# Patient Record
Sex: FEMALE | Race: WHITE | NOT HISPANIC OR LATINO | Employment: OTHER | URBAN - METROPOLITAN AREA
[De-identification: names, ages, dates, MRNs, and addresses within clinical notes are randomized per-mention and may not be internally consistent; named-entity substitution may affect disease eponyms.]

---

## 2017-02-21 ENCOUNTER — CONVERSION ENCOUNTER (OUTPATIENT)
Dept: MAMMOGRAPHY | Facility: HOSPITAL | Age: 67
End: 2017-02-21

## 2017-11-30 ENCOUNTER — CONVERSION ENCOUNTER (OUTPATIENT)
Dept: MAMMOGRAPHY | Facility: HOSPITAL | Age: 67
End: 2017-11-30

## 2018-03-27 ENCOUNTER — CONVERSION ENCOUNTER (OUTPATIENT)
Dept: MAMMOGRAPHY | Facility: HOSPITAL | Age: 68
End: 2018-03-27

## 2018-04-16 ENCOUNTER — CONVERSION ENCOUNTER (OUTPATIENT)
Dept: MAMMOGRAPHY | Facility: HOSPITAL | Age: 68
End: 2018-04-16

## 2018-04-30 ENCOUNTER — CONVERSION ENCOUNTER (OUTPATIENT)
Dept: MAMMOGRAPHY | Facility: HOSPITAL | Age: 68
End: 2018-04-30

## 2018-05-10 ENCOUNTER — OFFICE VISIT CONVERTED (OUTPATIENT)
Dept: OTHER | Facility: HOSPITAL | Age: 68
End: 2018-05-10
Attending: SURGERY

## 2018-05-24 ENCOUNTER — OFFICE VISIT CONVERTED (OUTPATIENT)
Dept: OTHER | Facility: HOSPITAL | Age: 68
End: 2018-05-24
Attending: SURGERY

## 2018-06-06 ENCOUNTER — OFFICE VISIT CONVERTED (OUTPATIENT)
Dept: SURGERY | Facility: CLINIC | Age: 68
End: 2018-06-06
Attending: SURGERY

## 2018-06-20 ENCOUNTER — OFFICE VISIT CONVERTED (OUTPATIENT)
Dept: SURGERY | Facility: CLINIC | Age: 68
End: 2018-06-20
Attending: SURGERY

## 2018-06-27 ENCOUNTER — OFFICE VISIT CONVERTED (OUTPATIENT)
Dept: SURGERY | Facility: CLINIC | Age: 68
End: 2018-06-27
Attending: SURGERY

## 2018-06-29 ENCOUNTER — OFFICE VISIT CONVERTED (OUTPATIENT)
Dept: CARDIOLOGY | Facility: CLINIC | Age: 68
End: 2018-06-29
Attending: INTERNAL MEDICINE

## 2018-07-11 ENCOUNTER — OFFICE VISIT CONVERTED (OUTPATIENT)
Dept: SURGERY | Facility: CLINIC | Age: 68
End: 2018-07-11
Attending: SURGERY

## 2018-10-01 ENCOUNTER — OFFICE VISIT CONVERTED (OUTPATIENT)
Dept: CARDIOLOGY | Facility: CLINIC | Age: 68
End: 2018-10-01
Attending: INTERNAL MEDICINE

## 2019-02-07 ENCOUNTER — HOSPITAL ENCOUNTER (OUTPATIENT)
Dept: GENERAL RADIOLOGY | Facility: HOSPITAL | Age: 69
Discharge: HOME | End: 2019-02-07
Attending: NURSE PRACTITIONER

## 2019-02-13 ENCOUNTER — OFFICE VISIT CONVERTED (OUTPATIENT)
Dept: ONCOLOGY | Facility: HOSPITAL | Age: 69
End: 2019-02-13
Attending: NURSE PRACTITIONER

## 2019-02-13 ENCOUNTER — HOSPITAL ENCOUNTER (OUTPATIENT)
Dept: ONCOLOGY | Facility: HOSPITAL | Age: 69
Discharge: HOME | End: 2019-02-13
Attending: NURSE PRACTITIONER

## 2019-05-11 ENCOUNTER — HOSPITAL ENCOUNTER (OUTPATIENT)
Dept: URGENT CARE | Facility: CLINIC | Age: 69
Discharge: HOME | End: 2019-05-11
Attending: EMERGENCY MEDICINE

## 2019-07-03 ENCOUNTER — OFFICE VISIT CONVERTED (OUTPATIENT)
Dept: ORTHOPEDIC SURGERY | Facility: CLINIC | Age: 69
End: 2019-07-03
Attending: ORTHOPAEDIC SURGERY

## 2019-08-26 ENCOUNTER — HOSPITAL ENCOUNTER (OUTPATIENT)
Dept: MAMMOGRAPHY | Facility: HOSPITAL | Age: 69
Discharge: HOME | End: 2019-08-26
Attending: NURSE PRACTITIONER

## 2019-10-22 ENCOUNTER — OFFICE VISIT CONVERTED (OUTPATIENT)
Dept: CARDIOLOGY | Facility: CLINIC | Age: 69
End: 2019-10-22
Attending: INTERNAL MEDICINE

## 2019-11-04 ENCOUNTER — OFFICE VISIT CONVERTED (OUTPATIENT)
Dept: ONCOLOGY | Facility: HOSPITAL | Age: 69
End: 2019-11-04
Attending: NURSE PRACTITIONER

## 2019-11-04 ENCOUNTER — HOSPITAL ENCOUNTER (OUTPATIENT)
Dept: ONCOLOGY | Facility: HOSPITAL | Age: 69
Discharge: HOME | End: 2019-11-04
Attending: NURSE PRACTITIONER

## 2019-11-04 LAB
ALBUMIN SERPL-MCNC: 4.6 G/DL (ref 3.5–5)
ALBUMIN/GLOB SERPL: 1.8 {RATIO} (ref 1.4–2.6)
ALP SERPL-CCNC: 62 U/L (ref 43–160)
ALT SERPL-CCNC: 9 U/L (ref 10–40)
ANION GAP SERPL CALC-SCNC: 20 MMOL/L (ref 8–19)
AST SERPL-CCNC: 18 U/L (ref 15–50)
BASOPHILS # BLD AUTO: 0.04 10*3/UL (ref 0–0.2)
BASOPHILS NFR BLD AUTO: 0.7 % (ref 0–3)
BILIRUB SERPL-MCNC: 0.31 MG/DL (ref 0.2–1.3)
BUN SERPL-MCNC: 13 MG/DL (ref 5–25)
BUN/CREAT SERPL: 17 {RATIO} (ref 6–20)
CALCIUM SERPL-MCNC: 9.2 MG/DL (ref 8.7–10.4)
CHLORIDE SERPL-SCNC: 100 MMOL/L (ref 99–111)
CONV ABS IMM GRAN: 0.01 10*3/UL (ref 0–0.2)
CONV CO2: 25 MMOL/L (ref 22–32)
CONV IMMATURE GRAN: 0.2 % (ref 0–1.8)
CONV TOTAL PROTEIN: 7.2 G/DL (ref 6.3–8.2)
CREAT UR-MCNC: 0.77 MG/DL (ref 0.5–0.9)
DEPRECATED RDW RBC AUTO: 43 FL (ref 36.4–46.3)
EOSINOPHIL # BLD AUTO: 0.13 10*3/UL (ref 0–0.7)
EOSINOPHIL # BLD AUTO: 2.3 % (ref 0–7)
ERYTHROCYTE [DISTWIDTH] IN BLOOD BY AUTOMATED COUNT: 12.6 % (ref 11.7–14.4)
GFR SERPLBLD BASED ON 1.73 SQ M-ARVRAT: >60 ML/MIN/{1.73_M2}
GLOBULIN UR ELPH-MCNC: 2.6 G/DL (ref 2–3.5)
GLUCOSE SERPL-MCNC: 124 MG/DL (ref 65–99)
HCT VFR BLD AUTO: 36.1 % (ref 37–47)
HGB BLD-MCNC: 12.1 G/DL (ref 12–16)
LDH SERPL-CCNC: 182 U/L (ref 120–240)
LYMPHOCYTES # BLD AUTO: 1.65 10*3/UL (ref 1–5)
LYMPHOCYTES NFR BLD AUTO: 28.8 % (ref 20–45)
MCH RBC QN AUTO: 31.3 PG (ref 27–31)
MCHC RBC AUTO-ENTMCNC: 33.5 G/DL (ref 33–37)
MCV RBC AUTO: 93.3 FL (ref 81–99)
MONOCYTES # BLD AUTO: 0.6 10*3/UL (ref 0.2–1.2)
MONOCYTES NFR BLD AUTO: 10.5 % (ref 3–10)
NEUTROPHILS # BLD AUTO: 3.29 10*3/UL (ref 2–8)
NEUTROPHILS NFR BLD AUTO: 57.5 % (ref 30–85)
NRBC CBCN: 0 % (ref 0–0.7)
OSMOLALITY SERPL CALC.SUM OF ELEC: 294 MOSM/KG (ref 273–304)
PLATELET # BLD AUTO: 296 10*3/UL (ref 130–400)
PMV BLD AUTO: 9.9 FL (ref 9.4–12.3)
POTASSIUM SERPL-SCNC: 3.9 MMOL/L (ref 3.5–5.3)
RBC # BLD AUTO: 3.87 10*6/UL (ref 4.2–5.4)
SODIUM SERPL-SCNC: 141 MMOL/L (ref 135–147)
WBC # BLD AUTO: 5.72 10*3/UL (ref 4.8–10.8)

## 2019-11-05 LAB
CANCER AG15-3 SERPL-ACNC: 14.4 U/ML (ref 0–25)
CANCER AG27-29 SERPL-ACNC: 13.5 U/ML (ref 0–38.6)

## 2019-11-06 LAB — 1,25(OH)2D3 SERPL-MCNC: 34.1 PG/ML (ref 19.9–79.3)

## 2019-11-11 ENCOUNTER — HOSPITAL ENCOUNTER (OUTPATIENT)
Dept: MRI IMAGING | Facility: HOSPITAL | Age: 69
Discharge: HOME | End: 2019-11-11
Attending: NURSE PRACTITIONER

## 2019-11-26 ENCOUNTER — HOSPITAL ENCOUNTER (OUTPATIENT)
Dept: ONCOLOGY | Facility: HOSPITAL | Age: 69
Discharge: HOME | End: 2019-11-26
Attending: NURSE PRACTITIONER

## 2019-11-26 ENCOUNTER — OFFICE VISIT CONVERTED (OUTPATIENT)
Dept: ONCOLOGY | Facility: HOSPITAL | Age: 69
End: 2019-11-26
Attending: NURSE PRACTITIONER

## 2019-12-02 ENCOUNTER — CONVERSION ENCOUNTER (OUTPATIENT)
Dept: OTHER | Facility: HOSPITAL | Age: 69
End: 2019-12-02

## 2019-12-02 ENCOUNTER — OFFICE VISIT CONVERTED (OUTPATIENT)
Dept: OTOLARYNGOLOGY | Facility: CLINIC | Age: 69
End: 2019-12-02
Attending: OTOLARYNGOLOGY

## 2019-12-20 ENCOUNTER — HOSPITAL ENCOUNTER (OUTPATIENT)
Dept: CT IMAGING | Facility: HOSPITAL | Age: 69
Discharge: HOME | End: 2019-12-20
Attending: OTOLARYNGOLOGY

## 2019-12-20 LAB
CREAT BLD-MCNC: 0.7 MG/DL (ref 0.6–1.4)
GFR SERPLBLD BASED ON 1.73 SQ M-ARVRAT: >60 ML/MIN/{1.73_M2}

## 2020-06-17 ENCOUNTER — HOSPITAL ENCOUNTER (OUTPATIENT)
Dept: OTHER | Facility: HOSPITAL | Age: 70
Discharge: HOME | End: 2020-06-17
Attending: NURSE PRACTITIONER

## 2020-06-26 ENCOUNTER — OFFICE VISIT CONVERTED (OUTPATIENT)
Dept: ORTHOPEDIC SURGERY | Facility: CLINIC | Age: 70
End: 2020-06-26
Attending: ORTHOPAEDIC SURGERY

## 2020-07-23 ENCOUNTER — CONVERSION ENCOUNTER (OUTPATIENT)
Dept: GASTROENTEROLOGY | Facility: HOSPITAL | Age: 70
End: 2020-07-23

## 2020-07-24 LAB — SARS-COV-2 RNA SPEC QL NAA+PROBE: NOT DETECTED

## 2020-07-27 ENCOUNTER — HOSPITAL ENCOUNTER (OUTPATIENT)
Dept: GASTROENTEROLOGY | Facility: HOSPITAL | Age: 70
Setting detail: HOSPITAL OUTPATIENT SURGERY
Discharge: HOME | End: 2020-07-27
Attending: INTERNAL MEDICINE

## 2020-07-27 ENCOUNTER — PROCEDURE VISIT CONVERTED (OUTPATIENT)
Dept: OTHER | Facility: HOSPITAL | Age: 70
End: 2020-07-27
Attending: INTERNAL MEDICINE

## 2020-07-27 LAB — GLUCOSE BLD-MCNC: 102 MG/DL (ref 65–99)

## 2020-08-11 ENCOUNTER — HOSPITAL ENCOUNTER (OUTPATIENT)
Dept: LAB | Facility: HOSPITAL | Age: 70
Discharge: HOME | End: 2020-08-11
Attending: NURSE PRACTITIONER

## 2020-08-11 ENCOUNTER — HOSPITAL ENCOUNTER (OUTPATIENT)
Dept: GENERAL RADIOLOGY | Facility: HOSPITAL | Age: 70
Discharge: HOME | End: 2020-08-11
Attending: NURSE PRACTITIONER

## 2020-08-11 LAB
ALBUMIN SERPL-MCNC: 4.3 G/DL (ref 3.5–5)
ALBUMIN/GLOB SERPL: 1.7 {RATIO} (ref 1.4–2.6)
ALP SERPL-CCNC: 74 U/L (ref 43–160)
ALT SERPL-CCNC: 11 U/L (ref 10–40)
ANION GAP SERPL CALC-SCNC: 19 MMOL/L (ref 8–19)
AST SERPL-CCNC: 18 U/L (ref 15–50)
BASOPHILS # BLD AUTO: 0.05 10*3/UL (ref 0–0.2)
BASOPHILS NFR BLD AUTO: 0.7 % (ref 0–3)
BILIRUB SERPL-MCNC: 0.19 MG/DL (ref 0.2–1.3)
BUN SERPL-MCNC: 11 MG/DL (ref 5–25)
BUN/CREAT SERPL: 13 {RATIO} (ref 6–20)
CALCIUM SERPL-MCNC: 9.8 MG/DL (ref 8.7–10.4)
CHLORIDE SERPL-SCNC: 103 MMOL/L (ref 99–111)
CONV ABS IMM GRAN: 0.03 10*3/UL (ref 0–0.2)
CONV CO2: 24 MMOL/L (ref 22–32)
CONV IMMATURE GRAN: 0.4 % (ref 0–1.8)
CONV TOTAL PROTEIN: 6.8 G/DL (ref 6.3–8.2)
CREAT UR-MCNC: 0.85 MG/DL (ref 0.5–0.9)
DEPRECATED RDW RBC AUTO: 47.4 FL (ref 36.4–46.3)
EOSINOPHIL # BLD AUTO: 0.15 10*3/UL (ref 0–0.7)
EOSINOPHIL # BLD AUTO: 2.2 % (ref 0–7)
ERYTHROCYTE [DISTWIDTH] IN BLOOD BY AUTOMATED COUNT: 13.3 % (ref 11.7–14.4)
GFR SERPLBLD BASED ON 1.73 SQ M-ARVRAT: >60 ML/MIN/{1.73_M2}
GLOBULIN UR ELPH-MCNC: 2.5 G/DL (ref 2–3.5)
GLUCOSE SERPL-MCNC: 126 MG/DL (ref 65–99)
HCT VFR BLD AUTO: 35.9 % (ref 37–47)
HGB BLD-MCNC: 11.4 G/DL (ref 12–16)
LYMPHOCYTES # BLD AUTO: 1.42 10*3/UL (ref 1–5)
LYMPHOCYTES NFR BLD AUTO: 20.9 % (ref 20–45)
MCH RBC QN AUTO: 30.6 PG (ref 27–31)
MCHC RBC AUTO-ENTMCNC: 31.8 G/DL (ref 33–37)
MCV RBC AUTO: 96.2 FL (ref 81–99)
MONOCYTES # BLD AUTO: 0.66 10*3/UL (ref 0.2–1.2)
MONOCYTES NFR BLD AUTO: 9.7 % (ref 3–10)
NEUTROPHILS # BLD AUTO: 4.5 10*3/UL (ref 2–8)
NEUTROPHILS NFR BLD AUTO: 66.1 % (ref 30–85)
NRBC CBCN: 0 % (ref 0–0.7)
OSMOLALITY SERPL CALC.SUM OF ELEC: 295 MOSM/KG (ref 273–304)
PLATELET # BLD AUTO: 271 10*3/UL (ref 130–400)
PMV BLD AUTO: 10.6 FL (ref 9.4–12.3)
POTASSIUM SERPL-SCNC: 4.2 MMOL/L (ref 3.5–5.3)
RBC # BLD AUTO: 3.73 10*6/UL (ref 4.2–5.4)
SODIUM SERPL-SCNC: 142 MMOL/L (ref 135–147)
WBC # BLD AUTO: 6.81 10*3/UL (ref 4.8–10.8)

## 2020-08-24 ENCOUNTER — TELEPHONE CONVERTED (OUTPATIENT)
Dept: GASTROENTEROLOGY | Facility: CLINIC | Age: 70
End: 2020-08-24
Attending: NURSE PRACTITIONER

## 2020-10-26 ENCOUNTER — CONVERSION ENCOUNTER (OUTPATIENT)
Dept: CARDIOLOGY | Facility: CLINIC | Age: 70
End: 2020-10-26

## 2020-10-26 ENCOUNTER — OFFICE VISIT CONVERTED (OUTPATIENT)
Dept: CARDIOLOGY | Facility: CLINIC | Age: 70
End: 2020-10-26
Attending: INTERNAL MEDICINE

## 2020-10-27 ENCOUNTER — HOSPITAL ENCOUNTER (OUTPATIENT)
Dept: OTHER | Facility: HOSPITAL | Age: 70
Discharge: HOME | End: 2020-10-27
Attending: INTERNAL MEDICINE

## 2020-10-27 LAB
CHOLEST SERPL-MCNC: 139 MG/DL (ref 107–200)
CHOLEST/HDLC SERPL: 2.8 {RATIO} (ref 3–6)
HDLC SERPL-MCNC: 50 MG/DL (ref 40–60)
LDLC SERPL CALC-MCNC: 62 MG/DL (ref 70–100)
TRIGL SERPL-MCNC: 134 MG/DL (ref 40–150)
VLDLC SERPL-MCNC: 27 MG/DL (ref 5–37)

## 2020-11-02 ENCOUNTER — CONVERSION ENCOUNTER (OUTPATIENT)
Dept: CARDIOLOGY | Facility: CLINIC | Age: 70
End: 2020-11-02
Attending: INTERNAL MEDICINE

## 2020-12-18 ENCOUNTER — HOSPITAL ENCOUNTER (OUTPATIENT)
Dept: URGENT CARE | Facility: CLINIC | Age: 70
Discharge: HOME | End: 2020-12-18

## 2021-03-31 ENCOUNTER — OFFICE VISIT CONVERTED (OUTPATIENT)
Dept: ORTHOPEDIC SURGERY | Facility: CLINIC | Age: 71
End: 2021-03-31
Attending: ORTHOPAEDIC SURGERY

## 2021-05-10 NOTE — H&P
History and Physical      Patient Name: Camilla Navas   Patient ID: [id number]   Sex: Female   YOB: 1950    Primary Care Provider: Tamanna Chavez   Referring Provider: Almita CLIFFORD    Visit Date: June 26, 2020    Provider: Ad Mcclure MD   Location: [facility]   Location Address: [address] [place] [address]  [id number]   Location Phone: [phone]          Chief Complaint  · Right Hip Pain      History Of Present Illness  Camilla Navas is a 69 year old /White female who presents for right hip pain. She points to the outside part of her hip as the source of all of her pain. She denies any trauma to it. She was just walking along and felt a sharp pain there, and now, it is staying. This happened about a month ago. She has had X-rays, which shows calcifications at the greater trochanter consistent with chronic bursitis and mild-to-moderate osteoarthritis.       Past Medical History  Anemia, Unspecified; Arthritis; Asthma; Atrophic vaginitis; Bladder Disorder; Breast Cancer, Female; CAD (coronary artery disease); Cancer; Colon Polyps; Cough; DDD (degenerative disc disease), cervical; Depression; Diabetes; Diabetes Mellitus, Type II; Diverticula of Intestine; Dry mouth; Dysuria; Ear pain; GERD; Hearing loss; Heart Disease; Hematuria; High cholesterol; Hypertension; Limb Swelling; Mandibular mass; Reflux; Renal Cyst; Renal cysts, acquired, bilateral; Renal mass; Vaginal itching         Past Surgical History  Arthroscopic knee surgery, right; Bladder Surgery; Breast biopsy, right breast; Cardiac Catherization; Cholecystectomy; Colonoscopy; EGD; Eye Implant; Gallbladder; Hernia; Hiatal hernia repair; Hysterectomy; Knee surgery; Lumpectomy, right breast; Tonsillectomy; Ventral Hernia         Medication List  amlodipine 10 mg oral tablet; aspirin 81 mg oral tablet,delayed release (DR/EC); atenolol 50 mg oral tablet; gabapentin 300 mg oral capsule; hydrochlorothiazide 25 mg  "oral tablet; metformin 1,000 mg oral tablet; naproxen 500 mg oral tablet; Norco 5-325 mg oral tablet; Norvasc 10 mg oral tablet; NuLYTELY with Flavor Packs 420 gram oral recon soln; Pravachol 80 mg oral tablet; Tylenol Arthritis Pain 650 mg oral tablet extended release; vitamin E 400 unit oral capsule; Zofran 4 mg oral tablet; Zoloft 100 mg oral tablet         Allergy List  benazepril; ezetimibe; fenofibrate; niacin; Niaspan Extended-Release; Relafen; simvastatin; Tricor; Vytorin 10-10         Family Medical History  Cancer, Unspecified; Diabetes, unspecified type; Family history of uterine cancer         Social History  Alcohol Use (Never); Claustophobic (Unknown); Homemaker.; lives with spouse; .; Recreational Drug Use (Never); Tobacco (Never)         Review of Systems  · Constitutional  o Denies  o : fever, chills, weight loss  · Cardiovascular  o Denies  o : chest pain, shortness of breath  · Gastrointestinal  o Denies  o : liver disease, heartburn, nausea, blood in stools  · Genitourinary  o Denies  o : painful urination, blood in urine  · Integument  o Denies  o : rash, itching  · Neurologic  o Denies  o : headache, weakness, loss of consciousness  · Musculoskeletal  o Admits  o : painful, swollen joints  · Psychiatric  o Admits  o : depression  o Denies  o : drug/alcohol addiction, anxiety      Vitals  Date Time BP Position Site L\R Cuff Size HR RR TEMP (F) WT  HT  BMI kg/m2 BSA m2 O2 Sat        06/26/2020 01:57 PM         184lbs 0oz 5'  6\" 29.7 1.97           Physical Examination  · Constitutional  o Appearance  o : well developed, well-nourished, no obvious deformities present  · Head and Face  o Head  o :   § Inspection  § : normocephalic  o Face  o :   § Inspection  § : no facial lesions  · Eyes  o Conjunctivae  o : conjunctivae normal  o Sclerae  o : sclerae white  · Ears, Nose, Mouth and Throat  o Ears  o :   § External Ears  § : appearance within normal limits  § Hearing  § : " intact  o Nose  o :   § External Nose  § : appearance normal  · Neck  o Inspection/Palpation  o : normal appearance  o Range of Motion  o : full range of motion  · Respiratory  o Respiratory Effort  o : breathing unlabored  o Inspection of Chest  o : normal appearance  o Auscultation of Lungs  o : no audible wheezing or rales  · Cardiovascular  o Heart  o : regular rate  · Gastrointestinal  o Abdominal Examination  o : soft and non-tender  · Skin and Subcutaneous Tissue  o General Inspection  o : intact, no rashes  · Psychiatric  o General  o : Alert and oriented x3  o Judgement and Insight  o : judgment and insight intact  o Mood and Affect  o : mood normal, affect appropriate  · Right Hip  o Inspection  o : Appearance of the hip is normal compared bilaterally. No atrophy or skin discoloration. Exquisite tenderness over the trochanteric bursa. Full range of motion. No groin pain with internal/external rotation. Her knee and ankle is nontender with full range of motion. Good strength of quadriceps, hamstrings, dorsiflexors, and plantar flexors. Sensation is grossly intact. Neurovascularly intact. Calf supple; no signs of DVT.   · Injection Note/Aspiration Note  o Site  o : right hip  o Procedure  o : After educating the patient, patient gave consent for procedure. After using Chloraprep, the injection was given. The patient tolerated the procedure well.  o Medication  o : 80 mg of DepoMedrol with 9cc of 1% Lidocaine  · Imaging  o Imaging  o : X-rays performed at [facility] on 06/17/2020 concluded no acute fracture or dislocation; mild-to-moderate osteoarthritic degenerative changes of the bilateral hips noted; degenerative changes of the SI joints, pubic symphysis, and lower lumbosacral spine noted; sacral alae intact; SI joints intact; phleboliths noted in the pelvis; vascular calcifications observed.           Assessment  · Primary osteoarthritis of right hip     715.15/M16.11  · Trochanteric bursitis of right  hip     726.5/M70.61  · Right Pain: Hip     719.45/M25.559      Plan  · Orders  o Depo-Medrol injection 80mg () - 719.45/M25.559 - 06/26/2020   Lot [id number] Exp 06 2021 [employer] Administered by PATRICK Mcclure MD  o Hip Intra-articular Injection without US Guidance [facility] ([id number]) - 719.45/M25.559 - 06/26/2020   Lot [id number] Exp 07 01 2021 [employer] Administered by PATRICK Mcclure MD  · Medications  o Medications have been Reconciled  o Transition of Care or Provider Policy  · Instructions  o Reviewed the patient's Past Medical, Social, and Family history as well as the ROS at today's visit, no changes.  o Call or return if worsening symptoms.  o Exercise handout given.  o Reviewed Xrays.  o This note was transcribed by Eileen hurd.  o Discussed the risks and benefits of an injection. Given a home exercise program and a prescription for physical therapy at [facility] if she is failing to improve. Follow up as needed.             Electronically Signed by: Eileen Small-, Other -Author on June 27, 2020 04:14:31 PM  Electronically Co-signed by: Ad Mcclure MD -Reviewer on June 29, 2020 09:30:43 AM

## 2021-05-10 NOTE — H&P
History and Physical      Patient Name: Camilla Navas   Patient ID: [id number]   Sex: Female   YOB: 1950        Create Date: 2020              Chief Complaint  · Surgical History and Physical  · Screening Colonoscopy  · Gerd  · EGD      History Of Present Illness  NON-INPATIENT HISTORY AND PHYSICAL  Allergies: benazepril, ezetimibe, fenofibrate, niacin, Niaspan Extended-Release, Relafen, simvastatin, Tricor, and Vytorin 10-10   Chief Complaint/History of Present Illness: Colonoscopy Screening-HX of polyps; EGD-Gerd   Colon Recall: Yes How Lon years   Failed Outpatient Treatment/Contraindications: N/A   Current Medications: aspirin 81 mg oral tablet,delayed release (DR/EC), atenolol 50 mg oral tablet, gabapentin 300 mg oral capsule, hydrochlorothiazide 25 mg oral tablet, metformin 1,000 mg oral tablet, naproxen 500 mg oral tablet, Norvasc 10 mg oral tablet, NuLYTELY with Flavor Packs 420 gram oral recon soln, Pravachol 80 mg oral tablet, Tylenol Arthritis Pain 650 mg oral tablet extended release, vitamin E 400 unit oral capsule, Zofran 4 mg oral tablet, and Zoloft 100 mg oral tablet   Significant Past Medical History: Arthritis, Asthma, Atrophic vaginitis, Breast Cancer, Female, CAD (coronary artery disease), Colon Polyps, Cough, DDD (degenerative disc disease), cervical, Depression, Diabetes mellitus, type II, Diverticula of Intestine, Dry mouth, Dysuria, Ear pain, GERD, Hearing loss, Heart Disease, Hematuria, High cholesterol, Hypertension, Mandibular mass, Renal Cyst, Renal cysts, acquired, bilateral, Renal mass, and Vaginal itching   Significant Family Medical History: Mother diagnosed with Uterine Cancer   Significant Past Surgical History: Arthroscopic knee surgery, right, Bladder Surgery, Breast biopsy, right breast, Cardiac Catherization, Cholecystectomy, Colonoscopy, EGD, Eye Implant, Gallbladder, Hernia, Hiatal hernia repair, Hysterectomy, Knee surgery, Lumpectomy, right breast,  Tonsillectomy, and Ventral Hernia   Previous Colonoscopy: Yes YEAR: 2014 By Whom: Ren Gillette MD   Previous EGD: Yes YEAR: 2014 By Whom: Ren Gillette MD   PHYSICAL EXAM:  Heart: Regular Rate and Rhythm   Lungs: Breathing Unlabored       Past Medical History  Disease Name Date Onset Notes   Anemia, Unspecified --  --    Arthritis --  --    Asthma --  --    Atrophic Vaginitis 01/11/2017 --    Bladder Disorder --  --    Breast Cancer, Female 2001 Right Breast Cancer   CAD (coronary artery disease) --  --    Cancer --  --    Colon Polyps --  --    Cough --  --    DDD (degenerative disc disease), cervical --  --    Depression --  --    Diabetes --  --    Diabetes Mellitus, Type II --  --    Diverticula of Intestine --  --    Dry mouth --  --    Dysuria 01/11/2017 --    Ear pain --  --    GERD --  --    Hearing loss --  --    Heart Disease --  Blockage in Heart   Hematuria --  --    High cholesterol --  --    Hypertension --  --    Limb Swelling --  --    Mandibular mass --  --    Reflux --  --    Renal Cyst --  --    Renal cysts, acquired, bilateral 01/11/2017 --    Renal mass --  --    Vaginal itching 01/11/2017 --          Past Surgical History  Procedure Name Date Notes   Arthroscopic knee surgery, right --  --    Bladder Surgery --  --    Breast biopsy, right breast --  --    Cardiac Catherization --  --    Cholecystectomy --  --    Colonoscopy --  --    EGD --  --    Eye Implant --  yes   Gallbladder --  --    Hernia --  --    Hiatal hernia repair --  --    Hysterectomy --  --    Knee surgery --  Right   Lumpectomy, right breast --  --    Tonsillectomy --  --    Ventral Hernia --  --          Medication List  Name Date Started Instructions   amlodipine 10 mg oral tablet 04/27/2020 TAKE 1 TABLET BY MOUTH EVERY DAY   aspirin 81 mg oral tablet,delayed release (DR/EC)  take 1 tablet (81 mg) by oral route once daily   atenolol 50 mg oral tablet 04/27/2020 TAKE 1 TABLET BY MOUTH EVERY DAY   gabapentin 300 mg oral  capsule  take 1 capsule by oral route daily   hydrochlorothiazide 25 mg oral tablet 08/15/2019 TAKE 1 TABLET BY MOUTH EVERY DAY   metformin 1,000 mg oral tablet  take 1 tablet (1,000 mg) by oral route 2 times per day with morning and evening meals   naproxen 500 mg oral tablet  take 1 tablet (500 mg) by oral route every 12 hours with food   Norco 5-325 mg oral tablet 06/26/2020 take 1 tablet by oral route every 8 hours as needed   Norvasc 10 mg oral tablet  take 1 tablet (10 mg) by oral route once daily   NuLYTELY with Flavor Packs 420 gram oral recon soln 03/03/2020 take as directed per your doctors instructions   Pravachol 80 mg oral tablet  --    Tylenol Arthritis Pain 650 mg oral tablet extended release  take 1 tablet by oral route daily   vitamin E 400 unit oral capsule  --    Zofran 4 mg oral tablet 06/06/2018 take 2 tablets (8 mg) by oral route every 12 hours   Zoloft 100 mg oral tablet  --          Allergy List  Allergen Name Date Reaction Notes   benazepril --  --  --    ezetimibe --  --  --    fenofibrate --  --  --    niacin --  --  --    Niaspan Extended-Release --  --  --    Relafen --  --  --    simvastatin --  --  --    Tricor --  --  --    Vytorin 10-10 --  --  --        Allergies Reconciled  Family Medical History  Disease Name Relative/Age Notes   Cancer, Unspecified Mother/   Mother   Diabetes, unspecified type Son/   Son   Family history of uterine cancer Mother/   --          Social History  Finding Status Start/Stop Quantity Notes   Alcohol Use Never --/-- --  does not drink   Claustophobic Unknown --/-- --  yes   Homemaker. --  --/-- --  --    lives with spouse --  --/-- --  --    . --  --/-- --  --    Recreational Drug Use Never --/-- --  no   Tobacco Never --/-- --  never smoker             Assessment  · Preoperative examination     V72.84/Z01.818  · Screening for colon cancer     V76.51/Z12.11  · GERD (gastroesophageal reflux disease)     530.81/K21.9      Plan  · Orders  o Consent  for Esophagogastroduodenoscopy (EGD) and Colonoscopy- Possible risks/complications, benefits, and alternatives to surgical or invasive procedure have been explained to patient and/or legal guardian. - Patient has been evaluated and can tolerate anesthesia and/or sedation. Risks, benefits, and alternatives to anesthesia and sedation have been explained to patient and/or legal guardian. ([id number]) - - 07/27/2020  · Instructions  o ****Surgical Orders****  o ***************  o Outpatient  o ***************  o RISK AND BENEFITS:  o Possible risks/complications, benefits and alternatives to surgical or invasive procedure have been explained to the patient and/or legal guardian.  o Patient has been evaluated and can tolerate anesthesia and/or sedation. Risks, benefits, and alternatives to anesthesia and sedation have been explained to the patient and/or legal guardian.  o ***************  o PREP: Per protocol  o IV: Per Anesthesia  o The above History and Physical Examination has been completed within 30 days of admission.            Electronically Signed by: Agustina Chambers Other -Author on July 27, 2020 10:17:20 AM

## 2021-05-10 NOTE — H&P
History and Physical      Patient Name: Camilla Navas   Patient ID: [id number]   Sex: Female   YOB: 1950    Primary Care Provider: Tamanna Chavez   Referring Provider: Anne CLIFFORD    Visit Date: March 31, 2021    Provider: Ad Mcclure MD   Location: [facility]   Location Address: [address] [place] [address]  [id number]   Location Phone: [phone]          Chief Complaint  · Right Hip Pain      History Of Present Illness  Camilla Navas is a 70 year old /White female who presents today to [facility].      Patient presents today for a follow-up of right hip pain. We have been treating patient for trochanteric bursitis of her right hip. Patient received an injection on 6/26/20 that has provided her some relief of pain. She states she has been having increasing lateral sided hip pain in the past month. She states she does have some groin pain as well.       Past Medical History  Anemia, Unspecified; Arthritis; Asthma; Atrophic vaginitis; Bladder disorder; Breast Cancer, Female; CAD (coronary artery disease); Cancer; Chest pain; Colon Polyps; Cough; DDD (degenerative disc disease), cervical; Depression; Diabetes; Diabetes Mellitus, Type II; Diverticula of Intestine; Dry mouth; Dysuria; Ear pain; GERD; Hearing loss; Heart Disease; Hematuria; High cholesterol; Hyperlipemia; Hypertension; Limb Swelling; Mandibular mass; Reflux; Renal Cyst; Renal cysts, acquired, bilateral; Renal mass; Seasonal allergies; Vaginal itching         Past Surgical History  Arthroscopic knee surgery, right; Bladder Surgery; Breast biopsy, right breast; Cardiac Catherization; Cholecystectomy; Colonoscopy; EGD; Eye Implant; Gallbladder; Hernia; Hiatal hernia repair; Hysterectomy; Knee surgery; Lumpectomy, right breast; Tonsillectomy; Ventral Hernia         Medication List  amlodipine 10 mg oral tablet; aspirin 81 mg oral tablet,delayed release (DR/EC); atenolol 50 mg oral  "tablet; gabapentin 300 mg oral capsule; hydrochlorothiazide 25 mg oral tablet; metformin 1,000 mg oral tablet; Norvasc 10 mg oral tablet; Pravachol 80 mg oral tablet; pravastatin 80 mg oral tablet; Protonix 20 mg oral tablet,delayed release (DR/EC); Tylenol Arthritis Pain 650 mg oral tablet extended release; vitamin E 400 unit oral capsule; Zofran 4 mg oral tablet; Zoloft 100 mg oral tablet         Allergy List  benazepril; ezetimibe; fenofibrate; niacin; Niaspan Extended-Release; Relafen; simvastatin; Tricor; Vytorin 10-10       Allergies Reconciled  Family Medical History  Cancer, Unspecified; Diabetes, unspecified type; No family history of colorectal cancer; Family history of Arthritis; Family history of uterine cancer         Social History  Alcohol Use (Never); Claustophobic (Unknown); Homemaker.; lives with spouse; .; Recreational Drug Use (Never); Tobacco (Never)         Review of Systems  · Constitutional  o Denies  o : fever, chills, weight loss  · Cardiovascular  o Denies  o : chest pain, shortness of breath  · Gastrointestinal  o Denies  o : liver disease, heartburn, nausea, blood in stools  · Genitourinary  o Denies  o : painful urination, blood in urine  · Integument  o Denies  o : rash, itching  · Neurologic  o Denies  o : headache, weakness, loss of consciousness  · Musculoskeletal  o Denies  o : painful, swollen joints  · Psychiatric  o Denies  o : drug/alcohol addiction, anxiety, depression      Vitals  Date Time BP Position Site L\R Cuff Size HR RR TEMP (F) WT  HT  BMI kg/m2 BSA m2 O2 Sat FR L/min FiO2 HC       02/06/2017 02:36 /70 Sitting       202lbs 0oz 5'  6\" 32.6 2.07       03/31/2021 02:34 PM      67 - R   185lbs 16oz 5'  6\" 30.02 1.98 99 %            Physical Examination  · Constitutional  o Appearance  o : well developed, well-nourished, no obvious deformities present  · Head and Face  o Head  o :   § Inspection  § : normocephalic  o Face  o :   § Inspection  § : no facial " lesions  · Eyes  o Conjunctivae  o : conjunctivae normal  o Sclerae  o : sclerae white  · Ears, Nose, Mouth and Throat  o Ears  o :   § External Ears  § : appearance within normal limits  § Hearing  § : intact  o Nose  o :   § External Nose  § : appearance normal  · Neck  o Inspection/Palpation  o : normal appearance  o Range of Motion  o : full range of motion  · Respiratory  o Respiratory Effort  o : breathing unlabored  o Inspection of Chest  o : normal appearance  o Auscultation of Lungs  o : no audible wheezing or rales  · Cardiovascular  o Heart  o : regular rate  · Gastrointestinal  o Abdominal Examination  o : soft and non-tender  · Skin and Subcutaneous Tissue  o General Inspection  o : intact, no rashes  · Psychiatric  o General  o : Alert and oriented x3  o Judgement and Insight  o : judgment and insight intact  o Mood and Affect  o : mood normal, affect appropriate  · Right Hip  o Inspection  o : Appearance of the hip is normal compared bilaterally. No swelling, skin discoloration or atrophy. Tender trochanteric bursa. Full hip range of motion. Good tone of hip flexors, hip extensors, hip adductor, hip abductors. Good strength of quadriceps, hamstrings, dorsiflexors, and plantar flexors. Sensation grossly intact. Neurovascular intact. Full leg raise. No groin pain with hip range of motion.  · Injection Note/Aspiration Note  o Site  o : right hip  o Procedure  o : Procedure: After educating the patient, patient gave consent for procedure. After using Chloraprep, the joint space was injected. The patient tolerated the procedure well.   o Medication  o : 80 mg of DepoMedrol with 9cc of 1% Xylocaine  · In Office Procedures  o View  o : AP/LATERAL  o Site  o : right, hip   o Indication  o : Right hip pain   o Study  o : X-rays ordered, taken in the office, and reviewed today.  o Xray  o : Mild to moderate degenerative changes of the right hip.           Assessment  · Trochanteric bursitis of right  hip     726.5/M70.61  · Right Pain: Hip     719.45/M25.559      Plan  · Orders  o Depo-Medrol injection 80mg () - 719.45/M25.559 - 03/31/2021   Lot [id number] Exp 02 2022 [employer] Administered by PATRICK Mcclure MD  o Hip Intra-articular Injection without US Guidance [facility] ([id number]) - 719.45/M25.559 - 03/31/2021   Lot [id number] Exp 04 2024 [employer] Administered by PATRICK Mcclure MD  o Hip (Right) 2 or more views (includes AP Pelvis) [facility]. ([id number]) - 719.45/M25.559 - 03/31/2021  · Medications  o Medications have been Reconciled  o Transition of Care or Provider Policy  · Instructions  o Dr. Mcclure saw and examined the patient and agrees with plan.   o X-rays reviewed by Dr. Mcclure.  o Reviewed the patient's Past Medical, Social, and Family history as well as the ROS at today's visit, no changes.  o Call or return if worsening symptoms.  o Discussed treatment plans with the patient. Patient opted for a right hip injection and tolerated this procedure well. She was given a prescription for physical therapy if she wishes to attend.  o The above service was scribed by Belkis Madrid on my behalf and I attest to the accuracy of the note. [email or web address]            Electronically Signed by: Belkis Madrid-, Other -Author on April 1, 2021 02:55:49 PM  Electronically Co-signed by: Ad Mcclure MD -Reviewer on April 9, 2021 09:36:19 PM

## 2021-05-10 NOTE — PROCEDURES
"   Procedure Note      Patient Name: Camilla Navas   Patient ID: [id number]   Sex: Female   YOB: 1950    Primary Care Provider: Tamanna Chavez   Referring Provider: Anne CLIFFORD    Visit Date: November 2, 2020    Provider: Umesh Guadarrama MD   [place] [facility]   Location Address: [address] [place] [address], [address]   LENARD Spears  [id number]   Location Phone: [phone]          FINAL REPORT   TRANSTHORACIC ECHOCARDIOGRAM REPORT    Diagnosis: Coronary artery disease   Height: 5'6\" Weight: 185 B/P: 142/76 BSA: 1.9   Tech: JTW   MEASUREMENTS:  RVID (Diastole) : RVID. (NORMAL: 0.7 to 2.4 cm max)   LVID (Systole): 3.7 cm (Diastole): 5.3 cm . (NORMAL: 3.7 - 5.4 cm)   Posterior Wall Thickness (Diastole): 1.2 cm. (NORMAL: 0.8 - 1.1 cm)   Septal Thickness (Diastole): 1.2 cm. (NORMAL: 0.7 - 1.2 cm)   LAID (Systole): 3.7 cm. (NORMAL: 1.9 - 3.8 cm)   Aortic Root Diameter (Diastole): 3.0 cm. (NORMAL: 2.0 - 3.7 cm)   COMMENTS:  The patient underwent 2-D, M-Mode, and Doppler examination, including pulse-wave, continuous-wave, and color-flow analysis; the study is technically adequate.   FINDINGS:  MITRAL VALVE: Normal in appearance, opens well. No evidence of mitral valve prolapse. No mitral stenosis. Mild to moderate mitral regurgitation noted. The MR jet is eccentrically directed.   AORTIC VALVE: Normal trileaflet aortic valve, opens well. No evidence of aortic stenosis or regurgitation on Doppler examination.   TRICUSPID VALVE: Normal in appearance, opens well. Mild tricuspid regurgitation. Pulmonary artery systolic pressure within normal limits.   PULMONIC VALVE: Grossly normal.   AORTIC ROOT: Normal in size with adequate motion.   LEFT ATRIUM: Mild left atrial enlargement. No intracavitary masses or clots seen.   LEFT VENTRICLE: The left ventricular chamber size is normal. There is mild concentric left ventricular hypertrophy. Left ventricular systolic function is normal. Estimated LV " ejection fraction is 55-60%. There are no regional wall motion abnormalities. There is grade 2 diastolic dysfunction, pseudonormal pattern of the left ventricle.   RIGHT VENTRICLE: Normal size and function.   RIGHT ATRIUM: Normal in size.   PERICARDIUM: No evidence of pericardial effusion.   INFERIOR VENA CAVA: Measures 1.2 cm in diameter and there is more than 50% collapse during inspiration.   DOPPLER: E/A ratio is 1.5. DT= 142 msec. IVRT is 116 msec. E/E' is 15.   Faxed: 11/05/2020      CONCLUSION:  1.  Normal left ventricular systolic function with estimated LV ejection fraction of 55-60%.   2.  Mild concentric left ventricular hypertrophy.   3.  Grade 2 diastolic dysfunction of the left ventricle.   4.  Mild left atrial enlargement.   5.  Mild to moderate mitral regurgitation.   6.  Mild tricuspid regurgitation with normal calculated pulmonary artery systolic pressure.       MD JERILYN Castellanos/woody    This note was transcribed by Adela Henriquez.  [email or web address]/jerilyn  The above service was transcribed by Adela Henriquez, and I attest to the accuracy of the note.  JERILYN                 Electronically Signed by: Elsa Henriquez-, Other -Author on November 5, 2020 03:56:34 PM  Electronically Co-signed by: Umesh Guadarrama MD -Reviewer on November 6, 2020 07:45:03 AM

## 2021-05-13 NOTE — PROGRESS NOTES
Progress Note      Patient Name: Camilla Navas   Patient ID: [id number]   Sex: Female   YOB: 1950    Primary Care Provider: Tamanna Chavez   Referring Provider: Anne CLIFFORD    Visit Date: August 24, 2020    Provider: VENESSA Rose   Location: [facility]   Location Address: [address] [place] [address]  [id number]   Location Phone: [phone]          Chief Complaint  · diarrhea   · burping/gas   · nausea   · ABD pain  · Heartburn      History Of Present Illness  TELEHEALTH TELEPHONE VISIT  Chief Complaint: PT state its a follow up. She is still having nausea, heartburn, when she eats she burps, her stomach feels sore all the time. She has had diarrhea. No other issue at this time.   Camilla Navas is a 69 year old /White female who is presenting for evaluation via telehealth telephone visit. Verbal consent obtained before beginning visit.   Provider spent 8 minutes with the patient during the telehealth visit.   The following staff were present during this visit: Shahnaz MERCADO; Lucian Dick MA   Past Medical History/ Overview of Patient Symptoms     Patient has not been seen in the office since April 2018 and this office visit is not available today.  She was set up for screening EGD and colon--hx Lozada's endo+/bx neg in 2017.     EGD colonoscopy 7/27/2020: Normal esophagus, duodenal biopsy negative, stomach biopsy with gastropathy, previous Nissen seen in the stomach, normal duodenum; good prep, multiple nonbleeding diverticula in the sigmoid, 5 mm polyp in the rectum completely removedadenoma.  Carafate was prescribed.  Patient called August 5 reporting she was having stomach pain and diarrhea she felt it was related to the Carafate.  She had no fever or vomiting but reported symptoms for 1 week.  Advised to stop Carafate we checked a CBC which showed a normal white count but hemoglobin 11.4 with RDW elevated, CMP unremarkable  August 11, 2020  Chest x-ray and abdominal x-ray was negative 8/11/2020.    Currently pt c/o fatigue, nausea. Taking Zofran but not helping. No vomiting. Pt states after a meal she has terrible gas and burping. c/o stomach pain and HB. Taking TUMS. Pt has been on Meloxicam and Naproxen, she stopped Naproxen but didn't know to stop Meloxicam too.    HX Gastroparesis + 1/2017 w 16% remaining at 4 hrs.       Past Medical History  Anemia, Unspecified; Arthritis; Asthma; Atrophic vaginitis; Bladder Disorder; Breast Cancer, Female; CAD (coronary artery disease); Cancer; Colon Polyps; Cough; DDD (degenerative disc disease), cervical; Depression; Diabetes; Diabetes Mellitus, Type II; Diverticula of Intestine; Dry mouth; Dysuria; Ear pain; GERD; Hearing loss; Heart Disease; Hematuria; High cholesterol; Hypertension; Limb Swelling; Mandibular mass; Reflux; Renal Cyst; Renal cysts, acquired, bilateral; Renal mass; Vaginal itching         Past Surgical History  Arthroscopic knee surgery, right; Bladder Surgery; Breast biopsy, right breast; Cardiac Catherization; Cholecystectomy; Colonoscopy; EGD; Eye Implant; Gallbladder; Hernia; Hiatal hernia repair; Hysterectomy; Knee surgery; Lumpectomy, right breast; Tonsillectomy; Ventral Hernia         Medication List  Name Date Started Instructions   amlodipine 10 mg oral tablet 07/31/2020 TAKE 1 TABLET BY MOUTH EVERY DAY   aspirin 81 mg oral tablet,delayed release (DR/EC)  take 1 tablet (81 mg) by oral route once daily   atenolol 50 mg oral tablet 07/31/2020 TAKE 1 TABLET BY MOUTH EVERY DAY   gabapentin 300 mg oral capsule  take 1 capsule by oral route daily   hydrochlorothiazide 25 mg oral tablet 08/15/2019 TAKE 1 TABLET BY MOUTH EVERY DAY   metformin 1,000 mg oral tablet  take 1 tablet (1,000 mg) by oral route 2 times per day with morning and evening meals   Norvasc 10 mg oral tablet  take 1 tablet (10 mg) by oral route once daily   Pravachol 80 mg oral tablet  --    Tylenol Arthritis  Pain 650 mg oral tablet extended release  take 1 tablet by oral route daily   vitamin E 400 unit oral capsule  --    Zofran 4 mg oral tablet 06/06/2018 take 2 tablets (8 mg) by oral route every 12 hours   Zoloft 100 mg oral tablet  --          Allergy List  benazepril; ezetimibe; fenofibrate; niacin; Niaspan Extended-Release; Relafen; simvastatin; Tricor; Vytorin 10-10         Family Medical History  Cancer, Unspecified; Diabetes, unspecified type; No family history of colorectal cancer; Family history of uterine cancer         Social History  Alcohol Use (Never); Claustophobic (Unknown); Homemaker.; lives with spouse; .; Recreational Drug Use (Never); Tobacco (Never)             Assessment  · Gastroparesis     536.3/K31.84  w nausea  · Gastropathy     537.9/K31.9  · Epigastric pain     789.06/R10.13  · Polyp of rectum     569.0/K62.1  · Anemia     285.9/D64.9      Plan  · Orders  o Iron Profile (Iron 79512 TIBC 81513 and Transferrin 17373) (IRONP) - - 08/24/2020  o Ferritin (89491) - - 08/24/2020  o B12 Folate levels (B12FO) - - 08/24/2020  · Medications  o Protonix 20 mg oral tablet,delayed release (DR/EC)   SIG: take 1 tablet (20 mg) by oral route once daily for 90 days   DISP: (90) tablets with 1 refills  Prescribed on 08/24/2020     o meloxicam 15 mg oral tablet   SIG: take 1 tablet by oral route   DISP: (0) tablet with 0 refills  Discontinued on 08/24/2020     o naproxen 500 mg oral tablet   SIG: take 1 tablet (500 mg) by oral route every 12 hours with food   DISP: (0) tablet with 0 refills  Discontinued on 08/24/2020     o Norco 5-325 mg oral tablet   SIG: take 1 tablet by oral route every 8 hours as needed   DISP: (25) tablets with 0 refills  Discontinued on 08/24/2020     o Medications have been Reconciled  o Transition of Care or Provider Policy  · Instructions  o Patient instructed to seek medical attention urgently for new or worsening symptoms.  o Call the office with any concerns or  questions.  o HOLD NSAIDS  o R/W pt small frequent meals, but to go on liquid diet only for a few days to see if nausea improves. Will add PPI as well.   o F/U 2 months            Electronically Signed by: VENESSA Rose -Author on August 24, 2020 11:44:35 AM

## 2021-05-13 NOTE — PROGRESS NOTES
Progress Note      Patient Name: Camilla Navas   Patient ID: [id number]   Sex: Female   YOB: 1950    Primary Care Provider: Tamanna Chavez   Referring Provider: Anne CLIFFORD    Visit Date: October 26, 2020    Provider: Umesh Guadarrama MD   Location: [facility]   Location Address: [address] [place] [address], [address]   LENARD Spears  [id number]   Location Phone: [phone]          Chief Complaint  · Follow-up visit for non-obstructive coronary artery disease and hyperlipidemia       History Of Present Illness  REFERRING CARE PROVIDER: Tamanna Chavez   Camilla Navas is a 70-year-old female with non-obstructive coronary artery disease, hypertension, hyperlipidemia, who is here for her follow-up visit. She reports feeling more weak and tired at this time and has shortness of breath on activities. She denies having any chest pain, tightness or pressure. She will occasionally get some sharp pain in the chest. These symptoms are new and, per patient, getting worse.   PAST MEDICAL HISTORY: (1) Non-obstructive coronary artery disease. Cardiac catheterization done in October 2018 showed a 40% mid LAD lesion and a 40% proximal RCA lesion. (2) Hypertension, difficult to control. (3) Diabetes mellitus, on oral medications. (4) Hyperlipidemia. (5) Recent diagnosis of breast cancer, s/p surgery.   PSYCHOSOCIAL HISTORY: She never used alcohol. She currently uses tobacco.   CURRENT MEDICATIONS: include Norvasc 10 mg daily; ASA 81 mg daily; Atenolol 50 mg daily; HCTZ 25 mg daily; Pravachol 80 mg daily; Gabapentin 300 mg daily; Metformin 1000 mg b.i.d.; Zoloft 100 male denies any; Tylenol Arthritis 650 mg daily; Vitamin E; Zofran p.r.n.; Naproxen 500 mg b.i.d. The dosage and frequency of the medications were reviewed with the patient.       Review of Systems  · Cardiovascular  o Admits  o : shortness of breath while walking or lying flat  o Denies  o : palpitations (fast, fluttering, or skipping  "beats), swelling (feet, ankles, hands), chest pain or angina pectoris   · Respiratory  o Denies  o : chronic or frequent cough, asthma or wheezing      Vitals  Date Time BP Position Site L\R Cuff Size HR RR TEMP (F) WT  HT  BMI kg/m2 BSA m2 O2 Sat FR L/min FiO2 HC       10/26/2020 12:09 /76 Sitting    64 - R   184lbs 16oz 5'  6\" 29.86 1.98             Physical Examination  · Respiratory  o Auscultation of Lungs  o : Clear to auscultation bilaterally. No crackles or rhonchi.  · Cardiovascular  o Heart  o : S1, S2 is normally heard. No S3. No murmur, rubs, or gallops.  · Gastrointestinal  o Abdominal Examination  o : Soft, nontender, nondistended. No free fluid. Bowel sounds heard in all four quadrants.  · Extremities  o Extremities  o : Warm and well perfused. No pitting pedal edema. Distal pulses present.     Labs done on 08/11/2020 show hemoglobin of 11.4, hematocrit 35.9, platelet count 271, WBC 6.81.  Sodium 142, potassium 4.2, BUN 11, creatinine 0.85.  AST 18, ALT 11.           Assessment     ASSESSMENT AND PLAN:    1.  Non-obstructive coronary artery disease:  Currently no angina-like chest pain.  Continue aspirin, statin and       beta blocker.  2.  Hyperlipidemia:  Continue Pravastatin.  Will check lipid panel now and adjust the dose if needed.  3.  Hypertension:  Well controlled.  4.  Will proceed with an echocardiogram to reassess the left ventricular function since patient reports some       shortness of breath on activities.  5.  Will follow up with echocardiogram and lab reports.    MD JERILYN Castellanos/nicolás           This note was transcribed by Galina Castellon.  nicolás/JERILYN  The above service was transcribed by Galina Castellon, and I attest to the accuracy of the note.  JERILYN               Electronically Signed by: Galina Castellon-, -Author on October 28, 2020 04:30:51 AM  Electronically Co-signed by: Umesh Guadarrama MD -Reviewer on October 28, 2020 02:32:21 PM  "

## 2021-05-14 VITALS — WEIGHT: 186 LBS | BODY MASS INDEX: 29.89 KG/M2 | HEIGHT: 66 IN | OXYGEN SATURATION: 99 % | HEART RATE: 67 BPM

## 2021-05-14 VITALS
HEIGHT: 66 IN | WEIGHT: 185 LBS | HEART RATE: 64 BPM | SYSTOLIC BLOOD PRESSURE: 142 MMHG | BODY MASS INDEX: 29.73 KG/M2 | DIASTOLIC BLOOD PRESSURE: 76 MMHG

## 2021-05-15 VITALS
HEIGHT: 66 IN | SYSTOLIC BLOOD PRESSURE: 149 MMHG | OXYGEN SATURATION: 99 % | HEART RATE: 70 BPM | WEIGHT: 184.12 LBS | DIASTOLIC BLOOD PRESSURE: 71 MMHG | BODY MASS INDEX: 29.59 KG/M2

## 2021-05-15 VITALS
SYSTOLIC BLOOD PRESSURE: 130 MMHG | DIASTOLIC BLOOD PRESSURE: 68 MMHG | WEIGHT: 184 LBS | HEART RATE: 64 BPM | BODY MASS INDEX: 29.57 KG/M2 | HEIGHT: 66 IN

## 2021-05-15 VITALS — HEART RATE: 71 BPM | WEIGHT: 181.5 LBS | BODY MASS INDEX: 29.17 KG/M2 | HEIGHT: 66 IN | OXYGEN SATURATION: 98 %

## 2021-05-15 VITALS — BODY MASS INDEX: 29.57 KG/M2 | HEIGHT: 66 IN | WEIGHT: 184 LBS

## 2021-05-16 ENCOUNTER — HOSPITAL ENCOUNTER (OUTPATIENT)
Dept: URGENT CARE | Facility: CLINIC | Age: 71
Discharge: HOME | End: 2021-05-16
Attending: EMERGENCY MEDICINE

## 2021-05-16 VITALS — RESPIRATION RATE: 16 BRPM | BODY MASS INDEX: 30.53 KG/M2 | WEIGHT: 190 LBS | HEIGHT: 66 IN

## 2021-05-16 VITALS — WEIGHT: 190 LBS | BODY MASS INDEX: 30.53 KG/M2 | HEIGHT: 66 IN | RESPIRATION RATE: 16 BRPM

## 2021-05-16 VITALS
HEART RATE: 60 BPM | WEIGHT: 181 LBS | DIASTOLIC BLOOD PRESSURE: 60 MMHG | BODY MASS INDEX: 29.09 KG/M2 | HEIGHT: 66 IN | SYSTOLIC BLOOD PRESSURE: 128 MMHG

## 2021-05-16 VITALS
HEIGHT: 66 IN | BODY MASS INDEX: 29.25 KG/M2 | WEIGHT: 182 LBS | SYSTOLIC BLOOD PRESSURE: 146 MMHG | DIASTOLIC BLOOD PRESSURE: 72 MMHG | HEART RATE: 70 BPM

## 2021-05-16 VITALS — WEIGHT: 190 LBS | BODY MASS INDEX: 30.53 KG/M2 | RESPIRATION RATE: 16 BRPM | HEIGHT: 66 IN

## 2021-05-16 VITALS — BODY MASS INDEX: 30.22 KG/M2 | RESPIRATION RATE: 14 BRPM | WEIGHT: 188 LBS | HEIGHT: 66 IN

## 2021-05-16 VITALS — HEIGHT: 66 IN | WEIGHT: 182 LBS | BODY MASS INDEX: 29.25 KG/M2 | RESPIRATION RATE: 16 BRPM

## 2021-05-28 VITALS
OXYGEN SATURATION: 100 % | BODY MASS INDEX: 29.34 KG/M2 | WEIGHT: 182.54 LBS | TEMPERATURE: 97.8 F | OXYGEN SATURATION: 99 % | WEIGHT: 181.44 LBS | HEART RATE: 67 BPM | WEIGHT: 183.2 LBS | OXYGEN SATURATION: 99 % | BODY MASS INDEX: 29.16 KG/M2 | SYSTOLIC BLOOD PRESSURE: 146 MMHG | TEMPERATURE: 97.5 F | TEMPERATURE: 97.8 F | HEART RATE: 66 BPM | HEART RATE: 74 BPM | BODY MASS INDEX: 29.57 KG/M2 | RESPIRATION RATE: 16 BRPM | RESPIRATION RATE: 20 BRPM | HEIGHT: 66 IN | DIASTOLIC BLOOD PRESSURE: 67 MMHG | HEIGHT: 66 IN | DIASTOLIC BLOOD PRESSURE: 56 MMHG | SYSTOLIC BLOOD PRESSURE: 151 MMHG | SYSTOLIC BLOOD PRESSURE: 136 MMHG | RESPIRATION RATE: 16 BRPM | DIASTOLIC BLOOD PRESSURE: 107 MMHG

## 2021-05-28 NOTE — PROGRESS NOTES
Patient: CAMILLA NAVAS     Acct: [id number]     Report: #[id number]  UNIT #: [id number]     : 1950    Encounter Date:2019  PRIMARY CARE: MIGUEL REYES  ***Signed***  --------------------------------------------------------------------------------------------------------------------  NURSE INTAKE      Visit Type      Established Patient Visit            Chief Complaint      BREAST CA            Referring Provider/Copies To      Referring Provider:  SANTOSH TURK      Primary Care Provider:  SANTOSH TURK            History and Present Illness      Past Oncology Illness History      Ms. Camilla Navas is a very pleasant 67 year old  female     presenting after surgery.            -: right breast cancer. Lumpectomy and radiation x 33 treatment.       -2018. Diagnostic mammogram: Indeterminate clustered calcification in     the superior medial right breast.      -2018. Final pathology: Right breast Calcification with stereotactic     core needle biopsy: Invasive ductal carcinoma with 2 mm size. Estrogen receptor     positive 100% strong, Progesterone receptor positive 80% strong, Ki 67: 15%,     HER2/diego is negative: 1+ by IHC. DCIS present. Pattern is solid and cribriform,     low grade, necrosis is not present, Calcifications present       -.  Status post bilateral mastectomy.  Invasive carcinoma of type.      Grade 1.  %.  CT 80%. [id number]. Her 2 not amplified.       -2018. Oncotype DX score 10. No benefit for chemotherapy.       -2018.  Started tamoxifen.            HPI - Oncology Interim       Present for follow up for : Recurrent Right breast cancer: invasive ductal     carcinoma. Bilateral mastectomy. ER, CT hormone positive. Her 12 diego not     amplified.             Recurrent right breast cancer: Presents for follow up for recurrent right breast    cancer. Original diagnosis in right breast. Completed a right  "lumpectomy and t    ook 33 radiation treatments in 2002.             She started Tamoxifen in July of 2018 and continues to take this daily. She     denies any adverse side effects other than a few hot flashes.             She denies any cough, shortness of breath, abdominal pain, or worsening back     pain. She does report some axillary discomfort when she moves her arm. Feels     like a \"click\". Does not necessarily cause pain. I told her we could consider a     breast US to evaluate the site, follow up with surgeon and or consider physical     therapy. She agreed at this time, it is not that big of a discomfort to her and     forgo any scans, PT or follow up with surgeon.            ECOG Performance Status      0            PAST, FAMILY   Past Medical History      Past Medical History:  Arthritis, COPD, Depression, Diabetes Type 2,     Hypertension      Hematology/Oncology (F):  Breast Cancer      Other Genetic/Metabolic      NONE            Past Surgical History      Cholecystectomy, Hysterectomy      HERNIA REPAIR X 2, BLADDER REPAIR            Family History      MOTHER-UTERINE CA            Social History      Marital Status:        Lives independently:  Yes      Number of Children:  2      Occupation:  HOUSEWIFE            Tobacco Use      Tobacco status:  Never smoker            Alcohol Use      Alcohol intake:  None            Substance Use      Substance use:  Denies use            REVIEW OF SYSTEMS      General:  Denies: Appetite Change, Fatigue, Fever, Night Sweats, Weight Gain,     Weight Loss      Eye:  Denies: Blurred Vision, Corrective Lenses, Diplopia, Eye Irritation, Eye     Pain, Eye Redness, Spots in Vision, Vision Loss      ENT:  Denies: Headache, Hearing Loss, Hoarseness, Seizures, Sinus Congestion,     Sore Throat      Cardiovascular:  Denies: Chest Pain, Edema Ankles, Edema Legs, Irregular     Heartbeat, Palpitations      Respiratory:  Denies: Coughing Blood, Productive Cough, " Shortness of Air, Wh    eezing      Gastrointestinal:  Admits: Nausea;          Denies: Bloody Stools, Constipation, Diarrhea, Frequent Heartburn, Problem     Swallowing, Tarry Stools, Unable to Control Bowels, Vomiting      Genitourinary (female):  Denies: Blood in Urine, Decrease Urine Stream, Frequent    Urination, Incontinence, Painful Urination      Musculoskeletal:  Denies: Back Pain, Leg Cramps, Muscle Pain, Muscle Weakness,     Painful Joints, Swollen Joints      Integumentary:  Denies: Bleeds Easily, Bruises Easily, Hair Changes, Jaundice,     Lesions, Mole Changes, Nail Changes, Pigment Changes, Rash, Skin Discoloration      Neurologic:  Denies: Dizziness, Fainting, Numbness\Tingling, Paralysis, Seizures      Psychiatric:  Denies: Anxiety, Confused, Depression, Disoriented, Memory Loss      Hematologic/Lymphatic:  Denies: Bruising, Bleeding, Enlarged Lymph Nodes,     Recurrent Infections, Transfusions      Reproductive:  Admits: Menopause;          Denies: Heavy Periods, Pregnant, Still Menstruating            VITAL SIGNS AND SCORES      Vitals      Height 5 ft 5.75 in / 167 cm      Weight 181 lbs 7.017 oz / 82.3 kg      BSA 1.91 m2      BMI 29.5 kg/m2      Temperature 97.8 F / 36.56 C - Temporal      Pulse 74      Respirations 20      Blood Pressure 136/67 Sitting, Left Arm      Pulse Oximetry 99%, ROOM AIR            Pain Score      Experiencing any pain?:  Yes      Pain Scale Used:  Numerical (UNDER RIGHT ARM)      Pain Intensity:  5            Fatigue Score      Experiencing any fatigue?:  No            EXAM      General Appearance:  Positive for: Alert, Oriented x3, Cooperative      Eye:  Positive for: Moist Conjunctiva, PERRLA, Reactive to Light      HEENT:  Positive for: Oropharynx clear, Dentition      Neck:  Positive for: Full ROM, Supple      Respiratory:  Positive for: CTAB, Normal Respiratory Effort      Abdomen/Gastro:  Positive for: Normal Active Bowel Sounds, Soft;          Negative for:  Distention, Tenderness      Cardiovascular:  Positive for: Peripheral Edema, RRR;          Negative for: Murmur      Skin:  Positive for: Normal Temperature, Normal Texture and Turgor, Normal Tone      Psychiatric:  Positive for: AAO X 3, Appropriate Affect, Intact Judgement      Neurologic:  Positive for: Cranial Ner II-XII Intact;          Negative for: Deep Tendon Reflexes, Headache      Genitourinary:  Negative for: Bladder Distention      Musculoskeletal:  Positive for: Full ROM Lower Extremety, Full ROM Upper     Extremety, Full Muscle Strength      Lower Extremities:  Positive for: Normal Gait and Station, Pedal Pulses Intact,     Pedal Pulses Symetrical      Lymphatic:  Negative for: Axillary, Cervical, Supraclavicular            PREVENTION      Date Influenza Vaccine Given:  Oct 1, 2017      Influenza Vaccine Declined:  No      2 or More Falls Past Year?:  No      Fall Past Year with Injury?:  No      Encouraged to follow-up with:  PCP regarding preventative exams.      Chart initiated by      NADEEM HINOJOSA [facility]            ALLERGY/MEDS      Allergies      Coded Allergies:             FENOFIBRATE,MICRONIZED (Verified  Allergy, Severe, SHORT OF AIR, 10/18/18)           NABUMETONE (Verified  Adverse Reaction, Intermediate, weak, 10/18/18)           BENAZEPRIL (Verified  Adverse Reaction, Unknown, WEAK, 10/18/18)           EZETIMIBE (Verified  Adverse Reaction, Unknown, NAUSEA , 10/18/18)           NIACIN (Verified  Adverse Reaction, Unknown, RASH, 10/18/18)           SIMVASTATIN (Verified  Adverse Reaction, Unknown, NAUSEA, 10/18/18)            Medications      Last Reconciled on 2/13/19 15:43 by VICKIE MCLEAN      Tamoxifen Citrate (Tamoxifen*) 20 Mg Tab      20 MG PO QDAY, #30 TAB 5 Refills         Prov: VICKIE MCLEAN onc         2/13/19       Pravastatin Sodium (Pravachol*) 80 Mg Tablet      80 MG PO QDAY, TAB         Reported         10/18/18       Cyanocobalamin (Vitamin B-12*) 2,500 Mcg Tab.subl       2500 MCG PO QDAY, #30 TAB.SL 2 Refills         Prov: Hany Santos         8/12/18       Atenolol (ATENOLOL) 50 Mg Tablet      50 MG PO QDAY, #30 TAB 0 Refills         Reported         5/30/18       Sertraline HCl (Sertraline*) 100 Mg Tablet      100 MG PO QDAY, #30 TAB 0 Refills         Reported         5/30/18       Famotidine (Famotidine) 20 Mg Tablet      20 MG PO HS for 30 Days, #30 TAB 0 Refills         Reported         5/30/18       MDI-Albuterol (Ventolin HFA) 8 Gm Hfa.aer.ad      1 PUFFS INH Q4-6H PRN for SHORTNESS OF BREATH, #1 MDI 0 Refills         Reported         5/30/18       (Tylenol Arthritis)   No Conflict Check               Reported         5/10/18       Ondansetron HCl (Zofran*) 4 Mg Tablet      4 MG PO Q6H PRN for NAUSEA AND/OR VOMITING, TAB 0 Refills         Reported         5/10/18       Vitamin E (Vitamin E*) 400 Units Capsule      400 UNITS PO QDAY, CAP         Reported         2/23/18       Nitroglycerin (Nitrostat*) 0.4 Mg Tablet      0.4 MG SL ASDIR, #25 TAB 1 Refill         Reported         11/17/17       Hydrochlorothiazide (Hydrochlorothiazide*) 25 Mg Tablet      25 MG PO QDAY, #30 TAB 0 Refills         Reported         1/27/15       Metformin HCl (Metformin HCl) 1,000 Mg Tablet      1000 MG PO BID, #60 TAB 0 Refills         Reported         1/26/15       Gabapentin (Gabapentin) 300 Mg Capsule      300 MG PO HS, #30 CAP 0 Refills         Reported         1/26/15       amLODIPine (amLODIPine) 10 Mg Tablet      10 MG PO QDAY, #30 TAB 0 Refills         Reported         1/26/15       Meloxicam (Mobic*) 15 Mg Tablet      15 MG PO HS, TAB         Reported         12/30/14       Aspirin Chew (Aspirin Chew) 81 Mg Tab.chew      81 MG PO QDAY, TAB.CHEW         Reported         3/2/14      Medications Reviewed:  No Changes made to meds            IMPRESSION/PLAN      Impression      1). Invasive Ductal Carcinoma, 7mm. pT1b      Right breast. Bilateral mastectomy in June 2018.       Hormone  receptor positive. Oncotype DX score 10 which shows no benefit for     chemotherapy.            Diagnosis      Recurrent breast cancer - C50.919            Estrogen receptor positive status (ER+) - Z17.0            Carcinoma of breast, progesterone receptor positive - C50.919            Notes      Renewed Medications      * Tamoxifen Citrate (Tamoxifen*) 20 MG TAB: 20 MG PO QDAY #30            Plan      Tamoxifen 20 mg refilled and sent to pharmacy      Return in 3 months for NP visit.       Call with any questions or concerns.            Patient Education      Patient Education Provided:  Yes            Topics Patient Counseled on      counseled on breast cancer       counseled on side effects of Tamoxifen.            Alcohol Counseling      Counseling given:  None            Substance Counseling      Counseling given:  None                 Disclaimer: Converted document may not contain table formatting or lab diagrams. Please see [employer] [facility] for the authenticated document.

## 2021-05-28 NOTE — PROGRESS NOTES
Patient: CAMILLA NAVAS     Acct: [id number]     Report: #[id number]  UNIT #: [id number]     : 1950    Encounter Date:2019  PRIMARY CARE: MIGUEL REYES  ***Signed***  --------------------------------------------------------------------------------------------------------------------  NURSE INTAKE      Visit Type      Established Patient Visit            Chief Complaint      BREAST CA            Referring Provider/Copies To      Referring Provider:  SANTOSH TURK      Primary Care Provider:  SANTOSH TURK            History and Present Illness      Past Oncology Illness History      Ms. Camilla Navas is a very pleasant 67 year old  female     presenting after surgery.            1) Right breast cancer: Inferior outer right breast:  lumpectomy, radiation x 33    treatment. ()            --Invasive breast carcinoma:  Right superior medial breast. (18) final     path: Right breast: Invasive ductal carcinoma with 2 mm size, ER (+), 100%, [place]     (+) 80%, Ki-67 15%, Her 2 diego (-): 1+ by IHC. DCIS present. Staging: [id number]            Treatment:             Bilateral mastectomy: 2018      Oncotype DX score 10: no benefit for chemotherapy. (2018)      Started Tamoxifen: (2018)      dexa scan: normal results in the femur and spine: (19)            HPI - Oncology Interim      1) Right breast cancer: Inferior outer right breast:  lumpectomy, radiation x 33    treatment. ()            --Invasive breast carcinoma:  Right superior medial breast. (18) final     path: Right breast: Invasive ductal carcinoma with 2 mm size, ER (+), 100%, [place]     (+) 80%, Ki-67 15%, Her 2 diego (-): 1+ by IHC. DCIS present. Staging: [id number]            Treatment:             Bilateral mastectomy: 2018      Oncotype DX score 10: no benefit for chemotherapy. (2018)      Started Tamoxifen: (2018)      dexa scan: normal results in the femur and spine: (19)             Presents for 8 month follow up for invasive breast cancer.  History of a prior     right breast cancer occurring in 2002 in the right breast with lumpectomy and     radiation. Routine mammogram in 2018 picked up additional calcifications in the     right breast and patient diagnosed with invasive breast cancer in 2018 in the     right breast and subsequently completed bilateral mastectomies. She was     scheduled to come back at 3 month interval but patient not seen. Ms. Ying     states today, she took herself off of Tamoxifen due to having increased leg and     knee pain that started about 2-3 months ago. She does reports she had a bone     density done which was normal.             2) Bone pain: Reports ongoing leg and knee pain for the last 2-3 months. She     rates this a 10/10 pain scale. She relates this to being on the Tamoxifen and     states she stopped taking the Tamoxifen on her own about 3 months ago.             3) Headaches: She reports an increase in headaches 2-3 times a week. She reports    she will take Tylenol intermittently and the headaches will go away with     Tylenol.            ECOG Performance Status      0            PAST, FAMILY   Past Medical History      Past Medical History:  Arthritis, COPD, Depression, Diabetes Type 2,     Hypertension      Hematology/Oncology (F):  Breast Cancer            Past Surgical History      Cholecystectomy, Hysterectomy            Social History      Marital Status:        Lives independently:  Yes      Number of Children:  2      Occupation:  HOUSEWIFE            Tobacco Use      Tobacco status:  Never smoker            Alcohol Use      Alcohol intake:  None            Substance Use      Substance use:  Denies use            REVIEW OF SYSTEMS      General:  Admits: Fatigue;          Denies: Appetite Change, Fever, Night Sweats, Weight Gain, Weight Loss      Eye:  Admits Corrective Lenses; Denies Blurred Vision, Denies Diplopia, Denies     Vision  Changes      ENT:  Denies Headache, Denies Hearing Loss, Denies Hoarseness, Denies Sore     Throat      Cardiovascular:  Denies Chest Pain, Denies Palpitations      Respiratory:  Denies: Coughing Blood, Productive Cough, Shortness of Air,     Wheezing      Gastrointestinal:  Denies Bloody Stools, Denies Constipation, Denies Diarrhea,     Denies Nausea/Vomiting, Denies Problem Swallowing, Denies Unable to Control Irmo    els      Genitourinary:  Denies Blood in Urine, Denies Incontinence, Denies Painful     Urination      Musculoskeletal:  Denies Back Pain; Admits Muscle Pain (LEGS), Admits Painful     Joints (LEGS)      Integumentary:  Denies Itching, Denies Lesions, Denies Rash      Neurologic:  Denies Dizziness, Denies Numbness\Tingling, Denies Seizures      Psychiatric:  Denies Anxiety, Denies Depression      Endocrine:  Denies Cold Intolerance, Denies Heat Intolerance      Hematologic/Lymphatic:  Denies Bruising, Denies Bleeding, Denies Enlarged Lymph     Nodes      Reproductive:  Denies: Menopause, Heavy Periods, Pregnant, Still Menstruating            VITAL SIGNS AND SCORES      Vitals      Height 5 ft 5.75 in / 167 cm      Weight 182 lbs 8.654 oz / 82.8 kg      BSA 1.92 m2      BMI 29.7 kg/m2      Temperature 97.5 F / 36.39 C - Temporal      Pulse 66      Respirations 16      Blood Pressure 146/107 Sitting, Left Arm      Pulse Oximetry 100%, ROOM AIR            Pain Score      Experiencing any pain?:  Yes      Pain Scale Used:  Numerical      Pain Intensity:  10            Fatigue Score      Experiencing any fatigue?:  Yes      Fatigue (0-10 scale):  8            EXAM      General Appearance:  Positive for: Alert, Oriented x3, Cooperative      Eye:  Positive for: Moist Conjunctiva, PERRLA, Reactive to Light      HEENT:  Positive for: Oropharynx clear;          Negative for: Erythema      Neck:  Positive for: Full ROM, Supple      Respiratory:  Positive for: CTAB, Normal Respiratory Effort      Breast - Left:   Negative for: Adenopathy, Appearance, Discharge, Mass, Skin     Changes      Breast - Right:  Negative for: Adenopathy, Appearance, Discharge, Mass, Skin     Changes      Abdomen/Gastro:  Positive for: Normal Active Bowel Sounds, Soft;          Negative for: Distention, Tenderness      Cardiovascular:  Positive for: RRR;          Negative for: Distant Heart Sounds, Murmur, Peripheral Edema      Skin:  Positive for: Normal Temperature, Normal Texture and Turgor, Normal Tone;             Negative for: Rash      Psychiatric:  Positive for: AAO X 3, Appropriate Affect, Intact Judgement      Neurologic:  Positive for: Cranial Ner II-XII Intact, Deep Tendon Reflexes,     Headache      Musculoskeletal:  Positive for: Full ROM Lower Extremety, Full ROM Upper     Extremety, Full Muscle Strength      Lower Extremities:  Positive for: Normal Gait and Station, Pedal Pulses Intact,     Pedal Pulses Symetrical      Lymphatic:  Negative for: Axillary, Cervical, Supraclavicular            PREVENTION      Date Influenza Vaccine Given:  Oct 1, 2017      Influenza Vaccine Declined:  No      2 or More Falls Past Year?:  No      Fall Past Year with Injury?:  No      Encouraged to follow-up with:  PCP regarding preventative exams.      Chart initiated by      INA JOLLY MA            ALLERGY/MEDS      Allergies      Coded Allergies:             FENOFIBRATE,MICRONIZED (Verified  Allergy, Severe, SHORT OF AIR, 11/4/19)           NABUMETONE (Verified  Adverse Reaction, Intermediate, weak, 11/4/19)           BENAZEPRIL (Verified  Adverse Reaction, Unknown, WEAK, 11/4/19)           EZETIMIBE (Verified  Adverse Reaction, Unknown, NAUSEA , 11/4/19)           NIACIN (Verified  Adverse Reaction, Unknown, RASH, 11/4/19)           SIMVASTATIN (Verified  Adverse Reaction, Unknown, NAUSEA, 11/4/19)            Medications      Last Reconciled on 11/4/19 10:43 by VICKIE MCLEAN      Naproxen (Naproxen) 500 Mg Tablet      500 MG PO BID PRN  for pain for 5 Days, #10 TAB 0 Refills         Prov: MONI BALDERRAMA cfe         5/11/19       Pravastatin Sodium (Pravachol*) 80 Mg Tablet      80 MG PO QDAY, TAB         Reported         10/18/18       Atenolol (ATENOLOL) 50 Mg Tablet      50 MG PO QDAY, #30 TAB 0 Refills         Reported         5/30/18       Sertraline HCl (Sertraline*) 100 Mg Tablet      100 MG PO QDAY, #30 TAB 0 Refills         Reported         5/30/18       MDI-Albuterol (Ventolin HFA) 8 Gm Hfa.aer.ad      1 PUFFS INH Q4-6H PRN for SHORTNESS OF BREATH, #1 MDI 0 Refills         Reported         5/30/18       (Tylenol Arthritis)   No Conflict Check               Reported         5/10/18       Ondansetron Hcl (ONDANSETRON HCL) 4 Mg Tablet      4 MG PO Q6H PRN for NAUSEA AND/OR VOMITING, TAB 0 Refills         Reported         5/10/18       Vitamin E (Vitamin E*) 400 Units Capsule      400 UNITS PO QDAY, CAP         Reported         2/23/18       Nitroglycerin (Nitrostat*) 0.4 Mg Tablet      0.4 MG SL ASDIR, #25 TAB 1 Refill         Reported         11/17/17       Hydrochlorothiazide (Hydrochlorothiazide*) 25 Mg Tablet      25 MG PO QDAY, #30 TAB 0 Refills         Reported         1/27/15       Metformin HCl (Metformin HCl) 1,000 Mg Tablet      1000 MG PO BID, #60 TAB 0 Refills         Reported         1/26/15       Gabapentin (Gabapentin) 300 Mg Capsule      300 MG PO HS, #30 CAP 0 Refills         Reported         1/26/15       amLODIPine (amLODIPine) 10 Mg Tablet      10 MG PO QDAY, #30 TAB 0 Refills         Reported         1/26/15       Aspirin Chew (Aspirin Chew) 81 Mg Tab.chew      81 MG PO QDAY, TAB.CHEW         Reported         3/2/14      Medications Reviewed:  Changes made to meds            IMPRESSION/PLAN      Impression      1) Right breast cancer: Inferior outer right breast:  lumpectomy, radiation x 33    treatment. (2002)            --Invasive breast carcinoma:  Right superior medial breast. (4/30/18) final     path: Right breast:  Invasive ductal carcinoma with 2 mm size, ER (+), 100%, [place]     (+) 80%, Ki-67 15%, Her 2 diego (-): 1+ by IHC. DCIS present. Staging: [id number]            Treatment:             Bilateral mastectomy: 6/1/2018      Oncotype DX score 10: no benefit for chemotherapy. (6/2018)      Started Tamoxifen: (6/2018)            Presents for 8 month follow up for invasive breast cancer.  History of a prior     right breast cancer occurring in 2002 in the right breast with lumpectomy and     radiation. Routine mammogram in 2018 picked up additional calcifications in the     right breast and patient diagnosed with invasive breast cancer in 2018 in the     right breast and subsequently completed bilateral mastectomies. She was     scheduled to come back at 3 month interval but patient not seen. Ms. Ying     states today, she took herself off of Tamoxifen due to having increased leg and     knee pain that started about 2-3 months ago.             2) Bone pain: Reports ongoing leg and knee pain for the last 2-3 months. She     rates this a 10/10 pain scale. She relates this to being on the Tamoxifen and     states she stopped taking the Tamoxifen on her own about 3 months ago.             3) Headaches: She reports an increase in headaches 2-3 times a week. She reports    she will take Tylenol intermittently and the headaches will go away with     Tylenol.            Diagnosis      Carcinoma of breast, progesterone receptor positive         Carcinoma of right breast, progesterone receptor positive         Laterality: right            Invasive carcinoma of breast - C50.919            Persistent headaches - R51            Bone pain of knee - M89.8X6            Leg pain, bilateral - M79.604, M79.605            Notes      Discontinued Medications      * Tamoxifen Citrate (Tamoxifen*) 20 MG TAB: 20 MG PO QDAY #30      New Diagnostics      * CBC With Auto Diff, Routine         Dx: Carcinoma of breast, progesterone receptor positive - C50.919       * CMP Comp Metabolic Panel, Routine         Dx: Carcinoma of breast, progesterone receptor positive - C50.919      * LDH, Routine         Dx: Carcinoma of breast, progesterone receptor positive - C50.919      * Ca15-3, Routine         Dx: Carcinoma of breast, progesterone receptor positive - C50.919      * CA27-29, Routine         Dx: Carcinoma of breast, progesterone receptor positive - C50.919      * Bone Scan Wh Body NM, As Soon As Possible         Dx: Carcinoma of breast, progesterone receptor positive - C50.919      * Brain W/WO Cont MRI, As Soon As Possible         Dx: Carcinoma of breast, progesterone receptor positive - C50.919      * Vitamin D Level, Routine         Dx: Carcinoma of breast, progesterone receptor positive - C50.919            Plan      1) Doubt the new onset of leg and knee pain in related to Tamoxifen since she     had previously been taking Tamoxifen for a year and sudden development of bone     pain. She had been tolerating Tamoxifen well she reports until recently.  She     will stay off the  Tamoxifen for now. Will check labs today with CA 15-3, 27.29,    CBC, CMP, LDH, and Vitamin D.             2) Obtain a bone scan ASAP to evaluate for metastatic bone disease.             3) MRI of brain to rule out metastatic jluis lesions.             Follow up in 3 weeks for results of testing. If all is normal, would likely     switch Tamoxifen to Letrozole or could continue Tamoxifen.            Patient Education            Letrozole      Patient Education Provided:  Yes            Topics Patient Counseled on      Tamoxifen       Letrozole      bone scan      MRI of brain.            Alcohol Counseling      Counseling given:  None            Substance Counseling      Counseling given:  None            Electronically signed by VICKIE MCLEAN onc  11/04/2019 10:43       Disclaimer: Converted document may not contain table formatting or lab diagrams. Please see [facility] for the authenticated document.

## 2021-05-28 NOTE — PROGRESS NOTES
Patient: CAMILLA NAVAS     Acct: [id number]     Report: #[id number]  UNIT #: [id number]     : 1950    Encounter Date:2019  PRIMARY CARE: MIGUEL REYES  ***Signed***  --------------------------------------------------------------------------------------------------------------------  NURSE INTAKE      Visit Type      Established Patient Visit            Chief Complaint      F/U Breast Cancer            Referring Provider/Copies To      Referring Provider:  SANTOSH TURK      Primary Care Provider:  SANTOSH TURK            History and Present Illness      Past Oncology Illness History      Ms. Camilla Navas is a very pleasant 67 year old  female     presenting after surgery.            1) Right breast cancer: Inferior outer right breast:  lumpectomy, radiation x 33    treatment. ()            --Invasive breast carcinoma:  Right superior medial breast. (18) final     path: Right breast: Invasive ductal carcinoma with 2 mm size, ER (+), 100%, DE     (+) 80%, Ki-67 15%, Her 2 diego (-): 1+ by IHC. DCIS present. Staging: [id number]            Treatment:             Bilateral mastectomy: 2018      Oncotype DX score 10: no benefit for chemotherapy. (2018)      Started Tamoxifen: (2018)      dexa scan: normal results in the femur and spine: (19)            HPI - Oncology Interim      1) Right breast cancer: Inferior outer right breast:  lumpectomy, radiation x 33    treatment. ()            --Invasive breast carcinoma:  Right superior medial breast. (18) final     path: Right breast: Invasive ductal carcinoma with 2 mm size, ER (+), 100%, DE     (+) 80%, Ki-67 15%, Her 2 diego (-): 1+ by IHC. DCIS present. Staging: [id number].            Ms. Camilla Navas presents for follow-up to review her bone scan and MRI of    the brain.  She she has a history of invasive ductal right breast carcinoma     hormone receptor positive.  She was started on tamoxifen  in June 2018.  She does    report that she stopped taking her tamoxifen several months ago due to having     increased leg and knee pain that started about April of 2019.  Currently she     remains off the tamoxifen.  She does still have reports of increased leg and     knee pain bilaterally.  A bone scan was performed which did not show any osseous    metastatic disease.  It did show radiotracer uptake in the right side of the man    dible which could be related to odontogenic disease and does recommend direct     visualization.  I have discussed this with the patient and we will refer her for    an ENT consultation.  The bone scan did show a nonspecific focal area of     radiotracer uptake in the anterior left L4 superior endplate possibly     degenerative in nature and does recommend radiographs to correlate this.             Dexa scan in August 2019 was normal.             MRI of the brain was completed and did not show any mets to the brain.  Recent     labs on 11/4/19 are normal as well as her breast cancer tumor markers 15-3 and     27.29.             I have discussed with the patient the importance of taking AI therapy or     Tamoxifen. She is willing to try additional therapy as indicated. We will     proceed with Letrozole for a month to see how she tolerates this. I have     discussed side effect profile including osteoporosis, bone loss, arthralgia's,     hot flashes. She is agreeable to this plan of care.            ECOG Performance Status      0            PAST, FAMILY   Past Medical History      Past Medical History:  Arthritis, COPD, Depression, Diabetes Type 2, Hyper    tension      Hematology/Oncology (F):  Breast Cancer            Past Surgical History      Cholecystectomy, Hysterectomy            Social History      Marital Status:        Lives independently:  Yes      Number of Children:  2      Occupation:  HOUSEWIFE            Tobacco Use      Tobacco status:  Never smoker             Alcohol Use      Alcohol intake:  None            Substance Use      Substance use:  Denies use            REVIEW OF SYSTEMS      General:  Admits: Fatigue;          Denies: Appetite Change, Fever, Night Sweats, Weight Gain, Weight Loss      Eye:  Admits Corrective Lenses; Denies Blurred Vision, Denies Diplopia, Denies     Vision Changes      ENT:  Denies Headache, Denies Hearing Loss, Denies Hoarseness, Denies Sore     Throat      Other      Sinus complaints - running nose, sneezing      Cardiovascular:  Denies Chest Pain, Denies Palpitations      Respiratory:  Denies: Coughing Blood, Productive Cough, Shortness of Air,     Wheezing      Gastrointestinal:  Denies Bloody Stools, Denies Constipation, Denies Diarrhea,     Denies Nausea/Vomiting, Denies Problem Swallowing, Denies Unable to Control     Bowels      Musculoskeletal:  Admits Back Pain, Admits Muscle Pain      Other      Pt c/o of lower back pain      Integumentary:  Denies Itching, Denies Lesions, Denies Rash      Neurologic:  Denies Dizziness, Denies Numbness\Tingling, Denies Seizures      Psychiatric:  Denies Anxiety, Denies Depression      Endocrine:  Denies Cold Intolerance, Denies Heat Intolerance      Hematologic/Lymphatic:  Denies Bruising, Denies Bleeding, Denies Enlarged Lymph     Nodes      Reproductive:  Denies: Menopause, Heavy Periods, Pregnant, Still Menstruating            VITAL SIGNS AND SCORES      Vitals      Weight 183 lbs 3.236 oz / 83.1 kg      Temperature 97.8 F / 36.56 C      Pulse 67      Respirations 16      Blood Pressure 151/56      Pulse Oximetry 99%, ra            Pain Score      Experiencing any pain?:  Yes      Pain Scale Used:  Numerical      Pain Intensity:  10      Pain Comment:        Pain 10/10 to BLE constant dull pain and weakness            Fatigue Score      Experiencing any fatigue?:  Yes      Fatigue (0-10 scale):  8            EXAM      General appearance:  in no apparent distress, cooperative, appears stated  age.      HEENT: No pallor, no icterus, oral mucosa moist      Neck: Supple, trachea central-not deviated      Lymph nodes: none palpable peripherally      Cardiovascular: S1-S2 heard, no murmurs, no rubs, no gallops.      Breast exam: bilateral mastectomies.       Respiratory: Clear to auscultation bilaterally, no adventitious sounds      Abdomen/gastro: Soft, nontender, no palpable hepatosplenomegaly, bowel sounds     heard      Skin: No lesions, no rashes, no petechiae.      Extremities: No pedal edema, peripheral pulses felt, no clubbing      Musculoskeletal: Normal tone, no rigidity of muscles; range of motion intact      Spine: No deformities      Psychiatric: Alert and oriented x3, appropriate affect, intact judgment      Neurologic: Cranial nerves II through XII grossly intact, no gross neurological     deficits noted.            PREVENTION      Hx Influenza Vaccination:  Yes      Date Influenza Vaccine Given:  Oct 1, 2019      Influenza Vaccine Declined:  No      2 or More Falls Past Year?:  Yes      Fall Past Year with Injury?:  No      Encouraged to follow-up with:  PCP regarding preventative exams.      Chart initiated by      Josefina Howe            ALLERGY/MEDS      Allergies      Coded Allergies:             FENOFIBRATE,MICRONIZED (Verified  Allergy, Severe, SHORT OF AIR, 11/4/19)           NABUMETONE (Verified  Adverse Reaction, Intermediate, weak, 11/4/19)           BENAZEPRIL (Verified  Adverse Reaction, Unknown, WEAK, 11/4/19)           EZETIMIBE (Verified  Adverse Reaction, Unknown, NAUSEA , 11/4/19)           NIACIN (Verified  Adverse Reaction, Unknown, RASH, 11/4/19)           SIMVASTATIN (Verified  Adverse Reaction, Unknown, NAUSEA, 11/4/19)            Medications      Last Reconciled on 11/26/19 14:14 by VICKIE MCLEAN      Letrozole (Letrozole) 2.5 Mg Tablet      2.5 MG PO QDAY for 30 Days, #30 TAB 0 Refills         Prov: VICKIE MCLEAN onc         11/26/19       Naproxen  (Naproxen) 500 Mg Tablet      500 MG PO BID PRN for pain for 5 Days, #10 TAB 0 Refills         Prov: MONI BALDERRAMA cfe         5/11/19       Pravastatin Sodium (Pravachol*) 80 Mg Tablet      80 MG PO QDAY, TAB         Reported         10/18/18       Atenolol (ATENOLOL) 50 Mg Tablet      50 MG PO QDAY, #30 TAB 0 Refills         Reported         5/30/18       Sertraline HCl (Sertraline*) 100 Mg Tablet      100 MG PO QDAY, #30 TAB 0 Refills         Reported         5/30/18       MDI-Albuterol (Ventolin HFA) 8 Gm Hfa.aer.ad      1 PUFFS INH Q4-6H PRN for SHORTNESS OF BREATH, #1 MDI 0 Refills         Reported         5/30/18       (Tylenol Arthritis)   No Conflict Check               Reported         5/10/18       Ondansetron Hcl (ONDANSETRON HCL) 4 Mg Tablet      4 MG PO Q6H PRN for NAUSEA AND/OR VOMITING, TAB 0 Refills         Reported         5/10/18       Vitamin E (Vitamin E*) 400 Units Capsule      400 UNITS PO QDAY, CAP         Reported         2/23/18       Nitroglycerin (Nitrostat*) 0.4 Mg Tablet      0.4 MG SL ASDIR, #25 TAB 1 Refill         Reported         11/17/17       Hydrochlorothiazide (Hydrochlorothiazide*) 25 Mg Tablet      25 MG PO QDAY, #30 TAB 0 Refills         Reported         1/27/15       Metformin HCl (Metformin HCl) 1,000 Mg Tablet      1000 MG PO BID, #60 TAB 0 Refills         Reported         1/26/15       Gabapentin (Gabapentin) 300 Mg Capsule      300 MG PO HS, #30 CAP 0 Refills         Reported         1/26/15       amLODIPine (amLODIPine) 10 Mg Tablet      10 MG PO QDAY, #30 TAB 0 Refills         Reported         1/26/15       Aspirin Chew (Aspirin Chew) 81 Mg Tab.chew      81 MG PO QDAY, TAB.CHEW         Reported         3/2/14      Medications Reviewed:  No Changes made to meds            IMPRESSION/PLAN      Impression      1) Right breast cancer: Inferior outer right breast:  lumpectomy, radiation x 33    treatment. (2002)            --Invasive breast carcinoma:  Right superior  medial breast. (4/30/18) final     path: Right breast: Invasive ductal carcinoma with 2 mm size, ER (+), 100%, PA     (+) 80%, Ki-67 15%, Her 2 diego (-): 1+ by IHC. DCIS present. Staging: [id number].            Ms. Camilla Navas presents for follow-up to review her bone scan and MRI of    the brain.  She she has a history of invasive ductal right breast carcinoma     hormone receptor positive.  She was started on tamoxifen in June 2018.  She does    report that she stopped taking her tamoxifen several months ago due to having     increased leg and knee pain that started about April of 2019.  Currently she     remains off the tamoxifen.  She does still have reports of increased leg and     knee pain bilaterally.  A bone scan was performed which did not show any osseous    metastatic disease.  It did show radiotracer uptake in the right side of the     mandible which could be related to odontogenic disease and does recommend direct    visualization.  I have discussed this with the patient and we will refer her for    an ENT consultation.  The bone scan did show a nonspecific focal area of     radiotracer uptake in the anterior left L4 superior endplate possibly     degenerative in nature and does recommend radiographs to correlate this.             Dexa scan in August 2019 was normal.             MRI of the brain was completed and did not show any mets to the brain.  Recent     labs on 11/4/19 are normal as well as her breast cancer tumor markers 15-3 and     27.29.             I have discussed with the patient the importance of taking AI therapy or     Tamoxifen. She is willing to try additional therapy as indicated. We will     proceed with Letrozole for a month to see how she tolerates this. I have     discussed side effect profile including osteoporosis, bone loss, arthralgia's,     hot flashes. She is agreeable to this plan of care.            Diagnosis      Abnormal radionuclide bone scan - R94.8             Notes      New Medications      * Letrozole 2.5 MG TABLET: 2.5 MG PO QDAY 30 Days #30      New Referrals      * Ear, Nose, Throat, As Soon As Possible         Dx: Abnormal radionuclide bone scan - R94.8            Plan      1) Start Letrozole 2.5 mg PO QD x 1 month. Patient will call to let us know if     she is tolerating med. If so, then will continue with refills.             ENT consult for direct visualization of the right side of the mandible wich     could be related to odontogenic disease.             Xray of the left L 4 area to correlate with bone scan.             Return in 3 months with xray prior.             Call with any questions or concerns.            Patient Education      Patient Education Provided:  Yes            Alcohol Counseling      Counseling given:  None            Substance Counseling      Counseling given:  None            Electronically signed by VICKIE MCLEAN onc  11/26/2019 14:14       Disclaimer: Converted document may not contain table formatting or lab diagrams. Please see [employer] for the authenticated document.

## 2021-06-24 ENCOUNTER — HOSPITAL ENCOUNTER (EMERGENCY)
Facility: HOSPITAL | Age: 71
Discharge: LEFT BEFORE DISCHARGE | End: 2021-06-24

## 2021-06-24 VITALS
HEART RATE: 59 BPM | TEMPERATURE: 97.8 F | DIASTOLIC BLOOD PRESSURE: 90 MMHG | OXYGEN SATURATION: 97 % | RESPIRATION RATE: 18 BRPM | BODY MASS INDEX: 29.43 KG/M2 | WEIGHT: 182.32 LBS | SYSTOLIC BLOOD PRESSURE: 151 MMHG

## 2021-06-24 PROCEDURE — 99211 OFF/OP EST MAY X REQ PHY/QHP: CPT

## 2021-06-24 RX ORDER — PRAVASTATIN SODIUM 80 MG/1
80 TABLET ORAL DAILY
COMMUNITY
End: 2022-01-05

## 2021-06-24 RX ORDER — ATENOLOL 50 MG/1
50 TABLET ORAL DAILY
COMMUNITY
End: 2021-12-28

## 2021-06-24 RX ORDER — ASPIRIN 81 MG/1
81 TABLET ORAL DAILY
COMMUNITY

## 2021-06-24 RX ORDER — AMLODIPINE BESYLATE 10 MG/1
10 TABLET ORAL DAILY
COMMUNITY
End: 2021-12-28

## 2021-06-24 RX ORDER — SERTRALINE HYDROCHLORIDE 100 MG/1
100 TABLET, FILM COATED ORAL DAILY
COMMUNITY

## 2021-06-24 RX ORDER — GABAPENTIN 300 MG/1
300 CAPSULE ORAL 3 TIMES DAILY
COMMUNITY

## 2021-06-24 RX ORDER — HYDROCHLOROTHIAZIDE 25 MG/1
25 TABLET ORAL DAILY
COMMUNITY
End: 2021-11-29

## 2021-09-27 ENCOUNTER — OFFICE VISIT (OUTPATIENT)
Dept: ORTHOPEDIC SURGERY | Facility: CLINIC | Age: 71
End: 2021-09-27

## 2021-09-27 VITALS — WEIGHT: 186 LBS | BODY MASS INDEX: 29.89 KG/M2 | HEIGHT: 66 IN

## 2021-09-27 DIAGNOSIS — M75.51 BURSITIS OF RIGHT SHOULDER: Primary | ICD-10-CM

## 2021-09-27 PROCEDURE — 20610 DRAIN/INJ JOINT/BURSA W/O US: CPT | Performed by: ORTHOPAEDIC SURGERY

## 2021-09-27 PROCEDURE — 99213 OFFICE O/P EST LOW 20 MIN: CPT | Performed by: ORTHOPAEDIC SURGERY

## 2021-09-27 RX ADMIN — METHYLPREDNISOLONE ACETATE 80 MG: 80 INJECTION, SUSPENSION INTRA-ARTICULAR; INTRALESIONAL; INTRAMUSCULAR; SOFT TISSUE at 14:55

## 2021-09-27 RX ADMIN — LIDOCAINE HYDROCHLORIDE 9 ML: 10 INJECTION, SOLUTION INFILTRATION; PERINEURAL at 14:55

## 2021-09-27 NOTE — PROGRESS NOTES
"Chief Complaint  Initial Evaluation of the Right Shoulder     Subjective      Camilla Navas presents to [facility] for an evaluation of right shoulder. She states she has signficant pain and tenderness of the shoulder. She denies any injury or trauma to her shoulder. Pain has been ongoing for a month. She states she assumed she had slept wrong one day and that is why she was having shoulder pain. She states pain has failed to improve. She went to [facility] and was told she had a pinched nerve and sent her home. However the pain in her shoulder started to increase.     Allergies   Allergen Reactions   • Ezetimibe Unknown - Low Severity   • Ezetimibe-Simvastatin GI Intolerance   • Fenofibrate Other (See Comments)     SOB    • Nabumetone Unknown - Low Severity   • Simvastatin Nausea And Vomiting   • Naproxen Rash   • Niacin Anxiety and Rash        Social History     Socioeconomic History   • Marital status:      Spouse name: Not on file   • Number of children: Not on file   • Years of education: Not on file   • Highest education level: Not on file   Tobacco Use   • Smoking status: Never Smoker   • Smokeless tobacco: Never Used   Vaping Use   • Vaping Use: Never used   Substance and Sexual Activity   • Alcohol use: Never   • Drug use: Never        Review of Systems     Objective   Vital Signs:   Ht 167.6 cm (66\")   Wt 84.4 kg (186 lb)   BMI 30.02 kg/m²       Physical Exam  Constitutional:       Appearance: Normal appearance. Patient is well-developed and normal weight.   HENT:      Head: Normocephalic.      Right Ear: Hearing and external ear normal.      Left Ear: Hearing and external ear normal.      Nose: Nose normal.   Eyes:      Conjunctiva/sclera: Conjunctivae normal.   Cardiovascular:      Rate and Rhythm: Normal rate.   Pulmonary:      Effort: Pulmonary effort is normal.      Breath sounds: No wheezing or rales.   Abdominal:      Palpations: Abdomen is soft.      " Tenderness: There is no abdominal tenderness.   Musculoskeletal:      Cervical back: Normal range of motion.   Skin:     Findings: No rash.   Neurological:      Mental Status: Patient  is alert and oriented to person, place, and time.   Psychiatric:         Mood and Affect: Mood and affect normal.         Judgment: Judgment normal.       Ortho Exam      RIGHT SHOULDER: Passive motion to 160 with pain, actively to 140. Weakness and pain with empty can testing. Good tone of deltoid, biceps, triceps, wrist extensors, and wrist flexors.  Sensation grossly intact. Neurovascular intact. IR to L5. No swelling, skin discoloration or atrophy. Full flexion and extension. Tenderness about the shoulder. Radial pulse 2+, ulnar pulse 2+.       Large Joint Arthrocentesis: R subacromial bursa  Date/Time: 9/27/2021 2:55 PM  Consent given by: patient  Site marked: site marked  Timeout: Immediately prior to procedure a time out was called to verify the correct patient, procedure, equipment, support staff and site/side marked as required   Supporting Documentation  Indications: pain   Procedure Details  Location: shoulder - R subacromial bursa  Preparation: Patient was prepped and draped in the usual sterile fashion  Needle size: 22 G  Medications administered: 9 mL lidocaine 1 %; 80 mg methylPREDNISolone acetate 80 MG/ML  Patient tolerance: patient tolerated the procedure well with no immediate complications              Imaging Results (Most Recent)     None           Result Review :       No results found.           Assessment and Plan     DX: Right shoulder bursitis     Patient given a right shoulder injection and tolerated this well. If she continues to have persistent pain we will discuss obtaining an MRI of the shoulder.     Call or return if worsening symptoms.    Follow Up     4-6 weeks.       Patient was given instructions and counseling regarding her condition or for health maintenance advice. Please see specific information  pulled into the AVS if appropriate.     Scribed for Ad Mcclure MD by Belkis Madrid.  09/27/21   14:41 EDT    I have personally performed the services described in this document as scribed by the above individual and it is both accurate and complete. Ad Mcclure MD 09/29/21

## 2021-09-29 RX ORDER — LIDOCAINE HYDROCHLORIDE 10 MG/ML
9 INJECTION, SOLUTION INFILTRATION; PERINEURAL
Status: COMPLETED | OUTPATIENT
Start: 2021-09-27 | End: 2021-09-27

## 2021-09-29 RX ORDER — METHYLPREDNISOLONE ACETATE 80 MG/ML
80 INJECTION, SUSPENSION INTRA-ARTICULAR; INTRALESIONAL; INTRAMUSCULAR; SOFT TISSUE
Status: COMPLETED | OUTPATIENT
Start: 2021-09-27 | End: 2021-09-27

## 2021-11-22 ENCOUNTER — APPOINTMENT (OUTPATIENT)
Dept: CT IMAGING | Facility: HOSPITAL | Age: 71
End: 2021-11-22

## 2021-11-22 ENCOUNTER — HOSPITAL ENCOUNTER (EMERGENCY)
Facility: HOSPITAL | Age: 71
Discharge: HOME | End: 2021-11-22
Attending: EMERGENCY MEDICINE | Admitting: EMERGENCY MEDICINE

## 2021-11-22 VITALS
HEIGHT: 66 IN | WEIGHT: 179.45 LBS | DIASTOLIC BLOOD PRESSURE: 70 MMHG | HEART RATE: 70 BPM | SYSTOLIC BLOOD PRESSURE: 146 MMHG | OXYGEN SATURATION: 93 % | TEMPERATURE: 98.1 F | BODY MASS INDEX: 28.84 KG/M2 | RESPIRATION RATE: 19 BRPM

## 2021-11-22 DIAGNOSIS — M54.31 SCIATICA OF RIGHT SIDE: Primary | ICD-10-CM

## 2021-11-22 LAB
ANION GAP SERPL CALCULATED.3IONS-SCNC: 11.7 MMOL/L (ref 5–15)
BACTERIA UR QL AUTO: ABNORMAL /HPF
BASOPHILS # BLD AUTO: 0.03 10*3/MM3 (ref 0–0.2)
BASOPHILS NFR BLD AUTO: 0.3 % (ref 0–1.5)
BILIRUB UR QL STRIP: NEGATIVE
BUN SERPL-MCNC: 10 MG/DL (ref 8–23)
BUN/CREAT SERPL: 13.2 (ref 7–25)
CALCIUM SPEC-SCNC: 9.3 MG/DL (ref 8.6–10.5)
CHLORIDE SERPL-SCNC: 105 MMOL/L (ref 98–107)
CLARITY UR: CLEAR
CO2 SERPL-SCNC: 23.3 MMOL/L (ref 22–29)
COLOR UR: YELLOW
CREAT SERPL-MCNC: 0.76 MG/DL (ref 0.57–1)
DEPRECATED RDW RBC AUTO: 45.9 FL (ref 37–54)
EOSINOPHIL # BLD AUTO: 0.08 10*3/MM3 (ref 0–0.4)
EOSINOPHIL NFR BLD AUTO: 0.9 % (ref 0.3–6.2)
ERYTHROCYTE [DISTWIDTH] IN BLOOD BY AUTOMATED COUNT: 13.1 % (ref 12.3–15.4)
GFR SERPL CREATININE-BSD FRML MDRD: 75 ML/MIN/1.73
GLUCOSE SERPL-MCNC: 80 MG/DL (ref 65–99)
GLUCOSE UR STRIP-MCNC: NEGATIVE MG/DL
HCT VFR BLD AUTO: 37.3 % (ref 34–46.6)
HGB BLD-MCNC: 12.1 G/DL (ref 12–15.9)
HGB UR QL STRIP.AUTO: ABNORMAL
HYALINE CASTS UR QL AUTO: ABNORMAL /LPF
IMM GRANULOCYTES # BLD AUTO: 0.03 10*3/MM3 (ref 0–0.05)
IMM GRANULOCYTES NFR BLD AUTO: 0.3 % (ref 0–0.5)
KETONES UR QL STRIP: NEGATIVE
LEUKOCYTE ESTERASE UR QL STRIP.AUTO: ABNORMAL
LYMPHOCYTES # BLD AUTO: 2.63 10*3/MM3 (ref 0.7–3.1)
LYMPHOCYTES NFR BLD AUTO: 29.7 % (ref 19.6–45.3)
MCH RBC QN AUTO: 31.1 PG (ref 26.6–33)
MCHC RBC AUTO-ENTMCNC: 32.4 G/DL (ref 31.5–35.7)
MCV RBC AUTO: 95.9 FL (ref 79–97)
MONOCYTES # BLD AUTO: 1.01 10*3/MM3 (ref 0.1–0.9)
MONOCYTES NFR BLD AUTO: 11.4 % (ref 5–12)
NEUTROPHILS NFR BLD AUTO: 5.09 10*3/MM3 (ref 1.7–7)
NEUTROPHILS NFR BLD AUTO: 57.4 % (ref 42.7–76)
NITRITE UR QL STRIP: NEGATIVE
NRBC BLD AUTO-RTO: 0 /100 WBC (ref 0–0.2)
PH UR STRIP.AUTO: 6.5 [PH] (ref 5–8)
PLATELET # BLD AUTO: 256 10*3/MM3 (ref 140–450)
PMV BLD AUTO: 9.9 FL (ref 6–12)
POTASSIUM SERPL-SCNC: 3.6 MMOL/L (ref 3.5–5.2)
PROT UR QL STRIP: NEGATIVE
RBC # BLD AUTO: 3.89 10*6/MM3 (ref 3.77–5.28)
RBC # UR STRIP: ABNORMAL /HPF
REF LAB TEST METHOD: ABNORMAL
SODIUM SERPL-SCNC: 140 MMOL/L (ref 136–145)
SP GR UR STRIP: <=1.005 (ref 1–1.03)
SQUAMOUS #/AREA URNS HPF: ABNORMAL /HPF
UROBILINOGEN UR QL STRIP: ABNORMAL
WBC # UR STRIP: ABNORMAL /HPF
WBC NRBC COR # BLD: 8.87 10*3/MM3 (ref 3.4–10.8)

## 2021-11-22 PROCEDURE — 81001 URINALYSIS AUTO W/SCOPE: CPT | Performed by: EMERGENCY MEDICINE

## 2021-11-22 PROCEDURE — 96376 TX/PRO/DX INJ SAME DRUG ADON: CPT

## 2021-11-22 PROCEDURE — 25010000002 ONDANSETRON PER 1 MG: Performed by: EMERGENCY MEDICINE

## 2021-11-22 PROCEDURE — 25010000002 MORPHINE PER 10 MG: Performed by: EMERGENCY MEDICINE

## 2021-11-22 PROCEDURE — 74176 CT ABD & PELVIS W/O CONTRAST: CPT

## 2021-11-22 PROCEDURE — 99283 EMERGENCY DEPT VISIT LOW MDM: CPT

## 2021-11-22 PROCEDURE — 85025 COMPLETE CBC W/AUTO DIFF WBC: CPT | Performed by: EMERGENCY MEDICINE

## 2021-11-22 PROCEDURE — 96375 TX/PRO/DX INJ NEW DRUG ADDON: CPT

## 2021-11-22 PROCEDURE — 36415 COLL VENOUS BLD VENIPUNCTURE: CPT

## 2021-11-22 PROCEDURE — 80048 BASIC METABOLIC PNL TOTAL CA: CPT | Performed by: EMERGENCY MEDICINE

## 2021-11-22 PROCEDURE — 96374 THER/PROPH/DIAG INJ IV PUSH: CPT

## 2021-11-22 RX ORDER — MORPHINE SULFATE 2 MG/ML
2 INJECTION, SOLUTION INTRAMUSCULAR; INTRAVENOUS
Status: DISCONTINUED | OUTPATIENT
Start: 2021-11-22 | End: 2021-11-22 | Stop reason: HOSPADM

## 2021-11-22 RX ORDER — PREDNISONE 20 MG/1
20 TABLET ORAL
Qty: 9 TABLET | Refills: 0 | Status: SHIPPED | OUTPATIENT
Start: 2021-11-22 | End: 2021-11-25

## 2021-11-22 RX ORDER — SODIUM CHLORIDE 0.9 % (FLUSH) 0.9 %
10 SYRINGE (ML) INJECTION AS NEEDED
Status: DISCONTINUED | OUTPATIENT
Start: 2021-11-22 | End: 2021-11-22 | Stop reason: HOSPADM

## 2021-11-22 RX ORDER — ONDANSETRON 2 MG/ML
4 INJECTION INTRAMUSCULAR; INTRAVENOUS ONCE
Status: COMPLETED | OUTPATIENT
Start: 2021-11-22 | End: 2021-11-22

## 2021-11-22 RX ORDER — CYCLOBENZAPRINE HCL 10 MG
10 TABLET ORAL NIGHTLY PRN
Qty: 7 TABLET | Refills: 0 | Status: SHIPPED | OUTPATIENT
Start: 2021-11-22 | End: 2021-11-29

## 2021-11-22 RX ADMIN — MORPHINE SULFATE 2 MG: 2 INJECTION, SOLUTION INTRAMUSCULAR; INTRAVENOUS at 18:19

## 2021-11-22 RX ADMIN — MORPHINE SULFATE 2 MG: 2 INJECTION, SOLUTION INTRAMUSCULAR; INTRAVENOUS at 19:24

## 2021-11-22 RX ADMIN — ONDANSETRON 4 MG: 2 INJECTION INTRAMUSCULAR; INTRAVENOUS at 18:22

## 2021-11-29 RX ORDER — HYDROCHLOROTHIAZIDE 25 MG/1
TABLET ORAL
Qty: 90 TABLET | Refills: 0 | Status: SHIPPED | OUTPATIENT
Start: 2021-11-29 | End: 2022-03-11

## 2021-12-28 RX ORDER — AMLODIPINE BESYLATE 10 MG/1
TABLET ORAL
Qty: 30 TABLET | Refills: 1 | Status: SHIPPED | OUTPATIENT
Start: 2021-12-28 | End: 2021-12-28

## 2021-12-28 RX ORDER — AMLODIPINE BESYLATE 10 MG/1
TABLET ORAL
Qty: 30 TABLET | Refills: 1 | Status: SHIPPED | OUTPATIENT
Start: 2021-12-28 | End: 2022-05-02

## 2021-12-28 RX ORDER — ATENOLOL 50 MG/1
TABLET ORAL
Qty: 30 TABLET | Refills: 1 | Status: SHIPPED | OUTPATIENT
Start: 2021-12-28 | End: 2022-03-09

## 2021-12-28 RX ORDER — ATENOLOL 50 MG/1
TABLET ORAL
Qty: 30 TABLET | Refills: 1 | Status: SHIPPED | OUTPATIENT
Start: 2021-12-28 | End: 2021-12-28

## 2021-12-28 RX ORDER — AMLODIPINE BESYLATE 10 MG/1
TABLET ORAL
Qty: 90 TABLET | OUTPATIENT
Start: 2021-12-28

## 2021-12-28 RX ORDER — ATENOLOL 50 MG/1
TABLET ORAL
Qty: 90 TABLET | OUTPATIENT
Start: 2021-12-28

## 2022-01-06 RX ORDER — PRAVASTATIN SODIUM 80 MG/1
TABLET ORAL
Qty: 30 TABLET | Refills: 1 | Status: SHIPPED | OUTPATIENT
Start: 2022-01-06 | End: 2022-02-10

## 2022-01-10 RX ORDER — PRAVASTATIN SODIUM 80 MG/1
TABLET ORAL
Qty: 90 TABLET | OUTPATIENT
Start: 2022-01-10

## 2022-01-21 PROBLEM — R07.9 CHEST PAIN: Status: ACTIVE | Noted: 2022-01-21

## 2022-01-21 PROBLEM — J45.909 ASTHMA WITHOUT STATUS ASTHMATICUS: Status: ACTIVE | Noted: 2022-01-21

## 2022-01-21 PROBLEM — R30.0 DIFFICULT OR PAINFUL URINATION: Status: ACTIVE | Noted: 2017-01-11

## 2022-01-21 PROBLEM — K21.9 ESOPHAGEAL REFLUX: Status: ACTIVE | Noted: 2022-01-21

## 2022-01-21 PROBLEM — M50.30 DDD (DEGENERATIVE DISC DISEASE), CERVICAL: Status: ACTIVE | Noted: 2022-01-21

## 2022-01-21 PROBLEM — R31.9 HEMATURIA: Status: ACTIVE | Noted: 2022-01-21

## 2022-01-21 PROBLEM — C80.1 CANCER: Status: ACTIVE | Noted: 2022-01-21

## 2022-01-21 PROBLEM — F32.A DEPRESSION: Status: ACTIVE | Noted: 2022-01-21

## 2022-01-21 PROBLEM — D64.9 ANEMIA: Status: ACTIVE | Noted: 2022-01-21

## 2022-01-21 PROBLEM — M79.89 LIMB SWELLING: Status: ACTIVE | Noted: 2022-01-21

## 2022-01-21 PROBLEM — N95.9 MENOPAUSAL AND POSTMENOPAUSAL DISORDER: Status: ACTIVE | Noted: 2022-01-21

## 2022-01-21 PROBLEM — N89.8 VAGINAL ITCHING: Status: ACTIVE | Noted: 2017-01-11

## 2022-01-21 PROBLEM — N28.89 RENAL MASS: Status: ACTIVE | Noted: 2022-01-21

## 2022-01-21 PROBLEM — N95.2 ATROPHIC VAGINITIS: Status: ACTIVE | Noted: 2017-01-11

## 2022-01-21 PROBLEM — E66.9 OBESITY: Status: ACTIVE | Noted: 2022-01-21

## 2022-01-21 PROBLEM — K63.5 COLON POLYPS: Status: ACTIVE | Noted: 2022-01-21

## 2022-01-21 PROBLEM — H92.09 EAR PAIN: Status: ACTIVE | Noted: 2022-01-21

## 2022-01-21 PROBLEM — R22.0 MANDIBULAR MASS: Status: ACTIVE | Noted: 2022-01-21

## 2022-01-21 PROBLEM — E66.3 OVERWEIGHT WITH BODY MASS INDEX (BMI) 25.0-29.9: Status: ACTIVE | Noted: 2022-01-21

## 2022-01-21 PROBLEM — I25.9 CHRONIC ISCHEMIC HEART DISEASE: Status: ACTIVE | Noted: 2022-01-21

## 2022-01-21 PROBLEM — M19.90 OSTEOARTHRITIS: Status: ACTIVE | Noted: 2022-01-21

## 2022-01-21 PROBLEM — C50.919 MALIGNANT NEOPLASM OF FEMALE BREAST: Status: ACTIVE | Noted: 2022-01-21

## 2022-01-21 PROBLEM — I51.9 HEART DISEASE: Status: ACTIVE | Noted: 2022-01-21

## 2022-01-21 PROBLEM — G47.00 INSOMNIA: Status: ACTIVE | Noted: 2022-01-21

## 2022-01-21 PROBLEM — K57.30 DIVERTICULA OF INTESTINE: Status: ACTIVE | Noted: 2022-01-21

## 2022-01-21 PROBLEM — E11.9 DIABETES (HCC): Status: ACTIVE | Noted: 2022-01-21

## 2022-01-21 PROBLEM — K30 FUNCTIONAL DYSPEPSIA: Status: ACTIVE | Noted: 2022-01-21

## 2022-01-21 PROBLEM — J30.2 SEASONAL ALLERGIC RHINITIS: Status: ACTIVE | Noted: 2022-01-21

## 2022-01-21 PROBLEM — F41.1 GENERALIZED ANXIETY DISORDER: Status: ACTIVE | Noted: 2022-01-21

## 2022-01-21 PROBLEM — F32.9 MAJOR DEPRESSION, SINGLE EPISODE: Status: ACTIVE | Noted: 2022-01-21

## 2022-01-21 PROBLEM — F43.22 ADJUSTMENT DISORDER WITH ANXIETY: Status: ACTIVE | Noted: 2022-01-21

## 2022-01-21 PROBLEM — E11.42 DIABETIC PERIPHERAL NEUROPATHY ASSOCIATED WITH TYPE 2 DIABETES MELLITUS: Status: ACTIVE | Noted: 2022-01-21

## 2022-01-21 PROBLEM — R05.9 COUGH: Status: ACTIVE | Noted: 2022-01-21

## 2022-01-21 PROBLEM — N28.1 SIMPLE RENAL CYST: Status: ACTIVE | Noted: 2017-01-11

## 2022-01-21 PROBLEM — R68.2 DRY MOUTH: Status: ACTIVE | Noted: 2022-01-21

## 2022-01-21 PROBLEM — E78.5 HYPERLIPEMIA: Status: ACTIVE | Noted: 2022-01-21

## 2022-01-21 PROBLEM — N32.9 BLADDER DISORDER: Status: ACTIVE | Noted: 2022-01-21

## 2022-01-21 PROBLEM — H90.5 SENSORINEURAL HEARING LOSS: Status: ACTIVE | Noted: 2022-01-21

## 2022-01-21 PROBLEM — I25.10 ATHEROSCLEROSIS OF CORONARY ARTERY: Status: ACTIVE | Noted: 2022-01-21

## 2022-01-21 PROBLEM — K21.00 REFLUX ESOPHAGITIS: Status: ACTIVE | Noted: 2022-01-21

## 2022-01-21 PROCEDURE — 87086 URINE CULTURE/COLONY COUNT: CPT | Performed by: PHYSICIAN ASSISTANT

## 2022-01-31 ENCOUNTER — HOSPITAL ENCOUNTER (EMERGENCY)
Facility: HOSPITAL | Age: 72
Discharge: HOME | End: 2022-01-31
Attending: EMERGENCY MEDICINE | Admitting: EMERGENCY MEDICINE

## 2022-01-31 ENCOUNTER — APPOINTMENT (OUTPATIENT)
Dept: CT IMAGING | Facility: HOSPITAL | Age: 72
End: 2022-01-31

## 2022-01-31 VITALS
HEIGHT: 66 IN | SYSTOLIC BLOOD PRESSURE: 155 MMHG | RESPIRATION RATE: 18 BRPM | WEIGHT: 180.34 LBS | HEART RATE: 64 BPM | DIASTOLIC BLOOD PRESSURE: 73 MMHG | BODY MASS INDEX: 28.98 KG/M2 | TEMPERATURE: 98 F | OXYGEN SATURATION: 99 %

## 2022-01-31 DIAGNOSIS — K59.00 CONSTIPATION, UNSPECIFIED CONSTIPATION TYPE: ICD-10-CM

## 2022-01-31 DIAGNOSIS — M62.830 LUMBAR PARASPINAL MUSCLE SPASM: ICD-10-CM

## 2022-01-31 DIAGNOSIS — R10.9 ACUTE LEFT FLANK PAIN: Primary | ICD-10-CM

## 2022-01-31 LAB
ALBUMIN SERPL-MCNC: 4.4 G/DL (ref 3.5–5.2)
ALBUMIN/GLOB SERPL: 1.6 G/DL
ALP SERPL-CCNC: 76 U/L (ref 39–117)
ALT SERPL W P-5'-P-CCNC: 8 U/L (ref 1–33)
ANION GAP SERPL CALCULATED.3IONS-SCNC: 10.2 MMOL/L (ref 5–15)
AST SERPL-CCNC: 17 U/L (ref 1–32)
BACTERIA UR QL AUTO: ABNORMAL /HPF
BASOPHILS # BLD AUTO: 0.03 10*3/MM3 (ref 0–0.2)
BASOPHILS NFR BLD AUTO: 0.6 % (ref 0–1.5)
BILIRUB SERPL-MCNC: 0.3 MG/DL (ref 0–1.2)
BILIRUB UR QL STRIP: NEGATIVE
BUN SERPL-MCNC: 16 MG/DL (ref 8–23)
BUN/CREAT SERPL: 22.5 (ref 7–25)
CALCIUM SPEC-SCNC: 9.5 MG/DL (ref 8.6–10.5)
CHLORIDE SERPL-SCNC: 102 MMOL/L (ref 98–107)
CLARITY UR: CLEAR
CO2 SERPL-SCNC: 26.8 MMOL/L (ref 22–29)
COLOR UR: YELLOW
CREAT SERPL-MCNC: 0.71 MG/DL (ref 0.57–1)
DEPRECATED RDW RBC AUTO: 41.4 FL (ref 37–54)
EOSINOPHIL # BLD AUTO: 0.07 10*3/MM3 (ref 0–0.4)
EOSINOPHIL NFR BLD AUTO: 1.3 % (ref 0.3–6.2)
ERYTHROCYTE [DISTWIDTH] IN BLOOD BY AUTOMATED COUNT: 12.5 % (ref 12.3–15.4)
GFR SERPL CREATININE-BSD FRML MDRD: 81 ML/MIN/1.73
GLOBULIN UR ELPH-MCNC: 2.7 GM/DL
GLUCOSE SERPL-MCNC: 140 MG/DL (ref 65–99)
GLUCOSE UR STRIP-MCNC: NEGATIVE MG/DL
HCT VFR BLD AUTO: 34.7 % (ref 34–46.6)
HGB BLD-MCNC: 11.7 G/DL (ref 12–15.9)
HGB UR QL STRIP.AUTO: NEGATIVE
HOLD SPECIMEN: NORMAL
HOLD SPECIMEN: NORMAL
HYALINE CASTS UR QL AUTO: ABNORMAL /LPF
IMM GRANULOCYTES # BLD AUTO: 0.02 10*3/MM3 (ref 0–0.05)
IMM GRANULOCYTES NFR BLD AUTO: 0.4 % (ref 0–0.5)
KETONES UR QL STRIP: NEGATIVE
LEUKOCYTE ESTERASE UR QL STRIP.AUTO: ABNORMAL
LIPASE SERPL-CCNC: 46 U/L (ref 13–60)
LYMPHOCYTES # BLD AUTO: 1.67 10*3/MM3 (ref 0.7–3.1)
LYMPHOCYTES NFR BLD AUTO: 32.1 % (ref 19.6–45.3)
MCH RBC QN AUTO: 30.8 PG (ref 26.6–33)
MCHC RBC AUTO-ENTMCNC: 33.7 G/DL (ref 31.5–35.7)
MCV RBC AUTO: 91.3 FL (ref 79–97)
MONOCYTES # BLD AUTO: 0.59 10*3/MM3 (ref 0.1–0.9)
MONOCYTES NFR BLD AUTO: 11.3 % (ref 5–12)
NEUTROPHILS NFR BLD AUTO: 2.82 10*3/MM3 (ref 1.7–7)
NEUTROPHILS NFR BLD AUTO: 54.3 % (ref 42.7–76)
NITRITE UR QL STRIP: NEGATIVE
NRBC BLD AUTO-RTO: 0 /100 WBC (ref 0–0.2)
PH UR STRIP.AUTO: 7.5 [PH] (ref 5–8)
PLATELET # BLD AUTO: 257 10*3/MM3 (ref 140–450)
PMV BLD AUTO: 9.4 FL (ref 6–12)
POTASSIUM SERPL-SCNC: 3.7 MMOL/L (ref 3.5–5.2)
PROT SERPL-MCNC: 7.1 G/DL (ref 6–8.5)
PROT UR QL STRIP: NEGATIVE
RBC # BLD AUTO: 3.8 10*6/MM3 (ref 3.77–5.28)
RBC # UR STRIP: ABNORMAL /HPF
REF LAB TEST METHOD: ABNORMAL
SODIUM SERPL-SCNC: 139 MMOL/L (ref 136–145)
SP GR UR STRIP: 1.01 (ref 1–1.03)
SQUAMOUS #/AREA URNS HPF: ABNORMAL /HPF
TRANS CELLS #/AREA URNS HPF: ABNORMAL /HPF
UROBILINOGEN UR QL STRIP: ABNORMAL
WBC # UR STRIP: ABNORMAL /HPF
WBC NRBC COR # BLD: 5.2 10*3/MM3 (ref 3.4–10.8)
WHOLE BLOOD HOLD SPECIMEN: NORMAL
WHOLE BLOOD HOLD SPECIMEN: NORMAL

## 2022-01-31 PROCEDURE — 96374 THER/PROPH/DIAG INJ IV PUSH: CPT

## 2022-01-31 PROCEDURE — 85025 COMPLETE CBC W/AUTO DIFF WBC: CPT | Performed by: EMERGENCY MEDICINE

## 2022-01-31 PROCEDURE — 83690 ASSAY OF LIPASE: CPT | Performed by: EMERGENCY MEDICINE

## 2022-01-31 PROCEDURE — 25010000002 MORPHINE PER 10 MG: Performed by: EMERGENCY MEDICINE

## 2022-01-31 PROCEDURE — 81001 URINALYSIS AUTO W/SCOPE: CPT | Performed by: EMERGENCY MEDICINE

## 2022-01-31 PROCEDURE — 74176 CT ABD & PELVIS W/O CONTRAST: CPT

## 2022-01-31 PROCEDURE — 96375 TX/PRO/DX INJ NEW DRUG ADDON: CPT

## 2022-01-31 PROCEDURE — 25010000002 KETOROLAC TROMETHAMINE PER 15 MG: Performed by: EMERGENCY MEDICINE

## 2022-01-31 PROCEDURE — 25010000002 ONDANSETRON PER 1 MG: Performed by: EMERGENCY MEDICINE

## 2022-01-31 PROCEDURE — 87086 URINE CULTURE/COLONY COUNT: CPT | Performed by: EMERGENCY MEDICINE

## 2022-01-31 PROCEDURE — 80053 COMPREHEN METABOLIC PANEL: CPT | Performed by: EMERGENCY MEDICINE

## 2022-01-31 PROCEDURE — 99283 EMERGENCY DEPT VISIT LOW MDM: CPT

## 2022-01-31 PROCEDURE — 25010000002 ORPHENADRINE CITRATE PER 60 MG: Performed by: EMERGENCY MEDICINE

## 2022-01-31 RX ORDER — METAXALONE 800 MG/1
800 TABLET ORAL 3 TIMES DAILY PRN
Qty: 20 TABLET | Refills: 0 | OUTPATIENT
Start: 2022-01-31 | End: 2022-05-01

## 2022-01-31 RX ORDER — ONDANSETRON 2 MG/ML
4 INJECTION INTRAMUSCULAR; INTRAVENOUS ONCE
Status: COMPLETED | OUTPATIENT
Start: 2022-01-31 | End: 2022-01-31

## 2022-01-31 RX ORDER — HYDROCODONE BITARTRATE AND ACETAMINOPHEN 5; 325 MG/1; MG/1
1 TABLET ORAL EVERY 6 HOURS PRN
Qty: 12 TABLET | Refills: 0 | Status: SHIPPED | OUTPATIENT
Start: 2022-01-31 | End: 2022-11-01

## 2022-01-31 RX ORDER — ORPHENADRINE CITRATE 30 MG/ML
60 INJECTION INTRAMUSCULAR; INTRAVENOUS ONCE
Status: COMPLETED | OUTPATIENT
Start: 2022-01-31 | End: 2022-01-31

## 2022-01-31 RX ORDER — SODIUM CHLORIDE 0.9 % (FLUSH) 0.9 %
10 SYRINGE (ML) INJECTION AS NEEDED
Status: DISCONTINUED | OUTPATIENT
Start: 2022-01-31 | End: 2022-01-31 | Stop reason: HOSPADM

## 2022-01-31 RX ORDER — KETOROLAC TROMETHAMINE 30 MG/ML
30 INJECTION, SOLUTION INTRAMUSCULAR; INTRAVENOUS ONCE
Status: COMPLETED | OUTPATIENT
Start: 2022-01-31 | End: 2022-01-31

## 2022-01-31 RX ADMIN — MORPHINE SULFATE 4 MG: 4 INJECTION INTRAVENOUS at 09:09

## 2022-01-31 RX ADMIN — ORPHENADRINE CITRATE 60 MG: 60 INJECTION INTRAMUSCULAR; INTRAVENOUS at 11:30

## 2022-01-31 RX ADMIN — KETOROLAC TROMETHAMINE 30 MG: 30 INJECTION, SOLUTION INTRAMUSCULAR; INTRAVENOUS at 10:08

## 2022-01-31 RX ADMIN — ONDANSETRON 4 MG: 2 INJECTION INTRAMUSCULAR; INTRAVENOUS at 09:08

## 2022-02-01 LAB — BACTERIA SPEC AEROBE CULT: NORMAL

## 2022-02-01 NOTE — ED NOTES
Patient called stating that the insurance would not cover the metaxalone.  This RN spoke with Magaly PA who gave order for Flexeril 10 mg BID PRN dispense 15 no refills.  This RN called prescription in to [facility] and notified patient.      Zara Hancock, RN  02/01/22 1012

## 2022-02-14 RX ORDER — PRAVASTATIN SODIUM 80 MG/1
TABLET ORAL
Qty: 13 TABLET | Refills: 0 | Status: SHIPPED | OUTPATIENT
Start: 2022-02-14 | End: 2022-03-10

## 2022-02-25 RX ORDER — HYDROCHLOROTHIAZIDE 25 MG/1
TABLET ORAL
Qty: 90 TABLET | Refills: 0 | OUTPATIENT
Start: 2022-02-25

## 2022-02-28 RX ORDER — AMLODIPINE BESYLATE 10 MG/1
TABLET ORAL
Qty: 30 TABLET | Refills: 1 | OUTPATIENT
Start: 2022-02-28

## 2022-03-04 NOTE — ED PROVIDER NOTES
Time: 6:04 PM EST  Arrived by: private car  Chief Complaint: Right low back pain  History provided by: Patient  History is limited by: N/A     History of Present Illness:  Patient is a 71 y.o. year old female that presents to the emergency department with right low back pain, constant since 04 100 today.  Patient denies any report of trauma.  Patient states this constant right low back pain radiates to her right lower abdomen and right thigh.  Symptoms are worse with movement of her right lower extremity.  Describes the pain is stabbing.  The radiating pain is intermittent.  She does complain of urinary frequency starting today but denies any urinary retention or loss of bowel/bladder control.  She denies any right leg numbness or loss of strength/control.    HPI    Similar Symptoms Previously: No  Recently seen: No      Patient Care Team  Primary Care Provider: Tamanna Damon APRN    Past Medical History:     Allergies   Allergen Reactions   • Ezetimibe Unknown - Low Severity   • Ezetimibe-Simvastatin GI Intolerance   • Fenofibrate Other (See Comments)     SOB    • Nabumetone Unknown - Low Severity   • Simvastatin Nausea And Vomiting   • Naproxen Rash   • Niacin Anxiety and Rash     Past Medical History:   Diagnosis Date   • Arthritis    • Asthma    • Cancer (HCC)     Breast.    • Diabetes mellitus (HCC)    • Diverticulitis    • Heart disease    • Hiatal hernia    • Hypertension      Past Surgical History:   Procedure Laterality Date   • HERNIA REPAIR     • HYSTERECTOMY     • MASTECTOMY Bilateral      History reviewed. No pertinent family history.    Home Medications:  Prior to Admission medications    Medication Sig Start Date End Date Taking? Authorizing Provider   amLODIPine (NORVASC) 10 MG tablet Take 10 mg by mouth Daily.    ProviderJayesh MD   aspirin 81 MG EC tablet Take 81 mg by mouth Daily.    ProviderJayesh MD   atenolol (TENORMIN) 50 MG tablet Take 50 mg by mouth Daily.    Provider  Vasquez Hyltonkayleigh    Chief Complaint   Patient presents with   [facility] New Patient       History and Physical    Yajaira Hickman is a pleasant 26-year-old female who presents today for evaluation for lower extremity swelling, fatigue tired legs, and inability to walk long distances due to heavy legs. Patient presents today with her daughter at her side. Patient states that she ambulates daily 2-3 blocks easily without any leg pain or discomfort but her legs get extremely fatigued tired and heavy by the end of the day. She does have some bilateral knee issues, arthritis, but states that her little more heavy fatigued and tired. She recently was in the hospital and got diagnosed with a locally itchy. Patient states that she had a laceration on her left shin and was well for a couple weeks which she feels caused the DVT. Patient was started on Eliquis and has continued to take the medicine without any bleeding or bruising issues. Patient reports that her left leg was 3 times the size of her right leg at one time but it has started to decrease in size over the past 48 to 72 hours. She denies any open wounds at this time. Denies any ulcerations or skin rashes. She does note that the surrounding erythema around her left leg when she would get diagnosed with DVT has started to dissipate. Patient does have chronic issues with sedation. Numerous varicosities on both lower extremities left greater than right. She also with spider veins. Patient states that she has worn compression stockings in the but not religiously. She does state that she has trouble applying them but she will be compliant if asked to. She also states that she elevates her legs while sitting in a recliner but denies elevating her legs above the level of her heart. Overall patient states that 79years old she continues to remain active and is mobile and completes her own ADLs.   Continues to take her daily meds as recommended including aspirin, Eliquis, MD Jayesh   erythromycin (ROMYCIN) 5 MG/GM ophthalmic ointment Administer  into the left eye Every 4 (Four) Hours While Awake for 10 days. 11/13/21 11/23/21  Brenna Britton APRN   gabapentin (NEURONTIN) 300 MG capsule Take 300 mg by mouth 3 (Three) Times a Day.    ProviderJayesh MD   hydroCHLOROthiazide (HYDRODIURIL) 25 MG tablet Take 25 mg by mouth Daily.    Jayesh Mckinley MD   metFORMIN (GLUCOPHAGE) 1000 MG tablet Take 1,000 mg by mouth 2 (Two) Times a Day With Meals.    Jayesh Mckinley MD   ondansetron (ZOFRAN) 4 MG tablet Take 4 mg by mouth Every 8 (Eight) Hours As Needed for Nausea or Vomiting.    Emergency, Nurse Nasir, RN   pravastatin (Pravachol) 80 MG tablet Take 80 mg by mouth Daily.    ProviderJayesh MD   sertraline (ZOLOFT) 100 MG tablet Take 100 mg by mouth Daily.    Jayesh Mckinley MD   tiZANidine (ZANAFLEX) 4 MG tablet Take 1 tablet by mouth 3 (Three) Times a Day. 7/26/21   Matthew Mitchell MD        Social History:   Social History     Tobacco Use   • Smoking status: Never Smoker   • Smokeless tobacco: Never Used   Vaping Use   • Vaping Use: Never used   Substance Use Topics   • Alcohol use: Never   • Drug use: Never     Recent travel: no     Review of Systems:  Review of Systems   Constitutional: Negative for chills, fatigue and fever.   HENT: Negative for congestion, rhinorrhea and sore throat.    Eyes: Negative for pain and visual disturbance.   Respiratory: Negative for apnea, cough, chest tightness and shortness of breath.    Cardiovascular: Negative for chest pain and palpitations.   Gastrointestinal: Negative for abdominal pain, diarrhea, nausea and vomiting.   Genitourinary: Positive for frequency. Negative for difficulty urinating, dysuria and hematuria.   Musculoskeletal: Positive for back pain. Negative for joint swelling and myalgias.   Skin: Negative for color change.   Neurological: Negative for seizures, weakness, numbness and  and as stated she is on an antibiotic for the redness in her leg. She denies any chest pain or shortness of breath. Denies any dyspnea on exertion. Very good appetite with no nausea vomiting. Is any bowel or bladder issues. As stated above no open wounds lesions or rashes visit. Patients daughter stated that her mother ambulated from the parking lot to our office without any issues. Past Medical History:   Diagnosis Date    Essential hypertension     Hyperlipidemia     Other premature beats 5/13/2013     3.  11,437 single ve's, 16 paired. Bigeminy and trigeminy noted. Approximately 15% o pvc's on ekg. asymptomatic potassium normal check echo for lv function      Patient Active Problem List   Diagnosis Code    Other premature beats I49.49    Essential hypertension, benign I10    Other and unspecified hyperlipidemia E78.5    Rhabdomyolysis M62.82     History reviewed. No pertinent surgical history. Current Outpatient Medications   Medication Sig Dispense Refill    apixaban (ELIQUIS) 5 mg (74 tabs) starter pack Take 10 mg (two 5 mg tablets) by mouth twice a day for 7 days Followed by 5 mg (one 5 mg tablet) by mouth twice a day 1 Dose Pack 0    mv-mn/folic ac/calcium/vit K1 (WOMEN'S 50 PLUS MULTIVITAMIN PO) Take 1 Tablet by mouth daily.  artificial tears,hypromellose, (Systane GeL) 0.3 % gel ophthalmic ointment Apply 1 Drop to eye four (4) times daily as needed for Other (irritation). apply 1 drop  over left eye q 4 hours prn irritation      calcium-cholecalciferol, d3, (Calcium 600 + D,3,) 600 mg calcium- 200 unit cap Take 600 mg by mouth daily.  ibuprofen (MOTRIN) 400 mg tablet Take 400 mg by mouth three (3) times daily.  acetaminophen (TYLENOL) 325 mg tablet Take 2 Tabs by mouth every four (4) hours as needed for Pain.  (Patient taking differently: Take 500 mg by mouth three (3) times daily.) 60 Tab 0    diclofenac (VOLTAREN) 1 % gel Apply 4 g to affected area four (4) times daily as "headaches.   Psychiatric/Behavioral: Negative.  Negative for confusion.   All other systems reviewed and are negative.       Physical Exam:  /61   Pulse 65   Temp 98.1 °F (36.7 °C) (Oral)   Resp 19   Ht 167.6 cm (66\")   Wt 81.4 kg (179 lb 7.3 oz)   SpO2 93%   BMI 28.96 kg/m²     Physical Exam  Vitals and nursing note reviewed.   Constitutional:       Appearance: Normal appearance. She is well-developed.      Comments: In pain   HENT:      Head: Normocephalic and atraumatic.      Nose: Nose normal.      Mouth/Throat:      Mouth: Mucous membranes are dry.   Eyes:      Extraocular Movements: Extraocular movements intact.      Pupils: Pupils are equal, round, and reactive to light.   Cardiovascular:      Rate and Rhythm: Normal rate and regular rhythm.      Heart sounds: Normal heart sounds.   Pulmonary:      Effort: Pulmonary effort is normal.      Breath sounds: Normal breath sounds.   Abdominal:      General: Bowel sounds are normal.      Palpations: Abdomen is soft.      Tenderness: There is abdominal tenderness in the right lower quadrant. There is no guarding or rebound.   Musculoskeletal:         General: No swelling. Normal range of motion.      Cervical back: Normal range of motion and neck supple.   Skin:     General: Skin is warm and dry.      Coloration: Skin is not jaundiced.   Neurological:      General: No focal deficit present.      Mental Status: She is alert and oriented to person, place, and time. Mental status is at baseline.      Cranial Nerves: No cranial nerve deficit.      Motor: No weakness.      Comments: Sensation intact to right lower extremity   Psychiatric:         Mood and Affect: Mood normal.         Behavior: Behavior normal.         Judgment: Judgment normal.                Medications in the Emergency Department:  Medications   sodium chloride 0.9 % flush 10 mL (has no administration in time range)   morphine injection 2 mg (2 mg Intravenous Given 11/22/21 1924) " needed for Pain. Indications: Osteoarthritis of the Knee 100 g 0    atorvastatin (LIPITOR) 20 mg tablet Take 20 mg by mouth Q TU, TH & SAT.  raloxifene (EVISTA) 60 mg tablet Take 60 mg by mouth daily.  losartan-hydrochlorothiazide (HYZAAR) 50-12.5 mg per tablet Take 1 Tab by mouth daily.  cephALEXin (KEFLEX) 500 mg capsule Take 500 mg by mouth two (2) times a day. (Patient not taking: Reported on 3/4/2022)      losartan (COZAAR) 50 mg tablet Take 25 mg by mouth daily. (Patient not taking: Reported on 3/4/2022)      methocarbamol (ROBAXIN) 500 mg tablet Take 500 mg by mouth daily. take at bedtime  Pt reports only taking it one time due to med causing dizziness; pt to discuss it w/ MD.  Indications: muscle spasm (Patient not taking: Reported on 3/4/2022)      calcitonin, salmon, (MIACALCIN) nasal 1 spray intranasal daily, Alternate nostrils. (Patient not taking: Reported on 3/4/2022) 1 Bottle 0    pantoprazole (PROTONIX) 20 mg tablet Take 20 mg by mouth daily. Indications: gastroesophageal reflux disease (Patient not taking: Reported on 3/4/2022)      allopurinol (ZYLOPRIM) 100 mg tablet Take 100 mg by mouth daily. (Patient not taking: Reported on 3/4/2022)      colchicine 0.6 mg tablet Take 0.6 mg by mouth daily. (Patient not taking: Reported on 3/4/2022)      nitrofurantoin (MACRODANTIN) 50 mg capsule Take 1 Cap by mouth every six (6) hours. (Patient not taking: Reported on 2/11/2020) 6 Cap 0    aspirin delayed-release 81 mg tablet Take 81 mg by mouth daily.  (Patient not taking: Reported on 3/4/2022)       No Known Allergies  Social History     Socioeconomic History    Marital status:      Spouse name: Not on file    Number of children: Not on file    Years of education: Not on file    Highest education level: Not on file   Occupational History    Not on file   Tobacco Use    Smoking status: Never Smoker    Smokeless tobacco: Never Used   Substance and Sexual Activity      ondansetron (ZOFRAN) injection 4 mg (4 mg Intravenous Given 11/22/21 1822)        Labs  Lab Results (last 24 hours)     Procedure Component Value Units Date/Time    Urinalysis With Culture If Indicated - Urine, Clean Catch [[id number]]  (Abnormal) Collected: 11/22/21 1825    Specimen: Urine, Clean Catch Updated: 11/22/21 1853     Color, UA Yellow     Appearance, UA Clear     pH, UA 6.5     Specific Gravity, UA <=1.005     Glucose, UA Negative     Ketones, UA Negative     Bilirubin, UA Negative     Blood, UA Trace     Protein, UA Negative     Leuk Esterase, UA Small (1+)     Nitrite, UA Negative     Urobilinogen, UA 0.2 E.U./dL    Urinalysis, Microscopic Only - Urine, Clean Catch [[id number]]  (Abnormal) Collected: 11/22/21 1825    Specimen: Urine, Clean Catch Updated: 11/22/21 1853     RBC, UA 0-2 /HPF      WBC, UA 3-5 /HPF      Bacteria, UA None Seen /HPF      Squamous Epithelial Cells, UA 0-2 /HPF      Hyaline Casts, UA None Seen /LPF      Methodology Automated Microscopy    CBC & Differential [[id number]]  (Abnormal) Collected: 11/22/21 1854    Specimen: Blood Updated: 11/22/21 1901    Narrative:      The following orders were created for panel order CBC & Differential.  Procedure                               Abnormality         Status                     ---------                               -----------         ------                     CBC Auto Differential[[id number]]        Abnormal            Final result                 Please view results for these tests on the individual orders.    Basic Metabolic Panel [[id number]]  (Normal) Collected: 11/22/21 1854    Specimen: Blood Updated: 11/22/21 1918     Glucose 80 mg/dL      BUN 10 mg/dL      Creatinine 0.76 mg/dL      Sodium 140 mmol/L      Potassium 3.6 mmol/L      Chloride 105 mmol/L      CO2 23.3 mmol/L      Calcium 9.3 mg/dL      eGFR Non African Amer 75 mL/min/1.73      BUN/Creatinine Ratio 13.2     Anion Gap 11.7 mmol/L     Narrative:      GFR Normal  Alcohol use: No    Drug use: No    Sexual activity: Never   Other Topics Concern    Not on file   Social History Narrative    Not on file     Social Determinants of Health     Financial Resource Strain:     Difficulty of Paying Living Expenses: Not on file   Food Insecurity:     Worried About Running Out of Food in the Last Year: Not on file    Ric of Food in the Last Year: Not on file   Transportation Needs:     Lack of Transportation (Medical): Not on file    Lack of Transportation (Non-Medical): Not on file   Physical Activity:     Days of Exercise per Week: Not on file    Minutes of Exercise per Session: Not on file   Stress:     Feeling of Stress : Not on file   Social Connections:     Frequency of Communication with Friends and Family: Not on file    Frequency of Social Gatherings with Friends and Family: Not on file    Attends [employer] Services: Not on file    Active Member of [address] [place] [address] [employer] or Organizations: Not on file    Attends Club or Organization Meetings: Not on file    Marital Status: Not on file   Intimate Partner Violence:     Fear of Current or Ex-Partner: Not on file    Emotionally Abused: Not on file    Physically Abused: Not on file    Sexually Abused: Not on file   Housing Stability:     Unable to Pay for Housing in the Last Year: Not on file    Number of [place] in the Last Year: Not on file    Unstable Housing in the Last Year: Not on file      Family History   Problem Relation Age of Onset    Arrhythmia Sister     Heart Attack Other     Heart Surgery Other        Review of Systems    A full review of systems was completed times ten organ systems and was deemed negative unless otherwise mentioned in the HPI. Physical Exam:    Visit Vitals  /80 (BP 1 Location: Left upper arm, BP Patient Position: Sitting, BP Cuff Size: Adult)   Pulse 77   Resp 20   SpO2 95%   General: Alert and oriented x3. Answers all questions appropriately.   Moves all extremities to >60  Chronic Kidney Disease <60  Kidney Failure <15      CBC Auto Differential [[id number]]  (Abnormal) Collected: 11/22/21 1854    Specimen: Blood Updated: 11/22/21 1901     WBC 8.87 10*3/mm3      RBC 3.89 10*6/mm3      Hemoglobin 12.1 g/dL      Hematocrit 37.3 %      MCV 95.9 fL      MCH 31.1 pg      MCHC 32.4 g/dL      RDW 13.1 %      RDW-SD 45.9 fl      MPV 9.9 fL      Platelets 256 10*3/mm3      Neutrophil % 57.4 %      Lymphocyte % 29.7 %      Monocyte % 11.4 %      Eosinophil % 0.9 %      Basophil % 0.3 %      Immature Grans % 0.3 %      Neutrophils, Absolute 5.09 10*3/mm3      Lymphocytes, Absolute 2.63 10*3/mm3      Monocytes, Absolute 1.01 10*3/mm3      Eosinophils, Absolute 0.08 10*3/mm3      Basophils, Absolute 0.03 10*3/mm3      Immature Grans, Absolute 0.03 10*3/mm3      nRBC 0.0 /100 WBC            Imaging:  CT Abdomen Pelvis Without Contrast    Result Date: 11/22/2021  PROCEDURE: CT ABDOMEN PELVIS WO CONTRAST  COMPARISON: [facility], CT, CHEST W/ CONTRAST, 6/09/2018, 3:15.  INDICATIONS: Right renal colic  TECHNIQUE: CT images were created without intravenous contrast.   PROTOCOL:   Standard imaging protocol performed    RADIATION:   DLP: 475.1mGy*cm   Automated exposure control was utilized to minimize radiation dose.  FINDINGS:  The visualized lung bases are clear.  A cholecystectomy has been performed.  The liver, spleen, pancreas and adrenal glands appear unremarkable.  In the mid to upper left kidney is a 0.7 cm hyperdense focus favored to represent a small proteinaceous or hemorrhagic cyst.  Cystic foci elsewhere in the left kidney measure up to 3.8 cm, favored to represent simple cyst.  A 0.8 cm hyperdense focus in the inferior pole of the right kidney also likely represents a proteinaceous or hemorrhagic cyst.  Moderate atherosclerotic changes are noted involving the bilateral renal arteries.  No ureteral stone is seen.  The urinary bladder appears normal.  A hysterectomy has been  command. Brought into the room via wheelchair. Constitutional:  Patient is well developed, well nourished, and not distressed. Ambulated into the room with a normal steady gait. HEENT: atraumatic, normocephalic, wearing a mask. Eyes:   Cunjunctivae clear, no scleral icterus  Neck:   No JVD present. No adenopathy. No carotid bruits or thrills noted bilaterally. Cardiovascular:  Normal rate, regular rhythm, normal heart sounds. No murmur heard. Pulmonary/Chest: Effort normal . No wheezes, rales or rhonchi noted. Extremities: Normal range of motion. Strong palpable pedal pulses. Positive bilateral edema noted up to her sock lines, left 2 times the size of right. No calf tenderness to palpation bilaterally. Neurovascularly intact bilaterally both soft and deep sensation. Multiple varicosities noted left greater than right. Multiple spider veins noted left greater than right. Nontender to palpation  Neurological:  he  is alert and oriented x3 . Gait normal. Motor & sensory grossly intact in all 4 limbs. Psych: Appropriate mood and affect. Skin:  Skin is warm and dry. No ulcerations. Capillary refill <3 sec bilaterally. No ulcerations or rashes noted. Impression and Plan:    ICD-10-CM ICD-9-CM    1. Venous (peripheral) insufficiency  I87.2 459.81 DUPLEX LOWER EXT VENOUS LEFT     Patient instructed to continue present Rx -taking daily meds as ordered including anticoagulation medication. Patient educated on the importance of compression elevation in the face of venous insufficiency. Patient educated on proper elevation. Patient instructed that she must lay flat with 2-3 pillows under her legs to get her legs 20 to 30 inches above the level of her heart for proper elevation. Patient and daughter state they will try. Patient given a prescription for compression wraps. 20 to 30 mmHg.   Instructed the difference between the wraps and stockings, and patient feels they will be much easier for application. Patient willing to be compliant with above recommendations. Follow-up in 3 months with repeat left lower extremity duplex imaging to evaluate clot. In the meantime patient encouraged to continue with compression elevation and next visit we will discuss the length of time the patient will need to be on anticoagulation. Preliminary report shows 6 months for acute DVT. Patient agreeable  In the meantime patient encouraged to continue to increase her activity as tolerated, to continue to make better lifestyle choices, nutritional choices, and of course smoking cessation. Patient and daughter state and, are appreciative of our service, and are willing to proceed as planned. We will discuss details with my attending. Follow-up and Dispositions    · Return in about 3 months (around 6/4/2022). Sana Hanley PA-C    PLEASE NOTE:  This document has been produced using voice recognition software. Unrecognized errors in transcription may be present. performed.  The bilateral ovaries appear normal.  No adenopathy or free fluid is seen in the abdomen or pelvis.  No acute bowel abnormality is identified.  The lack of oral contrast limits evaluation of the bowel.  No focal osseous lesion is seen.  CONCLUSION:  1. Previous cholecystectomy and hysterectomy 2. Multiple simple appearing cysts in the kidneys bilaterally 3. Suspected proteinaceous or hemorrhagic renal cysts bilaterally, as above. 4. No ureteral stone demonstrated     Jose D Willingham M.D.       Electronically Signed and Approved By: Jose D Willingham M.D. on 11/22/2021 at 19:27               Procedures:  Procedures    Progress  ED Course as of 11/22/21 2055   Mon Nov 22, 2021 2047 Feeling much better on reevaluation.  Happy with our plan to go home.  I have alerted her to the presence of bilateral renal cysts and need for further work-up/evaluation. [RP]   2054 Patient states she is able to take prednisone in the past without having hyperglycemia. [RP]      ED Course User Index  [RP] Konrad Son MD                            Medical Decision Making:  MDM  Number of Diagnoses or Management Options     Amount and/or Complexity of Data Reviewed  Clinical lab tests: reviewed  Tests in the radiology section of CPT®: reviewed  Independent visualization of images, tracings, or specimens: yes    Risk of Complications, Morbidity, and/or Mortality  Presenting problems: moderate  Management options: low         Final diagnoses:   Sciatica of right side        Disposition:  ED Disposition     ED Disposition Condition Comment    Discharge Stable           This medical record created using voice recognition software and may contain unintended errors.         Konrad Son MD  11/22/21 2055

## 2022-03-05 PROBLEM — I10 HYPERTENSION, ESSENTIAL: Chronic | Status: ACTIVE | Noted: 2022-03-05

## 2022-03-05 PROBLEM — E78.5 HYPERLIPEMIA: Chronic | Status: ACTIVE | Noted: 2022-01-21

## 2022-03-05 PROBLEM — I25.10 ATHEROSCLEROSIS OF CORONARY ARTERY: Chronic | Status: ACTIVE | Noted: 2022-01-21

## 2022-03-05 PROBLEM — I10 HYPERTENSION, ESSENTIAL: Status: ACTIVE | Noted: 2022-03-05

## 2022-03-05 NOTE — PROGRESS NOTES
Chief Complaint  Other (Chronic ischemic heart disease/), Coronary Artery Disease, Hypertension, and Hyperlipidemia    Subjective        Camilla Navas presents to [facility] CARDIOLOGY  She is a 71-year-old white female comes in to evaluate her coronary disease.  Has not been here in the office for about a year and a half now.  States she is has chest discomfort at least twice over the last 2 months.  Described as a pressure and tightness lasting 5 to 10 minutes.  She did not take any nitro for it.  Has occasional palpitation 5 to fluttery sensation only last a few seconds.  Continue to be short of breath and small with mod exertion that is unchanged. Denies  swelling, dizziness, syncope, PND, and orthopnea.  States blood pressures are good but did not bring us any readings.           Past History:    Past Medical History:   Diagnosis Date   • Arthritis    • Asthma    • Atherosclerosis of coronary artery 1/21/2022    Non-obstructive coronary artery disease. Cardiac catheterization done in October 2018 showed a 40% mid LAD lesion and a 40% proximal RCA lesion.   • Cancer (HCC)     Breast.    • Diabetes mellitus (HCC)    • Diverticulitis    • Heart disease    • Hiatal hernia    • Hyperlipemia 1/21/2022   • Hypertension    • Hypertension, essential 3/5/2022        Family History: family history is not on file.     Social History: reports that she has never smoked. She has never used smokeless tobacco. She reports that she does not drink alcohol and does not use drugs.    Allergies: Fenofibrate, Vytorin [ezetimibe-simvastatin], and Niacin    Past Surgical History:   Procedure Laterality Date   • HERNIA REPAIR     • HYSTERECTOMY     • MASTECTOMY Bilateral           Current Outpatient Medications:   •  amLODIPine (NORVASC) 10 MG tablet, TAKE 1 TABLET BY MOUTH EVERY DAY, Disp: 30 tablet, Rfl: 1  •  aspirin 81 MG EC tablet, Take 81 mg by mouth Daily., Disp: , Rfl:   •  gabapentin (NEURONTIN) 300 MG  capsule, Take 300 mg by mouth 3 (Three) Times a Day., Disp: , Rfl:   •  HYDROcodone-acetaminophen (NORCO) 5-325 MG per tablet, Take 1 tablet by mouth Every 6 (Six) Hours As Needed for Severe Pain ., Disp: 12 tablet, Rfl: 0  •  melatonin 5 MG tablet tablet, Take 5 mg by mouth At Night As Needed., Disp: , Rfl:   •  metFORMIN (GLUCOPHAGE) 1000 MG tablet, Take 1,000 mg by mouth 2 (Two) Times a Day With Meals., Disp: , Rfl:   •  ondansetron (ZOFRAN) 4 MG tablet, Take 4 mg by mouth Every 8 (Eight) Hours As Needed for Nausea or Vomiting., Disp: , Rfl:   •  sertraline (ZOLOFT) 100 MG tablet, Take 100 mg by mouth Daily. 1.5 tabs qd, Disp: , Rfl:   •  vitamin E 1000 UNIT capsule, Take 1,000 Units by mouth Daily., Disp: , Rfl:   •  atenolol (TENORMIN) 50 MG tablet, TAKE 1 TABLET BY MOUTH EVERY DAY, Disp: 90 tablet, Rfl: 3  •  hydroCHLOROthiazide (HYDRODIURIL) 25 MG tablet, TAKE 1 TABLET BY MOUTH EVERY DAY, Disp: 90 tablet, Rfl: 3  •  metaxalone (SKELAXIN) 800 MG tablet, Take 1 tablet by mouth 3 (Three) Times a Day As Needed for Muscle Spasms., Disp: 20 tablet, Rfl: 0  •  nitroglycerin (NITROSTAT) 0.4 MG SL tablet, 1 under the tongue as needed for angina, may repeat q5mins for up three doses, Disp: 100 tablet, Rfl: 11  •  pravastatin (PRAVACHOL) 80 MG tablet, TAKE 1 TABLET BY MOUTH EVERY DAY, Disp: 90 tablet, Rfl: 3     Medications Discontinued During This Encounter   Medication Reason   • tiZANidine (ZANAFLEX) 4 MG tablet *Therapy completed   • nitrofurantoin, macrocrystal-monohydrate, (Macrobid) 100 MG capsule *Therapy completed   • neomycin-polymyxin-hydrocortisone (CORTISPORIN) 3.5-24501-5 ophthalmic suspension *Therapy completed        Review of Systems   Constitutional: Positive for fatigue.   Respiratory: Positive for shortness of breath (When walking). Negative for cough.    Cardiovascular: Positive for chest pain (Tightness) and palpitations. Negative for leg swelling.   Neurological: Negative for dizziness.     "    Objective     Physical Exam  Constitutional:       General: She is not in acute distress.     Appearance: Normal appearance.   Neck:      Vascular: No carotid bruit.   Cardiovascular:      Rate and Rhythm: Normal rate and regular rhythm.      Heart sounds: Murmur (at the apex) heard.    Systolic murmur is present with a grade of 1/6.  Pulmonary:      Effort: Pulmonary effort is normal.      Breath sounds: Normal breath sounds.   Musculoskeletal:      Right lower leg: No edema.      Left lower leg: No edema.   Neurological:      Mental Status: She is alert.       /69   Pulse 68   Ht 167.6 cm (66\")   Wt 82.1 kg (181 lb)   BMI 29.21 kg/m²       Vitals:    03/07/22 1409   BP: 155/69   Pulse: 68       Result Review :         The following data was reviewed by: VENESSA Galarza on 03/07/2022:      No results found for: PROBNP, BNP  CMP    CMP 11/22/21 1/31/22 3/16/22   Glucose 80 140 (A) 127 (A)   BUN 10 16 13   Creatinine 0.76 0.71 0.88   eGFR Non African Am 75 81    Sodium 140 139 138   Potassium 3.6 3.7 4.2   Chloride 105 102 102   Calcium 9.3 9.5 9.3   Albumin  4.40 4.10   Total Bilirubin  0.3 0.3   Alkaline Phosphatase  76 73   AST (SGOT)  17 16   ALT (SGPT)  8 9   (A) Abnormal value            CBC w/diff    CBC w/Diff 11/22/21 1/31/22   WBC 8.87 5.20   RBC 3.89 3.80   Hemoglobin 12.1 11.7 (A)   Hematocrit 37.3 34.7   MCV 95.9 91.3   MCH 31.1 30.8   MCHC 32.4 33.7   RDW 13.1 12.5   Platelets 256 257   Neutrophil Rel % 57.4 54.3   Immature Granulocyte Rel % 0.3 0.4   Lymphocyte Rel % 29.7 32.1   Monocyte Rel % 11.4 11.3   Eosinophil Rel % 0.9 1.3   Basophil Rel % 0.3 0.6   (A) Abnormal value             Lipid Panel    Lipid Panel 3/16/22   Total Cholesterol 141   Triglycerides 83   HDL Cholesterol 56   VLDL Cholesterol 16   LDL Cholesterol  69   LDL/HDL Ratio 1.22           Magnesium   Date Value Ref Range Status   03/13/2020 1.69 1.60 - 2.30 mg/dL Final               ECG 12 Lead    Date/Time: " 3/7/2022 4:28 PM  Performed by: Christine Chi APRN  Authorized by: Christine Chi APRN   Comparison: compared with previous ECG from 5/16/2021  Rhythm: sinus rhythm  Rate: normal  BPM: 61  Q waves: II, III and aVF    Clinical impression comment: Borderline EKG              Assessment and Plan    Diagnoses and all orders for this visit:    1. Atherosclerosis of coronary artery of native heart with angina pectoris, unspecified vessel or lesion type (HCC) (Primary)  Assessment & Plan:  With recent angina.  We will do a stress test to evaluate her chest pain.  Nitroglycerin was given with instructions.      2. Hyperlipidemia, unspecified hyperlipidemia type  Assessment & Plan:  Unknown control.  We will do labs in the next week and adjust meds accordingly.  Continue pravastatin 80 mg for now    Orders:  -     Lipid Panel; Future  -     Comprehensive Metabolic Panel; Future    3. Hypertension, essential  Assessment & Plan:  Uncertain control.  Instructed to blood pressure log will adjust meds accordingly.  Continue amlodipine 10 mg, atenolol 50 mg and hydrochlorothiazide 25 mg.  If elevated will consider adding an ACE or an ARB in view of her diabetes.      4. Chest pain, unspecified type  -     nitroglycerin (NITROSTAT) 0.4 MG SL tablet; 1 under the tongue as needed for angina, may repeat q5mins for up three doses  Dispense: 100 tablet; Refill: 11  -     Stress Test With Myocardial Perfusion One Day; Future  -     ECG 12 Lead          Follow Up     Return in about 4 months (around 7/7/2022) for with Dr. Guadarrama.    Patient was given instructions and counseling regarding her condition or for health maintenance advice. Please see specific information pulled into the AVS if appropriate.       VENESSA Zapata  04/01/22 11:48 EST

## 2022-03-07 ENCOUNTER — OFFICE VISIT (OUTPATIENT)
Dept: CARDIOLOGY | Facility: CLINIC | Age: 72
End: 2022-03-07

## 2022-03-07 VITALS
DIASTOLIC BLOOD PRESSURE: 69 MMHG | WEIGHT: 181 LBS | BODY MASS INDEX: 29.09 KG/M2 | SYSTOLIC BLOOD PRESSURE: 155 MMHG | HEART RATE: 68 BPM | HEIGHT: 66 IN

## 2022-03-07 DIAGNOSIS — I25.119 ATHEROSCLEROSIS OF CORONARY ARTERY OF NATIVE HEART WITH ANGINA PECTORIS, UNSPECIFIED VESSEL OR LESION TYPE: Primary | ICD-10-CM

## 2022-03-07 DIAGNOSIS — R07.9 CHEST PAIN, UNSPECIFIED TYPE: ICD-10-CM

## 2022-03-07 DIAGNOSIS — I10 HYPERTENSION, ESSENTIAL: ICD-10-CM

## 2022-03-07 DIAGNOSIS — E78.5 HYPERLIPIDEMIA, UNSPECIFIED HYPERLIPIDEMIA TYPE: ICD-10-CM

## 2022-03-07 PROCEDURE — 99214 OFFICE O/P EST MOD 30 MIN: CPT | Performed by: NURSE PRACTITIONER

## 2022-03-07 PROCEDURE — 93000 ELECTROCARDIOGRAM COMPLETE: CPT | Performed by: NURSE PRACTITIONER

## 2022-03-07 RX ORDER — NITROGLYCERIN 0.4 MG/1
TABLET SUBLINGUAL
Qty: 100 TABLET | Refills: 11 | Status: SHIPPED | OUTPATIENT
Start: 2022-03-07

## 2022-03-07 RX ORDER — MULTIVIT WITH MINERALS/LUTEIN
1000 TABLET ORAL DAILY
COMMUNITY

## 2022-03-07 NOTE — ASSESSMENT & PLAN NOTE
Uncertain control.  Instructed to blood pressure log will adjust meds accordingly.  Continue amlodipine 10 mg, atenolol 50 mg and hydrochlorothiazide 25 mg.  If elevated will consider adding an ACE or an ARB in view of her diabetes.

## 2022-03-07 NOTE — ASSESSMENT & PLAN NOTE
With recent angina.  We will do a stress test to evaluate her chest pain.  Nitroglycerin was given with instructions.

## 2022-03-07 NOTE — ASSESSMENT & PLAN NOTE
Unknown control.  We will do labs in the next week and adjust meds accordingly.  Continue pravastatin 80 mg for now

## 2022-03-09 RX ORDER — ATENOLOL 50 MG/1
TABLET ORAL
Qty: 30 TABLET | Refills: 1 | Status: CANCELLED | OUTPATIENT
Start: 2022-03-09

## 2022-03-10 RX ORDER — ATENOLOL 50 MG/1
TABLET ORAL
Qty: 90 TABLET | Refills: 3 | Status: SHIPPED | OUTPATIENT
Start: 2022-03-10

## 2022-03-11 RX ORDER — HYDROCHLOROTHIAZIDE 25 MG/1
TABLET ORAL
Qty: 90 TABLET | Refills: 3 | Status: SHIPPED | OUTPATIENT
Start: 2022-03-11 | End: 2023-03-10

## 2022-03-11 RX ORDER — PRAVASTATIN SODIUM 80 MG/1
TABLET ORAL
Qty: 90 TABLET | Refills: 3 | Status: SHIPPED | OUTPATIENT
Start: 2022-03-11

## 2022-03-16 ENCOUNTER — LAB (OUTPATIENT)
Dept: LAB | Facility: HOSPITAL | Age: 72
End: 2022-03-16

## 2022-03-16 DIAGNOSIS — E78.5 HYPERLIPIDEMIA, UNSPECIFIED HYPERLIPIDEMIA TYPE: ICD-10-CM

## 2022-03-16 LAB
ALBUMIN SERPL-MCNC: 4.1 G/DL (ref 3.5–5.2)
ALBUMIN/GLOB SERPL: 1.6 G/DL
ALP SERPL-CCNC: 73 U/L (ref 39–117)
ALT SERPL W P-5'-P-CCNC: 9 U/L (ref 1–33)
ANION GAP SERPL CALCULATED.3IONS-SCNC: 7.9 MMOL/L (ref 5–15)
AST SERPL-CCNC: 16 U/L (ref 1–32)
BILIRUB SERPL-MCNC: 0.3 MG/DL (ref 0–1.2)
BUN SERPL-MCNC: 13 MG/DL (ref 8–23)
BUN/CREAT SERPL: 14.8 (ref 7–25)
CALCIUM SPEC-SCNC: 9.3 MG/DL (ref 8.6–10.5)
CHLORIDE SERPL-SCNC: 102 MMOL/L (ref 98–107)
CHOLEST SERPL-MCNC: 141 MG/DL (ref 0–200)
CO2 SERPL-SCNC: 28.1 MMOL/L (ref 22–29)
CREAT SERPL-MCNC: 0.88 MG/DL (ref 0.57–1)
EGFRCR SERPLBLD CKD-EPI 2021: 69.9 ML/MIN/1.73
GLOBULIN UR ELPH-MCNC: 2.6 GM/DL
GLUCOSE SERPL-MCNC: 127 MG/DL (ref 65–99)
HDLC SERPL-MCNC: 56 MG/DL (ref 40–60)
LDLC SERPL CALC-MCNC: 69 MG/DL (ref 0–100)
LDLC/HDLC SERPL: 1.22 {RATIO}
POTASSIUM SERPL-SCNC: 4.2 MMOL/L (ref 3.5–5.2)
PROT SERPL-MCNC: 6.7 G/DL (ref 6–8.5)
SODIUM SERPL-SCNC: 138 MMOL/L (ref 136–145)
TRIGL SERPL-MCNC: 83 MG/DL (ref 0–150)
VLDLC SERPL-MCNC: 16 MG/DL (ref 5–40)

## 2022-03-16 PROCEDURE — 80061 LIPID PANEL: CPT

## 2022-03-16 PROCEDURE — 36415 COLL VENOUS BLD VENIPUNCTURE: CPT

## 2022-03-16 PROCEDURE — 80053 COMPREHEN METABOLIC PANEL: CPT

## 2022-03-18 ENCOUNTER — TELEPHONE (OUTPATIENT)
Dept: CARDIOLOGY | Facility: CLINIC | Age: 72
End: 2022-03-18

## 2022-03-18 NOTE — TELEPHONE ENCOUNTER
----- Message from Autumn Farfan sent at 3/18/2022 11:50 AM EDT -----    ----- Message -----  From: Christine Chi June, APRN  Sent: 3/18/2022  10:30 AM EDT  To: Autumn Farfan    Cholesterols and labs are good.  Continue current medications

## 2022-03-30 ENCOUNTER — HOSPITAL ENCOUNTER (OUTPATIENT)
Dept: NUCLEAR MEDICINE | Facility: HOSPITAL | Age: 72
Discharge: HOME | End: 2022-03-30

## 2022-03-30 DIAGNOSIS — R07.9 CHEST PAIN, UNSPECIFIED TYPE: ICD-10-CM

## 2022-03-30 PROCEDURE — 93018 CV STRESS TEST I&R ONLY: CPT | Performed by: INTERNAL MEDICINE

## 2022-03-30 PROCEDURE — 93017 CV STRESS TEST TRACING ONLY: CPT

## 2022-03-30 PROCEDURE — A9502 TC99M TETROFOSMIN: HCPCS | Performed by: NURSE PRACTITIONER

## 2022-03-30 PROCEDURE — 78452 HT MUSCLE IMAGE SPECT MULT: CPT

## 2022-03-30 PROCEDURE — 0 TECHNETIUM TETROFOSMIN KIT: Performed by: NURSE PRACTITIONER

## 2022-03-30 PROCEDURE — 93016 CV STRESS TEST SUPVJ ONLY: CPT | Performed by: NURSE PRACTITIONER

## 2022-03-30 PROCEDURE — 78452 HT MUSCLE IMAGE SPECT MULT: CPT | Performed by: INTERNAL MEDICINE

## 2022-03-30 RX ADMIN — TETROFOSMIN 1 DOSE: 1.38 INJECTION, POWDER, LYOPHILIZED, FOR SOLUTION INTRAVENOUS at 09:37

## 2022-03-30 RX ADMIN — TETROFOSMIN 1 DOSE: 1.38 INJECTION, POWDER, LYOPHILIZED, FOR SOLUTION INTRAVENOUS at 08:30

## 2022-03-31 LAB
BH CV IMMEDIATE POST RECOVERY TECH DATA SYMPTOMS: NORMAL
BH CV IMMEDIATE POST TECH DATA BLOOD PRESSURE: NORMAL MMHG
BH CV IMMEDIATE POST TECH DATA HEART RATE: 120 BPM
BH CV IMMEDIATE POST TECH DATA OXYGEN SATS: 97 %
BH CV REST NUCLEAR ISOTOPE DOSE: 9.7 MCI
BH CV SIX MINUTE RECOVERY TECH DATA BLOOD PRESSURE: NORMAL
BH CV SIX MINUTE RECOVERY TECH DATA HEART RATE: 70 BPM
BH CV SIX MINUTE RECOVERY TECH DATA OXYGEN SATURATION: 97 %
BH CV STRESS BP STAGE 1: NORMAL
BH CV STRESS BP STAGE 2: NORMAL
BH CV STRESS DURATION MIN STAGE 1: 3
BH CV STRESS DURATION MIN STAGE 2: 3
BH CV STRESS DURATION MIN STAGE 3: 3
BH CV STRESS DURATION SEC STAGE 1: 0
BH CV STRESS DURATION SEC STAGE 2: 0
BH CV STRESS DURATION SEC STAGE 3: 0
BH CV STRESS GRADE STAGE 1: 0
BH CV STRESS GRADE STAGE 2: 5
BH CV STRESS GRADE STAGE 3: 12
BH CV STRESS HR STAGE 1: 88
BH CV STRESS HR STAGE 2: 97
BH CV STRESS HR STAGE 3: 128
BH CV STRESS METS STAGE 1: 2.3
BH CV STRESS METS STAGE 2: 3.5
BH CV STRESS METS STAGE 3: 4.6
BH CV STRESS NUCLEAR ISOTOPE DOSE: 36.7 MCI
BH CV STRESS O2 STAGE 1: 96
BH CV STRESS O2 STAGE 2: 98
BH CV STRESS O2 STAGE 3: 99
BH CV STRESS PROTOCOL 1: NORMAL
BH CV STRESS RECOVERY BP: NORMAL MMHG
BH CV STRESS RECOVERY HR: 70 BPM
BH CV STRESS RECOVERY O2: 97 %
BH CV STRESS SPEED STAGE 1: 1.7
BH CV STRESS SPEED STAGE 2: 1.7
BH CV STRESS SPEED STAGE 3: 2.4
BH CV STRESS STAGE 1: 1
BH CV STRESS STAGE 2: 2
BH CV STRESS STAGE 3: 3
BH CV THREE MINUTE POST TECH DATA BLOOD PRESSURE: NORMAL MMHG
BH CV THREE MINUTE POST TECH DATA HEART RATE: 70 BPM
BH CV THREE MINUTE POST TECH DATA OXYGEN SATURATION: 97 %
BH CV THREE MINUTE RECOVERY TECH DATA SYMPTOM: NORMAL
LV EF NUC BP: 59 %
MAXIMAL PREDICTED HEART RATE: 148 BPM
PERCENT MAX PREDICTED HR: 86.49 %
STRESS BASELINE BP: NORMAL MMHG
STRESS BASELINE HR: 62 BPM
STRESS O2 SAT REST: 97 %
STRESS PERCENT HR: 102 %
STRESS POST ESTIMATED WORKLOAD: 6.3 METS
STRESS POST EXERCISE DUR MIN: 9 MIN
STRESS POST EXERCISE DUR SEC: 0 SEC
STRESS POST O2 SAT PEAK: 99 %
STRESS POST PEAK BP: NORMAL MMHG
STRESS POST PEAK HR: 128 BPM
STRESS TARGET HR: 126 BPM

## 2022-04-04 ENCOUNTER — TELEPHONE (OUTPATIENT)
Dept: CARDIOLOGY | Facility: CLINIC | Age: 72
End: 2022-04-04

## 2022-04-04 NOTE — TELEPHONE ENCOUNTER
----- Message from VENESSA Galarza sent at 4/1/2022  9:06 AM EDT -----  Inform her stress test was normal.  How is her chest pain doing?

## 2022-04-04 NOTE — TELEPHONE ENCOUNTER
Pt aware of results. Verbalized understanding. No questions.     Pt states she is still dealing with a lot of stress, however her chest pain is improving.

## 2022-05-01 PROCEDURE — 87086 URINE CULTURE/COLONY COUNT: CPT | Performed by: NURSE PRACTITIONER

## 2022-05-02 RX ORDER — AMLODIPINE BESYLATE 10 MG/1
TABLET ORAL
Qty: 90 TABLET | Refills: 0 | Status: SHIPPED | OUTPATIENT
Start: 2022-05-02 | End: 2022-08-02

## 2022-06-24 ENCOUNTER — HOSPITAL ENCOUNTER (EMERGENCY)
Facility: HOSPITAL | Age: 72
Discharge: HOME | End: 2022-06-24
Attending: EMERGENCY MEDICINE | Admitting: EMERGENCY MEDICINE

## 2022-06-24 ENCOUNTER — APPOINTMENT (OUTPATIENT)
Dept: GENERAL RADIOLOGY | Facility: HOSPITAL | Age: 72
End: 2022-06-24

## 2022-06-24 VITALS
DIASTOLIC BLOOD PRESSURE: 70 MMHG | BODY MASS INDEX: 30.33 KG/M2 | HEART RATE: 83 BPM | WEIGHT: 188.71 LBS | RESPIRATION RATE: 23 BRPM | SYSTOLIC BLOOD PRESSURE: 156 MMHG | OXYGEN SATURATION: 92 % | HEIGHT: 66 IN | TEMPERATURE: 98.2 F

## 2022-06-24 DIAGNOSIS — J98.01 BRONCHOSPASM: ICD-10-CM

## 2022-06-24 DIAGNOSIS — J40 BRONCHITIS: Primary | ICD-10-CM

## 2022-06-24 LAB
ALBUMIN SERPL-MCNC: 4.6 G/DL (ref 3.5–5.2)
ALBUMIN/GLOB SERPL: 1.8 G/DL
ALP SERPL-CCNC: 104 U/L (ref 39–117)
ALT SERPL W P-5'-P-CCNC: 12 U/L (ref 1–33)
ANION GAP SERPL CALCULATED.3IONS-SCNC: 11.6 MMOL/L (ref 5–15)
AST SERPL-CCNC: 21 U/L (ref 1–32)
BASOPHILS # BLD AUTO: 0.02 10*3/MM3 (ref 0–0.2)
BASOPHILS NFR BLD AUTO: 0.3 % (ref 0–1.5)
BILIRUB SERPL-MCNC: 0.2 MG/DL (ref 0–1.2)
BUN SERPL-MCNC: 15 MG/DL (ref 8–23)
BUN/CREAT SERPL: 17.9 (ref 7–25)
CALCIUM SPEC-SCNC: 9.5 MG/DL (ref 8.6–10.5)
CHLORIDE SERPL-SCNC: 102 MMOL/L (ref 98–107)
CO2 SERPL-SCNC: 26.4 MMOL/L (ref 22–29)
CREAT SERPL-MCNC: 0.84 MG/DL (ref 0.57–1)
DEPRECATED RDW RBC AUTO: 44.4 FL (ref 37–54)
EGFRCR SERPLBLD CKD-EPI 2021: 73.9 ML/MIN/1.73
EOSINOPHIL # BLD AUTO: 0.13 10*3/MM3 (ref 0–0.4)
EOSINOPHIL NFR BLD AUTO: 2.1 % (ref 0.3–6.2)
ERYTHROCYTE [DISTWIDTH] IN BLOOD BY AUTOMATED COUNT: 13.2 % (ref 12.3–15.4)
FLUAV AG NPH QL: NEGATIVE
FLUBV AG NPH QL IA: NEGATIVE
GLOBULIN UR ELPH-MCNC: 2.5 GM/DL
GLUCOSE SERPL-MCNC: 133 MG/DL (ref 65–99)
HCT VFR BLD AUTO: 35.5 % (ref 34–46.6)
HGB BLD-MCNC: 11.5 G/DL (ref 12–15.9)
HOLD SPECIMEN: NORMAL
HOLD SPECIMEN: NORMAL
IMM GRANULOCYTES # BLD AUTO: 0.02 10*3/MM3 (ref 0–0.05)
IMM GRANULOCYTES NFR BLD AUTO: 0.3 % (ref 0–0.5)
LYMPHOCYTES # BLD AUTO: 1.13 10*3/MM3 (ref 0.7–3.1)
LYMPHOCYTES NFR BLD AUTO: 18 % (ref 19.6–45.3)
MCH RBC QN AUTO: 30 PG (ref 26.6–33)
MCHC RBC AUTO-ENTMCNC: 32.4 G/DL (ref 31.5–35.7)
MCV RBC AUTO: 92.7 FL (ref 79–97)
MONOCYTES # BLD AUTO: 0.55 10*3/MM3 (ref 0.1–0.9)
MONOCYTES NFR BLD AUTO: 8.7 % (ref 5–12)
NEUTROPHILS NFR BLD AUTO: 4.44 10*3/MM3 (ref 1.7–7)
NEUTROPHILS NFR BLD AUTO: 70.6 % (ref 42.7–76)
NRBC BLD AUTO-RTO: 0 /100 WBC (ref 0–0.2)
NT-PROBNP SERPL-MCNC: 788.1 PG/ML (ref 0–900)
PLATELET # BLD AUTO: 248 10*3/MM3 (ref 140–450)
PMV BLD AUTO: 9.3 FL (ref 6–12)
POTASSIUM SERPL-SCNC: 3.4 MMOL/L (ref 3.5–5.2)
PROT SERPL-MCNC: 7.1 G/DL (ref 6–8.5)
QT INTERVAL: 380 MS
RBC # BLD AUTO: 3.83 10*6/MM3 (ref 3.77–5.28)
SARS-COV-2 RNA PNL SPEC NAA+PROBE: DETECTED
SODIUM SERPL-SCNC: 140 MMOL/L (ref 136–145)
TROPONIN T SERPL-MCNC: <0.01 NG/ML (ref 0–0.03)
WBC NRBC COR # BLD: 6.29 10*3/MM3 (ref 3.4–10.8)
WHOLE BLOOD HOLD COAG: NORMAL
WHOLE BLOOD HOLD SPECIMEN: NORMAL

## 2022-06-24 PROCEDURE — 85025 COMPLETE CBC W/AUTO DIFF WBC: CPT

## 2022-06-24 PROCEDURE — C9803 HOPD COVID-19 SPEC COLLECT: HCPCS | Performed by: EMERGENCY MEDICINE

## 2022-06-24 PROCEDURE — U0004 COV-19 TEST NON-CDC HGH THRU: HCPCS

## 2022-06-24 PROCEDURE — 93010 ELECTROCARDIOGRAM REPORT: CPT | Performed by: SPECIALIST

## 2022-06-24 PROCEDURE — 93005 ELECTROCARDIOGRAM TRACING: CPT | Performed by: EMERGENCY MEDICINE

## 2022-06-24 PROCEDURE — 96374 THER/PROPH/DIAG INJ IV PUSH: CPT

## 2022-06-24 PROCEDURE — 84484 ASSAY OF TROPONIN QUANT: CPT | Performed by: EMERGENCY MEDICINE

## 2022-06-24 PROCEDURE — 36415 COLL VENOUS BLD VENIPUNCTURE: CPT

## 2022-06-24 PROCEDURE — 83880 ASSAY OF NATRIURETIC PEPTIDE: CPT | Performed by: EMERGENCY MEDICINE

## 2022-06-24 PROCEDURE — 80053 COMPREHEN METABOLIC PANEL: CPT | Performed by: EMERGENCY MEDICINE

## 2022-06-24 PROCEDURE — 94799 UNLISTED PULMONARY SVC/PX: CPT

## 2022-06-24 PROCEDURE — 94640 AIRWAY INHALATION TREATMENT: CPT

## 2022-06-24 PROCEDURE — 99283 EMERGENCY DEPT VISIT LOW MDM: CPT

## 2022-06-24 PROCEDURE — U0005 INFEC AGEN DETEC AMPLI PROBE: HCPCS

## 2022-06-24 PROCEDURE — 87804 INFLUENZA ASSAY W/OPTIC: CPT

## 2022-06-24 PROCEDURE — 71045 X-RAY EXAM CHEST 1 VIEW: CPT

## 2022-06-24 PROCEDURE — 25010000002 METHYLPREDNISOLONE PER 125 MG: Performed by: EMERGENCY MEDICINE

## 2022-06-24 PROCEDURE — 93005 ELECTROCARDIOGRAM TRACING: CPT

## 2022-06-24 RX ORDER — AZITHROMYCIN 250 MG/1
TABLET, FILM COATED ORAL
Qty: 6 TABLET | Refills: 0 | Status: SHIPPED | OUTPATIENT
Start: 2022-06-24 | End: 2022-11-01

## 2022-06-24 RX ORDER — ALBUTEROL SULFATE 90 UG/1
2 AEROSOL, METERED RESPIRATORY (INHALATION) EVERY 4 HOURS PRN
Qty: 8 G | Refills: 0 | Status: SHIPPED | OUTPATIENT
Start: 2022-06-24

## 2022-06-24 RX ORDER — SODIUM CHLORIDE 0.9 % (FLUSH) 0.9 %
10 SYRINGE (ML) INJECTION AS NEEDED
Status: DISCONTINUED | OUTPATIENT
Start: 2022-06-24 | End: 2022-06-24 | Stop reason: HOSPADM

## 2022-06-24 RX ORDER — METHYLPREDNISOLONE SODIUM SUCCINATE 125 MG/2ML
125 INJECTION, POWDER, LYOPHILIZED, FOR SOLUTION INTRAMUSCULAR; INTRAVENOUS ONCE
Status: COMPLETED | OUTPATIENT
Start: 2022-06-24 | End: 2022-06-24

## 2022-06-24 RX ORDER — IPRATROPIUM BROMIDE AND ALBUTEROL SULFATE 2.5; .5 MG/3ML; MG/3ML
3 SOLUTION RESPIRATORY (INHALATION) ONCE
Status: COMPLETED | OUTPATIENT
Start: 2022-06-24 | End: 2022-06-24

## 2022-06-24 RX ORDER — PREDNISONE 50 MG/1
50 TABLET ORAL DAILY
Qty: 5 TABLET | Refills: 0 | OUTPATIENT
Start: 2022-06-24 | End: 2022-07-02

## 2022-06-24 RX ADMIN — IPRATROPIUM BROMIDE AND ALBUTEROL SULFATE 3 ML: 2.5; .5 SOLUTION RESPIRATORY (INHALATION) at 06:33

## 2022-06-24 RX ADMIN — METHYLPREDNISOLONE SODIUM SUCCINATE 125 MG: 125 INJECTION, POWDER, FOR SOLUTION INTRAMUSCULAR; INTRAVENOUS at 06:24

## 2022-06-24 NOTE — ED PROVIDER NOTES
Time: 6:09 AM EDT  Arrived by: private car  Chief Complaint: SOB  History provided by: pt  History is limited by: N/A     History of Present Illness:  Patient is a 71 y.o. year old female that presents to the emergency department with moderate SOB and cough. Cough has been intermittent for a couple days, but wheezing started at 2200 last night. Pt reports she used to have active asthma. Nothing improves or worsens symptoms.    Pt denies smoking.       History provided by:  Patient  Shortness of Breath  Severity:  Moderate  Onset quality:  Sudden  Duration:  2 days  Timing:  Constant  Progression:  Unchanged  Chronicity:  New  Relieved by:  Nothing  Worsened by:  Nothing  Associated symptoms: cough    Associated symptoms: no chest pain, no diaphoresis, no fever, no headaches, no neck pain, no rash and no vomiting    Cough:     Severity:  Moderate    Onset quality:  Sudden    Duration:  2 days    Timing:  Intermittent    Progression:  Unchanged    Chronicity:  New      Similar Symptoms Previously: no  Recently seen: N/A      Patient Care Team  Primary Care Provider: Lester Lizarraga MD    Past Medical History:     Allergies   Allergen Reactions   • Fenofibrate Other (See Comments)     SOB    • Vytorin [Ezetimibe-Simvastatin] GI Intolerance   • Lisinopril Cough   • Niacin Anxiety and Rash     Past Medical History:   Diagnosis Date   • Arthritis    • Asthma    • Atherosclerosis of coronary artery 1/21/2022    Non-obstructive coronary artery disease. Cardiac catheterization done in October 2018 showed a 40% mid LAD lesion and a 40% proximal RCA lesion.   • Cancer (HCC)     Breast.    • Diabetes mellitus (HCC)    • Diverticulitis    • Heart disease    • Hiatal hernia    • Hyperlipemia 1/21/2022   • Hypertension    • Hypertension, essential 3/5/2022     Past Surgical History:   Procedure Laterality Date   • HERNIA REPAIR     • HYSTERECTOMY     • MASTECTOMY Bilateral      No family history on file.    Home  Medications:  Prior to Admission medications    Medication Sig Start Date End Date Taking? Authorizing Provider   amLODIPine (NORVASC) 10 MG tablet TAKE 1 TABLET BY MOUTH EVERY DAY 5/2/22   Umesh Guadarrama MD   Arthritis Pain Reliever 650 MG 8 hr tablet Take 650 mg by mouth Every 8 (Eight) Hours As Needed. 3/2/22   Emergency, Nurse Epic, RN   aspirin 81 MG EC tablet Take 81 mg by mouth Daily.    ProviderJayesh MD   atenolol (TENORMIN) 50 MG tablet TAKE 1 TABLET BY MOUTH EVERY DAY 3/10/22   Christine Chi APRN   cyclobenzaprine (FLEXERIL) 5 MG tablet Take 1 tablet by mouth 2 (Two) Times a Day As Needed for Muscle Spasms. 5/1/22   Brenna Britton APRN   gabapentin (NEURONTIN) 300 MG capsule Take 300 mg by mouth 3 (Three) Times a Day.    ProviderJayesh MD   hydroCHLOROthiazide (HYDRODIURIL) 25 MG tablet TAKE 1 TABLET BY MOUTH EVERY DAY 3/11/22   Umesh Guadarrama MD   HYDROcodone-acetaminophen (NORCO) 5-325 MG per tablet Take 1 tablet by mouth Every 6 (Six) Hours As Needed for Severe Pain . 1/31/22   Kwasi Chavez MD   melatonin 5 MG tablet tablet Take 5 mg by mouth At Night As Needed. 12/15/21   Emergency, Nurse MARIZA Best   metFORMIN (GLUCOPHAGE) 1000 MG tablet Take 1,000 mg by mouth 2 (Two) Times a Day With Meals.    ProviderJayesh MD   naproxen (NAPROSYN) 250 MG tablet Take 1 tablet by mouth 2 (Two) Times a Day As Needed for Mild Pain . 5/1/22   Brenna Britton APRN   nitroglycerin (NITROSTAT) 0.4 MG SL tablet 1 under the tongue as needed for angina, may repeat q5mins for up three doses 3/7/22   Christine Chi APRN   ondansetron (ZOFRAN) 4 MG tablet Take 4 mg by mouth Every 8 (Eight) Hours As Needed for Nausea or Vomiting.    Emergency, Nurse MARIZA Best   pravastatin (PRAVACHOL) 80 MG tablet TAKE 1 TABLET BY MOUTH EVERY DAY 3/11/22   Christine Chi APRN   sertraline (ZOLOFT) 100 MG tablet Take 100 mg by mouth Daily. 1.5 tabs qd    Provider, MD Jayesh   vitamin  "E 1000 UNIT capsule Take 1,000 Units by mouth Daily.    Provider, Jayesh, MD        Social History:   Social History     Tobacco Use   • Smoking status: Never Smoker   • Smokeless tobacco: Never Used   Vaping Use   • Vaping Use: Never used   Substance Use Topics   • Alcohol use: Never   • Drug use: Never       Review of Systems:  Review of Systems   Constitutional: Negative for chills, diaphoresis and fever.   HENT: Negative for ear discharge and nosebleeds.    Eyes: Negative for photophobia.   Respiratory: Positive for cough and shortness of breath.    Cardiovascular: Negative for chest pain.   Gastrointestinal: Negative for diarrhea, nausea and vomiting.   Genitourinary: Negative for dysuria.   Musculoskeletal: Negative for back pain and neck pain.   Skin: Negative for rash.   Neurological: Negative for headaches.        Physical Exam:  /63   Pulse 82   Temp 98.1 °F (36.7 °C) (Oral)   Resp 23   Ht 167.6 cm (66\")   Wt 85.6 kg (188 lb 11.4 oz)   SpO2 94%   BMI 30.46 kg/m²     Physical Exam  Vitals and nursing note reviewed.   Constitutional:       General: She is not in acute distress.     Appearance: Normal appearance.   HENT:      Head: Normocephalic and atraumatic.      Nose: Nose normal.   Eyes:      General: No scleral icterus.  Cardiovascular:      Rate and Rhythm: Normal rate and regular rhythm.      Heart sounds: Normal heart sounds.   Pulmonary:      Effort: Pulmonary effort is normal. No respiratory distress.      Breath sounds: Wheezing (moderate) present.   Abdominal:      Palpations: Abdomen is soft.      Tenderness: There is no abdominal tenderness.   Musculoskeletal:         General: Normal range of motion.      Cervical back: Neck supple.      Right lower leg: No edema.      Left lower leg: No edema.   Skin:     General: Skin is warm and dry.   Neurological:      General: No focal deficit present.      Mental Status: She is alert and oriented to person, place, and time.      Sensory: " No sensory deficit.      Motor: No weakness.                Medications in the Emergency Department:  Medications   sodium chloride 0.9 % flush 10 mL (has no administration in time range)   ipratropium-albuterol (DUO-NEB) nebulizer solution 3 mL (3 mL Nebulization Given 6/24/22 0633)   methylPREDNISolone sodium succinate (SOLU-Medrol) injection 125 mg (125 mg Intravenous Given 6/24/22 0624)        Labs  Lab Results (last 24 hours)     Procedure Component Value Units Date/Time    COVID-19,APTIMA PANTHER(DILMA),[facility], NP/OP SWAB IN UTM/VTM/SALINE TRANSPORT MEDIA,24 HR TAT - Swab, Nasopharynx [[id number]] Collected: 06/24/22 0524    Specimen: Swab from Nasopharynx Updated: 06/24/22 0546    Influenza Antigen, Rapid - Swab, Nasopharynx [[id number]]  (Normal) Collected: 06/24/22 0524    Specimen: Swab from Nasopharynx Updated: 06/24/22 0615     Influenza A Ag, EIA Negative     Influenza B Ag, EIA Negative    CBC & Differential [[id number]]  (Abnormal) Collected: 06/24/22 0540    Specimen: Blood from Arm, Left Updated: 06/24/22 0552    Narrative:      The following orders were created for panel order CBC & Differential.  Procedure                               Abnormality         Status                     ---------                               -----------         ------                     CBC Auto Differential[[id number]]        Abnormal            Final result                 Please view results for these tests on the individual orders.    Comprehensive Metabolic Panel [[id number]]  (Abnormal) Collected: 06/24/22 0540    Specimen: Blood from Arm, Left Updated: 06/24/22 0612     Glucose 133 mg/dL      BUN 15 mg/dL      Creatinine 0.84 mg/dL      Sodium 140 mmol/L      Potassium 3.4 mmol/L      Chloride 102 mmol/L      CO2 26.4 mmol/L      Calcium 9.5 mg/dL      Total Protein 7.1 g/dL      Albumin 4.60 g/dL      ALT (SGPT) 12 U/L      AST (SGOT) 21 U/L      Alkaline Phosphatase 104 U/L      Total Bilirubin 0.2 mg/dL       Globulin 2.5 gm/dL      A/G Ratio 1.8 g/dL      BUN/Creatinine Ratio 17.9     Anion Gap 11.6 mmol/L      eGFR 73.9 mL/min/1.73      Comment: National Kidney Foundation and American Society of Nephrology (ASN) Task Force recommended calculation based on the Chronic Kidney Disease Epidemiology Collaboration (CKD-EPI) equation refit without adjustment for race.       Narrative:      GFR Normal >60  Chronic Kidney Disease <60  Kidney Failure <15      BNP [[id number]]  (Normal) Collected: 06/24/22 0540    Specimen: Blood from Arm, Left Updated: 06/24/22 0608     proBNP 788.1 pg/mL     Narrative:      Among patients with dyspnea, NT-proBNP is highly sensitive for the detection of acute congestive heart failure. In addition NT-proBNP of <300 pg/ml effectively rules out acute congestive heart failure with 99% negative predictive value.    Results may be falsely decreased if patient taking Biotin.      Troponin [[id number]]  (Normal) Collected: 06/24/22 0540    Specimen: Blood from Arm, Left Updated: 06/24/22 0610     Troponin T <0.010 ng/mL     Narrative:      Troponin T Reference Range:  <= 0.03 ng/mL-   Negative for AMI  >0.03 ng/mL-     Abnormal for myocardial necrosis.  Clinicians would have to utilize clinical acumen, EKG, Troponin and serial changes to determine if it is an Acute Myocardial Infarction or myocardial injury due to an underlying chronic condition.       Results may be falsely decreased if patient taking Biotin.      CBC Auto Differential [[id number]]  (Abnormal) Collected: 06/24/22 0540    Specimen: Blood from Arm, Left Updated: 06/24/22 0552     WBC 6.29 10*3/mm3      RBC 3.83 10*6/mm3      Hemoglobin 11.5 g/dL      Hematocrit 35.5 %      MCV 92.7 fL      MCH 30.0 pg      MCHC 32.4 g/dL      RDW 13.2 %      RDW-SD 44.4 fl      MPV 9.3 fL      Platelets 248 10*3/mm3      Neutrophil % 70.6 %      Lymphocyte % 18.0 %      Monocyte % 8.7 %      Eosinophil % 2.1 %      Basophil % 0.3 %      Immature Grans %  0.3 %      Neutrophils, Absolute 4.44 10*3/mm3      Lymphocytes, Absolute 1.13 10*3/mm3      Monocytes, Absolute 0.55 10*3/mm3      Eosinophils, Absolute 0.13 10*3/mm3      Basophils, Absolute 0.02 10*3/mm3      Immature Grans, Absolute 0.02 10*3/mm3      nRBC 0.0 /100 WBC            Imaging:  XR Chest 1 View    Result Date: 6/24/2022  PROCEDURE: XR CHEST 1 VW  COMPARISON: [facility] RODRIGUEZ Mary, CHEST PA/AP & LAT 2V, 5/16/2021, 13:59.  INDICATIONS: SHORTNESS OF BREATH.  FINDINGS:  A single AP upright portable chest radiograph was performed.  Borderline cardiac enlargement is seen.  No acute infiltrate is appreciated.  No pleural effusion or pneumothorax is identified.  Surgical clips are projected over the left chest, especially inferiorly.  The thoracic aorta is atherosclerotic. Chronic calcified granulomatous disease involves the chest.  No significant interval change is seen since the prior study.        No acute infiltrate is appreciated.      COMMENT:  Part of this note is an electronic transcription of spoken language to printed text. The electronic translation/transcription may permit erroneous, or at times, nonsensical (or even sensical) words or phrases to be inadvertently transcribed or omitted; this  has reviewed the note for such errors (as well as additional errors); however, some may still exist.  DELVIN FAROOQ JR, MD       Electronically Signed and Approved By: DELVIN FAROOQ JR, MD on 6/24/2022 at 6:11                Procedures:  Procedures    Progress  ED Course as of 06/24/22 0729   Fri Jun 24, 2022   0532 EKG:    Rhythm: Normal sinus rhythm  Rate: 85  Intervals: Normal  T-wave: V2 V3 low amplitude  ST Segment: Normal    Artifact present in V6    EKG Comparison: Not available    Interpreted by me   [NL]      ED Course User Index  [NL] Rohan Ring DO                            Medical Decision Making:  MDM   71-year-old female patient presents with complaint of cough  and shortness of breath.  Patient has expiratory wheezing.  Patient states she has had an asthma episode previously but has been several years.  Patient's chest x-ray is negative and her pulse ox is 95% on room air.  Patient improved with IV steroids and DuoNeb breathing treatment.  Patient is stable for discharge on antibiotics, steroids, cough medication and an inhaler.        Final diagnoses:   Bronchitis   Bronchospasm        Disposition:  ED Disposition     ED Disposition   Discharge    Condition   Stable    Comment   --             Documentation assistance provided by Lore Tinajero acting as scribe for Rohan Ring DO. Information recorded by the scribe was done at my direction and has been verified and validated by me.        Lore Tinajero  06/24/22 6247       Rohan Ring DO  06/24/22 4560

## 2022-07-02 ENCOUNTER — APPOINTMENT (OUTPATIENT)
Dept: GENERAL RADIOLOGY | Facility: HOSPITAL | Age: 72
End: 2022-07-02

## 2022-07-02 ENCOUNTER — HOSPITAL ENCOUNTER (EMERGENCY)
Facility: HOSPITAL | Age: 72
Discharge: HOME | End: 2022-07-02
Attending: EMERGENCY MEDICINE | Admitting: EMERGENCY MEDICINE

## 2022-07-02 VITALS
BODY MASS INDEX: 28.77 KG/M2 | OXYGEN SATURATION: 94 % | TEMPERATURE: 97.8 F | HEIGHT: 66 IN | SYSTOLIC BLOOD PRESSURE: 171 MMHG | WEIGHT: 179.01 LBS | DIASTOLIC BLOOD PRESSURE: 73 MMHG | HEART RATE: 84 BPM | RESPIRATION RATE: 26 BRPM

## 2022-07-02 DIAGNOSIS — R06.02 SHORTNESS OF BREATH: ICD-10-CM

## 2022-07-02 DIAGNOSIS — J20.9 ACUTE BRONCHITIS WITH BRONCHOSPASM: Primary | ICD-10-CM

## 2022-07-02 DIAGNOSIS — U07.1 COVID-19: ICD-10-CM

## 2022-07-02 LAB
ALBUMIN SERPL-MCNC: 4.7 G/DL (ref 3.5–5.2)
ALBUMIN/GLOB SERPL: 1.7 G/DL
ALP SERPL-CCNC: 85 U/L (ref 39–117)
ALT SERPL W P-5'-P-CCNC: 15 U/L (ref 1–33)
ANION GAP SERPL CALCULATED.3IONS-SCNC: 12.9 MMOL/L (ref 5–15)
AST SERPL-CCNC: 21 U/L (ref 1–32)
BASOPHILS # BLD AUTO: 0.03 10*3/MM3 (ref 0–0.2)
BASOPHILS NFR BLD AUTO: 0.4 % (ref 0–1.5)
BILIRUB SERPL-MCNC: 0.5 MG/DL (ref 0–1.2)
BUN SERPL-MCNC: 19 MG/DL (ref 8–23)
BUN/CREAT SERPL: 20.2 (ref 7–25)
CALCIUM SPEC-SCNC: 10.1 MG/DL (ref 8.6–10.5)
CHLORIDE SERPL-SCNC: 97 MMOL/L (ref 98–107)
CO2 SERPL-SCNC: 26.1 MMOL/L (ref 22–29)
CREAT SERPL-MCNC: 0.94 MG/DL (ref 0.57–1)
DEPRECATED RDW RBC AUTO: 43.2 FL (ref 37–54)
EGFRCR SERPLBLD CKD-EPI 2021: 64.6 ML/MIN/1.73
EOSINOPHIL # BLD AUTO: 0.1 10*3/MM3 (ref 0–0.4)
EOSINOPHIL NFR BLD AUTO: 1.4 % (ref 0.3–6.2)
ERYTHROCYTE [DISTWIDTH] IN BLOOD BY AUTOMATED COUNT: 13.2 % (ref 12.3–15.4)
GLOBULIN UR ELPH-MCNC: 2.7 GM/DL
GLUCOSE SERPL-MCNC: 160 MG/DL (ref 65–99)
HCT VFR BLD AUTO: 41.5 % (ref 34–46.6)
HGB BLD-MCNC: 13.7 G/DL (ref 12–15.9)
HOLD SPECIMEN: NORMAL
HOLD SPECIMEN: NORMAL
IMM GRANULOCYTES # BLD AUTO: 0.1 10*3/MM3 (ref 0–0.05)
IMM GRANULOCYTES NFR BLD AUTO: 1.4 % (ref 0–0.5)
LYMPHOCYTES # BLD AUTO: 1.94 10*3/MM3 (ref 0.7–3.1)
LYMPHOCYTES NFR BLD AUTO: 26.3 % (ref 19.6–45.3)
MCH RBC QN AUTO: 29.8 PG (ref 26.6–33)
MCHC RBC AUTO-ENTMCNC: 33 G/DL (ref 31.5–35.7)
MCV RBC AUTO: 90.4 FL (ref 79–97)
MONOCYTES # BLD AUTO: 0.68 10*3/MM3 (ref 0.1–0.9)
MONOCYTES NFR BLD AUTO: 9.2 % (ref 5–12)
NEUTROPHILS NFR BLD AUTO: 4.52 10*3/MM3 (ref 1.7–7)
NEUTROPHILS NFR BLD AUTO: 61.3 % (ref 42.7–76)
NRBC BLD AUTO-RTO: 0 /100 WBC (ref 0–0.2)
NT-PROBNP SERPL-MCNC: 332.3 PG/ML (ref 0–900)
PLATELET # BLD AUTO: 311 10*3/MM3 (ref 140–450)
PMV BLD AUTO: 9.3 FL (ref 6–12)
POTASSIUM SERPL-SCNC: 4.4 MMOL/L (ref 3.5–5.2)
PROT SERPL-MCNC: 7.4 G/DL (ref 6–8.5)
RBC # BLD AUTO: 4.59 10*6/MM3 (ref 3.77–5.28)
SODIUM SERPL-SCNC: 136 MMOL/L (ref 136–145)
TROPONIN T SERPL-MCNC: <0.01 NG/ML (ref 0–0.03)
WBC NRBC COR # BLD: 7.37 10*3/MM3 (ref 3.4–10.8)
WHOLE BLOOD HOLD COAG: NORMAL
WHOLE BLOOD HOLD SPECIMEN: NORMAL

## 2022-07-02 PROCEDURE — 25010000002 ONDANSETRON PER 1 MG: Performed by: EMERGENCY MEDICINE

## 2022-07-02 PROCEDURE — 96374 THER/PROPH/DIAG INJ IV PUSH: CPT

## 2022-07-02 PROCEDURE — 94664 DEMO&/EVAL PT USE INHALER: CPT

## 2022-07-02 PROCEDURE — 85025 COMPLETE CBC W/AUTO DIFF WBC: CPT

## 2022-07-02 PROCEDURE — 94799 UNLISTED PULMONARY SVC/PX: CPT

## 2022-07-02 PROCEDURE — 93005 ELECTROCARDIOGRAM TRACING: CPT | Performed by: EMERGENCY MEDICINE

## 2022-07-02 PROCEDURE — 94640 AIRWAY INHALATION TREATMENT: CPT

## 2022-07-02 PROCEDURE — 96375 TX/PRO/DX INJ NEW DRUG ADDON: CPT

## 2022-07-02 PROCEDURE — 36415 COLL VENOUS BLD VENIPUNCTURE: CPT

## 2022-07-02 PROCEDURE — 84484 ASSAY OF TROPONIN QUANT: CPT

## 2022-07-02 PROCEDURE — 93005 ELECTROCARDIOGRAM TRACING: CPT

## 2022-07-02 PROCEDURE — 83880 ASSAY OF NATRIURETIC PEPTIDE: CPT

## 2022-07-02 PROCEDURE — 71045 X-RAY EXAM CHEST 1 VIEW: CPT

## 2022-07-02 PROCEDURE — 80053 COMPREHEN METABOLIC PANEL: CPT

## 2022-07-02 PROCEDURE — 99283 EMERGENCY DEPT VISIT LOW MDM: CPT

## 2022-07-02 PROCEDURE — 25010000002 METHYLPREDNISOLONE PER 125 MG: Performed by: EMERGENCY MEDICINE

## 2022-07-02 RX ORDER — PREDNISONE 20 MG/1
40 TABLET ORAL DAILY
Qty: 10 TABLET | Refills: 0 | Status: SHIPPED | OUTPATIENT
Start: 2022-07-02 | End: 2022-11-01

## 2022-07-02 RX ORDER — IPRATROPIUM BROMIDE AND ALBUTEROL SULFATE 2.5; .5 MG/3ML; MG/3ML
3 SOLUTION RESPIRATORY (INHALATION) ONCE
Status: COMPLETED | OUTPATIENT
Start: 2022-07-02 | End: 2022-07-02

## 2022-07-02 RX ORDER — SODIUM CHLORIDE 0.9 % (FLUSH) 0.9 %
10 SYRINGE (ML) INJECTION AS NEEDED
Status: DISCONTINUED | OUTPATIENT
Start: 2022-07-02 | End: 2022-07-03 | Stop reason: HOSPADM

## 2022-07-02 RX ORDER — BENZONATATE 200 MG/1
200 CAPSULE ORAL 3 TIMES DAILY PRN
Qty: 20 CAPSULE | Refills: 0 | Status: SHIPPED | OUTPATIENT
Start: 2022-07-02 | End: 2022-11-01

## 2022-07-02 RX ORDER — METHYLPREDNISOLONE SODIUM SUCCINATE 125 MG/2ML
125 INJECTION, POWDER, LYOPHILIZED, FOR SOLUTION INTRAMUSCULAR; INTRAVENOUS ONCE
Status: COMPLETED | OUTPATIENT
Start: 2022-07-02 | End: 2022-07-02

## 2022-07-02 RX ORDER — ONDANSETRON 2 MG/ML
4 INJECTION INTRAMUSCULAR; INTRAVENOUS ONCE
Status: COMPLETED | OUTPATIENT
Start: 2022-07-02 | End: 2022-07-02

## 2022-07-02 RX ADMIN — METHYLPREDNISOLONE SODIUM SUCCINATE 125 MG: 125 INJECTION, POWDER, FOR SOLUTION INTRAMUSCULAR; INTRAVENOUS at 19:33

## 2022-07-02 RX ADMIN — ONDANSETRON 4 MG: 2 INJECTION INTRAMUSCULAR; INTRAVENOUS at 19:32

## 2022-07-02 RX ADMIN — IPRATROPIUM BROMIDE AND ALBUTEROL SULFATE 3 ML: .5; 3 SOLUTION RESPIRATORY (INHALATION) at 19:31

## 2022-07-02 NOTE — ED PROVIDER NOTES
Time: 6:50 PM EDT  Arrived by: private car  Chief Complaint: Shortness of Breath  History provided by: Patient  History is limited by: N/A     History of Present Illness:  Patient is a 71 y.o. year old female with a hx of asthma DM, and CAD who presents to the emergency department with shortness of breath that started a few days ago. Pt recently tested positive for COVID on 06/24/22 and has been having some shortness of breath with a productive cough with yellow sputum. Pt reports some wheezing and mild chest pain along with shortness of breath. Pt also has recently taken Prednisone and completed her course of steroids. Pt denies any leg swelling.      Similar Symptoms Previously: Yes  Recently seen: No      Patient Care Team  Primary Care Provider: Lester Lizarraga MD    Past Medical History:     Allergies   Allergen Reactions   • Fenofibrate Other (See Comments)     SOB    • Vytorin [Ezetimibe-Simvastatin] GI Intolerance   • Lisinopril Cough   • Niacin Anxiety and Rash     Past Medical History:   Diagnosis Date   • Arthritis    • Asthma    • Atherosclerosis of coronary artery 1/21/2022    Non-obstructive coronary artery disease. Cardiac catheterization done in October 2018 showed a 40% mid LAD lesion and a 40% proximal RCA lesion.   • Cancer (HCC)     Breast.    • Diabetes mellitus (HCC)    • Diverticulitis    • Heart disease    • Hiatal hernia    • Hyperlipemia 1/21/2022   • Hypertension    • Hypertension, essential 3/5/2022     Past Surgical History:   Procedure Laterality Date   • HERNIA REPAIR     • HYSTERECTOMY     • MASTECTOMY Bilateral      History reviewed. No pertinent family history.    Home Medications:  Prior to Admission medications    Medication Sig Start Date End Date Taking? Authorizing Provider   albuterol sulfate  (90 Base) MCG/ACT inhaler Inhale 2 puffs Every 4 (Four) Hours As Needed for Wheezing. 6/24/22   Rohan Ring,    amLODIPine (NORVASC) 10 MG tablet TAKE 1 TABLET BY MOUTH  EVERY DAY 5/2/22   Umesh Guadarrama MD   Arthritis Pain Reliever 650 MG 8 hr tablet Take 650 mg by mouth Every 8 (Eight) Hours As Needed. 3/2/22   Emergency, Nurse Epic, RN   aspirin 81 MG EC tablet Take 81 mg by mouth Daily.    ProviderJayesh MD   atenolol (TENORMIN) 50 MG tablet TAKE 1 TABLET BY MOUTH EVERY DAY 3/10/22   Christine Chi APRN   azithromycin (Zithromax Z-Micah) 250 MG tablet Take 2 tablets by mouth on day 1, then 1 tablet daily on days 2-5 6/24/22   Rohan Ring DO   cyclobenzaprine (FLEXERIL) 5 MG tablet Take 1 tablet by mouth 2 (Two) Times a Day As Needed for Muscle Spasms. 5/1/22   Brenna Britton APRN   gabapentin (NEURONTIN) 300 MG capsule Take 300 mg by mouth 3 (Three) Times a Day.    Jayesh Mckinley MD   hydroCHLOROthiazide (HYDRODIURIL) 25 MG tablet TAKE 1 TABLET BY MOUTH EVERY DAY 3/11/22   Umesh Guadarrama MD   HYDROcod Polst-CPM Polst ER (Tussionex Pennkinetic ER) 10-8 MG/5ML ER suspension Take 5 mL by mouth Every 12 (Twelve) Hours As Needed for Cough. 6/24/22   Rohan Ring DO   HYDROcodone-acetaminophen (NORCO) 5-325 MG per tablet Take 1 tablet by mouth Every 6 (Six) Hours As Needed for Severe Pain . 1/31/22   Kwasi Chavez MD   melatonin 5 MG tablet tablet Take 5 mg by mouth At Night As Needed. 12/15/21   Emergency, Nurse MARIZA Best   metFORMIN (GLUCOPHAGE) 1000 MG tablet Take 1,000 mg by mouth 2 (Two) Times a Day With Meals.    ProviderJayesh MD   naproxen (NAPROSYN) 250 MG tablet Take 1 tablet by mouth 2 (Two) Times a Day As Needed for Mild Pain . 5/1/22   Brenna Britton APRN   nitroglycerin (NITROSTAT) 0.4 MG SL tablet 1 under the tongue as needed for angina, may repeat q5mins for up three doses 3/7/22   Christine Chi APRN   ondansetron (ZOFRAN) 4 MG tablet Take 4 mg by mouth Every 8 (Eight) Hours As Needed for Nausea or Vomiting.    Emergency, Nurse Nasir, RN   pravastatin (PRAVACHOL) 80 MG tablet TAKE 1 TABLET BY MOUTH EVERY DAY  "3/11/22   Christine Chi Monik, APRN   predniSONE (DELTASONE) 50 MG tablet Take 1 tablet by mouth Daily. 6/24/22   Rohan Ring DO   sertraline (ZOLOFT) 100 MG tablet Take 100 mg by mouth Daily. 1.5 tabs qd    ProviderJayesh MD   vitamin E 1000 UNIT capsule Take 1,000 Units by mouth Daily.    Provider, MD Jayesh        Social History:   Social History     Tobacco Use   • Smoking status: Never Smoker   • Smokeless tobacco: Never Used   Vaping Use   • Vaping Use: Never used   Substance Use Topics   • Alcohol use: Never   • Drug use: Never     Recent travel: no     Review of Systems:  Review of Systems   Constitutional: Negative for chills and fever.   HENT: Negative for congestion, ear pain and sore throat.    Eyes: Negative for pain.   Respiratory: Positive for cough (yellow sputum), shortness of breath and wheezing. Negative for chest tightness.    Cardiovascular: Positive for chest pain (Mild).   Gastrointestinal: Negative for abdominal pain, diarrhea, nausea and vomiting.   Genitourinary: Negative for flank pain and hematuria.   Musculoskeletal: Negative for joint swelling.   Skin: Negative for pallor.   Neurological: Negative for seizures and headaches.   All other systems reviewed and are negative.       Physical Exam:  /73   Pulse 84   Temp 97.8 °F (36.6 °C) (Oral)   Resp 26   Ht 167.6 cm (66\")   Wt 81.2 kg (179 lb 0.2 oz)   SpO2 94%   BMI 28.89 kg/m²     Physical Exam  Vitals and nursing note reviewed.   Constitutional:       General: She is not in acute distress.     Appearance: Normal appearance. She is not toxic-appearing.   HENT:      Head: Normocephalic and atraumatic.      Mouth/Throat:      Mouth: Mucous membranes are moist.   Eyes:      General: No scleral icterus.  Cardiovascular:      Rate and Rhythm: Normal rate and regular rhythm.      Pulses: Normal pulses.      Heart sounds: Normal heart sounds.      Comments: No calf tenderness or edema  Pulmonary:      Effort: Pulmonary " effort is normal. Tachypnea present. No respiratory distress.      Comments: Mild respiratory distress with expiratory wheezes in all lung fields.  Abdominal:      General: Abdomen is flat.      Palpations: Abdomen is soft.      Tenderness: There is no abdominal tenderness.   Musculoskeletal:         General: Normal range of motion.      Cervical back: Normal range of motion and neck supple.   Skin:     General: Skin is warm and dry.   Neurological:      Mental Status: She is alert and oriented to person, place, and time. Mental status is at baseline.                Medications in the Emergency Department:  Medications   ondansetron (ZOFRAN) injection 4 mg (4 mg Intravenous Given 7/2/22 1932)   methylPREDNISolone sodium succinate (SOLU-Medrol) injection 125 mg (125 mg Intravenous Given 7/2/22 1933)   ipratropium-albuterol (DUO-NEB) nebulizer solution 3 mL (3 mL Nebulization Given 7/2/22 1931)   ipratropium-albuterol (DUO-NEB) nebulizer solution 3 mL (3 mL Nebulization Given 7/2/22 1931)        Labs  Lab Results (last 24 hours)     Procedure Component Value Units Date/Time    CBC & Differential [[id number]]  (Abnormal) Collected: 07/02/22 1627    Specimen: Blood Updated: 07/02/22 1704    Narrative:      The following orders were created for panel order CBC & Differential.  Procedure                               Abnormality         Status                     ---------                               -----------         ------                     CBC Auto Differential[[id number]]        Abnormal            Final result                 Please view results for these tests on the individual orders.    Comprehensive Metabolic Panel [[id number]]  (Abnormal) Collected: 07/02/22 1627    Specimen: Blood Updated: 07/02/22 1736     Glucose 160 mg/dL      BUN 19 mg/dL      Creatinine 0.94 mg/dL      Sodium 136 mmol/L      Potassium 4.4 mmol/L      Chloride 97 mmol/L      CO2 26.1 mmol/L      Calcium 10.1 mg/dL      Total Protein 7.4  g/dL      Albumin 4.70 g/dL      ALT (SGPT) 15 U/L      AST (SGOT) 21 U/L      Alkaline Phosphatase 85 U/L      Total Bilirubin 0.5 mg/dL      Globulin 2.7 gm/dL      A/G Ratio 1.7 g/dL      BUN/Creatinine Ratio 20.2     Anion Gap 12.9 mmol/L      eGFR 64.6 mL/min/1.73      Comment: National Kidney Foundation and American Society of Nephrology (ASN) Task Force recommended calculation based on the Chronic Kidney Disease Epidemiology Collaboration (CKD-EPI) equation refit without adjustment for race.       Narrative:      GFR Normal >60  Chronic Kidney Disease <60  Kidney Failure <15      BNP [[id number]]  (Normal) Collected: 07/02/22 1627    Specimen: Blood Updated: 07/02/22 1732     proBNP 332.3 pg/mL     Narrative:      Among patients with dyspnea, NT-proBNP is highly sensitive for the detection of acute congestive heart failure. In addition NT-proBNP of <300 pg/ml effectively rules out acute congestive heart failure with 99% negative predictive value.    Results may be falsely decreased if patient taking Biotin.      Troponin [[id number]]  (Normal) Collected: 07/02/22 1627    Specimen: Blood Updated: 07/02/22 1734     Troponin T <0.010 ng/mL     Narrative:      Troponin T Reference Range:  <= 0.03 ng/mL-   Negative for AMI  >0.03 ng/mL-     Abnormal for myocardial necrosis.  Clinicians would have to utilize clinical acumen, EKG, Troponin and serial changes to determine if it is an Acute Myocardial Infarction or myocardial injury due to an underlying chronic condition.       Results may be falsely decreased if patient taking Biotin.      CBC Auto Differential [[id number]]  (Abnormal) Collected: 07/02/22 1627    Specimen: Blood Updated: 07/02/22 1704     WBC 7.37 10*3/mm3      RBC 4.59 10*6/mm3      Hemoglobin 13.7 g/dL      Hematocrit 41.5 %      MCV 90.4 fL      MCH 29.8 pg      MCHC 33.0 g/dL      RDW 13.2 %      RDW-SD 43.2 fl      MPV 9.3 fL      Platelets 311 10*3/mm3      Neutrophil % 61.3 %      Lymphocyte %  26.3 %      Monocyte % 9.2 %      Eosinophil % 1.4 %      Basophil % 0.4 %      Immature Grans % 1.4 %      Neutrophils, Absolute 4.52 10*3/mm3      Lymphocytes, Absolute 1.94 10*3/mm3      Monocytes, Absolute 0.68 10*3/mm3      Eosinophils, Absolute 0.10 10*3/mm3      Basophils, Absolute 0.03 10*3/mm3      Immature Grans, Absolute 0.10 10*3/mm3      nRBC 0.0 /100 WBC            Imaging:  XR Chest 1 View    Result Date: 7/2/2022  PROCEDURE: XR CHEST 1 VW  COMPARISON: [facility], CR, XR CHEST 1 VW, 6/24/2022, 5:49.  INDICATIONS: SOA Triage Protocol  FINDINGS:  No new consolidations or pleural effusions are observed. The cardiac silhouette and mediastinum are unchanged. No definitive acute osseous abnormalities are seen on this single view.        1. No change from the previous study with no evidence for acute cardiopulmonary process.         GONSALO PAN MD       Electronically Signed and Approved By: GONSALO PAN MD on 7/02/2022 at 17:35               Procedures:  ECG 12 Lead      Date/Time: 7/2/2022 6:50 PM  Performed by: Kwasi Chavez MD  Authorized by: Kwasi Chavez MD   Rhythm: sinus rhythm  Rate: normal  BPM: 68  QRS axis: normal  Conduction: conduction normal  ST Segments: ST segments normal  T Waves: T waves normal  Clinical impression: normal ECG  Comments: No acute ischemia.          Progress                            Medical Decision Making:  MDM  Number of Diagnoses or Management Options  Shortness of breath: established and improving     Amount and/or Complexity of Data Reviewed  Clinical lab tests: reviewed  Tests in the radiology section of CPT®: reviewed  Tests in the medicine section of CPT®: reviewed         Differential diagnosis for this patient with dyspnea includes asthma or COPD exacerbation, CHF exacerbation, bronchitis or pneumonia, pneumothorax, pleural effusion or pulmonary edema, or less likely MI or PE.        This patient is a 71-year-old female presenting with  COVID-19 for over a week now now having significant wheezing and some dyspnea.    She is oxygenating well on room air but lung fields are quite wheezy on exam.    She does not have any reported asthma or COPD or underlying lung disease.    I am treating her with IV steroids and DuoNeb breathing treatments in the ED.    On reassessment she looks improved but still somewhat wheezy but oxygenating well here and chest x-ray normal and lab work reassuring.    I think she probably has some bronchospasm in the setting of frequent coughing from COVID-19 viral illness this week.    I am calling in some prednisone burst and will have her take her albuterol nebulized breathing treatments at home and follow-up with her doctor as an outpatient.        Final diagnoses:   Acute bronchitis with bronchospasm   COVID-19   Shortness of breath        Disposition:  ED Disposition     ED Disposition   Discharge    Condition   Stable    Comment   --             This medical record created using voice recognition software.           Tyrone Monson Jr.  07/02/22 1930       Tyrone Monson Jr.  07/02/22 2203       Tyrone Monson Jr.  07/02/22 2223       Kwasi Chavez MD  07/03/22 6685

## 2022-07-03 LAB — QT INTERVAL: 412 MS

## 2022-07-03 NOTE — DISCHARGE INSTRUCTIONS
Your blood work and EKG and chest x-ray look good today.    Make sure you use your albuterol nebulizer for breathing treatments every 4-6 hours at home for your wheezing, and start taking the prednisone tomorrow.    Please call your doctor for follow-up appointment.

## 2022-08-02 RX ORDER — AMLODIPINE BESYLATE 10 MG/1
TABLET ORAL
Qty: 90 TABLET | Refills: 2 | Status: SHIPPED | OUTPATIENT
Start: 2022-08-02

## 2022-11-28 ENCOUNTER — OFFICE VISIT (OUTPATIENT)
Dept: ORTHOPEDIC SURGERY | Facility: CLINIC | Age: 72
End: 2022-11-28

## 2022-11-28 VITALS — OXYGEN SATURATION: 97 % | HEIGHT: 66 IN | WEIGHT: 190 LBS | HEART RATE: 76 BPM | BODY MASS INDEX: 30.53 KG/M2

## 2022-11-28 DIAGNOSIS — M17.11 OSTEOARTHRITIS OF RIGHT KNEE, UNSPECIFIED OSTEOARTHRITIS TYPE: ICD-10-CM

## 2022-11-28 DIAGNOSIS — M25.561 RIGHT KNEE PAIN, UNSPECIFIED CHRONICITY: Primary | ICD-10-CM

## 2022-11-28 PROCEDURE — 20610 DRAIN/INJ JOINT/BURSA W/O US: CPT | Performed by: ORTHOPAEDIC SURGERY

## 2022-11-28 RX ORDER — TIZANIDINE 2 MG/1
TABLET ORAL
COMMUNITY
Start: 2022-08-22 | End: 2022-12-05

## 2022-11-28 RX ADMIN — TRIAMCINOLONE ACETONIDE 40 MG: 40 INJECTION, SUSPENSION INTRA-ARTICULAR; INTRAMUSCULAR at 11:14

## 2022-11-28 RX ADMIN — LIDOCAINE HYDROCHLORIDE 5 ML: 10 INJECTION, SOLUTION INFILTRATION; PERINEURAL at 11:14

## 2022-11-28 NOTE — PROGRESS NOTES
"Chief Complaint  Initial Evaluation of the Right Knee     Subjective      Camilla Navas presents to [facility] for initial evaluation of the right knee. The patient has had problems for years.  Her right knee is getting worse.  She notes increase pain with certain activities and hoping to get a right knee steroid injection.  The patient has had no recent falls , but is having increase time getting around.     Allergies   Allergen Reactions   • Fenofibrate Other (See Comments)     SOB    • Vytorin [Ezetimibe-Simvastatin] GI Intolerance   • Lisinopril Cough   • Niacin Anxiety and Rash        Social History     Socioeconomic History   • Marital status:    Tobacco Use   • Smoking status: Never   • Smokeless tobacco: Never   Vaping Use   • Vaping Use: Never used   Substance and Sexual Activity   • Alcohol use: Never   • Drug use: Never        Review of Systems     Objective   Vital Signs:   Pulse 76   Ht 167.6 cm (66\")   Wt 86.2 kg (190 lb)   SpO2 97%   BMI 30.67 kg/m²       Physical Exam  Constitutional:       Appearance: Normal appearance. Patient is well-developed and normal weight.   HENT:      Head: Normocephalic.      Right Ear: Hearing and external ear normal.      Left Ear: Hearing and external ear normal.      Nose: Nose normal.   Eyes:      Conjunctiva/sclera: Conjunctivae normal.   Cardiovascular:      Rate and Rhythm: Normal rate.   Pulmonary:      Effort: Pulmonary effort is normal.      Breath sounds: No wheezing or rales.   Abdominal:      Palpations: Abdomen is soft.      Tenderness: There is no abdominal tenderness.   Musculoskeletal:      Cervical back: Normal range of motion.   Skin:     Findings: No rash.   Neurological:      Mental Status: Patient  is alert and oriented to person, place, and time.   Psychiatric:         Mood and Affect: Mood and affect normal.         Judgment: Judgment normal.       Ortho Exam      RIGHT KNEE Dorsal pedal pulse 2+. " Posterior tibialis pulse is 2+. Good tone of hip flexors, hip extensors, and hip abductors. ROM -3-110.  Pain with lateral and medial joint lines. Calf supple.  Moderate swelling to the knee.  Sensation intact. Neurovascular Intact.    Large Joint Arthrocentesis: R knee  Date/Time: 11/28/2022 11:14 AM  Consent given by: patient  Site marked: site marked  Timeout: Immediately prior to procedure a time out was called to verify the correct patient, procedure, equipment, support staff and site/side marked as required   Supporting Documentation  Indications: pain   Procedure Details  Location: knee - R knee  Needle size: 22 G  Medications administered: 5 mL lidocaine 1 %; 40 mg triamcinolone acetonide 40 MG/ML  Patient tolerance: patient tolerated the procedure well with no immediate complications              Imaging Results (Most Recent)     Procedure Component Value Units Date/Time    XR Knee 3 View Right [[id number]] Resulted: 11/28/22 1057     Updated: 11/28/22 1102           Result Review :     X-Ray Report:  Right knee(s) X-Ray  Indication: Evaluation of the right knee  AP/Lateral and [place] view(s)  Findings: Advanced arthritis changes.  Moderate osseous abnormality, no dislocation or fracture.   Prior studies available for comparison:No                  Assessment and Plan     Diagnoses and all orders for this visit:    1. Right knee pain, unspecified chronicity (Primary)  -     XR Knee 3 View Right    2. Osteoarthritis of right knee, unspecified osteoarthritis type        Discussed the treatment plan with the patient. Discussed conservative measures as exercises, anti-inflammatory and injection. The patient wanted to proceed with right knee steroid injection and tolerated well.      Call or return if worsening symptoms.    Follow Up     PRN      Patient was given instructions and counseling regarding her condition or for health maintenance advice. Please see specific information pulled into the AVS if  appropriate.     Scribed for Ad Mcclure MD by Jennifer Pinto MA.  11/28/22   11:07 EST    I have personally performed the services described in this document as scribed by the above individual and it is both accurate and complete. Ad Mcclure MD 11/30/22

## 2022-11-30 RX ORDER — TRIAMCINOLONE ACETONIDE 40 MG/ML
40 INJECTION, SUSPENSION INTRA-ARTICULAR; INTRAMUSCULAR
Status: COMPLETED | OUTPATIENT
Start: 2022-11-28 | End: 2022-11-28

## 2022-11-30 RX ORDER — LIDOCAINE HYDROCHLORIDE 10 MG/ML
5 INJECTION, SOLUTION INFILTRATION; PERINEURAL
Status: COMPLETED | OUTPATIENT
Start: 2022-11-28 | End: 2022-11-28

## 2022-12-05 ENCOUNTER — HOSPITAL ENCOUNTER (EMERGENCY)
Facility: HOSPITAL | Age: 72
Discharge: HOME | End: 2022-12-05
Attending: EMERGENCY MEDICINE | Admitting: EMERGENCY MEDICINE

## 2022-12-05 VITALS
TEMPERATURE: 97.7 F | BODY MASS INDEX: 26.91 KG/M2 | SYSTOLIC BLOOD PRESSURE: 154 MMHG | DIASTOLIC BLOOD PRESSURE: 69 MMHG | HEIGHT: 71 IN | WEIGHT: 192.24 LBS | OXYGEN SATURATION: 97 % | RESPIRATION RATE: 16 BRPM | HEART RATE: 57 BPM

## 2022-12-05 DIAGNOSIS — M54.42 ACUTE LEFT-SIDED LOW BACK PAIN WITH LEFT-SIDED SCIATICA: Primary | ICD-10-CM

## 2022-12-05 LAB
BACTERIA UR QL AUTO: ABNORMAL /HPF
BILIRUB UR QL STRIP: NEGATIVE
CLARITY UR: CLEAR
COLOR UR: YELLOW
GLUCOSE UR STRIP-MCNC: NEGATIVE MG/DL
HGB UR QL STRIP.AUTO: ABNORMAL
HYALINE CASTS UR QL AUTO: ABNORMAL /LPF
KETONES UR QL STRIP: NEGATIVE
LEUKOCYTE ESTERASE UR QL STRIP.AUTO: ABNORMAL
NITRITE UR QL STRIP: NEGATIVE
PH UR STRIP.AUTO: 6 [PH] (ref 5–8)
PROT UR QL STRIP: NEGATIVE
RBC # UR STRIP: ABNORMAL /HPF
REF LAB TEST METHOD: ABNORMAL
SP GR UR STRIP: 1.02 (ref 1–1.03)
SQUAMOUS #/AREA URNS HPF: ABNORMAL /HPF
UROBILINOGEN UR QL STRIP: ABNORMAL
WBC # UR STRIP: ABNORMAL /HPF

## 2022-12-05 PROCEDURE — 99283 EMERGENCY DEPT VISIT LOW MDM: CPT

## 2022-12-05 PROCEDURE — 25010000002 DEXAMETHASONE PER 1 MG: Performed by: NURSE PRACTITIONER

## 2022-12-05 PROCEDURE — 25010000002 KETOROLAC TROMETHAMINE PER 15 MG: Performed by: NURSE PRACTITIONER

## 2022-12-05 PROCEDURE — 81001 URINALYSIS AUTO W/SCOPE: CPT | Performed by: NURSE PRACTITIONER

## 2022-12-05 PROCEDURE — 87086 URINE CULTURE/COLONY COUNT: CPT | Performed by: NURSE PRACTITIONER

## 2022-12-05 PROCEDURE — 96372 THER/PROPH/DIAG INJ SC/IM: CPT

## 2022-12-05 PROCEDURE — 25010000002 ORPHENADRINE CITRATE PER 60 MG: Performed by: NURSE PRACTITIONER

## 2022-12-05 RX ORDER — DEXAMETHASONE 4 MG/1
8 TABLET ORAL
Qty: 4 TABLET | Refills: 0 | Status: SHIPPED | OUTPATIENT
Start: 2022-12-05 | End: 2022-12-30

## 2022-12-05 RX ORDER — KETOROLAC TROMETHAMINE 30 MG/ML
30 INJECTION, SOLUTION INTRAMUSCULAR; INTRAVENOUS ONCE
Status: COMPLETED | OUTPATIENT
Start: 2022-12-05 | End: 2022-12-05

## 2022-12-05 RX ORDER — ORPHENADRINE CITRATE 30 MG/ML
60 INJECTION INTRAMUSCULAR; INTRAVENOUS ONCE
Status: COMPLETED | OUTPATIENT
Start: 2022-12-05 | End: 2022-12-05

## 2022-12-05 RX ORDER — ORPHENADRINE CITRATE 100 MG/1
100 TABLET, EXTENDED RELEASE ORAL 2 TIMES DAILY
Qty: 14 TABLET | Refills: 0 | Status: SHIPPED | OUTPATIENT
Start: 2022-12-05 | End: 2022-12-30

## 2022-12-05 RX ORDER — KETOROLAC TROMETHAMINE 10 MG/1
10 TABLET, FILM COATED ORAL EVERY 6 HOURS PRN
Qty: 15 TABLET | Refills: 0 | Status: SHIPPED | OUTPATIENT
Start: 2022-12-05 | End: 2022-12-30

## 2022-12-05 RX ADMIN — KETOROLAC TROMETHAMINE 30 MG: 30 INJECTION, SOLUTION INTRAMUSCULAR; INTRAVENOUS at 04:17

## 2022-12-05 RX ADMIN — DEXAMETHASONE SODIUM PHOSPHATE 10 MG: 10 INJECTION INTRAMUSCULAR; INTRAVENOUS at 06:10

## 2022-12-05 RX ADMIN — ORPHENADRINE CITRATE 60 MG: 30 INJECTION INTRAMUSCULAR; INTRAVENOUS at 04:17

## 2022-12-05 NOTE — DISCHARGE INSTRUCTIONS
Rest, gentle range of motion stretches followed by ice for 20 minutes several times a day.  Take your meds as prescribed.  You may also take over-the-counter acetaminophen with these medications as needed for pain.  Follow-up with Dr. GETACHEW Lizarraga's office as scheduled on December 15 for reevaluation and to discuss possible physical therapy if her symptoms are not starting to improve with conservative treatment.  Return to the emergency department for any acutely developing neurological symptoms, any bladder or bowel incontinence, or any new or worse concerns.

## 2022-12-05 NOTE — ED NOTES
Pt starting having back pain x 1 month.  Pt has f/u with pcp and given steriod.  Pt had no relief and followed up and dx with UTI.  Pt is still having pain when she moves a certain way.

## 2022-12-05 NOTE — ED PROVIDER NOTES
Subjective   History of Present Illness  The patient presents to the emergency department and states that she has had lower left back pain for about 1 month.  She states that it has been radiating down into her posterior left leg to about her mid thigh.  She states that stabbing in nature.  She states she initially was seen at the urgent care and was given muscle relaxers and a round of steroids.  She states that she was diagnosed there with a pulled muscle.  She states that she still continue to have discomfort and was told at her primary care's office that she had a UTI was treated for that.  She states that the pain has continued even after completing her antibiotics.  She states that the pain got worse yesterday and is getting very uncomfortable when she moves or bends.  She denies any bladder or bowel incontinence.  She states that she has had no known injury and had no moments where she felt she had injured her back.  She states she does follow-up with Dr. Lizarraga on December 15 of the office.    History provided by:  Patient   used: No        Review of Systems   Constitutional: Negative for chills and fever.   HENT: Negative for congestion, ear pain and sore throat.    Eyes: Negative for pain.   Respiratory: Negative for cough, chest tightness and shortness of breath.    Cardiovascular: Negative for chest pain.   Gastrointestinal: Negative for abdominal pain, diarrhea, nausea and vomiting.   Genitourinary: Negative for dysuria, flank pain, frequency, hematuria and urgency.   Musculoskeletal: Positive for back pain (LOWER LEFT). Negative for gait problem, joint swelling, neck pain and neck stiffness.   Skin: Negative for pallor and rash.   Neurological: Negative for seizures and headaches.   All other systems reviewed and are negative.      Past Medical History:   Diagnosis Date   • Arthritis    • Asthma    • Atherosclerosis of coronary artery 1/21/2022    Non-obstructive coronary artery  disease. Cardiac catheterization done in October 2018 showed a 40% mid LAD lesion and a 40% proximal RCA lesion.   • Cancer (HCC)     Breast.    • Diabetes mellitus (HCC)    • Diverticulitis    • Heart disease    • Hiatal hernia    • Hyperlipemia 1/21/2022   • Hypertension    • Hypertension, essential 3/5/2022       Allergies   Allergen Reactions   • Fenofibrate Other (See Comments)     SOB    • Vytorin [Ezetimibe-Simvastatin] GI Intolerance   • Lisinopril Cough   • Niacin Anxiety and Rash       Past Surgical History:   Procedure Laterality Date   • HERNIA REPAIR     • HYSTERECTOMY     • MASTECTOMY Bilateral        History reviewed. No pertinent family history.    Social History     Socioeconomic History   • Marital status:    Tobacco Use   • Smoking status: Never   • Smokeless tobacco: Never   Vaping Use   • Vaping Use: Never used   Substance and Sexual Activity   • Alcohol use: Never   • Drug use: Never           Objective   Physical Exam  Vitals and nursing note reviewed.   Constitutional:       General: She is not in acute distress.     Appearance: Normal appearance. She is not ill-appearing or toxic-appearing.   HENT:      Head: Normocephalic and atraumatic.   Eyes:      General: No scleral icterus.  Cardiovascular:      Rate and Rhythm: Normal rate and regular rhythm.      Pulses: Normal pulses.   Pulmonary:      Effort: Pulmonary effort is normal. No respiratory distress.      Breath sounds: Normal breath sounds. No wheezing.   Abdominal:      General: Abdomen is flat.      Palpations: Abdomen is soft.      Tenderness: There is no guarding or rebound.   Musculoskeletal:         General: Normal range of motion.      Cervical back: Normal range of motion and neck supple. No rigidity or tenderness.   Lymphadenopathy:      Cervical: No cervical adenopathy.   Skin:     General: Skin is warm and dry.      Capillary Refill: Capillary refill takes less than 2 seconds.      Findings: No rash.   Neurological:       General: No focal deficit present.      Mental Status: She is alert and oriented to person, place, and time. Mental status is at baseline.   Psychiatric:         Mood and Affect: Mood normal.         Behavior: Behavior normal.         Procedures           ED Course  ED Course as of 12/05/22 0718   Mon Dec 05, 2022   9849 The patient reports that she has had some relief with the medications here in the emergency department we discussed the need for me to send those to the pharmacy for her to continue to take and discussed the exercises and icing that she needed to do at home.  She reports that she follows up with Dr. Liazrraga in the office on December 15.  We discussed the need for her to talk to them about physical therapy if her symptoms are not improving.  She verbalized understanding of follow-up and return instructions to the ED. [TC]      ED Course User Index  [TC] Carol Bhagat APRN                                           MDM  Number of Diagnoses or Management Options  Acute left-sided low back pain with left-sided sciatica: minor  Risk of Complications, Morbidity, and/or Mortality  Presenting problems: low  Diagnostic procedures: low  Management options: low    Patient Progress  Patient progress: stable      Final diagnoses:   Acute left-sided low back pain with left-sided sciatica       ED Disposition  ED Disposition     ED Disposition   Discharge    Condition   Stable    Comment   --             Lester Lizarraga MD  [address] [place] [address]  [phone]      AS SCHEDULED         Medication List      New Prescriptions    dexamethasone 4 MG tablet  Commonly known as: DECADRON  Take 2 tablets by mouth Daily With Breakfast.     ketorolac 10 MG tablet  Commonly known as: TORADOL  Take 1 tablet by mouth Every 6 (Six) Hours As Needed for Moderate Pain.     orphenadrine 100 MG 12 hr tablet  Commonly known as: NORFLEX  Take 1 tablet by mouth 2 (Two) Times a Day.           Where to Get Your  Medications      These medications were [id number] [place] [facility] [address]  AT [facility] [address] - [phone]  - [phone] FX  [address], [place] [address]    Phone: [phone]   · dexamethasone 4 MG tablet  · ketorolac 10 MG tablet  · orphenadrine 100 MG 12 hr tablet          Carol Bhagat APRN  12/05/22 0743

## 2022-12-06 LAB — BACTERIA SPEC AEROBE CULT: NORMAL

## 2023-02-24 PROCEDURE — 87086 URINE CULTURE/COLONY COUNT: CPT | Performed by: PHYSICIAN ASSISTANT

## 2023-03-10 RX ORDER — HYDROCHLOROTHIAZIDE 25 MG/1
TABLET ORAL
Qty: 90 TABLET | Refills: 2 | Status: SHIPPED | OUTPATIENT
Start: 2023-03-10

## 2023-03-25 ENCOUNTER — HOSPITAL ENCOUNTER (EMERGENCY)
Facility: HOSPITAL | Age: 73
Discharge: HOME | End: 2023-03-25
Admitting: EMERGENCY MEDICINE
Payer: COMMERCIAL

## 2023-03-25 ENCOUNTER — APPOINTMENT (OUTPATIENT)
Dept: GENERAL RADIOLOGY | Facility: HOSPITAL | Age: 73
End: 2023-03-25
Payer: COMMERCIAL

## 2023-03-25 VITALS
DIASTOLIC BLOOD PRESSURE: 92 MMHG | RESPIRATION RATE: 18 BRPM | TEMPERATURE: 98.5 F | WEIGHT: 197.53 LBS | HEART RATE: 67 BPM | OXYGEN SATURATION: 99 % | BODY MASS INDEX: 31.75 KG/M2 | HEIGHT: 66 IN | SYSTOLIC BLOOD PRESSURE: 161 MMHG

## 2023-03-25 DIAGNOSIS — R07.2 SUBSTERNAL CHEST PAIN: ICD-10-CM
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
## 2023-03-25 PROCEDURE — 93005 ELECTROCARDIOGRAM TRACING: CPT

## 2023-03-25 PROCEDURE — 71045 X-RAY EXAM CHEST 1 VIEW: CPT

## 2023-03-25 PROCEDURE — 99284 EMERGENCY DEPT VISIT MOD MDM: CPT

## 2023-03-25 PROCEDURE — 93010 ELECTROCARDIOGRAM REPORT: CPT | Performed by: INTERNAL MEDICINE

## 2023-03-25 PROCEDURE — 63710000001 ONDANSETRON ODT 4 MG TABLET DISPERSIBLE: Performed by: EMERGENCY MEDICINE

## 2023-03-25 RX ORDER — ONDANSETRON 4 MG/1
4 TABLET, ORALLY DISINTEGRATING ORAL ONCE
Status: COMPLETED | OUTPATIENT
Start: 2023-03-25 | End: 2023-03-25

## 2023-03-25 RX ORDER — IBUPROFEN 400 MG/1
800 TABLET ORAL ONCE
Status: COMPLETED | OUTPATIENT
Start: 2023-03-25 | End: 2023-03-25

## 2023-03-25 RX ADMIN — IBUPROFEN 800 MG: 400 TABLET ORAL at 21:12

## 2023-03-25 RX ADMIN — ONDANSETRON 4 MG: 4 TABLET, ORALLY DISINTEGRATING ORAL at 21:12

## 2023-03-26 LAB — QT INTERVAL: 398 MS
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
## 2023-03-26 NOTE — DISCHARGE INSTRUCTIONS
Your EKG was normal  Your chest x-ray was negative for any fractures or dislocations  Please apply ice for swelling as needed  You can take Tylenol Motrin as needed for pain  Please follow-up with your primary care as needed.

## 2023-04-13 NOTE — TELEPHONE ENCOUNTER
Rx Refill Note  Requested Prescriptions      No prescriptions requested or ordered in this encounter      Last office visit with prescribing clinician:  03/07/22*    Next office visit with prescribing clinician: 9/5/2023     }Lance Stein MA  04/13/23, 15:24 EDT

## 2023-04-14 RX ORDER — ATENOLOL 50 MG/1
50 TABLET ORAL DAILY
Qty: 90 TABLET | Refills: 1 | Status: SHIPPED | OUTPATIENT
Start: 2023-04-14

## 2023-05-01 RX ORDER — AMLODIPINE BESYLATE 10 MG/1
TABLET ORAL
Qty: 90 TABLET | Refills: 2 | Status: SHIPPED | OUTPATIENT
Start: 2023-05-01

## 2023-05-04 ENCOUNTER — TELEPHONE (OUTPATIENT)
Dept: CARDIOLOGY | Facility: CLINIC | Age: 73
End: 2023-05-04
Payer: MEDICARE

## 2023-05-04 RX ORDER — PRAVASTATIN SODIUM 80 MG/1
80 TABLET ORAL DAILY
Qty: 90 TABLET | Refills: 1 | Status: SHIPPED | OUTPATIENT
Start: 2023-05-04

## 2023-05-04 NOTE — TELEPHONE ENCOUNTER
The [facility] received a fax that requires your attention. The document has been indexed to the patient’s chart for your review.      Reason for sending: REECE TATE- [facility] MED REC NOTIF     Documents Description: PRESCRIPTION REFILL REQUEST     Name of Sender: SHEA     Date Indexed: 5.2.23    Notes (if needed):

## 2023-06-15 ENCOUNTER — TELEPHONE (OUTPATIENT)
Dept: CARDIOLOGY | Facility: CLINIC | Age: 73
End: 2023-06-15
Payer: MEDICARE

## 2023-06-15 NOTE — TELEPHONE ENCOUNTER
The [facility] received a fax that requires your attention. The document has been indexed to the patient’s chart for your review.      Reason for sending: NEW PRESCRIPTION REFILL REQUEST    Documents Description: EXT MED [id number]    Name of Sender: BRIDGER    Date Indexed: 6-15-23

## 2023-06-16 ENCOUNTER — TELEPHONE (OUTPATIENT)
Dept: CARDIOLOGY | Facility: CLINIC | Age: 73
End: 2023-06-16
Payer: MEDICARE

## 2023-06-16 NOTE — TELEPHONE ENCOUNTER
The [facility] received a fax that requires your attention. The document has been indexed to the patient’s chart for your review.      Reason for sending: EXTERNAL MEDICAL RECORD NOTIFICATION     Documents Description: REFILL REQUESTS     Name of Sender: [facility]     Date Indexed: 6.16.23

## 2023-07-25 RX ORDER — ATENOLOL 50 MG/1
50 TABLET ORAL DAILY
Qty: 90 TABLET | Refills: 0 | Status: SHIPPED | OUTPATIENT
Start: 2023-07-25

## 2023-07-26 ENCOUNTER — HOSPITAL ENCOUNTER (OUTPATIENT)
Dept: CT IMAGING | Facility: HOSPITAL | Age: 73
Discharge: HOME | End: 2023-07-26
Admitting: INTERNAL MEDICINE
Payer: MEDICARE

## 2023-07-26 DIAGNOSIS — R10.31 ABDOMINAL PAIN, RIGHT LOWER QUADRANT: ICD-10-CM

## 2023-07-26 PROCEDURE — 74176 CT ABD & PELVIS W/O CONTRAST: CPT

## 2023-08-12 ENCOUNTER — HOSPITAL ENCOUNTER (EMERGENCY)
Facility: HOSPITAL | Age: 73
Discharge: HOME | End: 2023-08-12
Attending: EMERGENCY MEDICINE
Payer: MEDICARE

## 2023-08-12 ENCOUNTER — APPOINTMENT (OUTPATIENT)
Dept: CT IMAGING | Facility: HOSPITAL | Age: 73
End: 2023-08-12
Payer: MEDICARE

## 2023-08-12 VITALS
SYSTOLIC BLOOD PRESSURE: 168 MMHG | TEMPERATURE: 98 F | HEIGHT: 66 IN | OXYGEN SATURATION: 96 % | BODY MASS INDEX: 31.68 KG/M2 | HEART RATE: 105 BPM | WEIGHT: 197.09 LBS | RESPIRATION RATE: 16 BRPM | DIASTOLIC BLOOD PRESSURE: 54 MMHG

## 2023-08-12 DIAGNOSIS — R19.7 DIARRHEA, UNSPECIFIED TYPE: Primary | ICD-10-CM

## 2023-08-12 LAB
ALBUMIN SERPL-MCNC: 4.7 G/DL (ref 3.5–5.2)
ALBUMIN/GLOB SERPL: 1.7 G/DL
ALP SERPL-CCNC: 73 U/L (ref 39–117)
ALT SERPL W P-5'-P-CCNC: 11 U/L (ref 1–33)
ANION GAP SERPL CALCULATED.3IONS-SCNC: 14.3 MMOL/L (ref 5–15)
AST SERPL-CCNC: 21 U/L (ref 1–32)
BACTERIA UR QL AUTO: ABNORMAL /HPF
BASOPHILS # BLD AUTO: 0.02 10*3/MM3 (ref 0–0.2)
BASOPHILS NFR BLD AUTO: 0.2 % (ref 0–1.5)
BILIRUB SERPL-MCNC: <0.2 MG/DL (ref 0–1.2)
BILIRUB UR QL STRIP: NEGATIVE
BUN SERPL-MCNC: 15 MG/DL (ref 8–23)
BUN/CREAT SERPL: 15.2 (ref 7–25)
CALCIUM SPEC-SCNC: 9.4 MG/DL (ref 8.6–10.5)
CHLORIDE SERPL-SCNC: 103 MMOL/L (ref 98–107)
CLARITY UR: CLEAR
CO2 SERPL-SCNC: 24.7 MMOL/L (ref 22–29)
COLOR UR: YELLOW
CREAT SERPL-MCNC: 0.99 MG/DL (ref 0.57–1)
DEPRECATED RDW RBC AUTO: 43.2 FL (ref 37–54)
EGFRCR SERPLBLD CKD-EPI 2021: 60.3 ML/MIN/1.73
EOSINOPHIL # BLD AUTO: 0.14 10*3/MM3 (ref 0–0.4)
EOSINOPHIL NFR BLD AUTO: 1.7 % (ref 0.3–6.2)
ERYTHROCYTE [DISTWIDTH] IN BLOOD BY AUTOMATED COUNT: 12.9 % (ref 12.3–15.4)
GLOBULIN UR ELPH-MCNC: 2.7 GM/DL
GLUCOSE SERPL-MCNC: 97 MG/DL (ref 65–99)
GLUCOSE UR STRIP-MCNC: NEGATIVE MG/DL
HCT VFR BLD AUTO: 36.4 % (ref 34–46.6)
HEMOCCULT STL QL IA: NEGATIVE
HGB BLD-MCNC: 12.3 G/DL (ref 12–15.9)
HGB UR QL STRIP.AUTO: ABNORMAL
HYALINE CASTS UR QL AUTO: ABNORMAL /LPF
IMM GRANULOCYTES # BLD AUTO: 0.03 10*3/MM3 (ref 0–0.05)
IMM GRANULOCYTES NFR BLD AUTO: 0.4 % (ref 0–0.5)
KETONES UR QL STRIP: NEGATIVE
LEUKOCYTE ESTERASE UR QL STRIP.AUTO: ABNORMAL
LYMPHOCYTES # BLD AUTO: 1.99 10*3/MM3 (ref 0.7–3.1)
LYMPHOCYTES NFR BLD AUTO: 24.1 % (ref 19.6–45.3)
MCH RBC QN AUTO: 31.1 PG (ref 26.6–33)
MCHC RBC AUTO-ENTMCNC: 33.8 G/DL (ref 31.5–35.7)
MCV RBC AUTO: 91.9 FL (ref 79–97)
MONOCYTES # BLD AUTO: 0.82 10*3/MM3 (ref 0.1–0.9)
MONOCYTES NFR BLD AUTO: 9.9 % (ref 5–12)
NEUTROPHILS NFR BLD AUTO: 5.27 10*3/MM3 (ref 1.7–7)
NEUTROPHILS NFR BLD AUTO: 63.7 % (ref 42.7–76)
NITRITE UR QL STRIP: NEGATIVE
NRBC BLD AUTO-RTO: 0 /100 WBC (ref 0–0.2)
PH UR STRIP.AUTO: 6 [PH] (ref 5–8)
PLATELET # BLD AUTO: 274 10*3/MM3 (ref 140–450)
PMV BLD AUTO: 9.5 FL (ref 6–12)
POTASSIUM SERPL-SCNC: 3.8 MMOL/L (ref 3.5–5.2)
PROT SERPL-MCNC: 7.4 G/DL (ref 6–8.5)
PROT UR QL STRIP: NEGATIVE
RBC # BLD AUTO: 3.96 10*6/MM3 (ref 3.77–5.28)
RBC # UR STRIP: ABNORMAL /HPF
REF LAB TEST METHOD: ABNORMAL
SODIUM SERPL-SCNC: 142 MMOL/L (ref 136–145)
SP GR UR STRIP: 1.02 (ref 1–1.03)
SQUAMOUS #/AREA URNS HPF: ABNORMAL /HPF
UROBILINOGEN UR QL STRIP: ABNORMAL
WBC # UR STRIP: ABNORMAL /HPF
WBC NRBC COR # BLD: 8.27 10*3/MM3 (ref 3.4–10.8)

## 2023-08-12 PROCEDURE — 82274 ASSAY TEST FOR BLOOD FECAL: CPT

## 2023-08-12 PROCEDURE — 96361 HYDRATE IV INFUSION ADD-ON: CPT

## 2023-08-12 PROCEDURE — 25010000002 ONDANSETRON PER 1 MG

## 2023-08-12 PROCEDURE — 93005 ELECTROCARDIOGRAM TRACING: CPT | Performed by: EMERGENCY MEDICINE

## 2023-08-12 PROCEDURE — 74177 CT ABD & PELVIS W/CONTRAST: CPT

## 2023-08-12 PROCEDURE — 80053 COMPREHEN METABOLIC PANEL: CPT

## 2023-08-12 PROCEDURE — 99285 EMERGENCY DEPT VISIT HI MDM: CPT

## 2023-08-12 PROCEDURE — 85025 COMPLETE CBC W/AUTO DIFF WBC: CPT

## 2023-08-12 PROCEDURE — 96376 TX/PRO/DX INJ SAME DRUG ADON: CPT

## 2023-08-12 PROCEDURE — 25510000001 IOPAMIDOL PER 1 ML: Performed by: EMERGENCY MEDICINE

## 2023-08-12 PROCEDURE — 25010000002 ONDANSETRON PER 1 MG: Performed by: EMERGENCY MEDICINE

## 2023-08-12 PROCEDURE — 96375 TX/PRO/DX INJ NEW DRUG ADDON: CPT

## 2023-08-12 PROCEDURE — 81001 URINALYSIS AUTO W/SCOPE: CPT | Performed by: EMERGENCY MEDICINE

## 2023-08-12 PROCEDURE — 96374 THER/PROPH/DIAG INJ IV PUSH: CPT

## 2023-08-12 RX ORDER — OMEPRAZOLE 40 MG/1
40 CAPSULE, DELAYED RELEASE ORAL DAILY
Qty: 14 CAPSULE | Refills: 0 | Status: SHIPPED | OUTPATIENT
Start: 2023-08-12

## 2023-08-12 RX ORDER — ONDANSETRON 2 MG/ML
4 INJECTION INTRAMUSCULAR; INTRAVENOUS ONCE
Status: COMPLETED | OUTPATIENT
Start: 2023-08-12 | End: 2023-08-12

## 2023-08-12 RX ORDER — DICYCLOMINE HCL 20 MG
20 TABLET ORAL EVERY 6 HOURS PRN
Qty: 12 TABLET | Refills: 0 | Status: SHIPPED | OUTPATIENT
Start: 2023-08-12

## 2023-08-12 RX ORDER — ONDANSETRON 4 MG/1
4 TABLET, ORALLY DISINTEGRATING ORAL EVERY 6 HOURS PRN
Qty: 15 TABLET | Refills: 0 | Status: SHIPPED | OUTPATIENT
Start: 2023-08-12

## 2023-08-12 RX ORDER — PANTOPRAZOLE SODIUM 40 MG/10ML
40 INJECTION, POWDER, LYOPHILIZED, FOR SOLUTION INTRAVENOUS ONCE
Status: COMPLETED | OUTPATIENT
Start: 2023-08-12 | End: 2023-08-12

## 2023-08-12 RX ADMIN — PANTOPRAZOLE SODIUM 40 MG: 40 INJECTION, POWDER, FOR SOLUTION INTRAVENOUS at 20:33

## 2023-08-12 RX ADMIN — ONDANSETRON 4 MG: 2 INJECTION INTRAMUSCULAR; INTRAVENOUS at 18:01

## 2023-08-12 RX ADMIN — ONDANSETRON 4 MG: 2 INJECTION INTRAMUSCULAR; INTRAVENOUS at 20:31

## 2023-08-12 RX ADMIN — SODIUM CHLORIDE 1000 ML: 9 INJECTION, SOLUTION INTRAVENOUS at 18:01

## 2023-08-12 RX ADMIN — IOPAMIDOL 100 ML: 755 INJECTION, SOLUTION INTRAVENOUS at 18:53

## 2023-08-12 NOTE — ED PROVIDER NOTES
"Time: 5:41 PM EDT  Date of encounter:  8/12/2023  Independent Historian/Clinical History and Information was obtained by:   Patient    History is limited by: N/A    Chief Complaint   Patient presents with    Diarrhea     Diarrhea x 2.5 weeks, Black stool x 2 days    Weakness - Generalized    Abdominal Pain    Nausea         History of Present Illness:  Patient is a 72 y.o. year old female who presents to the emergency department for evaluation of diarrhea and ab pain times 2 weeks. She has developed dark bloody stools within the last 2 days. Takes ASA daily, no hx of GI bleeds. (KENYATTA Bennett, APRN).  No recent antibiotic exposure.  Afebrile.  Pain is primarily epigastric location but also right lower quadrant.  She describes the epigastric region as \"on fire\".  Nauseated but no vomiting.  Describes her stool as \"grayish-black, but not totally black\".      HPI    Patient Care Team  Primary Care Provider: Lester Lizarraga MD    Past Medical History:     Allergies   Allergen Reactions    Fenofibrate Other (See Comments)     SOB     Vytorin [Ezetimibe-Simvastatin] GI Intolerance    Lisinopril Cough    Niacin Anxiety and Rash     Past Medical History:   Diagnosis Date    Arthritis     Asthma     Atherosclerosis of coronary artery 1/21/2022    Non-obstructive coronary artery disease. Cardiac catheterization done in October 2018 showed a 40% mid LAD lesion and a 40% proximal RCA lesion.    Cancer     Breast.     Diabetes mellitus     Diverticulitis     Heart disease     Hiatal hernia     Hyperlipemia 1/21/2022    Hypertension     Hypertension, essential 3/5/2022     Past Surgical History:   Procedure Laterality Date    HERNIA REPAIR      HYSTERECTOMY      MASTECTOMY Bilateral      History reviewed. No pertinent family history.    Home Medications:  Prior to Admission medications    Medication Sig Start Date End Date Taking? Authorizing Provider   albuterol sulfate  (90 Base) MCG/ACT inhaler Inhale 2 puffs " Every 4 (Four) Hours As Needed for Wheezing. 6/24/22   Rohan Ring DO   amLODIPine (NORVASC) 10 MG tablet Take 1 tablet by mouth Daily. 6/26/23   Umesh Guadarrama MD   Arthritis Pain Reliever 650 MG 8 hr tablet Take 650 mg by mouth Every 8 (Eight) Hours As Needed. 3/2/22   Emergency, Nurse Epic, RN   aspirin 81 MG EC tablet Take 81 mg by mouth Daily.    Jayesh Mckinley MD   atenolol (TENORMIN) 50 MG tablet TAKE 1 TABLET BY MOUTH DAILY 7/25/23   Candida Campbell APRN   cyclobenzaprine (FLEXERIL) 10 MG tablet Take 1 tablet by mouth 3 (Three) Times a Day As Needed for Muscle Spasms. 2/24/23   Rick Jack PA   diclofenac (VOLTAREN) 75 MG EC tablet Take 1 tablet by mouth 2 (Two) Times a Day. 11/1/22   Rick Jack PA   gabapentin (NEURONTIN) 300 MG capsule Take 300 mg by mouth 3 (Three) Times a Day.    Jayesh Mckinley MD   hydroCHLOROthiazide (HYDRODIURIL) 25 MG tablet TAKE 1 TABLET BY MOUTH EVERY DAY 3/10/23   Umesh Guadarrama MD   lidocaine (LIDODERM) 5 % Place 1 patch on the skin as directed by provider Daily. Remove & Discard patch within 12 hours or as directed by MD 12/30/22   Brenna Britton APRN   melatonin 5 MG tablet tablet Take 5 mg by mouth At Night As Needed. 12/15/21   Emergency, Nurse Nasir, RN   metFORMIN (GLUCOPHAGE) 1000 MG tablet Take 1,000 mg by mouth 2 (Two) Times a Day With Meals.    Jayesh Mckinley MD   mirtazapine (REMERON) 15 MG tablet Take 15 mg by mouth At Night As Needed. 9/22/22   Jayesh Mckinley MD   nitroglycerin (NITROSTAT) 0.4 MG SL tablet 1 under the tongue as needed for angina, may repeat q5mins for up three doses 3/7/22   Christine Chi APRN   pravastatin (PRAVACHOL) 80 MG tablet Take 1 tablet by mouth Daily. 6/26/23   Umesh Guadarrama MD   sertraline (ZOLOFT) 100 MG tablet Take 100 mg by mouth Daily. 1.5 tabs qd    Jayesh Mckinley MD   vitamin E 1000 UNIT capsule Take 1,000 Units by mouth Daily.    Provider, MD Jayesh     "    Social History:   Social History     Tobacco Use    Smoking status: Never     Passive exposure: Never    Smokeless tobacco: Never   Vaping Use    Vaping Use: Never used   Substance Use Topics    Alcohol use: Never    Drug use: Never         Review of Systems:  Review of Systems   Constitutional:  Negative for chills and fever.   HENT:  Negative for congestion, rhinorrhea and sore throat.    Eyes:  Negative for photophobia.   Respiratory:  Negative for apnea, cough, chest tightness and shortness of breath.    Cardiovascular:  Negative for chest pain and palpitations.   Gastrointestinal:  Positive for abdominal pain, diarrhea and nausea. Negative for vomiting.   Endocrine: Negative.    Genitourinary:  Negative for difficulty urinating and dysuria.   Musculoskeletal:  Negative for back pain, joint swelling and myalgias.   Skin:  Negative for color change and wound.   Allergic/Immunologic: Negative.    Neurological:  Negative for seizures and headaches.   Psychiatric/Behavioral: Negative.     All other systems reviewed and are negative.     Physical Exam:  /54   Pulse 105   Temp 98 øF (36.7 øC) (Oral)   Resp 16   Ht 167.6 cm (66\")   Wt 89.4 kg (197 lb 1.5 oz)   SpO2 96%   BMI 31.81 kg/mý         Physical Exam  Vitals and nursing note reviewed.   Constitutional:       General: She is awake.      Appearance: Normal appearance.   HENT:      Head: Normocephalic and atraumatic.      Nose: Nose normal.      Mouth/Throat:      Mouth: Mucous membranes are moist.   Eyes:      Extraocular Movements: Extraocular movements intact.      Pupils: Pupils are equal, round, and reactive to light.   Cardiovascular:      Rate and Rhythm: Normal rate and regular rhythm.      Heart sounds: Normal heart sounds.   Pulmonary:      Effort: Pulmonary effort is normal. No respiratory distress.      Breath sounds: Normal breath sounds. No wheezing, rhonchi or rales.   Abdominal:      General: Bowel sounds are normal.      " Palpations: Abdomen is soft.      Tenderness: There is abdominal tenderness in the right lower quadrant. There is guarding. There is no rebound.      Comments: No rigidity   Musculoskeletal:         General: No tenderness. Normal range of motion.      Cervical back: Normal range of motion and neck supple.   Skin:     General: Skin is warm and dry.      Coloration: Skin is not jaundiced.   Neurological:      General: No focal deficit present.      Mental Status: She is alert and oriented to person, place, and time. Mental status is at baseline.      Sensory: Sensation is intact.      Motor: Motor function is intact.      Coordination: Coordination is intact.   Psychiatric:         Attention and Perception: Attention and perception normal.         Mood and Affect: Mood and affect normal.         Speech: Speech normal.         Behavior: Behavior normal.         Judgment: Judgment normal.                    Procedures:  Procedures      Medical Decision Making:      Comorbidities that affect care:    Diabetes, asthma, hypertension, diverticulitis    External Notes reviewed:    Imaging review: Outpatient CT abdomen pelvis ordered by Dr. SANTOSH Lizarraga on 7/26/2023 shows the following:  Narrative & Impression   PROCEDURE:  CT ABDOMEN PELVIS WO CONTRAST     COMPARISON: [facility], CT, CT ABDOMEN PELVIS WO CONTRAST, 1/31/2022, 8:59.     INDICATIONS:  RLQ Abdominal Pain, Nausea, Diarrhea     TECHNIQUE:    CT images were created without intravenous contrast.       PROTOCOL:     Standard imaging protocol performed                 RADIATION:      DLP: 471 mGy*cm               Automated exposure control was utilized to minimize radiation dose.      FINDINGS:          CT demonstrates mild atelectasis.  There is a stable pleural collection of fat in the right lower   thorax laterally similar to previous exam.  Previous cholecystectomy.     Small fat containing ventral hernia above the umbilicus.  There is coronary artery  calcifications.    No liver lesion.  Pancreas demonstrates some fatty infiltration but no focal mass.  There is a   bilobed duodenal diverticulum or 2 adjacent diverticula similar to previous exam.  Adrenal glands   appear normal.  There is renal vascular calcifications.  No obstructive uropathy.  Two left renal   cysts measuring up to 4.2 centimeters.  1 centimeter indeterminate exophytic lesion in the mid   right kidney similar in size to previous exam.  It measures about 26 Hounsfield units similar to   previous.  Bladder is normal.  Postoperative changes adjacent the bladder.  Previous hysterectomy.    There is no adnexal mass evident.  Diverticulosis.  Mild to moderate stool in the colon.  No   definite colitis.  Appendix not well visualized if present.  No definite small bowel finding.  No   enlarged adenopathy.  No significant ascites.  Mild to moderate atherosclerotic changes greater in   the mesenteric and renal arteries.  Lack of contrast limits evaluation.     Multilevel spine degenerative changes and facet arthropathy there is minimal anterolisthesis of L4   on L5.  The findings similar to previous exam.  Mild dextrocurvature of lumbar spine.     IMPRESSION:                 1. No clearly acute finding.  2. At least 2 indeterminate right renal lesions measuring up to 1 centimeter similar to previous   exam.  Contrast not utilized.    3. Mild to moderate stool in the colon.  There is diverticulosis.  No inflammatory process.  4. Multiple other incidental findings as above.            TONYA SINGLETARY MD         Electronically Signed and Approved By: TONYA SINGLETARY MD on 7/27/2023 at 10:32          The following orders were placed and all results were independently analyzed by me:  Orders Placed This Encounter   Procedures    Enteric Bacterial Panel - Stool, Per Rectum    Clostridioides difficile Toxin - Stool, Per Rectum    Clostridioides difficile Toxin, PCR - Stool, Per Rectum    CT Abdomen Pelvis  With Contrast    Comprehensive Metabolic Panel    CBC Auto Differential    Occult Blood, Fecal By Immunoassay - Stool, Per Rectum    Urinalysis With Culture If Indicated - Urine, Clean Catch    Urinalysis, Microscopic Only - Urine, Clean Catch    Could we please prompt for urine again  Nursing Communication    ECG 12 Lead Chest Pain    CBC & Differential       Medications Given in the Emergency Department:  Medications   ondansetron (ZOFRAN) injection 4 mg (4 mg Intravenous Given 8/12/23 1801)   sodium chloride 0.9 % bolus 1,000 mL (0 mL Intravenous Stopped 8/12/23 1901)   iopamidol (ISOVUE-370) 76 % injection 100 mL (100 mL Intravenous Given 8/12/23 1853)   pantoprazole (PROTONIX) injection 40 mg (40 mg Intravenous Given 8/12/23 2033)   ondansetron (ZOFRAN) injection 4 mg (4 mg Intravenous Given 8/12/23 2031)        ED Course:    The patient was initially evaluated in the triage area where orders were placed. The patient was later dispositioned by Konrad Son MD.      The patient was advised to stay for completion of workup which includes but is not limited to communication of labs and radiological results, reassessment and plan. The patient was advised that leaving prior to disposition by a provider could result in critical findings that are not communicated to the patient.     ED Course as of 08/12/23 2149   Sat Aug 12, 2023   1740   --- PROVIDER IN TRIAGE NOTE ---    Patient was seen and evaluated in triage by VENESSA Bravo.  Orders were written and the patient is currently awaiting disposition.   [MS]      ED Course User Index  [MS] Claribel Bennett APRN       Labs:    Lab Results (last 24 hours)       Procedure Component Value Units Date/Time    CBC & Differential [[id number]]  (Normal) Collected: 08/12/23 1801    Specimen: Blood Updated: 08/12/23 1813    Narrative:      The following orders were created for panel order CBC & Differential.  Procedure                                Abnormality         Status                     ---------                               -----------         ------                     CBC Auto Differential[[id number]]        Normal              Final result                 Please view results for these tests on the individual orders.    Comprehensive Metabolic Panel [[id number]] Collected: 08/12/23 1801    Specimen: Blood Updated: 08/12/23 1830     Glucose 97 mg/dL      BUN 15 mg/dL      Creatinine 0.99 mg/dL      Sodium 142 mmol/L      Potassium 3.8 mmol/L      Comment: Slight hemolysis detected by analyzer. Results may be affected.        Chloride 103 mmol/L      CO2 24.7 mmol/L      Calcium 9.4 mg/dL      Total Protein 7.4 g/dL      Albumin 4.7 g/dL      ALT (SGPT) 11 U/L      AST (SGOT) 21 U/L      Alkaline Phosphatase 73 U/L      Total Bilirubin <0.2 mg/dL      Globulin 2.7 gm/dL      A/G Ratio 1.7 g/dL      BUN/Creatinine Ratio 15.2     Anion Gap 14.3 mmol/L      eGFR 60.3 mL/min/1.73     Narrative:      GFR Normal >60  Chronic Kidney Disease <60  Kidney Failure <15    The GFR formula is only valid for adults with stable renal function between ages 18 and 70.    CBC Auto Differential [[id number]]  (Normal) Collected: 08/12/23 1801    Specimen: Blood Updated: 08/12/23 1813     WBC 8.27 10*3/mm3      RBC 3.96 10*6/mm3      Hemoglobin 12.3 g/dL      Hematocrit 36.4 %      MCV 91.9 fL      MCH 31.1 pg      MCHC 33.8 g/dL      RDW 12.9 %      RDW-SD 43.2 fl      MPV 9.5 fL      Platelets 274 10*3/mm3      Neutrophil % 63.7 %      Lymphocyte % 24.1 %      Monocyte % 9.9 %      Eosinophil % 1.7 %      Basophil % 0.2 %      Immature Grans % 0.4 %      Neutrophils, Absolute 5.27 10*3/mm3      Lymphocytes, Absolute 1.99 10*3/mm3      Monocytes, Absolute 0.82 10*3/mm3      Eosinophils, Absolute 0.14 10*3/mm3      Basophils, Absolute 0.02 10*3/mm3      Immature Grans, Absolute 0.03 10*3/mm3      nRBC 0.0 /100 WBC     Occult Blood, Fecal By Immunoassay - Stool, Per Rectum  [[id number]]  (Normal) Collected: 08/12/23 1828    Specimen: Stool from Per Rectum Updated: 08/12/23 1917     Occult Blood, Fecal by Immunoassay Negative    Urinalysis With Culture If Indicated - Urine, Clean Catch [[id number]]  (Abnormal) Collected: 08/12/23 2027    Specimen: Urine, Clean Catch Updated: 08/12/23 2038     Color, UA Yellow     Appearance, UA Clear     pH, UA 6.0     Specific Gravity, UA 1.018     Glucose, UA Negative     Ketones, UA Negative     Bilirubin, UA Negative     Blood, UA Trace     Protein, UA Negative     Leuk Esterase, UA Small (1+)     Nitrite, UA Negative     Urobilinogen, UA 0.2 E.U./dL    Narrative:      In absence of clinical symptoms, the presence of pyuria, bacteria, and/or nitrites on the urinalysis result does not correlate with infection.    Urinalysis, Microscopic Only - Urine, Clean Catch [[id number]]  (Abnormal) Collected: 08/12/23 2027    Specimen: Urine, Clean Catch Updated: 08/12/23 2039     RBC, UA 0-2 /HPF      WBC, UA 0-2 /HPF      Comment: Urine culture not indicated.        Bacteria, UA None Seen /HPF      Squamous Epithelial Cells, UA 0-2 /HPF      Hyaline Casts, UA None Seen /LPF      Methodology Automated Microscopy             Imaging:    CT Abdomen Pelvis With Contrast    Result Date: 8/12/2023  PROCEDURE: CT ABDOMEN PELVIS W CONTRAST  COMPARISON: [facility], CT, CT ABDOMEN PELVIS WO CONTRAST, 7/26/2023, 16:35.  INDICATIONS: pain with dark stools  TECHNIQUE: After obtaining the patient's consent, CT images were created with non-ionic intravenous contrast material.   PROTOCOL:   Standard imaging protocol performed    RADIATION:   DLP: 970.7 mGy*cm   Automated exposure control was utilized to minimize radiation dose. CONTRAST: 100 cc Isovue 370 I.V.  FINDINGS:  Within the lung bases is minimal bibasilar atelectasis.  The liver, adrenal glands, spleen, and pancreas are unremarkable.  The gallbladder is surgically absent.  There are bilateral renal cysts.   The stomach appears normal.  The small bowel appears normal in caliber and configuration.  There is sigmoid diverticulosis.  The appendix is not well visualized.  There is no ascites or loculated collection.  No abnormally enlarged lymph nodes are identified.  The rectum and urinary bladder are unremarkable.  The uterus is surgically absent.  No aggressive osseous lesions are identified.        1. No acute process identified within abdomen/pelvis. 2. Sigmoid diverticulosis.     GONSALO CHURCH MD       Electronically Signed and Approved By: GONSALO CHURCH MD on 8/12/2023 at 19:22                Differential Diagnosis and Discussion:      Abdominal Pain: Based on the patient's signs and symptoms, I considered abdominal aortic aneurysm, small bowel obstruction, pancreatitis, acute cholecystitis, acute appendecitis, peptic ulcer disease, gastritis, colitis, endocrine disorders, irritable bowel syndrome and other differential diagnosis an etiology of the patient's abdominal pain.    All labs were reviewed and interpreted by me.  CT scan radiology impression was interpreted by me.    MDM     Amount and/or Complexity of Data Reviewed  Clinical lab tests: reviewed  Tests in the radiology section of [facility]: reviewed  Tests in the medicine section of [facility]: reviewed             Patient Care Considerations:    CONSULT: I considered consulting gastroenterology, however work-up is unremarkable.      Consultants/Shared Management Plan:    None    Social Determinants of Health:    Patient is independent, reliable, and has access to care.       Disposition and Care Coordination:    Discharged: I considered escalation of care by admitting this patient to the hospital, however the patient has improved and is suitable and stable for discharge.  I did offer this patient overnight observation however she prefers to go home and follow-up with her primary care provider Monday morning.  Strong return warnings were given.    I have  explained the patient's condition, diagnoses and treatment plan based on the information available to me at this time. I have answered questions and addressed any concerns. The patient has a good  understanding of the patient's diagnosis, condition, and treatment plan as can be expected at this point. The vital signs have been stable. The patient's condition is stable and appropriate for discharge from the emergency department.      The patient will pursue further outpatient evaluation with the primary care physician or other designated or consulting physician as outlined in the discharge instructions. They are agreeable to this plan of care and follow-up instructions have been explained in detail. The patient has received these instructions in written format and have expressed an understanding of the discharge instructions. The patient is aware that any significant change in condition or worsening of symptoms should prompt an immediate return to this or the closest emergency department or call to 911.    Final diagnoses:   Diarrhea, unspecified type        ED Disposition       ED Disposition   Discharge    Condition   Stable    Comment   --               This medical record created using voice recognition software.             Konrad Son MD  08/12/23 4920

## 2023-08-13 LAB — QT INTERVAL: 393 MS

## 2023-08-13 NOTE — ED NOTES
Patient discharged by Ester MEEKS RN, but was unable to complete charting.  Per ester, she discharged patient at [phone]

## 2023-08-13 NOTE — DISCHARGE INSTRUCTIONS
Return to the emergency department immediately for fever, worsening abdominal pain, uncontrolled vomiting, vomiting blood.  Stay well-hydrated and eat a bland diet.

## 2023-08-31 ENCOUNTER — OFFICE VISIT (OUTPATIENT)
Dept: GASTROENTEROLOGY | Facility: CLINIC | Age: 73
End: 2023-08-31
Payer: MEDICARE

## 2023-08-31 ENCOUNTER — TELEPHONE (OUTPATIENT)
Dept: GASTROENTEROLOGY | Facility: CLINIC | Age: 73
End: 2023-08-31
Payer: MEDICARE

## 2023-08-31 VITALS
SYSTOLIC BLOOD PRESSURE: 158 MMHG | HEIGHT: 66 IN | HEART RATE: 65 BPM | OXYGEN SATURATION: 100 % | BODY MASS INDEX: 31.5 KG/M2 | WEIGHT: 196 LBS | DIASTOLIC BLOOD PRESSURE: 68 MMHG

## 2023-08-31 DIAGNOSIS — R19.7 DIARRHEA, UNSPECIFIED TYPE: Primary | ICD-10-CM

## 2023-08-31 DIAGNOSIS — R11.0 NAUSEA: ICD-10-CM

## 2023-08-31 DIAGNOSIS — K21.9 GASTROESOPHAGEAL REFLUX DISEASE, UNSPECIFIED WHETHER ESOPHAGITIS PRESENT: ICD-10-CM

## 2023-08-31 DIAGNOSIS — R10.84 GENERALIZED ABDOMINAL PAIN: ICD-10-CM

## 2023-08-31 DIAGNOSIS — R12 HEARTBURN: ICD-10-CM

## 2023-08-31 RX ORDER — POLYETHYLENE GLYCOL 3350, SODIUM SULFATE ANHYDROUS, SODIUM BICARBONATE, SODIUM CHLORIDE, POTASSIUM CHLORIDE 227.1; 21.5; 6.36; 5.53; .754 G/L; G/L; G/L; G/L; G/L
4000 POWDER, FOR SOLUTION ORAL ONCE
Qty: 1 EACH | Refills: 0 | Status: SHIPPED | OUTPATIENT
Start: 2023-08-31 | End: 2023-08-31

## 2023-08-31 RX ORDER — DICYCLOMINE HCL 20 MG
20 TABLET ORAL EVERY 6 HOURS PRN
Qty: 90 TABLET | Refills: 1 | Status: SHIPPED | OUTPATIENT
Start: 2023-08-31

## 2023-08-31 RX ORDER — PANTOPRAZOLE SODIUM 40 MG/1
40 TABLET, DELAYED RELEASE ORAL DAILY
Qty: 90 TABLET | Refills: 1 | Status: SHIPPED | OUTPATIENT
Start: 2023-08-31 | End: 2023-11-29

## 2023-08-31 NOTE — TELEPHONE ENCOUNTER
Pt is scheduled for egd/colon on 11/142023, needs cardiac clearance from Dr Guadarrama, to be sent on or around 9/14/2023

## 2023-08-31 NOTE — PROGRESS NOTES
Chief Complaint  Functional dyspepsia     Camilla Navas is a 72 y.o. female who presents to [facility] [place] for functional dyspepsia     History of present Illness  Established patient since 2018 for dyspepsia  EGD/Colonoscopy 07/27/2020 by Dr. Gillette - Normal esophagus, evidence of Nissen fundoplication, and normal duodenum.  Stomach biopsies-mild reactive gastropathy.  Normal duodenum biopsies.  Small tubular adenoma polyp in the rectum and diverticula in the sigmoid colon.  Repeat colonoscopy 5 years Carafate prescribed.  But ultimately stopped due to stomach pain and diarrhea associated.    Last seen in office 8/2020 with complaints of fatigue and nausea.  Nausea unrelieved with Zofran.  Reports excessive gas, belching, stomach pain, and heartburn.  History of gastroparesis diagnosed in 2017 (16% remaining at 4 hours).  Advised to stop all NSAIDs.  Protonix 20 mg prescribed    Patient evaluated at [facility] ED 8/12/23 for diarrhea and abdominal pain x2 weeks.   CT Abdomen Pelvis With Contrast 08/12/2023 -no acute findings, sigmoid diverticulosis, cholecystectomy  Occult blood 08/12/2023 - negative    Patient presents to the office for abdominal pain and diarrhea. For the last 6 weeks she has been struggling with nausea daily, post prandial diarrhea, and generalized abdominal pain. She tried Pepto without relief. Has about 4-5 episodes of diarrhea a day. Denies starting any new medications since symptoms began. Struggling with heartburn daily, not taking any medications currently.     Past Medical History:   Diagnosis Date    Arthritis     Asthma     Atherosclerosis of coronary artery 1/21/2022    Non-obstructive coronary artery disease. Cardiac catheterization done in October 2018 showed a 40% mid LAD lesion and a 40% proximal RCA lesion.    Cancer     Breast.     Diabetes mellitus     Diverticulitis     Heart disease     Hiatal hernia     Hyperlipemia 1/21/2022     Hypertension     Hypertension, essential 3/5/2022       Past Surgical History:   Procedure Laterality Date    COLONOSCOPY      HERNIA REPAIR      HYSTERECTOMY      MASTECTOMY Bilateral     UPPER GASTROINTESTINAL ENDOSCOPY           Current Outpatient Medications:     albuterol sulfate  (90 Base) MCG/ACT inhaler, Inhale 2 puffs Every 4 (Four) Hours As Needed for Wheezing., Disp: 8 g, Rfl: 0    amLODIPine (NORVASC) 10 MG tablet, Take 1 tablet by mouth Daily., Disp: 90 tablet, Rfl: 0    Arthritis Pain Reliever 650 MG 8 hr tablet, Take 1 tablet by mouth Every 8 (Eight) Hours As Needed., Disp: , Rfl:     aspirin 81 MG EC tablet, Take 1 tablet by mouth Daily., Disp: , Rfl:     atenolol (TENORMIN) 50 MG tablet, TAKE 1 TABLET BY MOUTH DAILY, Disp: 90 tablet, Rfl: 0    dicyclomine (BENTYL) 20 MG tablet, Take 1 tablet by mouth Every 6 (Six) Hours As Needed for Abdominal Cramping., Disp: 90 tablet, Rfl: 1    gabapentin (NEURONTIN) 300 MG capsule, Take 1 capsule by mouth 3 (Three) Times a Day., Disp: , Rfl:     hydroCHLOROthiazide (HYDRODIURIL) 25 MG tablet, TAKE 1 TABLET BY MOUTH EVERY DAY, Disp: 90 tablet, Rfl: 2    metFORMIN (GLUCOPHAGE) 1000 MG tablet, Take 1 tablet by mouth 2 (Two) Times a Day With Meals., Disp: , Rfl:     mirtazapine (REMERON) 15 MG tablet, Take 1 tablet by mouth At Night As Needed., Disp: , Rfl:     nitroglycerin (NITROSTAT) 0.4 MG SL tablet, 1 under the tongue as needed for angina, may repeat q5mins for up three doses, Disp: 100 tablet, Rfl: 11    ondansetron ODT (ZOFRAN-ODT) 4 MG disintegrating tablet, Place 1 tablet on the tongue Every 6 (Six) Hours As Needed for Nausea or Vomiting., Disp: 15 tablet, Rfl: 0    pravastatin (PRAVACHOL) 80 MG tablet, Take 1 tablet by mouth Daily., Disp: 90 tablet, Rfl: 0    sertraline (ZOLOFT) 100 MG tablet, Take 1 tablet by mouth Daily. 1.5 tabs qd, Disp: , Rfl:     vitamin E 1000 UNIT capsule, Take 1 capsule by mouth Daily., Disp: , Rfl:     cyclobenzaprine  "(FLEXERIL) 10 MG tablet, Take 1 tablet by mouth 3 (Three) Times a Day As Needed for Muscle Spasms. (Patient not taking: Reported on 8/31/2023), Disp: 30 tablet, Rfl: 0    diclofenac (VOLTAREN) 75 MG EC tablet, Take 1 tablet by mouth 2 (Two) Times a Day. (Patient not taking: Reported on 8/31/2023), Disp: 20 tablet, Rfl: 0    lidocaine (LIDODERM) 5 %, Place 1 patch on the skin as directed by provider Daily. Remove & Discard patch within 12 hours or as directed by MD (Patient not taking: Reported on 8/31/2023), Disp: 30 patch, Rfl: 0    melatonin 5 MG tablet tablet, Take 1 tablet by mouth At Night As Needed. (Patient not taking: Reported on 8/31/2023), Disp: , Rfl:     pantoprazole (Protonix) 40 MG EC tablet, Take 1 tablet by mouth Daily for 90 days., Disp: 90 tablet, Rfl: 1    PEG 3350-KCl-NaBcb-NaCl-NaSulf (Golytely) 227.1 g pack, Take 4,000 mL by mouth 1 (One) Time for 1 dose. Take as directed by the office for colon prep, Disp: 1 each, Rfl: 0     Allergies   Allergen Reactions    Fenofibrate Other (See Comments)     SOB     Vytorin [Ezetimibe-Simvastatin] GI Intolerance    Lisinopril Cough    Niacin Anxiety and Rash       Family History   Problem Relation Age of Onset    Colon cancer Neg Hx         Social History     Social History Narrative    Not on file       Objective       Vital Signs:   /68 (BP Location: Left arm, Patient Position: Sitting, Cuff Size: Adult)   Pulse 65   Ht 167.6 cm (65.98\")   Wt 88.9 kg (196 lb)   SpO2 100%   BMI 31.65 kg/mý       Physical Exam  Constitutional:       Appearance: Normal appearance.   HENT:      Head: Normocephalic.   Cardiovascular:      Rate and Rhythm: Normal rate and regular rhythm.      Heart sounds: Normal heart sounds.   Pulmonary:      Effort: Pulmonary effort is normal.      Breath sounds: Normal breath sounds.   Abdominal:      General: Bowel sounds are normal.      Palpations: Abdomen is soft.   Skin:     General: Skin is warm and dry.   Neurological:     "  Mental Status: She is alert and oriented to person, place, and time. Mental status is at baseline.   Psychiatric:         Mood and Affect: Mood normal.         Behavior: Behavior normal.         Thought Content: Thought content normal.         Judgment: Judgment normal.       Result Review :       CBC w/diff          8/12/2023    18:01   CBC w/Diff   WBC 8.27    RBC 3.96    Hemoglobin 12.3    Hematocrit 36.4    MCV 91.9    MCH 31.1    MCHC 33.8    RDW 12.9    Platelets 274    Neutrophil Rel % 63.7    Immature Granulocyte Rel % 0.4    Lymphocyte Rel % 24.1    Monocyte Rel % 9.9    Eosinophil Rel % 1.7    Basophil Rel % 0.2      CMP          8/12/2023    18:01   CMP   Glucose 97    BUN 15    Creatinine 0.99    EGFR 60.3    Sodium 142    Potassium 3.8    Chloride 103    Calcium 9.4    Total Protein 7.4    Albumin 4.7    Globulin 2.7    Total Bilirubin <0.2    Alkaline Phosphatase 73    AST (SGOT) 21    ALT (SGPT) 11    Albumin/Globulin Ratio 1.7    BUN/Creatinine Ratio 15.2    Anion Gap 14.3              Assessment and Plan    Diagnoses and all orders for this visit:    1. Diarrhea, unspecified type (Primary)  -     Clostridioides difficile Toxin - Stool, Per Rectum; Future  -     Enteric Bacterial Panel - Stool, Per Rectum; Future  -     Enteric Parasite Panel - Stool, Per Rectum; Future  -     Case Request; Standing  -     Implement Anesthesia orders day of procedure.; Standing  -     Obtain informed consent; Standing  -     Verify NPO; Standing  -     Verify bowel prep was successful; Standing  -     Give tap water enema if bowel prep was insufficient; Standing    2. Gastroesophageal reflux disease, unspecified whether esophagitis present  -     Case Request; Standing  -     Implement Anesthesia orders day of procedure.; Standing  -     Obtain informed consent; Standing  -     Verify NPO; Standing  -     Verify bowel prep was successful; Standing  -     Give tap water enema if bowel prep was insufficient;  Standing    3. Generalized abdominal pain  -     Case Request; Standing  -     Implement Anesthesia orders day of procedure.; Standing  -     Obtain informed consent; Standing  -     Verify NPO; Standing  -     Verify bowel prep was successful; Standing  -     Give tap water enema if bowel prep was insufficient; Standing    4. Nausea  -     Case Request; Standing  -     Implement Anesthesia orders day of procedure.; Standing  -     Obtain informed consent; Standing  -     Verify NPO; Standing  -     Verify bowel prep was successful; Standing  -     Give tap water enema if bowel prep was insufficient; Standing    5. Heartburn  -     Case Request; Standing  -     Implement Anesthesia orders day of procedure.; Standing  -     Obtain informed consent; Standing  -     Verify NPO; Standing  -     Verify bowel prep was successful; Standing  -     Give tap water enema if bowel prep was insufficient; Standing    Other orders  -     PEG 3350-KCl-NaBcb-NaCl-NaSulf (Golytely) 227.1 g pack; Take 4,000 mL by mouth 1 (One) Time for 1 dose. Take as directed by the office for colon prep  Dispense: 1 each; Refill: 0  -     pantoprazole (Protonix) 40 MG EC tablet; Take 1 tablet by mouth Daily for 90 days.  Dispense: 90 tablet; Refill: 1  -     dicyclomine (BENTYL) 20 MG tablet; Take 1 tablet by mouth Every 6 (Six) Hours As Needed for Abdominal Cramping.  Dispense: 90 tablet; Refill: 1    Start Protonix 40mg daily  Stool studies  Continue Bentyl as needed.   Cardiac clearance from Dr. Guadarrama.   EGD/COLONOSCOPY Surgical Risk and Benefits: Possible risk/complications, benefits, and alternatives to surgical or invasive procedure have been explained to patient and/or legal guardian. Risks include bleeding, infection, and perforation. Patient has been evaluated and can tolerate anesthesia and/or sedation. Risk, benefits, and alternatives to anesthesia and sedation have been explained to patient and/or legal guardian.     Follow Up   No  follow-ups on file.  Patient was given instructions and counseling regarding her condition or for health maintenance advice. Please see specific information pulled into the AVS if appropriate.

## 2023-09-05 ENCOUNTER — OFFICE VISIT (OUTPATIENT)
Dept: CARDIOLOGY | Facility: CLINIC | Age: 73
End: 2023-09-05
Payer: MEDICARE

## 2023-09-05 ENCOUNTER — LAB (OUTPATIENT)
Dept: LAB | Facility: HOSPITAL | Age: 73
End: 2023-09-05
Payer: MEDICARE

## 2023-09-05 VITALS
HEIGHT: 66 IN | BODY MASS INDEX: 31.18 KG/M2 | WEIGHT: 194 LBS | SYSTOLIC BLOOD PRESSURE: 153 MMHG | HEART RATE: 64 BPM | DIASTOLIC BLOOD PRESSURE: 65 MMHG

## 2023-09-05 DIAGNOSIS — I10 ESSENTIAL HYPERTENSION: ICD-10-CM

## 2023-09-05 DIAGNOSIS — I25.10 NONOBSTRUCTIVE ATHEROSCLEROSIS OF CORONARY ARTERY: Primary | ICD-10-CM

## 2023-09-05 DIAGNOSIS — R19.7 DIARRHEA, UNSPECIFIED TYPE: ICD-10-CM

## 2023-09-05 DIAGNOSIS — R60.0 PEDAL EDEMA: ICD-10-CM

## 2023-09-05 DIAGNOSIS — E78.2 MIXED HYPERLIPIDEMIA: ICD-10-CM

## 2023-09-05 LAB
027 TOXIN: NORMAL
C DIFF TOX GENS STL QL NAA+PROBE: NEGATIVE

## 2023-09-05 PROCEDURE — 99214 OFFICE O/P EST MOD 30 MIN: CPT | Performed by: INTERNAL MEDICINE

## 2023-09-05 PROCEDURE — 3077F SYST BP >= 140 MM HG: CPT | Performed by: INTERNAL MEDICINE

## 2023-09-05 PROCEDURE — 1160F RVW MEDS BY RX/DR IN RCRD: CPT | Performed by: INTERNAL MEDICINE

## 2023-09-05 PROCEDURE — 3078F DIAST BP <80 MM HG: CPT | Performed by: INTERNAL MEDICINE

## 2023-09-05 PROCEDURE — 1159F MED LIST DOCD IN RCRD: CPT | Performed by: INTERNAL MEDICINE

## 2023-09-05 PROCEDURE — 87493 C DIFF AMPLIFIED PROBE: CPT

## 2023-09-05 NOTE — ASSESSMENT & PLAN NOTE
She reports intermittent chest discomfort, which are atypical for angina.  Previous Cardiac catheterization showed nonobstructive coronary artery disease.  A SPECT stress test done last year did not show any evidence of reversible ischemia.  At this time, we will continue with medical management.  Continue aspirin, statin and beta-blocker.  A Cardiac catheterization to be considered in the future for recurrent or worsening symptoms.

## 2023-09-05 NOTE — ASSESSMENT & PLAN NOTE
Blood pressure mildly elevated in the office today.  Well-controlled in the past with current regimen.  We will continue amlodipine, hydrochlorothiazide and atenolol at the current dose.  Encouraged to keep home blood pressure log.  Counseled regarding low-sodium diet.

## 2023-09-05 NOTE — ASSESSMENT & PLAN NOTE
LDL was 69 per labs done last year.  No recent lipid panel available.  We will continue pravastatin 80 mg nightly.  We will repeat lipid panel before next office visit

## 2023-09-05 NOTE — ASSESSMENT & PLAN NOTE
Longstanding problem.  She also has significantly enlarged veins with chronic venous stasis changes.  Previous echocardiogram showed preserved LV function.  Etiology is likely related to chronic venous insufficiency.  Counseled regarding foot elevation and use of compression stockings as needed.  She is on hydrochlorothiazide which will be continued.

## 2023-09-05 NOTE — PROGRESS NOTES
CARDIOLOGY FOLLOW-UP PROGRESS NOTE        Chief Complaint  Follow-up (4 month f/u), Leg Swelling (Bilateral Ankles/feet for past two months), and Hypertension    Subjective            Camilla Kirill Navas presents to [facility] CARDIOLOGY  History of Present Illness      Ms Navas is here for a follow-up visit.  She was previously seen and evaluated for chest discomfort and pedal edema.  She was last seen in the office 1 and half years back.  She reports intermittent episodes of chest discomfort, mostly at rest.  Pedal edema is persisting and recently worse.  Denies any dizziness.  For the past 6 weeks, she is having constant watery diarrhea, multiple times during the day.  She is currently being scheduled for upper and lower GI endoscopy.      Past History:    (1) Non-obstructive coronary artery disease. Cardiac catheterization done in October 2018 showed a 40% mid LAD lesion and a 40% proximal RCA lesion. (2) Hypertension, difficult to control. (3) Diabetes mellitus, on oral medications. (4) Hyperlipidemia. (5) Breast cancer, s/p surgery.     Medical History:  Past Medical History:   Diagnosis Date    Arthritis     Asthma     Atherosclerosis of coronary artery 2018    Non-obstructive coronary artery disease. Cardiac catheterization done in October 2018 showed a 40% mid LAD lesion and a 40% proximal RCA lesion.    Cancer     Breast.     Diabetes mellitus     Diverticulitis     Hiatal hernia     Hyperlipemia 01/21/2022    Hypertension, essential 03/05/2022       Surgical History: has a past surgical history that includes Hernia repair; Hysterectomy; Mastectomy (Bilateral); Upper gastrointestinal endoscopy; and Colonoscopy.     Family History: Reviewed, no family history of premature coronary disease    Social History: reports that she has never smoked. She has never been exposed to tobacco smoke. She has never used smokeless tobacco. She reports that she does not drink alcohol and does not use  "drugs.    Allergies: Fenofibrate, Vytorin [ezetimibe-simvastatin], Lisinopril, and Niacin    Current Outpatient Medications on File Prior to Visit   Medication Sig    albuterol sulfate  (90 Base) MCG/ACT inhaler Inhale 2 puffs Every 4 (Four) Hours As Needed for Wheezing.    amLODIPine (NORVASC) 10 MG tablet Take 1 tablet by mouth Daily.    Arthritis Pain Reliever 650 MG 8 hr tablet Take 1 tablet by mouth Every 8 (Eight) Hours As Needed.    aspirin 81 MG EC tablet Take 1 tablet by mouth Daily.    atenolol (TENORMIN) 50 MG tablet TAKE 1 TABLET BY MOUTH DAILY    dicyclomine (BENTYL) 20 MG tablet Take 1 tablet by mouth Every 6 (Six) Hours As Needed for Abdominal Cramping.    gabapentin (NEURONTIN) 300 MG capsule Take 1 capsule by mouth 3 (Three) Times a Day.    hydroCHLOROthiazide (HYDRODIURIL) 25 MG tablet TAKE 1 TABLET BY MOUTH EVERY DAY    metFORMIN (GLUCOPHAGE) 1000 MG tablet Take 1 tablet by mouth 2 (Two) Times a Day With Meals.    mirtazapine (REMERON) 15 MG tablet Take 1 tablet by mouth At Night As Needed.    nitroglycerin (NITROSTAT) 0.4 MG SL tablet 1 under the tongue as needed for angina, may repeat q5mins for up three doses    ondansetron ODT (ZOFRAN-ODT) 4 MG disintegrating tablet Place 1 tablet on the tongue Every 6 (Six) Hours As Needed for Nausea or Vomiting.    pravastatin (PRAVACHOL) 80 MG tablet Take 1 tablet by mouth Daily.    sertraline (ZOLOFT) 100 MG tablet Take 1 tablet by mouth Daily. 1.5 tabs qd    vitamin E 1000 UNIT capsule Take 1 capsule by mouth Daily.         Review of Systems   Respiratory:  Negative for cough, shortness of breath and wheezing.    Cardiovascular:  Positive for chest pain and leg swelling. Negative for palpitations.   Gastrointestinal:  Negative for nausea and vomiting.   Neurological:  Negative for dizziness and syncope.      Objective     /65 (BP Location: Left arm, Patient Position: Sitting)   Pulse 64   Ht 167.6 cm (65.98\")   Wt 88 kg (194 lb)   BMI " 31.33 kg/m²       Physical Exam    General : Alert, awake, no acute distress  Neck : Supple, no carotid bruit, no jugular venous distention  CVS : Regular rate and rhythm, no murmur, rubs or gallops  Lungs: Clear to auscultation bilaterally, no crackles or rhonchi  Abdomen: Soft, nontender, bowel sounds heard in all 4 quadrants  Extremities: Warm, well-perfused, trace edema bilaterally.  Bilateral engorged veins and chronic venous stasis changes present.    Result Review :     The following data was reviewed by: Umesh Guadarrama MD on 09/05/2023:    CMP          8/12/2023    18:01   CMP   Glucose 97    BUN 15    Creatinine 0.99    EGFR 60.3    Sodium 142    Potassium 3.8    Chloride 103    Calcium 9.4    Total Protein 7.4    Albumin 4.7    Globulin 2.7    Total Bilirubin <0.2    Alkaline Phosphatase 73    AST (SGOT) 21    ALT (SGPT) 11    Albumin/Globulin Ratio 1.7    BUN/Creatinine Ratio 15.2    Anion Gap 14.3      CBC          8/12/2023    18:01   CBC   WBC 8.27    RBC 3.96    Hemoglobin 12.3    Hematocrit 36.4    MCV 91.9    MCH 31.1    MCHC 33.8    RDW 12.9    Platelets 274                Component  Ref Range & Units 1 yr ago 2 yr ago   Total Cholesterol  0 - 200 mg/dL 141 139 R, CM   Triglycerides  0 - 150 mg/dL 83 134 R, CM   HDL Cholesterol  40 - 60 mg/dL 56 50 CM   LDL Cholesterol  0 - 100 mg/dL 69 62 Low  R, CM   VLDL Cholesterol  5 - 40 mg/dL 16 27 R   LDL/HDL Ratio 1.22            Data reviewed: Cardiology studies            Results for orders placed during the hospital encounter of 03/30/22    Stress Test With Myocardial Perfusion One Day    Interpretation Summary  · Myocardial perfusion imaging indicates a normal myocardial perfusion study with no evidence of ischemia.  · Left ventricular ejection fraction is normal. (Calculated EF = 59%).  · Fair exercise tolerance noted.  · There was no chest pain with exercise.  · Exercise EKG is negative for ischemic changes.  · Impressions are consistent with a  low risk study.      Echocardiogram done on 11/2/2020 showed    1. Normal left ventricular systolic function with estimated LV ejection fraction of 55-60%.   2. Mild concentric left ventricular hypertrophy.   3. Grade 2 diastolic dysfunction of the left ventricle.   4. Mild left atrial enlargement.   5. Mild to moderate mitral regurgitation.   6. Mild tricuspid regurgitation with normal calculated pulmonary artery systolic pressure.              Assessment and Plan        Diagnoses and all orders for this visit:    1. Nonobstructive atherosclerosis of coronary artery (Primary)  Assessment & Plan:  She reports intermittent chest discomfort, which are atypical for angina.  Previous Cardiac catheterization showed nonobstructive coronary artery disease.  A SPECT stress test done last year did not show any evidence of reversible ischemia.  At this time, we will continue with medical management.  Continue aspirin, statin and beta-blocker.  A Cardiac catheterization to be considered in the future for recurrent or worsening symptoms.      2. Essential hypertension  Assessment & Plan:  Blood pressure mildly elevated in the office today.  Well-controlled in the past with current regimen.  We will continue amlodipine, hydrochlorothiazide and atenolol at the current dose.  Encouraged to keep home blood pressure log.  Counseled regarding low-sodium diet.      3. Mixed hyperlipidemia  Assessment & Plan:  LDL was 69 per labs done last year.  No recent lipid panel available.  We will continue pravastatin 80 mg nightly.  We will repeat lipid panel before next office visit      4. Pedal edema  Assessment & Plan:  Longstanding problem.  She also has significantly enlarged veins with chronic venous stasis changes.  Previous echocardiogram showed preserved LV function.  Etiology is likely related to chronic venous insufficiency.  Counseled regarding foot elevation and use of compression stockings as needed.  She is on  hydrochlorothiazide which will be continued.                Follow Up     Return in about 6 months (around 3/5/2024) for Next scheduled follow up.    Patient was given instructions and counseling regarding her condition or for health maintenance advice. Please see specific information pulled into the AVS if appropriate.

## 2023-09-06 LAB
C COLI+JEJ+UPSA DNA STL QL NAA+NON-PROBE: NOT DETECTED
EC STX1+STX2 GENES STL QL NAA+NON-PROBE: NOT DETECTED
S ENT+BONG DNA STL QL NAA+NON-PROBE: NOT DETECTED
SHIGELLA SP+EIEC IPAH ST NAA+NON-PROBE: NOT DETECTED

## 2023-09-07 ENCOUNTER — TELEPHONE (OUTPATIENT)
Dept: GASTROENTEROLOGY | Facility: CLINIC | Age: 73
End: 2023-09-07
Payer: MEDICARE

## 2023-09-07 NOTE — TELEPHONE ENCOUNTER
Called patient to review stool study results. Patient did not answer. Left vm and call back number

## 2023-09-08 RX ORDER — MONTELUKAST SODIUM 4 MG/1
1-2 TABLET, CHEWABLE ORAL 2 TIMES DAILY
Qty: 120 TABLET | Refills: 1 | Status: SHIPPED | OUTPATIENT
Start: 2023-09-08

## 2023-09-08 RX ORDER — ATENOLOL 50 MG/1
TABLET ORAL
Qty: 90 TABLET | Refills: 2 | Status: SHIPPED | OUTPATIENT
Start: 2023-09-08

## 2023-09-15 NOTE — TELEPHONE ENCOUNTER
Cardiac Clearance Request  9/15/2023      Patient Name: Camilla Navas  : 1950      Dear Dr Guadarrama,    This patient is waiting to have a Colonoscopy and/or Esophagogastroduodenoscopy which I will perform  at [facility] on _____________2023_____________. Please respond to this request noting your recommendations regarding clearance from a cardiology standpoint.  You may contact our office at [phone] Option 2 with any questions. I appreciate your prompt response in this matter. Please return this form to our office as soon as possible to [phone].    ____ I approve my patient from a cardiology standpoint    ____ I do NOT approve my patient from a cardiology standpoint at this time      Approving physician name (please print): _____________________________________________      Approving physician signature: ________________________________ Date:________________      Sincerely,  [facility]  Dr. Gillette's Office                            Please fax approval or denial to our office as soon as possible.

## 2023-09-19 NOTE — TELEPHONE ENCOUNTER
Procedure: Colonoscopy and/or EGD    Medication Directive:  NA    PMH: HLD, HTN, Nonobsturctive CAD    Last Seen: 09/05/23    STRESS: 03/30/22    Myocardial perfusion imaging indicates a normal myocardial perfusion study with no evidence of ischemia.  Left ventricular ejection fraction is normal. (Calculated EF = 59%).  Fair exercise tolerance noted.  There was no chest pain with exercise.  Exercise EKG is negative for ischemic changes.  Impressions are consistent with a low risk study.

## 2023-09-20 RX ORDER — PRAVASTATIN SODIUM 80 MG/1
TABLET ORAL
Qty: 90 TABLET | Refills: 3 | Status: SHIPPED | OUTPATIENT
Start: 2023-09-20

## 2023-09-20 RX ORDER — AMLODIPINE BESYLATE 10 MG/1
TABLET ORAL
Qty: 90 TABLET | Refills: 3 | Status: SHIPPED | OUTPATIENT
Start: 2023-09-20

## 2023-09-22 NOTE — TELEPHONE ENCOUNTER
MsFord Navas may proceed with upcoming colonoscopy and/or EGD at moderate risk for perioperative cardiac events.

## 2023-09-25 ENCOUNTER — APPOINTMENT (OUTPATIENT)
Dept: CT IMAGING | Facility: HOSPITAL | Age: 73
End: 2023-09-25
Payer: MEDICARE

## 2023-09-25 ENCOUNTER — APPOINTMENT (OUTPATIENT)
Dept: GENERAL RADIOLOGY | Facility: HOSPITAL | Age: 73
End: 2023-09-25
Payer: MEDICARE

## 2023-09-25 ENCOUNTER — HOSPITAL ENCOUNTER (EMERGENCY)
Facility: HOSPITAL | Age: 73
Discharge: HOME | End: 2023-09-25
Attending: EMERGENCY MEDICINE | Admitting: EMERGENCY MEDICINE
Payer: MEDICARE

## 2023-09-25 VITALS
RESPIRATION RATE: 15 BRPM | HEART RATE: 68 BPM | TEMPERATURE: 98.8 F | BODY MASS INDEX: 30.82 KG/M2 | WEIGHT: 191.8 LBS | DIASTOLIC BLOOD PRESSURE: 62 MMHG | HEIGHT: 66 IN | OXYGEN SATURATION: 98 % | SYSTOLIC BLOOD PRESSURE: 147 MMHG

## 2023-09-25 DIAGNOSIS — R10.30 LOWER ABDOMINAL PAIN: ICD-10-CM

## 2023-09-25 DIAGNOSIS — S20.212A CONTUSION OF LEFT CHEST WALL, INITIAL ENCOUNTER: ICD-10-CM
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
## 2023-09-25 DIAGNOSIS — S29.011A MUSCLE STRAIN OF CHEST WALL, INITIAL ENCOUNTER: ICD-10-CM

## 2023-09-25 LAB
ALBUMIN SERPL-MCNC: 4.7 G/DL (ref 3.5–5.2)
ALBUMIN/GLOB SERPL: 1.8 G/DL
ALP SERPL-CCNC: 74 U/L (ref 39–117)
ALT SERPL W P-5'-P-CCNC: 9 U/L (ref 1–33)
ANION GAP SERPL CALCULATED.3IONS-SCNC: 15.2 MMOL/L (ref 5–15)
AST SERPL-CCNC: 18 U/L (ref 1–32)
BACTERIA UR QL AUTO: ABNORMAL /HPF
BASOPHILS # BLD AUTO: 0.03 10*3/MM3 (ref 0–0.2)
BASOPHILS NFR BLD AUTO: 0.4 % (ref 0–1.5)
BILIRUB SERPL-MCNC: 0.2 MG/DL (ref 0–1.2)
BILIRUB UR QL STRIP: NEGATIVE
BUN SERPL-MCNC: 12 MG/DL (ref 8–23)
BUN/CREAT SERPL: 16.2 (ref 7–25)
CALCIUM SPEC-SCNC: 9.4 MG/DL (ref 8.6–10.5)
CHLORIDE SERPL-SCNC: 99 MMOL/L (ref 98–107)
CLARITY UR: CLEAR
CO2 SERPL-SCNC: 24.8 MMOL/L (ref 22–29)
COLOR UR: YELLOW
CREAT SERPL-MCNC: 0.74 MG/DL (ref 0.57–1)
DEPRECATED RDW RBC AUTO: 43.9 FL (ref 37–54)
EGFRCR SERPLBLD CKD-EPI 2021: 85.6 ML/MIN/1.73
EOSINOPHIL # BLD AUTO: 0.1 10*3/MM3 (ref 0–0.4)
EOSINOPHIL NFR BLD AUTO: 1.5 % (ref 0.3–6.2)
ERYTHROCYTE [DISTWIDTH] IN BLOOD BY AUTOMATED COUNT: 13 % (ref 12.3–15.4)
GLOBULIN UR ELPH-MCNC: 2.6 GM/DL
GLUCOSE SERPL-MCNC: 116 MG/DL (ref 65–99)
GLUCOSE UR STRIP-MCNC: NEGATIVE MG/DL
HCT VFR BLD AUTO: 37.8 % (ref 34–46.6)
HGB BLD-MCNC: 12.4 G/DL (ref 12–15.9)
HGB UR QL STRIP.AUTO: ABNORMAL
HYALINE CASTS UR QL AUTO: ABNORMAL /LPF
IMM GRANULOCYTES # BLD AUTO: 0.02 10*3/MM3 (ref 0–0.05)
IMM GRANULOCYTES NFR BLD AUTO: 0.3 % (ref 0–0.5)
KETONES UR QL STRIP: NEGATIVE
LEUKOCYTE ESTERASE UR QL STRIP.AUTO: ABNORMAL
LIPASE SERPL-CCNC: 25 U/L (ref 13–60)
LYMPHOCYTES # BLD AUTO: 1.5 10*3/MM3 (ref 0.7–3.1)
LYMPHOCYTES NFR BLD AUTO: 22.5 % (ref 19.6–45.3)
MCH RBC QN AUTO: 30.5 PG (ref 26.6–33)
MCHC RBC AUTO-ENTMCNC: 32.8 G/DL (ref 31.5–35.7)
MCV RBC AUTO: 93.1 FL (ref 79–97)
MONOCYTES # BLD AUTO: 0.62 10*3/MM3 (ref 0.1–0.9)
MONOCYTES NFR BLD AUTO: 9.3 % (ref 5–12)
NEUTROPHILS NFR BLD AUTO: 4.4 10*3/MM3 (ref 1.7–7)
NEUTROPHILS NFR BLD AUTO: 66 % (ref 42.7–76)
NITRITE UR QL STRIP: NEGATIVE
NRBC BLD AUTO-RTO: 0 /100 WBC (ref 0–0.2)
PH UR STRIP.AUTO: 6 [PH] (ref 5–8)
PLATELET # BLD AUTO: 282 10*3/MM3 (ref 140–450)
PMV BLD AUTO: 9.7 FL (ref 6–12)
POTASSIUM SERPL-SCNC: 3.3 MMOL/L (ref 3.5–5.2)
PROT SERPL-MCNC: 7.3 G/DL (ref 6–8.5)
PROT UR QL STRIP: NEGATIVE
RBC # BLD AUTO: 4.06 10*6/MM3 (ref 3.77–5.28)
RBC # UR STRIP: ABNORMAL /HPF
REF LAB TEST METHOD: ABNORMAL
SODIUM SERPL-SCNC: 139 MMOL/L (ref 136–145)
SP GR UR STRIP: 1.01 (ref 1–1.03)
SQUAMOUS #/AREA URNS HPF: ABNORMAL /HPF
UROBILINOGEN UR QL STRIP: ABNORMAL
WBC # UR STRIP: ABNORMAL /HPF
WBC NRBC COR # BLD: 6.67 10*3/MM3 (ref 3.4–10.8)

## 2023-09-25 PROCEDURE — 83690 ASSAY OF LIPASE: CPT | Performed by: EMERGENCY MEDICINE

## 2023-09-25 PROCEDURE — 71045 X-RAY EXAM CHEST 1 VIEW: CPT

## 2023-09-25 PROCEDURE — 80053 COMPREHEN METABOLIC PANEL: CPT | Performed by: EMERGENCY MEDICINE

## 2023-09-25 PROCEDURE — 74177 CT ABD & PELVIS W/CONTRAST: CPT

## 2023-09-25 PROCEDURE — 99285 EMERGENCY DEPT VISIT HI MDM: CPT

## 2023-09-25 PROCEDURE — 36415 COLL VENOUS BLD VENIPUNCTURE: CPT | Performed by: EMERGENCY MEDICINE

## 2023-09-25 PROCEDURE — 85025 COMPLETE CBC W/AUTO DIFF WBC: CPT | Performed by: EMERGENCY MEDICINE

## 2023-09-25 PROCEDURE — 25510000001 IOPAMIDOL PER 1 ML: Performed by: EMERGENCY MEDICINE

## 2023-09-25 PROCEDURE — 81001 URINALYSIS AUTO W/SCOPE: CPT | Performed by: NURSE PRACTITIONER

## 2023-09-25 RX ORDER — ACETAMINOPHEN 325 MG/1
650 TABLET ORAL ONCE
Status: COMPLETED | OUTPATIENT
Start: 2023-09-25 | End: 2023-09-25

## 2023-09-25 RX ADMIN — IOPAMIDOL 100 ML: 755 INJECTION, SOLUTION INTRAVENOUS at 18:50

## 2023-09-25 RX ADMIN — ACETAMINOPHEN 650 MG: 325 TABLET ORAL at 19:48
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
## 2023-09-26 NOTE — DISCHARGE INSTRUCTIONS
All imaging was normal and lab work was unremarkable.    Alternate ice and moist heat to affected areas.    Tylenol or ibuprofen for pain.    Follow-up with your PCP as needed    Return for new or worsening symptoms

## 2023-10-18 RX ORDER — MONTELUKAST SODIUM 4 MG/1
1-2 TABLET, CHEWABLE ORAL 2 TIMES DAILY
Qty: 360 TABLET | Refills: 1 | Status: SHIPPED | OUTPATIENT
Start: 2023-10-18

## 2023-11-06 NOTE — PRE-PROCEDURE INSTRUCTIONS
"Instructed on date and arrival time of 0800. Come to entrance \"C\". Must have  over age 18 to drive home.  May have two visitors; however, children under 12 must stay in waiting room.  Discussed clear liquid diet (no red or purple) and bowel prep.  May take medications as usual except for blood thinners, diabetic medications, and weight loss medications.  Bring list of medications.  Verbalized understanding of instructions given.  Instructed to call for questions or concerns.  Cardiac clearance noted in chart.  "

## 2023-11-13 ENCOUNTER — ANESTHESIA EVENT (OUTPATIENT)
Dept: GASTROENTEROLOGY | Facility: HOSPITAL | Age: 73
End: 2023-11-13
Payer: MEDICARE

## 2023-11-13 NOTE — ANESTHESIA PREPROCEDURE EVALUATION
Anesthesia Evaluation     Patient summary reviewed and Nursing notes reviewed   NPO Solid Status: > 8 hours  NPO Liquid Status: > 2 hours           Airway   Mallampati: I  TM distance: >3 FB  Neck ROM: full  No difficulty expected  Dental    (+) edentulous    Pulmonary - normal exam    breath sounds clear to auscultation  (+) asthma,  Cardiovascular - normal exam  Exercise tolerance: poor (<4 METS)    Rhythm: regular  Rate: normal    (+) hypertension well controlled, CAD, hyperlipidemia      Neuro/Psych  (+) tremors (lower lip - notable tremor at rest), numbness, psychiatric history Anxiety and Depression  GI/Hepatic/Renal/Endo    (+) obesity, hiatal hernia, GERD well controlled, renal disease-, diabetes mellitus type 2 well controlled    Musculoskeletal     Abdominal    Substance History      OB/GYN          Other   arthritis,   history of cancer                Anesthesia Plan    ASA 3     general   total IV anesthesia  intravenous induction     Anesthetic plan, risks, benefits, and alternatives have been provided, discussed and informed consent has been obtained with: patient and child.  Pre-procedure education provided  Plan discussed with CRNA.    CODE STATUS:

## 2023-11-14 ENCOUNTER — HOSPITAL ENCOUNTER (OUTPATIENT)
Facility: HOSPITAL | Age: 73
Setting detail: HOSPITAL OUTPATIENT SURGERY
Discharge: HOME | End: 2023-11-14
Attending: INTERNAL MEDICINE | Admitting: INTERNAL MEDICINE
Payer: MEDICARE

## 2023-11-14 ENCOUNTER — ANESTHESIA (OUTPATIENT)
Dept: GASTROENTEROLOGY | Facility: HOSPITAL | Age: 73
End: 2023-11-14
Payer: MEDICARE

## 2023-11-14 VITALS
HEART RATE: 72 BPM | DIASTOLIC BLOOD PRESSURE: 68 MMHG | RESPIRATION RATE: 22 BRPM | OXYGEN SATURATION: 97 % | TEMPERATURE: 97.8 F | SYSTOLIC BLOOD PRESSURE: 172 MMHG | WEIGHT: 189.6 LBS | BODY MASS INDEX: 30.6 KG/M2

## 2023-11-14 DIAGNOSIS — K21.9 GASTROESOPHAGEAL REFLUX DISEASE, UNSPECIFIED WHETHER ESOPHAGITIS PRESENT: ICD-10-CM

## 2023-11-14 DIAGNOSIS — R19.7 DIARRHEA, UNSPECIFIED TYPE: ICD-10-CM

## 2023-11-14 DIAGNOSIS — R12 HEARTBURN: ICD-10-CM

## 2023-11-14 DIAGNOSIS — R11.0 NAUSEA: ICD-10-CM

## 2023-11-14 DIAGNOSIS — R10.84 GENERALIZED ABDOMINAL PAIN: ICD-10-CM

## 2023-11-14 LAB — GLUCOSE BLDC GLUCOMTR-MCNC: 98 MG/DL (ref 70–99)

## 2023-11-14 PROCEDURE — 88305 TISSUE EXAM BY PATHOLOGIST: CPT | Performed by: INTERNAL MEDICINE

## 2023-11-14 PROCEDURE — 45380 COLONOSCOPY AND BIOPSY: CPT | Performed by: INTERNAL MEDICINE

## 2023-11-14 PROCEDURE — 25010000002 PROPOFOL 10 MG/ML EMULSION: Performed by: NURSE ANESTHETIST, CERTIFIED REGISTERED

## 2023-11-14 PROCEDURE — 82948 REAGENT STRIP/BLOOD GLUCOSE: CPT

## 2023-11-14 PROCEDURE — 43239 EGD BIOPSY SINGLE/MULTIPLE: CPT | Performed by: INTERNAL MEDICINE

## 2023-11-14 PROCEDURE — 25810000003 LACTATED RINGERS PER 1000 ML: Performed by: ANESTHESIOLOGY

## 2023-11-14 PROCEDURE — 45390 COLONOSCOPY W/RESECTION: CPT | Performed by: INTERNAL MEDICINE

## 2023-11-14 RX ORDER — PROPOFOL 10 MG/ML
VIAL (ML) INTRAVENOUS AS NEEDED
Status: DISCONTINUED | OUTPATIENT
Start: 2023-11-14 | End: 2023-11-14 | Stop reason: SURG

## 2023-11-14 RX ORDER — LIDOCAINE HYDROCHLORIDE 20 MG/ML
INJECTION, SOLUTION EPIDURAL; INFILTRATION; INTRACAUDAL; PERINEURAL AS NEEDED
Status: DISCONTINUED | OUTPATIENT
Start: 2023-11-14 | End: 2023-11-14 | Stop reason: SURG

## 2023-11-14 RX ORDER — SODIUM CHLORIDE, SODIUM LACTATE, POTASSIUM CHLORIDE, CALCIUM CHLORIDE 600; 310; 30; 20 MG/100ML; MG/100ML; MG/100ML; MG/100ML
30 INJECTION, SOLUTION INTRAVENOUS CONTINUOUS
Status: DISCONTINUED | OUTPATIENT
Start: 2023-11-14 | End: 2023-11-14 | Stop reason: HOSPADM

## 2023-11-14 RX ADMIN — LIDOCAINE HYDROCHLORIDE 100 MG: 20 INJECTION, SOLUTION EPIDURAL; INFILTRATION; INTRACAUDAL; PERINEURAL at 09:33

## 2023-11-14 RX ADMIN — PROPOFOL 125 MCG/KG/MIN: 10 INJECTION, EMULSION INTRAVENOUS at 09:33

## 2023-11-14 RX ADMIN — PROPOFOL 30 MG: 10 INJECTION, EMULSION INTRAVENOUS at 09:33

## 2023-11-14 RX ADMIN — SODIUM CHLORIDE, POTASSIUM CHLORIDE, SODIUM LACTATE AND CALCIUM CHLORIDE 30 ML/HR: 600; 310; 30; 20 INJECTION, SOLUTION INTRAVENOUS at 08:32

## 2023-11-14 NOTE — H&P
Pre Procedure History & Physical    Chief Complaint:   Diarrhea and heartburn    Subjective     HPI:   Chronic diarrhea and heartburn    Past Medical History:   Past Medical History:   Diagnosis Date    Arthritis     Asthma     Atherosclerosis of coronary artery 2018    Non-obstructive coronary artery disease. Cardiac catheterization done in October 2018 showed a 40% mid LAD lesion and a 40% proximal RCA lesion.    Cancer     Breast.     Diabetes mellitus     Diverticulitis     Hiatal hernia     Hyperlipemia 01/21/2022    Hypertension, essential 03/05/2022       Past Surgical History:  Past Surgical History:   Procedure Laterality Date    COLONOSCOPY      HERNIA REPAIR      HYSTERECTOMY      MASTECTOMY Bilateral     UPPER GASTROINTESTINAL ENDOSCOPY         Family History:  Family History   Problem Relation Age of Onset    Colon cancer Neg Hx     Malig Hyperthermia Neg Hx        Social History:   reports that she has never smoked. She has never been exposed to tobacco smoke. She has never used smokeless tobacco. She reports that she does not drink alcohol and does not use drugs.    Medications:   Medications Prior to Admission   Medication Sig Dispense Refill Last Dose    albuterol sulfate  (90 Base) MCG/ACT inhaler Inhale 2 puffs Every 4 (Four) Hours As Needed for Wheezing. 8 g 0 Past Month    amLODIPine (NORVASC) 10 MG tablet TAKE 1 TABLET EVERY DAY 90 tablet 3 11/13/2023    Arthritis Pain Reliever 650 MG 8 hr tablet Take 1 tablet by mouth Every 8 (Eight) Hours As Needed.   11/13/2023    aspirin 81 MG EC tablet Take 1 tablet by mouth Daily.   11/13/2023    atenolol (TENORMIN) 50 MG tablet TAKE 1 TABLET EVERY DAY 90 tablet 2 11/13/2023    dicyclomine (BENTYL) 20 MG tablet Take 1 tablet by mouth Every 6 (Six) Hours As Needed for Abdominal Cramping. 90 tablet 1 Past Week    gabapentin (NEURONTIN) 300 MG capsule Take 1 capsule by mouth 3 (Three) Times a Day.   11/13/2023    hydroCHLOROthiazide (HYDRODIURIL) 25  MG tablet TAKE 1 TABLET BY MOUTH EVERY DAY 90 tablet 2 11/13/2023    metFORMIN (GLUCOPHAGE) 1000 MG tablet Take 1 tablet by mouth 2 (Two) Times a Day With Meals.   11/13/2023    mirtazapine (REMERON) 15 MG tablet Take 1 tablet by mouth At Night As Needed.   Past Week    ondansetron ODT (ZOFRAN-ODT) 4 MG disintegrating tablet Place 1 tablet on the tongue Every 6 (Six) Hours As Needed for Nausea or Vomiting. 15 tablet 0 11/13/2023    pravastatin (PRAVACHOL) 80 MG tablet TAKE 1 TABLET EVERY DAY 90 tablet 3 11/13/2023    sertraline (ZOLOFT) 100 MG tablet Take 1 tablet by mouth Daily. 1.5 tabs qd   11/13/2023    vitamin E 1000 UNIT capsule Take 1 capsule by mouth Daily.   Past Week    nitroglycerin (NITROSTAT) 0.4 MG SL tablet 1 under the tongue as needed for angina, may repeat q5mins for up three doses 100 tablet 11 More than a month       Allergies:  Patient has no known allergies.        Objective     Blood pressure 179/70, pulse 70, temperature 97.1 °F (36.2 °C), temperature source Temporal, resp. rate 18, weight 86 kg (189 lb 9.5 oz), SpO2 95%.    Physical Exam   Constitutional: Pt is oriented to person, place, and time and well-developed, well-nourished, and in no distress.   Mouth/Throat: Oropharynx is clear and moist.   Neck: Normal range of motion.   Cardiovascular: Normal rate, regular rhythm and normal heart sounds.    Pulmonary/Chest: Effort normal and breath sounds normal.   Abdominal: Soft. Nontender  Skin: Skin is warm and dry.   Psychiatric: Mood, memory, affect and judgment normal.     Assessment & Plan     Diagnosis:  Persistent heartburn and chronic diarrhea    Anticipated Surgical Procedure:  EGD and colonoscopy    The risks, benefits, and alternatives of this procedure have been discussed with the patient or the responsible party- the patient understands and agrees to proceed.

## 2023-11-14 NOTE — ANESTHESIA POSTPROCEDURE EVALUATION
Patient: Camilla Navas    Procedure Summary       Date: 11/14/23 Room / Location: [facility] ENDOSCOPY 4 / [facility]COPY    Anesthesia Start: 0928 Anesthesia Stop: 1023    Procedures:       ESOPHAGOGASTRODUODENOSCOPY WITH BIOPSIES      COLONOSCOPY WITH POLYPECTOMIES HOT/COLD SNARE, ELEVIEW INJECTION, BIOPSIES Diagnosis:       Diarrhea, unspecified type      Gastroesophageal reflux disease, unspecified whether esophagitis present      Generalized abdominal pain      Nausea      Heartburn      (Diarrhea, unspecified type [R19.7])      (Gastroesophageal reflux disease, unspecified whether esophagitis present [K21.9])      (Generalized abdominal pain [R10.84])      (Nausea [R11.0])      (Heartburn [R12])    Surgeons: Lester Gillette MD Provider: Jacky Hunt CRNA    Anesthesia Type: general ASA Status: 3            Anesthesia Type: general    Vitals  Vitals Value Taken Time   /68 11/14/23 1038   Temp 36.6 °C (97.8 °F) 11/14/23 1038   Pulse 72 11/14/23 1038   Resp 22 11/14/23 1038   SpO2 97 % 11/14/23 1038           Post Anesthesia Care and Evaluation    Post-procedure mental status: acceptable.  Pain management: satisfactory to patient    Airway patency: patent  Anesthetic complications: No anesthetic complications    Cardiovascular status: acceptable  Respiratory status: acceptable    Comments: Per chart review

## 2023-11-15 LAB
CYTO UR: NORMAL
LAB AP CASE REPORT: NORMAL
LAB AP CLINICAL INFORMATION: NORMAL
PATH REPORT.FINAL DX SPEC: NORMAL
PATH REPORT.GROSS SPEC: NORMAL

## 2023-11-22 ENCOUNTER — TELEPHONE (OUTPATIENT)
Dept: GASTROENTEROLOGY | Facility: CLINIC | Age: 73
End: 2023-11-22
Payer: MEDICARE

## 2023-11-22 NOTE — TELEPHONE ENCOUNTER
----- Message from VENESSA Bacon sent at 11/15/2023  2:26 PM EST -----  I have reviewed the patients upper endoscopy and pathology.  Esophageal biopsies show chronic inflammation.  Biopsies are negative for dysplasia, metaplasia, and malignancy.      I have reviewed the patient colonoscopy and pathology report. Patient has tubular adenoma polyps on pathology report that were completely removed. Random colon biopsies normal, negative for microscopic colitis. It is recommended the patient be on a 3 year recall. Please place in the recall system. Send letter.    Please arrange follow up if having persistent GI symptoms.

## 2023-11-22 NOTE — TELEPHONE ENCOUNTER
Patient notified of results and recommendations and verbalized understanding, placed in recall system and letter mailed.  Patient is not having any persistent GI issues and does not wish to follow up at this time.

## 2023-12-26 RX ORDER — HYDROCHLOROTHIAZIDE 25 MG/1
TABLET ORAL
Qty: 90 TABLET | Refills: 2 | Status: SHIPPED | OUTPATIENT
Start: 2023-12-26

## 2024-01-23 RX ORDER — PRAVASTATIN SODIUM 80 MG/1
80 TABLET ORAL DAILY
Qty: 90 TABLET | Refills: 3 | Status: SHIPPED | OUTPATIENT
Start: 2024-01-23

## 2024-02-21 ENCOUNTER — APPOINTMENT (OUTPATIENT)
Dept: GENERAL RADIOLOGY | Facility: HOSPITAL | Age: 74
End: 2024-02-21
Payer: MEDICARE

## 2024-02-21 ENCOUNTER — HOSPITAL ENCOUNTER (EMERGENCY)
Facility: HOSPITAL | Age: 74
Discharge: HOME | End: 2024-02-21
Attending: EMERGENCY MEDICINE | Admitting: EMERGENCY MEDICINE
Payer: MEDICARE

## 2024-02-21 ENCOUNTER — APPOINTMENT (OUTPATIENT)
Dept: CT IMAGING | Facility: HOSPITAL | Age: 74
End: 2024-02-21
Payer: MEDICARE

## 2024-02-21 VITALS
BODY MASS INDEX: 30.12 KG/M2 | SYSTOLIC BLOOD PRESSURE: 181 MMHG | OXYGEN SATURATION: 91 % | HEART RATE: 63 BPM | RESPIRATION RATE: 18 BRPM | HEIGHT: 66 IN | WEIGHT: 187.39 LBS | DIASTOLIC BLOOD PRESSURE: 69 MMHG | TEMPERATURE: 97.4 F

## 2024-02-21 DIAGNOSIS — K21.9 GASTROESOPHAGEAL REFLUX DISEASE, UNSPECIFIED WHETHER ESOPHAGITIS PRESENT: ICD-10-CM

## 2024-02-21 DIAGNOSIS — R10.9 ABDOMINAL PAIN, UNSPECIFIED ABDOMINAL LOCATION: Primary | ICD-10-CM

## 2024-02-21 DIAGNOSIS — K52.9 CHRONIC DIARRHEA: ICD-10-CM

## 2024-02-21 DIAGNOSIS — R07.89 CHEST DISCOMFORT: ICD-10-CM

## 2024-02-21 LAB
ALBUMIN SERPL-MCNC: 4.3 G/DL (ref 3.5–5.2)
ALBUMIN/GLOB SERPL: 1.6 G/DL
ALP SERPL-CCNC: 78 U/L (ref 39–117)
ALT SERPL W P-5'-P-CCNC: 11 U/L (ref 1–33)
ANION GAP SERPL CALCULATED.3IONS-SCNC: 8.9 MMOL/L (ref 5–15)
AST SERPL-CCNC: 18 U/L (ref 1–32)
BASOPHILS # BLD AUTO: 0.02 10*3/MM3 (ref 0–0.2)
BASOPHILS NFR BLD AUTO: 0.3 % (ref 0–1.5)
BILIRUB SERPL-MCNC: 0.3 MG/DL (ref 0–1.2)
BUN SERPL-MCNC: 14 MG/DL (ref 8–23)
BUN/CREAT SERPL: 17.1 (ref 7–25)
CALCIUM SPEC-SCNC: 9.6 MG/DL (ref 8.6–10.5)
CHLORIDE SERPL-SCNC: 101 MMOL/L (ref 98–107)
CO2 SERPL-SCNC: 29.1 MMOL/L (ref 22–29)
CREAT SERPL-MCNC: 0.82 MG/DL (ref 0.57–1)
DEPRECATED RDW RBC AUTO: 42.9 FL (ref 37–54)
EGFRCR SERPLBLD CKD-EPI 2021: 75.2 ML/MIN/1.73
EOSINOPHIL # BLD AUTO: 0.12 10*3/MM3 (ref 0–0.4)
EOSINOPHIL NFR BLD AUTO: 1.7 % (ref 0.3–6.2)
ERYTHROCYTE [DISTWIDTH] IN BLOOD BY AUTOMATED COUNT: 12.9 % (ref 12.3–15.4)
FLUAV SUBTYP SPEC NAA+PROBE: NOT DETECTED
FLUBV RNA ISLT QL NAA+PROBE: NOT DETECTED
GEN 5 2HR TROPONIN T REFLEX: 12 NG/L
GLOBULIN UR ELPH-MCNC: 2.7 GM/DL
GLUCOSE SERPL-MCNC: 114 MG/DL (ref 65–99)
HCT VFR BLD AUTO: 36 % (ref 34–46.6)
HGB BLD-MCNC: 11.6 G/DL (ref 12–15.9)
HOLD SPECIMEN: NORMAL
HOLD SPECIMEN: NORMAL
IMM GRANULOCYTES # BLD AUTO: 0.02 10*3/MM3 (ref 0–0.05)
IMM GRANULOCYTES NFR BLD AUTO: 0.3 % (ref 0–0.5)
LIPASE SERPL-CCNC: 30 U/L (ref 13–60)
LYMPHOCYTES # BLD AUTO: 1.47 10*3/MM3 (ref 0.7–3.1)
LYMPHOCYTES NFR BLD AUTO: 21.4 % (ref 19.6–45.3)
MAGNESIUM SERPL-MCNC: 2 MG/DL (ref 1.6–2.4)
MCH RBC QN AUTO: 29.7 PG (ref 26.6–33)
MCHC RBC AUTO-ENTMCNC: 32.2 G/DL (ref 31.5–35.7)
MCV RBC AUTO: 92.1 FL (ref 79–97)
MONOCYTES # BLD AUTO: 0.65 10*3/MM3 (ref 0.1–0.9)
MONOCYTES NFR BLD AUTO: 9.5 % (ref 5–12)
NEUTROPHILS NFR BLD AUTO: 4.58 10*3/MM3 (ref 1.7–7)
NEUTROPHILS NFR BLD AUTO: 66.8 % (ref 42.7–76)
NRBC BLD AUTO-RTO: 0 /100 WBC (ref 0–0.2)
NT-PROBNP SERPL-MCNC: 727.5 PG/ML (ref 0–900)
PLATELET # BLD AUTO: 272 10*3/MM3 (ref 140–450)
PMV BLD AUTO: 9.5 FL (ref 6–12)
POTASSIUM SERPL-SCNC: 4.2 MMOL/L (ref 3.5–5.2)
PROT SERPL-MCNC: 7 G/DL (ref 6–8.5)
QT INTERVAL: 392 MS
QTC INTERVAL: 411 MS
RBC # BLD AUTO: 3.91 10*6/MM3 (ref 3.77–5.28)
RSV RNA NPH QL NAA+NON-PROBE: NOT DETECTED
SARS-COV-2 RNA RESP QL NAA+PROBE: NOT DETECTED
SODIUM SERPL-SCNC: 139 MMOL/L (ref 136–145)
TROPONIN T DELTA: 0 NG/L
TROPONIN T SERPL HS-MCNC: 12 NG/L
WBC NRBC COR # BLD AUTO: 6.86 10*3/MM3 (ref 3.4–10.8)
WHOLE BLOOD HOLD COAG: NORMAL
WHOLE BLOOD HOLD SPECIMEN: NORMAL

## 2024-02-21 PROCEDURE — 25010000002 ONDANSETRON PER 1 MG: Performed by: EMERGENCY MEDICINE

## 2024-02-21 PROCEDURE — 71045 X-RAY EXAM CHEST 1 VIEW: CPT

## 2024-02-21 PROCEDURE — 99285 EMERGENCY DEPT VISIT HI MDM: CPT

## 2024-02-21 PROCEDURE — 96374 THER/PROPH/DIAG INJ IV PUSH: CPT

## 2024-02-21 PROCEDURE — 87637 SARSCOV2&INF A&B&RSV AMP PRB: CPT | Performed by: EMERGENCY MEDICINE

## 2024-02-21 PROCEDURE — 93010 ELECTROCARDIOGRAM REPORT: CPT | Performed by: INTERNAL MEDICINE

## 2024-02-21 PROCEDURE — 84484 ASSAY OF TROPONIN QUANT: CPT | Performed by: EMERGENCY MEDICINE

## 2024-02-21 PROCEDURE — 83735 ASSAY OF MAGNESIUM: CPT

## 2024-02-21 PROCEDURE — 36415 COLL VENOUS BLD VENIPUNCTURE: CPT

## 2024-02-21 PROCEDURE — 84484 ASSAY OF TROPONIN QUANT: CPT

## 2024-02-21 PROCEDURE — 25510000001 IOPAMIDOL PER 1 ML: Performed by: EMERGENCY MEDICINE

## 2024-02-21 PROCEDURE — 83690 ASSAY OF LIPASE: CPT

## 2024-02-21 PROCEDURE — 96375 TX/PRO/DX INJ NEW DRUG ADDON: CPT

## 2024-02-21 PROCEDURE — 80053 COMPREHEN METABOLIC PANEL: CPT

## 2024-02-21 PROCEDURE — 93005 ELECTROCARDIOGRAM TRACING: CPT | Performed by: EMERGENCY MEDICINE

## 2024-02-21 PROCEDURE — 93005 ELECTROCARDIOGRAM TRACING: CPT

## 2024-02-21 PROCEDURE — 85025 COMPLETE CBC W/AUTO DIFF WBC: CPT

## 2024-02-21 PROCEDURE — 74177 CT ABD & PELVIS W/CONTRAST: CPT

## 2024-02-21 PROCEDURE — 83880 ASSAY OF NATRIURETIC PEPTIDE: CPT

## 2024-02-21 RX ORDER — ONDANSETRON 2 MG/ML
4 INJECTION INTRAMUSCULAR; INTRAVENOUS ONCE
Status: COMPLETED | OUTPATIENT
Start: 2024-02-21 | End: 2024-02-21

## 2024-02-21 RX ORDER — PANTOPRAZOLE SODIUM 40 MG/10ML
40 INJECTION, POWDER, LYOPHILIZED, FOR SOLUTION INTRAVENOUS ONCE
Status: COMPLETED | OUTPATIENT
Start: 2024-02-21 | End: 2024-02-21

## 2024-02-21 RX ORDER — ALUMINA, MAGNESIA, AND SIMETHICONE 2400; 2400; 240 MG/30ML; MG/30ML; MG/30ML
15 SUSPENSION ORAL ONCE
Status: COMPLETED | OUTPATIENT
Start: 2024-02-21 | End: 2024-02-21

## 2024-02-21 RX ORDER — ASPIRIN 81 MG/1
324 TABLET, CHEWABLE ORAL ONCE
Status: DISCONTINUED | OUTPATIENT
Start: 2024-02-21 | End: 2024-02-21 | Stop reason: HOSPADM

## 2024-02-21 RX ORDER — SODIUM CHLORIDE 0.9 % (FLUSH) 0.9 %
10 SYRINGE (ML) INJECTION AS NEEDED
Status: DISCONTINUED | OUTPATIENT
Start: 2024-02-21 | End: 2024-02-21 | Stop reason: HOSPADM

## 2024-02-21 RX ADMIN — ONDANSETRON 4 MG: 2 INJECTION INTRAMUSCULAR; INTRAVENOUS at 18:03

## 2024-02-21 RX ADMIN — PANTOPRAZOLE SODIUM 40 MG: 40 INJECTION, POWDER, FOR SOLUTION INTRAVENOUS at 18:03

## 2024-02-21 RX ADMIN — ALUMINUM HYDROXIDE, MAGNESIUM HYDROXIDE, AND DIMETHICONE 15 ML: 400; 400; 40 SUSPENSION ORAL at 18:02

## 2024-02-21 RX ADMIN — IOPAMIDOL 100 ML: 755 INJECTION, SOLUTION INTRAVENOUS at 18:18

## 2024-02-21 NOTE — ED PROVIDER NOTES
Time: 4:06 PM EST  Date of encounter:  2/21/2024  Independent Historian/Clinical History and Information was obtained by:   Patient    History is limited by: N/A    Chief Complaint   Patient presents with    Shortness of Breath    Nausea    Vomiting    Diarrhea         History of Present Illness:  Patient is a 73 y.o. year old female who presents to the emergency department for evaluation of multiple symptoms that began at 3 AM.  The patient notes that she woke up at 3 AM with chest pressure in the middle of the chest and slightly to the left of the sternum.  Again, described as pressure and there is no radiation of the symptoms to the neck, jaw back or arms.  She states she is slightly short of breath with it as well.  The symptoms been constant for over 14 hours at this time.  She describes the pressure as mild in nature.  She does note that she has a history of heartburn and reflux.  At that time she also became nauseated but there is no emesis.  She fell like she had to go to the bathroom.  In the bathroom she had diarrhea.  The patient states that she had diarrhea for almost 3 hours.  She states that is slightly improved.  He then developed abdominal pain at that time.  She states the stools were not black or bloody.  Also complicating the matter is that she has had chronic diarrhea now for nearly 2 months.  She has had multiple visits to her primary care physician as well as a gastroenterologist.  At this time there is no etiology.  Patient denies any bad food exposure.  The patient has not been on any antibiotics and denies any medication changes.      Patient Care Team  Primary Care Provider: Lester Lizarraga MD    Past Medical History:     No Known Allergies  Past Medical History:   Diagnosis Date    Arthritis     Asthma     Atherosclerosis of coronary artery 2018    Non-obstructive coronary artery disease. Cardiac catheterization done in October 2018 showed a 40% mid LAD lesion and a 40% proximal RCA  lesion.    Cancer     Breast.     Diabetes mellitus     Diverticulitis     Hiatal hernia     Hyperlipemia 01/21/2022    Hypertension, essential 03/05/2022     Past Surgical History:   Procedure Laterality Date    COLONOSCOPY      COLONOSCOPY N/A 11/14/2023    Procedure: COLONOSCOPY WITH POLYPECTOMIES HOT/COLD SNARE, ELEVIEW INJECTION, BIOPSIES;  Surgeon: Lester Gillette MD;  Location: [facility] ENDOSCOPY;  Service: Gastroenterology;  Laterality: N/A;  COLON POLYPS, DIVERTICULOSIS    ENDOSCOPY N/A 11/14/2023    Procedure: ESOPHAGOGASTRODUODENOSCOPY WITH BIOPSIES;  Surgeon: Lester Gillette MD;  Location: [facility] ENDOSCOPY;  Service: Gastroenterology;  Laterality: N/A;  PREVIOUS SURGERY    HERNIA REPAIR      HYSTERECTOMY      MASTECTOMY Bilateral     UPPER GASTROINTESTINAL ENDOSCOPY       Family History   Problem Relation Age of Onset    Colon cancer Neg Hx     Malig Hyperthermia Neg Hx        Home Medications:  Prior to Admission medications    Medication Sig Start Date End Date Taking? Authorizing Provider   albuterol sulfate  (90 Base) MCG/ACT inhaler Inhale 2 puffs Every 4 (Four) Hours As Needed for Wheezing. 6/24/22   Rohan Ring,    amLODIPine (NORVASC) 10 MG tablet TAKE 1 TABLET EVERY DAY 9/20/23   Umesh Guadarrama MD   Arthritis Pain Reliever 650 MG 8 hr tablet Take 1 tablet by mouth Every 8 (Eight) Hours As Needed. 3/2/22   Emergency, Nurse Epic, RN   aspirin 81 MG EC tablet Take 1 tablet by mouth Daily.    Provider, MD Jayesh   atenolol (TENORMIN) 50 MG tablet TAKE 1 TABLET EVERY DAY 9/8/23   Umesh Guadarrama MD   dicyclomine (BENTYL) 20 MG tablet Take 1 tablet by mouth Every 6 (Six) Hours As Needed for Abdominal Cramping. 8/31/23   Gail Moe APRN   gabapentin (NEURONTIN) 300 MG capsule Take 1 capsule by mouth 3 (Three) Times a Day.    ProviderJayesh MD   hydroCHLOROthiazide (HYDRODIURIL) 25 MG tablet TAKE 1 TABLET BY MOUTH EVERY DAY 12/26/23   Umesh Guadarrama MD    metFORMIN (GLUCOPHAGE) 1000 MG tablet Take 1 tablet by mouth 2 (Two) Times a Day With Meals.    Jayesh Mckinley MD   mirtazapine (REMERON) 15 MG tablet Take 1 tablet by mouth At Night As Needed. 9/22/22   Jayesh Mckinley MD   nitroglycerin (NITROSTAT) 0.4 MG SL tablet 1 under the tongue as needed for angina, may repeat q5mins for up three doses 3/7/22   Christine Chi APRN   ondansetron ODT (ZOFRAN-ODT) 4 MG disintegrating tablet Place 1 tablet on the tongue Every 6 (Six) Hours As Needed for Nausea or Vomiting. 8/12/23   Konrad Son MD   pravastatin (PRAVACHOL) 80 MG tablet TAKE 1 TABLET BY MOUTH DAILY 1/23/24   Edith Will APRN   sertraline (ZOLOFT) 100 MG tablet Take 1 tablet by mouth Daily. 1.5 tabs qd    Jayesh Mckinley MD   vitamin E 1000 UNIT capsule Take 1 capsule by mouth Daily.    Jayesh Mckinley MD        Social History:   Social History     Tobacco Use    Smoking status: Never     Passive exposure: Never    Smokeless tobacco: Never   Vaping Use    Vaping Use: Never used   Substance Use Topics    Alcohol use: Never    Drug use: Never         Review of Systems:  Review of Systems   Constitutional:  Negative for chills, diaphoresis and fever.   HENT:  Negative for congestion, postnasal drip, rhinorrhea and sore throat.    Eyes:  Negative for photophobia.   Respiratory:  Positive for shortness of breath. Negative for cough and chest tightness.    Cardiovascular:  Positive for chest pain. Negative for palpitations and leg swelling.   Gastrointestinal:  Positive for abdominal pain, diarrhea and nausea. Negative for vomiting.   Genitourinary:  Negative for difficulty urinating, dysuria, flank pain, frequency, hematuria and urgency.   Musculoskeletal:  Negative for neck pain and neck stiffness.   Skin:  Negative for pallor and rash.   Neurological:  Negative for dizziness, syncope, weakness, numbness and headaches.   Hematological:  Negative for adenopathy. Does not  "bruise/bleed easily.   Psychiatric/Behavioral: Negative.          Physical Exam:  BP (!) 181/69   Pulse 63   Temp 97.4 °F (36.3 °C) (Oral)   Resp 18   Ht 167.6 cm (66\")   Wt 85 kg (187 lb 6.3 oz)   SpO2 91%   BMI 30.25 kg/m²         Physical Exam  Vitals and nursing note reviewed.   Constitutional:       General: She is not in acute distress.     Appearance: Normal appearance. She is not ill-appearing, toxic-appearing or diaphoretic.   HENT:      Head: Normocephalic and atraumatic.      Mouth/Throat:      Mouth: Mucous membranes are moist.   Eyes:      Pupils: Pupils are equal, round, and reactive to light.   Cardiovascular:      Rate and Rhythm: Normal rate and regular rhythm.      Pulses: Normal pulses.           Carotid pulses are 2+ on the right side and 2+ on the left side.       Radial pulses are 2+ on the right side and 2+ on the left side.        Femoral pulses are 2+ on the right side and 2+ on the left side.       Popliteal pulses are 2+ on the right side and 2+ on the left side.        Dorsalis pedis pulses are 2+ on the right side and 2+ on the left side.        Posterior tibial pulses are 2+ on the right side and 2+ on the left side.      Heart sounds: Normal heart sounds. No murmur heard.  Pulmonary:      Effort: Pulmonary effort is normal. No accessory muscle usage, respiratory distress or retractions.      Breath sounds: Normal breath sounds. No wheezing, rhonchi or rales.   Chest:      Chest wall: No mass or tenderness.   Abdominal:      General: Abdomen is flat. There is no distension or abdominal bruit.      Palpations: Abdomen is soft. There is no mass or pulsatile mass.      Tenderness: There is abdominal tenderness in the right lower quadrant and suprapubic area. There is no right CVA tenderness, left CVA tenderness, guarding or rebound.      Comments: No rigidity   Musculoskeletal:         General: No swelling, tenderness or deformity.      Cervical back: Neck supple. No tenderness.     "  Right lower leg: No tenderness. No edema.      Left lower leg: No tenderness. No edema.   Skin:     General: Skin is warm and dry.      Capillary Refill: Capillary refill takes less than 2 seconds.      Coloration: Skin is not jaundiced or pale.      Findings: No erythema.   Neurological:      General: No focal deficit present.      Mental Status: She is alert and oriented to person, place, and time. Mental status is at baseline.      Cranial Nerves: Cranial nerves 2-12 are intact. No cranial nerve deficit.      Sensory: Sensation is intact. No sensory deficit.      Motor: Motor function is intact. No weakness or pronator drift.      Coordination: Coordination is intact. Coordination normal.   Psychiatric:         Mood and Affect: Mood normal.         Behavior: Behavior normal.                  Procedures:  Procedures      Medical Decision Making:      Comorbidities that affect care:    Asthma, diabetes, hyperlipidemia, hypertension, chronic diarrhea, diverticulitis    External Notes reviewed:    None      The following orders were placed and all results were independently analyzed by me:  Orders Placed This Encounter   Procedures    COVID PRE-OP / PRE-PROCEDURE SCREENING ORDER (NO ISOLATION) - Swab, Nasopharynx    COVID-19, FLU A/B, RSV PCR 1 HR TAT - Swab, Nasopharynx    Enteric Bacterial Panel - Stool, Per Rectum    Clostridioides difficile Toxin - Stool, Per Rectum    Clostridioides difficile Toxin, PCR - Stool, Per Rectum    XR Chest 1 View    CT Abdomen Pelvis With Contrast    [place] Draw    High Sensitivity Troponin T    Comprehensive Metabolic Panel    Lipase    BNP    Magnesium    CBC Auto Differential    High Sensitivity Troponin T 2Hr    Undress & Gown    Continuous Pulse Oximetry    ECG 12 Lead ED Triage Standing Order; Chest Pain    CBC & Differential    Green Top (Gel)    Lavender Top    Gold Top - SST    Light Blue Top       Medications Given in the Emergency Department:  Medications   pantoprazole  (PROTONIX) injection 40 mg (40 mg Intravenous Given 2/21/24 1803)   ondansetron (ZOFRAN) injection 4 mg (4 mg Intravenous Given 2/21/24 1803)   aluminum-magnesium hydroxide-simethicone (MAALOX MAX) 400-400-40 MG/5ML suspension 15 mL (15 mL Oral Given 2/21/24 1802)   iopamidol (ISOVUE-370) 76 % injection 100 mL (100 mL Intravenous Given 2/21/24 1818)        ED Course:    The patient was initially evaluated in the triage area where orders were placed. The patient was later dispositioned by August Peña DO.      The patient was advised to stay for completion of workup which includes but is not limited to communication of labs and radiological results, reassessment and plan. The patient was advised that leaving prior to disposition by a provider could result in critical findings that are not communicated to the patient.     ED Course as of 02/23/24 0257   Wed Feb 21, 2024   1608 PROVIDER IN TRIAGE  Patient was evaluated by Cruzito maloney PA-C. Orders were placed and awaiting final results and disposition.   [MV]   1610 EKG:    Rhythm: Sinus rhythm  Rate: 66  Intervals: Normal [place] and QT interval  T-wave: Nonspecific T wave flattening  ST Segment: No pathological ST elevation and reciprocal ST depression to suggest STEMI    EKG Comparison: No change in the QRS and ST morphology from the EKG performed August 12, 2023  Interpreted by me   [SD]   1837 EKG:    Rhythm: Sinus rhythm  Rate: 64  Intervals: Normal [place] and QT interval  T-wave: No pathological T waves  ST Segment: No pathological ST elevation and reciprocal ST depression to suggest STEMI    EKG Comparison: No change in QRS and ST morphology from EKG performed earlier in the department    Interpreted by me   [SD]      ED Course User Index  [MV] Cruzito Funez PA  [SD] August Peña DO       Labs:    Lab Results (last 24 hours)       ** No results found for the last 24 hours. **             Imaging:    No Radiology Exams Resulted Within Past 24 Hours      Differential  Diagnosis and Discussion:      Abdominal Pain: Based on the patient's signs and symptoms, I considered abdominal aortic aneurysm, small bowel obstruction, pancreatitis, acute cholecystitis, acute appendecitis, peptic ulcer disease, gastritis, colitis, endocrine disorders, irritable bowel syndrome and other differential diagnosis an etiology of the patient's abdominal pain.    All labs were reviewed and interpreted by me.  All X-rays impressions were independently interpreted by me.  EKG was interpreted by me.  CT scan radiology impression was interpreted by me.    MDM  Number of Diagnoses or Management Options  Abdominal pain, unspecified abdominal location  Chest discomfort  Chronic diarrhea  Gastroesophageal reflux disease, unspecified whether esophagitis present  Diagnosis management comments:   The patient was given Protonix and Maalox and reassess.  The patient states that her chest pressure went completely away after being medicated.    The patient has a history of chronic diarrhea.  The patient was unable to produce a stool sample in the emergency room.  The patient had no diarrhea while in the emergency room.    HEART Score for Major Cardiac Events - MDCalc  Calculated on Feb 21 2024 8:10 PM  4 points -> Moderate Score (4-6 points) Risk of MACE of 12-16.6%.    The patient had 2 high sensitivity troponins that were within normal limits.  The patient had 2 EKGs that demonstrated no evidence of ST segment elevation MI or other injury pattern    I discussed the patient's heart score with her.  The patient understands that she has moderate risk for cardiovascular event over the next 6 weeks.  The patient understands that she had 2 normal EKGs and 2 normal high sensitive troponins.  The patient also notes currently unsure implies that patients that had 2 normal high send troponins over 2 hours apart have very low risk to have a cardiovascular interval the next 4 weeks.  The fact that the patient's pain is better  with the Maalox and Protonix and she has a history of reflux, The patient feels comfortable for discharge and will follow-up with her cardiologist on outpatient basis.  Patient has no abdominal pain at this time.    The patient is resting comfortably and feels better, is alert and in no distress.  Repeat examination is unremarkable and benign; in particular, there is no discomfort at McBurney's point and there is no pulsatile mass.  The history, exam, diagnostic testing and current condition does not suggest acute appendicitis, bowel obstruction, acute cholecystitis bowel perforation, major gastrointestinal bleeding, severe diverticulitis, abdominal aortic aneurysm, mesenteric ischemia, volvulus, sepsis or other significant pathology that warrants further testing, continued ED treatment, admission for surgical evaluation at this point.  The vital signs have been stable.  The patient does not have uncontrolled pain intractable vomiting or other significant symptoms.  The patient's condition is stable and appropriate for discharge from the emergency department.  The patient will follow up with her primary care physician for serial reexamination of the abdomen tomorrow.  The patient was instructed to return should they have worsening pain, intractable vomiting, fever or new symptoms           Amount and/or Complexity of Data Reviewed  Clinical lab tests: reviewed  Tests in the radiology section of CPT®: reviewed  Tests in the medicine section of CPT®: reviewed         Social Determinants of Health:    Patient is independent, reliable, and has access to care.       Disposition and Care Coordination:    Discharged: I considered escalation of care by admitting this patient to the hospital, however the patient has improved and is suitable and  stable for discharge.    I have explained discharge medications and the need for follow up with the patient/caretakers. This was also printed in the discharge instructions. Patient  was discharged with the following medications and follow up:      Medication List      No changes were made to your prescriptions during this visit.      Lester Lizarraga MD  [address] [place] [address]  [phone]    On 2/22/2024  Call for appointment    Umesh Guadarrama MD  [address] [place] [address]  [phone]    On 2/22/2024  Chest discomfort, call for appointment       Final diagnoses:   Abdominal pain, unspecified abdominal location   Chronic diarrhea   Chest discomfort   Gastroesophageal reflux disease, unspecified whether esophagitis present        ED Disposition       ED Disposition   Discharge    Condition   Stable    Comment   --               This medical record created using voice recognition software.             August Peña DO  02/23/24 0258

## 2024-02-22 RX ORDER — PANTOPRAZOLE SODIUM 40 MG/1
40 TABLET, DELAYED RELEASE ORAL DAILY
Qty: 90 TABLET | Refills: 1 | Status: SHIPPED | OUTPATIENT
Start: 2024-02-22

## 2024-02-22 NOTE — DISCHARGE INSTRUCTIONS
Please follow-up with your doctor tomorrow for serial reexamination the abdomen.  Return to the emergency room immediately for intractable pain, intractable vomiting, fever, shaking chills, muscle aches, near passing out, passing out or any new symptoms you are concerned with    No strenuous activity until released by the cardiologist.      Please return to the emergency room for worsening chest pain, radiating chest pain, shortness of breath, near passing out, passing out, unusual fatigue, unusual sweating, nausea or vomiting or new or worrisome symptoms

## 2024-02-23 ENCOUNTER — HOSPITAL ENCOUNTER (OUTPATIENT)
Dept: GENERAL RADIOLOGY | Facility: HOSPITAL | Age: 74
Discharge: HOME | End: 2024-02-23
Payer: MEDICARE

## 2024-02-23 ENCOUNTER — TRANSCRIBE ORDERS (OUTPATIENT)
Dept: ADMINISTRATIVE | Facility: HOSPITAL | Age: 74
End: 2024-02-23
Payer: MEDICARE

## 2024-02-23 DIAGNOSIS — M54.50 LOW BACK PAIN, UNSPECIFIED BACK PAIN LATERALITY, UNSPECIFIED CHRONICITY, UNSPECIFIED WHETHER SCIATICA PRESENT: Primary | ICD-10-CM

## 2024-02-23 DIAGNOSIS — M54.50 LOW BACK PAIN, UNSPECIFIED BACK PAIN LATERALITY, UNSPECIFIED CHRONICITY, UNSPECIFIED WHETHER SCIATICA PRESENT: ICD-10-CM

## 2024-02-23 PROCEDURE — 72100 X-RAY EXAM L-S SPINE 2/3 VWS: CPT

## 2024-02-25 LAB
QT INTERVAL: 399 MS
QTC INTERVAL: 412 MS

## 2024-03-06 LAB
QT INTERVAL: 392 MS
QTC INTERVAL: 411 MS

## 2024-04-29 ENCOUNTER — OFFICE VISIT (OUTPATIENT)
Dept: CARDIOLOGY | Facility: CLINIC | Age: 74
End: 2024-04-29
Payer: MEDICARE

## 2024-04-29 VITALS
HEART RATE: 65 BPM | HEIGHT: 66 IN | SYSTOLIC BLOOD PRESSURE: 142 MMHG | BODY MASS INDEX: 31.72 KG/M2 | WEIGHT: 197.4 LBS | DIASTOLIC BLOOD PRESSURE: 68 MMHG

## 2024-04-29 DIAGNOSIS — R60.0 PEDAL EDEMA: ICD-10-CM

## 2024-04-29 DIAGNOSIS — I25.10 NONOBSTRUCTIVE ATHEROSCLEROSIS OF CORONARY ARTERY: Primary | ICD-10-CM

## 2024-04-29 DIAGNOSIS — I10 ESSENTIAL HYPERTENSION: ICD-10-CM

## 2024-04-29 DIAGNOSIS — E78.2 MIXED HYPERLIPIDEMIA: Chronic | ICD-10-CM

## 2024-04-29 PROCEDURE — 3077F SYST BP >= 140 MM HG: CPT | Performed by: FAMILY MEDICINE

## 2024-04-29 PROCEDURE — 3078F DIAST BP <80 MM HG: CPT | Performed by: FAMILY MEDICINE

## 2024-04-29 PROCEDURE — 99214 OFFICE O/P EST MOD 30 MIN: CPT | Performed by: FAMILY MEDICINE

## 2024-04-29 RX ORDER — TIZANIDINE 2 MG/1
2 TABLET ORAL NIGHTLY
COMMUNITY
Start: 2024-01-02 | End: 2024-04-29

## 2024-04-29 RX ORDER — EMPAGLIFLOZIN 10 MG/1
1 TABLET, FILM COATED ORAL DAILY
COMMUNITY
Start: 2024-04-03

## 2024-04-29 RX ORDER — CELECOXIB 200 MG/1
1 CAPSULE ORAL DAILY
COMMUNITY
Start: 2024-02-20 | End: 2024-04-29

## 2024-04-29 RX ORDER — ONDANSETRON 4 MG/1
1 TABLET, FILM COATED ORAL DAILY
COMMUNITY
Start: 2024-04-03 | End: 2024-04-29

## 2024-04-29 RX ORDER — BACLOFEN 10 MG/1
TABLET ORAL EVERY 12 HOURS SCHEDULED
COMMUNITY

## 2024-04-29 RX ORDER — NITROGLYCERIN 0.4 MG/1
TABLET SUBLINGUAL
Qty: 25 TABLET | Refills: 1 | Status: SHIPPED | OUTPATIENT
Start: 2024-04-29

## 2024-04-29 RX ORDER — LISINOPRIL 10 MG/1
1 TABLET ORAL DAILY
COMMUNITY
Start: 2024-04-03 | End: 2024-04-29

## 2024-04-29 RX ORDER — CYCLOBENZAPRINE HCL 10 MG
1 TABLET ORAL EVERY 12 HOURS SCHEDULED
COMMUNITY
Start: 2024-02-22 | End: 2024-04-29

## 2024-04-29 NOTE — PROGRESS NOTES
Chief Complaint  Nonobstructive atherosclerosis of coronary artery, Atherosclerosis of coronary artery of native heart with ang, and Follow-up    Subjective        History of Present Illness  Camilla Navas presents to [facility]   Ms. Navas is a 73-year-old female patient coming in for routine cardiac follow-up.  States she is in her general state of health currently and denies any new concerns..  SShe did have an ER evaluation in February with primary complaints of abdominal pain but also described having episode of chest pain at that time.  EKGs and cardiac enzymes were unremarkable.  Symptoms were improved with treatment with Maalox and Protonix.  Further significant episodes of chest pain or pressure.  She does request routine refill of sublingual nitro to have available, but has not required any recent use.    Past History:     (1) Non-obstructive coronary artery disease. Cardiac catheterization done in October 2018 showed a 40% mid LAD lesion and a 40% proximal RCA lesion. (2) Hypertension, difficult to control. (3) Diabetes mellitus, on oral medications. (4) Hyperlipidemia. (5) Breast cancer, s/p surgery.        Past Medical History:   Diagnosis Date    Arthritis     Asthma     Atherosclerosis of coronary artery 2018    Cancer     Diabetes mellitus     Diverticulitis     Hiatal hernia     Hyperlipemia 01/21/2022    Hypertension, essential 03/05/2022       No Known Allergies     Past Surgical History:   Procedure Laterality Date    COLONOSCOPY      COLONOSCOPY N/A 11/14/2023    Procedure: COLONOSCOPY WITH POLYPECTOMIES HOT/COLD SNARE, ELEVIEW INJECTION, BIOPSIES;  Surgeon: Lester Gillette MD;  Location: [facility] ENDOSCOPY;  Service: Gastroenterology;  Laterality: N/A;  COLON POLYPS, DIVERTICULOSIS    ENDOSCOPY N/A 11/14/2023    Procedure: ESOPHAGOGASTRODUODENOSCOPY WITH BIOPSIES;  Surgeon: Lester Gillette MD;  Location: [facility] ENDOSCOPY;  Service: Gastroenterology;   "Laterality: N/A;  PREVIOUS SURGERY    HERNIA REPAIR      HYSTERECTOMY      MASTECTOMY Bilateral     UPPER GASTROINTESTINAL ENDOSCOPY          Social History  She  reports that she has never smoked. She has never been exposed to tobacco smoke. She has never used smokeless tobacco. She reports that she does not drink alcohol and does not use drugs.    Family History  Her family history is not on file.       Current Outpatient Medications on File Prior to Visit   Medication Sig    albuterol sulfate  (90 Base) MCG/ACT inhaler Inhale 2 puffs Every 4 (Four) Hours As Needed for Wheezing.    amLODIPine (NORVASC) 10 MG tablet TAKE 1 TABLET EVERY DAY    Arthritis Pain Reliever 650 MG 8 hr tablet Take 1 tablet by mouth Every 8 (Eight) Hours As Needed.    aspirin 81 MG EC tablet Take 1 tablet by mouth Daily.    atenolol (TENORMIN) 50 MG tablet TAKE 1 TABLET EVERY DAY    baclofen (LIORESAL) 10 MG tablet Every 12 (Twelve) Hours.    gabapentin (NEURONTIN) 300 MG capsule Take 1 capsule by mouth 3 (Three) Times a Day.    hydroCHLOROthiazide (HYDRODIURIL) 25 MG tablet TAKE 1 TABLET BY MOUTH EVERY DAY    Jardiance 10 MG tablet tablet Take 1 tablet by mouth Daily.    pravastatin (PRAVACHOL) 80 MG tablet TAKE 1 TABLET BY MOUTH DAILY    sertraline (ZOLOFT) 100 MG tablet Take 1 tablet by mouth Daily. 1.5 tabs qd    vitamin E 1000 UNIT capsule Take 1 capsule by mouth Daily.     No current facility-administered medications on file prior to visit.         Review of Systems   Constitutional:  Negative for fatigue.   Respiratory:  Negative for cough, chest tightness and shortness of breath.    Cardiovascular:  Negative for chest pain, palpitations and leg swelling.   Gastrointestinal:  Negative for nausea and vomiting.   Neurological:  Negative for dizziness and syncope.        Objective   Vitals:    04/29/24 0837   BP: 151/66   Pulse: 65   Weight: 89.5 kg (197 lb 6.4 oz)   Height: 167.6 cm (66\")         Physical Exam  General : Alert, " "awake, no acute distress  Neck : Supple, no carotid bruit, no jugular venous distention  CVS : Regular rate and rhythm, no murmur, rubs or gallops  Lungs: Clear to auscultation bilaterally, no crackles or rhonchi  Abdomen: Soft, nontender, bowel sounds active  Extremities: Warm, well-perfused, no pedal edema      Result Review     The following data was reviewed by VENESSA Howell  proBNP   Date Value Ref Range Status   02/21/2024 727.5 0.0 - 900.0 pg/mL Final     CMP          8/12/2023    18:01 9/25/2023    17:51 2/21/2024    16:19   CMP   Glucose 97  116  114    BUN 15  12  14    Creatinine 0.99  0.74  0.82    EGFR 60.3  85.6  75.2    Sodium 142  139  139    Potassium 3.8  3.3  4.2    Chloride 103  99  101    Calcium 9.4  9.4  9.6    Total Protein 7.4  7.3  7.0    Albumin 4.7  4.7  4.3    Globulin 2.7  2.6  2.7    Total Bilirubin <0.2  0.2  0.3    Alkaline Phosphatase 73  74  78    AST (SGOT) 21  18  18    ALT (SGPT) 11  9  11    Albumin/Globulin Ratio 1.7  1.8  1.6    BUN/Creatinine Ratio 15.2  16.2  17.1    Anion Gap 14.3  15.2  8.9      CBC w/diff          8/12/2023    18:01 9/25/2023    17:51 2/21/2024    16:19   CBC w/Diff   WBC 8.27  6.67  6.86    RBC 3.96  4.06  3.91    Hemoglobin 12.3  12.4  11.6    Hematocrit 36.4  37.8  36.0    MCV 91.9  93.1  92.1    MCH 31.1  30.5  29.7    MCHC 33.8  32.8  32.2    RDW 12.9  13.0  12.9    Platelets 274  282  272    Neutrophil Rel % 63.7  66.0  66.8    Immature Granulocyte Rel % 0.4  0.3  0.3    Lymphocyte Rel % 24.1  22.5  21.4    Monocyte Rel % 9.9  9.3  9.5    Eosinophil Rel % 1.7  1.5  1.7    Basophil Rel % 0.2  0.4  0.3      Magnesium   Date Value Ref Range Status   02/21/2024 2.0 1.6 - 2.4 mg/dL Final      No results found for: \"DIGOXIN\"   Lab Results   Component Value Date    TROPONINT 12 02/21/2024           Echocardiogram done on 11/2/2020 showed     1. Normal left ventricular systolic function with estimated LV ejection fraction of 55-60%.   2. Mild " concentric left ventricular hypertrophy.   3. Grade 2 diastolic dysfunction of the left ventricle.   4. Mild left atrial enlargement.   5. Mild to moderate mitral regurgitation.   6. Mild tricuspid regurgitation with normal calculated pulmonary artery systolic pressure.              Results for orders placed during the hospital encounter of 03/30/22    Stress Test With Myocardial Perfusion One Day    Interpretation Summary  · Myocardial perfusion imaging indicates a normal myocardial perfusion study with no evidence of ischemia.  · Left ventricular ejection fraction is normal. (Calculated EF = 59%).  · Fair exercise tolerance noted.  · There was no chest pain with exercise.  · Exercise EKG is negative for ischemic changes.  · Impressions are consistent with a low risk study.           Assessment and Plan   Diagnoses and all orders for this visit:    1. Nonobstructive atherosclerosis of coronary artery (Primary)  Assessment & Plan:  She is stable, without symptoms of angina recently.  Continue current medication regiment with daily aspirin, beta-blocker and statin therapy.  No recent use of sublingual nitro, request refill for availability.    Orders:  -     nitroglycerin (NITROSTAT) 0.4 MG SL tablet; 1 under the tongue as needed for angina, may repeat q5mins for up three doses  Dispense: 25 tablet; Refill: 1    2. Essential hypertension  Assessment & Plan:  Blood pressure reasonably well-controlled.  Continue current doses of amlodipine, hydrochlorothiazide and atenolol.  Counseled regarding low-sodium diet.    Orders:  -     Comprehensive Metabolic Panel; Future    3. Mixed hyperlipidemia  Assessment & Plan:   Previously well-controlled, will send for updated lipid profile from PCP.  Continue current dose pravastatin recheck fasting lipid profile before next routine follow-up visit.    Orders:  -     Lipid Panel; Future  -     Comprehensive Metabolic Panel; Future    4. Pedal edema  Assessment & Plan:  She has  chronic longstanding pedal edema with multiple varicosities.  Previous echocardiogram showed preserved LV function.  Encouraged compression stockings, elevation when possible and continue current dose of HCTZ.                  Follow Up   Return in about 9 months (around 1/29/2025) for with Dr. Guadarrama.    Patient was given instructions and counseling regarding her condition or for health maintenance advice. Please see specific information pulled into the AVS if appropriate.     Signed,  Edith Will, APRN  04/29/2024     Dictated Utilizing Dragon Dictation: Please note that portions of this note were completed with a voice recognition program.  Part of this note may be an electronic transcription/translation of spoken language to printed text using the Dragon Dictation System.

## 2024-04-29 NOTE — ASSESSMENT & PLAN NOTE
Previously well-controlled, will send for updated lipid profile from PCP.  Continue current dose pravastatin recheck fasting lipid profile before next routine follow-up visit.

## 2024-04-29 NOTE — ASSESSMENT & PLAN NOTE
Blood pressure reasonably well-controlled.  Continue current doses of amlodipine, hydrochlorothiazide and atenolol.  Counseled regarding low-sodium diet.

## 2024-04-29 NOTE — ASSESSMENT & PLAN NOTE
She is stable, without symptoms of angina recently.  Continue current medication regiment with daily aspirin, beta-blocker and statin therapy.  No recent use of sublingual nitro, request refill for availability.

## 2024-04-29 NOTE — ASSESSMENT & PLAN NOTE
She has chronic longstanding pedal edema with multiple varicosities.  Previous echocardiogram showed preserved LV function.  Encouraged compression stockings, elevation when possible and continue current dose of HCTZ.

## 2024-04-29 NOTE — LETTER
April 29, 2024     Lester Lizarraga MD  [address] [place] [address]    Patient: Camilla Navas   YOB: 1950   Date of Visit: 4/29/2024       Dear Lester Lizarraga MD    Camilla Navas was in my office today. Below is a copy of my note.    If you have questions, please do not hesitate to call me. I look forward to following Camilla along with you.         Sincerely,        VENESSA Howell        CC: No Recipients    Chief Complaint  Nonobstructive atherosclerosis of coronary artery, Atherosclerosis of coronary artery of native heart with ang, and Follow-up    Subjective       History of Present Illness  Camilla Navas presents to [facility]   Ms. Navas is a 73-year-old female patient coming in for routine cardiac follow-up.  States she is in her general state of health currently and denies any new concerns..  SShe did have an ER evaluation in February with primary complaints of abdominal pain but also described having episode of chest pain at that time.  EKGs and cardiac enzymes were unremarkable.  Symptoms were improved with treatment with Maalox and Protonix.  Further significant episodes of chest pain or pressure.  She does request routine refill of sublingual nitro to have available, but has not required any recent use.    Past History:     (1) Non-obstructive coronary artery disease. Cardiac catheterization done in October 2018 showed a 40% mid LAD lesion and a 40% proximal RCA lesion. (2) Hypertension, difficult to control. (3) Diabetes mellitus, on oral medications. (4) Hyperlipidemia. (5) Breast cancer, s/p surgery.        Past Medical History:   Diagnosis Date   • Arthritis    • Asthma    • Atherosclerosis of coronary artery 2018   • Cancer    • Diabetes mellitus    • Diverticulitis    • Hiatal hernia    • Hyperlipemia 01/21/2022   • Hypertension, essential 03/05/2022       No Known Allergies     Past Surgical History:   Procedure  Laterality Date   • COLONOSCOPY     • COLONOSCOPY N/A 11/14/2023    Procedure: COLONOSCOPY WITH POLYPECTOMIES HOT/COLD SNARE, ELEVIEW INJECTION, BIOPSIES;  Surgeon: Lester Gillette MD;  Location: [facility] ENDOSCOPY;  Service: Gastroenterology;  Laterality: N/A;  COLON POLYPS, DIVERTICULOSIS   • ENDOSCOPY N/A 11/14/2023    Procedure: ESOPHAGOGASTRODUODENOSCOPY WITH BIOPSIES;  Surgeon: Lester Gillette MD;  Location: [facility] ENDOSCOPY;  Service: Gastroenterology;  Laterality: N/A;  PREVIOUS SURGERY   • HERNIA REPAIR     • HYSTERECTOMY     • MASTECTOMY Bilateral    • UPPER GASTROINTESTINAL ENDOSCOPY          Social History  She  reports that she has never smoked. She has never been exposed to tobacco smoke. She has never used smokeless tobacco. She reports that she does not drink alcohol and does not use drugs.    Family History  Her family history is not on file.       Current Outpatient Medications on File Prior to Visit   Medication Sig   • albuterol sulfate  (90 Base) MCG/ACT inhaler Inhale 2 puffs Every 4 (Four) Hours As Needed for Wheezing.   • amLODIPine (NORVASC) 10 MG tablet TAKE 1 TABLET EVERY DAY   • Arthritis Pain Reliever 650 MG 8 hr tablet Take 1 tablet by mouth Every 8 (Eight) Hours As Needed.   • aspirin 81 MG EC tablet Take 1 tablet by mouth Daily.   • atenolol (TENORMIN) 50 MG tablet TAKE 1 TABLET EVERY DAY   • baclofen (LIORESAL) 10 MG tablet Every 12 (Twelve) Hours.   • gabapentin (NEURONTIN) 300 MG capsule Take 1 capsule by mouth 3 (Three) Times a Day.   • hydroCHLOROthiazide (HYDRODIURIL) 25 MG tablet TAKE 1 TABLET BY MOUTH EVERY DAY   • Jardiance 10 MG tablet tablet Take 1 tablet by mouth Daily.   • pravastatin (PRAVACHOL) 80 MG tablet TAKE 1 TABLET BY MOUTH DAILY   • sertraline (ZOLOFT) 100 MG tablet Take 1 tablet by mouth Daily. 1.5 tabs qd   • vitamin E 1000 UNIT capsule Take 1 capsule by mouth Daily.     No current facility-administered medications on file prior to visit.  "        Review of Systems   Constitutional:  Negative for fatigue.   Respiratory:  Negative for cough, chest tightness and shortness of breath.    Cardiovascular:  Negative for chest pain, palpitations and leg swelling.   Gastrointestinal:  Negative for nausea and vomiting.   Neurological:  Negative for dizziness and syncope.        Objective  Vitals:    04/29/24 0837   BP: 151/66   Pulse: 65   Weight: 89.5 kg (197 lb 6.4 oz)   Height: 167.6 cm (66\")         Physical Exam  General : Alert, awake, no acute distress  Neck : Supple, no carotid bruit, no jugular venous distention  CVS : Regular rate and rhythm, no murmur, rubs or gallops  Lungs: Clear to auscultation bilaterally, no crackles or rhonchi  Abdomen: Soft, nontender, bowel sounds active  Extremities: Warm, well-perfused, no pedal edema      Result Review    The following data was reviewed by VENESSA Howell  proBNP   Date Value Ref Range Status   02/21/2024 727.5 0.0 - 900.0 pg/mL Final     CMP          8/12/2023    18:01 9/25/2023    17:51 2/21/2024    16:19   CMP   Glucose 97  116  114    BUN 15  12  14    Creatinine 0.99  0.74  0.82    EGFR 60.3  85.6  75.2    Sodium 142  139  139    Potassium 3.8  3.3  4.2    Chloride 103  99  101    Calcium 9.4  9.4  9.6    Total Protein 7.4  7.3  7.0    Albumin 4.7  4.7  4.3    Globulin 2.7  2.6  2.7    Total Bilirubin <0.2  0.2  0.3    Alkaline Phosphatase 73  74  78    AST (SGOT) 21  18  18    ALT (SGPT) 11  9  11    Albumin/Globulin Ratio 1.7  1.8  1.6    BUN/Creatinine Ratio 15.2  16.2  17.1    Anion Gap 14.3  15.2  8.9      CBC w/diff          8/12/2023    18:01 9/25/2023    17:51 2/21/2024    16:19   CBC w/Diff   WBC 8.27  6.67  6.86    RBC 3.96  4.06  3.91    Hemoglobin 12.3  12.4  11.6    Hematocrit 36.4  37.8  36.0    MCV 91.9  93.1  92.1    MCH 31.1  30.5  29.7    MCHC 33.8  32.8  32.2    RDW 12.9  13.0  12.9    Platelets 274  282  272    Neutrophil Rel % 63.7  66.0  66.8    Immature Granulocyte Rel % " "0.4  0.3  0.3    Lymphocyte Rel % 24.1  22.5  21.4    Monocyte Rel % 9.9  9.3  9.5    Eosinophil Rel % 1.7  1.5  1.7    Basophil Rel % 0.2  0.4  0.3      Magnesium   Date Value Ref Range Status   02/21/2024 2.0 1.6 - 2.4 mg/dL Final      No results found for: \"DIGOXIN\"   Lab Results   Component Value Date    TROPONINT 12 02/21/2024           Echocardiogram done on 11/2/2020 showed     1. Normal left ventricular systolic function with estimated LV ejection fraction of 55-60%.   2. Mild concentric left ventricular hypertrophy.   3. Grade 2 diastolic dysfunction of the left ventricle.   4. Mild left atrial enlargement.   5. Mild to moderate mitral regurgitation.   6. Mild tricuspid regurgitation with normal calculated pulmonary artery systolic pressure.              Results for orders placed during the hospital encounter of 03/30/22    Stress Test With Myocardial Perfusion One Day    Interpretation Summary  · Myocardial perfusion imaging indicates a normal myocardial perfusion study with no evidence of ischemia.  · Left ventricular ejection fraction is normal. (Calculated EF = 59%).  · Fair exercise tolerance noted.  · There was no chest pain with exercise.  · Exercise EKG is negative for ischemic changes.  · Impressions are consistent with a low risk study.           Assessment and Plan   Diagnoses and all orders for this visit:    1. Nonobstructive atherosclerosis of coronary artery (Primary)  Assessment & Plan:  She is stable, without symptoms of angina recently.  Continue current medication regiment with daily aspirin, beta-blocker and statin therapy.  No recent use of sublingual nitro, request refill for availability.    Orders:  -     nitroglycerin (NITROSTAT) 0.4 MG SL tablet; 1 under the tongue as needed for angina, may repeat q5mins for up three doses  Dispense: 25 tablet; Refill: 1    2. Essential hypertension  Assessment & Plan:  Blood pressure reasonably well-controlled.  Continue current doses of " amlodipine, hydrochlorothiazide and atenolol.  Counseled regarding low-sodium diet.    Orders:  -     Comprehensive Metabolic Panel; Future    3. Mixed hyperlipidemia  Assessment & Plan:   Previously well-controlled, will send for updated lipid profile from PCP.  Continue current dose pravastatin recheck fasting lipid profile before next routine follow-up visit.    Orders:  -     Lipid Panel; Future  -     Comprehensive Metabolic Panel; Future    4. Pedal edema  Assessment & Plan:  She has chronic longstanding pedal edema with multiple varicosities.  Previous echocardiogram showed preserved LV function.  Encouraged compression stockings, elevation when possible and continue current dose of HCTZ.                  Follow Up   Return in about 9 months (around 1/29/2025) for with Dr. Guadarrama.    Patient was given instructions and counseling regarding her condition or for health maintenance advice. Please see specific information pulled into the AVS if appropriate.     Signed,  Edith Will, APRN  04/29/2024     Dictated Utilizing Dragon Dictation: Please note that portions of this note were completed with a voice recognition program.  Part of this note may be an electronic transcription/translation of spoken language to printed text using the Dragon Dictation System.

## 2024-05-21 RX ORDER — AMLODIPINE BESYLATE 10 MG/1
10 TABLET ORAL DAILY
Qty: 90 TABLET | Refills: 3 | Status: SHIPPED | OUTPATIENT
Start: 2024-05-21

## 2024-05-24 ENCOUNTER — HOSPITAL ENCOUNTER (EMERGENCY)
Facility: HOSPITAL | Age: 74
Discharge: HOME | End: 2024-05-24
Attending: EMERGENCY MEDICINE
Payer: MEDICARE

## 2024-05-24 ENCOUNTER — APPOINTMENT (OUTPATIENT)
Dept: GENERAL RADIOLOGY | Facility: HOSPITAL | Age: 74
End: 2024-05-24
Payer: MEDICARE

## 2024-05-24 VITALS
TEMPERATURE: 98.5 F | BODY MASS INDEX: 32.28 KG/M2 | RESPIRATION RATE: 12 BRPM | HEIGHT: 66 IN | WEIGHT: 200.84 LBS | OXYGEN SATURATION: 93 % | HEART RATE: 84 BPM | SYSTOLIC BLOOD PRESSURE: 170 MMHG | DIASTOLIC BLOOD PRESSURE: 62 MMHG

## 2024-05-24 DIAGNOSIS — J98.01 ACUTE BRONCHOSPASM: Primary | ICD-10-CM

## 2024-05-24 LAB
ALBUMIN SERPL-MCNC: 4.3 G/DL (ref 3.5–5.2)
ALBUMIN/GLOB SERPL: 1.6 G/DL
ALP SERPL-CCNC: 83 U/L (ref 39–117)
ALT SERPL W P-5'-P-CCNC: 8 U/L (ref 1–33)
ANION GAP SERPL CALCULATED.3IONS-SCNC: 10.4 MMOL/L (ref 5–15)
AST SERPL-CCNC: 18 U/L (ref 1–32)
BASOPHILS # BLD AUTO: 0.02 10*3/MM3 (ref 0–0.2)
BASOPHILS NFR BLD AUTO: 0.3 % (ref 0–1.5)
BILIRUB SERPL-MCNC: 0.2 MG/DL (ref 0–1.2)
BUN SERPL-MCNC: 15 MG/DL (ref 8–23)
BUN/CREAT SERPL: 20 (ref 7–25)
CALCIUM SPEC-SCNC: 8.9 MG/DL (ref 8.6–10.5)
CHLORIDE SERPL-SCNC: 100 MMOL/L (ref 98–107)
CO2 SERPL-SCNC: 28.6 MMOL/L (ref 22–29)
CREAT SERPL-MCNC: 0.75 MG/DL (ref 0.57–1)
D-LACTATE SERPL-SCNC: 1.9 MMOL/L (ref 0.5–2)
DEPRECATED RDW RBC AUTO: 42.9 FL (ref 37–54)
EGFRCR SERPLBLD CKD-EPI 2021: 83.7 ML/MIN/1.73
EOSINOPHIL # BLD AUTO: 0.08 10*3/MM3 (ref 0–0.4)
EOSINOPHIL NFR BLD AUTO: 1.3 % (ref 0.3–6.2)
ERYTHROCYTE [DISTWIDTH] IN BLOOD BY AUTOMATED COUNT: 13.1 % (ref 12.3–15.4)
GLOBULIN UR ELPH-MCNC: 2.7 GM/DL
GLUCOSE SERPL-MCNC: 147 MG/DL (ref 65–99)
HCT VFR BLD AUTO: 34.9 % (ref 34–46.6)
HGB BLD-MCNC: 11.6 G/DL (ref 12–15.9)
HOLD SPECIMEN: NORMAL
HOLD SPECIMEN: NORMAL
IMM GRANULOCYTES # BLD AUTO: 0.03 10*3/MM3 (ref 0–0.05)
IMM GRANULOCYTES NFR BLD AUTO: 0.5 % (ref 0–0.5)
LYMPHOCYTES # BLD AUTO: 1.52 10*3/MM3 (ref 0.7–3.1)
LYMPHOCYTES NFR BLD AUTO: 24.7 % (ref 19.6–45.3)
MCH RBC QN AUTO: 30.1 PG (ref 26.6–33)
MCHC RBC AUTO-ENTMCNC: 33.2 G/DL (ref 31.5–35.7)
MCV RBC AUTO: 90.4 FL (ref 79–97)
MONOCYTES # BLD AUTO: 0.63 10*3/MM3 (ref 0.1–0.9)
MONOCYTES NFR BLD AUTO: 10.2 % (ref 5–12)
NEUTROPHILS NFR BLD AUTO: 3.88 10*3/MM3 (ref 1.7–7)
NEUTROPHILS NFR BLD AUTO: 63 % (ref 42.7–76)
NRBC BLD AUTO-RTO: 0 /100 WBC (ref 0–0.2)
NT-PROBNP SERPL-MCNC: 720.4 PG/ML (ref 0–900)
PLATELET # BLD AUTO: 248 10*3/MM3 (ref 140–450)
PMV BLD AUTO: 9.3 FL (ref 6–12)
POTASSIUM SERPL-SCNC: 3.6 MMOL/L (ref 3.5–5.2)
PROT SERPL-MCNC: 7 G/DL (ref 6–8.5)
RBC # BLD AUTO: 3.86 10*6/MM3 (ref 3.77–5.28)
SODIUM SERPL-SCNC: 139 MMOL/L (ref 136–145)
TROPONIN T SERPL HS-MCNC: 12 NG/L
WBC NRBC COR # BLD AUTO: 6.16 10*3/MM3 (ref 3.4–10.8)
WHOLE BLOOD HOLD COAG: NORMAL
WHOLE BLOOD HOLD SPECIMEN: NORMAL

## 2024-05-24 PROCEDURE — 99284 EMERGENCY DEPT VISIT MOD MDM: CPT

## 2024-05-24 PROCEDURE — 36415 COLL VENOUS BLD VENIPUNCTURE: CPT

## 2024-05-24 PROCEDURE — 83880 ASSAY OF NATRIURETIC PEPTIDE: CPT | Performed by: EMERGENCY MEDICINE

## 2024-05-24 PROCEDURE — 93010 ELECTROCARDIOGRAM REPORT: CPT | Performed by: INTERNAL MEDICINE

## 2024-05-24 PROCEDURE — 87040 BLOOD CULTURE FOR BACTERIA: CPT | Performed by: EMERGENCY MEDICINE

## 2024-05-24 PROCEDURE — 94799 UNLISTED PULMONARY SVC/PX: CPT

## 2024-05-24 PROCEDURE — 93005 ELECTROCARDIOGRAM TRACING: CPT | Performed by: EMERGENCY MEDICINE

## 2024-05-24 PROCEDURE — 94761 N-INVAS EAR/PLS OXIMETRY MLT: CPT

## 2024-05-24 PROCEDURE — 84484 ASSAY OF TROPONIN QUANT: CPT | Performed by: EMERGENCY MEDICINE

## 2024-05-24 PROCEDURE — 80053 COMPREHEN METABOLIC PANEL: CPT | Performed by: EMERGENCY MEDICINE

## 2024-05-24 PROCEDURE — 25010000002 DEXAMETHASONE SODIUM PHOSPHATE 10 MG/ML SOLUTION: Performed by: EMERGENCY MEDICINE

## 2024-05-24 PROCEDURE — 85025 COMPLETE CBC W/AUTO DIFF WBC: CPT | Performed by: EMERGENCY MEDICINE

## 2024-05-24 PROCEDURE — 83605 ASSAY OF LACTIC ACID: CPT | Performed by: EMERGENCY MEDICINE

## 2024-05-24 PROCEDURE — 96374 THER/PROPH/DIAG INJ IV PUSH: CPT

## 2024-05-24 PROCEDURE — 71045 X-RAY EXAM CHEST 1 VIEW: CPT

## 2024-05-24 PROCEDURE — 93005 ELECTROCARDIOGRAM TRACING: CPT

## 2024-05-24 PROCEDURE — 94640 AIRWAY INHALATION TREATMENT: CPT

## 2024-05-24 RX ORDER — DEXAMETHASONE SODIUM PHOSPHATE 10 MG/ML
10 INJECTION, SOLUTION INTRAMUSCULAR; INTRAVENOUS ONCE
Status: COMPLETED | OUTPATIENT
Start: 2024-05-24 | End: 2024-05-24

## 2024-05-24 RX ORDER — HYDROCODONE POLISTIREX AND CHLORPHENIRAMINE POLISTIREX 10; 8 MG/5ML; MG/5ML
5 SUSPENSION, EXTENDED RELEASE ORAL ONCE
Status: COMPLETED | OUTPATIENT
Start: 2024-05-24 | End: 2024-05-24

## 2024-05-24 RX ORDER — IPRATROPIUM BROMIDE AND ALBUTEROL SULFATE 2.5; .5 MG/3ML; MG/3ML
3 SOLUTION RESPIRATORY (INHALATION) ONCE
Status: COMPLETED | OUTPATIENT
Start: 2024-05-24 | End: 2024-05-24

## 2024-05-24 RX ORDER — HYDROCODONE POLISTIREX AND CHLORPHENIRAMINE POLISTIREX 10; 8 MG/5ML; MG/5ML
5 SUSPENSION, EXTENDED RELEASE ORAL EVERY 12 HOURS PRN
Qty: 100 ML | Refills: 0 | Status: SHIPPED | OUTPATIENT
Start: 2024-05-24

## 2024-05-24 RX ORDER — PREDNISONE 20 MG/1
40 TABLET ORAL DAILY
Qty: 10 TABLET | Refills: 0 | Status: SHIPPED | OUTPATIENT
Start: 2024-05-24 | End: 2024-05-29

## 2024-05-24 RX ORDER — LIDOCAINE HYDROCHLORIDE 10 MG/ML
5 INJECTION, SOLUTION EPIDURAL; INFILTRATION; INTRACAUDAL; PERINEURAL ONCE
Status: COMPLETED | OUTPATIENT
Start: 2024-05-24 | End: 2024-05-24

## 2024-05-24 RX ORDER — SODIUM CHLORIDE 0.9 % (FLUSH) 0.9 %
10 SYRINGE (ML) INJECTION AS NEEDED
Status: DISCONTINUED | OUTPATIENT
Start: 2024-05-24 | End: 2024-05-24 | Stop reason: HOSPADM

## 2024-05-24 RX ADMIN — HYDROCODONE POLISTIREX AND CHLORPHENIRAMINE POLISTIREX 5 ML: 10; 8 SUSPENSION, EXTENDED RELEASE ORAL at 17:44

## 2024-05-24 RX ADMIN — LIDOCAINE HYDROCHLORIDE 5 ML: 10 INJECTION, SOLUTION EPIDURAL; INFILTRATION; INTRACAUDAL; PERINEURAL at 17:14

## 2024-05-24 RX ADMIN — IPRATROPIUM BROMIDE AND ALBUTEROL SULFATE 3 ML: .5; 3 SOLUTION RESPIRATORY (INHALATION) at 16:17

## 2024-05-24 RX ADMIN — DEXAMETHASONE SODIUM PHOSPHATE 10 MG: 10 INJECTION INTRAMUSCULAR; INTRAVENOUS at 16:05

## 2024-05-24 NOTE — DISCHARGE INSTRUCTIONS
Your chest x-ray look normal and all of your blood work and EKG look normal.    Nothing serious or life-threatening was found today.    You may be having some bronchospasm or trouble breathing and coughing and wheezing secondary to some acid reflux that occurred last night in bed.    No obvious objects were found to be obstructing in your throat but you may feel better with some cough medicine and doing some steroids this week like prednisone.    Please make sure you check your blood sugars this week as they may be more elevated than usual.    Return if anything gets worse.

## 2024-05-24 NOTE — ED PROVIDER NOTES
Time: 4:11 PM EDT  Date of encounter:  5/24/2024  Independent Historian/Clinical History and Information was obtained by:   Patient    History is limited by: N/A    Chief Complaint: Cough, shortness of breath, wheezing, something stuck in throat      History of Present Illness:  Patient is a 73 y.o. year old diabetic female who presents to the emergency department for evaluation of something called in her throat or tickling her throat when she awoke this morning but went to bed feeling fine.    She has been having a dry cough and wheezing and short of breath ever since.    She has been outside lately and also has a history of seasonal allergies and asthma.    Has required corticosteroids in the past.    HPI    Patient Care Team  Primary Care Provider: Lester Lizarraga MD    Past Medical History:     No Known Allergies  Past Medical History:   Diagnosis Date    Arthritis     Asthma     Atherosclerosis of coronary artery 2018    Non-obstructive coronary artery disease. Cardiac catheterization done in October 2018 showed a 40% mid LAD lesion and a 40% proximal RCA lesion.    Cancer     Breast.     Diabetes mellitus     Diverticulitis     Hiatal hernia     Hyperlipemia 01/21/2022    Hypertension, essential 03/05/2022     Past Surgical History:   Procedure Laterality Date    COLONOSCOPY      COLONOSCOPY N/A 11/14/2023    Procedure: COLONOSCOPY WITH POLYPECTOMIES HOT/COLD SNARE, ELEVIEW INJECTION, BIOPSIES;  Surgeon: Lester Gillette MD;  Location: [facility] ENDOSCOPY;  Service: Gastroenterology;  Laterality: N/A;  COLON POLYPS, DIVERTICULOSIS    ENDOSCOPY N/A 11/14/2023    Procedure: ESOPHAGOGASTRODUODENOSCOPY WITH BIOPSIES;  Surgeon: Lester Gillette MD;  Location: [facility] ENDOSCOPY;  Service: Gastroenterology;  Laterality: N/A;  PREVIOUS SURGERY    HERNIA REPAIR      HYSTERECTOMY      MASTECTOMY Bilateral     UPPER GASTROINTESTINAL ENDOSCOPY       Family History   Problem Relation Age of Onset    Colon cancer  Neg Hx     Malig Hyperthermia Neg Hx        Home Medications:  Prior to Admission medications    Medication Sig Start Date End Date Taking? Authorizing Provider   albuterol sulfate  (90 Base) MCG/ACT inhaler Inhale 2 puffs Every 4 (Four) Hours As Needed for Wheezing. 6/24/22   Rohan Ring DO   amLODIPine (NORVASC) 10 MG tablet TAKE 1 TABLET BY MOUTH EVERY DAY 5/21/24   Umesh Guadarrama MD   Arthritis Pain Reliever 650 MG 8 hr tablet Take 1 tablet by mouth Every 8 (Eight) Hours As Needed. 3/2/22   Emergency, Nurse Epic, RN   aspirin 81 MG EC tablet Take 1 tablet by mouth Daily.    Jayesh Mckinley MD   atenolol (TENORMIN) 50 MG tablet TAKE 1 TABLET EVERY DAY 9/8/23   Umesh Guadarrama MD   baclofen (LIORESAL) 10 MG tablet Every 12 (Twelve) Hours.    Jayesh Mckinley MD   gabapentin (NEURONTIN) 300 MG capsule Take 1 capsule by mouth 3 (Three) Times a Day.    Jayesh Mckinley MD   hydroCHLOROthiazide (HYDRODIURIL) 25 MG tablet TAKE 1 TABLET BY MOUTH EVERY DAY 12/26/23   Umesh Guadarrama MD   Jardiance 10 MG tablet tablet Take 1 tablet by mouth Daily. 4/3/24   Jayesh Mckinley MD   nitroglycerin (NITROSTAT) 0.4 MG SL tablet 1 under the tongue as needed for angina, may repeat q5mins for up three doses 4/29/24   Edith Will APRN   pravastatin (PRAVACHOL) 80 MG tablet TAKE 1 TABLET BY MOUTH DAILY 1/23/24   Edith Will APRN   sertraline (ZOLOFT) 100 MG tablet Take 1 tablet by mouth Daily. 1.5 tabs qd    Jayesh Mckinley MD   vitamin E 1000 UNIT capsule Take 1 capsule by mouth Daily.    Jayesh Mckinley MD        Social History:   Social History     Tobacco Use    Smoking status: Never     Passive exposure: Never    Smokeless tobacco: Never   Vaping Use    Vaping status: Never Used   Substance Use Topics    Alcohol use: Never    Drug use: Never         Review of Systems:  Review of Systems   I performed a 10 point review of systems which was all negative, except for the  "positives found in the HPI above.  Physical Exam:  /62   Pulse 84   Temp 98.5 °F (36.9 °C)   Resp 12   Ht 167.6 cm (66\")   Wt 91.1 kg (200 lb 13.4 oz)   SpO2 93%   BMI 32.42 kg/m²     Physical Exam   General: Awake alert and in mild distress, coughing    HEENT: Head normocephalic atraumatic, eyes PERRLA EOMI, nose normal, oropharynx normal.    Neck: Supple full range of motion, no meningismus, no lymphadenopathy    Heart: Regular rate and rhythm, no murmurs or rubs, 2+ radial pulses bilaterally    Lungs: Mild wheezing and coughing, no respiratory distress    Abdomen: Soft, nontender, nondistended, no rebound or guarding    Skin: Warm, dry, no rash    Musculoskeletal: Normal range of motion, mild bilateral lower extremity edema    Neurologic: Oriented x3, no motor deficits no sensory deficits    Psychiatric: Mood appears stable, no psychosis          Procedures:  Procedures      Medical Decision Making:      Comorbidities that affect care:    Asthma    External Notes reviewed:    None      The following orders were placed and all results were independently analyzed by me:  Orders Placed This Encounter   Procedures    Blood Culture - Blood,    Blood Culture - Blood,    XR Chest 1 View    [place] Draw    Comprehensive Metabolic Panel    BNP    Single High Sensitivity Troponin T    CBC Auto Differential    Lactic Acid, Plasma    Undress & Gown    Continuous Pulse Oximetry    Vital Signs    ECG 12 Lead ED Triage Standing Order; SOA    CBC & Differential    Green Top (Gel)    Lavender Top    Gold Top - SST    Light Blue Top       Medications Given in the Emergency Department:  Medications   dexAMETHasone sodium phosphate injection 10 mg (10 mg Intravenous Given 5/24/24 1605)   ipratropium-albuterol (DUO-NEB) nebulizer solution 3 mL (3 mL Nebulization Given 5/24/24 1617)   lidocaine PF 1% (XYLOCAINE) injection 5 mL (5 mL Nebulization Given 5/24/24 1714)   Hydrocod Jac-Chlorphe Jac ER (TUSSIONEX PENNKINETIC) " 10-8 MG/5ML ER suspension 5 mL (5 mL Oral Given 5/24/24 6904)        ED Course:    ED Course as of 05/24/24 2235   Fri May 24, 2024   1346 EKG: Interpreted her twelve-lead EKG is normal sinus rhythm at 70 bpm, normal P waves, normal QRS, normal ST segments and T waves; no acute ischemia or ectopy. [VS]      ED Course User Index  [VS] Kwasi Chavez MD       Labs:    Lab Results (last 24 hours)       Procedure Component Value Units Date/Time    CBC & Differential [[id number]]  (Abnormal) Collected: 05/24/24 1356    Specimen: Blood Updated: 05/24/24 1404    Narrative:      The following orders were created for panel order CBC & Differential.  Procedure                               Abnormality         Status                     ---------                               -----------         ------                     CBC Auto Differential[[id number]]        Abnormal            Final result                 Please view results for these tests on the individual orders.    Comprehensive Metabolic Panel [[id number]]  (Abnormal) Collected: 05/24/24 1356    Specimen: Blood Updated: 05/24/24 1427     Glucose 147 mg/dL      BUN 15 mg/dL      Creatinine 0.75 mg/dL      Sodium 139 mmol/L      Potassium 3.6 mmol/L      Chloride 100 mmol/L      CO2 28.6 mmol/L      Calcium 8.9 mg/dL      Total Protein 7.0 g/dL      Albumin 4.3 g/dL      ALT (SGPT) 8 U/L      AST (SGOT) 18 U/L      Alkaline Phosphatase 83 U/L      Total Bilirubin 0.2 mg/dL      Globulin 2.7 gm/dL      A/G Ratio 1.6 g/dL      BUN/Creatinine Ratio 20.0     Anion Gap 10.4 mmol/L      eGFR 83.7 mL/min/1.73     Narrative:      GFR Normal >60  Chronic Kidney Disease <60  Kidney Failure <15    The GFR formula is only valid for adults with stable renal function between ages 18 and 70.    BNP [[id number]]  (Normal) Collected: 05/24/24 1356    Specimen: Blood Updated: 05/24/24 1423     proBNP 720.4 pg/mL     Narrative:      This assay is used as an aid in the diagnosis of  individuals suspected of having heart failure. It can be used as an aid in the diagnosis of acute decompensated heart failure (ADHF) in patients presenting with signs and symptoms of ADHF to the emergency department (ED). In addition, NT-proBNP of <300 pg/mL indicates ADHF is not likely.    Age Range Result Interpretation  NT-proBNP Concentration (pg/mL:      <50             Positive            >450                   Gray                 300-450                    Negative             <300    50-75           Positive            >900                  Gray                300-900                  Negative            <300      >75             Positive            >1800                  Gray                300-1800                  Negative            <300    Single High Sensitivity Troponin T [[id number]]  (Normal) Collected: 05/24/24 1356    Specimen: Blood Updated: 05/24/24 1427     HS Troponin T 12 ng/L     Narrative:      High Sensitive Troponin T Reference Range:  <14.0 ng/L- Negative Female for AMI  <22.0 ng/L- Negative Male for AMI  >=14 - Abnormal Female indicating possible myocardial injury.  >=22 - Abnormal Male indicating possible myocardial injury.   Clinicians would have to utilize clinical acumen, EKG, Troponin, and serial changes to determine if it is an Acute Myocardial Infarction or myocardial injury due to an underlying chronic condition.         CBC Auto Differential [[id number]]  (Abnormal) Collected: 05/24/24 1356    Specimen: Blood Updated: 05/24/24 1404     WBC 6.16 10*3/mm3      RBC 3.86 10*6/mm3      Hemoglobin 11.6 g/dL      Hematocrit 34.9 %      MCV 90.4 fL      MCH 30.1 pg      MCHC 33.2 g/dL      RDW 13.1 %      RDW-SD 42.9 fl      MPV 9.3 fL      Platelets 248 10*3/mm3      Neutrophil % 63.0 %      Lymphocyte % 24.7 %      Monocyte % 10.2 %      Eosinophil % 1.3 %      Basophil % 0.3 %      Immature Grans % 0.5 %      Neutrophils, Absolute 3.88 10*3/mm3      Lymphocytes, Absolute 1.52  10*3/mm3      Monocytes, Absolute 0.63 10*3/mm3      Eosinophils, Absolute 0.08 10*3/mm3      Basophils, Absolute 0.02 10*3/mm3      Immature Grans, Absolute 0.03 10*3/mm3      nRBC 0.0 /100 WBC     Blood Culture - Blood, Arm, Left [[id number]] Collected: 05/24/24 1356    Specimen: Blood from Arm, Left Updated: 05/24/24 1401    Lactic Acid, Plasma [[id number]]  (Normal) Collected: 05/24/24 1356    Specimen: Blood Updated: 05/24/24 1425     Lactate 1.9 mmol/L     Blood Culture - Blood, Arm, Left [[id number]] Collected: 05/24/24 1413    Specimen: Blood from Arm, Left Updated: 05/24/24 1416             Imaging:    XR Chest 1 View    Result Date: 5/24/2024  XR CHEST 1 VW-  Date of Exam: 5/24/2024 2:01 PM  Indication: SOA Triage Protocol  Comparison: Chest radiograph 2/21/2024  Findings: Heart size borderline/mildly enlarged similar to prior. Negative for lobar consolidation, edema, large effusion or pneumothorax. Minimal apical capping. Surgical changes on the left similar to prior comparison. Degenerative related osseous changes.      Impression: No active pulmonary process.   Electronically Signed By-Umesh Worrell MD On:5/24/2024 2:04 PM         Differential Diagnosis and Discussion:    Cough: Differential diagnosis includes but is not limited to pneumonia, acute bronchitis, upper respiratory infection, ACE inhibitor use, allergic reaction, epiglottitis, seasonal allergies, chemical irritants, exercise-induced asthma, viral syndrome.    All labs were reviewed and interpreted by me.  All X-rays impressions were independently interpreted by me.  EKG was interpreted by me.    MDM     Amount and/or Complexity of Data Reviewed  Clinical lab tests: reviewed  Tests in the radiology section of CPT®: reviewed  Tests in the medicine section of CPT®: reviewed           This patient is a pleasant 73-year-old female with history of asthma and seasonal allergies presenting with dry cough, some wheezing and shortness of breath and  feeling that something is in her throat or making her cough since she woke up this morning.    She denies choking on any food or pills last night and felt normal before bed and awoke with new cough and wheezing.    I am giving her IV steroids here and breathing treatment.    All of her workup today including lab work, EKG, chest x-ray look normal.    We are going to try a nebulized lidocaine breathing treatment here to see if it helps.    I did make her aware that she needs to monitor her blood sugars closely this week delay will be temporarily elevated above baseline given the corticosteroids for asthma attack and bronchospasm, as she is diabetic.            Patient Care Considerations:          Consultants/Shared Management Plan:        Social Determinants of Health:    Patient is independent, reliable, and has access to care.       Disposition and Care Coordination:    Discharged: The patient is suitable and stable for discharge with no need for consideration of admission.    I have explained the patient´s condition, diagnoses and treatment plan based on the information available to me at this time. I have answered questions and addressed any concerns. The patient has a good  understanding of the patient´s diagnosis, condition, and treatment plan as can be expected at this point. The vital signs have been stable. The patient´s condition is stable and appropriate for discharge from the emergency department.      The patient will pursue further outpatient evaluation with the primary care physician or other designated or consulting physician as outlined in the discharge instructions. They are agreeable to this plan of care and follow-up instructions have been explained in detail. The patient has received these instructions in written format and has expressed an understanding of the discharge instructions. The patient is aware that any significant change in condition or worsening of symptoms should prompt an  immediate return to this or the closest emergency department or call to 911.  I have explained discharge medications and the need for follow up with the patient/caretakers. This was also printed in the discharge instructions. Patient was discharged with the following medications and follow up:      Medication List        New Prescriptions      Hydrocod Jac-Chlorphe Jac ER 10-8 MG/5ML ER suspension  Commonly known as: TUSSIONEX PENNKINETIC  Take 5 mL by mouth Every 12 (Twelve) Hours As Needed for Cough or Allergies.     predniSONE 20 MG tablet  Commonly known as: DELTASONE  Take 2 tablets by mouth Daily for 5 days.               Where to Get Your Medications        [id number] [place] [facility] [address] AT [facility] - [phone]  - [phone] FX  [address] [place] [address]      Phone: [phone]   Hydrocod Jac-Chlorphe Jac ER 10-8 MG/5ML ER suspension  predniSONE 20 MG tablet      Lester Lizarraga MD  [address] [place] [address]  [phone]    Call in 2 days  As needed, for a follow-up appointment    Wayne Chambers MD  [address] [facility] [address]  [place] [address]  [phone]    Call in 2 days  As needed, If symptoms worsen, for a follow-up appointment       Final diagnoses:   Acute bronchospasm        ED Disposition       ED Disposition   Discharge    Condition   Stable    Comment   --               This medical record created using voice recognition software.             Kwasi Chavez MD  05/24/24 0493

## 2024-05-25 LAB
QT INTERVAL: 400 MS
QTC INTERVAL: 455 MS

## 2024-05-29 LAB
BACTERIA SPEC AEROBE CULT: NORMAL
BACTERIA SPEC AEROBE CULT: NORMAL

## 2024-06-06 ENCOUNTER — TRANSCRIBE ORDERS (OUTPATIENT)
Dept: ADMINISTRATIVE | Facility: HOSPITAL | Age: 74
End: 2024-06-06
Payer: MEDICARE

## 2024-06-06 DIAGNOSIS — R13.12 OROPHARYNGEAL DYSPHAGIA: Primary | ICD-10-CM

## 2024-06-07 ENCOUNTER — APPOINTMENT (OUTPATIENT)
Dept: GENERAL RADIOLOGY | Facility: HOSPITAL | Age: 74
DRG: 309 | End: 2024-06-07
Payer: MEDICARE

## 2024-06-07 ENCOUNTER — HOSPITAL ENCOUNTER (INPATIENT)
Facility: HOSPITAL | Age: 74
LOS: 1 days | Discharge: HOME | DRG: 309 | End: 2024-06-08
Attending: EMERGENCY MEDICINE | Admitting: INTERNAL MEDICINE
Payer: MEDICARE

## 2024-06-07 DIAGNOSIS — I50.9 ACUTE ON CHRONIC CONGESTIVE HEART FAILURE, UNSPECIFIED HEART FAILURE TYPE: ICD-10-CM

## 2024-06-07 DIAGNOSIS — I48.91 ATRIAL FIBRILLATION WITH RVR: Primary | ICD-10-CM

## 2024-06-07 PROBLEM — E87.6 HYPOKALEMIA: Status: ACTIVE | Noted: 2024-06-07

## 2024-06-07 LAB
ALBUMIN SERPL-MCNC: 4.2 G/DL (ref 3.5–5.2)
ALBUMIN/GLOB SERPL: 1.7 G/DL
ALP SERPL-CCNC: 105 U/L (ref 39–117)
ALT SERPL W P-5'-P-CCNC: 15 U/L (ref 1–33)
ANION GAP SERPL CALCULATED.3IONS-SCNC: 16.9 MMOL/L (ref 5–15)
APTT PPP: 29.8 SECONDS (ref 24.2–34.2)
AST SERPL-CCNC: 18 U/L (ref 1–32)
BASOPHILS # BLD AUTO: 0.04 10*3/MM3 (ref 0–0.2)
BASOPHILS NFR BLD AUTO: 0.4 % (ref 0–1.5)
BILIRUB SERPL-MCNC: 0.2 MG/DL (ref 0–1.2)
BUN SERPL-MCNC: 19 MG/DL (ref 8–23)
BUN/CREAT SERPL: 19.8 (ref 7–25)
CALCIUM SPEC-SCNC: 9.2 MG/DL (ref 8.6–10.5)
CHLORIDE SERPL-SCNC: 101 MMOL/L (ref 98–107)
CO2 SERPL-SCNC: 22.1 MMOL/L (ref 22–29)
CREAT SERPL-MCNC: 0.96 MG/DL (ref 0.57–1)
DEPRECATED RDW RBC AUTO: 45.7 FL (ref 37–54)
EGFRCR SERPLBLD CKD-EPI 2021: 62.2 ML/MIN/1.73
EOSINOPHIL # BLD AUTO: 0.06 10*3/MM3 (ref 0–0.4)
EOSINOPHIL NFR BLD AUTO: 0.6 % (ref 0.3–6.2)
ERYTHROCYTE [DISTWIDTH] IN BLOOD BY AUTOMATED COUNT: 13.5 % (ref 12.3–15.4)
GEN 5 2HR TROPONIN T REFLEX: 36 NG/L
GLOBULIN UR ELPH-MCNC: 2.5 GM/DL
GLUCOSE BLDC GLUCOMTR-MCNC: 153 MG/DL (ref 70–99)
GLUCOSE BLDC GLUCOMTR-MCNC: 189 MG/DL (ref 70–99)
GLUCOSE BLDC GLUCOMTR-MCNC: 209 MG/DL (ref 70–99)
GLUCOSE SERPL-MCNC: 214 MG/DL (ref 65–99)
HCT VFR BLD AUTO: 36.6 % (ref 34–46.6)
HGB BLD-MCNC: 12 G/DL (ref 12–15.9)
HOLD SPECIMEN: NORMAL
HOLD SPECIMEN: NORMAL
IMM GRANULOCYTES # BLD AUTO: 0.05 10*3/MM3 (ref 0–0.05)
IMM GRANULOCYTES NFR BLD AUTO: 0.5 % (ref 0–0.5)
INR PPP: 0.99 (ref 0.86–1.15)
LYMPHOCYTES # BLD AUTO: 2.7 10*3/MM3 (ref 0.7–3.1)
LYMPHOCYTES NFR BLD AUTO: 28.1 % (ref 19.6–45.3)
MAGNESIUM SERPL-MCNC: 1.6 MG/DL (ref 1.6–2.4)
MCH RBC QN AUTO: 30.1 PG (ref 26.6–33)
MCHC RBC AUTO-ENTMCNC: 32.8 G/DL (ref 31.5–35.7)
MCV RBC AUTO: 91.7 FL (ref 79–97)
MONOCYTES # BLD AUTO: 0.98 10*3/MM3 (ref 0.1–0.9)
MONOCYTES NFR BLD AUTO: 10.2 % (ref 5–12)
NEUTROPHILS NFR BLD AUTO: 5.77 10*3/MM3 (ref 1.7–7)
NEUTROPHILS NFR BLD AUTO: 60.2 % (ref 42.7–76)
NRBC BLD AUTO-RTO: 0 /100 WBC (ref 0–0.2)
NT-PROBNP SERPL-MCNC: 2509 PG/ML (ref 0–900)
PLATELET # BLD AUTO: 265 10*3/MM3 (ref 140–450)
PMV BLD AUTO: 10.3 FL (ref 6–12)
POTASSIUM SERPL-SCNC: 3 MMOL/L (ref 3.5–5.2)
PROT SERPL-MCNC: 6.7 G/DL (ref 6–8.5)
PROTHROMBIN TIME: 13.3 SECONDS (ref 11.8–14.9)
RBC # BLD AUTO: 3.99 10*6/MM3 (ref 3.77–5.28)
SODIUM SERPL-SCNC: 140 MMOL/L (ref 136–145)
TROPONIN T DELTA: 17 NG/L
TROPONIN T SERPL HS-MCNC: 19 NG/L
TROPONIN T SERPL HS-MCNC: 25 NG/L
TSH SERPL DL<=0.05 MIU/L-ACNC: 2.28 UIU/ML (ref 0.27–4.2)
WBC NRBC COR # BLD AUTO: 9.6 10*3/MM3 (ref 3.4–10.8)
WHOLE BLOOD HOLD COAG: NORMAL
WHOLE BLOOD HOLD SPECIMEN: NORMAL

## 2024-06-07 PROCEDURE — 83880 ASSAY OF NATRIURETIC PEPTIDE: CPT | Performed by: EMERGENCY MEDICINE

## 2024-06-07 PROCEDURE — 25010000002 POTASSIUM CHLORIDE 10 MEQ/100ML SOLUTION: Performed by: EMERGENCY MEDICINE

## 2024-06-07 PROCEDURE — 85025 COMPLETE CBC W/AUTO DIFF WBC: CPT | Performed by: EMERGENCY MEDICINE

## 2024-06-07 PROCEDURE — 25010000002 ENOXAPARIN PER 10 MG: Performed by: INTERNAL MEDICINE

## 2024-06-07 PROCEDURE — 83735 ASSAY OF MAGNESIUM: CPT | Performed by: EMERGENCY MEDICINE

## 2024-06-07 PROCEDURE — 82948 REAGENT STRIP/BLOOD GLUCOSE: CPT

## 2024-06-07 PROCEDURE — 93005 ELECTROCARDIOGRAM TRACING: CPT | Performed by: EMERGENCY MEDICINE

## 2024-06-07 PROCEDURE — 99285 EMERGENCY DEPT VISIT HI MDM: CPT

## 2024-06-07 PROCEDURE — 84484 ASSAY OF TROPONIN QUANT: CPT | Performed by: INTERNAL MEDICINE

## 2024-06-07 PROCEDURE — 84484 ASSAY OF TROPONIN QUANT: CPT | Performed by: EMERGENCY MEDICINE

## 2024-06-07 PROCEDURE — 85730 THROMBOPLASTIN TIME PARTIAL: CPT | Performed by: EMERGENCY MEDICINE

## 2024-06-07 PROCEDURE — 80053 COMPREHEN METABOLIC PANEL: CPT | Performed by: EMERGENCY MEDICINE

## 2024-06-07 PROCEDURE — 25010000002 CEFTRIAXONE PER 250 MG: Performed by: INTERNAL MEDICINE

## 2024-06-07 PROCEDURE — 71045 X-RAY EXAM CHEST 1 VIEW: CPT

## 2024-06-07 PROCEDURE — 93010 ELECTROCARDIOGRAM REPORT: CPT | Performed by: INTERNAL MEDICINE

## 2024-06-07 PROCEDURE — 36415 COLL VENOUS BLD VENIPUNCTURE: CPT

## 2024-06-07 PROCEDURE — 85610 PROTHROMBIN TIME: CPT | Performed by: EMERGENCY MEDICINE

## 2024-06-07 PROCEDURE — 84443 ASSAY THYROID STIM HORMONE: CPT | Performed by: EMERGENCY MEDICINE

## 2024-06-07 PROCEDURE — 99291 CRITICAL CARE FIRST HOUR: CPT

## 2024-06-07 RX ORDER — ONDANSETRON 2 MG/ML
4 INJECTION INTRAMUSCULAR; INTRAVENOUS EVERY 6 HOURS PRN
Status: DISCONTINUED | OUTPATIENT
Start: 2024-06-07 | End: 2024-06-08 | Stop reason: HOSPADM

## 2024-06-07 RX ORDER — ATENOLOL 50 MG/1
100 TABLET ORAL
Status: DISCONTINUED | OUTPATIENT
Start: 2024-06-07 | End: 2024-06-08 | Stop reason: HOSPADM

## 2024-06-07 RX ORDER — BENZONATATE 100 MG/1
100 CAPSULE ORAL 3 TIMES DAILY PRN
COMMUNITY
Start: 2024-06-04

## 2024-06-07 RX ORDER — ALUMINA, MAGNESIA, AND SIMETHICONE 2400; 2400; 240 MG/30ML; MG/30ML; MG/30ML
15 SUSPENSION ORAL EVERY 6 HOURS PRN
Status: DISCONTINUED | OUTPATIENT
Start: 2024-06-07 | End: 2024-06-08 | Stop reason: HOSPADM

## 2024-06-07 RX ORDER — GLIPIZIDE 5 MG/1
5 TABLET ORAL DAILY
COMMUNITY
Start: 2024-05-30

## 2024-06-07 RX ORDER — NITROGLYCERIN 0.4 MG/1
0.4 TABLET SUBLINGUAL
Status: ON HOLD | COMMUNITY
End: 2024-06-07 | Stop reason: SDUPTHER

## 2024-06-07 RX ORDER — BROMPHENIRAMINE MALEATE, PSEUDOEPHEDRINE HYDROCHLORIDE, AND DEXTROMETHORPHAN HYDROBROMIDE 2; 30; 10 MG/5ML; MG/5ML; MG/5ML
10 SYRUP ORAL 4 TIMES DAILY PRN
COMMUNITY
Start: 2024-05-30

## 2024-06-07 RX ORDER — MIRTAZAPINE 15 MG/1
15 TABLET, FILM COATED ORAL NIGHTLY
COMMUNITY

## 2024-06-07 RX ORDER — ACETAMINOPHEN 650 MG/1
650 SUPPOSITORY RECTAL EVERY 4 HOURS PRN
Status: DISCONTINUED | OUTPATIENT
Start: 2024-06-07 | End: 2024-06-08 | Stop reason: HOSPADM

## 2024-06-07 RX ORDER — ACETAMINOPHEN 160 MG/5ML
650 SOLUTION ORAL EVERY 4 HOURS PRN
Status: DISCONTINUED | OUTPATIENT
Start: 2024-06-07 | End: 2024-06-08 | Stop reason: HOSPADM

## 2024-06-07 RX ORDER — DICYCLOMINE HCL 20 MG
20 TABLET ORAL EVERY 6 HOURS PRN
COMMUNITY
Start: 2024-05-15

## 2024-06-07 RX ORDER — DILTIAZEM HYDROCHLORIDE 180 MG/1
180 CAPSULE, COATED, EXTENDED RELEASE ORAL
Status: DISCONTINUED | OUTPATIENT
Start: 2024-06-07 | End: 2024-06-08 | Stop reason: HOSPADM

## 2024-06-07 RX ORDER — CODEINE PHOSPHATE/GUAIFENESIN 10-100MG/5
5 LIQUID (ML) ORAL EVERY 8 HOURS PRN
Status: DISCONTINUED | OUTPATIENT
Start: 2024-06-07 | End: 2024-06-08 | Stop reason: HOSPADM

## 2024-06-07 RX ORDER — DILTIAZEM HYDROCHLORIDE 5 MG/ML
10 INJECTION INTRAVENOUS ONCE
Status: COMPLETED | OUTPATIENT
Start: 2024-06-07 | End: 2024-06-07

## 2024-06-07 RX ORDER — TRIAMTERENE AND HYDROCHLOROTHIAZIDE 37.5; 25 MG/1; MG/1
1 TABLET ORAL DAILY
Status: DISCONTINUED | OUTPATIENT
Start: 2024-06-07 | End: 2024-06-08 | Stop reason: HOSPADM

## 2024-06-07 RX ORDER — NALOXONE HCL 0.4 MG/ML
0.4 VIAL (ML) INJECTION
Status: DISCONTINUED | OUTPATIENT
Start: 2024-06-07 | End: 2024-06-08 | Stop reason: HOSPADM

## 2024-06-07 RX ORDER — BISACODYL 10 MG
10 SUPPOSITORY, RECTAL RECTAL DAILY PRN
Status: DISCONTINUED | OUTPATIENT
Start: 2024-06-07 | End: 2024-06-08 | Stop reason: HOSPADM

## 2024-06-07 RX ORDER — HYDROCODONE BITARTRATE AND ACETAMINOPHEN 10; 325 MG/1; MG/1
1 TABLET ORAL EVERY 4 HOURS PRN
Status: DISCONTINUED | OUTPATIENT
Start: 2024-06-07 | End: 2024-06-08 | Stop reason: HOSPADM

## 2024-06-07 RX ORDER — POTASSIUM CHLORIDE 750 MG/1
40 CAPSULE, EXTENDED RELEASE ORAL
Status: COMPLETED | OUTPATIENT
Start: 2024-06-07 | End: 2024-06-08

## 2024-06-07 RX ORDER — ONDANSETRON 4 MG/1
4 TABLET, FILM COATED ORAL EVERY 6 HOURS PRN
COMMUNITY

## 2024-06-07 RX ORDER — DILTIAZEM HCL IN NACL,ISO-OSM 125 MG/125
5-15 PLASTIC BAG, INJECTION (ML) INTRAVENOUS
Status: DISCONTINUED | OUTPATIENT
Start: 2024-06-07 | End: 2024-06-08 | Stop reason: HOSPADM

## 2024-06-07 RX ORDER — ALPRAZOLAM 0.25 MG/1
0.5 TABLET ORAL EVERY 8 HOURS PRN
Status: DISCONTINUED | OUTPATIENT
Start: 2024-06-07 | End: 2024-06-08 | Stop reason: HOSPADM

## 2024-06-07 RX ORDER — AMOXICILLIN 250 MG
2 CAPSULE ORAL 2 TIMES DAILY PRN
Status: DISCONTINUED | OUTPATIENT
Start: 2024-06-07 | End: 2024-06-08 | Stop reason: HOSPADM

## 2024-06-07 RX ORDER — PANTOPRAZOLE SODIUM 40 MG/1
1 TABLET, DELAYED RELEASE ORAL DAILY
COMMUNITY

## 2024-06-07 RX ORDER — HYDROCODONE BITARTRATE AND ACETAMINOPHEN 5; 325 MG/1; MG/1
1 TABLET ORAL EVERY 4 HOURS PRN
Status: DISCONTINUED | OUTPATIENT
Start: 2024-06-07 | End: 2024-06-08 | Stop reason: HOSPADM

## 2024-06-07 RX ORDER — NITROGLYCERIN 0.4 MG/1
0.4 TABLET SUBLINGUAL
Status: DISCONTINUED | OUTPATIENT
Start: 2024-06-07 | End: 2024-06-08 | Stop reason: HOSPADM

## 2024-06-07 RX ORDER — FAMOTIDINE 20 MG/1
40 TABLET, FILM COATED ORAL DAILY
Status: DISCONTINUED | OUTPATIENT
Start: 2024-06-07 | End: 2024-06-08 | Stop reason: HOSPADM

## 2024-06-07 RX ORDER — ENOXAPARIN SODIUM 100 MG/ML
40 INJECTION SUBCUTANEOUS DAILY
Status: DISCONTINUED | OUTPATIENT
Start: 2024-06-07 | End: 2024-06-08 | Stop reason: HOSPADM

## 2024-06-07 RX ORDER — POTASSIUM CHLORIDE 7.45 MG/ML
10 INJECTION INTRAVENOUS ONCE
Status: COMPLETED | OUTPATIENT
Start: 2024-06-07 | End: 2024-06-07

## 2024-06-07 RX ORDER — BISACODYL 5 MG/1
5 TABLET, DELAYED RELEASE ORAL DAILY PRN
Status: DISCONTINUED | OUTPATIENT
Start: 2024-06-07 | End: 2024-06-08 | Stop reason: HOSPADM

## 2024-06-07 RX ORDER — POLYETHYLENE GLYCOL 3350 17 G/17G
17 POWDER, FOR SOLUTION ORAL DAILY PRN
Status: DISCONTINUED | OUTPATIENT
Start: 2024-06-07 | End: 2024-06-08 | Stop reason: HOSPADM

## 2024-06-07 RX ORDER — ACETAMINOPHEN 325 MG/1
650 TABLET ORAL EVERY 4 HOURS PRN
Status: DISCONTINUED | OUTPATIENT
Start: 2024-06-07 | End: 2024-06-08 | Stop reason: HOSPADM

## 2024-06-07 RX ADMIN — POTASSIUM CHLORIDE 40 MEQ: 750 CAPSULE, EXTENDED RELEASE ORAL at 17:18

## 2024-06-07 RX ADMIN — FAMOTIDINE 40 MG: 20 TABLET ORAL at 08:47

## 2024-06-07 RX ADMIN — ATENOLOL 100 MG: 50 TABLET ORAL at 10:39

## 2024-06-07 RX ADMIN — POTASSIUM CHLORIDE 40 MEQ: 750 CAPSULE, EXTENDED RELEASE ORAL at 12:24

## 2024-06-07 RX ADMIN — ALPRAZOLAM 0.5 MG: 0.25 TABLET ORAL at 10:36

## 2024-06-07 RX ADMIN — TRIAMTERENE AND HYDROCHLOROTHIAZIDE 1 TABLET: 37.5; 25 TABLET ORAL at 10:50

## 2024-06-07 RX ADMIN — DILTIAZEM HYDROCHLORIDE 10 MG: 5 INJECTION, SOLUTION INTRAVENOUS at 03:50

## 2024-06-07 RX ADMIN — CEFTRIAXONE SODIUM 1000 MG: 1 INJECTION, POWDER, FOR SOLUTION INTRAMUSCULAR; INTRAVENOUS at 10:39

## 2024-06-07 RX ADMIN — ENOXAPARIN SODIUM 40 MG: 100 INJECTION SUBCUTANEOUS at 08:48

## 2024-06-07 RX ADMIN — POTASSIUM CHLORIDE 10 MEQ: 7.46 INJECTION, SOLUTION INTRAVENOUS at 05:38

## 2024-06-07 RX ADMIN — DILTIAZEM HYDROCHLORIDE 180 MG: 180 CAPSULE, COATED, EXTENDED RELEASE ORAL at 10:40

## 2024-06-07 RX ADMIN — DILTIAZEM HYDROCHLORIDE 5 MG/HR: 5 INJECTION INTRAVENOUS at 03:56

## 2024-06-07 RX ADMIN — GUAIFENESIN AND CODEINE PHOSPHATE 5 ML: 100; 10 SOLUTION ORAL at 07:17

## 2024-06-07 RX ADMIN — PHENOL 1 SPRAY: 1.5 LIQUID ORAL at 18:04

## 2024-06-07 NOTE — ED NOTES
EMS spoke with Dr. Coy en route pertaining to EKG showing A-fib with RVR, was told that pt was good to be placed in triage as long as BP was stable.

## 2024-06-07 NOTE — ED PROVIDER NOTES
Time: 4:29 AM EDT  Date of encounter:  6/7/2024  Independent Historian/Clinical History and Information was obtained by:   Patient    History is limited by: N/A    Chief Complaint: cough      History of Present Illness:  Patient is a 73 y.o. year old female who presents to the emergency department for evaluation of Coughing and palpitations.  Patient states she woke up started coughing.  She reports shortness of breath.  She reports chest discomfort.  She tried a breathing treatment at home without any significant improvement.  No nausea vomiting.  No fever or chills.  Cough is nonproductive    HPI    Patient Care Team  Primary Care Provider: Lester Lizarraga MD    Past Medical History:     No Known Allergies  Past Medical History:   Diagnosis Date    Arthritis     Asthma     Atherosclerosis of coronary artery 2018    Non-obstructive coronary artery disease. Cardiac catheterization done in October 2018 showed a 40% mid LAD lesion and a 40% proximal RCA lesion.    Cancer     Breast.     Diabetes mellitus     Diverticulitis     Hiatal hernia     Hyperlipemia 01/21/2022    Hypertension, essential 03/05/2022     Past Surgical History:   Procedure Laterality Date    COLONOSCOPY      COLONOSCOPY N/A 11/14/2023    Procedure: COLONOSCOPY WITH POLYPECTOMIES HOT/COLD SNARE, ELEVIEW INJECTION, BIOPSIES;  Surgeon: Lester Gillette MD;  Location: [facility] ENDOSCOPY;  Service: Gastroenterology;  Laterality: N/A;  COLON POLYPS, DIVERTICULOSIS    ENDOSCOPY N/A 11/14/2023    Procedure: ESOPHAGOGASTRODUODENOSCOPY WITH BIOPSIES;  Surgeon: Lester Gillette MD;  Location: [facility] ENDOSCOPY;  Service: Gastroenterology;  Laterality: N/A;  PREVIOUS SURGERY    HERNIA REPAIR      HYSTERECTOMY      MASTECTOMY Bilateral     UPPER GASTROINTESTINAL ENDOSCOPY       Family History   Problem Relation Age of Onset    Colon cancer Neg Hx     Malig Hyperthermia Neg Hx        Home Medications:  Prior to Admission medications    Medication Sig  Start Date End Date Taking? Authorizing Provider   albuterol sulfate  (90 Base) MCG/ACT inhaler Inhale 2 puffs Every 4 (Four) Hours As Needed for Wheezing. 6/24/22   Rohan Ring DO   amLODIPine (NORVASC) 10 MG tablet TAKE 1 TABLET BY MOUTH EVERY DAY 5/21/24   Umesh Guadarrama MD   Arthritis Pain Reliever 650 MG 8 hr tablet Take 1 tablet by mouth Every 8 (Eight) Hours As Needed. 3/2/22   Emergency, Nurse Epic, RN   aspirin 81 MG EC tablet Take 1 tablet by mouth Daily.    Jayesh Mckinley MD   atenolol (TENORMIN) 50 MG tablet TAKE 1 TABLET EVERY DAY 9/8/23   Umesh Guadarrama MD   baclofen (LIORESAL) 10 MG tablet Every 12 (Twelve) Hours.    Jayesh Mckinley MD   gabapentin (NEURONTIN) 300 MG capsule Take 1 capsule by mouth 3 (Three) Times a Day.    Jayesh Mckinley MD   hydroCHLOROthiazide (HYDRODIURIL) 25 MG tablet TAKE 1 TABLET BY MOUTH EVERY DAY 12/26/23   Umesh Guadarrama MD   Hydrocod Jac-Chlorphe Jac ER (TUSSIONEX PENNKINETIC) 10-8 MG/5ML ER suspension Take 5 mL by mouth Every 12 (Twelve) Hours As Needed for Cough or Allergies. 5/24/24   Kwasi Chavez MD   Jardiance 10 MG tablet tablet Take 1 tablet by mouth Daily. 4/3/24   Jayesh Mckinley MD   nitroglycerin (NITROSTAT) 0.4 MG SL tablet 1 under the tongue as needed for angina, may repeat q5mins for up three doses 4/29/24   Edith Will APRN   pravastatin (PRAVACHOL) 80 MG tablet TAKE 1 TABLET BY MOUTH DAILY 1/23/24   Edith Will APRN   sertraline (ZOLOFT) 100 MG tablet Take 1 tablet by mouth Daily. 1.5 tabs qd    Jayesh Mckinley MD   vitamin E 1000 UNIT capsule Take 1 capsule by mouth Daily.    Jayesh Mckinley MD        Social History:   Social History     Tobacco Use    Smoking status: Never     Passive exposure: Never    Smokeless tobacco: Never   Vaping Use    Vaping status: Never Used   Substance Use Topics    Alcohol use: Never    Drug use: Never         Review of Systems:  Review of Systems  "  Constitutional:  Negative for chills and fever.   HENT:  Negative for congestion, ear pain and sore throat.    Eyes:  Negative for pain.   Respiratory:  Positive for cough and shortness of breath. Negative for chest tightness.    Cardiovascular:  Positive for chest pain and palpitations.   Gastrointestinal:  Negative for abdominal pain, diarrhea, nausea and vomiting.   Genitourinary:  Negative for flank pain and hematuria.   Musculoskeletal:  Negative for joint swelling.   Skin:  Negative for pallor.   Neurological:  Negative for seizures and headaches.   All other systems reviewed and are negative.       Physical Exam:  /90   Pulse 88   Temp 98.6 °F (37 °C) (Oral)   Resp 20   Ht 167.6 cm (65.98\")   Wt 90.3 kg (199 lb 1.2 oz)   SpO2 96%   BMI 32.15 kg/m²     Physical Exam  Constitutional:       Appearance: Normal appearance.   HENT:      Head: Normocephalic and atraumatic.      Nose: Nose normal.      Mouth/Throat:      Mouth: Mucous membranes are moist.   Eyes:      Extraocular Movements: Extraocular movements intact.      Conjunctiva/sclera: Conjunctivae normal.      Pupils: Pupils are equal, round, and reactive to light.   Cardiovascular:      Rate and Rhythm: Tachycardia present. Rhythm irregular.      Pulses: Normal pulses.      Heart sounds: Normal heart sounds.   Pulmonary:      Effort: Pulmonary effort is normal.      Breath sounds: Rhonchi present.   Abdominal:      General: There is no distension.      Palpations: Abdomen is soft.      Tenderness: There is no abdominal tenderness.   Musculoskeletal:         General: Normal range of motion.      Cervical back: Normal range of motion.   Skin:     General: Skin is warm and dry.      Capillary Refill: Capillary refill takes less than 2 seconds.   Neurological:      General: No focal deficit present.      Mental Status: She is alert and oriented to person, place, and time. Mental status is at baseline.   Psychiatric:         Mood and Affect: Mood " normal.         Behavior: Behavior normal.                  Procedures:  Procedures      Medical Decision Making:      Comorbidities that affect care:    Asthma, Cancer, Diabetes, Hypertension    External Notes reviewed:    Previous Clinic Note: Patient seen by cardiology on 4/29/2024 for nonobstructive atherosclerosis of coronary arteries, hypertension, hyperlipidemia, pedal edema      The following orders were placed and all results were independently analyzed by me:  Orders Placed This Encounter   Procedures    XR Chest 1 View    [place] Draw    Comprehensive Metabolic Panel    High Sensitivity Troponin T    BNP    Protime-INR    aPTT    CBC Auto Differential    High Sensitivity Troponin T 2Hr    Magnesium    TSH Rfx On Abnormal To Free T4    Inpatient Hospitalist Consult    ECG 12 Lead Tachycardia    Inpatient Admission    Green Top (Gel)    Lavender Top    Gold Top - SST    Light Blue Top    CBC & Differential       Medications Given in the Emergency Department:  Medications   dilTIAZem (CARDIZEM) 125 mg in 125 mL sodium chloride  infusion (5 mg/hr Intravenous Currently Infusing 6/7/24 0358)   potassium chloride 10 mEq in 100 mL IVPB (10 mEq Intravenous New Bag 6/7/24 0539)   dilTIAZem (CARDIZEM) injection 10 mg (10 mg Intravenous Given 6/7/24 0350)        ED Course:         Labs:    Lab Results (last 24 hours)       Procedure Component Value Units Date/Time    CBC & Differential [[id number]]  (Abnormal) Collected: 06/07/24 0206    Specimen: Blood from Arm, Left Updated: 06/07/24 0325    Narrative:      The following orders were created for panel order CBC & Differential.  Procedure                               Abnormality         Status                     ---------                               -----------         ------                     CBC Auto Differential[[id number]]        Abnormal            Final result                 Please view results for these tests on the individual orders.    Comprehensive  Metabolic Panel [[id number]]  (Abnormal) Collected: 06/07/24 0206    Specimen: Blood from Arm, Left Updated: 06/07/24 0338     Glucose 214 mg/dL      BUN 19 mg/dL      Creatinine 0.96 mg/dL      Sodium 140 mmol/L      Potassium 3.0 mmol/L      Chloride 101 mmol/L      CO2 22.1 mmol/L      Calcium 9.2 mg/dL      Total Protein 6.7 g/dL      Albumin 4.2 g/dL      ALT (SGPT) 15 U/L      AST (SGOT) 18 U/L      Alkaline Phosphatase 105 U/L      Total Bilirubin 0.2 mg/dL      Globulin 2.5 gm/dL      A/G Ratio 1.7 g/dL      BUN/Creatinine Ratio 19.8     Anion Gap 16.9 mmol/L      eGFR 62.2 mL/min/1.73     Narrative:      GFR Normal >60  Chronic Kidney Disease <60  Kidney Failure <15    The GFR formula is only valid for adults with stable renal function between ages 18 and 70.    High Sensitivity Troponin T [[id number]]  (Abnormal) Collected: 06/07/24 0206    Specimen: Blood from Arm, Left Updated: 06/07/24 0338     HS Troponin T 19 ng/L     Narrative:      High Sensitive Troponin T Reference Range:  <14.0 ng/L- Negative Female for AMI  <22.0 ng/L- Negative Male for AMI  >=14 - Abnormal Female indicating possible myocardial injury.  >=22 - Abnormal Male indicating possible myocardial injury.   Clinicians would have to utilize clinical acumen, EKG, Troponin, and serial changes to determine if it is an Acute Myocardial Infarction or myocardial injury due to an underlying chronic condition.         BNP [[id number]]  (Abnormal) Collected: 06/07/24 0206    Specimen: Blood from Arm, Left Updated: 06/07/24 0336     proBNP 2,509.0 pg/mL     Narrative:      This assay is used as an aid in the diagnosis of individuals suspected of having heart failure. It can be used as an aid in the diagnosis of acute decompensated heart failure (ADHF) in patients presenting with signs and symptoms of ADHF to the emergency department (ED). In addition, NT-proBNP of <300 pg/mL indicates ADHF is not likely.    Age Range Result Interpretation  NT-proBNP  Concentration (pg/mL:      <50             Positive            >450                   Gray                 300-450                    Negative             <300    50-75           Positive            >900                  Gray                300-900                  Negative            <300      >75             Positive            >1800                  Gray                300-1800                  Negative            <300    Protime-INR [[id number]]  (Normal) Collected: 06/07/24 0206    Specimen: Blood from Arm, Left Updated: 06/07/24 0331     Protime 13.3 Seconds      INR 0.99    Narrative:      Suggested Therapeutic Ranges For Oral Anticoagulant Therapy:  Level of Therapy                      INR Target Range  Standard Dose                            2.0-3.0  High Dose                                2.5-3.5  Patients not receiving anticoagulant  Therapy Normal Range                     0.86-1.15    aPTT [[id number]]  (Normal) Collected: 06/07/24 0206    Specimen: Blood from Arm, Left Updated: 06/07/24 0331     PTT 29.8 seconds     CBC Auto Differential [[id number]]  (Abnormal) Collected: 06/07/24 0206    Specimen: Blood from Arm, Left Updated: 06/07/24 0325     WBC 9.60 10*3/mm3      RBC 3.99 10*6/mm3      Hemoglobin 12.0 g/dL      Hematocrit 36.6 %      MCV 91.7 fL      MCH 30.1 pg      MCHC 32.8 g/dL      RDW 13.5 %      RDW-SD 45.7 fl      MPV 10.3 fL      Platelets 265 10*3/mm3      Neutrophil % 60.2 %      Lymphocyte % 28.1 %      Monocyte % 10.2 %      Eosinophil % 0.6 %      Basophil % 0.4 %      Immature Grans % 0.5 %      Neutrophils, Absolute 5.77 10*3/mm3      Lymphocytes, Absolute 2.70 10*3/mm3      Monocytes, Absolute 0.98 10*3/mm3      Eosinophils, Absolute 0.06 10*3/mm3      Basophils, Absolute 0.04 10*3/mm3      Immature Grans, Absolute 0.05 10*3/mm3      nRBC 0.0 /100 WBC     Magnesium [[id number]]  (Normal) Collected: 06/07/24 0206    Specimen: Blood from Arm, Left Updated: 06/07/24 0414      Magnesium 1.6 mg/dL     TSH Rfx On Abnormal To Free T4 [[id number]]  (Normal) Collected: 06/07/24 0206    Specimen: Blood from Arm, Left Updated: 06/07/24 0424     TSH 2.280 uIU/mL     High Sensitivity Troponin T 2Hr [[id number]] Collected: 06/07/24 0538    Specimen: Blood Updated: 06/07/24 0542             Imaging:    XR Chest 1 View    Result Date: 6/7/2024  XR CHEST 1 VW-  Date of Exam: 6/7/2024, 2:57 A.M.  Indication: COUGH.  Comparison: 5/24/2024.  FINDINGS: A single AP (or PA) upright portable chest radiograph was performed. Borderline cardiac enlargement is seen. No acute infiltrate is appreciated. No pleural effusion or pneumothorax is identified. There are degenerative changes of the spine. External artifacts obscure detail. The thoracic aorta is atherosclerotic. Artifactual blunting of the right lateral costophrenic sulcus is suggested. Again, the patient has undergone bilateral mastectomies and possible left axillary lymph node dissection. Please correlate clinically. No significant interval change is seen since the prior study (or studies).  CONCLUSION: No acute infiltrate is appreciated.     Please note that portions of this note were completed with a voice recognition program.     Electronically Signed By-Junior Orlando MD On:6/7/2024 3:41 AM         Differential Diagnosis and Discussion:    Chest Pain:  Based on the patient's signs and symptoms, I considered aortic dissection, myocardial infaction, pulmonary embolism, cardiac tamponade, pericarditis, pneumothorax, musculoskeletal chest pain and other differential diagnosis as an etiology of the patient's chest pain.   Cough: Differential diagnosis includes but is not limited to pneumonia, acute bronchitis, upper respiratory infection, ACE inhibitor use, allergic reaction, epiglottitis, seasonal allergies, chemical irritants, exercise-induced asthma, viral syndrome.  Dyspnea: Differential diagnosis includes but is not limited to metabolic acidosis,  neurological disorders, psychogenic, asthma, pneumothorax, upper airway obstruction, COPD, pneumonia, noncardiogenic pulmonary edema, interstitial lung disease, anemia, congestive heart failure, and pulmonary embolism    All labs were reviewed and interpreted by me.  All X-rays impressions were independently interpreted by me.  EKG was interpreted by me.    MDM  Number of Diagnoses or Management Options  Acute on chronic congestive heart failure, unspecified heart failure type  Atrial fibrillation with RVR  Diagnosis management comments: On arrival patient is tachycardic.  EKG showed A-fib with RVR.  Labs show an elevated BNP of 2500.  Trop is also elevated 19.  Patient was given diltiazem and started on infusion.  Heart rate improved.  X-ray did not show acute findings.  Discussed patient with hospitalist and will admit for further care.       Amount and/or Complexity of Data Reviewed  Clinical lab tests: reviewed  Tests in the radiology section of CPT®: reviewed  Review and summarize past medical records: yes  Independent visualization of images, tracings, or specimens: yes    Risk of Complications, Morbidity, and/or Mortality  Presenting problems: moderate  Management options: moderate         Critical Care Note: Total Critical Care time of 35 minutes. Total critical care time documented does not include time spent on separately billed procedures for services of nurses or physician assistants. I personally saw and examined the patient. I have reviewed all diagnostic interpretations and treatment plans as written. I was present for the key portions of any procedures performed and the inclusive time noted in any critical care statement. Critical care time includes patient management by me, time spent at the patients bedside,  time to review lab and imaging results, discussing patient care, documentation in the medical record, and time spent with family or caregiver.        Patient Care Considerations:    SEPSIS was  considered but is NOT present in the emergency department as SIRS criteria is not present.      Consultants/Shared Management Plan:    Hospitalist: I have discussed the case with Dr Ratliff who agrees to accept the patient for admission.    Social Determinants of Health:    Patient is independent, reliable, and has access to care.       Disposition and Care Coordination:    Admit:   Through independent evaluation of the patient's history, physical, and imperical data, the patient meets criteria for inpatient admission to the hospital.        Final diagnoses:   Atrial fibrillation with RVR   Acute on chronic congestive heart failure, unspecified heart failure type        ED Disposition       ED Disposition   Decision to Admit    Condition   --    Comment   Level of Care: Progressive Care [20]   Diagnosis: Atrial fibrillation with RVR [[id number]]   Admitting Physician: SUPRIYA RATLIFF [[id number]]   Attending Physician: SUPRIYA RATLIFF [[id number]]   Certification: I Certify That Inpatient Hospital Services Are Medically Necessary For Greater Than 2 Midnights                 This medical record created using voice recognition software.             Barber Gonzalez MD  06/07/24 0606

## 2024-06-07 NOTE — H&P
[facility]   HISTORY AND PHYSICAL    Patient Name: Camilla Navas  : 1950  MRN: [id number]  Primary Care Physician:  Lester Lizarraga MD  Date of admission: 2024    Subjective   Subjective     Chief Complaint: Coughing episode    HPI:    Camilla Navas is a 73 y.o. female with past medical history significant for asthma coronary artery disease diabetes essential hypertension was feeling sore throat and then she started coughing very aggressively and because of that reason patient was brought to the emergency room where she had bronchospasm and was also tachycardic and hypertensive and because of that reason patient is admitted.  She denies any chest pain.  She had a cardiac catheterization done 2018 and showed 40% LAD and RCA lesion.  When I see the patient she has no chest pain she is feeling better but complaining of some sore throat  Review of Systems  All review of systems negative except as in subjective complaints:  Personal History     Past Medical History:   Diagnosis Date    Arthritis     Asthma     Atherosclerosis of coronary artery 2018    Non-obstructive coronary artery disease. Cardiac catheterization done in 2018 showed a 40% mid LAD lesion and a 40% proximal RCA lesion.    Cancer     Breast.     Diabetes mellitus     Diverticulitis     Hiatal hernia     Hyperlipemia 2022    Hypertension, essential 2022       Past Surgical History:   Procedure Laterality Date    COLONOSCOPY      COLONOSCOPY N/A 2023    Procedure: COLONOSCOPY WITH POLYPECTOMIES HOT/COLD SNARE, ELEVIEW INJECTION, BIOPSIES;  Surgeon: Lester Gillette MD;  Location: [facility] ENDOSCOPY;  Service: Gastroenterology;  Laterality: N/A;  COLON POLYPS, DIVERTICULOSIS    ENDOSCOPY N/A 2023    Procedure: ESOPHAGOGASTRODUODENOSCOPY WITH BIOPSIES;  Surgeon: Lester Gillette MD;  Location: [facility] ENDOSCOPY;  Service: Gastroenterology;  Laterality: N/A;  PREVIOUS SURGERY     HERNIA REPAIR      HYSTERECTOMY      MASTECTOMY Bilateral     UPPER GASTROINTESTINAL ENDOSCOPY         Family History: family history is not on file. Otherwise pertinent FHx was reviewed and not pertinent to current issue.    Social History:  reports that she has never smoked. She has never been exposed to tobacco smoke. She has never used smokeless tobacco. She reports that she does not drink alcohol and does not use drugs.    Home Medications:  Hydrocod Jac-Chlorphe Jac ER, acetaminophen, albuterol sulfate HFA, amLODIPine, aspirin, atenolol, baclofen, empagliflozin, gabapentin, hydroCHLOROthiazide, nitroglycerin, pravastatin, sertraline, and vitamin E      Allergies:  No Known Allergies    Objective   Objective     Vitals:   Temp:  [98.2 °F (36.8 °C)-98.6 °F (37 °C)] 98.2 °F (36.8 °C)  Heart Rate:  [] 129  Resp:  [18-20] 20  BP: (141-176)/() 157/114  Physical Exam               Constitutional:         Awake, alert responsive, conversant, no obvious distress   Eyes:                       PERRLA, sclerae anicteric, no conjunctival injection   HEENT:                   Moist mucous membranes, no nasal or eye discharge, no throat congestion   Neck:                      Supple, no thyromegaly, no lymphadenopathy, trachea midline, no elevated JVD   Respiratory:           Clear to auscultation bilaterally, nonlabored respirations    Cardiovascular:     RRR, no murmurs, rubs, or gallops, palpable pedal pulses bilaterally,No bilateral ankle edema   Gastrointestinal:   Positive bowel sounds, soft, nontender, nondistended, no organomegaly   Musculoskeletal:   No clubbing or cyanosis to extremities, muscle wasting, joint swelling, muscle weakness   Psychiatric:             Appropriate affect, cooperative   Neurologic:            Awake alert ,oriented x 3, strength symmetric in all extremities, Cranial Nerves grossly intact to confrontation, speech clear   Skin:                      No rashes, bruising, skin  ulcers, petechiae or ecchymosis    Result Review    Result Review:  I have personally reviewed the results from the time of this admission to 6/7/2024 09:38 EDT and agree with these findings:  []  Laboratory  []  Microbiology  []  Radiology  []  EKG/Telemetry   []  Cardiology/Vascular   []  Pathology  []  Old records  []  Other:    Results from last 7 days   Lab Units 06/07/24  0206   WBC 10*3/mm3 9.60   HEMOGLOBIN g/dL 12.0   PLATELETS 10*3/mm3 265     Results from last 7 days   Lab Units 06/07/24  0206   SODIUM mmol/L 140   POTASSIUM mmol/L 3.0*   CHLORIDE mmol/L 101   CO2 mmol/L 22.1   ANION GAP mmol/L 16.9*   BUN mg/dL 19   CREATININE mg/dL 0.96   GLUCOSE mg/dL 214*         Assessment & Plan   Assessment / Plan     Active Hospital Problems:  Active Hospital Problems    Diagnosis     **Atrial fibrillation with RVR     Essential hypertension        Plan:   Will try to control heart rate with the help of medications, beta-blockers and will also make adjustment in antihypertensive medications to control the blood pressure  Will repeat cardiac enzymes  Bronchodilator therapy  Empiric antibiotics for pharyngitis  Replace potassium  DVT prophylaxis:  Medical DVT prophylaxis orders are present.        CODE STATUS:    Level Of Support Discussed With: Patient  Code Status (Patient has no pulse and is not breathing): CPR (Attempt to Resuscitate)  Medical Interventions (Patient has pulse or is breathing): Full Support    Admission Status:  I believe this patient meets inpt status.    Electronically signed by Emory Ratliff MD, 06/07/24, 9:35 AM EDT.

## 2024-06-08 VITALS
HEART RATE: 77 BPM | OXYGEN SATURATION: 96 % | SYSTOLIC BLOOD PRESSURE: 138 MMHG | BODY MASS INDEX: 32.06 KG/M2 | RESPIRATION RATE: 17 BRPM | DIASTOLIC BLOOD PRESSURE: 58 MMHG | WEIGHT: 199.52 LBS | TEMPERATURE: 98.1 F | HEIGHT: 66 IN

## 2024-06-08 PROBLEM — J02.9 PHARYNGITIS: Status: ACTIVE | Noted: 2024-06-08

## 2024-06-08 LAB
ALBUMIN SERPL-MCNC: 3.9 G/DL (ref 3.5–5.2)
ALBUMIN/GLOB SERPL: 1.6 G/DL
ALP SERPL-CCNC: 74 U/L (ref 39–117)
ALT SERPL W P-5'-P-CCNC: 11 U/L (ref 1–33)
ANION GAP SERPL CALCULATED.3IONS-SCNC: 11.5 MMOL/L (ref 5–15)
AST SERPL-CCNC: 15 U/L (ref 1–32)
BILIRUB SERPL-MCNC: 0.3 MG/DL (ref 0–1.2)
BUN SERPL-MCNC: 15 MG/DL (ref 8–23)
BUN/CREAT SERPL: 20.3 (ref 7–25)
CALCIUM SPEC-SCNC: 9.2 MG/DL (ref 8.6–10.5)
CHLORIDE SERPL-SCNC: 104 MMOL/L (ref 98–107)
CO2 SERPL-SCNC: 22.5 MMOL/L (ref 22–29)
CREAT SERPL-MCNC: 0.74 MG/DL (ref 0.57–1)
EGFRCR SERPLBLD CKD-EPI 2021: 85 ML/MIN/1.73
GLOBULIN UR ELPH-MCNC: 2.5 GM/DL
GLUCOSE BLDC GLUCOMTR-MCNC: 157 MG/DL (ref 70–99)
GLUCOSE BLDC GLUCOMTR-MCNC: 185 MG/DL (ref 70–99)
GLUCOSE SERPL-MCNC: 152 MG/DL (ref 65–99)
POTASSIUM SERPL-SCNC: 4 MMOL/L (ref 3.5–5.2)
PROT SERPL-MCNC: 6.4 G/DL (ref 6–8.5)
SODIUM SERPL-SCNC: 138 MMOL/L (ref 136–145)
WHOLE BLOOD HOLD SPECIMEN: NORMAL

## 2024-06-08 PROCEDURE — 80053 COMPREHEN METABOLIC PANEL: CPT | Performed by: INTERNAL MEDICINE

## 2024-06-08 PROCEDURE — 82948 REAGENT STRIP/BLOOD GLUCOSE: CPT

## 2024-06-08 PROCEDURE — 25010000002 ENOXAPARIN PER 10 MG: Performed by: INTERNAL MEDICINE

## 2024-06-08 PROCEDURE — 25010000002 CEFTRIAXONE PER 250 MG: Performed by: INTERNAL MEDICINE

## 2024-06-08 RX ORDER — AMOXICILLIN AND CLAVULANATE POTASSIUM 875; 125 MG/1; MG/1
1 TABLET, FILM COATED ORAL EVERY 12 HOURS SCHEDULED
Qty: 10 TABLET | Refills: 0 | Status: SHIPPED | OUTPATIENT
Start: 2024-06-08 | End: 2024-06-13

## 2024-06-08 RX ORDER — AMOXICILLIN AND CLAVULANATE POTASSIUM 875; 125 MG/1; MG/1
1 TABLET, FILM COATED ORAL EVERY 12 HOURS SCHEDULED
Status: DISCONTINUED | OUTPATIENT
Start: 2024-06-08 | End: 2024-06-08 | Stop reason: HOSPADM

## 2024-06-08 RX ADMIN — PHENOL 1 SPRAY: 1.5 LIQUID ORAL at 07:46

## 2024-06-08 RX ADMIN — DILTIAZEM HYDROCHLORIDE 180 MG: 180 CAPSULE, COATED, EXTENDED RELEASE ORAL at 08:19

## 2024-06-08 RX ADMIN — ATENOLOL 100 MG: 50 TABLET ORAL at 08:19

## 2024-06-08 RX ADMIN — TRIAMTERENE AND HYDROCHLOROTHIAZIDE 1 TABLET: 37.5; 25 TABLET ORAL at 08:19

## 2024-06-08 RX ADMIN — FAMOTIDINE 40 MG: 20 TABLET ORAL at 08:19

## 2024-06-08 RX ADMIN — ENOXAPARIN SODIUM 40 MG: 100 INJECTION SUBCUTANEOUS at 08:19

## 2024-06-08 RX ADMIN — CEFTRIAXONE SODIUM 1000 MG: 1 INJECTION, POWDER, FOR SOLUTION INTRAMUSCULAR; INTRAVENOUS at 10:52

## 2024-06-08 RX ADMIN — AMOXICILLIN AND CLAVULANATE POTASSIUM 1 TABLET: 875; 125 TABLET, FILM COATED ORAL at 12:13

## 2024-06-08 RX ADMIN — POTASSIUM CHLORIDE 40 MEQ: 750 CAPSULE, EXTENDED RELEASE ORAL at 08:19

## 2024-06-08 NOTE — PLAN OF CARE
Goal Outcome Evaluation:  Plan of Care Reviewed With: patient        Progress: improving  Outcome Evaluation: Patient has been sleeping throughout the night. No complaints or signs of distress. Will continue with plan of care.

## 2024-06-08 NOTE — DISCHARGE SUMMARY
[facility]   DISCHARGE SUMMARY    Patient Name: Camilla Navas  : 1950  MRN: [id number]    Date of Admission: 2024  Date of Discharge: 2024  Primary Care Physician: Lester Lizarraga MD    Consults       Date and Time Order Name Status Description    2024  3:57 AM Inpatient Hospitalist Consult              Hospital Course     Presenting Problem:   Atrial fibrillation with RVR [I48.91]  Acute on chronic congestive heart failure, unspecified heart failure type [I50.9]  Pharyngitis [J02.9]    Active and resolved problems  Principal Problem:    Atrial fibrillation with RVR  Overview:  Active Problems:    Essential hypertension  Overview:    Hypokalemia  Overview:    Pharyngitis  Overview:  Resolved Problems:    * No resolved hospital problems. *      Hospital Course:  Camilla Navas is a 73 y.o. female with past medical significant history significant for asthma coronary artery disease diabetes essential hypertension was having a sore throat and was causing coughing aggressively and because of that reason came to the emergency room.  She was also tachycardic hypertensive but once she calmed down her blood pressure has stabilized and she is feeling much better than yesterday.  Patient most likely had upper respiratory tract infection which exacerbated asthma.  At this time patient is clinically stable so will be discharged home on p.o. Augmentin  Final diagnosis  Upper respiratory tract infection      Vital Signs:  Temp:  [98.1 °F (36.7 °C)-98.8 °F (37.1 °C)] 98.1 °F (36.7 °C)  Heart Rate:  [79-87] 87  Resp:  [18-20] 18  BP: (123-146)/(54-77) 146/77      Discharge Details        Discharge Medications        New Medications        Instructions Start Date   amoxicillin-clavulanate 875-125 MG per tablet  Commonly known as: AUGMENTIN   1 tablet, Oral, Every 12 Hours Scheduled             Changes to Medications        Instructions Start Date   albuterol sulfate  (90 Base) MCG/ACT  inhaler  Commonly known as: PROVENTIL HFA;VENTOLIN HFA;PROAIR HFA  What changed: reasons to take this   2 puffs, Inhalation, Every 4 Hours PRN             Continue These Medications        Instructions Start Date   amLODIPine 10 MG tablet  Commonly known as: NORVASC   10 mg, Oral, Daily      Arthritis Pain Reliever 650 MG 8 hr tablet  Generic drug: acetaminophen   650 mg, Oral, Every 8 Hours PRN      aspirin 81 MG EC tablet   81 mg, Oral, Daily      atenolol 50 MG tablet  Commonly known as: TENORMIN   TAKE 1 TABLET EVERY DAY      baclofen 10 MG tablet  Commonly known as: LIORESAL   10 mg, Oral, 2 Times Daily      benzonatate 100 MG capsule  Commonly known as: TESSALON   100 mg, Oral, 3 Times Daily PRN      brompheniramine-pseudoephedrine-DM 30-2-10 MG/5ML syrup   10 mL, Oral, 4 Times Daily PRN      dicyclomine 20 MG tablet  Commonly known as: BENTYL   20 mg, Oral, Every 6 Hours PRN      gabapentin 300 MG capsule  Commonly known as: NEURONTIN   300 mg, Oral, Nightly      glipizide 5 MG tablet  Commonly known as: GLUCOTROL   5 mg, Oral, Daily      hydroCHLOROthiazide 25 MG tablet   TAKE 1 TABLET BY MOUTH EVERY DAY      Hydrocod Jac-Chlorphe Jac ER 10-8 MG/5ML ER suspension  Commonly known as: TUSSIONEX PENNKINETIC   5 mL, Oral, Every 12 Hours PRN      Jardiance 10 MG tablet tablet  Generic drug: empagliflozin   1 tablet, Oral, Daily      mirtazapine 15 MG tablet  Commonly known as: REMERON   15 mg, Oral, Nightly      nitroglycerin 0.4 MG SL tablet  Commonly known as: NITROSTAT   1 under the tongue as needed for angina, may repeat q5mins for up three doses      nystatin 100,000 unit/mL suspension  Commonly known as: MYCOSTATIN   10 mL, Oral, 3 Times Daily      ondansetron 4 MG tablet  Commonly known as: ZOFRAN   4 mg, Oral, Every 6 Hours PRN      pantoprazole 40 MG EC tablet  Commonly known as: PROTONIX   1 tablet, Oral, Daily      pravastatin 80 MG tablet  Commonly known as: PRAVACHOL   80 mg, Oral, Daily       sertraline 100 MG tablet  Commonly known as: ZOLOFT   200 mg, Oral, Daily      vitamin D3 125 MCG (5000 UT) capsule capsule   5,000 Units, Oral, Daily      vitamin E 1000 UNIT capsule   1,000 Units, Oral, Daily               No Known Allergies      Discharge Disposition:  Home or Self Care    Diet:        Discharge Activity:         CODE STATUS:    Code Status and Medical Interventions:   Ordered at: 06/07/24 0616     Level Of Support Discussed With:    Patient     Code Status (Patient has no pulse and is not breathing):    CPR (Attempt to Resuscitate)     Medical Interventions (Patient has pulse or is breathing):    Full Support         Future Appointments   Date Time Provider Department Center   7/9/2024  9:30 AM [facility] 2 [facility]   7/9/2024 10:45 AM [facility] 1 [facility]   2/3/2025  9:00 AM Umesh Guadarrama MD [facility]           Time spent on Discharge including face to face service:   minutes    Electronically signed by Emory Ratliff MD, 06/08/24, 11:54 AM EDT.

## 2024-06-08 NOTE — PLAN OF CARE
After Visit Summary   10/25/2018    Chelsea Allen    MRN: [id number]           Patient Information     Date Of Birth          1977        Visit Information        Provider Department      10/25/2018 12:15 PM Daniel Noonan MD [facility]        Today's Diagnoses     Palpitations    -  1       Follow-ups after your visit        Additional Services     Follow-Up with Cardiologist                 Your next 10 appointments already scheduled     Jan 28, 2019  7:45 AM CST   Return Visit with Daniel Noonan MD   [facility] ([facility])    [address] [place] [id number]   [phone] OPT 2              Future tests that were ordered for you today     Open Future Orders        Priority Expected Expires Ordered    Follow-Up with Cardiologist Routine 12/13/2018 10/25/2019 10/25/2018    Magnesium Routine 10/25/2018 10/25/2019 10/25/2018    Basic metabolic panel Routine 10/25/2018 10/25/2019 10/25/2018    Echocardiogram Routine 11/1/2018 10/25/2019 10/25/2018    Exercise Stress Echocardiogram Routine 11/1/2018 10/25/2019 10/25/2018    Cardiac Event Monitor Routine 11/1/2018 10/25/2019 10/25/2018            Who to contact     If you have questions or need follow up information about today's clinic visit or your schedule please contact [facility] directly at [phone].  Normal or non-critical lab and imaging results will be communicated to you by MyChart, letter or phone within 4 business days after the clinic has received the results. If you do not hear from us within 7 days, please contact the clinic through MyChart or phone. If you have a critical or abnormal lab result, we will notify you by phone as soon as possible.  Submit refill requests through [employer] or call your pharmacy and they will forward the refill request to us.  Goal Outcome Evaluation:                                               "Please allow 3 business days for your refill to be completed.          Additional Information About Your Visit        MyChart Information     [employer] gives you secure access to your electronic health record. If you see a primary care provider, you can also send messages to your care team and make appointments. If you have questions, please call your primary care clinic.  If you do not have a primary care provider, please call [phone] and they will assist you.        Care EveryWhere ID     This is your Care EveryWhere ID. This could be used by other organizations to access your [place] medical records  [id number]        Your Vitals Were     Pulse Height BMI (Body Mass Index)             111 1.575 m (5' 2\") 36.03 kg/m2          Blood Pressure from Last 3 Encounters:   10/25/18 127/81   06/08/18 118/66   03/24/18 98/58    Weight from Last 3 Encounters:   10/25/18 89.4 kg (197 lb)   06/08/18 86.2 kg (190 lb)   03/24/18 83.9 kg (185 lb)              We Performed the Following     EKG 12-lead complete w/read - Clinics (performed today)        Primary Care Provider Office Phone # Fax #    Angela Vaughan PA-C [phone]       [address] [facility] [address]  [facility] [address]        Equal Access to Services     LALI SLATER AH: Hadii aad ku hadasho Soomaali, waaxda luqadaha, qaybta kaalmada adeegyada, waxay myriam haygregory dias . So [facility] [phone].    ATENCIÓN: Si habla español, tiene a lunsford disposición servicios gratuitos de asistencia lingüística. Llame al [phone].    We comply with applicable federal civil rights laws and [place] laws. We do not discriminate on the basis of race, color, national origin, age, disability, sex, sexual orientation, or gender identity.            Thank you!     Thank you for choosing [facility]  for your care. Our goal is always to provide you with excellent care. Hearing back from our patients is one " way we can continue to improve our services. Please take a few minutes to complete the written survey that you may receive in the mail after your visit with us. Thank you!             Your Updated Medication List - Protect others around you: Learn how to safely use, store and throw away your medicines at www.disposemymeds.org.          This list is accurate as of 10/25/18 12:49 PM.  Always use your most recent med list.                   Brand Name Dispense Instructions for use Diagnosis    sertraline 50 MG tablet    ZOLOFT    90 tablet    Take 1 tablet (50 mg) by mouth daily    Depression, unspecified depression type

## 2024-06-09 ENCOUNTER — READMISSION MANAGEMENT (OUTPATIENT)
Dept: CALL CENTER | Facility: HOSPITAL | Age: 74
End: 2024-06-09
Payer: MEDICARE

## 2024-06-09 LAB
QT INTERVAL: 370 MS
QTC INTERVAL: 491 MS

## 2024-06-09 NOTE — OUTREACH NOTE
Prep Survey      Flowsheet Row Responses   [facility] facility patient discharged from? [facility]   Is LACE score < 7 ? No   Eligibility Readm Mgmt   Discharge diagnosis Atrial fibrillation with RVR   Does the patient have one of the following disease processes/diagnoses(primary or secondary)? CHF   Does the patient have Home health ordered? No   Is there a DME ordered? No   Medication alerts for this patient Augmentin   Prep survey completed? Yes            Maylin PUENTE - Registered Nurse

## 2024-06-14 ENCOUNTER — READMISSION MANAGEMENT (OUTPATIENT)
Dept: CALL CENTER | Facility: HOSPITAL | Age: 74
End: 2024-06-14
Payer: MEDICARE

## 2024-06-14 NOTE — OUTREACH NOTE
"CHF Week 1 Survey      Flowsheet Row Responses   [facility] patient discharged from? [facility]   Does the patient have one of the following disease processes/diagnoses(primary or secondary)? CHF   CHF Week 1 attempt successful? Yes   Call start time 1208   Call end time 1211   Discharge diagnosis Atrial fibrillation with RVR   Meds reviewed with patient/caregiver? Yes   Is the patient having any side effects they believe may be caused by any medication additions or changes? No   Does the patient have all medications ordered at discharge? Yes   Is the patient taking all medications as directed (includes completed medication regime)? Yes   Medication comments Pt has finished augmentin   Comments regarding appointments Pt has US on throat 6/20/24 and swallow study in July for her complaints of \"lump in throat\"   Does the patient have a primary care provider?  Yes   Does the patient have an appointment with their PCP within 7 days of discharge? Greater than 7 days   Nursing Interventions Verified appointment date/time/provider   Has home health visited the patient within 72 hours of discharge? N/A   Psychosocial issues? No   Did the patient receive a copy of their discharge instructions? Yes   Nursing interventions Reviewed instructions with patient   What is the patient's perception of their health status since discharge? Same  [Reports still has a \"lump in throat\" and wating diagnostics.  Pt still with cough and has her normal amount of swelling to feet.  [place] wt daily and aware to notiy cardio for 2-3#daily gain or 5# weekly gain, follows low Na+ diet. No questions today]   Nursing interventions Nurse provided patient education   Is the patient able to teach back signs and symptoms of worsening condition? (i.e. weight gain, shortness of air, etc.) Yes   Is the patient/caregiver able to teach back the hierarchy of who to call/visit for symptoms/problems? PCP, Specialist, Home health nurse, Urgent Care, ED, 911 " Yes   Is the patient able to teach back Heart Failure Zones? No   CHF Zone this Call Green Zone   Green Zone No new swelling -  feet, ankles and legs look normal for you, Weight check stable   Green Zone Interventions Daily weight check, Meds as directed, Low sodium diet    CHF Week 1 call completed? Yes   Call end time 1211            MARIA GUADALUPE ZARAGOZA - Registered Nurse

## 2024-06-19 ENCOUNTER — TELEPHONE (OUTPATIENT)
Dept: CARDIOLOGY | Facility: CLINIC | Age: 74
End: 2024-06-19
Payer: MEDICARE

## 2024-06-19 NOTE — TELEPHONE ENCOUNTER
Caller: Camilla Navas    Relationship to patient: Self    Best call back number: [phone] - OKAY WITH A VOICEMAIL    Chief complaint: BILATERAL ANKLES AND FEET SWELLING FOR ~2 WEEKS    Type of visit: FOLLOW UP    Requested date: ASAP

## 2024-06-20 ENCOUNTER — HOSPITAL ENCOUNTER (OUTPATIENT)
Dept: ULTRASOUND IMAGING | Facility: HOSPITAL | Age: 74
Discharge: HOME | End: 2024-06-20
Admitting: NURSE PRACTITIONER
Payer: MEDICARE

## 2024-06-20 DIAGNOSIS — R13.12 OROPHARYNGEAL DYSPHAGIA: ICD-10-CM

## 2024-06-20 PROCEDURE — 76536 US EXAM OF HEAD AND NECK: CPT

## 2024-06-20 NOTE — TELEPHONE ENCOUNTER
SW patient. Patient was recently in the hospital for rapid atrial fibrillation and acute CHF. Cardiology was not consulted on hospital stay.    Patient states she was seen at her PCP yesterday and was told her heart rate was still too fast- today's BP/HR: 127/71; 118.     Patient scheduled with f/u with VENESSA Will tomorrow afternoon. Patient verbalized appreciation.

## 2024-06-21 ENCOUNTER — OFFICE VISIT (OUTPATIENT)
Dept: CARDIOLOGY | Facility: CLINIC | Age: 74
End: 2024-06-21
Payer: MEDICARE

## 2024-06-21 VITALS
SYSTOLIC BLOOD PRESSURE: 130 MMHG | BODY MASS INDEX: 33.59 KG/M2 | WEIGHT: 201.6 LBS | HEART RATE: 97 BPM | HEIGHT: 65 IN | DIASTOLIC BLOOD PRESSURE: 74 MMHG

## 2024-06-21 DIAGNOSIS — R60.0 PEDAL EDEMA: ICD-10-CM

## 2024-06-21 DIAGNOSIS — I10 ESSENTIAL HYPERTENSION: ICD-10-CM

## 2024-06-21 DIAGNOSIS — I48.91 ATRIAL FIBRILLATION, UNSPECIFIED TYPE: Primary | ICD-10-CM

## 2024-06-21 DIAGNOSIS — I25.10 NONOBSTRUCTIVE ATHEROSCLEROSIS OF CORONARY ARTERY: ICD-10-CM

## 2024-06-21 DIAGNOSIS — E78.2 MIXED HYPERLIPIDEMIA: ICD-10-CM

## 2024-06-21 PROBLEM — I48.0 PAROXYSMAL ATRIAL FIBRILLATION: Status: ACTIVE | Noted: 2024-06-21

## 2024-06-21 RX ORDER — FUROSEMIDE 20 MG/1
TABLET ORAL
Qty: 90 TABLET | OUTPATIENT
Start: 2024-06-21

## 2024-06-21 RX ORDER — TIZANIDINE HYDROCHLORIDE 2 MG/1
CAPSULE, GELATIN COATED ORAL
COMMUNITY
Start: 2024-06-13

## 2024-06-21 RX ORDER — FUROSEMIDE 20 MG/1
20 TABLET ORAL DAILY
Qty: 30 TABLET | Refills: 0 | Status: SHIPPED | OUTPATIENT
Start: 2024-06-21

## 2024-06-21 RX ORDER — ATENOLOL 100 MG/1
100 TABLET ORAL DAILY
Qty: 90 TABLET | Refills: 1 | Status: SHIPPED | OUTPATIENT
Start: 2024-06-21

## 2024-06-21 NOTE — ASSESSMENT & PLAN NOTE
Blood pressure is reasonably well-controlled.  For now we will continue current regiment, with the exception of increasing dose of atenolol.  Encouraged her to monitor blood pressure closely at home, increasing atenolol should help with her rate control, if needed to we can back off on an dose of amlodipine.

## 2024-06-21 NOTE — ASSESSMENT & PLAN NOTE
Need updated fasting lipid profile.  We will continue current dose of pravastatin, and recheck fasting lipid profile with next blood draw.

## 2024-06-21 NOTE — PROGRESS NOTES
Chief Complaint  Coronary Artery Disease, Hospital Follow Up Visit, and Rapid Heart Rate    Subjective        History of Present Illness  Camilla Navas presents to [facility] CARDIOLOGY   Ms. Navas is a 73-year-old female patient coming in today for hospital follow-up.  She was admitted overnight on June 7.  She initially presented with complaints of sore throat and persistent coughing, was found to be tachycardic.  She was treated for upper respiratory infection, and asthma exacerbation once her coughing improved her rate was also improved.  However her EKG shows atrial fibrillation which is a new diagnosis for her.  She notes she was seen for follow-up with her PCP earlier this week, and her rate is still on the high side.  She is not feeling palpitations, but has been feeling very fatigued recently.  Complaining of having swelling of bilateral lower legs more than usual recently.  She has also had an ongoing cough, and with sensation of something stuck in her throat, she underwent a thyroid ultrasound yesterday, will be undergoing a barium swallow soon.          Past History:     (1) Non-obstructive coronary artery disease. Cardiac catheterization done in October 2018 showed a 40% mid LAD lesion and a 40% proximal RCA lesion. (2) Hypertension, difficult to control. (3) Diabetes mellitus, on oral medications. (4) Hyperlipidemia. (5) Breast cancer, s/p surgery.      Past Medical History:   Diagnosis Date    Arthritis     Asthma     Atherosclerosis of coronary artery 2018    Cancer     Diabetes mellitus     Diverticulitis     Hiatal hernia     Hyperlipemia 01/21/2022    Hypertension, essential 03/05/2022       Allergies   Allergen Reactions    Empagliflozin Nausea And Vomiting    Metformin Diarrhea        Past Surgical History:   Procedure Laterality Date    COLONOSCOPY      COLONOSCOPY N/A 11/14/2023    Procedure: COLONOSCOPY WITH POLYPECTOMIES HOT/COLD SNARE, ELEVIEW INJECTION, BIOPSIES;   Surgeon: Lester Gillette MD;  Location: [facility] ENDOSCOPY;  Service: Gastroenterology;  Laterality: N/A;  COLON POLYPS, DIVERTICULOSIS    ENDOSCOPY N/A 11/14/2023    Procedure: ESOPHAGOGASTRODUODENOSCOPY WITH BIOPSIES;  Surgeon: Lester Gillette MD;  Location: [facility] ENDOSCOPY;  Service: Gastroenterology;  Laterality: N/A;  PREVIOUS SURGERY    HERNIA REPAIR      HYSTERECTOMY      MASTECTOMY Bilateral     UPPER GASTROINTESTINAL ENDOSCOPY          Social History  She  reports that she has never smoked. She has never been exposed to tobacco smoke. She has never used smokeless tobacco. She reports that she does not drink alcohol and does not use drugs.    Family History  Her family history is not on file.       Current Outpatient Medications on File Prior to Visit   Medication Sig    albuterol sulfate  (90 Base) MCG/ACT inhaler Inhale 2 puffs Every 4 (Four) Hours As Needed for Wheezing. (Patient taking differently: Inhale 2 puffs Every 4 (Four) Hours As Needed for Wheezing or Shortness of Air.)    amLODIPine (NORVASC) 10 MG tablet TAKE 1 TABLET BY MOUTH EVERY DAY    Arthritis Pain Reliever 650 MG 8 hr tablet Take 1 tablet by mouth Every 8 (Eight) Hours As Needed for Mild Pain.    baclofen (LIORESAL) 10 MG tablet Take 1 tablet by mouth 2 (Two) Times a Day.    benzonatate (TESSALON) 100 MG capsule Take 1 capsule by mouth 3 (Three) Times a Day As Needed for Cough.    brompheniramine-pseudoephedrine-DM 30-2-10 MG/5ML syrup Take 10 mL by mouth 4 (Four) Times a Day As Needed for Congestion, Cough or Allergies.    dicyclomine (BENTYL) 20 MG tablet Take 1 tablet by mouth Every 6 (Six) Hours As Needed for Abdominal Cramping.    gabapentin (NEURONTIN) 300 MG capsule Take 1 capsule by mouth Every Night.    glipizide (GLUCOTROL) 5 MG tablet Take 1 tablet by mouth Daily.    hydroCHLOROthiazide (HYDRODIURIL) 25 MG tablet TAKE 1 TABLET BY MOUTH EVERY DAY    Hydrocod Jac-Chlorphe Jac ER (TUSSIONEX PENNKINETIC) 10-8  "MG/5ML ER suspension Take 5 mL by mouth Every 12 (Twelve) Hours As Needed for Cough or Allergies.    Jardiance 10 MG tablet tablet Take 1 tablet by mouth Daily.    mirtazapine (REMERON) 15 MG tablet Take 1 tablet by mouth Every Night.    nitroglycerin (NITROSTAT) 0.4 MG SL tablet 1 under the tongue as needed for angina, may repeat q5mins for up three doses    nystatin (MYCOSTATIN) 100,000 unit/mL suspension Take 10 mL by mouth 3 (Three) Times a Day.    ondansetron (ZOFRAN) 4 MG tablet Take 1 tablet by mouth Every 6 (Six) Hours As Needed for Nausea or Vomiting.    pantoprazole (PROTONIX) 40 MG EC tablet Take 1 tablet by mouth Daily.    pravastatin (PRAVACHOL) 80 MG tablet TAKE 1 TABLET BY MOUTH DAILY    sertraline (ZOLOFT) 100 MG tablet Take 2 tablets by mouth Daily.    TiZANidine (ZANAFLEX) 2 MG capsule     vitamin D3 125 MCG (5000 UT) capsule capsule Take 1 capsule by mouth Daily.    vitamin E 1000 UNIT capsule Take 1 capsule by mouth Daily.    [DISCONTINUED] aspirin 81 MG EC tablet Take 1 tablet by mouth Daily.    [DISCONTINUED] atenolol (TENORMIN) 50 MG tablet TAKE 1 TABLET EVERY DAY     No current facility-administered medications on file prior to visit.         Review of Systems   Constitutional:  Positive for fatigue.   Respiratory:  Positive for cough. Negative for chest tightness and shortness of breath.    Cardiovascular:  Positive for leg swelling. Negative for chest pain and palpitations.   Gastrointestinal:  Negative for nausea and vomiting.   Neurological:  Negative for dizziness and syncope.        Objective   Vitals:    06/21/24 1340   BP: 130/74   Pulse: 97   Weight: 91.4 kg (201 lb 9.6 oz)   Height: 165.1 cm (65\")         Physical Exam  General : Alert, awake, no acute distress  Neck : Supple, no carotid bruit, no jugular venous distention  CVS : Irregular rhythm, no murmur, no rubs or gallops  Lungs: Clear to auscultation bilaterally, no crackles or rhonchi  Abdomen: Soft, nontender, bowel sounds " "active  Extremities: Warm, well-perfused, +1 bilateral pedal edema      Result Review     The following data was reviewed by VENESSA Howell  proBNP   Date Value Ref Range Status   06/07/2024 2,509.0 (H) 0.0 - 900.0 pg/mL Final     CMP          5/24/2024    13:56 6/7/2024    02:06 6/8/2024    04:32   CMP   Glucose 147  214  152    BUN 15  19  15    Creatinine 0.75  0.96  0.74    EGFR 83.7  62.2  85.0    Sodium 139  140  138    Potassium 3.6  3.0  4.0    Chloride 100  101  104    Calcium 8.9  9.2  9.2    Total Protein 7.0  6.7  6.4    Albumin 4.3  4.2  3.9    Globulin 2.7  2.5  2.5    Total Bilirubin 0.2  0.2  0.3    Alkaline Phosphatase 83  105  74    AST (SGOT) 18  18  15    ALT (SGPT) 8  15  11    Albumin/Globulin Ratio 1.6  1.7  1.6    BUN/Creatinine Ratio 20.0  19.8  20.3    Anion Gap 10.4  16.9  11.5      CBC w/diff          2/21/2024    16:19 5/24/2024    13:56 6/7/2024    02:06   CBC w/Diff   WBC 6.86  6.16  9.60    RBC 3.91  3.86  3.99    Hemoglobin 11.6  11.6  12.0    Hematocrit 36.0  34.9  36.6    MCV 92.1  90.4  91.7    MCH 29.7  30.1  30.1    MCHC 32.2  33.2  32.8    RDW 12.9  13.1  13.5    Platelets 272  248  265    Neutrophil Rel % 66.8  63.0  60.2    Immature Granulocyte Rel % 0.3  0.5  0.5    Lymphocyte Rel % 21.4  24.7  28.1    Monocyte Rel % 9.5  10.2  10.2    Eosinophil Rel % 1.7  1.3  0.6    Basophil Rel % 0.3  0.3  0.4       Lab Results   Component Value Date    TSH 2.280 06/07/2024      No results found for: \"FREET4\"   No results found for: \"DDIMERQUANT\"  Magnesium   Date Value Ref Range Status   06/07/2024 1.6 1.6 - 2.4 mg/dL Final      No results found for: \"DIGOXIN\"   Lab Results   Component Value Date    TROPONINT 25 (H) 06/07/2024                 Results for orders placed during the hospital encounter of 03/30/22    Stress Test With Myocardial Perfusion One Day    Interpretation Summary  · Myocardial perfusion imaging indicates a normal myocardial perfusion study with no evidence of " ischemia.  · Left ventricular ejection fraction is normal. (Calculated EF = 59%).  · Fair exercise tolerance noted.  · There was no chest pain with exercise.  · Exercise EKG is negative for ischemic changes.  · Impressions are consistent with a low risk study.           Assessment and Plan   Diagnoses and all orders for this visit:    1. Atrial fibrillation, unspecified type (Primary)  Assessment & Plan:  She is in atrial fibrillation which is newly diagnosed for her.  Currently on atenolol, will increase the dose for better rate control, and get a Holter monitor to see what her overall rate and rhythm control is.  Discussed treatment strategies with ZANA-amy, including anticoagulation in detail.  [id number] score is elevated, and anticoagulation is indicated.  Provided her samples of the 30-day free card along with patient assistance application, and we will start her on Eliquis 5 mg twice daily.  Will get an echocardiogram to evaluate LV function and for any valvular issues.  She does not have any symptoms of chest pain, she had an ischemic evaluation in the form of a stress test in 2022.   If she is still persistently in atrial fibrillation after 6 weeks of anticoagulation therapy, then we can consider electrical cardioversion.    Orders:  -     Holter Monitor - 72 Hour Up To 15 Days; Future  -     Adult Transthoracic Echo Complete w/ Color, Spectral and Contrast if necessary per protocol; Future  -     CBC (No Diff); Future  -     Cancel: Basic Metabolic Panel; Future    2. Nonobstructive atherosclerosis of coronary artery  Assessment & Plan:  Stable, and she has no symptoms of angina.  We will discontinue aspirin to reduce risk of bleeding since we are initiating anticoagulation for A-fib.  Continue beta-blocker and statin therapy.      3. Essential hypertension  Assessment & Plan:  Blood pressure is reasonably well-controlled.  For now we will continue current regiment, with the exception of increasing dose of  atenolol.  Encouraged her to monitor blood pressure closely at home, increasing atenolol should help with her rate control, if needed to we can back off on an dose of amlodipine.      4. Mixed hyperlipidemia  Assessment & Plan:  Need updated fasting lipid profile.  We will continue current dose of pravastatin, and recheck fasting lipid profile with next blood draw.      5. Pedal edema  Assessment & Plan:  Is a long-term problem with pedal edema, it is a little worse than usual for her, and considering her new onset of atrial fibrillation when to give her 7 days of low-dose furosemide we try to improve her rate control, and evaluate her LV function with echocardiogram.      Other orders  -     atenolol (TENORMIN) 100 MG tablet; Take 1 tablet by mouth Daily.  Dispense: 90 tablet; Refill: 1  -     furosemide (LASIX) 20 MG tablet; Take 1 tablet by mouth Daily. Take once daily for 7 days, then as needed for increased swelling  Dispense: 30 tablet; Refill: 0  -     apixaban (ELIQUIS) 5 MG tablet tablet; Take 1 tablet by mouth Every 12 (Twelve) Hours.  Dispense: 180 tablet; Refill: 3        [id number] Score: 5         Follow Up   No follow-ups on file.  Will Schedule after Holter and echo are available    Patient was given instructions and counseling regarding her condition or for health maintenance advice. Please see specific information pulled into the AVS if appropriate.     Signed,  Edith Will, VENESSA  06/21/2024     Dictated Utilizing Dragon Dictation: Please note that portions of this note were completed with a voice recognition program.  Part of this note may be an electronic transcription/translation of spoken language to printed text using the Dragon Dictation System.

## 2024-06-21 NOTE — ASSESSMENT & PLAN NOTE
She is in atrial fibrillation which is newly diagnosed for her.  Currently on atenolol, will increase the dose for better rate control, and get a Holter monitor to see what her overall rate and rhythm control is.  Discussed treatment strategies with López, including anticoagulation in detail.  [id number] score is elevated, and anticoagulation is indicated.  Provided her samples of the 30-day free card along with patient assistance application, and we will start her on Eliquis 5 mg twice daily.  Will get an echocardiogram to evaluate LV function and for any valvular issues.  She does not have any symptoms of chest pain, she had an ischemic evaluation in the form of a stress test in 2022.   If she is still persistently in atrial fibrillation after 6 weeks of anticoagulation therapy, then we can consider electrical cardioversion.

## 2024-06-21 NOTE — ASSESSMENT & PLAN NOTE
Is a long-term problem with pedal edema, it is a little worse than usual for her, and considering her new onset of atrial fibrillation when to give her 7 days of low-dose furosemide we try to improve her rate control, and evaluate her LV function with echocardiogram.

## 2024-06-21 NOTE — ASSESSMENT & PLAN NOTE
Stable, and she has no symptoms of angina.  We will discontinue aspirin to reduce risk of bleeding since we are initiating anticoagulation for A-fib.  Continue beta-blocker and statin therapy.

## 2024-06-21 NOTE — LETTER
June 21, 2024     Lester Lizarraga MD  [address] [place] [address]    Patient: Camilla Navas   YOB: 1950   Date of Visit: 6/21/2024       Dear Lester Lizarraga MD    Camilla Navas was in my office today. Below is a copy of my note.    If you have questions, please do not hesitate to call me. I look forward to following Camilla along with you.         Sincerely,        VENESSA Howell        CC: No Recipients    Chief Complaint  Coronary Artery Disease, Hospital Follow Up Visit, and Rapid Heart Rate    Subjective       History of Present Illness  Camilla Navas presents to [facility] CARDIOLOGY   Ms. Navas is a 73-year-old female patient coming in today for hospital follow-up.  She was admitted overnight on June 7.  She initially presented with complaints of sore throat and persistent coughing, was found to be tachycardic.  She was treated for upper respiratory infection, and asthma exacerbation once her coughing improved her rate was also improved.  However her EKG shows atrial fibrillation which is a new diagnosis for her.  She notes she was seen for follow-up with her PCP earlier this week, and her rate is still on the high side.  She is not feeling palpitations, but has been feeling very fatigued recently.  Complaining of having swelling of bilateral lower legs more than usual recently.  She has also had an ongoing cough, and with sensation of something stuck in her throat, she underwent a thyroid ultrasound yesterday, will be undergoing a barium swallow soon.          Past History:     (1) Non-obstructive coronary artery disease. Cardiac catheterization done in October 2018 showed a 40% mid LAD lesion and a 40% proximal RCA lesion. (2) Hypertension, difficult to control. (3) Diabetes mellitus, on oral medications. (4) Hyperlipidemia. (5) Breast cancer, s/p surgery.      Past Medical History:   Diagnosis Date   • Arthritis    • Asthma    •  Atherosclerosis of coronary artery 2018   • Cancer    • Diabetes mellitus    • Diverticulitis    • Hiatal hernia    • Hyperlipemia 01/21/2022   • Hypertension, essential 03/05/2022       Allergies   Allergen Reactions   • Empagliflozin Nausea And Vomiting   • Metformin Diarrhea        Past Surgical History:   Procedure Laterality Date   • COLONOSCOPY     • COLONOSCOPY N/A 11/14/2023    Procedure: COLONOSCOPY WITH POLYPECTOMIES HOT/COLD SNARE, ELEVIEW INJECTION, BIOPSIES;  Surgeon: Lester Gillette MD;  Location: [facility] ENDOSCOPY;  Service: Gastroenterology;  Laterality: N/A;  COLON POLYPS, DIVERTICULOSIS   • ENDOSCOPY N/A 11/14/2023    Procedure: ESOPHAGOGASTRODUODENOSCOPY WITH BIOPSIES;  Surgeon: Lester Gillette MD;  Location: [facility] ENDOSCOPY;  Service: Gastroenterology;  Laterality: N/A;  PREVIOUS SURGERY   • HERNIA REPAIR     • HYSTERECTOMY     • MASTECTOMY Bilateral    • UPPER GASTROINTESTINAL ENDOSCOPY          Social History  She  reports that she has never smoked. She has never been exposed to tobacco smoke. She has never used smokeless tobacco. She reports that she does not drink alcohol and does not use drugs.    Family History  Her family history is not on file.       Current Outpatient Medications on File Prior to Visit   Medication Sig   • albuterol sulfate  (90 Base) MCG/ACT inhaler Inhale 2 puffs Every 4 (Four) Hours As Needed for Wheezing. (Patient taking differently: Inhale 2 puffs Every 4 (Four) Hours As Needed for Wheezing or Shortness of Air.)   • amLODIPine (NORVASC) 10 MG tablet TAKE 1 TABLET BY MOUTH EVERY DAY   • Arthritis Pain Reliever 650 MG 8 hr tablet Take 1 tablet by mouth Every 8 (Eight) Hours As Needed for Mild Pain.   • baclofen (LIORESAL) 10 MG tablet Take 1 tablet by mouth 2 (Two) Times a Day.   • benzonatate (TESSALON) 100 MG capsule Take 1 capsule by mouth 3 (Three) Times a Day As Needed for Cough.   • brompheniramine-pseudoephedrine-DM 30-2-10 MG/5ML syrup Take 10  mL by mouth 4 (Four) Times a Day As Needed for Congestion, Cough or Allergies.   • dicyclomine (BENTYL) 20 MG tablet Take 1 tablet by mouth Every 6 (Six) Hours As Needed for Abdominal Cramping.   • gabapentin (NEURONTIN) 300 MG capsule Take 1 capsule by mouth Every Night.   • glipizide (GLUCOTROL) 5 MG tablet Take 1 tablet by mouth Daily.   • hydroCHLOROthiazide (HYDRODIURIL) 25 MG tablet TAKE 1 TABLET BY MOUTH EVERY DAY   • Hydrocod Jac-Chlorphe Jac ER (TUSSIONEX PENNKINETIC) 10-8 MG/5ML ER suspension Take 5 mL by mouth Every 12 (Twelve) Hours As Needed for Cough or Allergies.   • Jardiance 10 MG tablet tablet Take 1 tablet by mouth Daily.   • mirtazapine (REMERON) 15 MG tablet Take 1 tablet by mouth Every Night.   • nitroglycerin (NITROSTAT) 0.4 MG SL tablet 1 under the tongue as needed for angina, may repeat q5mins for up three doses   • nystatin (MYCOSTATIN) 100,000 unit/mL suspension Take 10 mL by mouth 3 (Three) Times a Day.   • ondansetron (ZOFRAN) 4 MG tablet Take 1 tablet by mouth Every 6 (Six) Hours As Needed for Nausea or Vomiting.   • pantoprazole (PROTONIX) 40 MG EC tablet Take 1 tablet by mouth Daily.   • pravastatin (PRAVACHOL) 80 MG tablet TAKE 1 TABLET BY MOUTH DAILY   • sertraline (ZOLOFT) 100 MG tablet Take 2 tablets by mouth Daily.   • TiZANidine (ZANAFLEX) 2 MG capsule    • vitamin D3 125 MCG (5000 UT) capsule capsule Take 1 capsule by mouth Daily.   • vitamin E 1000 UNIT capsule Take 1 capsule by mouth Daily.   • [DISCONTINUED] aspirin 81 MG EC tablet Take 1 tablet by mouth Daily.   • [DISCONTINUED] atenolol (TENORMIN) 50 MG tablet TAKE 1 TABLET EVERY DAY     No current facility-administered medications on file prior to visit.         Review of Systems   Constitutional:  Positive for fatigue.   Respiratory:  Positive for cough. Negative for chest tightness and shortness of breath.    Cardiovascular:  Positive for leg swelling. Negative for chest pain and palpitations.   Gastrointestinal:   "Negative for nausea and vomiting.   Neurological:  Negative for dizziness and syncope.        Objective  Vitals:    06/21/24 1340   BP: 130/74   Pulse: 97   Weight: 91.4 kg (201 lb 9.6 oz)   Height: 165.1 cm (65\")         Physical Exam  General : Alert, awake, no acute distress  Neck : Supple, no carotid bruit, no jugular venous distention  CVS : Irregular rhythm, no murmur, no rubs or gallops  Lungs: Clear to auscultation bilaterally, no crackles or rhonchi  Abdomen: Soft, nontender, bowel sounds active  Extremities: Warm, well-perfused, +1 bilateral pedal edema      Result Review    The following data was reviewed by VENESSA Howell  proBNP   Date Value Ref Range Status   06/07/2024 2,509.0 (H) 0.0 - 900.0 pg/mL Final     CMP          5/24/2024    13:56 6/7/2024    02:06 6/8/2024    04:32   CMP   Glucose 147  214  152    BUN 15  19  15    Creatinine 0.75  0.96  0.74    EGFR 83.7  62.2  85.0    Sodium 139  140  138    Potassium 3.6  3.0  4.0    Chloride 100  101  104    Calcium 8.9  9.2  9.2    Total Protein 7.0  6.7  6.4    Albumin 4.3  4.2  3.9    Globulin 2.7  2.5  2.5    Total Bilirubin 0.2  0.2  0.3    Alkaline Phosphatase 83  105  74    AST (SGOT) 18  18  15    ALT (SGPT) 8  15  11    Albumin/Globulin Ratio 1.6  1.7  1.6    BUN/Creatinine Ratio 20.0  19.8  20.3    Anion Gap 10.4  16.9  11.5      CBC w/diff          2/21/2024    16:19 5/24/2024    13:56 6/7/2024    02:06   CBC w/Diff   WBC 6.86  6.16  9.60    RBC 3.91  3.86  3.99    Hemoglobin 11.6  11.6  12.0    Hematocrit 36.0  34.9  36.6    MCV 92.1  90.4  91.7    MCH 29.7  30.1  30.1    MCHC 32.2  33.2  32.8    RDW 12.9  13.1  13.5    Platelets 272  248  265    Neutrophil Rel % 66.8  63.0  60.2    Immature Granulocyte Rel % 0.3  0.5  0.5    Lymphocyte Rel % 21.4  24.7  28.1    Monocyte Rel % 9.5  10.2  10.2    Eosinophil Rel % 1.7  1.3  0.6    Basophil Rel % 0.3  0.3  0.4       Lab Results   Component Value Date    TSH 2.280 06/07/2024      No results " "found for: \"FREET4\"   No results found for: \"DDIMERQUANT\"  Magnesium   Date Value Ref Range Status   06/07/2024 1.6 1.6 - 2.4 mg/dL Final      No results found for: \"DIGOXIN\"   Lab Results   Component Value Date    TROPONINT 25 (H) 06/07/2024                 Results for orders placed during the hospital encounter of 03/30/22    Stress Test With Myocardial Perfusion One Day    Interpretation Summary  · Myocardial perfusion imaging indicates a normal myocardial perfusion study with no evidence of ischemia.  · Left ventricular ejection fraction is normal. (Calculated EF = 59%).  · Fair exercise tolerance noted.  · There was no chest pain with exercise.  · Exercise EKG is negative for ischemic changes.  · Impressions are consistent with a low risk study.           Assessment and Plan   Diagnoses and all orders for this visit:    1. Atrial fibrillation, unspecified type (Primary)  Assessment & Plan:  She is in atrial fibrillation which is newly diagnosed for her.  Currently on atenolol, will increase the dose for better rate control, and get a Holter monitor to see what her overall rate and rhythm control is.  Discussed treatment strategies with A-fib, including anticoagulation in detail.  [id number] score is elevated, and anticoagulation is indicated.  Provided her samples of the 30-day free card along with patient assistance application, and we will start her on Eliquis 5 mg twice daily.  Will get an echocardiogram to evaluate LV function and for any valvular issues.  She does not have any symptoms of chest pain, she had an ischemic evaluation in the form of a stress test in 2022.   If she is still persistently in atrial fibrillation after 6 weeks of anticoagulation therapy, then we can consider electrical cardioversion.    Orders:  -     Holter Monitor - 72 Hour Up To 15 Days; Future  -     Adult Transthoracic Echo Complete w/ Color, Spectral and Contrast if necessary per protocol; Future  -     CBC (No Diff); " Future  -     Cancel: Basic Metabolic Panel; Future    2. Nonobstructive atherosclerosis of coronary artery  Assessment & Plan:  Stable, and she has no symptoms of angina.  We will discontinue aspirin to reduce risk of bleeding since we are initiating anticoagulation for A-fib.  Continue beta-blocker and statin therapy.      3. Essential hypertension  Assessment & Plan:  Blood pressure is reasonably well-controlled.  For now we will continue current regiment, with the exception of increasing dose of atenolol.  Encouraged her to monitor blood pressure closely at home, increasing atenolol should help with her rate control, if needed to we can back off on an dose of amlodipine.      4. Mixed hyperlipidemia  Assessment & Plan:  Need updated fasting lipid profile.  We will continue current dose of pravastatin, and recheck fasting lipid profile with next blood draw.      5. Pedal edema  Assessment & Plan:  Is a long-term problem with pedal edema, it is a little worse than usual for her, and considering her new onset of atrial fibrillation when to give her 7 days of low-dose furosemide we try to improve her rate control, and evaluate her LV function with echocardiogram.      Other orders  -     atenolol (TENORMIN) 100 MG tablet; Take 1 tablet by mouth Daily.  Dispense: 90 tablet; Refill: 1  -     furosemide (LASIX) 20 MG tablet; Take 1 tablet by mouth Daily. Take once daily for 7 days, then as needed for increased swelling  Dispense: 30 tablet; Refill: 0  -     apixaban (ELIQUIS) 5 MG tablet tablet; Take 1 tablet by mouth Every 12 (Twelve) Hours.  Dispense: 180 tablet; Refill: 3        [id number] Score: 5         Follow Up   No follow-ups on file.  Will Schedule after Holter and echo are available    Patient was given instructions and counseling regarding her condition or for health maintenance advice. Please see specific information pulled into the AVS if appropriate.     Signed,  Edith Will, APRN  06/21/2024      Dictated Utilizing Dragon Dictation: Please note that portions of this note were completed with a voice recognition program.  Part of this note may be an electronic transcription/translation of spoken language to printed text using the Dragon Dictation System.

## 2024-06-24 ENCOUNTER — READMISSION MANAGEMENT (OUTPATIENT)
Dept: CALL CENTER | Facility: HOSPITAL | Age: 74
End: 2024-06-24
Payer: MEDICARE

## 2024-06-24 NOTE — OUTREACH NOTE
CHF Week 2 Survey      Flowsheet Row Responses   [facility] facility patient discharged from? [facility]   Does the patient have one of the following disease processes/diagnoses(primary or secondary)? CHF   Week 2 attempt successful? No   Unsuccessful attempts Attempt 1            Simin WESLEY - Registered Nurse

## 2024-07-05 ENCOUNTER — TELEPHONE (OUTPATIENT)
Dept: CARDIOLOGY | Facility: CLINIC | Age: 74
End: 2024-07-05
Payer: MEDICARE

## 2024-07-05 RX ORDER — DILTIAZEM HYDROCHLORIDE 120 MG/1
120 CAPSULE, COATED, EXTENDED RELEASE ORAL DAILY
Qty: 90 CAPSULE | Refills: 3 | Status: SHIPPED | OUTPATIENT
Start: 2024-07-05

## 2024-07-05 NOTE — TELEPHONE ENCOUNTER
RUSSELL patient. Went over results and medication changes. Updated medication list. Sent new prescription. Patient verbalized understanding and appreciation.

## 2024-07-05 NOTE — TELEPHONE ENCOUNTER
----- Message from Edith Will sent at 7/4/2024  9:52 AM EDT -----  Holter monitor study shows she is remaining in atrial fibrillation, and her rate is still on the higher side.  Have her stop taking amlodipine, and send a prescription for diltiazem CD1 120 mg once daily.  Schedule her for 6-week follow-up with me to reevaluate her rate and rhythm control.

## 2024-07-09 ENCOUNTER — APPOINTMENT (OUTPATIENT)
Dept: ULTRASOUND IMAGING | Facility: HOSPITAL | Age: 74
End: 2024-07-09
Payer: MEDICARE

## 2024-07-09 ENCOUNTER — HOSPITAL ENCOUNTER (OUTPATIENT)
Dept: GENERAL RADIOLOGY | Facility: HOSPITAL | Age: 74
Discharge: HOME | End: 2024-07-09
Admitting: NURSE PRACTITIONER
Payer: MEDICARE

## 2024-07-09 DIAGNOSIS — R13.12 OROPHARYNGEAL DYSPHAGIA: ICD-10-CM

## 2024-07-09 PROCEDURE — 63710000001 BARIUM SULFATE 700 MG TABLET: Performed by: NURSE PRACTITIONER

## 2024-07-09 PROCEDURE — A9270 NON-COVERED ITEM OR SERVICE: HCPCS | Performed by: NURSE PRACTITIONER

## 2024-07-09 PROCEDURE — 63710000001 BARIUM SULFATE 60 % SUSPENSION: Performed by: NURSE PRACTITIONER

## 2024-07-09 PROCEDURE — 74221 X-RAY XM ESOPHAGUS 2CNTRST: CPT

## 2024-07-09 PROCEDURE — 63710000001 SOD BICARB-CITRIC ACID-SIMETHICONE 2.21-1.53-0.04 G PACK: Performed by: NURSE PRACTITIONER

## 2024-07-09 PROCEDURE — 63710000001 BARIUM SULFATE 98 % RECONSTITUTED SUSPENSION: Performed by: NURSE PRACTITIONER

## 2024-07-09 RX ADMIN — BARIUM SULFATE 355 ML: 0.6 SUSPENSION ORAL at 10:38

## 2024-07-09 RX ADMIN — BARIUM SULFATE 135 ML: 980 POWDER, FOR SUSPENSION ORAL at 10:38

## 2024-07-09 RX ADMIN — ANTACID/ANTIFLATULENT 1 PACKET: 380; 550; 10; 10 GRANULE, EFFERVESCENT ORAL at 10:38

## 2024-07-09 RX ADMIN — BARIUM SULFATE 700 MG: 700 TABLET ORAL at 10:37

## 2024-07-15 RX ORDER — FUROSEMIDE 20 MG/1
TABLET ORAL
Qty: 30 TABLET | Refills: 0 | Status: SHIPPED | OUTPATIENT
Start: 2024-07-15

## 2024-07-18 ENCOUNTER — HOSPITAL ENCOUNTER (OUTPATIENT)
Dept: CARDIOLOGY | Facility: HOSPITAL | Age: 74
Discharge: HOME | End: 2024-07-18
Admitting: FAMILY MEDICINE
Payer: MEDICARE

## 2024-07-18 DIAGNOSIS — I48.91 ATRIAL FIBRILLATION, UNSPECIFIED TYPE: ICD-10-CM

## 2024-07-18 LAB
BH CV ECHO MEAS - ACS: 1.57 CM
BH CV ECHO MEAS - AO MAX PG: 7.6 MMHG
BH CV ECHO MEAS - AO MEAN PG: 4.7 MMHG
BH CV ECHO MEAS - AO ROOT DIAM: 3.1 CM
BH CV ECHO MEAS - AO V2 MAX: 137.8 CM/SEC
BH CV ECHO MEAS - AO V2 VTI: 22.4 CM
BH CV ECHO MEAS - EF(MOD-BP): 50 %
BH CV ECHO MEAS - IVS/LVPW: 0.91 CM
BH CV ECHO MEAS - IVSD: 1 CM
BH CV ECHO MEAS - LA DIMENSION: 4.2 CM
BH CV ECHO MEAS - LAT PEAK E' VEL: 10.5 CM/SEC
BH CV ECHO MEAS - LV MAX PG: 4 MMHG
BH CV ECHO MEAS - LV MEAN PG: 1.8 MMHG
BH CV ECHO MEAS - LV V1 MAX: 100 CM/SEC
BH CV ECHO MEAS - LV V1 VTI: 15.1 CM
BH CV ECHO MEAS - LVIDD: 3.8 CM
BH CV ECHO MEAS - LVIDS: 2.6 CM
BH CV ECHO MEAS - LVOT DIAM: 2 CM
BH CV ECHO MEAS - LVPWD: 1.1 CM
BH CV ECHO MEAS - MED PEAK E' VEL: 10.5 CM/SEC
BH CV ECHO MEAS - MR VTI: 128 CM
BH CV ECHO MEAS - MV A MAX VEL: 79.9 CM/SEC
BH CV ECHO MEAS - MV DEC TIME: 82 SEC
BH CV ECHO MEAS - MV E MAX VEL: 108 CM/SEC
BH CV ECHO MEAS - MV MAX PG: 87.9 MMHG
BH CV ECHO MEAS - MV MEAN PG: 59 MMHG
BH CV ECHO MEAS - MV V2 VTI: 127.9 CM
BH CV ECHO MEAS - TAPSE (>1.6): 1.14 CM
BH CV ECHO MEAS - TR MAX PG: 26.6 MMHG
BH CV ECHO MEAS - TR MAX VEL: 257.9 CM/SEC
BH CV ECHO MEASUREMENTS AVERAGE E/E' RATIO: 10.29
MR PISA EROA: 3.17 CM2
PISA RADIUS: 0.7 CM

## 2024-07-18 PROCEDURE — 93306 TTE W/DOPPLER COMPLETE: CPT

## 2024-07-23 LAB
BH CV ECHO MEAS - ACS: 1.57 CM
BH CV ECHO MEAS - AO MAX PG: 7.6 MMHG
BH CV ECHO MEAS - AO MEAN PG: 4.7 MMHG
BH CV ECHO MEAS - AO ROOT DIAM: 3.1 CM
BH CV ECHO MEAS - AO V2 MAX: 137.8 CM/SEC
BH CV ECHO MEAS - AO V2 VTI: 22.4 CM
BH CV ECHO MEAS - EF(MOD-BP): 50 %
BH CV ECHO MEAS - IVS/LVPW: 0.91 CM
BH CV ECHO MEAS - IVSD: 1 CM
BH CV ECHO MEAS - LA DIMENSION: 4.2 CM
BH CV ECHO MEAS - LAT PEAK E' VEL: 10.5 CM/SEC
BH CV ECHO MEAS - LV MAX PG: 4 MMHG
BH CV ECHO MEAS - LV MEAN PG: 1.8 MMHG
BH CV ECHO MEAS - LV V1 MAX: 100 CM/SEC
BH CV ECHO MEAS - LV V1 VTI: 15.1 CM
BH CV ECHO MEAS - LVIDD: 3.8 CM
BH CV ECHO MEAS - LVIDS: 2.6 CM
BH CV ECHO MEAS - LVOT DIAM: 2 CM
BH CV ECHO MEAS - LVPWD: 1.1 CM
BH CV ECHO MEAS - MED PEAK E' VEL: 10.5 CM/SEC
BH CV ECHO MEAS - MR VTI: 128 CM
BH CV ECHO MEAS - MV A MAX VEL: 79.9 CM/SEC
BH CV ECHO MEAS - MV DEC TIME: 82 SEC
BH CV ECHO MEAS - MV E MAX VEL: 108 CM/SEC
BH CV ECHO MEAS - MV MAX PG: 87.9 MMHG
BH CV ECHO MEAS - MV MEAN PG: 59 MMHG
BH CV ECHO MEAS - MV V2 VTI: 127.9 CM
BH CV ECHO MEAS - RAP SYSTOLE: 5 MMHG
BH CV ECHO MEAS - RVSP: 32 MMHG
BH CV ECHO MEAS - TAPSE (>1.6): 1.14 CM
BH CV ECHO MEAS - TR MAX PG: 26.6 MMHG
BH CV ECHO MEAS - TR MAX VEL: 257.9 CM/SEC
BH CV ECHO MEASUREMENTS AVERAGE E/E' RATIO: 10.29
MR PISA EROA: 3.17 CM2
PISA RADIUS: 0.7 CM

## 2024-07-30 ENCOUNTER — TELEPHONE (OUTPATIENT)
Dept: CARDIOLOGY | Facility: CLINIC | Age: 74
End: 2024-07-30
Payer: MEDICARE

## 2024-07-30 NOTE — TELEPHONE ENCOUNTER
----- Message from Edith Will sent at 7/28/2024  5:26 PM EDT -----  Echocardiogram shows low normal system function with EF of 50%.  She has mild to moderate mitral valve leaking.  This is something we will monitor in the future with repeat echocardiograms in 1 to 2 years.  We recently made medication adjustment due to her atrial fibrillation, we need to schedule her 6-week office follow-up with either Dr. Guadarrama or myself to reevaluate .

## 2024-08-19 RX ORDER — FUROSEMIDE 20 MG/1
TABLET ORAL
Qty: 30 TABLET | Refills: 0 | Status: SHIPPED | OUTPATIENT
Start: 2024-08-19

## 2024-08-19 RX ORDER — FUROSEMIDE 20 MG/1
TABLET ORAL
Qty: 90 TABLET | OUTPATIENT
Start: 2024-08-19

## 2024-09-11 ENCOUNTER — OFFICE VISIT (OUTPATIENT)
Dept: CARDIOLOGY | Facility: CLINIC | Age: 74
End: 2024-09-11
Payer: MEDICARE

## 2024-09-11 VITALS
SYSTOLIC BLOOD PRESSURE: 130 MMHG | HEIGHT: 65 IN | HEART RATE: 86 BPM | WEIGHT: 202 LBS | BODY MASS INDEX: 33.66 KG/M2 | DIASTOLIC BLOOD PRESSURE: 80 MMHG

## 2024-09-11 DIAGNOSIS — E78.2 MIXED HYPERLIPIDEMIA: ICD-10-CM

## 2024-09-11 DIAGNOSIS — I10 ESSENTIAL HYPERTENSION: ICD-10-CM

## 2024-09-11 DIAGNOSIS — R60.0 PEDAL EDEMA: ICD-10-CM

## 2024-09-11 DIAGNOSIS — I48.19 PERSISTENT ATRIAL FIBRILLATION: Primary | ICD-10-CM

## 2024-09-11 DIAGNOSIS — I25.10 NONOBSTRUCTIVE ATHEROSCLEROSIS OF CORONARY ARTERY: ICD-10-CM

## 2024-09-11 PROCEDURE — 3079F DIAST BP 80-89 MM HG: CPT | Performed by: FAMILY MEDICINE

## 2024-09-11 PROCEDURE — 99214 OFFICE O/P EST MOD 30 MIN: CPT | Performed by: FAMILY MEDICINE

## 2024-09-11 PROCEDURE — 3075F SYST BP GE 130 - 139MM HG: CPT | Performed by: FAMILY MEDICINE

## 2024-09-11 NOTE — PROGRESS NOTES
Chief Complaint  Follow up after echo (Having some fatigue and weakness)    Subjective        History of Present Illness  Camilla Navas presents to [facility] CARDIOLOGY   Ms. Navas is a 73-year-old female patient coming in today for follow-up of atrial fibrillation.  She was diagnosed with atrial fibrillation earlier this year, at her last office visit, we increased dose of atenolol.  Subsequently she underwent Holter monitor study to evaluate her overall atrial fibrillation burden, and she remained in atrial fibrillation throughout the monitor study.  Amlodipine was transitioned over to diltiazem.  She also had echocardiogram  showing mild to moderate mitral regurgitation.  She is tolerating medication adjustments well, not complaining today of any symptoms of palpitations or shortness of breath.             Past History:     (1) Non-obstructive coronary artery disease. Cardiac catheterization done in October 2018 showed a 40% mid LAD lesion and a 40% proximal RCA lesion. (2) Hypertension, difficult to control. (3) Diabetes mellitus, on oral medications. (4) Hyperlipidemia. (5) Breast cancer, s/p surgery. 6)    Past Medical History:   Diagnosis Date    Arthritis     Asthma     Atherosclerosis of coronary artery 2018    Cancer     Diabetes mellitus     Diverticulitis     Hiatal hernia     Hyperlipemia 01/21/2022    Hypertension, essential 03/05/2022       Allergies   Allergen Reactions    Empagliflozin Nausea And Vomiting    Metformin Diarrhea        Past Surgical History:   Procedure Laterality Date    COLONOSCOPY      COLONOSCOPY N/A 11/14/2023    Procedure: COLONOSCOPY WITH POLYPECTOMIES HOT/COLD SNARE, ELEVIEW INJECTION, BIOPSIES;  Surgeon: Lester Gillette MD;  Location: [facility] ENDOSCOPY;  Service: Gastroenterology;  Laterality: N/A;  COLON POLYPS, DIVERTICULOSIS    ENDOSCOPY N/A 11/14/2023    Procedure: ESOPHAGOGASTRODUODENOSCOPY WITH BIOPSIES;  Surgeon: Lester Gillette MD;   Location: [facility] ENDOSCOPY;  Service: Gastroenterology;  Laterality: N/A;  PREVIOUS SURGERY    HERNIA REPAIR      HYSTERECTOMY      MASTECTOMY Bilateral     UPPER GASTROINTESTINAL ENDOSCOPY          Social History  She  reports that she has never smoked. She has never been exposed to tobacco smoke. She has never used smokeless tobacco. She reports that she does not drink alcohol and does not use drugs.    Family History  Her family history is not on file.       Current Outpatient Medications on File Prior to Visit   Medication Sig    albuterol sulfate  (90 Base) MCG/ACT inhaler Inhale 2 puffs Every 4 (Four) Hours As Needed for Wheezing. (Patient taking differently: Inhale 2 puffs Every 4 (Four) Hours As Needed for Wheezing or Shortness of Air.)    apixaban (ELIQUIS) 5 MG tablet tablet Take 1 tablet by mouth Every 12 (Twelve) Hours.    Arthritis Pain Reliever 650 MG 8 hr tablet Take 1 tablet by mouth Every 8 (Eight) Hours As Needed for Mild Pain.    atenolol (TENORMIN) 100 MG tablet Take 1 tablet by mouth Daily.    baclofen (LIORESAL) 10 MG tablet Take 1 tablet by mouth 2 (Two) Times a Day.    dilTIAZem CD (CARDIZEM CD) 120 MG 24 hr capsule Take 1 capsule by mouth Daily.    gabapentin (NEURONTIN) 300 MG capsule Take 1 capsule by mouth Every Night.    glipizide (GLUCOTROL) 5 MG tablet Take 1 tablet by mouth Daily.    hydroCHLOROthiazide (HYDRODIURIL) 25 MG tablet TAKE 1 TABLET BY MOUTH EVERY DAY    Hydrocod Jac-Chlorphe Jac ER (TUSSIONEX PENNKINETIC) 10-8 MG/5ML ER suspension Take 5 mL by mouth Every 12 (Twelve) Hours As Needed for Cough or Allergies.    Jardiance 10 MG tablet tablet Take 1 tablet by mouth Daily.    methylPREDNISolone (MEDROL) 4 MG dose pack Take as directed on package instructions.    mirtazapine (REMERON) 15 MG tablet Take 1 tablet by mouth Every Night.    nitroglycerin (NITROSTAT) 0.4 MG SL tablet 1 under the tongue as needed for angina, may repeat q5mins for up three doses    nystatin  "(MYCOSTATIN) 100,000 unit/mL suspension Take 10 mL by mouth 3 (Three) Times a Day.    ondansetron (ZOFRAN) 4 MG tablet Take 1 tablet by mouth Every 6 (Six) Hours As Needed for Nausea or Vomiting.    pantoprazole (PROTONIX) 40 MG EC tablet Take 1 tablet by mouth Daily.    pravastatin (PRAVACHOL) 80 MG tablet TAKE 1 TABLET BY MOUTH DAILY    sertraline (ZOLOFT) 100 MG tablet Take 2 tablets by mouth Daily.    TiZANidine (ZANAFLEX) 2 MG capsule     vitamin D3 125 MCG (5000 UT) capsule capsule Take 1 capsule by mouth Daily.    vitamin E 1000 UNIT capsule Take 1 capsule by mouth Daily.     No current facility-administered medications on file prior to visit.         Review of Systems     Objective   Vitals:    09/11/24 1305   BP: 130/80   Pulse: 86   Weight: 91.6 kg (202 lb)   Height: 165.1 cm (65\")         Physical Exam  General : Alert, awake, no acute distress  Neck : Supple, no carotid bruit, no jugular venous distention  CVS : Irregularly irregular rhythm, no murmur, no rubs or gallops  Lungs: Clear to auscultation bilaterally, no crackles or rhonchi  Abdomen: Soft, nontender, bowel sounds active  Extremities: Warm, well-perfused, no pedal edema      Result Review     The following data was reviewed by VENESSA Howell  proBNP   Date Value Ref Range Status   06/07/2024 2,509.0 (H) 0.0 - 900.0 pg/mL Final     CMP          5/24/2024    13:56 6/7/2024    02:06 6/8/2024    04:32   CMP   Glucose 147  214  152    BUN 15  19  15    Creatinine 0.75  0.96  0.74    EGFR 83.7  62.2  85.0    Sodium 139  140  138    Potassium 3.6  3.0  4.0    Chloride 100  101  104    Calcium 8.9  9.2  9.2    Total Protein 7.0  6.7  6.4    Albumin 4.3  4.2  3.9    Globulin 2.7  2.5  2.5    Total Bilirubin 0.2  0.2  0.3    Alkaline Phosphatase 83  105  74    AST (SGOT) 18  18  15    ALT (SGPT) 8  15  11    Albumin/Globulin Ratio 1.6  1.7  1.6    BUN/Creatinine Ratio 20.0  19.8  20.3    Anion Gap 10.4  16.9  11.5      CBC w/diff          " "2/21/2024    16:19 5/24/2024    13:56 6/7/2024    02:06   CBC w/Diff   WBC 6.86  6.16  9.60    RBC 3.91  3.86  3.99    Hemoglobin 11.6  11.6  12.0    Hematocrit 36.0  34.9  36.6    MCV 92.1  90.4  91.7    MCH 29.7  30.1  30.1    MCHC 32.2  33.2  32.8    RDW 12.9  13.1  13.5    Platelets 272  248  265    Neutrophil Rel % 66.8  63.0  60.2    Immature Granulocyte Rel % 0.3  0.5  0.5    Lymphocyte Rel % 21.4  24.7  28.1    Monocyte Rel % 9.5  10.2  10.2    Eosinophil Rel % 1.7  1.3  0.6    Basophil Rel % 0.3  0.3  0.4       Lab Results   Component Value Date    TSH 2.280 06/07/2024      No results found for: \"FREET4\"   No results found for: \"DDIMERQUANT\"  Magnesium   Date Value Ref Range Status   06/07/2024 1.6 1.6 - 2.4 mg/dL Final      No results found for: \"DIGOXIN\"   Lab Results   Component Value Date    TROPONINT 25 (H) 06/07/2024               Results for orders placed during the hospital encounter of 07/18/24    Adult Transthoracic Echo Complete w/ Color, Spectral and Contrast if necessary per protocol    Interpretation Summary    Left ventricular systolic function is low normal. Calculated left ventricular EF = 50%    Left atrial cavity is borderline dilated.    There is mild to moderate mitral regurgitation.    Estimated right ventricular systolic pressure from tricuspid regurgitation is normal (<35 mmHg).    Results for orders placed during the hospital encounter of 03/30/22    Stress Test With Myocardial Perfusion One Day    Interpretation Summary  · Myocardial perfusion imaging indicates a normal myocardial perfusion study with no evidence of ischemia.  · Left ventricular ejection fraction is normal. (Calculated EF = 59%).  · Fair exercise tolerance noted.  · There was no chest pain with exercise.  · Exercise EKG is negative for ischemic changes.  · Impressions are consistent with a low risk study.    Holter monitor  6/21/2024    Patient was monitored for 4 days, 17 hours and 29 minutes.    Patient was in " atrial fibrillation throughout the exam.    Lowest heart rate was 70 bpm, average heart rate was 101 bpm, maximum heart rate was 167 bpm.    Rare PVC, couplet.    1 patient activated event corresponding to atrial fibrillation.          Assessment and Plan   Diagnoses and all orders for this visit:    1. Persistent atrial fibrillation (Primary)  Assessment & Plan:  She is in atrial fibrillation with controlled ventricular rates.  We will continue current doses of amlodipine and diltiazem.  We have discussed rate versus rhythm strategies, and she is doing well with rate control.  Continue anticoagulation with Eliquis for CVA protection.    Orders:  -     CBC (No Diff); Future  -     Comprehensive Metabolic Panel; Future    2. Nonobstructive atherosclerosis of coronary artery  Assessment & Plan:  She is stable without symptoms of angina.  Continue beta-blocker and statin therapy.      3. Essential hypertension  Assessment & Plan:  Pressure is well-controlled, continue current medications.    Orders:  -     Comprehensive Metabolic Panel; Future    4. Mixed hyperlipidemia  Assessment & Plan:   Uncertain control, continue current dose pravastatin, and recheck fasting lipid profile before next follow-up visit.    Orders:  -     Comprehensive Metabolic Panel; Future  -     Lipid Panel; Future    5. Pedal edema  Assessment & Plan:  No significant edema today.  She was previously given furosemide that she can use as needed for lower extremity swelling.              Follow Up   Return for Will schedule after cardioversion.    Patient was given instructions and counseling regarding her condition or for health maintenance advice. Please see specific information pulled into the AVS if appropriate.     Signed,  Edith Will, VENESSA  09/11/2024     Dictated Utilizing Dragon Dictation: Please note that portions of this note were completed with a voice recognition program.  Part of this note may be an electronic  transcription/translation of spoken language to printed text using the Dragon Dictation System.

## 2024-09-16 RX ORDER — FUROSEMIDE 20 MG
TABLET ORAL
Qty: 30 TABLET | Refills: 1 | Status: SHIPPED | OUTPATIENT
Start: 2024-09-16

## 2024-09-16 RX ORDER — FUROSEMIDE 20 MG
TABLET ORAL
Qty: 90 TABLET | OUTPATIENT
Start: 2024-09-16

## 2024-09-30 NOTE — PROGRESS NOTES
Primary Care Provider  Lester iLzarraga MD   Referring Provider  Allie Blanco, *    Patient Complaint  Establish Care, Asthma, Cough (Soemtimes productive yellow), and Shortness of Breath (On exertion)      Subjective       History of Presenting Illness  Camilla Navas is a pleasant 73 y.o. female who presents to [facility] for new patient appointment. Patient was referred by PCP for asthma.  Patient states she was diagnosed with asthma several years ago.  She currently has an albuterol inhaler and nebulizer machine.  Patient states that her nebulizer machine is very old.  Patient states that earlier this May she went to emergency for bad cough and was told she had bronchitis and would need to follow-up with pulmonology.  Patient states she gets shortness of breath with minimal exertional activities this has been going on since May as well.  Sometimes she has a productive cough with thick yellow sputum.  She is requesting flu vaccine today.  Patient is on Eliquis for atrial fibrillation.  She states she has never had COVID.  Patient states she has had a secondhand smoke exposure in childhood and adulthood.  She has no pets.  She has no known chemical or environmental exposures other than secondhand smoke.  Patient states she was diagnosed with breast cancer in 2001 and again in 2018.  She has had bilateral mastectomies.  She has had no further follow-up with oncology.  She is a non-smoker.  She is up-to-date with her COVID RSV and pneumonia vaccines.  She is requesting flu vaccine today.  She has never had a pulmonary function test.  She did have a chest x-ray when she went to the hospital in July which showed hyperexpansion of lungs with chronic interstitial changes suggestive of COPD.  Chest x-ray also revealed scarring at the right lung base peripherally.  She has not had a CT scan of her lungs.      Patient denies  wheezing, headaches, chest  pain, weight loss or hemoptysis. Patient denies fevers, chills and night sweats. Camilla Navas is able to perform ADLs without difficulties.    I have personally reviewed the review of systems, past family, social, medical and surgical histories; and agree with their findings.      Review of Systems   Constitutional: Negative.    HENT: Negative.     Respiratory:  Positive for cough, shortness of breath and wheezing.    Cardiovascular: Negative.    Musculoskeletal: Negative.    Neurological: Negative.    Psychiatric/Behavioral: Negative.           Family History   Problem Relation Age of Onset   • Colon cancer Neg Hx    • Malig Hyperthermia Neg Hx         Social History     Socioeconomic History   • Marital status:    Tobacco Use   • Smoking status: Never     Passive exposure: Never   • Smokeless tobacco: Never   Vaping Use   • Vaping status: Never Used   Substance and Sexual Activity   • Alcohol use: Never   • Drug use: Never   • Sexual activity: Defer        Past Medical History:   Diagnosis Date   • Arthritis    • Asthma    • Atherosclerosis of coronary artery 2018    Non-obstructive coronary artery disease. Cardiac catheterization done in October 2018 showed a 40% mid LAD lesion and a 40% proximal RCA lesion.   • Cancer     Breast.    • Diabetes mellitus    • Diverticulitis    • Hiatal hernia    • Hyperlipemia 01/21/2022   • Hypertension, essential 03/05/2022        Immunization History   Administered Date(s) Administered   • 31-influenza Vac Quardvalent Preservativ 10/16/2019, 09/17/2020, 09/29/2023   • ABRYSVO (RSV, 60+ or pregnant women 32-36 wks) 04/30/2024   • COVID-19 (MODERNA) 12YRS+ (SPIKEVAX) 09/04/2024   • COVID-19 (MODERNA) BIVALENT 12+YRS 09/26/2022   • COVID-19 (PFIZER) Purple Cap Monovalent 07/27/2021, 08/17/2021   • Covid-19 (Pfizer) Gray Cap Monovalent 03/07/2022   • Fluzone  >6mos 10/08/2012, 09/24/2013, 11/22/2017, 09/26/2018, 10/16/2019, 09/17/2020   • Fluzone High-Dose 65+yrs  09/26/2022   • Fluzone Quad >6mos (Multi-dose) 10/06/2021   • Influenza Injectable Mdck Pf Quad 11/22/2017, 09/26/2018   • Influenza TIV (IM) 10/04/2007, 10/06/2008, 09/09/2009, 09/21/2010, 10/07/2011, 09/15/2014   • Influenza, Unspecified 11/22/2017, 09/26/2018   • Pneumococcal Conjugate 13-Valent (PCV13) 03/29/2015   • Pneumococcal Conjugate 20-Valent (PCV20) 09/04/2024   • Pneumococcal Polysaccharide (PPSV23) 12/06/2011, 09/17/2020   • Shingrix 04/30/2024, 07/31/2024   • TD Preservative Free (Tenivac) 08/01/2008       Allergies   Allergen Reactions   • Empagliflozin Nausea And Vomiting   • Metformin Diarrhea          Current Outpatient Medications:   •  albuterol sulfate  (90 Base) MCG/ACT inhaler, Inhale 2 puffs Every 4 (Four) Hours As Needed for Wheezing. (Patient taking differently: Inhale 2 puffs Every 4 (Four) Hours As Needed for Wheezing or Shortness of Air.), Disp: 8 g, Rfl: 0  •  apixaban (ELIQUIS) 5 MG tablet tablet, Take 1 tablet by mouth Every 12 (Twelve) Hours., Disp: 180 tablet, Rfl: 3  •  Arthritis Pain Reliever 650 MG 8 hr tablet, Take 1 tablet by mouth Every 8 (Eight) Hours As Needed for Mild Pain., Disp: , Rfl:   •  atenolol (TENORMIN) 100 MG tablet, Take 1 tablet by mouth Daily., Disp: 90 tablet, Rfl: 1  •  baclofen (LIORESAL) 10 MG tablet, Take 1 tablet by mouth 2 (Two) Times a Day., Disp: , Rfl:   •  furosemide (LASIX) 20 MG tablet, TAKE 1 TABLET BY MOUTH EVERY DAY FOR 7 DAYS, THEN USE EVERY DAY AS NEEDED FOR INCREASED SWELLING (Patient not taking: Reported on 10/3/2024), Disp: 30 tablet, Rfl: 1  •  gabapentin (NEURONTIN) 300 MG capsule, Take 1 capsule by mouth Every Night., Disp: , Rfl:   •  hydroCHLOROthiazide (HYDRODIURIL) 25 MG tablet, TAKE 1 TABLET BY MOUTH EVERY DAY, Disp: 90 tablet, Rfl: 2  •  Hydrocod Jac-Chlorphe Jac ER (TUSSIONEX PENNKINETIC) 10-8 MG/5ML ER suspension, Take 5 mL by mouth Every 12 (Twelve) Hours As Needed for Cough or Allergies., Disp: 100 mL, Rfl: 0  •   "mirtazapine (REMERON) 15 MG tablet, Take 1 tablet by mouth Every Night., Disp: , Rfl:   •  nitroglycerin (NITROSTAT) 0.4 MG SL tablet, 1 under the tongue as needed for angina, may repeat q5mins for up three doses, Disp: 25 tablet, Rfl: 1  •  ondansetron (ZOFRAN) 4 MG tablet, Take 1 tablet by mouth Every 6 (Six) Hours As Needed for Nausea or Vomiting., Disp: , Rfl:   •  pantoprazole (PROTONIX) 40 MG EC tablet, Take 1 tablet by mouth Daily., Disp: , Rfl:   •  pravastatin (PRAVACHOL) 80 MG tablet, TAKE 1 TABLET BY MOUTH DAILY, Disp: 90 tablet, Rfl: 3  •  sertraline (ZOLOFT) 100 MG tablet, Take 2 tablets by mouth Daily., Disp: , Rfl:   •  TiZANidine (ZANAFLEX) 2 MG capsule, , Disp: , Rfl:   •  vitamin D3 125 MCG (5000 UT) capsule capsule, Take 1 capsule by mouth Daily., Disp: , Rfl:   •  vitamin E 1000 UNIT capsule, Take 1 capsule by mouth Daily., Disp: , Rfl:   •  dilTIAZem CD (CARDIZEM CD) 120 MG 24 hr capsule, Take 1 capsule by mouth Daily. (Patient not taking: Reported on 10/3/2024), Disp: 90 capsule, Rfl: 3  •  glipizide (GLUCOTROL) 5 MG tablet, Take 1 tablet by mouth Daily. (Patient not taking: Reported on 10/3/2024), Disp: , Rfl:   •  Jardiance 10 MG tablet tablet, Take 1 tablet by mouth Daily. (Patient not taking: Reported on 10/3/2024), Disp: , Rfl:   •  nystatin (MYCOSTATIN) 100,000 unit/mL suspension, Take 10 mL by mouth 3 (Three) Times a Day. (Patient not taking: Reported on 10/3/2024), Disp: , Rfl:          Vital Signs   /57 (BP Location: Left arm, Patient Position: Sitting, Cuff Size: Large Adult)   Pulse 92   Temp 97.6 °F (36.4 °C)   Resp 16   Ht 165.1 cm (65\")   Wt 91.6 kg (202 lb)   SpO2 97% Comment: room air  BMI 33.61 kg/m²       Objective     Physical Exam  Vitals reviewed.   Constitutional:       General: She is not in acute distress.     Appearance: Normal appearance. She is obese. She is not ill-appearing.   HENT:      Head: Normocephalic and atraumatic.      Nose: Nose normal.      " Mouth/Throat:      Mouth: Mucous membranes are moist.      Pharynx: Oropharynx is clear.   Eyes:      Extraocular Movements: Extraocular movements intact.      Conjunctiva/sclera: Conjunctivae normal.      Pupils: Pupils are equal, round, and reactive to light.   Cardiovascular:      Rate and Rhythm: Normal rate and regular rhythm.      Pulses: Normal pulses.      Heart sounds: Normal heart sounds.   Pulmonary:      Effort: Pulmonary effort is normal. No respiratory distress.      Breath sounds: Normal breath sounds. No stridor. No wheezing, rhonchi or rales.   Abdominal:      General: Bowel sounds are normal.   Musculoskeletal:         General: Normal range of motion.      Cervical back: Normal range of motion and neck supple.   Skin:     General: Skin is warm and dry.   Neurological:      Mental Status: She is alert and oriented to person, place, and time.   Psychiatric:         Behavior: Behavior normal.         Results Review  I have personally reviewed the prior office notes, hospital records, labs, and diagnostics.  XR Chest 2 View [[id number]] (Order [id number])  Order  Status: Final result     Appointment Information    PACS Images     Radiology Images  Study Result    Narrative & Impression      DATE OF EXAM:   7/18/2024 2:26 PM     PROCEDURE:   XR CHEST 2 VW-     INDICATIONS:   wheezing, cough     COMPARISON:  6/7/2024 and prior     TECHNIQUE:   2 views     FINDINGS:      Lungs are hyperexpanded. The heart size appears borderline enlarged.  Emphysematous changes noted with coarsening of the interstitial markings  with a perihilar and bibasilar predominance. Surgical clips overlie the  lower chest anteriorly on the left. There appears to be scarring at the  right lung base peripherally. Multilevel degenerative changes are noted  of the spine. There is diffuse osteopenia.     IMPRESSION:  IMPRESSION :   Hyperexpansion of lungs with chronic interstitial changes suggestive of  COPD     No definite focal  consolidation [     Electronically Signed By-Gilbert Lang On:7/18/2024 2:40 PM   Complete Transthoracic Echocardiogram with Complete Doppler and Color Flow    Accession Number: [id number]   Date of Study: 7/18/24   Ordering Provider: Edith Will APRN   Clinical Indications: Arrhythmia, AFib        Reading Physicians  Performing Staff   Cardiology: Umesh Guadarrama MD    Tech: Vicky Russ, [facility]        Patient Hx Of Height, Weight, and Vitals    Height Weight BSA (Calculated - sq m) BMI (Calculated) Retired BMI (kg/m2) Pulse BP        118 151/97      [facility] PACS Images     Show images for Adult Transthoracic Echo Complete w/ Color, Spectral and Contrast if necessary per protocol   Clinical Indication    Arrhythmia; AFib   Dx: Atrial fibrillation, unspecified type [I48.91 (ICD-10-CM)]     Interpretation Summary       •  Left ventricular systolic function is low normal. Calculated left ventricular EF = 50%  •  Left atrial cavity is borderline dilated.  •  There is mild to moderate mitral regurgitation.  •  Estimated right ventricular systolic pressure from tricuspid regurgitation is normal (<35 mmHg).     Assessment         Patient Active Problem List   Diagnosis   • Reflux esophagitis   • Diabetes   • Depression   • DDD (degenerative disc disease), cervical   • Cough   • Colon polyps   • Chest pain   • Cancer   • Atrophic vaginitis   • Bladder disorder   • Nonobstructive atherosclerosis of coronary artery   • Asthma without status asthmaticus   • Anemia   • Adjustment disorder with anxiety   • Functional dyspepsia   • Esophageal reflux   • Ear pain   • Dry mouth   • Diverticula of intestine   • Diabetic peripheral neuropathy associated with type 2 diabetes mellitus   • Difficult or painful urination   • Limb swelling   • Insomnia   • Hyperlipemia   • Hematuria   • Generalized anxiety disorder   • Menopausal and postmenopausal disorder   • Mandibular mass   • Malignant neoplasm of female breast   • Major  depression, single episode   • Sensorineural hearing loss   • Seasonal allergic rhinitis   • Renal mass   • Overweight with body mass index (BMI) 25.0-29.9   • Osteoarthritis   • Obesity   • Vaginal itching   • Simple renal cyst   • Essential hypertension   • Diarrhea   • Generalized abdominal pain   • Nausea   • Heartburn   • Pedal edema   • Hypokalemia   • Pharyngitis   • Paroxysmal atrial fibrillation   • Persistent atrial fibrillation        Plan     Diagnoses and all orders for this visit:    1. Dyspnea on exertion (Primary)  -     6 Minute Walk Test; Future  -     CBC & Differential; Future  -     Comprehensive Metabolic Panel; Future  -     Regional Allergen Zone 8 / With IgE; Future  -     Complete PFT - Pre & Post Bronchodilator; Future  -     CT Chest Without Contrast; Future  -     AFB Culture - , Cough; Future  -     Respiratory Culture - Sputum, Cough; Future  -     Alpha - 1 - Antitrypsin; Future  -     Home Nebulizer Accessories  -     Home Nebulizer    2. Bronchitis, mucopurulent recurrent  -     6 Minute Walk Test; Future  -     CBC & Differential; Future  -     Comprehensive Metabolic Panel; Future  -     Regional Allergen Zone 8 / With IgE; Future  -     Complete PFT - Pre & Post Bronchodilator; Future  -     CT Chest Without Contrast; Future  -     AFB Culture - , Cough; Future  -     Respiratory Culture - Sputum, Cough; Future  -     Alpha - 1 - Antitrypsin; Future  -     Home Nebulizer Accessories  -     Home Nebulizer    3. Asthma, unspecified asthma severity, unspecified whether complicated, unspecified whether persistent  -     6 Minute Walk Test; Future  -     CBC & Differential; Future  -     Comprehensive Metabolic Panel; Future  -     Regional Allergen Zone 8 / With IgE; Future  -     Complete PFT - Pre & Post Bronchodilator; Future  -     CT Chest Without Contrast; Future  -     AFB Culture - , Cough; Future  -     Respiratory Culture - Sputum, Cough; Future  -     Alpha - 1 -  Antitrypsin; Future  -     Home Nebulizer Accessories  -     Home Nebulizer    4. Cough, unspecified type  -     6 Minute Walk Test; Future  -     CBC & Differential; Future  -     Comprehensive Metabolic Panel; Future  -     Regional Allergen Zone 8 / With IgE; Future  -     Complete PFT - Pre & Post Bronchodilator; Future  -     CT Chest Without Contrast; Future  -     AFB Culture - , Cough; Future  -     Respiratory Culture - Sputum, Cough; Future  -     Alpha - 1 - Antitrypsin; Future  -     Home Nebulizer Accessories  -     Home Nebulizer    5. Second hand tobacco smoke exposure  -     6 Minute Walk Test; Future  -     CBC & Differential; Future  -     Comprehensive Metabolic Panel; Future  -     Regional Allergen Zone 8 / With IgE; Future  -     Complete PFT - Pre & Post Bronchodilator; Future  -     CT Chest Without Contrast; Future  -     AFB Culture - , Cough; Future  -     Respiratory Culture - Sputum, Cough; Future  -     Alpha - 1 - Antitrypsin; Future  -     Home Nebulizer Accessories  -     Home Nebulizer    6. HX: breast cancer  -     6 Minute Walk Test; Future  -     CBC & Differential; Future  -     Comprehensive Metabolic Panel; Future  -     Regional Allergen Zone 8 / With IgE; Future  -     Complete PFT - Pre & Post Bronchodilator; Future  -     CT Chest Without Contrast; Future  -     AFB Culture - , Cough; Future  -     Respiratory Culture - Sputum, Cough; Future  -     Alpha - 1 - Antitrypsin; Future  -     Home Nebulizer Accessories  -     Home Nebulizer    7. Need for influenza vaccination  -     Fluzone High-Dose 65+yrs (1364-7950)             Smoking status:  reports that she has never smoked. She has never been exposed to tobacco smoke. She has never used smokeless tobacco.    Vaccination status: Reviewed  Immunization History   Administered Date(s) Administered   • 31-influenza Vac Quardvalent Preservativ 10/16/2019, 09/17/2020, 09/29/2023   • ABRYSVO (RSV, 60+ or pregnant women 32-36  wks) 04/30/2024   • COVID-19 (MODERNA) 12YRS+ (SPIKEVAX) 09/04/2024   • COVID-19 (MODERNA) BIVALENT 12+YRS 09/26/2022   • COVID-19 (PFIZER) Purple Cap Monovalent 07/27/2021, 08/17/2021   • Covid-19 (Pfizer) Gray Cap Monovalent 03/07/2022   • Fluzone  >6mos 10/08/2012, 09/24/2013, 11/22/2017, 09/26/2018, 10/16/2019, 09/17/2020   • Fluzone High-Dose 65+yrs 09/26/2022   • Fluzone Quad >6mos (Multi-dose) 10/06/2021   • Influenza Injectable Mdck Pf Quad 11/22/2017, 09/26/2018   • Influenza TIV (IM) 10/04/2007, 10/06/2008, 09/09/2009, 09/21/2010, 10/07/2011, 09/15/2014   • Influenza, Unspecified 11/22/2017, 09/26/2018   • Pneumococcal Conjugate 13-Valent (PCV13) 03/29/2015   • Pneumococcal Conjugate 20-Valent (PCV20) 09/04/2024   • Pneumococcal Polysaccharide (PPSV23) 12/06/2011, 09/17/2020   • Shingrix 04/30/2024, 07/31/2024   • TD Preservative Free (Tenivac) 08/01/2008        Medications personally reviewed    Follow Up  Return in about 3 months (around 1/3/2025) for after PFT with MD.    Patient was given instructions and counseling regarding her condition or for health maintenance advice. Please see specific information pulled into the AVS if appropriate.     I spent 20 minutes caring for Camilla Navas on this date of service. This time includes time spent by me in the following activities:preparing for the visit, reviewing tests, obtaining and/or reviewing a separately obtained history, performing a medically appropriate examination and/or evaluation, counseling and educating the patient/family/caregiver, ordering medications, tests, or procedures, documenting information in the medical record, independently interpreting results and communicating that information with the patient/family/caregiver and answered questions family members, discuss medications.

## 2024-10-02 PROBLEM — I48.19 PERSISTENT ATRIAL FIBRILLATION: Status: ACTIVE | Noted: 2024-10-02

## 2024-10-02 NOTE — ASSESSMENT & PLAN NOTE
She is in atrial fibrillation with controlled ventricular rates.  We will continue current doses of amlodipine and diltiazem.  We have discussed rate versus rhythm strategies, and she is doing well with rate control.  Continue anticoagulation with Eliquis for CVA protection.  Recent echocardiogram shows long low normal LV systolic function and mild to moderate mitral valve regurgitation.

## 2024-10-02 NOTE — ASSESSMENT & PLAN NOTE
No significant edema today.  She was previously given furosemide that she can use as needed for lower extremity swelling.

## 2024-10-03 ENCOUNTER — LAB (OUTPATIENT)
Dept: LAB | Facility: HOSPITAL | Age: 74
End: 2024-10-03
Payer: MEDICARE

## 2024-10-03 ENCOUNTER — OFFICE VISIT (OUTPATIENT)
Dept: PULMONOLOGY | Facility: CLINIC | Age: 74
End: 2024-10-03
Payer: MEDICARE

## 2024-10-03 VITALS
HEART RATE: 92 BPM | OXYGEN SATURATION: 97 % | DIASTOLIC BLOOD PRESSURE: 57 MMHG | HEIGHT: 65 IN | SYSTOLIC BLOOD PRESSURE: 145 MMHG | BODY MASS INDEX: 33.66 KG/M2 | TEMPERATURE: 97.6 F | RESPIRATION RATE: 16 BRPM | WEIGHT: 202 LBS

## 2024-10-03 DIAGNOSIS — Z23 NEED FOR INFLUENZA VACCINATION: ICD-10-CM

## 2024-10-03 DIAGNOSIS — Z85.3 HX: BREAST CANCER: ICD-10-CM

## 2024-10-03 DIAGNOSIS — J45.909 ASTHMA, UNSPECIFIED ASTHMA SEVERITY, UNSPECIFIED WHETHER COMPLICATED, UNSPECIFIED WHETHER PERSISTENT: ICD-10-CM

## 2024-10-03 DIAGNOSIS — R05.9 COUGH, UNSPECIFIED TYPE: ICD-10-CM

## 2024-10-03 DIAGNOSIS — R06.09 DYSPNEA ON EXERTION: Primary | ICD-10-CM

## 2024-10-03 DIAGNOSIS — Z77.22 SECOND HAND TOBACCO SMOKE EXPOSURE: ICD-10-CM

## 2024-10-03 DIAGNOSIS — J41.1 BRONCHITIS, MUCOPURULENT RECURRENT: ICD-10-CM

## 2024-10-03 DIAGNOSIS — R06.09 DYSPNEA ON EXERTION: ICD-10-CM

## 2024-10-03 LAB
ALBUMIN SERPL-MCNC: 4.3 G/DL (ref 3.5–5.2)
ALBUMIN/GLOB SERPL: 1.4 G/DL
ALP SERPL-CCNC: 77 U/L (ref 39–117)
ALPHA1 GLOB MFR UR ELPH: 148 MG/DL (ref 90–200)
ALT SERPL W P-5'-P-CCNC: 10 U/L (ref 1–33)
ANION GAP SERPL CALCULATED.3IONS-SCNC: 10.8 MMOL/L (ref 5–15)
AST SERPL-CCNC: 20 U/L (ref 1–32)
BASOPHILS # BLD AUTO: 0.03 10*3/MM3 (ref 0–0.2)
BASOPHILS NFR BLD AUTO: 0.4 % (ref 0–1.5)
BILIRUB SERPL-MCNC: 0.2 MG/DL (ref 0–1.2)
BUN SERPL-MCNC: 21 MG/DL (ref 8–23)
BUN/CREAT SERPL: 17.6 (ref 7–25)
CALCIUM SPEC-SCNC: 9.6 MG/DL (ref 8.6–10.5)
CHLORIDE SERPL-SCNC: 99 MMOL/L (ref 98–107)
CO2 SERPL-SCNC: 27.2 MMOL/L (ref 22–29)
CREAT SERPL-MCNC: 1.19 MG/DL (ref 0.57–1)
DEPRECATED RDW RBC AUTO: 40.1 FL (ref 37–54)
EGFRCR SERPLBLD CKD-EPI 2021: 48.1 ML/MIN/1.73
EOSINOPHIL # BLD AUTO: 0.1 10*3/MM3 (ref 0–0.4)
EOSINOPHIL NFR BLD AUTO: 1.3 % (ref 0.3–6.2)
ERYTHROCYTE [DISTWIDTH] IN BLOOD BY AUTOMATED COUNT: 12.7 % (ref 12.3–15.4)
GLOBULIN UR ELPH-MCNC: 3 GM/DL
GLUCOSE SERPL-MCNC: 165 MG/DL (ref 65–99)
HCT VFR BLD AUTO: 37.6 % (ref 34–46.6)
HGB BLD-MCNC: 12.2 G/DL (ref 12–15.9)
IMM GRANULOCYTES # BLD AUTO: 0.02 10*3/MM3 (ref 0–0.05)
IMM GRANULOCYTES NFR BLD AUTO: 0.3 % (ref 0–0.5)
LYMPHOCYTES # BLD AUTO: 2.52 10*3/MM3 (ref 0.7–3.1)
LYMPHOCYTES NFR BLD AUTO: 32.4 % (ref 19.6–45.3)
MCH RBC QN AUTO: 28.7 PG (ref 26.6–33)
MCHC RBC AUTO-ENTMCNC: 32.4 G/DL (ref 31.5–35.7)
MCV RBC AUTO: 88.5 FL (ref 79–97)
MONOCYTES # BLD AUTO: 0.76 10*3/MM3 (ref 0.1–0.9)
MONOCYTES NFR BLD AUTO: 9.8 % (ref 5–12)
NEUTROPHILS NFR BLD AUTO: 4.35 10*3/MM3 (ref 1.7–7)
NEUTROPHILS NFR BLD AUTO: 55.8 % (ref 42.7–76)
NRBC BLD AUTO-RTO: 0 /100 WBC (ref 0–0.2)
PLATELET # BLD AUTO: 312 10*3/MM3 (ref 140–450)
PMV BLD AUTO: 10.1 FL (ref 6–12)
POTASSIUM SERPL-SCNC: 3.5 MMOL/L (ref 3.5–5.2)
PROT SERPL-MCNC: 7.3 G/DL (ref 6–8.5)
RBC # BLD AUTO: 4.25 10*6/MM3 (ref 3.77–5.28)
SODIUM SERPL-SCNC: 137 MMOL/L (ref 136–145)
WBC NRBC COR # BLD AUTO: 7.78 10*3/MM3 (ref 3.4–10.8)

## 2024-10-03 PROCEDURE — 36415 COLL VENOUS BLD VENIPUNCTURE: CPT

## 2024-10-03 PROCEDURE — 86003 ALLG SPEC IGE CRUDE XTRC EA: CPT

## 2024-10-03 PROCEDURE — 82103 ALPHA-1-ANTITRYPSIN TOTAL: CPT

## 2024-10-03 PROCEDURE — 85025 COMPLETE CBC W/AUTO DIFF WBC: CPT

## 2024-10-03 PROCEDURE — 82785 ASSAY OF IGE: CPT

## 2024-10-03 PROCEDURE — 80053 COMPREHEN METABOLIC PANEL: CPT

## 2024-10-03 RX ORDER — FLUTICASONE FUROATE, UMECLIDINIUM BROMIDE AND VILANTEROL TRIFENATATE 100; 62.5; 25 UG/1; UG/1; UG/1
1 POWDER RESPIRATORY (INHALATION)
Qty: 1 EACH | Refills: 0 | COMMUNITY
Start: 2024-10-03 | End: 2024-10-04

## 2024-10-04 DIAGNOSIS — N28.9 FUNCTION KIDNEY DECREASED: Primary | ICD-10-CM

## 2024-10-06 LAB
A ALTERNATA IGE QN: <0.1 KU/L
A FUMIGATUS IGE QN: <0.1 KU/L
AMER ROACH IGE QN: <0.1 KU/L
BAHIA GRASS IGE QN: <0.1 KU/L
BERMUDA GRASS IGE QN: <0.1 KU/L
BOXELDER IGE QN: <0.1 KU/L
C HERBARUM IGE QN: <0.1 KU/L
CAT DANDER IGE QN: <0.1 KU/L
CMN PIGWEED IGE QN: <0.1 KU/L
COMMON RAGWEED IGE QN: <0.1 KU/L
CONV CLASS DESCRIPTION: ABNORMAL
D FARINAE IGE QN: <0.1 KU/L
D PTERONYSS IGE QN: <0.1 KU/L
DOG DANDER IGE QN: <0.1 KU/L
ENGL PLANTAIN IGE QN: <0.1 KU/L
HAZELNUT POLN IGE QN: <0.1 KU/L
IGE SERPL-ACNC: 3 IU/ML (ref 6–495)
JOHNSON GRASS IGE QN: <0.1 KU/L
KENT BLUE GRASS IGE QN: <0.1 KU/L
LONDON PLANE IGE QN: <0.1 KU/L
M RACEMOSUS IGE QN: <0.1 KU/L
MT JUNIPER IGE QN: <0.1 KU/L
MUGWORT IGE QN: <0.1 KU/L
NETTLE IGE QN: <0.1 KU/L
P NOTATUM IGE QN: <0.1 KU/L
S BOTRYOSUM IGE QN: <0.1 KU/L
SHEEP SORREL IGE QN: <0.1 KU/L
SWEET GUM IGE QN: <0.1 KU/L
WHITE ELM IGE QN: <0.1 KU/L
WHITE HICKORY IGE QN: <0.1 KU/L
WHITE MULBERRY IGE QN: <0.1 KU/L
WHITE OAK IGE QN: <0.1 KU/L

## 2024-10-09 ENCOUNTER — TELEPHONE (OUTPATIENT)
Dept: CARDIOLOGY | Facility: CLINIC | Age: 74
End: 2024-10-09
Payer: MEDICARE

## 2024-10-09 NOTE — TELEPHONE ENCOUNTER
RUSSELL PT.  PROVIDED HER [facility] ASSISTANCE INFO AND PHONE NUMBER.  PT IS GOING TO CALL AND APPLY FOR THE PROGRAM.  PT STILL HAS SOME MEDICATION AT THIS TIME.

## 2024-10-09 NOTE — TELEPHONE ENCOUNTER
Caller: Camilla Navas    Relationship: Self    Best call back number: [phone]    What is the best time to reach you: ANY    Who are you requesting to speak with (clinical staff, provider,  specific staff member): ANY    What was the call regarding: PATIENT WENT TO GET ELIQUIS AND THEY WANT $141 FOR IT THEY GAVE HER A NUMBER TO CALL AND THAT IS AROUND THE SAME AMOUNT SHE WOULD HAVE TO PAY ALREADY.  THE PHARMACY TOLD HER TO SEE IF THERE WAS ANOTHER PRESCRIPTION SHE COULD TRY.

## 2024-10-18 ENCOUNTER — LAB (OUTPATIENT)
Dept: LAB | Facility: HOSPITAL | Age: 74
End: 2024-10-18
Payer: MEDICARE

## 2024-10-18 ENCOUNTER — HOSPITAL ENCOUNTER (OUTPATIENT)
Dept: CT IMAGING | Facility: HOSPITAL | Age: 74
Discharge: HOME | End: 2024-10-18
Payer: MEDICARE

## 2024-10-18 DIAGNOSIS — Z85.3 HX: BREAST CANCER: ICD-10-CM

## 2024-10-18 DIAGNOSIS — R05.9 COUGH, UNSPECIFIED TYPE: ICD-10-CM

## 2024-10-18 DIAGNOSIS — J41.1 BRONCHITIS, MUCOPURULENT RECURRENT: ICD-10-CM

## 2024-10-18 DIAGNOSIS — R06.09 DYSPNEA ON EXERTION: ICD-10-CM

## 2024-10-18 DIAGNOSIS — J45.909 ASTHMA, UNSPECIFIED ASTHMA SEVERITY, UNSPECIFIED WHETHER COMPLICATED, UNSPECIFIED WHETHER PERSISTENT: ICD-10-CM

## 2024-10-18 DIAGNOSIS — Z77.22 SECOND HAND TOBACCO SMOKE EXPOSURE: ICD-10-CM

## 2024-10-18 PROCEDURE — 87206 SMEAR FLUORESCENT/ACID STAI: CPT

## 2024-10-18 PROCEDURE — 87205 SMEAR GRAM STAIN: CPT

## 2024-10-18 PROCEDURE — 87116 MYCOBACTERIA CULTURE: CPT

## 2024-10-18 PROCEDURE — 71250 CT THORAX DX C-: CPT

## 2024-10-18 PROCEDURE — 87070 CULTURE OTHR SPECIMN AEROBIC: CPT

## 2024-10-20 LAB
BACTERIA SPEC RESP CULT: NORMAL
GRAM STN SPEC: NORMAL

## 2024-10-21 RX ORDER — HYDROCHLOROTHIAZIDE 25 MG/1
TABLET ORAL
Qty: 90 TABLET | Refills: 2 | Status: SHIPPED | OUTPATIENT
Start: 2024-10-21

## 2024-10-23 ENCOUNTER — APPOINTMENT (OUTPATIENT)
Dept: CT IMAGING | Facility: HOSPITAL | Age: 74
End: 2024-10-23
Payer: MEDICARE

## 2024-10-23 ENCOUNTER — HOSPITAL ENCOUNTER (INPATIENT)
Facility: HOSPITAL | Age: 74
LOS: 2 days | Discharge: HOME | End: 2024-10-25
Attending: EMERGENCY MEDICINE | Admitting: STUDENT IN AN ORGANIZED HEALTH CARE EDUCATION/TRAINING PROGRAM
Payer: MEDICARE

## 2024-10-23 ENCOUNTER — APPOINTMENT (OUTPATIENT)
Dept: GENERAL RADIOLOGY | Facility: HOSPITAL | Age: 74
End: 2024-10-23
Payer: MEDICARE

## 2024-10-23 DIAGNOSIS — J45.901 SEVERE ASTHMA WITH EXACERBATION, UNSPECIFIED WHETHER PERSISTENT: ICD-10-CM

## 2024-10-23 DIAGNOSIS — I48.91 ATRIAL FIBRILLATION WITH RAPID VENTRICULAR RESPONSE: ICD-10-CM

## 2024-10-23 DIAGNOSIS — J96.01 ACUTE RESPIRATORY FAILURE WITH HYPOXIA: Primary | ICD-10-CM

## 2024-10-23 DIAGNOSIS — I50.9 ACUTE CONGESTIVE HEART FAILURE, UNSPECIFIED HEART FAILURE TYPE: ICD-10-CM

## 2024-10-23 LAB
ALBUMIN SERPL-MCNC: 3.9 G/DL (ref 3.5–5.2)
ALBUMIN SERPL-MCNC: 4.1 G/DL (ref 3.5–5.2)
ALBUMIN/GLOB SERPL: 1 G/DL
ALBUMIN/GLOB SERPL: 1.4 G/DL
ALP SERPL-CCNC: 67 U/L (ref 39–117)
ALP SERPL-CCNC: 73 U/L (ref 39–117)
ALT SERPL W P-5'-P-CCNC: 11 U/L (ref 1–33)
ALT SERPL W P-5'-P-CCNC: 13 U/L (ref 1–33)
ANION GAP SERPL CALCULATED.3IONS-SCNC: 10.6 MMOL/L (ref 5–15)
ANION GAP SERPL CALCULATED.3IONS-SCNC: 19.6 MMOL/L (ref 5–15)
ARTERIAL PATENCY WRIST A: POSITIVE
AST SERPL-CCNC: 19 U/L (ref 1–32)
AST SERPL-CCNC: 29 U/L (ref 1–32)
ATMOSPHERIC PRESS: 745.7 MMHG
BASE EXCESS BLDA CALC-SCNC: 2.8 MMOL/L (ref -2–2)
BASOPHILS # BLD AUTO: 0.02 10*3/MM3 (ref 0–0.2)
BASOPHILS NFR BLD AUTO: 0.2 % (ref 0–1.5)
BDY SITE: ABNORMAL
BILIRUB SERPL-MCNC: 0.3 MG/DL (ref 0–1.2)
BILIRUB SERPL-MCNC: 0.4 MG/DL (ref 0–1.2)
BUN SERPL-MCNC: 14 MG/DL (ref 8–23)
BUN SERPL-MCNC: 14 MG/DL (ref 8–23)
BUN/CREAT SERPL: 10.1 (ref 7–25)
BUN/CREAT SERPL: 14.7 (ref 7–25)
CALCIUM SPEC-SCNC: 9 MG/DL (ref 8.6–10.5)
CALCIUM SPEC-SCNC: 9 MG/DL (ref 8.6–10.5)
CHLORIDE SERPL-SCNC: 101 MMOL/L (ref 98–107)
CHLORIDE SERPL-SCNC: 93 MMOL/L (ref 98–107)
CK SERPL-CCNC: 104 U/L (ref 20–180)
CO2 SERPL-SCNC: 19.4 MMOL/L (ref 22–29)
CO2 SERPL-SCNC: 27.4 MMOL/L (ref 22–29)
CREAT SERPL-MCNC: 0.95 MG/DL (ref 0.57–1)
CREAT SERPL-MCNC: 1.39 MG/DL (ref 0.57–1)
D-LACTATE SERPL-SCNC: 2.4 MMOL/L (ref 0.5–2)
D-LACTATE SERPL-SCNC: 2.6 MMOL/L (ref 0.5–2)
D-LACTATE SERPL-SCNC: 4.5 MMOL/L (ref 0.5–2)
D-LACTATE SERPL-SCNC: 5.4 MMOL/L (ref 0.5–2)
DEPRECATED RDW RBC AUTO: 45.8 FL (ref 37–54)
EGFRCR SERPLBLD CKD-EPI 2021: 39.7 ML/MIN/1.73
EGFRCR SERPLBLD CKD-EPI 2021: 62.6 ML/MIN/1.73
EOSINOPHIL # BLD AUTO: 0.05 10*3/MM3 (ref 0–0.4)
EOSINOPHIL NFR BLD AUTO: 0.5 % (ref 0.3–6.2)
ERYTHROCYTE [DISTWIDTH] IN BLOOD BY AUTOMATED COUNT: 14.2 % (ref 12.3–15.4)
FLUAV SUBTYP SPEC NAA+PROBE: NOT DETECTED
FLUBV RNA ISLT QL NAA+PROBE: NOT DETECTED
GAS FLOW AIRWAY: 2 LPM
GEN 5 2HR TROPONIN T REFLEX: 9 NG/L
GLOBULIN UR ELPH-MCNC: 2.9 GM/DL
GLOBULIN UR ELPH-MCNC: 3.8 GM/DL
GLUCOSE SERPL-MCNC: 200 MG/DL (ref 65–99)
GLUCOSE SERPL-MCNC: 363 MG/DL (ref 65–99)
HCO3 BLDA-SCNC: 25.4 MMOL/L (ref 22–26)
HCT VFR BLD AUTO: 34.6 % (ref 34–46.6)
HCT VFR BLD CALC: 36 % (ref 38–51)
HEMODILUTION: NO
HGB BLD-MCNC: 11.2 G/DL (ref 12–15.9)
HGB BLDA-MCNC: 12.4 G/DL (ref 12–18)
HOLD SPECIMEN: NORMAL
HOLD SPECIMEN: NORMAL
IMM GRANULOCYTES # BLD AUTO: 0.03 10*3/MM3 (ref 0–0.05)
IMM GRANULOCYTES NFR BLD AUTO: 0.3 % (ref 0–0.5)
LYMPHOCYTES # BLD AUTO: 1.18 10*3/MM3 (ref 0.7–3.1)
LYMPHOCYTES NFR BLD AUTO: 12.4 % (ref 19.6–45.3)
MCH RBC QN AUTO: 29 PG (ref 26.6–33)
MCHC RBC AUTO-ENTMCNC: 32.4 G/DL (ref 31.5–35.7)
MCV RBC AUTO: 89.6 FL (ref 79–97)
MODALITY: ABNORMAL
MONOCYTES # BLD AUTO: 0.92 10*3/MM3 (ref 0.1–0.9)
MONOCYTES NFR BLD AUTO: 9.7 % (ref 5–12)
NEUTROPHILS NFR BLD AUTO: 7.29 10*3/MM3 (ref 1.7–7)
NEUTROPHILS NFR BLD AUTO: 76.9 % (ref 42.7–76)
NRBC BLD AUTO-RTO: 0 /100 WBC (ref 0–0.2)
NT-PROBNP SERPL-MCNC: 3874 PG/ML (ref 0–1800)
PCO2 BLDA: 31.9 MM HG (ref 35–45)
PH BLDA: 7.51 PH UNITS (ref 7.35–7.45)
PLATELET # BLD AUTO: 226 10*3/MM3 (ref 140–450)
PMV BLD AUTO: 10.1 FL (ref 6–12)
PO2 BLDA: 93.6 MM HG (ref 80–100)
POTASSIUM SERPL-SCNC: 3.7 MMOL/L (ref 3.5–5.2)
POTASSIUM SERPL-SCNC: 4.1 MMOL/L (ref 3.5–5.2)
PROCALCITONIN SERPL-MCNC: 0.04 NG/ML (ref 0–0.25)
PROT SERPL-MCNC: 7 G/DL (ref 6–8.5)
PROT SERPL-MCNC: 7.7 G/DL (ref 6–8.5)
QT INTERVAL: 341 MS
QTC INTERVAL: 468 MS
RBC # BLD AUTO: 3.86 10*6/MM3 (ref 3.77–5.28)
RSV RNA NPH QL NAA+NON-PROBE: NOT DETECTED
SAO2 % BLDCOA: 98.1 % (ref 95–99)
SARS-COV-2 RNA RESP QL NAA+PROBE: NOT DETECTED
SODIUM SERPL-SCNC: 132 MMOL/L (ref 136–145)
SODIUM SERPL-SCNC: 139 MMOL/L (ref 136–145)
TROPONIN T DELTA: -3 NG/L
TROPONIN T SERPL HS-MCNC: 12 NG/L
TROPONIN T SERPL HS-MCNC: 7 NG/L
WBC NRBC COR # BLD AUTO: 9.49 10*3/MM3 (ref 3.4–10.8)
WHOLE BLOOD HOLD COAG: NORMAL
WHOLE BLOOD HOLD SPECIMEN: NORMAL

## 2024-10-23 PROCEDURE — 25010000002 METHYLPREDNISOLONE PER 40 MG: Performed by: STUDENT IN AN ORGANIZED HEALTH CARE EDUCATION/TRAINING PROGRAM

## 2024-10-23 PROCEDURE — 25010000002 METHYLPREDNISOLONE PER 125 MG: Performed by: EMERGENCY MEDICINE

## 2024-10-23 PROCEDURE — 36600 WITHDRAWAL OF ARTERIAL BLOOD: CPT | Performed by: STUDENT IN AN ORGANIZED HEALTH CARE EDUCATION/TRAINING PROGRAM

## 2024-10-23 PROCEDURE — 94761 N-INVAS EAR/PLS OXIMETRY MLT: CPT

## 2024-10-23 PROCEDURE — 25010000002 FUROSEMIDE PER 20 MG: Performed by: EMERGENCY MEDICINE

## 2024-10-23 PROCEDURE — 87637 SARSCOV2&INF A&B&RSV AMP PRB: CPT | Performed by: EMERGENCY MEDICINE

## 2024-10-23 PROCEDURE — 80053 COMPREHEN METABOLIC PANEL: CPT | Performed by: STUDENT IN AN ORGANIZED HEALTH CARE EDUCATION/TRAINING PROGRAM

## 2024-10-23 PROCEDURE — 25010000002 MORPHINE PER 10 MG: Performed by: EMERGENCY MEDICINE

## 2024-10-23 PROCEDURE — 82550 ASSAY OF CK (CPK): CPT | Performed by: STUDENT IN AN ORGANIZED HEALTH CARE EDUCATION/TRAINING PROGRAM

## 2024-10-23 PROCEDURE — 71260 CT THORAX DX C+: CPT

## 2024-10-23 PROCEDURE — 84484 ASSAY OF TROPONIN QUANT: CPT | Performed by: STUDENT IN AN ORGANIZED HEALTH CARE EDUCATION/TRAINING PROGRAM

## 2024-10-23 PROCEDURE — 82803 BLOOD GASES ANY COMBINATION: CPT | Performed by: STUDENT IN AN ORGANIZED HEALTH CARE EDUCATION/TRAINING PROGRAM

## 2024-10-23 PROCEDURE — 25010000002 ONDANSETRON PER 1 MG: Performed by: EMERGENCY MEDICINE

## 2024-10-23 PROCEDURE — 99285 EMERGENCY DEPT VISIT HI MDM: CPT

## 2024-10-23 PROCEDURE — 94799 UNLISTED PULMONARY SVC/PX: CPT

## 2024-10-23 PROCEDURE — 99291 CRITICAL CARE FIRST HOUR: CPT

## 2024-10-23 PROCEDURE — 83880 ASSAY OF NATRIURETIC PEPTIDE: CPT | Performed by: EMERGENCY MEDICINE

## 2024-10-23 PROCEDURE — 85025 COMPLETE CBC W/AUTO DIFF WBC: CPT | Performed by: EMERGENCY MEDICINE

## 2024-10-23 PROCEDURE — 71045 X-RAY EXAM CHEST 1 VIEW: CPT

## 2024-10-23 PROCEDURE — 84484 ASSAY OF TROPONIN QUANT: CPT | Performed by: EMERGENCY MEDICINE

## 2024-10-23 PROCEDURE — 80053 COMPREHEN METABOLIC PANEL: CPT | Performed by: EMERGENCY MEDICINE

## 2024-10-23 PROCEDURE — 87040 BLOOD CULTURE FOR BACTERIA: CPT | Performed by: EMERGENCY MEDICINE

## 2024-10-23 PROCEDURE — 93005 ELECTROCARDIOGRAM TRACING: CPT | Performed by: EMERGENCY MEDICINE

## 2024-10-23 PROCEDURE — 25510000001 IOPAMIDOL PER 1 ML: Performed by: EMERGENCY MEDICINE

## 2024-10-23 PROCEDURE — 84145 PROCALCITONIN (PCT): CPT | Performed by: STUDENT IN AN ORGANIZED HEALTH CARE EDUCATION/TRAINING PROGRAM

## 2024-10-23 PROCEDURE — 94640 AIRWAY INHALATION TREATMENT: CPT

## 2024-10-23 PROCEDURE — 36415 COLL VENOUS BLD VENIPUNCTURE: CPT | Performed by: EMERGENCY MEDICINE

## 2024-10-23 PROCEDURE — 83605 ASSAY OF LACTIC ACID: CPT | Performed by: EMERGENCY MEDICINE

## 2024-10-23 RX ORDER — SODIUM CHLORIDE 0.9 % (FLUSH) 0.9 %
10 SYRINGE (ML) INJECTION AS NEEDED
Status: DISCONTINUED | OUTPATIENT
Start: 2024-10-23 | End: 2024-10-25 | Stop reason: HOSPADM

## 2024-10-23 RX ORDER — METHYLPREDNISOLONE SODIUM SUCCINATE 125 MG/2ML
125 INJECTION, POWDER, LYOPHILIZED, FOR SOLUTION INTRAMUSCULAR; INTRAVENOUS ONCE
Status: COMPLETED | OUTPATIENT
Start: 2024-10-23 | End: 2024-10-23

## 2024-10-23 RX ORDER — METHYLPREDNISOLONE SODIUM SUCCINATE 40 MG/ML
40 INJECTION, POWDER, LYOPHILIZED, FOR SOLUTION INTRAMUSCULAR; INTRAVENOUS EVERY 8 HOURS
Status: DISCONTINUED | OUTPATIENT
Start: 2024-10-23 | End: 2024-10-24

## 2024-10-23 RX ORDER — IPRATROPIUM BROMIDE AND ALBUTEROL SULFATE 2.5; .5 MG/3ML; MG/3ML
3 SOLUTION RESPIRATORY (INHALATION) ONCE
Status: COMPLETED | OUTPATIENT
Start: 2024-10-23 | End: 2024-10-23

## 2024-10-23 RX ORDER — GABAPENTIN 300 MG/1
300 CAPSULE ORAL NIGHTLY
Status: DISCONTINUED | OUTPATIENT
Start: 2024-10-23 | End: 2024-10-25 | Stop reason: HOSPADM

## 2024-10-23 RX ORDER — SERTRALINE HYDROCHLORIDE 100 MG/1
200 TABLET, FILM COATED ORAL DAILY
Status: DISCONTINUED | OUTPATIENT
Start: 2024-10-23 | End: 2024-10-25 | Stop reason: HOSPADM

## 2024-10-23 RX ORDER — METOPROLOL TARTRATE 50 MG
100 TABLET ORAL EVERY 12 HOURS SCHEDULED
Status: DISCONTINUED | OUTPATIENT
Start: 2024-10-23 | End: 2024-10-25 | Stop reason: HOSPADM

## 2024-10-23 RX ORDER — FUROSEMIDE 10 MG/ML
40 INJECTION INTRAMUSCULAR; INTRAVENOUS ONCE
Status: COMPLETED | OUTPATIENT
Start: 2024-10-23 | End: 2024-10-23

## 2024-10-23 RX ORDER — BACLOFEN 10 MG/1
10 TABLET ORAL 2 TIMES DAILY
Status: DISCONTINUED | OUTPATIENT
Start: 2024-10-23 | End: 2024-10-25 | Stop reason: HOSPADM

## 2024-10-23 RX ORDER — MORPHINE SULFATE 2 MG/ML
2 INJECTION, SOLUTION INTRAMUSCULAR; INTRAVENOUS ONCE
Status: COMPLETED | OUTPATIENT
Start: 2024-10-23 | End: 2024-10-23

## 2024-10-23 RX ORDER — IPRATROPIUM BROMIDE AND ALBUTEROL SULFATE 2.5; .5 MG/3ML; MG/3ML
3 SOLUTION RESPIRATORY (INHALATION)
Status: DISCONTINUED | OUTPATIENT
Start: 2024-10-23 | End: 2024-10-25 | Stop reason: HOSPADM

## 2024-10-23 RX ORDER — MIRTAZAPINE 15 MG/1
15 TABLET, FILM COATED ORAL NIGHTLY
Status: DISCONTINUED | OUTPATIENT
Start: 2024-10-23 | End: 2024-10-25 | Stop reason: HOSPADM

## 2024-10-23 RX ORDER — PANTOPRAZOLE SODIUM 40 MG/1
40 TABLET, DELAYED RELEASE ORAL DAILY
Status: DISCONTINUED | OUTPATIENT
Start: 2024-10-23 | End: 2024-10-25 | Stop reason: HOSPADM

## 2024-10-23 RX ORDER — BUDESONIDE AND FORMOTEROL FUMARATE DIHYDRATE 160; 4.5 UG/1; UG/1
2 AEROSOL RESPIRATORY (INHALATION)
Status: DISCONTINUED | OUTPATIENT
Start: 2024-10-23 | End: 2024-10-25 | Stop reason: HOSPADM

## 2024-10-23 RX ORDER — ONDANSETRON 2 MG/ML
4 INJECTION INTRAMUSCULAR; INTRAVENOUS EVERY 6 HOURS PRN
Status: DISCONTINUED | OUTPATIENT
Start: 2024-10-23 | End: 2024-10-25 | Stop reason: HOSPADM

## 2024-10-23 RX ORDER — OXYCODONE HYDROCHLORIDE 5 MG/1
5 TABLET ORAL EVERY 6 HOURS PRN
Status: DISCONTINUED | OUTPATIENT
Start: 2024-10-23 | End: 2024-10-25 | Stop reason: HOSPADM

## 2024-10-23 RX ORDER — PRAVASTATIN SODIUM 20 MG
80 TABLET ORAL DAILY
Status: DISCONTINUED | OUTPATIENT
Start: 2024-10-23 | End: 2024-10-25 | Stop reason: HOSPADM

## 2024-10-23 RX ORDER — FUROSEMIDE 10 MG/ML
20 INJECTION INTRAMUSCULAR; INTRAVENOUS ONCE
Status: DISCONTINUED | OUTPATIENT
Start: 2024-10-23 | End: 2024-10-23

## 2024-10-23 RX ORDER — POLYETHYLENE GLYCOL 3350 17 G/17G
17 POWDER, FOR SOLUTION ORAL DAILY PRN
Status: DISCONTINUED | OUTPATIENT
Start: 2024-10-23 | End: 2024-10-25 | Stop reason: HOSPADM

## 2024-10-23 RX ORDER — BISACODYL 5 MG/1
5 TABLET, DELAYED RELEASE ORAL DAILY PRN
Status: DISCONTINUED | OUTPATIENT
Start: 2024-10-23 | End: 2024-10-25 | Stop reason: HOSPADM

## 2024-10-23 RX ORDER — NITROGLYCERIN 0.4 MG/1
0.4 TABLET SUBLINGUAL
Status: DISCONTINUED | OUTPATIENT
Start: 2024-10-23 | End: 2024-10-25 | Stop reason: HOSPADM

## 2024-10-23 RX ORDER — ONDANSETRON 2 MG/ML
4 INJECTION INTRAMUSCULAR; INTRAVENOUS ONCE
Status: COMPLETED | OUTPATIENT
Start: 2024-10-23 | End: 2024-10-23

## 2024-10-23 RX ORDER — DILTIAZEM HCL IN NACL,ISO-OSM 125 MG/125
5-15 PLASTIC BAG, INJECTION (ML) INTRAVENOUS
Status: DISCONTINUED | OUTPATIENT
Start: 2024-10-23 | End: 2024-10-24

## 2024-10-23 RX ORDER — ACETAMINOPHEN 325 MG/1
650 TABLET ORAL EVERY 4 HOURS PRN
Status: DISCONTINUED | OUTPATIENT
Start: 2024-10-23 | End: 2024-10-25 | Stop reason: HOSPADM

## 2024-10-23 RX ORDER — ALBUTEROL SULFATE 0.83 MG/ML
2.5 SOLUTION RESPIRATORY (INHALATION) 3 TIMES DAILY
COMMUNITY

## 2024-10-23 RX ORDER — DILTIAZEM HYDROCHLORIDE 120 MG/1
120 CAPSULE, COATED, EXTENDED RELEASE ORAL DAILY
Status: DISCONTINUED | OUTPATIENT
Start: 2024-10-23 | End: 2024-10-23

## 2024-10-23 RX ORDER — ACETAMINOPHEN 650 MG/1
650 SUPPOSITORY RECTAL EVERY 4 HOURS PRN
Status: DISCONTINUED | OUTPATIENT
Start: 2024-10-23 | End: 2024-10-25 | Stop reason: HOSPADM

## 2024-10-23 RX ORDER — DILTIAZEM HYDROCHLORIDE 5 MG/ML
10 INJECTION INTRAVENOUS ONCE
Status: COMPLETED | OUTPATIENT
Start: 2024-10-23 | End: 2024-10-23

## 2024-10-23 RX ORDER — ACETAMINOPHEN 160 MG/5ML
650 SOLUTION ORAL EVERY 4 HOURS PRN
Status: DISCONTINUED | OUTPATIENT
Start: 2024-10-23 | End: 2024-10-25 | Stop reason: HOSPADM

## 2024-10-23 RX ORDER — BISACODYL 10 MG
10 SUPPOSITORY, RECTAL RECTAL DAILY PRN
Status: DISCONTINUED | OUTPATIENT
Start: 2024-10-23 | End: 2024-10-25 | Stop reason: HOSPADM

## 2024-10-23 RX ORDER — ALPRAZOLAM 0.25 MG
0.5 TABLET ORAL EVERY 8 HOURS PRN
Status: DISCONTINUED | OUTPATIENT
Start: 2024-10-23 | End: 2024-10-25 | Stop reason: HOSPADM

## 2024-10-23 RX ORDER — DILTIAZEM HYDROCHLORIDE 240 MG/1
240 CAPSULE, COATED, EXTENDED RELEASE ORAL DAILY
Status: DISCONTINUED | OUTPATIENT
Start: 2024-10-23 | End: 2024-10-25 | Stop reason: HOSPADM

## 2024-10-23 RX ORDER — HYDROCHLOROTHIAZIDE 25 MG/1
25 TABLET ORAL DAILY
Status: DISCONTINUED | OUTPATIENT
Start: 2024-10-23 | End: 2024-10-25 | Stop reason: HOSPADM

## 2024-10-23 RX ORDER — IOPAMIDOL 755 MG/ML
100 INJECTION, SOLUTION INTRAVASCULAR
Status: COMPLETED | OUTPATIENT
Start: 2024-10-23 | End: 2024-10-23

## 2024-10-23 RX ORDER — FLUTICASONE FUROATE, UMECLIDINIUM BROMIDE AND VILANTEROL TRIFENATATE 100; 62.5; 25 UG/1; UG/1; UG/1
1 POWDER RESPIRATORY (INHALATION)
COMMUNITY

## 2024-10-23 RX ORDER — AMOXICILLIN 250 MG
2 CAPSULE ORAL 2 TIMES DAILY PRN
Status: DISCONTINUED | OUTPATIENT
Start: 2024-10-23 | End: 2024-10-25 | Stop reason: HOSPADM

## 2024-10-23 RX ORDER — TRAMADOL HYDROCHLORIDE 50 MG/1
50 TABLET ORAL EVERY 6 HOURS PRN
Status: DISCONTINUED | OUTPATIENT
Start: 2024-10-23 | End: 2024-10-25 | Stop reason: HOSPADM

## 2024-10-23 RX ORDER — ALUMINA, MAGNESIA, AND SIMETHICONE 2400; 2400; 240 MG/30ML; MG/30ML; MG/30ML
15 SUSPENSION ORAL EVERY 6 HOURS PRN
Status: DISCONTINUED | OUTPATIENT
Start: 2024-10-23 | End: 2024-10-25 | Stop reason: HOSPADM

## 2024-10-23 RX ADMIN — APIXABAN 5 MG: 5 TABLET, FILM COATED ORAL at 14:01

## 2024-10-23 RX ADMIN — DILTIAZEM HYDROCHLORIDE 10 MG: 5 INJECTION, SOLUTION INTRAVENOUS at 11:53

## 2024-10-23 RX ADMIN — METHYLPREDNISOLONE SODIUM SUCCINATE 40 MG: 40 INJECTION, POWDER, FOR SOLUTION INTRAMUSCULAR; INTRAVENOUS at 23:53

## 2024-10-23 RX ADMIN — IPRATROPIUM BROMIDE AND ALBUTEROL SULFATE 3 ML: .5; 3 SOLUTION RESPIRATORY (INHALATION) at 16:56

## 2024-10-23 RX ADMIN — METOPROLOL TARTRATE 100 MG: 50 TABLET, FILM COATED ORAL at 20:28

## 2024-10-23 RX ADMIN — HYDROCHLOROTHIAZIDE 25 MG: 25 TABLET ORAL at 14:01

## 2024-10-23 RX ADMIN — FUROSEMIDE 40 MG: 10 INJECTION, SOLUTION INTRAMUSCULAR; INTRAVENOUS at 09:19

## 2024-10-23 RX ADMIN — BUDESONIDE AND FORMOTEROL FUMARATE DIHYDRATE 2 PUFF: 160; 4.5 AEROSOL RESPIRATORY (INHALATION) at 13:39

## 2024-10-23 RX ADMIN — METHYLPREDNISOLONE SODIUM SUCCINATE 40 MG: 40 INJECTION, POWDER, FOR SOLUTION INTRAMUSCULAR; INTRAVENOUS at 16:12

## 2024-10-23 RX ADMIN — IOPAMIDOL 100 ML: 755 INJECTION, SOLUTION INTRAVENOUS at 10:29

## 2024-10-23 RX ADMIN — DILTIAZEM HYDROCHLORIDE 5 MG/HR: 5 INJECTION INTRAVENOUS at 11:56

## 2024-10-23 RX ADMIN — BACLOFEN 10 MG: 10 TABLET ORAL at 20:22

## 2024-10-23 RX ADMIN — PRAVASTATIN SODIUM 80 MG: 20 TABLET ORAL at 14:00

## 2024-10-23 RX ADMIN — METOPROLOL TARTRATE 100 MG: 50 TABLET, FILM COATED ORAL at 14:01

## 2024-10-23 RX ADMIN — MIRTAZAPINE 15 MG: 15 TABLET, FILM COATED ORAL at 20:22

## 2024-10-23 RX ADMIN — METHYLPREDNISOLONE SODIUM SUCCINATE 125 MG: 125 INJECTION, POWDER, FOR SOLUTION INTRAMUSCULAR; INTRAVENOUS at 08:27

## 2024-10-23 RX ADMIN — DILTIAZEM HYDROCHLORIDE 240 MG: 240 CAPSULE, EXTENDED RELEASE ORAL at 14:01

## 2024-10-23 RX ADMIN — ONDANSETRON 4 MG: 2 INJECTION INTRAMUSCULAR; INTRAVENOUS at 08:27

## 2024-10-23 RX ADMIN — GABAPENTIN 300 MG: 300 CAPSULE ORAL at 20:22

## 2024-10-23 RX ADMIN — MORPHINE SULFATE 2 MG: 2 INJECTION, SOLUTION INTRAMUSCULAR; INTRAVENOUS at 09:19

## 2024-10-23 RX ADMIN — APIXABAN 5 MG: 5 TABLET, FILM COATED ORAL at 20:28

## 2024-10-23 RX ADMIN — IPRATROPIUM BROMIDE AND ALBUTEROL SULFATE 3 ML: .5; 3 SOLUTION RESPIRATORY (INHALATION) at 08:17

## 2024-10-23 RX ADMIN — OXYCODONE 5 MG: 5 TABLET ORAL at 19:29

## 2024-10-23 RX ADMIN — SERTRALINE HYDROCHLORIDE 200 MG: 100 TABLET ORAL at 14:01

## 2024-10-23 RX ADMIN — TIOTROPIUM BROMIDE INHALATION SPRAY 2 PUFF: 3.12 SPRAY, METERED RESPIRATORY (INHALATION) at 13:39

## 2024-10-23 RX ADMIN — PANTOPRAZOLE SODIUM 40 MG: 40 TABLET, DELAYED RELEASE ORAL at 14:01

## 2024-10-23 NOTE — PLAN OF CARE
Goal Outcome Evaluation:  Plan of Care Reviewed With: patient        Progress: improving  Outcome Evaluation: Patient is off cardizem drip and taking oral cardizem. No complaints of shortness of air or chest pain. HR in the 70's and BP 120s/60s

## 2024-10-23 NOTE — H&P
[facility]   HISTORY AND PHYSICAL    Patient Name: Camilla Navas  : 1950  MRN: [id number]  Primary Care Physician:  Lester Lizarraga MD  Date of admission: 10/23/2024    Subjective   Subjective     Chief Complaint: Shortness of breath    HPI:    Camilla Navas is a 74 y.o. female 74-year-old female with past medical history of atrial fibrillation on anticoagulation, Type 2 diabetes, hyperlipidemia, asthma who comes in due to worsening shortness of breath wheezing and chest pain.  Patient states that she has been having congestion since she did yard work yesterday.  She denies any fevers chills nausea or vomiting.  Her symptoms continue to get worse due to that reason she presented the ER.  She was also having some intermittent sharp pain on the left lateral side of her chest.    At the time of admission patient's blood pressures were normal.  However patient was requiring 2 L of oxygen to maintain her saturation.  She was noted to be persistently tachycardic and in RVR.  Her proBNP was mildly elevated at 4000.  Her CBC was unremarkable.  Her chemistry was normal.  She had a lactate of 2.4 which is up trended to 5.4.  Patient mated for further management of symptoms.  CT angiogram of the chest was unremarkable    Review of Systems  All review of systems negative except as in subjective complaints:  Personal History     Past Medical History:   Diagnosis Date    Arthritis     Asthma     Atherosclerosis of coronary artery 2018    Non-obstructive coronary artery disease. Cardiac catheterization done in 2018 showed a 40% mid LAD lesion and a 40% proximal RCA lesion.    Cancer     Breast.     COPD (chronic obstructive pulmonary disease)     Diabetes mellitus     Diverticulitis     Hiatal hernia     Hyperlipemia 2022    Hypertension, essential 2022       Past Surgical History:   Procedure Laterality Date    COLONOSCOPY      COLONOSCOPY N/A 2023    Procedure: COLONOSCOPY  WITH POLYPECTOMIES HOT/COLD SNARE, ELEVIEW INJECTION, BIOPSIES;  Surgeon: Lester Gillette MD;  Location: [facility] ENDOSCOPY;  Service: Gastroenterology;  Laterality: N/A;  COLON POLYPS, DIVERTICULOSIS    ENDOSCOPY N/A 11/14/2023    Procedure: ESOPHAGOGASTRODUODENOSCOPY WITH BIOPSIES;  Surgeon: Lester Gillette MD;  Location: [facility] ENDOSCOPY;  Service: Gastroenterology;  Laterality: N/A;  PREVIOUS SURGERY    HERNIA REPAIR      HYSTERECTOMY      MASTECTOMY Bilateral     UPPER GASTROINTESTINAL ENDOSCOPY         Family History: family history is not on file. Otherwise pertinent FHx was reviewed and not pertinent to current issue.    Social History:  reports that she has never smoked. She has never been exposed to tobacco smoke. She has never used smokeless tobacco. She reports that she does not drink alcohol and does not use drugs.    Home Medications:  Fluticasone-Umeclidin-Vilant, acetaminophen, albuterol, albuterol sulfate HFA, apixaban, atenolol, baclofen, dilTIAZem CD, furosemide, gabapentin, glipizide, hydroCHLOROthiazide, mirtazapine, nitroglycerin, ondansetron, pantoprazole, pravastatin, sertraline, vitamin D3, and vitamin E      Allergies:  Allergies   Allergen Reactions    Empagliflozin Nausea And Vomiting    Metformin Diarrhea       Objective   Objective     Vitals:   Temp:  [98.8 °F (37.1 °C)-99.5 °F (37.5 °C)] 98.8 °F (37.1 °C)  Heart Rate:  [111-136] 115  Resp:  [20-24] 22  BP: (115-170)/() 159/99  Flow (L/min) (Oxygen Therapy):  [2] 2  Physical Exam               Constitutional:         Awake, alert responsive, conversant, no obvious distress   Eyes:                       PERRLA, sclerae anicteric, no conjunctival injection   HEENT:                   Moist mucous membranes, no nasal or eye discharge, no throat congestion   Neck:                      Supple, no thyromegaly, no lymphadenopathy, trachea midline, no elevated JVD   Respiratory:           Clear to auscultation bilaterally,  nonlabored respirations    Cardiovascular:     RRR, no murmurs, rubs, or gallops, palpable pedal pulses bilaterally,No bilateral ankle edema   Gastrointestinal:   Positive bowel sounds, soft, nontender, nondistended, no organomegaly   Musculoskeletal:   No clubbing or cyanosis to extremities, muscle wasting, joint swelling, muscle weakness   Psychiatric:             Appropriate affect, cooperative   Neurologic:            Awake alert ,oriented x 3, strength symmetric in all extremities, Cranial Nerves grossly intact to confrontation, speech clear   Skin:                      No rashes, bruising, skin ulcers, petechiae or ecchymosis    Result Review    Result Review:  I have personally reviewed the results from the time of this admission to 10/23/2024 13:07 EDT and agree with these findings:  []  Laboratory  []  Microbiology  []  Radiology  []  EKG/Telemetry   []  Cardiology/Vascular   []  Pathology  []  Old records  []  Other:    Results from last 7 days   Lab Units 10/23/24  0825   WBC 10*3/mm3 9.49   HEMOGLOBIN g/dL 11.2*   PLATELETS 10*3/mm3 226     Results from last 7 days   Lab Units 10/23/24  0825   SODIUM mmol/L 139   POTASSIUM mmol/L 3.7   CHLORIDE mmol/L 101   CO2 mmol/L 27.4   ANION GAP mmol/L 10.6   BUN mg/dL 14   CREATININE mg/dL 0.95   GLUCOSE mg/dL 200*         Assessment & Plan   Assessment / Plan     Active Hospital Problems:  Active Hospital Problems    Diagnosis     **CHF (congestive heart failure)      74-year-old female with past medical history of atrial fibrillation on anticoagulation, Type 2 diabetes, hyperlipidemia, asthma who comes in due to worsening shortness of breath wheezing and chest pain.  Troponins are negative.  EKG with A-fib with RVR.  CT angiogram was unremarkable.  proBNP mildly elevated at 3000.  Patient's lactate is uptrending and her exam is otherwise benign except for some wheezing.  Echo done in June 2024 was unremarkable.  Suspect most likely has asthma exacerbation and  mild CHF    Plan:   Patient has been given Lasix 40 mg IV  Will continue to trend lactate  Will get repeat CMP and troponin levels  Will give methylprednisone 40 mg 3 times daily  Continue Brovana and Pulmicort  DuoNebs 4 times a day  Pro-Allen ordered  Low suspicion for sepsis at this time and will continue to monitor  Patient is on diltiazem for A-fib with RVR  Will increase diltiazem to 240 mg daily, Eliquis 5 mg twice daily, metoprolol 100 mg twice daily instead of atenolol 100 mg daily  Continue pravastatin 80, sertraline 200 hydrochlorothiazide 25 and baclofen 10 mg twice daily      VTE Prophylaxis:  Pharmacologic & mechanical VTE prophylaxis orders are present.        CODE STATUS:    Code Status (Patient has no pulse and is not breathing): CPR (Attempt to Resuscitate)  Medical Interventions (Patient has pulse or is breathing): Full Support    Admission Status:  I believe this patient meets inpatient status.    Electronically signed by Philip Bueno MD, 10/23/24, 1:07 PM EDT.

## 2024-10-23 NOTE — ED PROVIDER NOTES
Time: 8:11 AM EDT  Date of encounter:  10/23/2024  Independent Historian/Clinical History and Information was obtained by:   Patient    History is limited by: N/A    Chief Complaint: Shortness of breath, wheezing, chest pain      History of Present Illness:  Patient is a 74 y.o. year old female who presents to the emergency department for evaluation of shortness of breath, wheezing, chest pain.  Patient states that she has had some cough and congestion as well as shortness of breath that started yesterday.  States that this morning it got worse around 5 AM.  States that she started having some intermittent chest pain as well associated with this.  Does have a history of COPD, diabetes, coronary artery disease.  She is on Eliquis for A-fib.  She has been compliant with her medicines.  She is also on Lasix.  Reports the pain is sharp in nature and just comes and goes.  No other complaints this time.      Patient Care Team  Primary Care Provider: Lester Lizarraga MD    Past Medical History:     Allergies   Allergen Reactions    Empagliflozin Nausea And Vomiting    Metformin Diarrhea     Past Medical History:   Diagnosis Date    Arthritis     Asthma     Atherosclerosis of coronary artery 2018    Non-obstructive coronary artery disease. Cardiac catheterization done in October 2018 showed a 40% mid LAD lesion and a 40% proximal RCA lesion.    Cancer     Breast.     COPD (chronic obstructive pulmonary disease)     Diabetes mellitus     Diverticulitis     Hiatal hernia     Hyperlipemia 01/21/2022    Hypertension, essential 03/05/2022     Past Surgical History:   Procedure Laterality Date    COLONOSCOPY      COLONOSCOPY N/A 11/14/2023    Procedure: COLONOSCOPY WITH POLYPECTOMIES HOT/COLD SNARE, ELEVIEW INJECTION, BIOPSIES;  Surgeon: Lester Gillette MD;  Location: [facility] ENDOSCOPY;  Service: Gastroenterology;  Laterality: N/A;  COLON POLYPS, DIVERTICULOSIS    ENDOSCOPY N/A 11/14/2023    Procedure:  ESOPHAGOGASTRODUODENOSCOPY WITH BIOPSIES;  Surgeon: Lester Gillette MD;  Location: [facility] ENDOSCOPY;  Service: Gastroenterology;  Laterality: N/A;  PREVIOUS SURGERY    HERNIA REPAIR      HYSTERECTOMY      MASTECTOMY Bilateral     UPPER GASTROINTESTINAL ENDOSCOPY       Family History   Problem Relation Age of Onset    Colon cancer Neg Hx     Malig Hyperthermia Neg Hx        Home Medications:  Prior to Admission medications    Medication Sig Start Date End Date Taking? Authorizing Provider   albuterol sulfate  (90 Base) MCG/ACT inhaler Inhale 2 puffs Every 4 (Four) Hours As Needed for Wheezing.  Patient taking differently: Inhale 2 puffs Every 4 (Four) Hours As Needed for Wheezing or Shortness of Air. 6/24/22   Jhonathan, Rohan, DO   apixaban (ELIQUIS) 5 MG tablet tablet Take 1 tablet by mouth Every 12 (Twelve) Hours. 6/21/24   Edith Will APRN   Arthritis Pain Reliever 650 MG 8 hr tablet Take 1 tablet by mouth Every 8 (Eight) Hours As Needed for Mild Pain. 3/2/22   Emergency, Nurse Nasir, RN   atenolol (TENORMIN) 100 MG tablet Take 1 tablet by mouth Daily. 6/21/24   Edith Will APRN   baclofen (LIORESAL) 10 MG tablet Take 1 tablet by mouth 2 (Two) Times a Day.    ProviderJayesh MD   dilTIAZem CD (CARDIZEM CD) 120 MG 24 hr capsule Take 1 capsule by mouth Daily.  Patient not taking: Reported on 10/3/2024 7/5/24   Edith Will APRN   furosemide (LASIX) 20 MG tablet TAKE 1 TABLET BY MOUTH EVERY DAY FOR 7 DAYS, THEN USE EVERY DAY AS NEEDED FOR INCREASED SWELLING  Patient not taking: Reported on 10/3/2024 9/16/24   Edith Will APRN   gabapentin (NEURONTIN) 300 MG capsule Take 1 capsule by mouth Every Night.    ProviderJayesh MD   glipizide (GLUCOTROL) 5 MG tablet Take 1 tablet by mouth Daily.  Patient not taking: Reported on 10/3/2024 5/30/24   Jayesh Mckinley MD   hydroCHLOROthiazide 25 MG tablet TAKE 1 TABLET BY MOUTH EVERY DAY 10/21/24   Umesh Guadarrama MD   Hydrocod  Jac-Chlorphe Jac ER (TUSSIONEX PENNKINETIC) 10-8 MG/5ML ER suspension Take 5 mL by mouth Every 12 (Twelve) Hours As Needed for Cough or Allergies. 5/24/24   Kwasi Chavez MD   Jardiance 10 MG tablet tablet Take 1 tablet by mouth Daily.  Patient not taking: Reported on 10/3/2024 4/3/24   Jayesh Mckinley MD   mirtazapine (REMERON) 15 MG tablet Take 1 tablet by mouth Every Night.    Jayesh Mckinley MD   nitroglycerin (NITROSTAT) 0.4 MG SL tablet 1 under the tongue as needed for angina, may repeat q5mins for up three doses 4/29/24   Edith Will APRN   nystatin (MYCOSTATIN) 100,000 unit/mL suspension Take 10 mL by mouth 3 (Three) Times a Day.  Patient not taking: Reported on 10/3/2024    Jayesh Mckinley MD   ondansetron (ZOFRAN) 4 MG tablet Take 1 tablet by mouth Every 6 (Six) Hours As Needed for Nausea or Vomiting.    Jayesh Mckinley MD   pantoprazole (PROTONIX) 40 MG EC tablet Take 1 tablet by mouth Daily.    Jayesh Mckinley MD   pravastatin (PRAVACHOL) 80 MG tablet TAKE 1 TABLET BY MOUTH DAILY 1/23/24   Edith Will APRN   sertraline (ZOLOFT) 100 MG tablet Take 2 tablets by mouth Daily.    Jayesh Mckinley MD   TiZANidine (ZANAFLEX) 2 MG capsule  6/13/24   Jayesh Mckinley MD   vitamin D3 125 MCG (5000 UT) capsule capsule Take 1 capsule by mouth Daily.    Jayesh Mckinley MD   vitamin E 1000 UNIT capsule Take 1 capsule by mouth Daily.    Jayesh Mckinley MD        Social History:   Social History     Tobacco Use    Smoking status: Never     Passive exposure: Never    Smokeless tobacco: Never   Vaping Use    Vaping status: Never Used   Substance Use Topics    Alcohol use: Never    Drug use: Never         Review of Systems:  Review of Systems   Respiratory:  Positive for shortness of breath and wheezing.    Cardiovascular:  Positive for chest pain.        Physical Exam:  /85   Pulse (!) 128   Temp 99.4 °F (37.4 °C) (Oral)   Resp 24   Ht 165.1 cm  "(65\")   Wt 96.4 kg (212 lb 8.4 oz)   SpO2 93%   BMI 35.37 kg/m²     Physical Exam  Vitals and nursing note reviewed.   Constitutional:       Appearance: Normal appearance.   HENT:      Head: Normocephalic and atraumatic.   Eyes:      General: No scleral icterus.  Cardiovascular:      Rate and Rhythm: Tachycardia present. Rhythm irregular.      Heart sounds: Normal heart sounds.   Pulmonary:      Breath sounds: Wheezing present.   Abdominal:      Palpations: Abdomen is soft.      Tenderness: There is no abdominal tenderness.   Musculoskeletal:         General: Normal range of motion.      Cervical back: Normal range of motion.   Skin:     Findings: No rash.   Neurological:      General: No focal deficit present.      Mental Status: She is alert.                  Procedures:  Procedures      Medical Decision Making:      Comorbidities that affect care:    COPD, Coronary Artery Disease, Diabetes    External Notes reviewed:    Reviewed note from 10/16/2024, reviewed note from 10/3/2024      The following orders were placed and all results were independently analyzed by me:  Orders Placed This Encounter   Procedures    COVID-19, FLU A/B, RSV PCR 1 HR TAT - Swab, Nasopharynx    Blood Culture - Blood,    Blood Culture - Blood,    XR Chest 1 View    CT Chest With Contrast Diagnostic    [place] Draw    Comprehensive Metabolic Panel    BNP    CBC Auto Differential    High Sensitivity Troponin T    Lactic Acid, Plasma    High Sensitivity Troponin T 2Hr    STAT Lactic Acid, Reflex    NPO Diet NPO Type: Strict NPO    Undress & Gown    Vital Signs    Undress & Gown    Continuous Pulse Oximetry    Vital Signs    IP General Consult (Use specialty-specific consult if known)    Oxygen Therapy- Nasal Cannula; Titrate 1-6 LPM Per SpO2; 90 - 95%    Oxygen Therapy- Nasal Cannula; Titrate 1-6 LPM Per SpO2; 90 - 95%    ECG 12 Lead ED Triage Standing Order; SOA    Insert Peripheral IV    Insert Peripheral IV    Inpatient Admission    CBC " & Differential    Green Top (Gel)    Lavender Top    Gold Top - SST    Light Blue Top       Medications Given in the Emergency Department:  Medications   sodium chloride 0.9 % flush 10 mL (has no administration in time range)   sodium chloride 0.9 % flush 10 mL (has no administration in time range)   dilTIAZem (CARDIZEM) 125 mg in 125 mL sodium chloride  infusion (has no administration in time range)   dilTIAZem (CARDIZEM) injection 10 mg (has no administration in time range)   methylPREDNISolone sodium succinate (SOLU-Medrol) injection 125 mg (125 mg Intravenous Given 10/23/24 0827)   ipratropium-albuterol (DUO-NEB) nebulizer solution 3 mL (3 mL Nebulization Given 10/23/24 0817)   ondansetron (ZOFRAN) injection 4 mg (4 mg Intravenous Given 10/23/24 0827)   furosemide (LASIX) injection 40 mg (40 mg Intravenous Given 10/23/24 0919)   morphine injection 2 mg (2 mg Intravenous Given 10/23/24 0919)   iopamidol (ISOVUE-370) 76 % injection 100 mL (100 mL Intravenous Given 10/23/24 1029)        ED Course:    ED Course as of 10/23/24 1137   Wed Oct 23, 2024   0851 EKG interpreted by me  Time: 0808  Heart rate 113  A-fib, nonspecific ST changes, no acute ischemia [MA]   0945 Recheck patient.  Patient still tachypneic and short of breath.  Oxygen was 88% when I evaluated her.  Will place on a couple liters nasal cannula.  Will obtain CT scan.  Discussed admission. [MA]   1136 Spoke with Dr. Desir who agrees for admission.  Recommends a telemetry bed.  Recommends Cardizem drip [MA]      ED Course User Index  [MA] Hernan Grijalva MD       Labs:    Lab Results (last 24 hours)       Procedure Component Value Units Date/Time    COVID-19, FLU A/B, RSV PCR 1 HR TAT - Swab, Nasopharynx [[id number]]  (Normal) Collected: 10/23/24 0825    Specimen: Swab from Nasopharynx Updated: 10/23/24 0920     COVID19 Not Detected     Influenza A PCR Not Detected     Influenza B PCR Not Detected     RSV, PCR Not Detected    Narrative:       Fact sheet for providers: https://www.fda.gov/media/466992/download    Fact sheet for patients: https://www.fda.gov/media/489505/download    Test performed by PCR.    CBC & Differential [[id number]]  (Abnormal) Collected: 10/23/24 0825    Specimen: Blood Updated: 10/23/24 0840    Narrative:      The following orders were created for panel order CBC & Differential.  Procedure                               Abnormality         Status                     ---------                               -----------         ------                     CBC Auto Differential[[id number]]        Abnormal            Final result                 Please view results for these tests on the individual orders.    Comprehensive Metabolic Panel [[id number]]  (Abnormal) Collected: 10/23/24 0825    Specimen: Blood Updated: 10/23/24 0857     Glucose 200 mg/dL      BUN 14 mg/dL      Creatinine 0.95 mg/dL      Sodium 139 mmol/L      Potassium 3.7 mmol/L      Chloride 101 mmol/L      CO2 27.4 mmol/L      Calcium 9.0 mg/dL      Total Protein 7.0 g/dL      Albumin 4.1 g/dL      ALT (SGPT) 11 U/L      AST (SGOT) 19 U/L      Alkaline Phosphatase 73 U/L      Total Bilirubin 0.3 mg/dL      Globulin 2.9 gm/dL      A/G Ratio 1.4 g/dL      BUN/Creatinine Ratio 14.7     Anion Gap 10.6 mmol/L      eGFR 62.6 mL/min/1.73     Narrative:      GFR Normal >60  Chronic Kidney Disease <60  Kidney Failure <15    The GFR formula is only valid for adults with stable renal function between ages 18 and 70.    BNP [[id number]]  (Abnormal) Collected: 10/23/24 0825    Specimen: Blood Updated: 10/23/24 0854     proBNP 3,874.0 pg/mL     Narrative:      This assay is used as an aid in the diagnosis of individuals suspected of having heart failure. It can be used as an aid in the diagnosis of acute decompensated heart failure (ADHF) in patients presenting with signs and symptoms of ADHF to the emergency department (ED). In addition, NT-proBNP of <300 pg/mL indicates ADHF is not  likely.    Age Range Result Interpretation  NT-proBNP Concentration (pg/mL:      <50             Positive            >450                   Gray                 300-450                    Negative             <300    50-75           Positive            >900                  Gray                300-900                  Negative            <300      >75             Positive            >1800                  Gray                300-1800                  Negative            <300    CBC Auto Differential [[id number]]  (Abnormal) Collected: 10/23/24 0825    Specimen: Blood Updated: 10/23/24 0840     WBC 9.49 10*3/mm3      RBC 3.86 10*6/mm3      Hemoglobin 11.2 g/dL      Hematocrit 34.6 %      MCV 89.6 fL      MCH 29.0 pg      MCHC 32.4 g/dL      RDW 14.2 %      RDW-SD 45.8 fl      MPV 10.1 fL      Platelets 226 10*3/mm3      Neutrophil % 76.9 %      Lymphocyte % 12.4 %      Monocyte % 9.7 %      Eosinophil % 0.5 %      Basophil % 0.2 %      Immature Grans % 0.3 %      Neutrophils, Absolute 7.29 10*3/mm3      Lymphocytes, Absolute 1.18 10*3/mm3      Monocytes, Absolute 0.92 10*3/mm3      Eosinophils, Absolute 0.05 10*3/mm3      Basophils, Absolute 0.02 10*3/mm3      Immature Grans, Absolute 0.03 10*3/mm3      nRBC 0.0 /100 WBC     High Sensitivity Troponin T [[id number]]  (Normal) Collected: 10/23/24 0825    Specimen: Blood Updated: 10/23/24 0856     HS Troponin T 12 ng/L     Narrative:      High Sensitive Troponin T Reference Range:  <14.0 ng/L- Negative Female for AMI  <22.0 ng/L- Negative Male for AMI  >=14 - Abnormal Female indicating possible myocardial injury.  >=22 - Abnormal Male indicating possible myocardial injury.   Clinicians would have to utilize clinical acumen, EKG, Troponin, and serial changes to determine if it is an Acute Myocardial Infarction or myocardial injury due to an underlying chronic condition.         Lactic Acid, Plasma [[id number]]  (Abnormal) Collected: 10/23/24 0825    Specimen: Blood Updated:  10/23/24 0925     Lactate 2.4 mmol/L     Blood Culture - Blood, Arm, Left [[id number]] Collected: 10/23/24 0825    Specimen: Blood from Arm, Left Updated: 10/23/24 0835    Blood Culture - Blood, Hand, Left [[id number]] Collected: 10/23/24 0909    Specimen: Blood from Hand, Left Updated: 10/23/24 0912    High Sensitivity Troponin T 2Hr [[id number]]  (Normal) Collected: 10/23/24 0909    Specimen: Blood from Hand, Left Updated: 10/23/24 0933     HS Troponin T 9 ng/L      Troponin T Delta -3 ng/L     Narrative:      High Sensitive Troponin T Reference Range:  <14.0 ng/L- Negative Female for AMI  <22.0 ng/L- Negative Male for AMI  >=14 - Abnormal Female indicating possible myocardial injury.  >=22 - Abnormal Male indicating possible myocardial injury.   Clinicians would have to utilize clinical acumen, EKG, Troponin, and serial changes to determine if it is an Acute Myocardial Infarction or myocardial injury due to an underlying chronic condition.                  Imaging:    CT Chest With Contrast Diagnostic    Result Date: 10/23/2024  CT CHEST W CONTRAST DIAGNOSTIC Date of Exam: 10/23/2024 10:22 AM EDT Indication: soa shortness of breath. Comparison: None available. Technique: Axial CT images were obtained of the chest after the uneventful intravenous administration of iodinated contrast.  Reconstructed coronal and sagittal images were also obtained. Automated exposure control and iterative construction methods were  used. Findings: There are no filling defects suspicious for pulmonary emboli. There are new minimal pleural effusions. There is new pulmonary vascular congestion with prominent interstitial pattern. The trachea and bronchial tree appear patent. There is moderately severe cardiomegaly. There are coronary calcifications. There are no enlarged mediastinal nodes. There are no hilar masses. A 3 mm right upper lobe lung nodule is stable as seen on image #110 of series 701. There are postoperative changes of  bilateral mastectomies. There is no axillary adenopathy.     Impression: Negative exam for pulmonary embolism. Findings are most compatible with CHF with mild pulmonary edema and minimal effusions. Electronically Signed: Lillie Kaba MD  10/23/2024 10:39 AM EDT  Workstation ID: [id number]    XR Chest 1 View    Result Date: 10/23/2024  XR CHEST 1 VW Date of Exam: 10/23/2024 8:50 AM EDT Indication: SOA Triage Protocol Comparison: CT chest 10/18/2024 Findings: The cardiomediastinal silhouette is within normal limits. Lungs are clear. No focal consolidation, pneumothorax, or significant pleural effusion. Osseous structures grossly intact. Chronic scarring of the right lateral lung base.     Impression: No acute process. Electronically Signed: Artur Gillespie MD  10/23/2024 9:03 AM EDT  Workstation ID: [id number]       Differential Diagnosis and Discussion:    Chest Pain:  Based on the patient's signs and symptoms, I considered aortic dissection, myocardial infaction, pulmonary embolism, cardiac tamponade, pericarditis, pneumothorax, musculoskeletal chest pain and other differential diagnosis as an etiology of the patient's chest pain.   Dyspnea: Differential diagnosis includes but is not limited to metabolic acidosis, neurological disorders, psychogenic, asthma, pneumothorax, upper airway obstruction, COPD, pneumonia, noncardiogenic pulmonary edema, interstitial lung disease, anemia, congestive heart failure, and pulmonary embolism    All labs were reviewed and interpreted by me.  All X-rays impressions were independently interpreted by me.  EKG was interpreted by me.    MDM       Patient is a 74-year-old female who presents with complaints of shortness of breath, wheezing, chest pain.  States that she is just not felt well for the last couple days and got worse around 5 AM.  Found to have CHF with fluid overload.  Also has A-fib with RVR.  Initially was around  but is increased to the 150s.  Will start on Cardizem.   Placed on Lasix.  Also given steroids and neb.  Spoke with the hospitalist who agrees to admit for further workup management.    Total Critical Care time of 33 minutes. Total critical care time documented does not include time spent on separately billed procedures for services of nurses or physician assistants. I personally saw and examined the patient. I have reviewed all diagnostic interpretations and treatment plans as written. I was present for the key portions of any procedures performed and the inclusive time noted in any critical care statement. Critical care time includes patient management by me, time spent at the patients bedside,  time to review lab and imaging results, discussing patient care, documentation in the medical record, and time spent with family or caregiver.          Patient Care Considerations:          Consultants/Shared Management Plan:    Hospitalist: I have discussed the case with Dr. Polanco who agrees to accept the patient for admission.    Social Determinants of Health:    Patient is independent, reliable, and has access to care.       Disposition and Care Coordination:    Admit:   Through independent evaluation of the patient's history, physical, and imperical data, the patient meets criteria for inpatient admission to the hospital.        Final diagnoses:   Acute respiratory failure with hypoxia   Acute congestive heart failure, unspecified heart failure type   Atrial fibrillation with rapid ventricular response        ED Disposition       ED Disposition   Decision to Admit    Condition   --    Comment   Level of Care: Telemetry [5]   Diagnosis: CHF (congestive heart failure) [494829]   Admitting Physician: BATOOL CARDONA [[id number]]   Attending Physician: BATOOL CARDONA [[id number]]   Certification: I Certify That Inpatient Hospital Services Are Medically Necessary For Greater Than 2 Midnights                 This medical record created using voice recognition  software.             Hernan Grijalva MD  10/23/24 7087

## 2024-10-24 PROBLEM — I48.91 ATRIAL FIBRILLATION: Status: ACTIVE | Noted: 2024-06-21

## 2024-10-24 PROBLEM — J45.901 SEVERE ASTHMA WITH EXACERBATION: Status: ACTIVE | Noted: 2024-10-24

## 2024-10-24 LAB
D-LACTATE SERPL-SCNC: 1.7 MMOL/L (ref 0.5–2)
D-LACTATE SERPL-SCNC: 3.3 MMOL/L (ref 0.5–2)

## 2024-10-24 PROCEDURE — 83605 ASSAY OF LACTIC ACID: CPT | Performed by: EMERGENCY MEDICINE

## 2024-10-24 PROCEDURE — 94664 DEMO&/EVAL PT USE INHALER: CPT

## 2024-10-24 PROCEDURE — 94799 UNLISTED PULMONARY SVC/PX: CPT

## 2024-10-24 PROCEDURE — 25010000002 METHYLPREDNISOLONE PER 40 MG: Performed by: STUDENT IN AN ORGANIZED HEALTH CARE EDUCATION/TRAINING PROGRAM

## 2024-10-24 PROCEDURE — 94760 N-INVAS EAR/PLS OXIMETRY 1: CPT

## 2024-10-24 RX ORDER — METHYLPREDNISOLONE SODIUM SUCCINATE 40 MG/ML
40 INJECTION, POWDER, LYOPHILIZED, FOR SOLUTION INTRAMUSCULAR; INTRAVENOUS EVERY 12 HOURS
Status: DISCONTINUED | OUTPATIENT
Start: 2024-10-24 | End: 2024-10-25

## 2024-10-24 RX ADMIN — IPRATROPIUM BROMIDE AND ALBUTEROL SULFATE 3 ML: .5; 3 SOLUTION RESPIRATORY (INHALATION) at 07:10

## 2024-10-24 RX ADMIN — TIOTROPIUM BROMIDE INHALATION SPRAY 2 PUFF: 3.12 SPRAY, METERED RESPIRATORY (INHALATION) at 07:12

## 2024-10-24 RX ADMIN — GABAPENTIN 300 MG: 300 CAPSULE ORAL at 20:17

## 2024-10-24 RX ADMIN — IPRATROPIUM BROMIDE AND ALBUTEROL SULFATE 3 ML: .5; 3 SOLUTION RESPIRATORY (INHALATION) at 18:59

## 2024-10-24 RX ADMIN — METOPROLOL TARTRATE 100 MG: 50 TABLET, FILM COATED ORAL at 08:52

## 2024-10-24 RX ADMIN — METOPROLOL TARTRATE 100 MG: 50 TABLET, FILM COATED ORAL at 20:16

## 2024-10-24 RX ADMIN — BUDESONIDE AND FORMOTEROL FUMARATE DIHYDRATE 2 PUFF: 160; 4.5 AEROSOL RESPIRATORY (INHALATION) at 19:00

## 2024-10-24 RX ADMIN — DILTIAZEM HYDROCHLORIDE 240 MG: 240 CAPSULE, EXTENDED RELEASE ORAL at 08:52

## 2024-10-24 RX ADMIN — MIRTAZAPINE 15 MG: 15 TABLET, FILM COATED ORAL at 20:18

## 2024-10-24 RX ADMIN — PRAVASTATIN SODIUM 80 MG: 20 TABLET ORAL at 08:52

## 2024-10-24 RX ADMIN — APIXABAN 5 MG: 5 TABLET, FILM COATED ORAL at 20:18

## 2024-10-24 RX ADMIN — SERTRALINE HYDROCHLORIDE 200 MG: 100 TABLET ORAL at 08:51

## 2024-10-24 RX ADMIN — HYDROCHLOROTHIAZIDE 25 MG: 25 TABLET ORAL at 08:52

## 2024-10-24 RX ADMIN — APIXABAN 5 MG: 5 TABLET, FILM COATED ORAL at 08:52

## 2024-10-24 RX ADMIN — PANTOPRAZOLE SODIUM 40 MG: 40 TABLET, DELAYED RELEASE ORAL at 08:52

## 2024-10-24 RX ADMIN — METHYLPREDNISOLONE SODIUM SUCCINATE 40 MG: 40 INJECTION, POWDER, FOR SOLUTION INTRAMUSCULAR; INTRAVENOUS at 20:18

## 2024-10-24 RX ADMIN — BACLOFEN 10 MG: 10 TABLET ORAL at 08:52

## 2024-10-24 RX ADMIN — IPRATROPIUM BROMIDE AND ALBUTEROL SULFATE 3 ML: .5; 3 SOLUTION RESPIRATORY (INHALATION) at 02:45

## 2024-10-24 RX ADMIN — BACLOFEN 10 MG: 10 TABLET ORAL at 20:16

## 2024-10-24 RX ADMIN — BUDESONIDE AND FORMOTEROL FUMARATE DIHYDRATE 2 PUFF: 160; 4.5 AEROSOL RESPIRATORY (INHALATION) at 07:12

## 2024-10-24 RX ADMIN — IPRATROPIUM BROMIDE AND ALBUTEROL SULFATE 3 ML: .5; 3 SOLUTION RESPIRATORY (INHALATION) at 13:57

## 2024-10-24 NOTE — CONSULTS
10/24/24 1335   Coping/Psychosocial   Additional Documentation Spiritual Care (Group)   Spiritual Care   Spiritual Care Source patient request (describe)   Spiritual Care Visit Type initial   Receptivity to Spiritual Care busy, visit another time  (sign on the door states that a test is in progress.  will followup tomorrow.)

## 2024-10-24 NOTE — PLAN OF CARE
Goal Outcome Evaluation:  Plan of Care Reviewed With: patient        Progress: improving  Outcome Evaluation: No acute events this shift. Call light in reach

## 2024-10-24 NOTE — CASE MANAGEMENT/SOCIAL WORK
Discharge Planning Assessment   Em     Patient Name: Camilla Navas  MRN: [id number]  Today's Date: 10/24/2024    Admit Date: 10/23/2024    Plan: Pt lives home alone, PCP: SANTOSH Lizarraga. Pharm: [facility]. Pt states she does not have alot of support, she is usually independent. Pt does have DME in home. Pt drives self, POA on file. Pt has one medication that cost $141.00 a month, PCP office working with Pt to complete assistance form. Pt plans to return home at discharge. SW will follow for needs.   Discharge Needs Assessment       Row Name 10/24/24 1226       Living Environment    People in Home alone    Current Living Arrangements home    Potentially Unsafe Housing Conditions none    In the past 12 months has the electric, gas, oil, or water company threatened to shut off services in your home? No    Primary Care Provided by self    Provides Primary Care For no one    Family Caregiver if Needed none    Quality of Family Relationships other (see comments)    Able to Return to Prior Arrangements yes       Resource/Environmental Concerns    Resource/Environmental Concerns none    Transportation Concerns none       Transportation Needs    In the past 12 months, has lack of transportation kept you from medical appointments or from getting medications? no    In the past 12 months, has lack of transportation kept you from meetings, work, or from getting things needed for daily living? No       Food Insecurity    Within the past 12 months, you worried that your food would run out before you got the money to buy more. Never true    Within the past 12 months, the food you bought just didn't last and you didn't have money to get more. Never true       Transition Planning    Patient/Family Anticipates Transition to home    Patient/Family Anticipated Services at Transition none    Transportation Anticipated family or friend will provide       Discharge Needs Assessment    Readmission Within the Last 30 Days no previous  admission in last 30 days    Equipment Currently Used at Home bp cuff;nebulizer;commode;cane, straight    Concerns to be Addressed discharge planning    Do you want help finding or keeping work or a job? I do not need or want help    Do you want help with school or training? For example, starting or completing job training or getting a high school diploma, GED or equivalent No    Anticipated Changes Related to Illness none    Equipment Needed After Discharge none    Discharge Coordination/Progress Pt lives home alone, PCP: SANTOSH Lizarraga. Pharm: [facility]. Pt states she does not have alot of support, she is usually independent. Pt does have DME in home. Pt drives self, POA on file. Pt has one medication that cost $141.00 a month, PCP office working with Pt to complete assistance form. Pt plans to return home at discharge. SW will follow for needs.                   Discharge Plan       Row Name 10/24/24 9195       Plan    Plan Pt lives home alone, PCP: SANTOSH Lizarraga. Pharm: [facility]. Pt states she does not have alot of support, she is usually independent. Pt does have DME in home. Pt drives self, POA on file. Pt has one medication that cost $141.00 a month, PCP office working with Pt to complete assistance form. Pt plans to return home at discharge. SW will follow for needs.                  Continued Care and Services - Admitted Since 10/23/2024    No active coordination exists for this encounter.          Demographic Summary       Row Name 10/24/24 1222       General Information    Admission Type inpatient    Arrived From emergency department    Referral Source admission list    Reason for Consult discharge planning    Preferred Language English       Contact Information    Permission Granted to Share Info With permission denied                   Functional Status       Row Name 10/24/24 8913       Functional Status    Usual Activity Tolerance good    Current Activity Tolerance good       Physical Activity    On  average, how many days per week do you engage in moderate to strenuous exercise (like a brisk walk)? 0 days    On average, how many minutes do you engage in exercise at this level? 0 min    Number of minutes of exercise per week 0       Assessment of Health Literacy    How often do you have someone help you read hospital materials? Never    How often do you have problems learning about your medical condition because of difficulty understanding written information? Never    How often do you have a problem understanding what is told to you about your medical condition? Never    How confident are you filling out medical forms by yourself? Quite a bit    Health Literacy Good       Functional Status, IADL    Medications independent    Meal Preparation independent    Housekeeping independent    Laundry independent    Shopping independent    If for any reason you need help with day-to-day activities such as bathing, preparing meals, shopping, managing finances, etc., do you get the help you need? I don't need any help       Mental Status    General Appearance WDL WDL       Mental Status Summary    Recent Changes in Mental Status/Cognitive Functioning no changes       Employment/    Employment Status retired                   Psychosocial    No documentation.                  Abuse/Neglect    No documentation.                  Legal       Row Name 10/24/24 1226       Financial Resource Strain    How hard is it for you to pay for the very basics like food, housing, medical care, and heating? Not very       Financial/Legal    Source of Income social security    Application for Public Assistance not applied       Legal    Criminal Activity/Legal Involvement none                   Substance Abuse    No documentation.                  Patient Forms    No documentation.                     Lucie Taylor

## 2024-10-24 NOTE — PROGRESS NOTES
[facility]     Progress Note    Patient Name: Camilla Navas  : 1950  MRN: [id number]  Primary Care Physician:  Lester Lizarraga MD  Date of admission: 10/23/2024    Subjective   Patient clinically feels better  No major acute events overnight  Heart rate has improved      Scheduled Meds:apixaban, 5 mg, Oral, Q12H  baclofen, 10 mg, Oral, BID  budesonide-formoterol, 2 puff, Inhalation, BID - RT   And  tiotropium bromide monohydrate, 2 puff, Inhalation, Daily - RT  dilTIAZem CD, 240 mg, Oral, Daily  gabapentin, 300 mg, Oral, Nightly  hydroCHLOROthiazide, 25 mg, Oral, Daily  ipratropium-albuterol, 3 mL, Nebulization, 4x Daily - RT  methylPREDNISolone sodium succinate, 40 mg, Intravenous, Q12H  metoprolol tartrate, 100 mg, Oral, Q12H  mirtazapine, 15 mg, Oral, Nightly  pantoprazole, 40 mg, Oral, Daily  pravastatin, 80 mg, Oral, Daily  sertraline, 200 mg, Oral, Daily      Continuous Infusions:dilTIAZem, 5-15 mg/hr, Last Rate: Stopped (10/23/24 1500)      PRN Meds:.  acetaminophen **OR** acetaminophen **OR** acetaminophen    ALPRAZolam    aluminum-magnesium hydroxide-simethicone    senna-docusate sodium **AND** polyethylene glycol **AND** bisacodyl **AND** bisacodyl    nitroglycerin    ondansetron    oxyCODONE    sodium chloride    sodium chloride    traMADol       Review of Systems  Constitutional:        Weakness tiredness fatigue  Eyes:                       No blurry vision, eye discharge, eye irritation, eye pain  HEENT:                   No acute hair loss, earache and discharge, nasal congestion or discharge, sore throat, postnasal drip  Respiratory:           No shortness of breath coughing sputum production wheezing hemoptysis pleuritic chest pain  Cardiovascular:     No chest pain, orthopnea, PND, dizziness, palpitation, lower extremity edema  Gastrointestinal:   No nausea vomiting diarrhea abdominal pain constipation  Genitourinary:       No urinary incontinence, hesitancy, frequency,  urgency, dysuria  Hematologic:         No bruising, bleeding, pallor, lymphadenopathy  Endocrine:            No coldness, hot flashes, polyuria, abnormal hair growth  Musculoskeletal:    No body pains, aches, arthritic pains, muscle pain ,muscle wasting  Psychiatric:          No low or high mood, anxiety, hallucinations, delusions  Skin.                      No rash, ulcers, bruising, itching  Neurological:        No confusion, headache, focal weakness, numbness, dysphasia    Objective   Objective     Vitals:   Temp:  [97.2 °F (36.2 °C)-99.5 °F (37.5 °C)] 97.9 °F (36.6 °C)  Heart Rate:  [] 95  Resp:  [16-24] 18  BP: (120-170)/() 128/62  Flow (L/min) (Oxygen Therapy):  [2] 2  Physical Exam    Constitutional: Awake, alert responsive, conversant, no obvious distress              Psychiatric:  Appropriate affect, cooperative   Neurologic:  Awake alert ,oriented x 3, strength symmetric in all extremities, Cranial Nerves grossly intact to confrontation, speech clear   Eyes:   PERRLA, sclerae anicteric, no conjunctival injection   HEENT:  Moist mucous membranes, no nasal or eye discharge, no throat congestion   Neck:   Supple, no thyromegaly, no lymphadenopathy, trachea midline, no elevated JVD   Respiratory:  Clear to auscultation bilaterally, nonlabored respirations    Cardiovascular: RRR, no murmurs, rubs, or gallops, palpable pedal pulses bilaterally, No bilateral ankle edema   Gastrointestinal: Positive bowel sounds, soft, nontender, nondistended, no organomegaly   Musculoskeletal:  No clubbing or cyanosis to extremities,muscle wasting, joint swelling, muscle weakness             Skin:                      No rashes, bruising, skin ulcers, petechiae or ecchymosis    Result Review    Result Review:  I have personally reviewed the results from the time of this admission to 10/24/2024 08:35 EDT and agree with these findings:  []  Laboratory  []  Microbiology  []  Radiology  []  EKG/Telemetry   []   Cardiology/Vascular   []  Pathology  []  Old records  []  Other:    Assessment & Plan   Assessment / Plan       Active Hospital Problems:  Active Hospital Problems    Diagnosis     **Severe asthma with exacerbation     CHF (congestive heart failure)     Atrial fibrillation with rapid ventricular response      74-year-old female with past medical history of atrial fibrillation on anticoagulation, Type 2 diabetes, hyperlipidemia, asthma who comes in due to worsening shortness of breath wheezing and chest pain.  Troponins are negative.  EKG with A-fib with RVR.  CT angiogram was unremarkable.  proBNP mildly elevated at 3000.  Patient's lactate is uptrending and her exam is otherwise benign except for some wheezing.  Echo done in June 2024 was unremarkable.  Suspect most likely has asthma exacerbation and mild CHF for which she was given 1 dose of Lasix 40 mg IV     Plan:   Will continue with as needed Lasix  After starting steroids lactate has resolved  CMP stable and troponins are stable as well  Will decrease methylprednisone to 40 mg twice daily  Continue Brovana and Pulmicort  DuoNebs 4 times a day  Pro-Allen noted to be negative  Continue increased diltiazem to 240 mg daily, Eliquis 5 mg twice daily, metoprolol 100 mg twice daily instead of atenolol 100 mg daily  Continue pravastatin 80, sertraline 200 hydrochlorothiazide 25 and baclofen 10 mg twice daily    Electronically signed by Philip Bueno MD, 10/24/24, 8:32 AM EDT.

## 2024-10-24 NOTE — PLAN OF CARE
Problem: Sepsis/Septic Shock  Goal: Optimal Coping  Outcome: Progressing  Goal: Absence of Bleeding  Outcome: Progressing  Goal: Blood Glucose Level Within Target Range  Outcome: Progressing  Goal: Absence of Infection Signs and Symptoms  Outcome: Progressing  Intervention: Initiate Sepsis Management  Recent Flowsheet Documentation  Taken 10/23/2024 2127 by Angeli Guan RN  Infection Prevention:   cohorting utilized   environmental surveillance performed  Taken 10/23/2024 1929 by Angeli Guan RN  Infection Prevention: cohorting utilized  Intervention: Promote Recovery  Recent Flowsheet Documentation  Taken 10/23/2024 1929 by Angeli Guan RN  Activity Management: sitting, edge of bed  Goal: Optimal Nutrition Delivery  Outcome: Progressing     Problem: Adult Inpatient Plan of Care  Goal: Plan of Care Review  Outcome: Progressing  Goal: Patient-Specific Goal (Individualized)  Outcome: Progressing  Goal: Absence of Hospital-Acquired Illness or Injury  Outcome: Progressing  Intervention: Identify and Manage Fall Risk  Recent Flowsheet Documentation  Taken 10/24/2024 0500 by Angeli Guan RN  Safety Promotion/Fall Prevention: safety round/check completed  Taken 10/24/2024 0300 by Angeli Guan RN  Safety Promotion/Fall Prevention: safety round/check completed  Taken 10/24/2024 0100 by Angeli Guan RN  Safety Promotion/Fall Prevention: safety round/check completed  Taken 10/23/2024 2309 by Angeli Guan RN  Safety Promotion/Fall Prevention: safety round/check completed  Taken 10/23/2024 2127 by Angeli Guan RN  Safety Promotion/Fall Prevention: safety round/check completed  Taken 10/23/2024 1929 by Angeli Guan RN  Safety Promotion/Fall Prevention:   clutter free environment maintained   assistive device/personal items within reach   lighting adjusted   safety round/check completed  Intervention: Prevent Skin Injury  Recent Flowsheet Documentation  Taken 10/23/2024 1929 by Roge  MARIZA Suh  Body Position: position changed independently  Intervention: Prevent Infection  Recent Flowsheet Documentation  Taken 10/23/2024 2127 by Angeli Guan RN  Infection Prevention:   cohorting utilized   environmental surveillance performed  Taken 10/23/2024 1929 by Angeli Guan RN  Infection Prevention: cohorting utilized  Goal: Optimal Comfort and Wellbeing  Outcome: Progressing  Intervention: Monitor Pain and Promote Comfort  Recent Flowsheet Documentation  Taken 10/23/2024 1929 by Angeli Guan RN  Pain Management Interventions:   care clustered   quiet environment facilitated   relaxation techniques promoted  Intervention: Provide Person-Centered Care  Recent Flowsheet Documentation  Taken 10/23/2024 1929 by Angeli Guan RN  Trust Relationship/Rapport:   care explained   choices provided   emotional support provided  Goal: Readiness for Transition of Care  Outcome: Progressing     Problem: Comorbidity Management  Goal: Maintenance of Asthma Control  Outcome: Progressing  Goal: Maintenance of COPD Symptom Control  Outcome: Progressing  Goal: Blood Glucose Level Within Target Range  Outcome: Progressing  Goal: Maintenance of Heart Failure Symptom Control  Outcome: Progressing  Goal: Blood Pressure in Desired Range  Outcome: Progressing   Goal Outcome Evaluation:      Pt weaned off oxygen, 02 saturation remained over 96%overnight. Provided oxy 5 mg x 1 for 8/10 left sided chest pain at 1930, pt slept , stated pain was controlled without any other interventions.

## 2024-10-25 ENCOUNTER — READMISSION MANAGEMENT (OUTPATIENT)
Dept: CALL CENTER | Facility: HOSPITAL | Age: 74
End: 2024-10-25
Payer: MEDICARE

## 2024-10-25 VITALS
RESPIRATION RATE: 18 BRPM | DIASTOLIC BLOOD PRESSURE: 72 MMHG | BODY MASS INDEX: 35.04 KG/M2 | HEIGHT: 65 IN | WEIGHT: 210.32 LBS | TEMPERATURE: 97.7 F | SYSTOLIC BLOOD PRESSURE: 148 MMHG | OXYGEN SATURATION: 94 % | HEART RATE: 85 BPM

## 2024-10-25 PROBLEM — I48.91 ATRIAL FIBRILLATION WITH RAPID VENTRICULAR RESPONSE: Status: RESOLVED | Noted: 2024-06-21 | Resolved: 2024-10-25

## 2024-10-25 PROBLEM — J45.901 SEVERE ASTHMA WITH EXACERBATION: Status: RESOLVED | Noted: 2024-10-24 | Resolved: 2024-10-25

## 2024-10-25 PROBLEM — I50.9 CHF (CONGESTIVE HEART FAILURE): Status: RESOLVED | Noted: 2024-10-23 | Resolved: 2024-10-25

## 2024-10-25 LAB
ANION GAP SERPL CALCULATED.3IONS-SCNC: 12.5 MMOL/L (ref 5–15)
BUN SERPL-MCNC: 30 MG/DL (ref 8–23)
BUN/CREAT SERPL: 31.3 (ref 7–25)
CALCIUM SPEC-SCNC: 8.9 MG/DL (ref 8.6–10.5)
CHLORIDE SERPL-SCNC: 96 MMOL/L (ref 98–107)
CO2 SERPL-SCNC: 25.5 MMOL/L (ref 22–29)
CREAT SERPL-MCNC: 0.96 MG/DL (ref 0.57–1)
EGFRCR SERPLBLD CKD-EPI 2021: 61.8 ML/MIN/1.73
GLUCOSE SERPL-MCNC: 333 MG/DL (ref 65–99)
MYCOBACTERIUM SPEC CULT: NORMAL
NIGHT BLUE STAIN TISS: NORMAL
NIGHT BLUE STAIN TISS: NORMAL
POTASSIUM SERPL-SCNC: 3.8 MMOL/L (ref 3.5–5.2)
SODIUM SERPL-SCNC: 134 MMOL/L (ref 136–145)
WHOLE BLOOD HOLD SPECIMEN: NORMAL

## 2024-10-25 PROCEDURE — 63710000001 PREDNISONE PER 1 MG: Performed by: STUDENT IN AN ORGANIZED HEALTH CARE EDUCATION/TRAINING PROGRAM

## 2024-10-25 PROCEDURE — 80048 BASIC METABOLIC PNL TOTAL CA: CPT | Performed by: STUDENT IN AN ORGANIZED HEALTH CARE EDUCATION/TRAINING PROGRAM

## 2024-10-25 PROCEDURE — 94664 DEMO&/EVAL PT USE INHALER: CPT

## 2024-10-25 PROCEDURE — 94799 UNLISTED PULMONARY SVC/PX: CPT

## 2024-10-25 PROCEDURE — 94760 N-INVAS EAR/PLS OXIMETRY 1: CPT

## 2024-10-25 RX ORDER — PREDNISONE 20 MG/1
40 TABLET ORAL
Qty: 10 TABLET | Refills: 0 | Status: SHIPPED | OUTPATIENT
Start: 2024-10-25 | End: 2024-10-30

## 2024-10-25 RX ORDER — PREDNISONE 20 MG/1
40 TABLET ORAL
Status: DISCONTINUED | OUTPATIENT
Start: 2024-10-25 | End: 2024-10-25 | Stop reason: HOSPADM

## 2024-10-25 RX ORDER — DILTIAZEM HYDROCHLORIDE 240 MG/1
240 CAPSULE, COATED, EXTENDED RELEASE ORAL DAILY
Qty: 30 CAPSULE | Refills: 0 | Status: SHIPPED | OUTPATIENT
Start: 2024-10-25 | End: 2024-11-24

## 2024-10-25 RX ORDER — METOPROLOL TARTRATE 100 MG
100 TABLET ORAL EVERY 12 HOURS SCHEDULED
Qty: 60 TABLET | Refills: 0 | Status: SHIPPED | OUTPATIENT
Start: 2024-10-25 | End: 2024-11-24

## 2024-10-25 RX ADMIN — SERTRALINE HYDROCHLORIDE 200 MG: 100 TABLET ORAL at 08:30

## 2024-10-25 RX ADMIN — DILTIAZEM HYDROCHLORIDE 240 MG: 240 CAPSULE, EXTENDED RELEASE ORAL at 08:30

## 2024-10-25 RX ADMIN — IPRATROPIUM BROMIDE AND ALBUTEROL SULFATE 3 ML: .5; 3 SOLUTION RESPIRATORY (INHALATION) at 00:47

## 2024-10-25 RX ADMIN — BACLOFEN 10 MG: 10 TABLET ORAL at 08:30

## 2024-10-25 RX ADMIN — IPRATROPIUM BROMIDE AND ALBUTEROL SULFATE 3 ML: .5; 3 SOLUTION RESPIRATORY (INHALATION) at 11:15

## 2024-10-25 RX ADMIN — PRAVASTATIN SODIUM 80 MG: 20 TABLET ORAL at 08:30

## 2024-10-25 RX ADMIN — APIXABAN 5 MG: 5 TABLET, FILM COATED ORAL at 08:30

## 2024-10-25 RX ADMIN — BUDESONIDE AND FORMOTEROL FUMARATE DIHYDRATE 2 PUFF: 160; 4.5 AEROSOL RESPIRATORY (INHALATION) at 06:35

## 2024-10-25 RX ADMIN — METOPROLOL TARTRATE 100 MG: 50 TABLET, FILM COATED ORAL at 08:30

## 2024-10-25 RX ADMIN — HYDROCHLOROTHIAZIDE 25 MG: 25 TABLET ORAL at 08:30

## 2024-10-25 RX ADMIN — PANTOPRAZOLE SODIUM 40 MG: 40 TABLET, DELAYED RELEASE ORAL at 08:30

## 2024-10-25 RX ADMIN — IPRATROPIUM BROMIDE AND ALBUTEROL SULFATE 3 ML: .5; 3 SOLUTION RESPIRATORY (INHALATION) at 06:26

## 2024-10-25 RX ADMIN — PREDNISONE 40 MG: 20 TABLET ORAL at 08:30

## 2024-10-25 NOTE — DISCHARGE SUMMARY
[facility]   DISCHARGE SUMMARY    Patient Name: Camilla Navas  : 1950  MRN: [id number]    Date of Admission: 10/23/2024  Date of Discharge: 10/26/2024  Primary Care Physician: Lester Lizarraga MD    Consults       No orders found from 2024 to 10/24/2024.            Hospital Course     Presenting Problem:   CHF (congestive heart failure) [I50.9]  Atrial fibrillation with rapid ventricular response [I48.91]  Acute respiratory failure with hypoxia [J96.01]  Acute congestive heart failure, unspecified heart failure type [I50.9]    Active and resolved problems  Principal Problem (Resolved):    Severe asthma with exacerbation  Overview:  Active Problems:    * No active hospital problems. *  Resolved Problems:    Atrial fibrillation with rapid ventricular response  Overview:    CHF (congestive heart failure)  Overview:     Hospital Course:  Camilla Navas is a 74 y.o. female 74-year-old female with past medical history of atrial fibrillation on anticoagulation, Type 2 diabetes, hyperlipidemia, asthma who comes in due to worsening shortness of breath wheezing and chest pain.  Troponins are negative.  EKG with A-fib with RVR.  CT angiogram was unremarkable.  proBNP mildly elevated at 3000.  Patient's lactate is uptrending and her exam is otherwise benign except for some wheezing.  Echo done in 2024 was unremarkable.  Suspect most likely has asthma exacerbation and mild CHF for which she was given 1 dose of Lasix 40 mg IV with mild worsening of renal function and patient has been switched over to p.o. prednisone on 10/25 for 5 days and to continue remaining home medications.  She is to follow-up with her PCP and pulmonology      Vital Signs:  Temp:  [97.7 °F (36.5 °C)] 97.7 °F (36.5 °C)  Heart Rate:  [85] 85  Resp:  [18] 18  BP: (148)/(72) 148/72      Discharge Details        Discharge Medications        New Medications        Instructions Start Date   metoprolol tartrate 100 MG  tablet  Commonly known as: LOPRESSOR   100 mg, Oral, Every 12 Hours Scheduled      predniSONE 20 MG tablet  Commonly known as: DELTASONE   40 mg, Oral, Daily With Breakfast             Changes to Medications        Instructions Start Date   albuterol sulfate  (90 Base) MCG/ACT inhaler  Commonly known as: PROVENTIL HFA;VENTOLIN HFA;PROAIR HFA  What changed: reasons to take this   2 puffs, Inhalation, Every 4 Hours PRN      albuterol (2.5 MG/3ML) 0.083% nebulizer solution  Commonly known as: PROVENTIL  What changed: Another medication with the same name was changed. Make sure you understand how and when to take each.   2.5 mg, 3 times daily      apixaban 5 MG tablet tablet  Commonly known as: ELIQUIS  What changed: when to take this   5 mg, Oral, Every 12 Hours Scheduled      dilTIAZem  MG 24 hr capsule  Commonly known as: CARDIZEM CD  What changed:   medication strength  how much to take   240 mg, Oral, Daily      furosemide 20 MG tablet  Commonly known as: LASIX  What changed: See the new instructions.   TAKE 1 TABLET BY MOUTH EVERY DAY FOR 7 DAYS, THEN USE EVERY DAY AS NEEDED FOR INCREASED SWELLING             Continue These Medications        Instructions Start Date   Arthritis Pain Reliever 650 MG 8 hr tablet  Generic drug: acetaminophen   650 mg, Oral, Every 8 Hours PRN      baclofen 10 MG tablet  Commonly known as: LIORESAL   10 mg, Oral, 2 Times Daily      gabapentin 300 MG capsule  Commonly known as: NEURONTIN   300 mg, Oral, Nightly      glipizide 5 MG tablet  Commonly known as: GLUCOTROL   5 mg, Oral, Daily      hydroCHLOROthiazide 25 MG tablet   TAKE 1 TABLET BY MOUTH EVERY DAY      mirtazapine 15 MG tablet  Commonly known as: REMERON   15 mg, Oral, Nightly      nitroglycerin 0.4 MG SL tablet  Commonly known as: NITROSTAT   1 under the tongue as needed for angina, may repeat q5mins for up three doses      ondansetron 4 MG tablet  Commonly known as: ZOFRAN   4 mg, Oral, Every 6 Hours PRN       pantoprazole 40 MG EC tablet  Commonly known as: PROTONIX   1 tablet, Oral, Daily      pravastatin 80 MG tablet  Commonly known as: PRAVACHOL   80 mg, Oral, Daily      sertraline 100 MG tablet  Commonly known as: ZOLOFT   200 mg, Oral, Daily      Trelegy Ellipta 100-62.5-25 MCG/ACT inhaler  Generic drug: Fluticasone-Umeclidin-Vilant   1 puff, Daily - RT      vitamin D3 125 MCG (5000 UT) capsule capsule   5,000 Units, Oral, Daily      vitamin E 400 UNIT capsule   400 Units, Oral, Daily             Stop These Medications      atenolol 100 MG tablet  Commonly known as: TENORMIN              Allergies   Allergen Reactions    Empagliflozin Nausea And Vomiting    Metformin Diarrhea         Discharge Disposition:  Home or Self Care    Diet:  As tolerated      Discharge Activity:   As tolerated      CODE STATUS:    Code Status and Medical Interventions: CPR (Attempt to Resuscitate); Full Support   Ordered at: 10/23/24 1306     Code Status (Patient has no pulse and is not breathing):    CPR (Attempt to Resuscitate)     Medical Interventions (Patient has pulse or is breathing):    Full Support         Future Appointments   Date Time Provider Department Center   10/30/2024  9:00 AM Randall Mathew MD [facility] P[facility]   12/10/2024  3:00 PM [facility] WALK TEST ROOM [facility]   12/10/2024  3:30 PM [facility] PULM LAB ROOM 2 [facility]   1/7/2025  9:15 AM Randall Mathew MD [facility]   2/3/2025  9:00 AM [facility]           Time spent on Discharge including face to face service:  15 minutes    Electronically signed by Philip Bueno MD, 10/25/24, 2:45 PM EDT.

## 2024-10-25 NOTE — PLAN OF CARE
No acute events overnight. Pt slept without any complaints of chest pain.     Problem: Sepsis/Septic Shock  Goal: Optimal Coping  Outcome: Progressing  Goal: Absence of Bleeding  Outcome: Progressing  Goal: Blood Glucose Level Within Target Range  Outcome: Progressing  Goal: Absence of Infection Signs and Symptoms  Outcome: Progressing  Intervention: Initiate Sepsis Management  Recent Flowsheet Documentation  Taken 10/24/2024 2300 by Angeli Guan RN  Infection Prevention: rest/sleep promoted  Taken 10/24/2024 2100 by Angeli Guan RN  Infection Prevention: rest/sleep promoted  Taken 10/24/2024 1900 by Angeli Guan RN  Infection Prevention:   cohorting utilized   rest/sleep promoted  Goal: Optimal Nutrition Delivery  Outcome: Progressing     Problem: Adult Inpatient Plan of Care  Goal: Plan of Care Review  Outcome: Progressing  Goal: Patient-Specific Goal (Individualized)  Outcome: Progressing  Goal: Absence of Hospital-Acquired Illness or Injury  Outcome: Progressing  Intervention: Identify and Manage Fall Risk  Recent Flowsheet Documentation  Taken 10/25/2024 0300 by Angeli Guan RN  Safety Promotion/Fall Prevention: safety round/check completed  Taken 10/24/2024 2300 by Angeli Guan RN  Safety Promotion/Fall Prevention: safety round/check completed  Taken 10/24/2024 2100 by Angeli Guan RN  Safety Promotion/Fall Prevention: safety round/check completed  Taken 10/24/2024 1900 by Angeli Guan RN  Safety Promotion/Fall Prevention: safety round/check completed  Intervention: Prevent Skin Injury  Recent Flowsheet Documentation  Taken 10/24/2024 1900 by Angeli Guan RN  Body Position: position changed independently  Intervention: Prevent Infection  Recent Flowsheet Documentation  Taken 10/24/2024 2300 by Angeli Guan RN  Infection Prevention: rest/sleep promoted  Taken 10/24/2024 2100 by Angeli Guan RN  Infection Prevention: rest/sleep promoted  Taken 10/24/2024 1900 by  Angeli Guan, RN  Infection Prevention:   cohorting utilized   rest/sleep promoted  Goal: Optimal Comfort and Wellbeing  Outcome: Progressing  Intervention: Monitor Pain and Promote Comfort  Recent Flowsheet Documentation  Taken 10/24/2024 1900 by Angeli Guan RN  Pain Management Interventions:   care clustered   quiet environment facilitated   prescribed exercises encouraged  Goal: Readiness for Transition of Care  Outcome: Progressing     Problem: Comorbidity Management  Goal: Maintenance of Asthma Control  Outcome: Progressing  Goal: Maintenance of COPD Symptom Control  Outcome: Progressing  Goal: Blood Glucose Level Within Target Range  Outcome: Progressing  Goal: Maintenance of Heart Failure Symptom Control  Outcome: Progressing  Goal: Blood Pressure in Desired Range  Outcome: Progressing     Problem: Fall Injury Risk  Goal: Absence of Fall and Fall-Related Injury  Outcome: Progressing  Intervention: Promote Injury-Free Environment  Recent Flowsheet Documentation  Taken 10/25/2024 0300 by Angeli Guan RN  Safety Promotion/Fall Prevention: safety round/check completed  Taken 10/24/2024 2300 by Angeli Guan RN  Safety Promotion/Fall Prevention: safety round/check completed  Taken 10/24/2024 2100 by Angeli Guan RN  Safety Promotion/Fall Prevention: safety round/check completed  Taken 10/24/2024 1900 by Angeli Guan RN  Safety Promotion/Fall Prevention: safety round/check completed   Goal Outcome Evaluation:

## 2024-10-25 NOTE — PROGRESS NOTES
[facility]     Progress Note    Patient Name: Camilla Navas  : 1950  MRN: [id number]  Primary Care Physician:  Lester Lizarraga MD  Date of admission: 10/23/2024    Subjective   No major acute events overnight  Patient's breathing is better  Blood pressures and vitals are stable    Scheduled Meds:apixaban, 5 mg, Oral, Q12H  baclofen, 10 mg, Oral, BID  budesonide-formoterol, 2 puff, Inhalation, BID - RT   And  tiotropium bromide monohydrate, 2 puff, Inhalation, Daily - RT  dilTIAZem CD, 240 mg, Oral, Daily  gabapentin, 300 mg, Oral, Nightly  hydroCHLOROthiazide, 25 mg, Oral, Daily  ipratropium-albuterol, 3 mL, Nebulization, 4x Daily - RT  metoprolol tartrate, 100 mg, Oral, Q12H  mirtazapine, 15 mg, Oral, Nightly  pantoprazole, 40 mg, Oral, Daily  pravastatin, 80 mg, Oral, Daily  predniSONE, 40 mg, Oral, Daily With Breakfast  sertraline, 200 mg, Oral, Daily      Continuous Infusions:   PRN Meds:.  acetaminophen **OR** acetaminophen **OR** acetaminophen    ALPRAZolam    aluminum-magnesium hydroxide-simethicone    senna-docusate sodium **AND** polyethylene glycol **AND** bisacodyl **AND** bisacodyl    nitroglycerin    ondansetron    oxyCODONE    sodium chloride    sodium chloride    traMADol       Review of Systems  Constitutional:        Weakness tiredness fatigue  Eyes:                       No blurry vision, eye discharge, eye irritation, eye pain  HEENT:                   No acute hair loss, earache and discharge, nasal congestion or discharge, sore throat, postnasal drip  Respiratory:           No shortness of breath coughing sputum production wheezing hemoptysis pleuritic chest pain  Cardiovascular:     No chest pain, orthopnea, PND, dizziness, palpitation, lower extremity edema  Gastrointestinal:   No nausea vomiting diarrhea abdominal pain constipation  Genitourinary:       No urinary incontinence, hesitancy, frequency, urgency, dysuria  Hematologic:         No bruising, bleeding, pallor,  lymphadenopathy  Endocrine:            No coldness, hot flashes, polyuria, abnormal hair growth  Musculoskeletal:    No body pains, aches, arthritic pains, muscle pain ,muscle wasting  Psychiatric:          No low or high mood, anxiety, hallucinations, delusions  Skin.                      No rash, ulcers, bruising, itching  Neurological:        No confusion, headache, focal weakness, numbness, dysphasia    Objective   Objective     Vitals:   Temp:  [97.2 °F (36.2 °C)-99.1 °F (37.3 °C)] 97.2 °F (36.2 °C)  Heart Rate:  [] 99  Resp:  [16-20] 18  BP: (124-166)/(56-80) 140/66  Flow (L/min) (Oxygen Therapy):  [2] 2  Physical Exam    Constitutional: Awake, alert responsive, conversant, no obvious distress              Psychiatric:  Appropriate affect, cooperative   Neurologic:  Awake alert ,oriented x 3, strength symmetric in all extremities, Cranial Nerves grossly intact to confrontation, speech clear   Eyes:   PERRLA, sclerae anicteric, no conjunctival injection   HEENT:  Moist mucous membranes, no nasal or eye discharge, no throat congestion   Neck:   Supple, no thyromegaly, no lymphadenopathy, trachea midline, no elevated JVD   Respiratory:  Clear to auscultation bilaterally, nonlabored respirations    Cardiovascular: RRR, no murmurs, rubs, or gallops, palpable pedal pulses bilaterally, No bilateral ankle edema   Gastrointestinal: Positive bowel sounds, soft, nontender, nondistended, no organomegaly   Musculoskeletal:  No clubbing or cyanosis to extremities,muscle wasting, joint swelling, muscle weakness             Skin:                      No rashes, bruising, skin ulcers, petechiae or ecchymosis    Result Review    Result Review:  I have personally reviewed the results from the time of this admission to 10/25/2024 08:14 EDT and agree with these findings:  []  Laboratory  []  Microbiology  []  Radiology  []  EKG/Telemetry   []  Cardiology/Vascular   []  Pathology  []  Old records  []  Other:    Assessment &  Plan   Assessment / Plan       Active Hospital Problems:  Active Hospital Problems    Diagnosis     **Severe asthma with exacerbation     CHF (congestive heart failure)     Atrial fibrillation with rapid ventricular response      74-year-old female with past medical history of atrial fibrillation on anticoagulation, Type 2 diabetes, hyperlipidemia, asthma who comes in due to worsening shortness of breath wheezing and chest pain.  Troponins are negative.  EKG with A-fib with RVR.  CT angiogram was unremarkable.  proBNP mildly elevated at 3000.  Patient's lactate is uptrending and her exam is otherwise benign except for some wheezing.  Echo done in June 2024 was unremarkable.  Suspect most likely has asthma exacerbation and mild CHF for which she was given 1 dose of Lasix 40 mg IV with mild worsening of renal function and patient has been switched over to p.o. prednisone on 10/25 for 5 days and to continue remaining home medications     Plan:   Switch over to prednisone 40 mg daily  Continue Brovana and Pulmicort  DuoNebs 4 times a day  Pro-Allen noted to be negative  Continue increased diltiazem to 240 mg daily, Eliquis 5 mg twice daily, metoprolol 100 mg twice daily instead of atenolol 100 mg daily  Continue pravastatin 80, sertraline 200 hydrochlorothiazide 25 and baclofen 10 mg twice daily    Electronically signed by Philip Bueno MD, 10/25/24, 8:14 AM EDT.

## 2024-10-25 NOTE — PLAN OF CARE
Goal Outcome Evaluation:  Plan of Care Reviewed With: patient        Progress: improving  Outcome Evaluation: Patient has done well this shift, no complaints of CP, SOA or swelling. Ambulating to bathroom with standby assist only, tolerating well. Ready to go back home.

## 2024-10-26 NOTE — OUTREACH NOTE
Prep Survey      Flowsheet Row Responses   [facility] discharged [facility]? [facility]   Is LACE score < 7 ? No   Eligibility Readm Mgmt   Discharge diagnosis Severe asthma with exacerbation   Does the patient have one of the following disease processes/diagnoses(primary or secondary)? Other   Does the patient have Home health ordered? No   Is there a DME ordered? No   Prep survey completed? Yes            Nasima MAKI - Registered Nurse

## 2024-10-28 LAB
BACTERIA SPEC AEROBE CULT: NORMAL
BACTERIA SPEC AEROBE CULT: NORMAL

## 2024-10-29 ENCOUNTER — READMISSION MANAGEMENT (OUTPATIENT)
Dept: CALL CENTER | Facility: HOSPITAL | Age: 74
End: 2024-10-29
Payer: MEDICARE

## 2024-10-29 NOTE — OUTREACH NOTE
Medical Week 1 Survey      Flowsheet Row Responses   [facility] patient discharged from? [facility]   Does the patient have one of the following disease processes/diagnoses(primary or secondary)? Other   Week 1 attempt successful? No   Unsuccessful attempts Attempt 1  [attempted pt and dtr]            MARIA GUADALUPE ZARAGOZA - Registered Nurse

## 2024-10-30 ENCOUNTER — OFFICE VISIT (OUTPATIENT)
Dept: PULMONOLOGY | Facility: CLINIC | Age: 74
End: 2024-10-30
Payer: MEDICARE

## 2024-10-30 VITALS
OXYGEN SATURATION: 97 % | SYSTOLIC BLOOD PRESSURE: 153 MMHG | TEMPERATURE: 96.3 F | DIASTOLIC BLOOD PRESSURE: 77 MMHG | BODY MASS INDEX: 34.2 KG/M2 | RESPIRATION RATE: 14 BRPM | HEART RATE: 88 BPM | HEIGHT: 65 IN | WEIGHT: 205.3 LBS

## 2024-10-30 DIAGNOSIS — J45.40 MODERATE PERSISTENT ASTHMA WITHOUT STATUS ASTHMATICUS WITHOUT COMPLICATION: ICD-10-CM

## 2024-10-30 DIAGNOSIS — J30.2 SEASONAL ALLERGIC RHINITIS, UNSPECIFIED TRIGGER: Primary | ICD-10-CM

## 2024-10-30 DIAGNOSIS — J30.2 SEASONAL ALLERGIC RHINITIS, UNSPECIFIED TRIGGER: ICD-10-CM

## 2024-10-30 DIAGNOSIS — R05.9 COUGH, UNSPECIFIED TYPE: ICD-10-CM

## 2024-10-30 RX ORDER — OMEPRAZOLE 40 MG/1
40 CAPSULE, DELAYED RELEASE ORAL 2 TIMES DAILY
Qty: 60 CAPSULE | Refills: 3 | Status: SHIPPED | OUTPATIENT
Start: 2024-10-30

## 2024-10-30 RX ORDER — MONTELUKAST SODIUM 10 MG/1
10 TABLET ORAL NIGHTLY
Qty: 90 TABLET | Refills: 3 | Status: SHIPPED | OUTPATIENT
Start: 2024-10-30

## 2024-10-30 RX ORDER — FLUTICASONE PROPIONATE 50 MCG
2 SPRAY, SUSPENSION (ML) NASAL DAILY
Qty: 16 G | Refills: 6 | Status: SHIPPED | OUTPATIENT
Start: 2024-10-30

## 2024-10-30 RX ORDER — IPRATROPIUM BROMIDE AND ALBUTEROL SULFATE 2.5; .5 MG/3ML; MG/3ML
SOLUTION RESPIRATORY (INHALATION)
Qty: 1080 ML | Refills: 3 | Status: SHIPPED | OUTPATIENT
Start: 2024-10-30

## 2024-10-30 RX ORDER — IPRATROPIUM BROMIDE AND ALBUTEROL SULFATE 2.5; .5 MG/3ML; MG/3ML
3 SOLUTION RESPIRATORY (INHALATION) 4 TIMES DAILY PRN
Qty: 360 ML | Refills: 1 | Status: SHIPPED | OUTPATIENT
Start: 2024-10-30 | End: 2024-10-30

## 2024-10-30 RX ORDER — AZELASTINE 1 MG/ML
2 SPRAY, METERED NASAL 2 TIMES DAILY
Qty: 90 ML | Refills: 5 | Status: SHIPPED | OUTPATIENT
Start: 2024-10-30

## 2024-10-30 NOTE — PROGRESS NOTES
Primary Care Provider  Lester Lizarraga MD     Referring Provider  No ref. provider found     Chief Complaint  Hospital Follow Up Visit, Sore Throat (/), Asthma, Cough (Yellow /), Shortness of Breath, and Wheezing (At times/)    Subjective          Camilla Navas presents to [facility] PULMONARY & CRITICAL CARE MEDICINE  History of Present Illness  Camilla Navas is a 74 y.o. female patient   History of Present Illness  The patient is a 74-year-old female who was seen for possible asthma and is here for a follow-up visit.    She was recently seen in the emergency room a week ago for worsening shortness of breath and was found to have mild congestive heart failure (CHF). She was admitted to the ER for three days and was prescribed steroids, antibiotics, and Lasix. Her condition has improved since then, with her cough becoming less severe. She reports chest soreness due to excessive coughing and leg swelling, which is a common occurrence for her.    She continues to experience wheezing and coughing. She regularly uses an inhaler, which provides relief, but did not use it this morning due to oversleeping. She has completed her course of prednisone and has a few Lasix tablets remaining. She requires medication for her nebulizer. She uses Trelegy once daily and albuterol Ventolin twice daily as needed. She occasionally wakes up at night due to coughing, wheezing, and shortness of breath. She uses her nebulizer three times a day.    She also suffers from acid reflux, which varies in severity. She rinses her mouth after using the inhaler. She reports postnasal drip and drainage.    She had a severe coughing episode in 05/2024, which required a three-day hospital stay. Since then, her cough has been intermittent. She experienced another severe episode a week ago.    IMMUNIZATIONS  She is up to date on her influenza, pneumonia, and RSV vaccines.    Review of Systems     General:  No Fatigue, No  Fever No weight loss or loss of appetite  HEENT: No dysphagia, No Visual Changes, no rhinorrhea  Respiratory:  + cough,+Dyspnea, intermittent phlegm, No Pleuritic Pain, no wheezing, no hemoptysis.  Cardiovascular: Denies chest pain, denies palpitations,+PARKER, No Chest Pressure  Gastrointestinal:  No Abdominal Pain, No Nausea, No Vomiting, No Diarrhea  Genitourinary:  No Dysuria, No Frequency, No Hesitancy  Musculoskeletal: No muscle pain or swelling  Endocrine:  No Heat Intolerance, No Cold Intolerance  Hematologic:  No Bleeding, No Bruising  Psychiatric:  No Anxiety, No Depression  Neurologic:  No Confusion, no Dysarthria, No Headaches  Skin:  No Rash, No Open Wounds    Family History   Problem Relation Age of Onset    Colon cancer Neg Hx     Malig Hyperthermia Neg Hx         Social History     Socioeconomic History    Marital status:    Tobacco Use    Smoking status: Never     Passive exposure: Past    Smokeless tobacco: Never   Vaping Use    Vaping status: Never Used   Substance and Sexual Activity    Alcohol use: Never    Drug use: Never    Sexual activity: Defer        Past Medical History:   Diagnosis Date    Arthritis     Asthma     Atherosclerosis of coronary artery 2018    Non-obstructive coronary artery disease. Cardiac catheterization done in October 2018 showed a 40% mid LAD lesion and a 40% proximal RCA lesion.    Cancer     Breast.     COPD (chronic obstructive pulmonary disease)     Diabetes mellitus     Diverticulitis     Hiatal hernia     Hyperlipemia 01/21/2022    Hypertension, essential 03/05/2022        Immunization History   Administered Date(s) Administered    31-influenza Vac Quardvalent Preservativ 10/16/2019, 09/17/2020, 09/29/2023    ABRYSVO (RSV, 60+ or pregnant women 32-36 wks) 04/30/2024    COVID-19 (MODERNA) 12YRS+ (SPIKEVAX) 09/04/2024    COVID-19 (MODERNA) BIVALENT 12+YRS 09/26/2022    COVID-19 (PFIZER) Purple Cap Monovalent 07/27/2021, 08/17/2021    Covid-19 (Pfizer) Gray Cap  Monovalent 03/07/2022    Fluzone  >6mos 10/08/2012, 09/24/2013, 11/22/2017, 09/26/2018, 10/16/2019, 09/17/2020    Fluzone High-Dose 65+YRS 10/03/2024    Fluzone High-Dose 65+yrs 09/26/2022    Fluzone Quad >6mos (Multi-dose) 10/06/2021    Influenza Injectable Mdck Pf Quad 11/22/2017, 09/26/2018    Influenza TIV (IM) 10/04/2007, 10/06/2008, 09/09/2009, 09/21/2010, 10/07/2011, 09/15/2014    Influenza, Unspecified 11/22/2017, 09/26/2018    Pneumococcal Conjugate 13-Valent (PCV13) 03/29/2015    Pneumococcal Conjugate 20-Valent (PCV20) 09/04/2024    Pneumococcal Polysaccharide (PPSV23) 12/06/2011, 09/17/2020    Shingrix 04/30/2024, 07/31/2024    TD Preservative Free (Tenivac) 08/01/2008         Allergies   Allergen Reactions    Empagliflozin Nausea And Vomiting    Metformin Diarrhea          Current Outpatient Medications:     albuterol sulfate  (90 Base) MCG/ACT inhaler, Inhale 2 puffs Every 4 (Four) Hours As Needed for Wheezing., Disp: 8 g, Rfl: 0    apixaban (ELIQUIS) 5 MG tablet tablet, Take 1 tablet by mouth Every 12 (Twelve) Hours., Disp: 180 tablet, Rfl: 3    Arthritis Pain Reliever 650 MG 8 hr tablet, Take 1 tablet by mouth Every 8 (Eight) Hours As Needed for Mild Pain., Disp: , Rfl:     baclofen (LIORESAL) 10 MG tablet, Take 1 tablet by mouth 2 (Two) Times a Day., Disp: , Rfl:     dilTIAZem CD (CARDIZEM CD) 240 MG 24 hr capsule, Take 1 capsule by mouth Daily for 30 days., Disp: 30 capsule, Rfl: 0    furosemide (LASIX) 20 MG tablet, TAKE 1 TABLET BY MOUTH EVERY DAY FOR 7 DAYS, THEN USE EVERY DAY AS NEEDED FOR INCREASED SWELLING (Patient taking differently: Take 1 tablet by mouth Daily.), Disp: 30 tablet, Rfl: 1    gabapentin (NEURONTIN) 300 MG capsule, Take 1 capsule by mouth Every Night., Disp: , Rfl:     glipizide (GLUCOTROL) 5 MG tablet, Take 1 tablet by mouth Daily., Disp: , Rfl:     hydroCHLOROthiazide 25 MG tablet, TAKE 1 TABLET BY MOUTH EVERY DAY (Patient taking differently: Take 1 tablet by mouth  Daily.), Disp: 90 tablet, Rfl: 2    metoprolol tartrate (LOPRESSOR) 100 MG tablet, Take 1 tablet by mouth Every 12 (Twelve) Hours for 30 days., Disp: 60 tablet, Rfl: 0    mirtazapine (REMERON) 15 MG tablet, Take 1 tablet by mouth Every Night., Disp: , Rfl:     nitroglycerin (NITROSTAT) 0.4 MG SL tablet, 1 under the tongue as needed for angina, may repeat q5mins for up three doses, Disp: 25 tablet, Rfl: 1    ondansetron (ZOFRAN) 4 MG tablet, Take 1 tablet by mouth Every 6 (Six) Hours As Needed for Nausea or Vomiting., Disp: , Rfl:     pantoprazole (PROTONIX) 40 MG EC tablet, Take 1 tablet by mouth Daily., Disp: , Rfl:     pravastatin (PRAVACHOL) 80 MG tablet, TAKE 1 TABLET BY MOUTH DAILY, Disp: 90 tablet, Rfl: 3    predniSONE (DELTASONE) 20 MG tablet, Take 2 tablets by mouth Daily With Breakfast for 5 doses., Disp: 10 tablet, Rfl: 0    sertraline (ZOLOFT) 100 MG tablet, Take 2 tablets by mouth Daily., Disp: , Rfl:     vitamin D3 125 MCG (5000 UT) capsule capsule, Take 1 capsule by mouth Daily., Disp: , Rfl:     vitamin E 400 UNIT capsule, Take 1 capsule by mouth Daily., Disp: , Rfl:     azelastine (ASTELIN) 0.1 % nasal spray, Administer 2 sprays into the nostril(s) as directed by provider 2 (Two) Times a Day. Use in each nostril as directed, Disp: 90 mL, Rfl: 5    fluticasone (FLONASE) 50 MCG/ACT nasal spray, Administer 2 sprays into the nostril(s) as directed by provider Daily., Disp: 16 g, Rfl: 6    Fluticasone-Umeclidin-Vilant (TRELEGY ELLIPTA) 200-62.5-25 MCG/ACT inhaler, Inhale 1 puff Daily., Disp: 60 each, Rfl: 11    ipratropium-albuterol (DUO-NEB) 0.5-2.5 mg/3 ml nebulizer, Take 3 mL by nebulization 4 (Four) Times a Day As Needed for Wheezing or Shortness of Air., Disp: 360 mL, Rfl: 1    omeprazole (priLOSEC) 40 MG capsule, Take 1 capsule by mouth 2 (Two) Times a Day., Disp: 60 capsule, Rfl: 3     Objective   Physical Exam  Physical Exam      Vital Signs:   /77 (BP Location: Left arm, Patient Position:  "Sitting, Cuff Size: Adult)   Pulse 88   Temp 96.3 °F (35.7 °C) (Temporal)   Resp 14   Ht 165.1 cm (65\")   Wt 93.1 kg (205 lb 4.8 oz)   SpO2 97% Comment: room air  BMI 34.16 kg/m²       Vital Signs Reviewed  WDWN, Alert, NAD.   HEENT:  PERRL, EOMI.  OP, nares clear, no sinus tenderness  Mallampatti classification : 1  Neck:  Supple, no JVD, no thyromegaly  Lymph: no axillary, cervical, supraclavicular lymphadenopathy noted bilaterally  Chest:  good aeration, bilateral diminished breath sounds, no wheezing, crackles or rhonchi, resonant to percussion b/l  CV: RRR, no MGR, pulses 2+, equal.  Abd:  Soft, NT, ND, + BS, no HSM  EXT:  no clubbing, no cyanosis, No BLE edema  Neuro:  A&Ox3, CN grossly intact, no focal deficits.  Skin: No rashes or lesions noted     Result Review :   The following data was reviewed by: Randall Mathew MD on 10/30/2024:  Common labs          10/3/2024    09:28 10/23/2024    08:25 10/23/2024    15:45 10/25/2024    08:27   Common Labs   Glucose 165  200  363  333    BUN 21  14  14  30    Creatinine 1.19  0.95  1.39  0.96    Sodium 137  139  132  134    Potassium 3.5  3.7  4.1  3.8    Chloride 99  101  93  96    Calcium 9.6  9.0  9.0  8.9    Albumin 4.3  4.1  3.9     Total Bilirubin 0.2  0.3  0.4     Alkaline Phosphatase 77  73  67     AST (SGOT) 20  19  29     ALT (SGPT) 10  11  13     WBC 7.78  9.49      Hemoglobin 12.2  11.2      Hematocrit 37.6  34.6      Platelets 312  226        CMP          10/3/2024    09:28 10/23/2024    08:25 10/23/2024    15:45 10/25/2024    08:27   CMP   Glucose 165  200  363  333    BUN 21  14  14  30    Creatinine 1.19  0.95  1.39  0.96    EGFR 48.1  62.6  39.7  61.8    Sodium 137  139  132  134    Potassium 3.5  3.7  4.1  3.8    Chloride 99  101  93  96    Calcium 9.6  9.0  9.0  8.9    Total Protein 7.3  7.0  7.7     Albumin 4.3  4.1  3.9     Globulin 3.0  2.9  3.8     Total Bilirubin 0.2  0.3  0.4     Alkaline Phosphatase 77  73  67     AST (SGOT) 20  19  " 29     ALT (SGPT) 10  11  13     Albumin/Globulin Ratio 1.4  1.4  1.0     BUN/Creatinine Ratio 17.6  14.7  10.1  31.3    Anion Gap 10.8  10.6  19.6  12.5      CBC          6/7/2024    02:06 10/3/2024    09:28 10/23/2024    08:25   CBC   WBC 9.60  7.78  9.49    RBC 3.99  4.25  3.86    Hemoglobin 12.0  12.2  11.2    Hematocrit 36.6  37.6  34.6    MCV 91.7  88.5  89.6    MCH 30.1  28.7  29.0    MCHC 32.8  32.4  32.4    RDW 13.5  12.7  14.2    Platelets 265  312  226      CBC w/diff          6/7/2024    02:06 10/3/2024    09:28 10/23/2024    08:25   CBC w/Diff   WBC 9.60  7.78  9.49    RBC 3.99  4.25  3.86    Hemoglobin 12.0  12.2  11.2    Hematocrit 36.6  37.6  34.6    MCV 91.7  88.5  89.6    MCH 30.1  28.7  29.0    MCHC 32.8  32.4  32.4    RDW 13.5  12.7  14.2    Platelets 265  312  226    Neutrophil Rel % 60.2  55.8  76.9    Immature Granulocyte Rel % 0.5  0.3  0.3    Lymphocyte Rel % 28.1  32.4  12.4    Monocyte Rel % 10.2  9.8  9.7    Eosinophil Rel % 0.6  1.3  0.5    Basophil Rel % 0.4  0.4  0.2      Data reviewed : Radiologic studies Recent imaging reviewed.     Results  Imaging  CT scan shows fluid buildup in lungs.    Narrative & Impression   CT CHEST W CONTRAST DIAGNOSTIC     Date of Exam: 10/23/2024 10:22 AM EDT     Indication: soa  shortness of breath.     Comparison: None available.     Technique: Axial CT images were obtained of the chest after the uneventful intravenous administration of iodinated contrast.  Reconstructed coronal and sagittal images were also obtained. Automated exposure control and iterative construction methods were   used.        Findings:  There are no filling defects suspicious for pulmonary emboli. There are new minimal pleural effusions. There is new pulmonary vascular congestion with prominent interstitial pattern. The trachea and bronchial tree appear patent. There is moderately   severe cardiomegaly. There are coronary calcifications. There are no enlarged mediastinal nodes.  There are no hilar masses. A 3 mm right upper lobe lung nodule is stable as seen on image #110 of series 701. There are postoperative changes of bilateral   mastectomies. There is no axillary adenopathy.     IMPRESSION:  Impression:  Negative exam for pulmonary embolism. Findings are most compatible with CHF with mild pulmonary edema and minimal effusions.           Electronically Signed: Lillie Kaba MD    10/23/2024 10:39 AM EDT    Workstation ID: [id number]        ECHO: 6/21/24:     Left ventricular systolic function is low normal. Calculated left ventricular EF = 50%    Left atrial cavity is borderline dilated.    There is mild to moderate mitral regurgitation.    Estimated right ventricular systolic pressure from tricuspid regurgitation is normal (<35 mmHg).          Assessment and Plan    Diagnoses and all orders for this visit:    1. Seasonal allergic rhinitis, unspecified trigger (Primary)  -     omeprazole (priLOSEC) 40 MG capsule; Take 1 capsule by mouth 2 (Two) Times a Day.  Dispense: 60 capsule; Refill: 3  -     azelastine (ASTELIN) 0.1 % nasal spray; Administer 2 sprays into the nostril(s) as directed by provider 2 (Two) Times a Day. Use in each nostril as directed  Dispense: 90 mL; Refill: 5  -     Fluticasone-Umeclidin-Vilant (TRELEGY ELLIPTA) 200-62.5-25 MCG/ACT inhaler; Inhale 1 puff Daily.  Dispense: 60 each; Refill: 11  -     fluticasone (FLONASE) 50 MCG/ACT nasal spray; Administer 2 sprays into the nostril(s) as directed by provider Daily.  Dispense: 16 g; Refill: 6  -     ipratropium-albuterol (DUO-NEB) 0.5-2.5 mg/3 ml nebulizer; Take 3 mL by nebulization 4 (Four) Times a Day As Needed for Wheezing or Shortness of Air.  Dispense: 360 mL; Refill: 1    2. Cough, unspecified type  -     omeprazole (priLOSEC) 40 MG capsule; Take 1 capsule by mouth 2 (Two) Times a Day.  Dispense: 60 capsule; Refill: 3  -     azelastine (ASTELIN) 0.1 % nasal spray; Administer 2 sprays into the nostril(s) as directed  by provider 2 (Two) Times a Day. Use in each nostril as directed  Dispense: 90 mL; Refill: 5  -     Fluticasone-Umeclidin-Vilant (TRELEGY ELLIPTA) 200-62.5-25 MCG/ACT inhaler; Inhale 1 puff Daily.  Dispense: 60 each; Refill: 11  -     fluticasone (FLONASE) 50 MCG/ACT nasal spray; Administer 2 sprays into the nostril(s) as directed by provider Daily.  Dispense: 16 g; Refill: 6  -     ipratropium-albuterol (DUO-NEB) 0.5-2.5 mg/3 ml nebulizer; Take 3 mL by nebulization 4 (Four) Times a Day As Needed for Wheezing or Shortness of Air.  Dispense: 360 mL; Refill: 1    3. Moderate persistent asthma without status asthmaticus without complication  -     omeprazole (priLOSEC) 40 MG capsule; Take 1 capsule by mouth 2 (Two) Times a Day.  Dispense: 60 capsule; Refill: 3  -     azelastine (ASTELIN) 0.1 % nasal spray; Administer 2 sprays into the nostril(s) as directed by provider 2 (Two) Times a Day. Use in each nostril as directed  Dispense: 90 mL; Refill: 5  -     Fluticasone-Umeclidin-Vilant (TRELEGY ELLIPTA) 200-62.5-25 MCG/ACT inhaler; Inhale 1 puff Daily.  Dispense: 60 each; Refill: 11  -     fluticasone (FLONASE) 50 MCG/ACT nasal spray; Administer 2 sprays into the nostril(s) as directed by provider Daily.  Dispense: 16 g; Refill: 6  -     ipratropium-albuterol (DUO-NEB) 0.5-2.5 mg/3 ml nebulizer; Take 3 mL by nebulization 4 (Four) Times a Day As Needed for Wheezing or Shortness of Air.  Dispense: 360 mL; Refill: 1      Assessment & Plan  1. Asthma.  Her asthma appears to be uncontrolled, with fluid accumulation noted in her lungs. She is currently using Trelegy 100 mcg once a day, which will be increased to 200 mcg. She uses a rescue inhaler (albuterol Ventolin) twice a day as needed and a nebulizer three times a day. Nebulizer medication will be provided. She is advised to continue her diuretic medication (Lasix) and limit salt intake to maintain fluid balance. A lung function test will be conducted. If her condition  does not improve, a bronchoscopy may be considered.    2. Congestive Heart Failure (CHF).  She was recently diagnosed with mild CHF and was prescribed Lasix. She is advised to continue taking Lasix and monitor her weight daily to ensure she is losing weight. Limiting salt intake is also recommended to help manage fluid retention.    3. Gastroesophageal Reflux Disease (GERD).  She reports experiencing bad acid reflux, which varies in severity. A medication will be prescribed to help manage her reflux symptoms. Omeprazole 40 mg bid prescribed.     4. Allergic Rhinitis.  She reports constant postnasal drip and nasal drainage. An allergy medication will be added to her regimen to help manage these symptoms. Start singulair. Start azelastine nasal spray. Start flonase.    She is up to date on flu shot, pneumonia, RSV vaccines.        Follow Up   Return in about 3 months (around 1/30/2025).  Patient was given instructions and counseling regarding her condition or for health maintenance advice. Please see specific information pulled into the AVS if appropriate.     Patient or patient representative verbalized consent for the use of Ambient Listening during the visit with  Randall Mathew MD for chart documentation. 10/30/2024  09:02 EDT    Electronically signed by Randall Mathew MD, 10/30/2024, 09:22 EDT.

## 2024-11-01 LAB
MYCOBACTERIUM SPEC CULT: NORMAL
NIGHT BLUE STAIN TISS: NORMAL
NIGHT BLUE STAIN TISS: NORMAL

## 2024-11-05 ENCOUNTER — READMISSION MANAGEMENT (OUTPATIENT)
Dept: CALL CENTER | Facility: HOSPITAL | Age: 74
End: 2024-11-05
Payer: MEDICARE

## 2024-11-05 LAB
QT INTERVAL: 341 MS
QTC INTERVAL: 468 MS

## 2024-11-05 NOTE — OUTREACH NOTE
Medical Week 2 Survey      Flowsheet Row Responses   [facility] patient discharged from? [facility]   Does the patient have one of the following disease processes/diagnoses(primary or secondary)? Other   Week 2 attempt successful? No   Unsuccessful attempts Attempt 1  [called patient and daughter, Stephany---no answer.]            Maylin PUENTE - Registered Nurse

## 2024-11-08 LAB
MYCOBACTERIUM SPEC CULT: NORMAL
NIGHT BLUE STAIN TISS: NORMAL
NIGHT BLUE STAIN TISS: NORMAL

## 2024-11-12 ENCOUNTER — READMISSION MANAGEMENT (OUTPATIENT)
Dept: CALL CENTER | Facility: HOSPITAL | Age: 74
End: 2024-11-12
Payer: MEDICARE

## 2024-11-12 NOTE — OUTREACH NOTE
Medical Week 3 Survey      Flowsheet Row Responses   [facility] patient discharged from? [facility]   Does the patient have one of the following disease processes/diagnoses(primary or secondary)? Other   Week 3 attempt successful? Yes   Call start time 1613   Call end time 1618   Discharge diagnosis Severe asthma with exacerbation   Person spoke with today (if not patient) and relationship taya Hammond   Meds reviewed with patient/caregiver? Yes   Prescription comments dtr reports pt keeps up with meds, using nebs as ordered   Is the patient taking all medications as directed (includes completed medication regime)? Yes   Does the patient have a primary care provider?  Yes   Has the patient kept scheduled appointments due by today? Yes   Comments dtr reports pt compliant with all her f/u aptps and aware of PFT on 12/10/24   Has home health visited the patient within 72 hours of discharge? N/A   Did the patient receive a copy of their discharge instructions? Yes   Nursing interventions Reviewed instructions with patient   What is the patient's perception of their health status since discharge? Improving  [Reports pt is better but still a little weak and tired, has a cough..  Keeping her appts and taking meds as ordered. Has good family support.  Aware to monitor for increasing resp type s/s.]   Is the patient/caregiver able to teach back signs and symptoms related to disease process for when to call PCP? Yes   Is the patient/caregiver able to teach back signs and symptoms related to disease process for when to call 911? Yes   Week 3 Call Completed? Yes   Graduated Yes   Call end time 1618            MARIA GUADALUPE ZARAGOZA - Registered Nurse

## 2024-11-15 LAB
MYCOBACTERIUM SPEC CULT: NORMAL
NIGHT BLUE STAIN TISS: NORMAL
NIGHT BLUE STAIN TISS: NORMAL

## 2024-11-15 RX ORDER — FUROSEMIDE 20 MG/1
20 TABLET ORAL DAILY
Qty: 90 TABLET | Refills: 0 | Status: SHIPPED | OUTPATIENT
Start: 2024-11-15

## 2024-11-18 ENCOUNTER — TELEPHONE (OUTPATIENT)
Dept: CARDIOLOGY | Facility: CLINIC | Age: 74
End: 2024-11-18
Payer: MEDICARE

## 2024-11-18 NOTE — TELEPHONE ENCOUNTER
RECEIVED PA REQUEST IN COVER MY MEDS FOR AMLODIPINE.  MEDICATION IS NOT ON CURRENT MED LIST.  PLEASE ADVISE.

## 2024-11-19 ENCOUNTER — TELEPHONE (OUTPATIENT)
Dept: CARDIOLOGY | Facility: CLINIC | Age: 74
End: 2024-11-19
Payer: MEDICARE

## 2024-11-19 NOTE — TELEPHONE ENCOUNTER
Caller: INEZ HALLMAN    Relationship:PATIENT    Callback number:   Telephone Information:   Mobile [phone]      Is it ok to leave a message: [x] Yes [] No    Requested medication for samples: ELIQUIS    How much medication does the patient currently have left: THIS WEEK ONLY    Who will be picking up the samples: PATIENT

## 2024-11-19 NOTE — TELEPHONE ENCOUNTER
Caller: Camilla Navas    Relationship: Self    Best call back number:   Telephone Information:   Mobile [phone]       What is the best time to reach you: ANY    Who are you requesting to speak with (clinical staff, provider,  specific staff member): ANY    What was the call regarding: PATIENT CALLING TO CHECK THE STATUS OF HER ELIQUIS ASSISTANCE PAPERWORK.  SHE DROPPED THAT OFF AT THE END OF OCTOBER AND THE COMPANY TOLD HER THEY HAVE NOT RECEIVED THAT YET.  PLEASE REACH OUT TO LET HER KNOW THE STATUS OF THAT

## 2024-11-20 ENCOUNTER — TELEPHONE (OUTPATIENT)
Dept: CARDIOLOGY | Facility: CLINIC | Age: 74
End: 2024-11-20

## 2024-11-20 NOTE — TELEPHONE ENCOUNTER
The [facility] received a fax that requires your attention. The document has been indexed to the patient’s chart for your review.      Reason for sending: EXT MED REC NOTIF    Documents Description: PATIENT MEDICATION ALERT-CHF PATIENT TAKING 1 OUT OF 4 MEDICATIONS RECOMMENDED BY [facility] AND [facility]    Name of Sender: [employer]    Date Indexed: 11.19.24

## 2024-11-20 NOTE — TELEPHONE ENCOUNTER
RUSSELL PT.  I AM UNABLE TO LOCATE HER ELIQUIS PAP APPLICATION IN HER CHART.  ADVISED PT I NEVER RECEIVED HER APPLICATION AT THE OFFICE.  ADVISED PT SHE WILL NEED TO COMPLETE A NEW APPLICATION.  PT STATED SHE WAS COMING TO THE OFFICE TO  SAMPLES TODAY AND WOULD FILL ARIEL OUT THEN.  ONCE RECEIVED, I WILL SUBMIT TO BM FOR REVIEW.

## 2024-11-21 ENCOUNTER — TELEPHONE (OUTPATIENT)
Dept: CARDIOLOGY | Facility: CLINIC | Age: 74
End: 2024-11-21
Payer: MEDICARE

## 2024-11-21 NOTE — TELEPHONE ENCOUNTER
OLEGIS PAP APPLICATION RECEIVED FROM  AND FAXED TO [employer] FOR REVIEW.  SCANNED COPY IN MEDIA.  WAITING ON A DETERMINATION.

## 2024-11-22 ENCOUNTER — TELEPHONE (OUTPATIENT)
Dept: CARDIOLOGY | Facility: CLINIC | Age: 74
End: 2024-11-22
Payer: MEDICARE

## 2024-11-22 LAB
MYCOBACTERIUM SPEC CULT: NORMAL
NIGHT BLUE STAIN TISS: NORMAL
NIGHT BLUE STAIN TISS: NORMAL

## 2024-11-22 NOTE — TELEPHONE ENCOUNTER
RECEIVED OPAL WRAY DENIAL LETTER BY FAX FROM [employer] AND SCANNED COPY IN MEDIA.  PT NOTIFIED.     DENIED DUE TO DOCUMENTATION OF 3% OUT OF POCKET PRESCRIPTION EXPENSES, BASED ON HOUSEHOLD ADJUSTED GROSS INCOME, NOT MET.     ADVISED PT ONCE 3% OUT OF POCKET PRESCRIPTION EXPENSES HAVE BEEN MET TO GET A STATEMENT FROM THE PHARMACY AND BRING TO THE OFFICE AND I WILL SUBMIT IT FOR ADDITIONAL REVIEW.  PT ADVISED SHE MAILED HER OOP PHARMACY STATEMENT HERSELF TO [employer].  PT IS GOING TO CALL BM AND MAKE SURE IT WAS RECEIVED AND FIND OUT HOW MUCH SHE NEEDS TO MEET HER 3%.  PT STILL HAS SOME MEDICATION AND DOES NOT NEED SAMPLES AT THIS TIME.

## 2024-11-22 NOTE — TELEPHONE ENCOUNTER
The [facility] received a fax that requires your attention. The document has been indexed to the patient’s chart for your review.      Reason for sending: EXT MED REC NOTIF    Documents Description: APPLICATION RECEIVED    Name of Sender: ELIA LOZOYA    Date Indexed: 11.22.24

## 2024-11-22 NOTE — TELEPHONE ENCOUNTER
RECEIVED POAL WRAY DENIAL LETTER BY FAX FROM [employer] AND SCANNED COPY IN MEDIA.  PT NOTIFIED.     DENIED DUE TO DOCUMENTATION OF 3% OUT OF POCKET PRESCRIPTION EXPENSES, BASED ON HOUSEHOLD ADJUSTED GROSS INCOME, NOT MET.     ADVISED PT ONCE 3% OUT OF POCKET PRESCRIPTION EXPENSES HAVE BEEN MET TO GET A STATEMENT FROM THE PHARMACY AND BRING TO THE OFFICE AND I WILL SUBMIT IT FOR ADDITIONAL REVIEW.  PT ADVISED SHE MAILED HER OOP PHARMACY STATEMENT HERSELF TO [facility] [employer].  PT IS GOING TO CALL BM AND MAKE SURE IT WAS RECEIVED AND FIND OUT HOW MUCH SHE NEEDS TO MEET HER 3%.  PT STILL HAS SOME MEDICATION AND DOES NOT NEED SAMPLES AT THIS TIME.

## 2024-11-22 NOTE — TELEPHONE ENCOUNTER
The [facility] received a fax that requires your attention. The document has been indexed to the patient’s chart for your review.      Reason for sending: EXT MED REC NOTIF    Documents Description: NOT ELIGIBLE    Name of Sender: ELIA LOZOYA    Date Indexed: 11.22.24   [employer]

## 2024-11-26 ENCOUNTER — TRANSCRIBE ORDERS (OUTPATIENT)
Facility: HOSPITAL | Age: 74
End: 2024-11-26
Payer: MEDICARE

## 2024-11-26 ENCOUNTER — LAB (OUTPATIENT)
Facility: HOSPITAL | Age: 74
End: 2024-11-26
Payer: MEDICARE

## 2024-11-26 DIAGNOSIS — N28.1 ACQUIRED CYST OF KIDNEY: ICD-10-CM

## 2024-11-26 DIAGNOSIS — E11.9 DIABETES MELLITUS WITHOUT COMPLICATION: ICD-10-CM

## 2024-11-26 DIAGNOSIS — I48.0 PAROXYSMAL ATRIAL FIBRILLATION: ICD-10-CM

## 2024-11-26 DIAGNOSIS — E11.42 DIABETIC SENSORIMOTOR NEUROPATHY: ICD-10-CM

## 2024-11-26 DIAGNOSIS — N18.2 CHRONIC KIDNEY DISEASE, STAGE II (MILD): ICD-10-CM

## 2024-11-26 DIAGNOSIS — I10 PRIMARY HYPERTENSION: ICD-10-CM

## 2024-11-26 DIAGNOSIS — N18.2 CHRONIC KIDNEY DISEASE, STAGE II (MILD): Primary | ICD-10-CM

## 2024-11-26 LAB
25(OH)D3 SERPL-MCNC: 42 NG/ML (ref 30–100)
ALBUMIN SERPL-MCNC: 4.1 G/DL (ref 3.5–5.2)
ANION GAP SERPL CALCULATED.3IONS-SCNC: 11 MMOL/L (ref 5–15)
BACTERIA UR QL AUTO: NORMAL /HPF
BASOPHILS # BLD AUTO: 0.02 10*3/MM3 (ref 0–0.2)
BASOPHILS NFR BLD AUTO: 0.3 % (ref 0–1.5)
BILIRUB UR QL STRIP: NEGATIVE
BUN SERPL-MCNC: 22 MG/DL (ref 8–23)
BUN/CREAT SERPL: 21.8 (ref 7–25)
CALCIUM SPEC-SCNC: 9.3 MG/DL (ref 8.6–10.5)
CHLORIDE SERPL-SCNC: 100 MMOL/L (ref 98–107)
CLARITY UR: CLEAR
CO2 SERPL-SCNC: 25 MMOL/L (ref 22–29)
COLOR UR: YELLOW
CREAT SERPL-MCNC: 1.01 MG/DL (ref 0.57–1)
CREAT UR-MCNC: 102.6 MG/DL
DEPRECATED RDW RBC AUTO: 42.5 FL (ref 37–54)
EGFRCR SERPLBLD CKD-EPI 2021: 58.2 ML/MIN/1.73
EOSINOPHIL # BLD AUTO: 0.06 10*3/MM3 (ref 0–0.4)
EOSINOPHIL NFR BLD AUTO: 0.8 % (ref 0.3–6.2)
ERYTHROCYTE [DISTWIDTH] IN BLOOD BY AUTOMATED COUNT: 13 % (ref 12.3–15.4)
GLUCOSE SERPL-MCNC: 204 MG/DL (ref 65–99)
GLUCOSE UR STRIP-MCNC: NEGATIVE MG/DL
HCT VFR BLD AUTO: 37.7 % (ref 34–46.6)
HGB BLD-MCNC: 12.8 G/DL (ref 12–15.9)
HGB UR QL STRIP.AUTO: ABNORMAL
HYALINE CASTS UR QL AUTO: NORMAL /LPF
IMM GRANULOCYTES # BLD AUTO: 0.02 10*3/MM3 (ref 0–0.05)
IMM GRANULOCYTES NFR BLD AUTO: 0.3 % (ref 0–0.5)
KETONES UR QL STRIP: NEGATIVE
LEUKOCYTE ESTERASE UR QL STRIP.AUTO: ABNORMAL
LYMPHOCYTES # BLD AUTO: 1.1 10*3/MM3 (ref 0.7–3.1)
LYMPHOCYTES NFR BLD AUTO: 14.8 % (ref 19.6–45.3)
MCH RBC QN AUTO: 30.5 PG (ref 26.6–33)
MCHC RBC AUTO-ENTMCNC: 34 G/DL (ref 31.5–35.7)
MCV RBC AUTO: 89.8 FL (ref 79–97)
MONOCYTES # BLD AUTO: 0.82 10*3/MM3 (ref 0.1–0.9)
MONOCYTES NFR BLD AUTO: 11 % (ref 5–12)
NEUTROPHILS NFR BLD AUTO: 5.43 10*3/MM3 (ref 1.7–7)
NEUTROPHILS NFR BLD AUTO: 72.8 % (ref 42.7–76)
NITRITE UR QL STRIP: NEGATIVE
NRBC BLD AUTO-RTO: 0 /100 WBC (ref 0–0.2)
PH UR STRIP.AUTO: 6.5 [PH] (ref 5–8)
PHOSPHATE SERPL-MCNC: 2.9 MG/DL (ref 2.5–4.5)
PLATELET # BLD AUTO: 269 10*3/MM3 (ref 140–450)
PMV BLD AUTO: 10.3 FL (ref 6–12)
POTASSIUM SERPL-SCNC: 4.4 MMOL/L (ref 3.5–5.2)
PROT ?TM UR-MCNC: 10.9 MG/DL
PROT UR QL STRIP: NEGATIVE
PROT/CREAT UR: 0.11 MG/G{CREAT}
PTH-INTACT SERPL-MCNC: 58.2 PG/ML (ref 15–65)
RBC # BLD AUTO: 4.2 10*6/MM3 (ref 3.77–5.28)
RBC # UR STRIP: NORMAL /HPF
REF LAB TEST METHOD: NORMAL
SODIUM SERPL-SCNC: 136 MMOL/L (ref 136–145)
SP GR UR STRIP: 1.02 (ref 1–1.03)
SQUAMOUS #/AREA URNS HPF: NORMAL /HPF
UROBILINOGEN UR QL STRIP: ABNORMAL
WBC # UR STRIP: NORMAL /HPF
WBC NRBC COR # BLD AUTO: 7.45 10*3/MM3 (ref 3.4–10.8)

## 2024-11-26 PROCEDURE — 82306 VITAMIN D 25 HYDROXY: CPT

## 2024-11-26 PROCEDURE — 84156 ASSAY OF PROTEIN URINE: CPT

## 2024-11-26 PROCEDURE — 82570 ASSAY OF URINE CREATININE: CPT

## 2024-11-26 PROCEDURE — 80069 RENAL FUNCTION PANEL: CPT

## 2024-11-26 PROCEDURE — 85025 COMPLETE CBC W/AUTO DIFF WBC: CPT

## 2024-11-26 PROCEDURE — 36415 COLL VENOUS BLD VENIPUNCTURE: CPT

## 2024-11-26 PROCEDURE — 81001 URINALYSIS AUTO W/SCOPE: CPT

## 2024-11-26 PROCEDURE — 83970 ASSAY OF PARATHORMONE: CPT

## 2024-11-29 LAB
MYCOBACTERIUM SPEC CULT: NORMAL
NIGHT BLUE STAIN TISS: NORMAL
NIGHT BLUE STAIN TISS: NORMAL

## 2024-12-02 ENCOUNTER — TRANSCRIBE ORDERS (OUTPATIENT)
Dept: ADMINISTRATIVE | Facility: HOSPITAL | Age: 74
End: 2024-12-02
Payer: MEDICARE

## 2024-12-02 DIAGNOSIS — Z78.0 ASYMPTOMATIC MENOPAUSAL STATE: Primary | ICD-10-CM

## 2024-12-06 ENCOUNTER — TRANSCRIBE ORDERS (OUTPATIENT)
Dept: ADMINISTRATIVE | Facility: HOSPITAL | Age: 74
End: 2024-12-06
Payer: MEDICARE

## 2024-12-06 ENCOUNTER — HOSPITAL ENCOUNTER (OUTPATIENT)
Dept: GENERAL RADIOLOGY | Facility: HOSPITAL | Age: 74
Discharge: HOME | End: 2024-12-06
Payer: MEDICARE

## 2024-12-06 DIAGNOSIS — M25.511 RIGHT SHOULDER PAIN, UNSPECIFIED CHRONICITY: Primary | ICD-10-CM

## 2024-12-06 DIAGNOSIS — M25.511 RIGHT SHOULDER PAIN, UNSPECIFIED CHRONICITY: ICD-10-CM

## 2024-12-06 PROCEDURE — 73030 X-RAY EXAM OF SHOULDER: CPT

## 2024-12-09 ENCOUNTER — APPOINTMENT (OUTPATIENT)
Dept: GENERAL RADIOLOGY | Facility: HOSPITAL | Age: 74
DRG: 310 | End: 2024-12-09
Payer: MEDICARE

## 2024-12-09 ENCOUNTER — HOSPITAL ENCOUNTER (INPATIENT)
Facility: HOSPITAL | Age: 74
LOS: 3 days | Discharge: HOME | DRG: 310 | End: 2024-12-13
Attending: EMERGENCY MEDICINE | Admitting: INTERNAL MEDICINE
Payer: MEDICARE

## 2024-12-09 DIAGNOSIS — I48.91 ATRIAL FIBRILLATION WITH RVR: Primary | ICD-10-CM

## 2024-12-09 PROBLEM — I48.92 ATRIAL FIB/FLUTTER, TRANSIENT: Status: ACTIVE | Noted: 2024-12-09

## 2024-12-09 LAB
ALBUMIN SERPL-MCNC: 4.1 G/DL (ref 3.5–5.2)
ALBUMIN/GLOB SERPL: 1.4 G/DL
ALP SERPL-CCNC: 109 U/L (ref 39–117)
ALT SERPL W P-5'-P-CCNC: 13 U/L (ref 1–33)
ANION GAP SERPL CALCULATED.3IONS-SCNC: 11.8 MMOL/L (ref 5–15)
AST SERPL-CCNC: 18 U/L (ref 1–32)
BASOPHILS # BLD AUTO: 0.02 10*3/MM3 (ref 0–0.2)
BASOPHILS NFR BLD AUTO: 0.3 % (ref 0–1.5)
BILIRUB SERPL-MCNC: 0.2 MG/DL (ref 0–1.2)
BUN SERPL-MCNC: 19 MG/DL (ref 8–23)
BUN/CREAT SERPL: 18.8 (ref 7–25)
CALCIUM SPEC-SCNC: 9.3 MG/DL (ref 8.6–10.5)
CHLORIDE SERPL-SCNC: 100 MMOL/L (ref 98–107)
CO2 SERPL-SCNC: 26.2 MMOL/L (ref 22–29)
CREAT SERPL-MCNC: 1.01 MG/DL (ref 0.57–1)
D-LACTATE SERPL-SCNC: 1.8 MMOL/L (ref 0.5–2)
DEPRECATED RDW RBC AUTO: 47.2 FL (ref 37–54)
EGFRCR SERPLBLD CKD-EPI 2021: 58.2 ML/MIN/1.73
EOSINOPHIL # BLD AUTO: 0.08 10*3/MM3 (ref 0–0.4)
EOSINOPHIL NFR BLD AUTO: 1.2 % (ref 0.3–6.2)
ERYTHROCYTE [DISTWIDTH] IN BLOOD BY AUTOMATED COUNT: 14 % (ref 12.3–15.4)
FLUAV SUBTYP SPEC NAA+PROBE: NOT DETECTED
FLUBV RNA ISLT QL NAA+PROBE: NOT DETECTED
GLOBULIN UR ELPH-MCNC: 2.9 GM/DL
GLUCOSE SERPL-MCNC: 244 MG/DL (ref 65–99)
HCT VFR BLD AUTO: 35.6 % (ref 34–46.6)
HGB BLD-MCNC: 11.3 G/DL (ref 12–15.9)
HOLD SPECIMEN: NORMAL
HOLD SPECIMEN: NORMAL
IMM GRANULOCYTES # BLD AUTO: 0.03 10*3/MM3 (ref 0–0.05)
IMM GRANULOCYTES NFR BLD AUTO: 0.5 % (ref 0–0.5)
IRON 24H UR-MRATE: 40 MCG/DL (ref 37–145)
IRON SATN MFR SERPL: 10 % (ref 20–50)
LYMPHOCYTES # BLD AUTO: 1.26 10*3/MM3 (ref 0.7–3.1)
LYMPHOCYTES NFR BLD AUTO: 19.4 % (ref 19.6–45.3)
MAGNESIUM SERPL-MCNC: 1.7 MG/DL (ref 1.6–2.4)
MCH RBC QN AUTO: 28.8 PG (ref 26.6–33)
MCHC RBC AUTO-ENTMCNC: 31.7 G/DL (ref 31.5–35.7)
MCV RBC AUTO: 90.6 FL (ref 79–97)
MONOCYTES # BLD AUTO: 0.62 10*3/MM3 (ref 0.1–0.9)
MONOCYTES NFR BLD AUTO: 9.5 % (ref 5–12)
NEUTROPHILS NFR BLD AUTO: 4.49 10*3/MM3 (ref 1.7–7)
NEUTROPHILS NFR BLD AUTO: 69.1 % (ref 42.7–76)
NRBC BLD AUTO-RTO: 0 /100 WBC (ref 0–0.2)
NT-PROBNP SERPL-MCNC: 2241 PG/ML (ref 0–1800)
PLATELET # BLD AUTO: 210 10*3/MM3 (ref 140–450)
PMV BLD AUTO: 9.7 FL (ref 6–12)
POTASSIUM SERPL-SCNC: 3.3 MMOL/L (ref 3.5–5.2)
PROT SERPL-MCNC: 7 G/DL (ref 6–8.5)
QT INTERVAL: 316 MS
QTC INTERVAL: 483 MS
RBC # BLD AUTO: 3.93 10*6/MM3 (ref 3.77–5.28)
RSV RNA NPH QL NAA+NON-PROBE: NOT DETECTED
SARS-COV-2 RNA RESP QL NAA+PROBE: NOT DETECTED
SODIUM SERPL-SCNC: 138 MMOL/L (ref 136–145)
TIBC SERPL-MCNC: 395 MCG/DL (ref 298–536)
TRANSFERRIN SERPL-MCNC: 265 MG/DL (ref 200–360)
TROPONIN T SERPL HS-MCNC: 15 NG/L
WBC NRBC COR # BLD AUTO: 6.5 10*3/MM3 (ref 3.4–10.8)
WHOLE BLOOD HOLD COAG: NORMAL
WHOLE BLOOD HOLD SPECIMEN: NORMAL

## 2024-12-09 PROCEDURE — G0378 HOSPITAL OBSERVATION PER HR: HCPCS

## 2024-12-09 PROCEDURE — 36415 COLL VENOUS BLD VENIPUNCTURE: CPT

## 2024-12-09 PROCEDURE — 80053 COMPREHEN METABOLIC PANEL: CPT | Performed by: EMERGENCY MEDICINE

## 2024-12-09 PROCEDURE — 87637 SARSCOV2&INF A&B&RSV AMP PRB: CPT | Performed by: EMERGENCY MEDICINE

## 2024-12-09 PROCEDURE — 99291 CRITICAL CARE FIRST HOUR: CPT

## 2024-12-09 PROCEDURE — 83540 ASSAY OF IRON: CPT | Performed by: INTERNAL MEDICINE

## 2024-12-09 PROCEDURE — 87040 BLOOD CULTURE FOR BACTERIA: CPT | Performed by: EMERGENCY MEDICINE

## 2024-12-09 PROCEDURE — 83605 ASSAY OF LACTIC ACID: CPT | Performed by: EMERGENCY MEDICINE

## 2024-12-09 PROCEDURE — 71045 X-RAY EXAM CHEST 1 VIEW: CPT

## 2024-12-09 PROCEDURE — 83735 ASSAY OF MAGNESIUM: CPT | Performed by: INTERNAL MEDICINE

## 2024-12-09 PROCEDURE — 84466 ASSAY OF TRANSFERRIN: CPT | Performed by: INTERNAL MEDICINE

## 2024-12-09 PROCEDURE — 85025 COMPLETE CBC W/AUTO DIFF WBC: CPT | Performed by: EMERGENCY MEDICINE

## 2024-12-09 PROCEDURE — 83880 ASSAY OF NATRIURETIC PEPTIDE: CPT | Performed by: EMERGENCY MEDICINE

## 2024-12-09 PROCEDURE — 93005 ELECTROCARDIOGRAM TRACING: CPT | Performed by: EMERGENCY MEDICINE

## 2024-12-09 PROCEDURE — 84484 ASSAY OF TROPONIN QUANT: CPT | Performed by: EMERGENCY MEDICINE

## 2024-12-09 RX ORDER — SODIUM CHLORIDE 0.9 % (FLUSH) 0.9 %
10 SYRINGE (ML) INJECTION AS NEEDED
Status: DISCONTINUED | OUTPATIENT
Start: 2024-12-09 | End: 2024-12-13 | Stop reason: HOSPADM

## 2024-12-09 RX ORDER — AMOXICILLIN 250 MG
2 CAPSULE ORAL 2 TIMES DAILY PRN
Status: DISCONTINUED | OUTPATIENT
Start: 2024-12-09 | End: 2024-12-13 | Stop reason: HOSPADM

## 2024-12-09 RX ORDER — BISACODYL 10 MG
10 SUPPOSITORY, RECTAL RECTAL DAILY PRN
Status: DISCONTINUED | OUTPATIENT
Start: 2024-12-09 | End: 2024-12-13 | Stop reason: HOSPADM

## 2024-12-09 RX ORDER — POTASSIUM CHLORIDE 750 MG/1
40 CAPSULE, EXTENDED RELEASE ORAL DAILY
Status: COMPLETED | OUTPATIENT
Start: 2024-12-09 | End: 2024-12-10

## 2024-12-09 RX ORDER — ACETAMINOPHEN 650 MG/1
650 SUPPOSITORY RECTAL EVERY 4 HOURS PRN
Status: DISCONTINUED | OUTPATIENT
Start: 2024-12-09 | End: 2024-12-13 | Stop reason: HOSPADM

## 2024-12-09 RX ORDER — GUAIFENESIN/DEXTROMETHORPHAN 100-10MG/5
5 SYRUP ORAL EVERY 4 HOURS PRN
Status: DISCONTINUED | OUTPATIENT
Start: 2024-12-09 | End: 2024-12-12

## 2024-12-09 RX ORDER — DILTIAZEM HYDROCHLORIDE 240 MG/1
240 CAPSULE, COATED, EXTENDED RELEASE ORAL
Status: DISCONTINUED | OUTPATIENT
Start: 2024-12-09 | End: 2024-12-11

## 2024-12-09 RX ORDER — DILTIAZEM HCL IN NACL,ISO-OSM 125 MG/125
5-15 PLASTIC BAG, INJECTION (ML) INTRAVENOUS
Status: DISCONTINUED | OUTPATIENT
Start: 2024-12-09 | End: 2024-12-09

## 2024-12-09 RX ORDER — METOPROLOL SUCCINATE 25 MG/1
12.5 TABLET, EXTENDED RELEASE ORAL EVERY 12 HOURS SCHEDULED
Status: DISCONTINUED | OUTPATIENT
Start: 2024-12-09 | End: 2024-12-10

## 2024-12-09 RX ORDER — ALPRAZOLAM 0.25 MG/1
0.5 TABLET ORAL EVERY 8 HOURS PRN
Status: DISCONTINUED | OUTPATIENT
Start: 2024-12-09 | End: 2024-12-13 | Stop reason: HOSPADM

## 2024-12-09 RX ORDER — SITAGLIPTIN 25 MG/1
1 TABLET, FILM COATED ORAL DAILY
COMMUNITY
Start: 2024-12-02

## 2024-12-09 RX ORDER — ACETAMINOPHEN 500 MG
1000 TABLET ORAL ONCE
Status: COMPLETED | OUTPATIENT
Start: 2024-12-09 | End: 2024-12-09

## 2024-12-09 RX ORDER — BUPROPION HYDROCHLORIDE 150 MG/1
150 TABLET ORAL EVERY 24 HOURS
COMMUNITY
Start: 2024-12-02

## 2024-12-09 RX ORDER — BISACODYL 5 MG/1
5 TABLET, DELAYED RELEASE ORAL DAILY PRN
Status: DISCONTINUED | OUTPATIENT
Start: 2024-12-09 | End: 2024-12-13 | Stop reason: HOSPADM

## 2024-12-09 RX ORDER — POLYETHYLENE GLYCOL 3350 17 G/17G
17 POWDER, FOR SOLUTION ORAL DAILY PRN
Status: DISCONTINUED | OUTPATIENT
Start: 2024-12-09 | End: 2024-12-13 | Stop reason: HOSPADM

## 2024-12-09 RX ORDER — ACETAMINOPHEN 325 MG/1
650 TABLET ORAL EVERY 4 HOURS PRN
Status: DISCONTINUED | OUTPATIENT
Start: 2024-12-09 | End: 2024-12-13 | Stop reason: HOSPADM

## 2024-12-09 RX ORDER — LISINOPRIL 5 MG/1
1 TABLET ORAL DAILY
COMMUNITY

## 2024-12-09 RX ORDER — ONDANSETRON 2 MG/ML
4 INJECTION INTRAMUSCULAR; INTRAVENOUS EVERY 6 HOURS PRN
Status: DISCONTINUED | OUTPATIENT
Start: 2024-12-09 | End: 2024-12-13 | Stop reason: HOSPADM

## 2024-12-09 RX ORDER — ALUMINA, MAGNESIA, AND SIMETHICONE 2400; 2400; 240 MG/30ML; MG/30ML; MG/30ML
15 SUSPENSION ORAL EVERY 6 HOURS PRN
Status: DISCONTINUED | OUTPATIENT
Start: 2024-12-09 | End: 2024-12-13 | Stop reason: HOSPADM

## 2024-12-09 RX ORDER — FAMOTIDINE 20 MG/1
40 TABLET, FILM COATED ORAL DAILY
Status: DISCONTINUED | OUTPATIENT
Start: 2024-12-09 | End: 2024-12-10

## 2024-12-09 RX ORDER — METOPROLOL TARTRATE 25 MG/1
25 TABLET, FILM COATED ORAL EVERY 12 HOURS SCHEDULED
Status: DISCONTINUED | OUTPATIENT
Start: 2024-12-09 | End: 2024-12-09

## 2024-12-09 RX ORDER — HYDROCODONE POLISTIREX AND CHLORPHENIRAMINE POLISTIREX 10; 8 MG/5ML; MG/5ML
2.5 SUSPENSION, EXTENDED RELEASE ORAL EVERY 12 HOURS PRN
Status: DISCONTINUED | OUTPATIENT
Start: 2024-12-09 | End: 2024-12-09

## 2024-12-09 RX ORDER — ACETAMINOPHEN 160 MG/5ML
650 SOLUTION ORAL EVERY 4 HOURS PRN
Status: DISCONTINUED | OUTPATIENT
Start: 2024-12-09 | End: 2024-12-13 | Stop reason: HOSPADM

## 2024-12-09 RX ADMIN — POTASSIUM CHLORIDE 40 MEQ: 750 CAPSULE, EXTENDED RELEASE ORAL at 16:07

## 2024-12-09 RX ADMIN — FAMOTIDINE 40 MG: 20 TABLET ORAL at 11:26

## 2024-12-09 RX ADMIN — HYDROCODONE POLISTIREX AND CHLORPHENIRAMINE POLISTIREX 2.5 ML: 10; 8 SUSPENSION, EXTENDED RELEASE ORAL at 13:16

## 2024-12-09 RX ADMIN — APIXABAN 5 MG: 5 TABLET, FILM COATED ORAL at 21:10

## 2024-12-09 RX ADMIN — DILTIAZEM HYDROCHLORIDE 240 MG: 240 CAPSULE, EXTENDED RELEASE ORAL at 13:41

## 2024-12-09 RX ADMIN — METOPROLOL SUCCINATE 12.5 MG: 25 TABLET, FILM COATED, EXTENDED RELEASE ORAL at 16:07

## 2024-12-09 RX ADMIN — GUAIFENESIN AND DEXTROMETHORPHAN 5 ML: 100; 10 SYRUP ORAL at 21:09

## 2024-12-09 RX ADMIN — DILTIAZEM HYDROCHLORIDE 5 MG/HR: 5 INJECTION INTRAVENOUS at 08:50

## 2024-12-09 RX ADMIN — APIXABAN 5 MG: 5 TABLET, FILM COATED ORAL at 13:41

## 2024-12-09 RX ADMIN — ACETAMINOPHEN 1000 MG: 500 TABLET ORAL at 09:47

## 2024-12-09 NOTE — PLAN OF CARE
Goal Outcome Evaluation:  Plan of Care Reviewed With: patient        Progress: no change  Outcome Evaluation: Patient was admission from ED this afternoon. Cardizem drip continued. No complaints of pain or chest pain. Patient has frequent cough, PRN cough medication ordered. Will continue with POC.

## 2024-12-09 NOTE — PAYOR COMM NOTE
"Camilla Navas (74 y.o. Female)     PATIENT INFORMATION  Name:  Camilla Navas  MRN#:     [id number]  :  1950         ADMISSION INFORMATION  CLASS: Observation   DOS:  2024        CURRENT ATTENDING PROVIDER INFORMATION  Name/NPI: Emory Ratliff MD [[id number]]  Phone:  Phone: [phone]  Fax:  [phone]        REQUESTING PROVIDER and RENDERING FACILITY  Name:  [facility]   NPI:  [id number]  TID:  [id number]  Address:      [address] [place] [address]  Phone:               [phone]  Fax:  [phone]        UTILIZATION REVIEW CONTACT INFORMATION  Phone:      [phone]  Fax:           [phone]        ADMISSION DIAGNOSIS  Atrial fib/flutter, transient [I48.91, I48.92]      ++++++++++++++++++++++++++++++++++++++++++++++++++++++++++++++++++++++++++++++++        Date of Birth   1950    Social Security Number       Address   [address] [id number]    Home Phone   [phone]    MRN   [id number]       Buddhism   Advent    Marital Status                               Admission Date   24    Admission Type   Emergency    Admitting Provider   Emory Ratliff MD    Attending Provider   Emory Ratliff MD    Department, Room/Bed   [facility] EMERGENCY ROOM, 15/15       Discharge Date       Discharge Disposition       Discharge Destination                                 Attending Provider: Emory Ratliff MD    Allergies: Empagliflozin, Metformin    Isolation: None   Infection: None   Code Status: CPR    Ht: 165.1 cm (65\")   Wt: 95.3 kg (210 lb 1.6 oz)    Admission Cmt: None   Principal Problem: Atrial fib/flutter, transient [I48.91,I48.92]                   Active Insurance as of 2024       Primary Coverage       Payor Plan Insurance Group Employer/Plan Group    HUMANA MEDICARE REPLACEMENT HUMANA MED ADV SNP HMO [id number]       Payor Plan Address Payor Plan Phone Number Payor Plan Fax " Number Effective Dates    [address] [address] [phone]  1/1/2024 - None Entered    [facility] [id number]         Subscriber Name Subscriber Birth Date Member ID       INEZ HALLMAN 1950 [id number]                              Emergency Department Notes        Mya Selene Darby, VENESSA at 12/09/24 0805          Time: 8:06 AM EST  Date of encounter:  12/9/2024  Independent Historian/Clinical History and Information was obtained by:   Patient    History is limited by: N/A    Chief Complaint   Patient presents with    Shortness of Breath    Cough    Back Pain         History of Present Illness:  Patient is a 74 y.o. year old female who presents to the emergency department for evaluation of shortness of breath and back pain.  Patient states she has been having trouble breathing for the past couple of days.  Patient has history positive for new onset A-fib but states that she is not on Cardizem at this time.  She is taking Eliquis 5 mg twice daily.  She also reports a cough and congestion.  Patient denies chest pain.    Patient Care Team  Primary Care Provider: Lester Lizarraga MD    Past Medical History:     Allergies   Allergen Reactions    Empagliflozin Nausea And Vomiting    Metformin Diarrhea     Past Medical History:   Diagnosis Date    Arthritis     Asthma     Atherosclerosis of coronary artery 2018    Non-obstructive coronary artery disease. Cardiac catheterization done in October 2018 showed a 40% mid LAD lesion and a 40% proximal RCA lesion.    Cancer     Breast.     COPD (chronic obstructive pulmonary disease)     Diabetes mellitus     Diverticulitis     Hiatal hernia     Hyperlipemia 01/21/2022    Hypertension, essential 03/05/2022     Past Surgical History:   Procedure Laterality Date    COLONOSCOPY      COLONOSCOPY N/A 11/14/2023    Procedure: COLONOSCOPY WITH POLYPECTOMIES HOT/COLD SNARE, ELEVIEW INJECTION, BIOPSIES;  Surgeon: Lester Gillette MD;  Location: [facility] ENDOSCOPY;  Service:  Gastroenterology;  Laterality: N/A;  COLON POLYPS, DIVERTICULOSIS    ENDOSCOPY N/A 11/14/2023    Procedure: ESOPHAGOGASTRODUODENOSCOPY WITH BIOPSIES;  Surgeon: Lester Gillette MD;  Location: [facility] ENDOSCOPY;  Service: Gastroenterology;  Laterality: N/A;  PREVIOUS SURGERY    HERNIA REPAIR      HYSTERECTOMY      MASTECTOMY Bilateral     UPPER GASTROINTESTINAL ENDOSCOPY       Family History   Problem Relation Age of Onset    Colon cancer Neg Hx     Malig Hyperthermia Neg Hx        Home Medications:  Prior to Admission medications    Medication Sig Start Date End Date Taking? Authorizing Provider   albuterol sulfate  (90 Base) MCG/ACT inhaler Inhale 2 puffs Every 4 (Four) Hours As Needed for Wheezing. 6/24/22   Rohan Ring,    apixaban (ELIQUIS) 5 MG tablet tablet Take 1 tablet by mouth Every 12 (Twelve) Hours. 6/21/24   Edith Will APRN   apixaban (ELIQUIS) 5 MG tablet tablet Take 1 tablet by mouth 2 (Two) Times a Day. 11/19/24   Umesh Guadarrama MD   Arthritis Pain Reliever 650 MG 8 hr tablet Take 1 tablet by mouth Every 8 (Eight) Hours As Needed for Mild Pain. 3/2/22   Emergency, Nurse Epic, RN   azelastine (ASTELIN) 0.1 % nasal spray Administer 2 sprays into the nostril(s) as directed by provider 2 (Two) Times a Day. Use in each nostril as directed 10/30/24   Randall Mathew MD   baclofen (LIORESAL) 10 MG tablet Take 1 tablet by mouth 2 (Two) Times a Day.    Provider, MD Jayesh   dilTIAZem CD (CARDIZEM CD) 240 MG 24 hr capsule Take 1 capsule by mouth Daily for 30 days. 10/25/24 11/24/24  Philip Bueno MD   fluticasone (FLONASE) 50 MCG/ACT nasal spray Administer 2 sprays into the nostril(s) as directed by provider Daily. 10/30/24   Randall Mathew MD   Fluticasone-Umeclidin-Vilant (TRELEGY ELLIPTA) 200-62.5-25 MCG/ACT inhaler Inhale 1 puff Daily. 10/30/24   Randall Mathew MD   furosemide (LASIX) 20 MG tablet Take 1 tablet by mouth Daily. 11/15/24   Edith Will, APRN    gabapentin (NEURONTIN) 300 MG capsule Take 1 capsule by mouth Every Night.    Jayesh Mckinley MD   glipizide (GLUCOTROL) 5 MG tablet Take 1 tablet by mouth Daily. 5/30/24   Jayesh Mckinley MD   hydroCHLOROthiazide 25 MG tablet TAKE 1 TABLET BY MOUTH EVERY DAY  Patient taking differently: Take 1 tablet by mouth Daily. 10/21/24   Umesh Guadarrama MD   ipratropium-albuterol (DUO-NEB) 0.5-2.5 mg/3 ml nebulizer USE 3 ML VIA NEBULIZER FOUR TIMES DAILY AS NEEDED FOR WHEEZING OR SHORTNESS OF BREATH 10/30/24   Randall Mathew MD   metoprolol tartrate (LOPRESSOR) 100 MG tablet Take 1 tablet by mouth Every 12 (Twelve) Hours for 30 days. 10/25/24 11/24/24  Philip Bueno MD   mirtazapine (REMERON) 15 MG tablet Take 1 tablet by mouth Every Night.    Jayesh Mckinley MD   montelukast (SINGULAIR) 10 MG tablet Take 1 tablet by mouth Every Night. 10/30/24   Randall Mathew MD   nitroglycerin (NITROSTAT) 0.4 MG SL tablet 1 under the tongue as needed for angina, may repeat q5mins for up three doses 4/29/24   Edith Will APRN   omeprazole (priLOSEC) 40 MG capsule Take 1 capsule by mouth 2 (Two) Times a Day. 10/30/24   Randall Mathew MD   ondansetron (ZOFRAN) 4 MG tablet Take 1 tablet by mouth Every 6 (Six) Hours As Needed for Nausea or Vomiting.    Jayesh Mckinley MD   pantoprazole (PROTONIX) 40 MG EC tablet Take 1 tablet by mouth Daily.    Jayesh Mckinley MD   pravastatin (PRAVACHOL) 80 MG tablet TAKE 1 TABLET BY MOUTH DAILY 1/23/24   Edith Will APRN   sertraline (ZOLOFT) 100 MG tablet Take 2 tablets by mouth Daily.    Jayesh Mckinley MD   vitamin D3 125 MCG (5000 UT) capsule capsule Take 1 capsule by mouth Daily.    Jayesh Mckinley MD   vitamin E 400 UNIT capsule Take 1 capsule by mouth Daily.    Jayesh Mckinley MD        Social History:   Social History     Tobacco Use    Smoking status: Never     Passive exposure: Past    Smokeless tobacco: Never   Vaping Use     "Vaping status: Never Used   Substance Use Topics    Alcohol use: Never    Drug use: Never         Review of Systems:  Review of Systems   Constitutional:  Positive for fatigue.   HENT:  Positive for congestion.    Eyes: Negative.    Respiratory:  Positive for cough and shortness of breath.    Cardiovascular: Negative.    Gastrointestinal: Negative.    Endocrine: Negative.    Genitourinary: Negative.    Musculoskeletal:  Positive for back pain.   Skin: Negative.    Allergic/Immunologic: Negative.    Neurological: Negative.    Hematological: Negative.    Psychiatric/Behavioral: Negative.          Physical Exam:  /98   Pulse (!) 136   Temp 98.2 °F (36.8 °C) (Oral)   Resp 24   Ht 165.1 cm (65\")   Wt 95.3 kg (210 lb 1.6 oz)   SpO2 95%   BMI 34.96 kg/m²         Physical Exam  Vitals and nursing note reviewed.   Constitutional:       General: She is not in acute distress.     Appearance: Normal appearance. She is not toxic-appearing.   HENT:      Head: Normocephalic and atraumatic.      Jaw: There is normal jaw occlusion.      Nose: Nose normal.      Mouth/Throat:      Mouth: Mucous membranes are moist.      Pharynx: Oropharynx is clear.   Eyes:      General: Lids are normal.      Extraocular Movements: Extraocular movements intact.      Conjunctiva/sclera: Conjunctivae normal.      Pupils: Pupils are equal, round, and reactive to light.   Cardiovascular:      Rate and Rhythm: Tachycardia present. Rhythm irregular.      Pulses: Normal pulses.      Heart sounds: Normal heart sounds.   Pulmonary:      Effort: Pulmonary effort is normal. No respiratory distress.      Breath sounds: No stridor. Examination of the right-middle field reveals decreased breath sounds. Examination of the right-lower field reveals decreased breath sounds. Examination of the left-lower field reveals decreased breath sounds. Decreased breath sounds present. No wheezing, rhonchi or rales.   Abdominal:      General: Abdomen is flat. Bowel " sounds are normal.      Palpations: Abdomen is soft.      Tenderness: There is no abdominal tenderness. There is no right CVA tenderness, left CVA tenderness, guarding or rebound.   Musculoskeletal:         General: Normal range of motion.      Cervical back: Normal range of motion and neck supple.      Thoracic back: Tenderness (Left side paraspinous) present.      Right lower leg: No edema.      Left lower leg: No edema.   Skin:     General: Skin is warm and dry.   Neurological:      Mental Status: She is alert and oriented to person, place, and time. Mental status is at baseline.   Psychiatric:         Mood and Affect: Mood normal.                Procedures:  Procedures      Medical Decision Making:      Comorbidities that affect care:    Asthma, diabetes, hiatal hernia, arthritis, diverticulitis, hyperlipidemia, COPD, hypertension    External Notes reviewed:    Previous Clinic Note: I reviewed patient's last office note from pulmonology.      The following orders were placed and all results were independently analyzed by me:  Orders Placed This Encounter   Procedures    COVID-19, FLU A/B, RSV PCR 1 HR TAT - Swab, Nasopharynx    Blood Culture - Blood,    Blood Culture - Blood,    XR Chest 1 View    [place] Draw    Comprehensive Metabolic Panel    BNP    Single High Sensitivity Troponin T    CBC Auto Differential    Lactic Acid, Plasma    NPO Diet NPO Type: Strict NPO    Undress & Gown    Continuous Pulse Oximetry    Vital Signs    IP General Consult (Use specialty-specific consult if known)    Oxygen Therapy- Nasal Cannula; Titrate 1-6 LPM Per SpO2; 90 - 95%    ECG 12 Lead ED Triage Standing Order; SOA    Insert Peripheral IV    CBC & Differential    Green Top (Gel)    Lavender Top    Gold Top - SST    Light Blue Top       Medications Given in the Emergency Department:  Medications   sodium chloride 0.9 % flush 10 mL (has no administration in time range)   dilTIAZem (CARDIZEM) 125 mg in 125 mL sodium chloride   infusion (5 mg/hr Intravenous New Bag 12/9/24 0850)   acetaminophen (TYLENOL) tablet 1,000 mg (has no administration in time range)   dilTIAZem (CARDIZEM) bolus from bag 1 mg/mL solution 10 mg (10 mg Intravenous Bolus from Bag 12/9/24 0845)        ED Course:    The patient was initially evaluated in the triage area where orders were placed. The patient was later dispositioned by VENESSA Meng.      The patient was advised to stay for completion of workup which includes but is not limited to communication of labs and radiological results, reassessment and plan. The patient was advised that leaving prior to disposition by a provider could result in critical findings that are not communicated to the patient.          Labs:    Lab Results (last 24 hours)       Procedure Component Value Units Date/Time    CBC & Differential [[id number]]  (Abnormal) Collected: 12/09/24 0824    Specimen: Blood Updated: 12/09/24 0839    Narrative:      The following orders were created for panel order CBC & Differential.  Procedure                               Abnormality         Status                     ---------                               -----------         ------                     CBC Auto Differential[[id number]]        Abnormal            Final result                 Please view results for these tests on the individual orders.    Comprehensive Metabolic Panel [[id number]]  (Abnormal) Collected: 12/09/24 0824    Specimen: Blood Updated: 12/09/24 0901     Glucose 244 mg/dL      BUN 19 mg/dL      Creatinine 1.01 mg/dL      Sodium 138 mmol/L      Potassium 3.3 mmol/L      Chloride 100 mmol/L      CO2 26.2 mmol/L      Calcium 9.3 mg/dL      Total Protein 7.0 g/dL      Albumin 4.1 g/dL      ALT (SGPT) 13 U/L      AST (SGOT) 18 U/L      Alkaline Phosphatase 109 U/L      Total Bilirubin 0.2 mg/dL      Globulin 2.9 gm/dL      A/G Ratio 1.4 g/dL      BUN/Creatinine Ratio 18.8     Anion Gap 11.8 mmol/L      eGFR 58.2 mL/min/1.73      Narrative:      GFR Normal >60  Chronic Kidney Disease <60  Kidney Failure <15    The GFR formula is only valid for adults with stable renal function between ages 18 and 70.    BNP [[id number]]  (Abnormal) Collected: 12/09/24 0824    Specimen: Blood Updated: 12/09/24 0857     proBNP 2,241.0 pg/mL     Narrative:      This assay is used as an aid in the diagnosis of individuals suspected of having heart failure. It can be used as an aid in the diagnosis of acute decompensated heart failure (ADHF) in patients presenting with signs and symptoms of ADHF to the emergency department (ED). In addition, NT-proBNP of <300 pg/mL indicates ADHF is not likely.    Age Range Result Interpretation  NT-proBNP Concentration (pg/mL:      <50             Positive            >450                   Gray                 300-450                    Negative             <300    50-75           Positive            >900                  Gray                300-900                  Negative            <300      >75             Positive            >1800                  Gray                300-1800                  Negative            <300    Single High Sensitivity Troponin T [[id number]]  (Abnormal) Collected: 12/09/24 0824    Specimen: Blood Updated: 12/09/24 0859     HS Troponin T 15 ng/L     Narrative:      High Sensitive Troponin T Reference Range:  <14.0 ng/L- Negative Female for AMI  <22.0 ng/L- Negative Male for AMI  >=14 - Abnormal Female indicating possible myocardial injury.  >=22 - Abnormal Male indicating possible myocardial injury.   Clinicians would have to utilize clinical acumen, EKG, Troponin, and serial changes to determine if it is an Acute Myocardial Infarction or myocardial injury due to an underlying chronic condition.         CBC Auto Differential [[id number]]  (Abnormal) Collected: 12/09/24 0824    Specimen: Blood Updated: 12/09/24 0839     WBC 6.50 10*3/mm3      RBC 3.93 10*6/mm3      Hemoglobin 11.3 g/dL      Hematocrit  35.6 %      MCV 90.6 fL      MCH 28.8 pg      MCHC 31.7 g/dL      RDW 14.0 %      RDW-SD 47.2 fl      MPV 9.7 fL      Platelets 210 10*3/mm3      Neutrophil % 69.1 %      Lymphocyte % 19.4 %      Monocyte % 9.5 %      Eosinophil % 1.2 %      Basophil % 0.3 %      Immature Grans % 0.5 %      Neutrophils, Absolute 4.49 10*3/mm3      Lymphocytes, Absolute 1.26 10*3/mm3      Monocytes, Absolute 0.62 10*3/mm3      Eosinophils, Absolute 0.08 10*3/mm3      Basophils, Absolute 0.02 10*3/mm3      Immature Grans, Absolute 0.03 10*3/mm3      nRBC 0.0 /100 WBC     Blood Culture - Blood, Arm, Left [[id number]] Collected: 12/09/24 0825    Specimen: Blood from Arm, Left Updated: 12/09/24 0835    Lactic Acid, Plasma [[id number]]  (Normal) Collected: 12/09/24 0825    Specimen: Blood Updated: 12/09/24 0852     Lactate 1.8 mmol/L     COVID-19, FLU A/B, RSV PCR 1 HR TAT - Swab, Nasopharynx [[id number]] Collected: 12/09/24 0828    Specimen: Swab from Nasopharynx Updated: 12/09/24 0834    Blood Culture - Blood, Hand, Right [[id number]] Collected: 12/09/24 0900    Specimen: Blood from Hand, Right Updated: 12/09/24 0903             Imaging:    XR Chest 1 View    Result Date: 12/9/2024  XR CHEST 1 VW Date of Exam: 12/9/2024 8:37 AM EST Indication: SOA Triage Protocol Comparison: Chest radiograph dated 10/23/2024 Findings: There is cardiomegaly. There is aortic arch atherosclerotic calcification. Pulmonary vascularity appears normal. There is faint residual right basilar airspace opacity which appears improved from the prior examination. There is no pleural effusion or pneumothorax. There are degenerative changes of the thoracic spine.     Impression: Faint right basilar airspace opacity which appears improved from 10/23/2024. This likely corresponds to chronic atelectasis or scarring. Electronically Signed: Malcolm Stewart  12/9/2024 8:56 AM EST  Workstation ID: [id number]       Differential Diagnosis and Discussion:      Dyspnea: Differential  diagnosis includes but is not limited to metabolic acidosis, neurological disorders, psychogenic, asthma, pneumothorax, upper airway obstruction, COPD, pneumonia, noncardiogenic pulmonary edema, interstitial lung disease, anemia, congestive heart failure, and pulmonary embolism    All labs were reviewed and interpreted by me.  All X-rays impressions were independently interpreted by me.  EKG was interpreted by me.  EKG was interpreted by supervising attending.    MDM         Patient was placed on the cardiac monitor after being given Cardizem.  They were monitored for ventricular ectopy, arrhythmia, tachycardia, hypoxia, and changes in blood pressure.  Patient was rechecked several times throughout their stay for mental status decline and for reassessment of worsening changes in vital signs.     Total Critical Care time of 30 minutes. Total critical care time documented does not include time spent on separately billed procedures for services of nurses or physician assistants. I personally saw and examined the patient. I have reviewed all diagnostic interpretations and treatment plans as written. I was present for the key portions of any procedures performed and the inclusive time noted in any critical care statement. Critical care time includes patient management by me, time spent at the patients bedside,  time to review lab and imaging results, discussing patient care, documentation in the medical record, and time spent with family or caregiver.          Patient Care Considerations:    SEPSIS was considered but is NOT present in the emergency department as SIRS criteria is not present. ANTIBIOTICS: I considered prescribing antibiotics as an outpatient however no bacterial focus of infection was found.      Consultants/Shared Management Plan:    Hospitalist: I have discussed the case with Dr. Ratliff who agrees to accept the patient for admission.    Social Determinants of Health:    Patient is independent, reliable,  and has access to care.       Disposition and Care Coordination:    Admit:   Through independent evaluation of the patient's history, physical, and imperical data, the patient meets criteria for inpatient admission to the hospital.        Final diagnoses:   Atrial fibrillation with RVR        ED Disposition       ED Disposition   Decision to Admit    Condition   --    Comment   --               This medical record created using voice recognition software.             Selene Roque APRN  12/09/24 0915      Electronically signed by Selene Roque APRN at 12/09/24 0915       Vital Signs (last day)       Date/Time Temp Temp src Pulse Resp BP Patient Position SpO2    12/09/24 1100 -- -- 108 -- 146/81 -- 95    12/09/24 1046 -- -- 112 -- 133/80 Sitting 96    12/09/24 1015 -- -- 116 -- 132/82 -- 94    12/09/24 1000 -- -- 113 -- 147/85 -- 94    12/09/24 0945 -- -- 116 -- 130/81 -- 93    12/09/24 0930 -- -- 109 -- 148/83 -- 93    12/09/24 0915 -- -- 118 -- 139/99 -- 95    12/09/24 0910 -- -- 136 -- -- -- 95    12/09/24 0900 -- -- 133 -- -- -- 95    12/09/24 0845 -- -- 138 -- 149/98 -- --    12/09/24 0810 98.2 (36.8) Oral 136 24 138/80 Sitting 99

## 2024-12-09 NOTE — ED PROVIDER NOTES
Time: 8:06 AM EST  Date of encounter:  12/9/2024  Independent Historian/Clinical History and Information was obtained by:   Patient    History is limited by: N/A    Chief Complaint   Patient presents with    Shortness of Breath    Cough    Back Pain         History of Present Illness:  Patient is a 74 y.o. year old female who presents to the emergency department for evaluation of shortness of breath and back pain.  Patient states she has been having trouble breathing for the past couple of days.  Patient has history positive for new onset A-fib but states that she is not on Cardizem at this time.  She is taking Eliquis 5 mg twice daily.  She also reports a cough and congestion.  Patient denies chest pain.    Patient Care Team  Primary Care Provider: Lester Lizarraga MD    Past Medical History:     Allergies   Allergen Reactions    Empagliflozin Nausea And Vomiting    Metformin Diarrhea     Past Medical History:   Diagnosis Date    Arthritis     Asthma     Atherosclerosis of coronary artery 2018    Non-obstructive coronary artery disease. Cardiac catheterization done in October 2018 showed a 40% mid LAD lesion and a 40% proximal RCA lesion.    Cancer     Breast.     COPD (chronic obstructive pulmonary disease)     Diabetes mellitus     Diverticulitis     Hiatal hernia     Hyperlipemia 01/21/2022    Hypertension, essential 03/05/2022     Past Surgical History:   Procedure Laterality Date    COLONOSCOPY      COLONOSCOPY N/A 11/14/2023    Procedure: COLONOSCOPY WITH POLYPECTOMIES HOT/COLD SNARE, ELEVIEW INJECTION, BIOPSIES;  Surgeon: Lester Gillette MD;  Location: [facility] ENDOSCOPY;  Service: Gastroenterology;  Laterality: N/A;  COLON POLYPS, DIVERTICULOSIS    ENDOSCOPY N/A 11/14/2023    Procedure: ESOPHAGOGASTRODUODENOSCOPY WITH BIOPSIES;  Surgeon: Lester Gillette MD;  Location: [facility] ENDOSCOPY;  Service: Gastroenterology;  Laterality: N/A;  PREVIOUS SURGERY    HERNIA REPAIR      HYSTERECTOMY       MASTECTOMY Bilateral     UPPER GASTROINTESTINAL ENDOSCOPY       Family History   Problem Relation Age of Onset    Colon cancer Neg Hx     Malig Hyperthermia Neg Hx        Home Medications:  Prior to Admission medications    Medication Sig Start Date End Date Taking? Authorizing Provider   albuterol sulfate  (90 Base) MCG/ACT inhaler Inhale 2 puffs Every 4 (Four) Hours As Needed for Wheezing. 6/24/22   Rohan Ring,    apixaban (ELIQUIS) 5 MG tablet tablet Take 1 tablet by mouth Every 12 (Twelve) Hours. 6/21/24   Edith iWll APRN   apixaban (ELIQUIS) 5 MG tablet tablet Take 1 tablet by mouth 2 (Two) Times a Day. 11/19/24   Umesh Guadarrama MD   Arthritis Pain Reliever 650 MG 8 hr tablet Take 1 tablet by mouth Every 8 (Eight) Hours As Needed for Mild Pain. 3/2/22   Emergency, Nurse Epic, RN   azelastine (ASTELIN) 0.1 % nasal spray Administer 2 sprays into the nostril(s) as directed by provider 2 (Two) Times a Day. Use in each nostril as directed 10/30/24   Randall Mathew MD   baclofen (LIORESAL) 10 MG tablet Take 1 tablet by mouth 2 (Two) Times a Day.    ProviderJayesh MD   dilTIAZem CD (CARDIZEM CD) 240 MG 24 hr capsule Take 1 capsule by mouth Daily for 30 days. 10/25/24 11/24/24  Philip Bueno MD   fluticasone (FLONASE) 50 MCG/ACT nasal spray Administer 2 sprays into the nostril(s) as directed by provider Daily. 10/30/24   Randall Mathew MD   Fluticasone-Umeclidin-Vilant (TRELEGY ELLIPTA) 200-62.5-25 MCG/ACT inhaler Inhale 1 puff Daily. 10/30/24   Randall Mathew MD   furosemide (LASIX) 20 MG tablet Take 1 tablet by mouth Daily. 11/15/24   Edith Will APRN   gabapentin (NEURONTIN) 300 MG capsule Take 1 capsule by mouth Every Night.    Jayesh Mckinley MD   glipizide (GLUCOTROL) 5 MG tablet Take 1 tablet by mouth Daily. 5/30/24   Jayesh Mckinley MD   hydroCHLOROthiazide 25 MG tablet TAKE 1 TABLET BY MOUTH EVERY DAY  Patient taking differently: Take 1 tablet by mouth  Daily. 10/21/24   Umesh Guadarrama MD   ipratropium-albuterol (DUO-NEB) 0.5-2.5 mg/3 ml nebulizer USE 3 ML VIA NEBULIZER FOUR TIMES DAILY AS NEEDED FOR WHEEZING OR SHORTNESS OF BREATH 10/30/24   Randall Mathew MD   metoprolol tartrate (LOPRESSOR) 100 MG tablet Take 1 tablet by mouth Every 12 (Twelve) Hours for 30 days. 10/25/24 11/24/24  Philip Bueno MD   mirtazapine (REMERON) 15 MG tablet Take 1 tablet by mouth Every Night.    Jayesh Mckinley MD   montelukast (SINGULAIR) 10 MG tablet Take 1 tablet by mouth Every Night. 10/30/24   Randall Mtahew MD   nitroglycerin (NITROSTAT) 0.4 MG SL tablet 1 under the tongue as needed for angina, may repeat q5mins for up three doses 4/29/24   Edith Will APRN   omeprazole (priLOSEC) 40 MG capsule Take 1 capsule by mouth 2 (Two) Times a Day. 10/30/24   Randall Mathew MD   ondansetron (ZOFRAN) 4 MG tablet Take 1 tablet by mouth Every 6 (Six) Hours As Needed for Nausea or Vomiting.    Jayesh Mckinley MD   pantoprazole (PROTONIX) 40 MG EC tablet Take 1 tablet by mouth Daily.    Jayesh Mckinley MD   pravastatin (PRAVACHOL) 80 MG tablet TAKE 1 TABLET BY MOUTH DAILY 1/23/24   Edith Will APRN   sertraline (ZOLOFT) 100 MG tablet Take 2 tablets by mouth Daily.    Jayesh Mckinley MD   vitamin D3 125 MCG (5000 UT) capsule capsule Take 1 capsule by mouth Daily.    Jayesh Mckinley MD   vitamin E 400 UNIT capsule Take 1 capsule by mouth Daily.    Jayesh Mckinley MD        Social History:   Social History     Tobacco Use    Smoking status: Never     Passive exposure: Past    Smokeless tobacco: Never   Vaping Use    Vaping status: Never Used   Substance Use Topics    Alcohol use: Never    Drug use: Never         Review of Systems:  Review of Systems   Constitutional:  Positive for fatigue.   HENT:  Positive for congestion.    Eyes: Negative.    Respiratory:  Positive for cough and shortness of breath.    Cardiovascular: Negative.   "  Gastrointestinal: Negative.    Endocrine: Negative.    Genitourinary: Negative.    Musculoskeletal:  Positive for back pain.   Skin: Negative.    Allergic/Immunologic: Negative.    Neurological: Negative.    Hematological: Negative.    Psychiatric/Behavioral: Negative.          Physical Exam:  /98   Pulse (!) 136   Temp 98.2 °F (36.8 °C) (Oral)   Resp 24   Ht 165.1 cm (65\")   Wt 95.3 kg (210 lb 1.6 oz)   SpO2 95%   BMI 34.96 kg/m²         Physical Exam  Vitals and nursing note reviewed.   Constitutional:       General: She is not in acute distress.     Appearance: Normal appearance. She is not toxic-appearing.   HENT:      Head: Normocephalic and atraumatic.      Jaw: There is normal jaw occlusion.      Nose: Nose normal.      Mouth/Throat:      Mouth: Mucous membranes are moist.      Pharynx: Oropharynx is clear.   Eyes:      General: Lids are normal.      Extraocular Movements: Extraocular movements intact.      Conjunctiva/sclera: Conjunctivae normal.      Pupils: Pupils are equal, round, and reactive to light.   Cardiovascular:      Rate and Rhythm: Tachycardia present. Rhythm irregular.      Pulses: Normal pulses.      Heart sounds: Normal heart sounds.   Pulmonary:      Effort: Pulmonary effort is normal. No respiratory distress.      Breath sounds: No stridor. Examination of the right-middle field reveals decreased breath sounds. Examination of the right-lower field reveals decreased breath sounds. Examination of the left-lower field reveals decreased breath sounds. Decreased breath sounds present. No wheezing, rhonchi or rales.   Abdominal:      General: Abdomen is flat. Bowel sounds are normal.      Palpations: Abdomen is soft.      Tenderness: There is no abdominal tenderness. There is no right CVA tenderness, left CVA tenderness, guarding or rebound.   Musculoskeletal:         General: Normal range of motion.      Cervical back: Normal range of motion and neck supple.      Thoracic back: " Tenderness (Left side paraspinous) present.      Right lower leg: No edema.      Left lower leg: No edema.   Skin:     General: Skin is warm and dry.   Neurological:      Mental Status: She is alert and oriented to person, place, and time. Mental status is at baseline.   Psychiatric:         Mood and Affect: Mood normal.                Procedures:  Procedures      Medical Decision Making:      Comorbidities that affect care:    Asthma, diabetes, hiatal hernia, arthritis, diverticulitis, hyperlipidemia, COPD, hypertension    External Notes reviewed:    Previous Clinic Note: I reviewed patient's last office note from pulmonology.      The following orders were placed and all results were independently analyzed by me:  Orders Placed This Encounter   Procedures    COVID-19, FLU A/B, RSV PCR 1 HR TAT - Swab, Nasopharynx    Blood Culture - Blood,    Blood Culture - Blood,    XR Chest 1 View    [place] Draw    Comprehensive Metabolic Panel    BNP    Single High Sensitivity Troponin T    CBC Auto Differential    Lactic Acid, Plasma    NPO Diet NPO Type: Strict NPO    Undress & Gown    Continuous Pulse Oximetry    Vital Signs    IP General Consult (Use specialty-specific consult if known)    Oxygen Therapy- Nasal Cannula; Titrate 1-6 LPM Per SpO2; 90 - 95%    ECG 12 Lead ED Triage Standing Order; SOA    Insert Peripheral IV    CBC & Differential    Green Top (Gel)    Lavender Top    Gold Top - SST    Light Blue Top       Medications Given in the Emergency Department:  Medications   sodium chloride 0.9 % flush 10 mL (has no administration in time range)   dilTIAZem (CARDIZEM) 125 mg in 125 mL sodium chloride  infusion (5 mg/hr Intravenous New Bag 12/9/24 0850)   acetaminophen (TYLENOL) tablet 1,000 mg (has no administration in time range)   dilTIAZem (CARDIZEM) bolus from bag 1 mg/mL solution 10 mg (10 mg Intravenous Bolus from Bag 12/9/24 0845)        ED Course:    The patient was initially evaluated in the triage area where  orders were placed. The patient was later dispositioned by VENESSA Meng.      The patient was advised to stay for completion of workup which includes but is not limited to communication of labs and radiological results, reassessment and plan. The patient was advised that leaving prior to disposition by a provider could result in critical findings that are not communicated to the patient.          Labs:    Lab Results (last 24 hours)       Procedure Component Value Units Date/Time    CBC & Differential [[id number]]  (Abnormal) Collected: 12/09/24 0824    Specimen: Blood Updated: 12/09/24 0839    Narrative:      The following orders were created for panel order CBC & Differential.  Procedure                               Abnormality         Status                     ---------                               -----------         ------                     CBC Auto Differential[[id number]]        Abnormal            Final result                 Please view results for these tests on the individual orders.    Comprehensive Metabolic Panel [[id number]]  (Abnormal) Collected: 12/09/24 0824    Specimen: Blood Updated: 12/09/24 0901     Glucose 244 mg/dL      BUN 19 mg/dL      Creatinine 1.01 mg/dL      Sodium 138 mmol/L      Potassium 3.3 mmol/L      Chloride 100 mmol/L      CO2 26.2 mmol/L      Calcium 9.3 mg/dL      Total Protein 7.0 g/dL      Albumin 4.1 g/dL      ALT (SGPT) 13 U/L      AST (SGOT) 18 U/L      Alkaline Phosphatase 109 U/L      Total Bilirubin 0.2 mg/dL      Globulin 2.9 gm/dL      A/G Ratio 1.4 g/dL      BUN/Creatinine Ratio 18.8     Anion Gap 11.8 mmol/L      eGFR 58.2 mL/min/1.73     Narrative:      GFR Normal >60  Chronic Kidney Disease <60  Kidney Failure <15    The GFR formula is only valid for adults with stable renal function between ages 18 and 70.    BNP [[id number]]  (Abnormal) Collected: 12/09/24 0824    Specimen: Blood Updated: 12/09/24 0857     proBNP 2,241.0 pg/mL     Narrative:      This  assay is used as an aid in the diagnosis of individuals suspected of having heart failure. It can be used as an aid in the diagnosis of acute decompensated heart failure (ADHF) in patients presenting with signs and symptoms of ADHF to the emergency department (ED). In addition, NT-proBNP of <300 pg/mL indicates ADHF is not likely.    Age Range Result Interpretation  NT-proBNP Concentration (pg/mL:      <50             Positive            >450                   Gray                 300-450                    Negative             <300    50-75           Positive            >900                  Gray                300-900                  Negative            <300      >75             Positive            >1800                  Gray                300-1800                  Negative            <300    Single High Sensitivity Troponin T [[id number]]  (Abnormal) Collected: 12/09/24 0824    Specimen: Blood Updated: 12/09/24 0859     HS Troponin T 15 ng/L     Narrative:      High Sensitive Troponin T Reference Range:  <14.0 ng/L- Negative Female for AMI  <22.0 ng/L- Negative Male for AMI  >=14 - Abnormal Female indicating possible myocardial injury.  >=22 - Abnormal Male indicating possible myocardial injury.   Clinicians would have to utilize clinical acumen, EKG, Troponin, and serial changes to determine if it is an Acute Myocardial Infarction or myocardial injury due to an underlying chronic condition.         CBC Auto Differential [[id number]]  (Abnormal) Collected: 12/09/24 0824    Specimen: Blood Updated: 12/09/24 0839     WBC 6.50 10*3/mm3      RBC 3.93 10*6/mm3      Hemoglobin 11.3 g/dL      Hematocrit 35.6 %      MCV 90.6 fL      MCH 28.8 pg      MCHC 31.7 g/dL      RDW 14.0 %      RDW-SD 47.2 fl      MPV 9.7 fL      Platelets 210 10*3/mm3      Neutrophil % 69.1 %      Lymphocyte % 19.4 %      Monocyte % 9.5 %      Eosinophil % 1.2 %      Basophil % 0.3 %      Immature Grans % 0.5 %      Neutrophils, Absolute 4.49  10*3/mm3      Lymphocytes, Absolute 1.26 10*3/mm3      Monocytes, Absolute 0.62 10*3/mm3      Eosinophils, Absolute 0.08 10*3/mm3      Basophils, Absolute 0.02 10*3/mm3      Immature Grans, Absolute 0.03 10*3/mm3      nRBC 0.0 /100 WBC     Blood Culture - Blood, Arm, Left [[id number]] Collected: 12/09/24 0825    Specimen: Blood from Arm, Left Updated: 12/09/24 0835    Lactic Acid, Plasma [[id number]]  (Normal) Collected: 12/09/24 0825    Specimen: Blood Updated: 12/09/24 0852     Lactate 1.8 mmol/L     COVID-19, FLU A/B, RSV PCR 1 HR TAT - Swab, Nasopharynx [[id number]] Collected: 12/09/24 0828    Specimen: Swab from Nasopharynx Updated: 12/09/24 0834    Blood Culture - Blood, Hand, Right [[id number]] Collected: 12/09/24 0900    Specimen: Blood from Hand, Right Updated: 12/09/24 0903             Imaging:    XR Chest 1 View    Result Date: 12/9/2024  XR CHEST 1 VW Date of Exam: 12/9/2024 8:37 AM EST Indication: SOA Triage Protocol Comparison: Chest radiograph dated 10/23/2024 Findings: There is cardiomegaly. There is aortic arch atherosclerotic calcification. Pulmonary vascularity appears normal. There is faint residual right basilar airspace opacity which appears improved from the prior examination. There is no pleural effusion or pneumothorax. There are degenerative changes of the thoracic spine.     Impression: Faint right basilar airspace opacity which appears improved from 10/23/2024. This likely corresponds to chronic atelectasis or scarring. Electronically Signed: Malcolm Pat  12/9/2024 8:56 AM EST  Workstation ID: [id number]       Differential Diagnosis and Discussion:      Dyspnea: Differential diagnosis includes but is not limited to metabolic acidosis, neurological disorders, psychogenic, asthma, pneumothorax, upper airway obstruction, COPD, pneumonia, noncardiogenic pulmonary edema, interstitial lung disease, anemia, congestive heart failure, and pulmonary embolism    All labs were reviewed and interpreted  by me.  All X-rays impressions were independently interpreted by me.  EKG was interpreted by me.  EKG was interpreted by supervising attending.    MDM         Patient was placed on the cardiac monitor after being given Cardizem.  They were monitored for ventricular ectopy, arrhythmia, tachycardia, hypoxia, and changes in blood pressure.  Patient was rechecked several times throughout their stay for mental status decline and for reassessment of worsening changes in vital signs.     Total Critical Care time of 30 minutes. Total critical care time documented does not include time spent on separately billed procedures for services of nurses or physician assistants. I personally saw and examined the patient. I have reviewed all diagnostic interpretations and treatment plans as written. I was present for the key portions of any procedures performed and the inclusive time noted in any critical care statement. Critical care time includes patient management by me, time spent at the patients bedside,  time to review lab and imaging results, discussing patient care, documentation in the medical record, and time spent with family or caregiver.          Patient Care Considerations:    SEPSIS was considered but is NOT present in the emergency department as SIRS criteria is not present. ANTIBIOTICS: I considered prescribing antibiotics as an outpatient however no bacterial focus of infection was found.      Consultants/Shared Management Plan:    Hospitalist: I have discussed the case with Dr. Ratliff who agrees to accept the patient for admission.    Social Determinants of Health:    Patient is independent, reliable, and has access to care.       Disposition and Care Coordination:    Admit:   Through independent evaluation of the patient's history, physical, and imperical data, the patient meets criteria for inpatient admission to the hospital.        Final diagnoses:   Atrial fibrillation with RVR        ED Disposition       ED  Disposition   Decision to Admit    Condition   --    Comment   --               This medical record created using voice recognition software.             Selene Roque, APRN  12/09/24 0902

## 2024-12-09 NOTE — Clinical Note
Level of Care: Telemetry [5]   Diagnosis: Atrial fib/flutter, transient [423347]   Admitting Physician: SUPRIYA COLIN [[id number]]   Attending Physician: SUPRIYA COLIN [[id number]]   Isolate for COVID?: No [0]   Certification: I Certify That Inpatient Hospital Services Are Medically Necessary For Greater Than 2 Midnights

## 2024-12-10 ENCOUNTER — APPOINTMENT (OUTPATIENT)
Dept: GENERAL RADIOLOGY | Facility: HOSPITAL | Age: 74
DRG: 310 | End: 2024-12-10
Payer: MEDICARE

## 2024-12-10 ENCOUNTER — HOSPITAL ENCOUNTER (OUTPATIENT)
Dept: RESPIRATORY THERAPY | Facility: HOSPITAL | Age: 74
Discharge: HOME | End: 2024-12-10
Payer: MEDICARE

## 2024-12-10 LAB
ALBUMIN SERPL-MCNC: 3.6 G/DL (ref 3.5–5.2)
ALBUMIN/GLOB SERPL: 1.3 G/DL
ALP SERPL-CCNC: 69 U/L (ref 39–117)
ALT SERPL W P-5'-P-CCNC: 10 U/L (ref 1–33)
ANION GAP SERPL CALCULATED.3IONS-SCNC: 11.9 MMOL/L (ref 5–15)
AST SERPL-CCNC: 19 U/L (ref 1–32)
BASOPHILS # BLD AUTO: 0.03 10*3/MM3 (ref 0–0.2)
BASOPHILS NFR BLD AUTO: 0.4 % (ref 0–1.5)
BILIRUB SERPL-MCNC: 0.3 MG/DL (ref 0–1.2)
BUN SERPL-MCNC: 13 MG/DL (ref 8–23)
BUN/CREAT SERPL: 17.1 (ref 7–25)
CALCIUM SPEC-SCNC: 8.7 MG/DL (ref 8.6–10.5)
CHLORIDE SERPL-SCNC: 101 MMOL/L (ref 98–107)
CO2 SERPL-SCNC: 23.1 MMOL/L (ref 22–29)
CREAT SERPL-MCNC: 0.76 MG/DL (ref 0.57–1)
DEPRECATED RDW RBC AUTO: 48.2 FL (ref 37–54)
EGFRCR SERPLBLD CKD-EPI 2021: 81.8 ML/MIN/1.73
EOSINOPHIL # BLD AUTO: 0.14 10*3/MM3 (ref 0–0.4)
EOSINOPHIL NFR BLD AUTO: 1.8 % (ref 0.3–6.2)
ERYTHROCYTE [DISTWIDTH] IN BLOOD BY AUTOMATED COUNT: 14.4 % (ref 12.3–15.4)
GLOBULIN UR ELPH-MCNC: 2.7 GM/DL
GLUCOSE BLDC GLUCOMTR-MCNC: 178 MG/DL (ref 70–99)
GLUCOSE SERPL-MCNC: 176 MG/DL (ref 65–99)
HCT VFR BLD AUTO: 32.9 % (ref 34–46.6)
HGB BLD-MCNC: 10.5 G/DL (ref 12–15.9)
HOLD SPECIMEN: NORMAL
IMM GRANULOCYTES # BLD AUTO: 0.03 10*3/MM3 (ref 0–0.05)
IMM GRANULOCYTES NFR BLD AUTO: 0.4 % (ref 0–0.5)
LYMPHOCYTES # BLD AUTO: 1.32 10*3/MM3 (ref 0.7–3.1)
LYMPHOCYTES NFR BLD AUTO: 16.5 % (ref 19.6–45.3)
MCH RBC QN AUTO: 29.1 PG (ref 26.6–33)
MCHC RBC AUTO-ENTMCNC: 31.9 G/DL (ref 31.5–35.7)
MCV RBC AUTO: 91.1 FL (ref 79–97)
MONOCYTES # BLD AUTO: 0.77 10*3/MM3 (ref 0.1–0.9)
MONOCYTES NFR BLD AUTO: 9.6 % (ref 5–12)
NEUTROPHILS NFR BLD AUTO: 5.69 10*3/MM3 (ref 1.7–7)
NEUTROPHILS NFR BLD AUTO: 71.3 % (ref 42.7–76)
NRBC BLD AUTO-RTO: 0 /100 WBC (ref 0–0.2)
PLATELET # BLD AUTO: 212 10*3/MM3 (ref 140–450)
PMV BLD AUTO: 10.6 FL (ref 6–12)
POTASSIUM SERPL-SCNC: 4.3 MMOL/L (ref 3.5–5.2)
PROT SERPL-MCNC: 6.3 G/DL (ref 6–8.5)
RBC # BLD AUTO: 3.61 10*6/MM3 (ref 3.77–5.28)
SODIUM SERPL-SCNC: 136 MMOL/L (ref 136–145)
WBC NRBC COR # BLD AUTO: 7.98 10*3/MM3 (ref 3.4–10.8)
WHOLE BLOOD HOLD SPECIMEN: NORMAL

## 2024-12-10 PROCEDURE — 94799 UNLISTED PULMONARY SVC/PX: CPT

## 2024-12-10 PROCEDURE — 82948 REAGENT STRIP/BLOOD GLUCOSE: CPT

## 2024-12-10 PROCEDURE — 85025 COMPLETE CBC W/AUTO DIFF WBC: CPT | Performed by: NURSE PRACTITIONER

## 2024-12-10 PROCEDURE — 80053 COMPREHEN METABOLIC PANEL: CPT | Performed by: INTERNAL MEDICINE

## 2024-12-10 PROCEDURE — 25010000002 ONDANSETRON PER 1 MG: Performed by: INTERNAL MEDICINE

## 2024-12-10 PROCEDURE — 63710000001 PREDNISONE PER 1 MG: Performed by: STUDENT IN AN ORGANIZED HEALTH CARE EDUCATION/TRAINING PROGRAM

## 2024-12-10 PROCEDURE — 94640 AIRWAY INHALATION TREATMENT: CPT

## 2024-12-10 PROCEDURE — 94664 DEMO&/EVAL PT USE INHALER: CPT

## 2024-12-10 PROCEDURE — 71045 X-RAY EXAM CHEST 1 VIEW: CPT

## 2024-12-10 PROCEDURE — G0378 HOSPITAL OBSERVATION PER HR: HCPCS

## 2024-12-10 RX ORDER — PREDNISONE 20 MG/1
40 TABLET ORAL
Status: DISCONTINUED | OUTPATIENT
Start: 2024-12-10 | End: 2024-12-12

## 2024-12-10 RX ORDER — MONTELUKAST SODIUM 10 MG/1
10 TABLET ORAL NIGHTLY
Status: DISCONTINUED | OUTPATIENT
Start: 2024-12-10 | End: 2024-12-13 | Stop reason: HOSPADM

## 2024-12-10 RX ORDER — LEVALBUTEROL INHALATION SOLUTION 1.25 MG/3ML
1.25 SOLUTION RESPIRATORY (INHALATION) EVERY 4 HOURS
Status: DISCONTINUED | OUTPATIENT
Start: 2024-12-11 | End: 2024-12-11

## 2024-12-10 RX ORDER — PANTOPRAZOLE SODIUM 40 MG/1
40 TABLET, DELAYED RELEASE ORAL
Status: DISCONTINUED | OUTPATIENT
Start: 2024-12-10 | End: 2024-12-13 | Stop reason: HOSPADM

## 2024-12-10 RX ORDER — PRAVASTATIN SODIUM 20 MG
80 TABLET ORAL DAILY
Status: DISCONTINUED | OUTPATIENT
Start: 2024-12-10 | End: 2024-12-13 | Stop reason: HOSPADM

## 2024-12-10 RX ORDER — LEVALBUTEROL INHALATION SOLUTION 1.25 MG/3ML
1.25 SOLUTION RESPIRATORY (INHALATION) EVERY 4 HOURS PRN
Status: DISCONTINUED | OUTPATIENT
Start: 2024-12-10 | End: 2024-12-10

## 2024-12-10 RX ORDER — ARFORMOTEROL TARTRATE 15 UG/2ML
15 SOLUTION RESPIRATORY (INHALATION)
Status: DISCONTINUED | OUTPATIENT
Start: 2024-12-10 | End: 2024-12-13 | Stop reason: HOSPADM

## 2024-12-10 RX ORDER — GABAPENTIN 300 MG/1
300 CAPSULE ORAL NIGHTLY
Status: DISCONTINUED | OUTPATIENT
Start: 2024-12-10 | End: 2024-12-13 | Stop reason: HOSPADM

## 2024-12-10 RX ORDER — FUROSEMIDE 20 MG/1
20 TABLET ORAL DAILY
Status: DISCONTINUED | OUTPATIENT
Start: 2024-12-10 | End: 2024-12-11

## 2024-12-10 RX ORDER — MIRTAZAPINE 15 MG/1
15 TABLET, FILM COATED ORAL NIGHTLY
Status: DISCONTINUED | OUTPATIENT
Start: 2024-12-10 | End: 2024-12-13 | Stop reason: HOSPADM

## 2024-12-10 RX ORDER — BUPROPION HYDROCHLORIDE 150 MG/1
150 TABLET ORAL EVERY 24 HOURS
Status: DISCONTINUED | OUTPATIENT
Start: 2024-12-10 | End: 2024-12-13 | Stop reason: HOSPADM

## 2024-12-10 RX ORDER — METOPROLOL SUCCINATE 50 MG/1
50 TABLET, EXTENDED RELEASE ORAL EVERY 12 HOURS SCHEDULED
Status: DISCONTINUED | OUTPATIENT
Start: 2024-12-10 | End: 2024-12-13 | Stop reason: HOSPADM

## 2024-12-10 RX ORDER — IPRATROPIUM BROMIDE AND ALBUTEROL SULFATE 2.5; .5 MG/3ML; MG/3ML
3 SOLUTION RESPIRATORY (INHALATION)
Status: DISCONTINUED | OUTPATIENT
Start: 2024-12-10 | End: 2024-12-10

## 2024-12-10 RX ORDER — BUDESONIDE 0.5 MG/2ML
0.5 INHALANT ORAL
Status: DISCONTINUED | OUTPATIENT
Start: 2024-12-10 | End: 2024-12-13 | Stop reason: HOSPADM

## 2024-12-10 RX ADMIN — DILTIAZEM HYDROCHLORIDE 240 MG: 240 CAPSULE, EXTENDED RELEASE ORAL at 08:18

## 2024-12-10 RX ADMIN — FUROSEMIDE 20 MG: 20 TABLET ORAL at 11:06

## 2024-12-10 RX ADMIN — ARFORMOTEROL TARTRATE 15 MCG: 15 SOLUTION RESPIRATORY (INHALATION) at 09:03

## 2024-12-10 RX ADMIN — IPRATROPIUM BROMIDE AND ALBUTEROL SULFATE 3 ML: .5; 3 SOLUTION RESPIRATORY (INHALATION) at 07:07

## 2024-12-10 RX ADMIN — PREDNISONE 40 MG: 20 TABLET ORAL at 12:06

## 2024-12-10 RX ADMIN — LINAGLIPTIN 5 MG: 5 TABLET, FILM COATED ORAL at 11:06

## 2024-12-10 RX ADMIN — BUDESONIDE 0.5 MG: 0.5 INHALANT RESPIRATORY (INHALATION) at 19:05

## 2024-12-10 RX ADMIN — IPRATROPIUM BROMIDE AND ALBUTEROL SULFATE 3 ML: .5; 3 SOLUTION RESPIRATORY (INHALATION) at 19:05

## 2024-12-10 RX ADMIN — GUAIFENESIN AND DEXTROMETHORPHAN 5 ML: 100; 10 SYRUP ORAL at 20:47

## 2024-12-10 RX ADMIN — MIRTAZAPINE 15 MG: 15 TABLET, FILM COATED ORAL at 20:47

## 2024-12-10 RX ADMIN — FAMOTIDINE 40 MG: 20 TABLET ORAL at 08:19

## 2024-12-10 RX ADMIN — POTASSIUM CHLORIDE 40 MEQ: 750 CAPSULE, EXTENDED RELEASE ORAL at 08:18

## 2024-12-10 RX ADMIN — APIXABAN 5 MG: 5 TABLET, FILM COATED ORAL at 08:18

## 2024-12-10 RX ADMIN — IPRATROPIUM BROMIDE AND ALBUTEROL SULFATE 3 ML: .5; 3 SOLUTION RESPIRATORY (INHALATION) at 13:08

## 2024-12-10 RX ADMIN — BUDESONIDE 0.5 MG: 0.5 INHALANT RESPIRATORY (INHALATION) at 09:03

## 2024-12-10 RX ADMIN — GUAIFENESIN AND DEXTROMETHORPHAN 5 ML: 100; 10 SYRUP ORAL at 01:34

## 2024-12-10 RX ADMIN — GABAPENTIN 300 MG: 300 CAPSULE ORAL at 20:48

## 2024-12-10 RX ADMIN — METOPROLOL SUCCINATE 12.5 MG: 25 TABLET, FILM COATED, EXTENDED RELEASE ORAL at 08:18

## 2024-12-10 RX ADMIN — BUPROPION HYDROCHLORIDE 150 MG: 150 TABLET, EXTENDED RELEASE ORAL at 11:06

## 2024-12-10 RX ADMIN — MONTELUKAST 10 MG: 10 TABLET, FILM COATED ORAL at 20:49

## 2024-12-10 RX ADMIN — ALPRAZOLAM 0.5 MG: 0.25 TABLET ORAL at 20:49

## 2024-12-10 RX ADMIN — ONDANSETRON 4 MG: 2 INJECTION INTRAMUSCULAR; INTRAVENOUS at 01:29

## 2024-12-10 RX ADMIN — METOPROLOL TARTRATE 5 MG: 1 INJECTION, SOLUTION INTRAVENOUS at 19:54

## 2024-12-10 RX ADMIN — ARFORMOTEROL TARTRATE 15 MCG: 15 SOLUTION RESPIRATORY (INHALATION) at 19:05

## 2024-12-10 RX ADMIN — METOPROLOL SUCCINATE 50 MG: 50 TABLET, EXTENDED RELEASE ORAL at 20:47

## 2024-12-10 RX ADMIN — PANTOPRAZOLE SODIUM 40 MG: 40 TABLET, DELAYED RELEASE ORAL at 11:06

## 2024-12-10 RX ADMIN — GUAIFENESIN AND DEXTROMETHORPHAN 5 ML: 100; 10 SYRUP ORAL at 12:11

## 2024-12-10 RX ADMIN — ALPRAZOLAM 0.5 MG: 0.25 TABLET ORAL at 01:34

## 2024-12-10 RX ADMIN — APIXABAN 5 MG: 5 TABLET, FILM COATED ORAL at 20:49

## 2024-12-10 RX ADMIN — PRAVASTATIN SODIUM 80 MG: 20 TABLET ORAL at 11:06

## 2024-12-10 NOTE — PLAN OF CARE
Problem: Adult Inpatient Plan of Care  Goal: Plan of Care Review  Outcome: Progressing  Flowsheets (Taken 12/10/2024 1803)  Progress: improving  Outcome Evaluation: Patient alert and oriented x 4. Patient remains on 2 L NC, room air at baseline. Cough treated per MAR. No complaints of pain or discomfort. Continuing with plan of care.  Plan of Care Reviewed With: patient   Goal Outcome Evaluation:  Plan of Care Reviewed With: patient        Progress: improving  Outcome Evaluation: Patient alert and oriented x 4. Patient remains on 2 L NC, room air at baseline. Cough treated per MAR. No complaints of pain or discomfort. Continuing with plan of care.

## 2024-12-10 NOTE — PROGRESS NOTES
[facility]     Progress Note    Patient Name: Camilla Navas  : 1950  MRN: [id number]  Primary Care Physician:  Lester Lizarraga MD  Date of admission: 2024    Subjective   Patient is no longer on Cardizem drip  Heart rate is slightly better  Blood pressures and vitals are stable  Labs and electrolytes are normal as well    Scheduled Meds:apixaban, 5 mg, Oral, Q12H  arformoterol, 15 mcg, Nebulization, BID - RT  budesonide, 0.5 mg, Nebulization, BID - RT  dilTIAZem CD, 240 mg, Oral, Q24H  famotidine, 40 mg, Oral, Daily  ipratropium-albuterol, 3 mL, Nebulization, 4x Daily - RT  metoprolol succinate XL, 12.5 mg, Oral, Q12H      Continuous Infusions:   PRN Meds:.  acetaminophen **OR** acetaminophen **OR** acetaminophen    ALPRAZolam    aluminum-magnesium hydroxide-simethicone    senna-docusate sodium **AND** polyethylene glycol **AND** bisacodyl **AND** bisacodyl    guaiFENesin-dextromethorphan    ondansetron    sodium chloride       Review of Systems  Constitutional:        Weakness tiredness fatigue  Eyes:                       No blurry vision, eye discharge, eye irritation, eye pain  HEENT:                   No acute hair loss, earache and discharge, nasal congestion or discharge, sore throat, postnasal drip  Respiratory:           No shortness of breath coughing sputum production wheezing hemoptysis pleuritic chest pain  Cardiovascular:     No chest pain, orthopnea, PND, dizziness, palpitation, lower extremity edema  Gastrointestinal:   No nausea vomiting diarrhea abdominal pain constipation  Genitourinary:       No urinary incontinence, hesitancy, frequency, urgency, dysuria  Hematologic:         No bruising, bleeding, pallor, lymphadenopathy  Endocrine:            No coldness, hot flashes, polyuria, abnormal hair growth  Musculoskeletal:    No body pains, aches, arthritic pains, muscle pain ,muscle wasting  Psychiatric:          No low or high mood, anxiety, hallucinations,  delusions  Skin.                      No rash, ulcers, bruising, itching  Neurological:        No confusion, headache, focal weakness, numbness, dysphasia    Objective   Objective     Vitals:   Temp:  [98 °F (36.7 °C)-98.4 °F (36.9 °C)] 98.2 °F (36.8 °C)  Heart Rate:  [100-125] 114  Resp:  [18-22] 18  BP: (128-157)/(60-97) 138/80  Flow (L/min) (Oxygen Therapy):  [2] 2  Physical Exam    Constitutional: Awake, alert responsive, conversant, no obvious distress              Psychiatric:  Appropriate affect, cooperative   Neurologic:  Awake alert ,oriented x 3, strength symmetric in all extremities, Cranial Nerves grossly intact to confrontation, speech clear   Eyes:   PERRLA, sclerae anicteric, no conjunctival injection   HEENT:  Moist mucous membranes, no nasal or eye discharge, no throat congestion   Neck:   Supple, no thyromegaly, no lymphadenopathy, trachea midline, no elevated JVD   Respiratory:  Clear to auscultation bilaterally, nonlabored respirations    Cardiovascular: RRR, no murmurs, rubs, or gallops, palpable pedal pulses bilaterally, No bilateral ankle edema   Gastrointestinal: Positive bowel sounds, soft, nontender, nondistended, no organomegaly   Musculoskeletal:  No clubbing or cyanosis to extremities,muscle wasting, joint swelling, muscle weakness             Skin:                      No rashes, bruising, skin ulcers, petechiae or ecchymosis    Result Review    Result Review:  I have personally reviewed the results from the time of this admission to 12/10/2024 09:15 EST and agree with these findings:  []  Laboratory  []  Microbiology  []  Radiology  []  EKG/Telemetry   []  Cardiology/Vascular   []  Pathology  []  Old records  []  Other:    Assessment & Plan   Assessment / Plan       Active Hospital Problems:  Active Hospital Problems    Diagnosis     **Atrial fib/flutter, transient     Asthma without status asthmaticus     Chronic anemia      74-year-old female with past medical history of chronic  atrial fibrillation on anticoagulation, type 2 diabetes, hyperlipidemia, asthma, history of breast cancer, who came into the hospital due to shortness of breath that has been ongoing for the last 2 days and found to be in A-fib with RVR after stopping her Cardizem while she was supposed to be on it.  Briefly required Cardizem drip    Plan:   Will continue diltiazem to 40 mg XL  Will increase metoprolol to 50 mg twice daily  Continue with Brovana and Pulmicort  Continue with Eliquis 5 mg twice daily  DuoNebs 4 times a day  Continue Wellbutrin, gabapentin 300 nightly, linagliptin 5 mg daily, Remeron 15 mg nightly and Singulair 10 mg nightly, Protonix 40 mg and pravastatin 80    Electronically signed by Philip Bueno MD, 12/10/24, 9:15 AM EST.

## 2024-12-11 PROCEDURE — 94799 UNLISTED PULMONARY SVC/PX: CPT

## 2024-12-11 PROCEDURE — 63710000001 PREDNISONE PER 1 MG: Performed by: STUDENT IN AN ORGANIZED HEALTH CARE EDUCATION/TRAINING PROGRAM

## 2024-12-11 PROCEDURE — 94664 DEMO&/EVAL PT USE INHALER: CPT

## 2024-12-11 PROCEDURE — 94760 N-INVAS EAR/PLS OXIMETRY 1: CPT

## 2024-12-11 RX ORDER — DILTIAZEM HYDROCHLORIDE 180 MG/1
360 CAPSULE, COATED, EXTENDED RELEASE ORAL
Status: DISCONTINUED | OUTPATIENT
Start: 2024-12-11 | End: 2024-12-13 | Stop reason: HOSPADM

## 2024-12-11 RX ORDER — FUROSEMIDE 40 MG/1
40 TABLET ORAL DAILY
Status: DISCONTINUED | OUTPATIENT
Start: 2024-12-11 | End: 2024-12-13 | Stop reason: HOSPADM

## 2024-12-11 RX ORDER — LEVALBUTEROL INHALATION SOLUTION 1.25 MG/3ML
1.25 SOLUTION RESPIRATORY (INHALATION)
Status: DISCONTINUED | OUTPATIENT
Start: 2024-12-11 | End: 2024-12-13 | Stop reason: HOSPADM

## 2024-12-11 RX ADMIN — GUAIFENESIN AND DEXTROMETHORPHAN 5 ML: 100; 10 SYRUP ORAL at 18:20

## 2024-12-11 RX ADMIN — LINAGLIPTIN 5 MG: 5 TABLET, FILM COATED ORAL at 09:10

## 2024-12-11 RX ADMIN — METOPROLOL SUCCINATE 50 MG: 50 TABLET, EXTENDED RELEASE ORAL at 20:52

## 2024-12-11 RX ADMIN — BUDESONIDE 0.5 MG: 0.5 INHALANT RESPIRATORY (INHALATION) at 07:00

## 2024-12-11 RX ADMIN — DILTIAZEM HYDROCHLORIDE 360 MG: 180 CAPSULE, COATED, EXTENDED RELEASE ORAL at 09:09

## 2024-12-11 RX ADMIN — MONTELUKAST 10 MG: 10 TABLET, FILM COATED ORAL at 20:52

## 2024-12-11 RX ADMIN — METOPROLOL SUCCINATE 50 MG: 50 TABLET, EXTENDED RELEASE ORAL at 09:09

## 2024-12-11 RX ADMIN — PANTOPRAZOLE SODIUM 40 MG: 40 TABLET, DELAYED RELEASE ORAL at 09:09

## 2024-12-11 RX ADMIN — LEVALBUTEROL HYDROCHLORIDE 1.25 MG: 1.25 SOLUTION RESPIRATORY (INHALATION) at 01:04

## 2024-12-11 RX ADMIN — APIXABAN 5 MG: 5 TABLET, FILM COATED ORAL at 20:52

## 2024-12-11 RX ADMIN — PREDNISONE 40 MG: 20 TABLET ORAL at 09:09

## 2024-12-11 RX ADMIN — ARFORMOTEROL TARTRATE 15 MCG: 15 SOLUTION RESPIRATORY (INHALATION) at 07:00

## 2024-12-11 RX ADMIN — APIXABAN 5 MG: 5 TABLET, FILM COATED ORAL at 09:09

## 2024-12-11 RX ADMIN — BUDESONIDE 0.5 MG: 0.5 INHALANT RESPIRATORY (INHALATION) at 18:54

## 2024-12-11 RX ADMIN — GABAPENTIN 300 MG: 300 CAPSULE ORAL at 20:52

## 2024-12-11 RX ADMIN — LEVALBUTEROL HYDROCHLORIDE 1.25 MG: 1.25 SOLUTION RESPIRATORY (INHALATION) at 11:20

## 2024-12-11 RX ADMIN — BUPROPION HYDROCHLORIDE 150 MG: 150 TABLET, EXTENDED RELEASE ORAL at 09:15

## 2024-12-11 RX ADMIN — ALPRAZOLAM 0.5 MG: 0.25 TABLET ORAL at 20:52

## 2024-12-11 RX ADMIN — GUAIFENESIN AND DEXTROMETHORPHAN 5 ML: 100; 10 SYRUP ORAL at 22:30

## 2024-12-11 RX ADMIN — MIRTAZAPINE 15 MG: 15 TABLET, FILM COATED ORAL at 20:52

## 2024-12-11 RX ADMIN — ARFORMOTEROL TARTRATE 15 MCG: 15 SOLUTION RESPIRATORY (INHALATION) at 18:54

## 2024-12-11 RX ADMIN — LEVALBUTEROL HYDROCHLORIDE 1.25 MG: 1.25 SOLUTION RESPIRATORY (INHALATION) at 18:54

## 2024-12-11 RX ADMIN — FUROSEMIDE 40 MG: 40 TABLET ORAL at 09:09

## 2024-12-11 RX ADMIN — PRAVASTATIN SODIUM 80 MG: 20 TABLET ORAL at 09:09

## 2024-12-11 NOTE — PLAN OF CARE
Goal Outcome Evaluation:              Outcome Evaluation: AxO. Wearing 2L for comfort only when in bed. Stable on room air. Ambulates to bathroom. Albuterol caused heart rate to stay between 140s and 160s. Miladis CLIFFORD ordered duo nebs to be switched to xopenex and ordered lopressor 5mg IV push PRN for uncontrolled heart rate. Heart rate remained in the low 100s after treatment.Cough controlled with one time dose of cough syrup and breathing treatment. Rested well throughout the night with xanax. Patient requested to have her cardiologist and pulmonologist to consult while admitted.

## 2024-12-11 NOTE — PLAN OF CARE
Problem: Adult Inpatient Plan of Care  Goal: Plan of Care Review  Outcome: Progressing  Flowsheets (Taken 12/11/2024 5039)  Progress: improving  Outcome Evaluation: Patient alert and oriented x 4. Patient taking self on and off 1L NC for comfort. Patient ambulates to bathroom. Patient rested well in between care. Continuing with plan of care.  Plan of Care Reviewed With: patient   Goal Outcome Evaluation:  Plan of Care Reviewed With: patient        Progress: improving  Outcome Evaluation: Patient alert and oriented x 4. Patient taking self on and off 1L NC for comfort. Patient ambulates to bathroom. Patient rested well in between care. Continuing with plan of care.

## 2024-12-11 NOTE — PROGRESS NOTES
[facility]     Progress Note    Patient Name: Camilla Navas  : 1950  MRN: [id number]  Primary Care Physician:  Lester Lizarraga MD  Date of admission: 2024    Subjective   Patient's heart rate is stable  Blood pressures and vitals are stable  No major acute events overnight  Chest x-ray appears to be stable    Scheduled Meds:apixaban, 5 mg, Oral, Q12H  arformoterol, 15 mcg, Nebulization, BID - RT  budesonide, 0.5 mg, Nebulization, BID - RT  buPROPion XL, 150 mg, Oral, Q24H  dilTIAZem CD, 360 mg, Oral, Q24H  furosemide, 20 mg, Oral, Daily  gabapentin, 300 mg, Oral, Nightly  levalbuterol, 1.25 mg, Nebulization, 4x Daily - RT  linagliptin, 5 mg, Oral, Daily  metoprolol succinate XL, 50 mg, Oral, Q12H  mirtazapine, 15 mg, Oral, Nightly  montelukast, 10 mg, Oral, Nightly  pantoprazole, 40 mg, Oral, Q AM  pravastatin, 80 mg, Oral, Daily  predniSONE, 40 mg, Oral, Daily With Breakfast      Continuous Infusions:   PRN Meds:.  acetaminophen **OR** acetaminophen **OR** acetaminophen    ALPRAZolam    aluminum-magnesium hydroxide-simethicone    senna-docusate sodium **AND** polyethylene glycol **AND** bisacodyl **AND** bisacodyl    guaiFENesin-dextromethorphan    metoprolol tartrate    ondansetron    sodium chloride       Review of Systems  Constitutional:        Weakness tiredness fatigue  Eyes:                       No blurry vision, eye discharge, eye irritation, eye pain  HEENT:                   No acute hair loss, earache and discharge, nasal congestion or discharge, sore throat, postnasal drip  Respiratory:           No shortness of breath coughing sputum production wheezing hemoptysis pleuritic chest pain  Cardiovascular:     No chest pain, orthopnea, PND, dizziness, palpitation, lower extremity edema  Gastrointestinal:   No nausea vomiting diarrhea abdominal pain constipation  Genitourinary:       No urinary incontinence, hesitancy, frequency, urgency, dysuria  Hematologic:         No  bruising, bleeding, pallor, lymphadenopathy  Endocrine:            No coldness, hot flashes, polyuria, abnormal hair growth  Musculoskeletal:    No body pains, aches, arthritic pains, muscle pain ,muscle wasting  Psychiatric:          No low or high mood, anxiety, hallucinations, delusions  Skin.                      No rash, ulcers, bruising, itching  Neurological:        No confusion, headache, focal weakness, numbness, dysphasia    Objective   Objective     Vitals:   Temp:  [97.5 °F (36.4 °C)-98.9 °F (37.2 °C)] 97.9 °F (36.6 °C)  Heart Rate:  [] 114  Resp:  [15-22] 18  BP: (114-147)/(69-77) 147/69  Flow (L/min) (Oxygen Therapy):  [1-2] 1  Physical Exam    Constitutional: Awake, alert responsive, conversant, no obvious distress              Psychiatric:  Appropriate affect, cooperative   Neurologic:  Awake alert ,oriented x 3, strength symmetric in all extremities, Cranial Nerves grossly intact to confrontation, speech clear   Eyes:   PERRLA, sclerae anicteric, no conjunctival injection   HEENT:  Moist mucous membranes, no nasal or eye discharge, no throat congestion   Neck:   Supple, no thyromegaly, no lymphadenopathy, trachea midline, no elevated JVD   Respiratory:  Clear to auscultation bilaterally, nonlabored respirations    Cardiovascular: RRR, no murmurs, rubs, or gallops, palpable pedal pulses bilaterally, No bilateral ankle edema   Gastrointestinal: Positive bowel sounds, soft, nontender, nondistended, no organomegaly   Musculoskeletal:  No clubbing or cyanosis to extremities,muscle wasting, joint swelling, muscle weakness             Skin:                      No rashes, bruising, skin ulcers, petechiae or ecchymosis    Result Review    Result Review:  I have personally reviewed the results from the time of this admission to 12/11/2024 08:34 EST and agree with these findings:  []  Laboratory  []  Microbiology  []  Radiology  []  EKG/Telemetry   []  Cardiology/Vascular   []  Pathology  []  Old  records  []  Other:    Assessment & Plan   Assessment / Plan       Active Hospital Problems:  Active Hospital Problems    Diagnosis     **Atrial fib/flutter, transient     Asthma without status asthmaticus     Chronic anemia      74-year-old female with past medical history of chronic atrial fibrillation on anticoagulation, type 2 diabetes, hyperlipidemia, asthma, history of breast cancer, who came into the hospital due to shortness of breath that has been ongoing for the last 2 days and found to be in A-fib with RVR after stopping her Cardizem while she was supposed to be on it.  Briefly required Cardizem drip    Plan:   Will increase diltiazem to 360 mg every 24  Continue metoprolol to 50 mg twice daily  Continue with Brovana and Pulmicort  Continue with Eliquis 5 mg twice daily  Prednisone 40 mg started for asthma  Will increase Lasix to 40 mg p.o.  DuoNebs have been switched over to levalbuterol  Continue Wellbutrin, gabapentin 300 nightly, linagliptin 5 mg daily, Remeron 15 mg nightly and Singulair 10 mg nightly, Protonix 40 mg and pravastatin 80

## 2024-12-12 PROCEDURE — 63710000001 PREDNISONE PER 1 MG: Performed by: STUDENT IN AN ORGANIZED HEALTH CARE EDUCATION/TRAINING PROGRAM

## 2024-12-12 PROCEDURE — 94799 UNLISTED PULMONARY SVC/PX: CPT

## 2024-12-12 PROCEDURE — 25010000002 METHYLPREDNISOLONE PER 40 MG: Performed by: STUDENT IN AN ORGANIZED HEALTH CARE EDUCATION/TRAINING PROGRAM

## 2024-12-12 PROCEDURE — 94664 DEMO&/EVAL PT USE INHALER: CPT

## 2024-12-12 PROCEDURE — 94760 N-INVAS EAR/PLS OXIMETRY 1: CPT

## 2024-12-12 RX ORDER — METHYLPREDNISOLONE SODIUM SUCCINATE 40 MG/ML
40 INJECTION, POWDER, LYOPHILIZED, FOR SOLUTION INTRAMUSCULAR; INTRAVENOUS EVERY 12 HOURS
Status: DISCONTINUED | OUTPATIENT
Start: 2024-12-12 | End: 2024-12-13 | Stop reason: HOSPADM

## 2024-12-12 RX ORDER — CODEINE PHOSPHATE AND GUAIFENESIN 10; 100 MG/5ML; MG/5ML
5 SOLUTION ORAL EVERY 4 HOURS PRN
Status: DISCONTINUED | OUTPATIENT
Start: 2024-12-12 | End: 2024-12-13 | Stop reason: HOSPADM

## 2024-12-12 RX ADMIN — FUROSEMIDE 40 MG: 40 TABLET ORAL at 09:34

## 2024-12-12 RX ADMIN — METOPROLOL SUCCINATE 50 MG: 50 TABLET, EXTENDED RELEASE ORAL at 09:34

## 2024-12-12 RX ADMIN — BUDESONIDE 0.5 MG: 0.5 INHALANT RESPIRATORY (INHALATION) at 06:37

## 2024-12-12 RX ADMIN — APIXABAN 5 MG: 5 TABLET, FILM COATED ORAL at 20:29

## 2024-12-12 RX ADMIN — MIRTAZAPINE 15 MG: 15 TABLET, FILM COATED ORAL at 20:28

## 2024-12-12 RX ADMIN — MONTELUKAST 10 MG: 10 TABLET, FILM COATED ORAL at 20:29

## 2024-12-12 RX ADMIN — BUDESONIDE 0.5 MG: 0.5 INHALANT RESPIRATORY (INHALATION) at 18:38

## 2024-12-12 RX ADMIN — Medication 10 ML: at 20:29

## 2024-12-12 RX ADMIN — ARFORMOTEROL TARTRATE 15 MCG: 15 SOLUTION RESPIRATORY (INHALATION) at 18:38

## 2024-12-12 RX ADMIN — LEVALBUTEROL HYDROCHLORIDE 1.25 MG: 1.25 SOLUTION RESPIRATORY (INHALATION) at 06:37

## 2024-12-12 RX ADMIN — LEVALBUTEROL HYDROCHLORIDE 1.25 MG: 1.25 SOLUTION RESPIRATORY (INHALATION) at 18:38

## 2024-12-12 RX ADMIN — DILTIAZEM HYDROCHLORIDE 360 MG: 180 CAPSULE, COATED, EXTENDED RELEASE ORAL at 09:34

## 2024-12-12 RX ADMIN — METHYLPREDNISOLONE SODIUM SUCCINATE 40 MG: 40 INJECTION, POWDER, FOR SOLUTION INTRAMUSCULAR; INTRAVENOUS at 23:45

## 2024-12-12 RX ADMIN — PANTOPRAZOLE SODIUM 40 MG: 40 TABLET, DELAYED RELEASE ORAL at 05:44

## 2024-12-12 RX ADMIN — PREDNISONE 40 MG: 20 TABLET ORAL at 09:34

## 2024-12-12 RX ADMIN — PRAVASTATIN SODIUM 80 MG: 20 TABLET ORAL at 09:34

## 2024-12-12 RX ADMIN — GUAIFENESIN AND CODEINE PHOSPHATE 5 ML: 100; 10 SOLUTION ORAL at 21:38

## 2024-12-12 RX ADMIN — ARFORMOTEROL TARTRATE 15 MCG: 15 SOLUTION RESPIRATORY (INHALATION) at 06:37

## 2024-12-12 RX ADMIN — METOPROLOL SUCCINATE 50 MG: 50 TABLET, EXTENDED RELEASE ORAL at 20:28

## 2024-12-12 RX ADMIN — BUPROPION HYDROCHLORIDE 150 MG: 150 TABLET, EXTENDED RELEASE ORAL at 09:34

## 2024-12-12 RX ADMIN — METHYLPREDNISOLONE SODIUM SUCCINATE 40 MG: 40 INJECTION, POWDER, FOR SOLUTION INTRAMUSCULAR; INTRAVENOUS at 12:07

## 2024-12-12 RX ADMIN — LEVALBUTEROL HYDROCHLORIDE 1.25 MG: 1.25 SOLUTION RESPIRATORY (INHALATION) at 00:24

## 2024-12-12 RX ADMIN — LINAGLIPTIN 5 MG: 5 TABLET, FILM COATED ORAL at 12:07

## 2024-12-12 RX ADMIN — GABAPENTIN 300 MG: 300 CAPSULE ORAL at 20:28

## 2024-12-12 RX ADMIN — APIXABAN 5 MG: 5 TABLET, FILM COATED ORAL at 09:34

## 2024-12-12 RX ADMIN — LEVALBUTEROL HYDROCHLORIDE 1.25 MG: 1.25 SOLUTION RESPIRATORY (INHALATION) at 11:41

## 2024-12-12 NOTE — PROGRESS NOTES
[facility]     Progress Note    Patient Name: Camilla Navas  : 1950  MRN: [id number]  Primary Care Physician:  Lester Lizarraga MD  Date of admission: 2024    Subjective   Patient's heart rate is well-controlled at this time  Blood pressures and vitals are stable  No major acute events overnight    Scheduled Meds:apixaban, 5 mg, Oral, Q12H  arformoterol, 15 mcg, Nebulization, BID - RT  budesonide, 0.5 mg, Nebulization, BID - RT  buPROPion XL, 150 mg, Oral, Q24H  dilTIAZem CD, 360 mg, Oral, Q24H  furosemide, 40 mg, Oral, Daily  gabapentin, 300 mg, Oral, Nightly  levalbuterol, 1.25 mg, Nebulization, 4x Daily - RT  linagliptin, 5 mg, Oral, Daily  metoprolol succinate XL, 50 mg, Oral, Q12H  mirtazapine, 15 mg, Oral, Nightly  montelukast, 10 mg, Oral, Nightly  pantoprazole, 40 mg, Oral, Q AM  pravastatin, 80 mg, Oral, Daily  predniSONE, 40 mg, Oral, Daily With Breakfast      Continuous Infusions:   PRN Meds:.  acetaminophen **OR** acetaminophen **OR** acetaminophen    ALPRAZolam    aluminum-magnesium hydroxide-simethicone    senna-docusate sodium **AND** polyethylene glycol **AND** bisacodyl **AND** bisacodyl    guaiFENesin-dextromethorphan    metoprolol tartrate    ondansetron    sodium chloride       Review of Systems  Constitutional:        Weakness tiredness fatigue  Eyes:                       No blurry vision, eye discharge, eye irritation, eye pain  HEENT:                   No acute hair loss, earache and discharge, nasal congestion or discharge, sore throat, postnasal drip  Respiratory:           No shortness of breath coughing sputum production wheezing hemoptysis pleuritic chest pain  Cardiovascular:     No chest pain, orthopnea, PND, dizziness, palpitation, lower extremity edema  Gastrointestinal:   No nausea vomiting diarrhea abdominal pain constipation  Genitourinary:       No urinary incontinence, hesitancy, frequency, urgency, dysuria  Hematologic:         No bruising, bleeding,  pallor, lymphadenopathy  Endocrine:            No coldness, hot flashes, polyuria, abnormal hair growth  Musculoskeletal:    No body pains, aches, arthritic pains, muscle pain ,muscle wasting  Psychiatric:          No low or high mood, anxiety, hallucinations, delusions  Skin.                      No rash, ulcers, bruising, itching  Neurological:        No confusion, headache, focal weakness, numbness, dysphasia    Objective   Objective     Vitals:   Temp:  [97.2 °F (36.2 °C)-98.2 °F (36.8 °C)] 97.4 °F (36.3 °C)  Heart Rate:  [75-94] 91  Resp:  [18-20] 18  BP: (110-129)/(55-92) 128/60  Flow (L/min) (Oxygen Therapy):  [1] 1  Physical Exam    Constitutional: Awake, alert responsive, conversant, no obvious distress              Psychiatric:  Appropriate affect, cooperative   Neurologic:  Awake alert ,oriented x 3, strength symmetric in all extremities, Cranial Nerves grossly intact to confrontation, speech clear   Eyes:   PERRLA, sclerae anicteric, no conjunctival injection   HEENT:  Moist mucous membranes, no nasal or eye discharge, no throat congestion   Neck:   Supple, no thyromegaly, no lymphadenopathy, trachea midline, no elevated JVD   Respiratory:  Clear to auscultation bilaterally, nonlabored respirations    Cardiovascular: RRR, no murmurs, rubs, or gallops, palpable pedal pulses bilaterally, No bilateral ankle edema   Gastrointestinal: Positive bowel sounds, soft, nontender, nondistended, no organomegaly   Musculoskeletal:  No clubbing or cyanosis to extremities,muscle wasting, joint swelling, muscle weakness             Skin:                      No rashes, bruising, skin ulcers, petechiae or ecchymosis    Result Review    Result Review:  I have personally reviewed the results from the time of this admission to 12/12/2024 07:53 EST and agree with these findings:  []  Laboratory  []  Microbiology  []  Radiology  []  EKG/Telemetry   []  Cardiology/Vascular   []  Pathology  []  Old records  []   Other:    Assessment & Plan   Assessment / Plan       Active Hospital Problems:  Active Hospital Problems    Diagnosis     **Atrial fib/flutter, transient     Asthma without status asthmaticus     Chronic anemia      74-year-old female with past medical history of chronic atrial fibrillation on anticoagulation, type 2 diabetes, hyperlipidemia, asthma, history of breast cancer, who came into the hospital due to shortness of breath that has been ongoing for the last 2 days and found to be in A-fib with RVR after stopping her Cardizem while she was supposed to be on it.  Briefly required Cardizem drip    Plan:   Continue diltiazem to 360 mg every 24  Continue metoprolol to 50 mg twice daily  Continue with Brovana and Pulmicort  Continue with Eliquis 5 mg twice daily  Prednisone 40 mg started for asthma  Continue Lasix to 40 mg p.o.  DuoNebs have been switched over to levalbuterol  Continue Wellbutrin, gabapentin 300 nightly, linagliptin 5 mg daily, Remeron 15 mg nightly and Singulair 10 mg nightly, Protonix 40 mg and pravastatin 80

## 2024-12-12 NOTE — PLAN OF CARE
Goal Outcome Evaluation:              Outcome Evaluation: AxO. VSS. Patient ambulates to bathroom. 1L NC for comfort.

## 2024-12-13 ENCOUNTER — READMISSION MANAGEMENT (OUTPATIENT)
Dept: CALL CENTER | Facility: HOSPITAL | Age: 74
End: 2024-12-13
Payer: MEDICARE

## 2024-12-13 VITALS
DIASTOLIC BLOOD PRESSURE: 83 MMHG | RESPIRATION RATE: 18 BRPM | HEIGHT: 65 IN | WEIGHT: 203.93 LBS | SYSTOLIC BLOOD PRESSURE: 148 MMHG | TEMPERATURE: 97.7 F | HEART RATE: 78 BPM | OXYGEN SATURATION: 98 % | BODY MASS INDEX: 33.98 KG/M2

## 2024-12-13 PROBLEM — D64.9 CHRONIC ANEMIA: Status: RESOLVED | Noted: 2022-01-21 | Resolved: 2024-12-13

## 2024-12-13 PROCEDURE — 94664 DEMO&/EVAL PT USE INHALER: CPT

## 2024-12-13 PROCEDURE — 94799 UNLISTED PULMONARY SVC/PX: CPT

## 2024-12-13 RX ORDER — PREDNISONE 20 MG/1
40 TABLET ORAL DAILY
Qty: 6 TABLET | Refills: 0 | Status: SHIPPED | OUTPATIENT
Start: 2024-12-13 | End: 2024-12-16

## 2024-12-13 RX ORDER — METOPROLOL SUCCINATE 50 MG/1
50 TABLET, EXTENDED RELEASE ORAL EVERY 12 HOURS SCHEDULED
Qty: 60 TABLET | Refills: 0 | Status: SHIPPED | OUTPATIENT
Start: 2024-12-13

## 2024-12-13 RX ORDER — DILTIAZEM HYDROCHLORIDE 360 MG/1
360 CAPSULE, EXTENDED RELEASE ORAL
Qty: 30 CAPSULE | Refills: 0 | Status: SHIPPED | OUTPATIENT
Start: 2024-12-13

## 2024-12-13 RX ADMIN — PRAVASTATIN SODIUM 80 MG: 20 TABLET ORAL at 08:59

## 2024-12-13 RX ADMIN — FUROSEMIDE 40 MG: 40 TABLET ORAL at 08:59

## 2024-12-13 RX ADMIN — BUDESONIDE 0.5 MG: 0.5 INHALANT RESPIRATORY (INHALATION) at 06:35

## 2024-12-13 RX ADMIN — BUPROPION HYDROCHLORIDE 150 MG: 150 TABLET, EXTENDED RELEASE ORAL at 10:22

## 2024-12-13 RX ADMIN — ARFORMOTEROL TARTRATE 15 MCG: 15 SOLUTION RESPIRATORY (INHALATION) at 06:35

## 2024-12-13 RX ADMIN — DILTIAZEM HYDROCHLORIDE 360 MG: 180 CAPSULE, COATED, EXTENDED RELEASE ORAL at 09:02

## 2024-12-13 RX ADMIN — PANTOPRAZOLE SODIUM 40 MG: 40 TABLET, DELAYED RELEASE ORAL at 05:35

## 2024-12-13 RX ADMIN — METOPROLOL SUCCINATE 50 MG: 50 TABLET, EXTENDED RELEASE ORAL at 09:00

## 2024-12-13 RX ADMIN — LINAGLIPTIN 5 MG: 5 TABLET, FILM COATED ORAL at 09:00

## 2024-12-13 RX ADMIN — LEVALBUTEROL HYDROCHLORIDE 1.25 MG: 1.25 SOLUTION RESPIRATORY (INHALATION) at 00:03

## 2024-12-13 RX ADMIN — APIXABAN 5 MG: 5 TABLET, FILM COATED ORAL at 08:59

## 2024-12-13 RX ADMIN — LEVALBUTEROL HYDROCHLORIDE 1.25 MG: 1.25 SOLUTION RESPIRATORY (INHALATION) at 06:35

## 2024-12-13 NOTE — PLAN OF CARE
Goal Outcome Evaluation:  Plan of Care Reviewed With: patient           Outcome Evaluation: Pt. on ra, afib, controlled. No complaints through the shift.

## 2024-12-13 NOTE — PLAN OF CARE
Problem: Adult Inpatient Plan of Care  Goal: Plan of Care Review  Outcome: Progressing  Flowsheets (Taken 12/12/2024 1950)  Progress: improving  Outcome Evaluation: Patient alert and oriented x 4. Patient on room air. Patient ambulated around unit, oxygen remained stable on room air, HR around 120s Afib while ambulating returned to 90s afib when back to rest. No complaints of pain or discomfort. Continuing with plan of care.  Plan of Care Reviewed With: patient   Goal Outcome Evaluation:  Plan of Care Reviewed With: patient        Progress: improving  Outcome Evaluation: Patient alert and oriented x 4. Patient on room air. Patient ambulated around unit, oxygen remained stable on room air, HR around 120s Afib while ambulating returned to 90s afib when back to rest. No complaints of pain or discomfort. Continuing with plan of care.

## 2024-12-13 NOTE — DISCHARGE SUMMARY
[facility]   DISCHARGE SUMMARY    Patient Name: Camilla Navas  : 1950  MRN: [id number]    Date of Admission: 2024  Date of Discharge: 2024  Primary Care Physician: Lester Lizarraga MD    Consults       Date and Time Order Name Status Description    2024  9:13 AM IP General Consult (Use specialty-specific consult if known)              Hospital Course     Presenting Problem:   Atrial fibrillation with RVR [I48.91]  Atrial fib/flutter, transient [I48.91, I48.92]    Active and resolved problems  Principal Problem:    Atrial fib/flutter, transient  Overview:  Active Problems:    Asthma without status asthmaticus  Overview:  Resolved Problems:    Chronic anemia  Overview:     Hospital Course:  Camilla Navas is a 74 y.o. female 74-year-old female with past medical history of chronic atrial fibrillation on anticoagulation, type 2 diabetes, hyperlipidemia, asthma, history of breast cancer, who came into the hospital due to shortness of breath that has been ongoing for the last 2 days and found to be in A-fib with RVR after stopping her Cardizem while she was supposed to be on it. Briefly required Cardizem drip and subsequently transition to p.o. with Cardizem dose uptitrated to 360 XL and metoprolol 50 mg twice daily.  Patient also had extensive amount of wheezing due to her history of asthma and was started on course of steroids and will be completing course.  Patient to follow-up with her cardiologist and pulmonologist on discharge      Vital Signs:  Temp:  [97.3 °F (36.3 °C)-97.9 °F (36.6 °C)] 97.7 °F (36.5 °C)  Heart Rate:  [] 78  Resp:  [16-22] 18  BP: (136-148)/(73-91) 148/83  Flow (L/min) (Oxygen Therapy):  [0-1] 0      Discharge Details        Discharge Medications        New Medications        Instructions Start Date   metoprolol succinate XL 50 MG 24 hr tablet  Commonly known as: TOPROL-XL   50 mg, Oral, Every 12 Hours Scheduled      predniSONE 20 MG  tablet  Commonly known as: DELTASONE   40 mg, Oral, Daily             Changes to Medications        Instructions Start Date   dilTIAZem  MG 24 hr capsule  Commonly known as: CARDIZEM CD  What changed:   medication strength  how much to take  when to take this   360 mg, Oral, Every 24 Hours Scheduled      ipratropium-albuterol 0.5-2.5 mg/3 ml nebulizer  Commonly known as: DUO-NEB  What changed: See the new instructions.   USE 3 ML VIA NEBULIZER FOUR TIMES DAILY AS NEEDED FOR WHEEZING OR SHORTNESS OF BREATH             Continue These Medications        Instructions Start Date   albuterol sulfate  (90 Base) MCG/ACT inhaler  Commonly known as: PROVENTIL HFA;VENTOLIN HFA;PROAIR HFA   2 puffs, Inhalation, Every 4 Hours PRN      apixaban 5 MG tablet tablet  Commonly known as: ELIQUIS   5 mg, Oral, 2 Times Daily      Arthritis Pain Reliever 650 MG 8 hr tablet  Generic drug: acetaminophen   650 mg, Every 8 Hours PRN      azelastine 0.1 % nasal spray  Commonly known as: ASTELIN   2 sprays, Nasal, 2 Times Daily, Use in each nostril as directed      baclofen 10 MG tablet  Commonly known as: LIORESAL   10 mg, 2 Times Daily      buPROPion  MG 24 hr tablet  Commonly known as: WELLBUTRIN XL   150 mg, Every 24 Hours      fluticasone 50 MCG/ACT nasal spray  Commonly known as: FLONASE   2 sprays, Nasal, Daily      Fluticasone-Umeclidin-Vilant 200-62.5-25 MCG/ACT inhaler  Commonly known as: TRELEGY ELLIPTA   1 puff, Inhalation, Daily - RT      furosemide 20 MG tablet  Commonly known as: LASIX   20 mg, Oral, Daily      gabapentin 300 MG capsule  Commonly known as: NEURONTIN   300 mg, Nightly      glipizide 5 MG tablet  Commonly known as: GLUCOTROL   5 mg, Daily      hydroCHLOROthiazide 25 MG tablet   TAKE 1 TABLET BY MOUTH EVERY DAY      Januvia 25 MG tablet  Generic drug: SITagliptin   1 tablet, Daily      lisinopril 5 MG tablet  Commonly known as: PRINIVIL,ZESTRIL   1 tablet, Daily      mirtazapine 15 MG  tablet  Commonly known as: REMERON   15 mg, Nightly      montelukast 10 MG tablet  Commonly known as: SINGULAIR   10 mg, Oral, Nightly      nitroglycerin 0.4 MG SL tablet  Commonly known as: NITROSTAT   1 under the tongue as needed for angina, may repeat q5mins for up three doses      omeprazole 40 MG capsule  Commonly known as: priLOSEC   40 mg, Oral, 2 Times Daily      ondansetron 4 MG tablet  Commonly known as: ZOFRAN   4 mg, Every 6 Hours PRN      pantoprazole 40 MG EC tablet  Commonly known as: PROTONIX   1 tablet, Daily      pravastatin 80 MG tablet  Commonly known as: PRAVACHOL   80 mg, Oral, Daily      sertraline 100 MG tablet  Commonly known as: ZOLOFT   200 mg, Daily      vitamin D3 125 MCG (5000 UT) capsule capsule   5,000 Units, Daily      vitamin E 400 UNIT capsule   400 Units, Daily             Stop These Medications      metoprolol tartrate 100 MG tablet  Commonly known as: LOPRESSOR              Allergies   Allergen Reactions    Empagliflozin Nausea And Vomiting    Metformin Diarrhea         Discharge Disposition:  Home or Self CareHome    Diet:  As tolerated      Discharge Activity:   As tolerated      CODE STATUS:    Code Status and Medical Interventions: CPR (Attempt to Resuscitate); Full Support   Ordered at: 12/09/24 1047     Code Status (Patient has no pulse and is not breathing):    CPR (Attempt to Resuscitate)     Medical Interventions (Patient has pulse or is breathing):    Full Support         Future Appointments   Date Time Provider Department Center   1/7/2025  9:15 AM Randall Mathew MD [facility]   1/17/2025 10:00 AM [address]   2/3/2025  9:00 AM Umesh Guadarrama MD [facility]           Time spent on Discharge including face to face service:  25 minutes    Electronically signed by Philip Bueno MD, 12/13/24, 7:02 AM EST.

## 2024-12-13 NOTE — PLAN OF CARE
Goal Outcome Evaluation:  Plan of Care Reviewed With: patient, grandchild(gardenia)        Progress: improving  Outcome Evaluation: Patient alert and oriented throughout shift. VSS. No complaints of pain or discomfort noted. PO cardizem and metoprolol administered. Plan of care discussed with patient and grandchild at bedside. Patient to discharge later this shift.

## 2024-12-14 LAB
BACTERIA SPEC AEROBE CULT: NORMAL
BACTERIA SPEC AEROBE CULT: NORMAL

## 2024-12-14 NOTE — OUTREACH NOTE
Prep Survey      Flowsheet Row Responses   [facility] facility patient discharged from? [facility]   Is LACE score < 7 ? No   Eligibility Readm Mgmt   Discharge diagnosis Atrial fib   Does the patient have one of the following disease processes/diagnoses(primary or secondary)? Other   Prep survey completed? Yes            Eliza ROBERTSON - Registered Nurse

## 2024-12-16 ENCOUNTER — READMISSION MANAGEMENT (OUTPATIENT)
Dept: CALL CENTER | Facility: HOSPITAL | Age: 74
End: 2024-12-16
Payer: MEDICARE

## 2024-12-27 ENCOUNTER — TELEPHONE (OUTPATIENT)
Dept: CARDIOLOGY | Facility: CLINIC | Age: 74
End: 2024-12-27
Payer: MEDICARE

## 2024-12-27 NOTE — TELEPHONE ENCOUNTER
Caller: [employer]    Relationship: SELF    Callback number: [phone]    Is it ok to leave a message: [x] Yes [] No    Requested medication for samples: ELIQUIS    How much medication does the patient currently have left: ENOUGH TO LAST THE WEEKEND    Who will be picking up the samples: SELF    Do you need information about patient financial assistance for this medication: [] Yes [x] No    Additional details provided: PATIENT WOULD LIKE TO  SAMPLES. PLEASE CALL PATIENT IF AVAILABLE, THANK YOU!

## 2024-12-28 LAB
QT INTERVAL: 316 MS
QTC INTERVAL: 483 MS

## 2024-12-30 RX ORDER — PANTOPRAZOLE SODIUM 40 MG/1
TABLET, DELAYED RELEASE ORAL DAILY
Qty: 90 TABLET | OUTPATIENT
Start: 2024-12-30

## 2025-01-03 RX ORDER — FUROSEMIDE 20 MG/1
20 TABLET ORAL DAILY
Qty: 90 TABLET | Refills: 0 | Status: SHIPPED | OUTPATIENT
Start: 2025-01-03

## 2025-01-12 DIAGNOSIS — J45.40 MODERATE PERSISTENT ASTHMA WITHOUT STATUS ASTHMATICUS WITHOUT COMPLICATION: ICD-10-CM

## 2025-01-12 DIAGNOSIS — R05.9 COUGH, UNSPECIFIED TYPE: ICD-10-CM

## 2025-01-12 DIAGNOSIS — J30.2 SEASONAL ALLERGIC RHINITIS, UNSPECIFIED TRIGGER: ICD-10-CM

## 2025-01-13 ENCOUNTER — OFFICE VISIT (OUTPATIENT)
Dept: ORTHOPEDIC SURGERY | Facility: CLINIC | Age: 75
End: 2025-01-13
Payer: MEDICARE

## 2025-01-13 VITALS
WEIGHT: 203 LBS | BODY MASS INDEX: 33.82 KG/M2 | HEIGHT: 65 IN | OXYGEN SATURATION: 96 % | SYSTOLIC BLOOD PRESSURE: 126 MMHG | HEART RATE: 89 BPM | DIASTOLIC BLOOD PRESSURE: 73 MMHG

## 2025-01-13 DIAGNOSIS — M25.511 RIGHT SHOULDER PAIN, UNSPECIFIED CHRONICITY: Primary | ICD-10-CM

## 2025-01-13 PROCEDURE — 20610 DRAIN/INJ JOINT/BURSA W/O US: CPT | Performed by: ORTHOPAEDIC SURGERY

## 2025-01-13 PROCEDURE — 3078F DIAST BP <80 MM HG: CPT | Performed by: ORTHOPAEDIC SURGERY

## 2025-01-13 PROCEDURE — 99213 OFFICE O/P EST LOW 20 MIN: CPT | Performed by: ORTHOPAEDIC SURGERY

## 2025-01-13 PROCEDURE — 3074F SYST BP LT 130 MM HG: CPT | Performed by: ORTHOPAEDIC SURGERY

## 2025-01-13 RX ORDER — OMEPRAZOLE 40 MG/1
40 CAPSULE, DELAYED RELEASE ORAL 2 TIMES DAILY
Qty: 60 CAPSULE | Refills: 3 | Status: SHIPPED | OUTPATIENT
Start: 2025-01-13

## 2025-01-13 RX ORDER — TRIAMCINOLONE ACETONIDE 40 MG/ML
40 INJECTION, SUSPENSION INTRA-ARTICULAR; INTRAMUSCULAR
Status: COMPLETED | OUTPATIENT
Start: 2025-01-13 | End: 2025-01-13

## 2025-01-13 RX ORDER — LIDOCAINE HYDROCHLORIDE 10 MG/ML
5 INJECTION, SOLUTION INFILTRATION; PERINEURAL
Status: COMPLETED | OUTPATIENT
Start: 2025-01-13 | End: 2025-01-13

## 2025-01-13 RX ADMIN — LIDOCAINE HYDROCHLORIDE 5 ML: 10 INJECTION, SOLUTION INFILTRATION; PERINEURAL at 09:21

## 2025-01-13 RX ADMIN — TRIAMCINOLONE ACETONIDE 40 MG: 40 INJECTION, SUSPENSION INTRA-ARTICULAR; INTRAMUSCULAR at 09:21

## 2025-01-13 NOTE — PROGRESS NOTES
"Chief Complaint  Initial Evaluation of the Right Shoulder     Subjective      Camilla Navas presents to [facility] for initial evaluation of the right shoulder. She has had pain in her shoulder since last October.  She had X rays and is here to review.  She has had injections in the past.  She has pain with forward and upward motion.  She has tried ice, heat and anti inflammatory.      Allergies   Allergen Reactions    Empagliflozin Nausea And Vomiting    Metformin Diarrhea        Social History     Socioeconomic History    Marital status:    Tobacco Use    Smoking status: Never     Passive exposure: Past    Smokeless tobacco: Never   Vaping Use    Vaping status: Never Used   Substance and Sexual Activity    Alcohol use: Never    Drug use: Never    Sexual activity: Defer        I reviewed the patient's chief complaint, history of present illness, review of systems, past medical history, surgical history, family history, social history, medications, and allergy list.     Review of Systems     Constitutional: Denies fevers, chills, weight loss  Cardiovascular: Denies chest pain, shortness of breath  Skin: Denies rashes, acute skin changes  Neurologic: Denies headache, loss of consciousness      Vital Signs:   /73   Pulse 89   Ht 165.1 cm (65\")   Wt 92.1 kg (203 lb)   SpO2 96%   BMI 33.78 kg/m²          Physical Exam  General: Alert. No acute distress    Ortho Exam        RIGHT SHOULDER Forward flexion 165 with pain Full AAROM. Abduction 90. External rotation 60. Internal rotation SI joint . Positive Cross body adduction. Supraspinatus strength 4+/5. Infraspinatus Strength 5/5. Infrared subscap 5/5. Positive Wilson. Positive Neer. Negative Apprehension. Negative Lift off. (Negative Obriens. Sensation intact to light touch, median, radial, ulnar nerve. Positive AIN, PIN, ulnar nerve motor. Positive pulses. Positive Impingement signs. Good strength in triceps, biceps, " deltoid, wrist extensors and wrist flexors. Tender to palpation to the shoulder and down the arm.         Large Joint: R subacromial bursa  Date/Time: 1/13/2025 9:21 AM  Consent given by: patient  Site marked: site marked  Timeout: Immediately prior to procedure a time out was called to verify the correct patient, procedure, equipment, support staff and site/side marked as required   Supporting Documentation  Indications: pain   Procedure Details  Location: shoulder - R subacromial bursa  Preparation: Patient was prepped and draped in the usual sterile fashion  Needle gauge: 21 G.  Medications administered: 5 mL lidocaine 1 %; 40 mg triamcinolone acetonide 40 MG/ML  Patient tolerance: patient tolerated the procedure well with no immediate complications    This injection documentation was Scribed for Ad Mcclure MD by Lucie Palmer MA.  01/13/25   09:21 EST        Imaging Results (Most Recent)       None             Result Review :        Narrative & Impression   XR SHOULDER 2+ VW RIGHT     Date of Exam: 12/6/2024 11:55 AM EST     Indication: pain in rt shoulder     Comparison: None available.     Findings:  No fracture or dislocation. No AC joint separation. There is AC joint and glenohumeral joint arthropathy. No bone erosion or destruction.     IMPRESSION:  AC joint and glenohumeral arthropathy. No acute findings.                   Assessment and Plan     Diagnoses and all orders for this visit:    1. Right shoulder pain, unspecified chronicity (Primary)        Discussed the treatment plan with the patient. I reviewed the X-rays that were obtained 12/6/24 with the patient.     Discussed the risks and benefits of conservative measures. The patient expressed understanding and wished to proceed with a right shoulder steroid injection.  She tolerated the injection well.        Call or return if worsening symptoms.    Follow Up     PRN  She will call back if not better and if injection was not successful may  discuss a right shoulder MRI.        Patient was given instructions and counseling regarding her condition or for health maintenance advice. Please see specific information pulled into the AVS if appropriate.     Scribed for Ad Mcclure MD by Jennifer Pinto MA.  01/13/25   08:18 EST      I have personally performed the services described in this document as scribed by the above individual and it is both accurate and complete. Ad Mcclure MD 01/13/25

## 2025-01-16 ENCOUNTER — TELEPHONE (OUTPATIENT)
Dept: CARDIOLOGY | Facility: CLINIC | Age: 75
End: 2025-01-16
Payer: MEDICARE

## 2025-01-17 ENCOUNTER — HOSPITAL ENCOUNTER (OUTPATIENT)
Dept: BONE DENSITY | Facility: HOSPITAL | Age: 75
Discharge: HOME | End: 2025-01-17
Payer: MEDICARE

## 2025-01-17 DIAGNOSIS — Z78.0 ASYMPTOMATIC MENOPAUSAL STATE: ICD-10-CM

## 2025-01-17 PROCEDURE — 77080 DXA BONE DENSITY AXIAL: CPT

## 2025-01-20 RX ORDER — PRAVASTATIN SODIUM 80 MG/1
80 TABLET ORAL DAILY
Qty: 90 TABLET | Refills: 3 | Status: SHIPPED | OUTPATIENT
Start: 2025-01-20

## 2025-01-21 ENCOUNTER — TELEPHONE (OUTPATIENT)
Dept: CARDIOLOGY | Facility: CLINIC | Age: 75
End: 2025-01-21
Payer: MEDICARE

## 2025-01-21 NOTE — TELEPHONE ENCOUNTER
Caller: Camilla Navas    Relationship: Self    Best call back number:   Telephone Information:   Mobile [phone]        What is the best time to reach you: ANY    Who are you requesting to speak with (clinical staff, provider,  specific staff member): MARIO?    Do you know the name of the person who called: MARIO?    What was the call regarding: RETURNING A CALL FROM SHE BELIEVES MARIO ABOUT HER ASSISTANCE PAPERWORK

## 2025-01-22 NOTE — TELEPHONE ENCOUNTER
Spoke to pt.  She is reapplying for pt asst with Eliquis.  She will be coming to the office to sign pw and to bring financial docs.  In the interim, samples will be given until determination is made.

## 2025-01-22 NOTE — TELEPHONE ENCOUNTER
PT BROUGHT IN NECESSARY PW BUT DID NOT SIGN UPDATED APPLICATION.  SHE TOLD [facility] THAT SHE HAD ALREADY FILLED OUT THE APPLICATION; ALTHOUGH, THIS WAS GONE OVER VIA TELEPHONE WITH PT.    FAXED TO [facility] ANYWAY.  IF IT IS REJECTED, WILL TRY TO GET SIGNATURE AGAIN.

## 2025-01-23 ENCOUNTER — APPOINTMENT (OUTPATIENT)
Dept: GENERAL RADIOLOGY | Facility: HOSPITAL | Age: 75
End: 2025-01-23
Payer: MEDICARE

## 2025-01-23 ENCOUNTER — HOSPITAL ENCOUNTER (EMERGENCY)
Facility: HOSPITAL | Age: 75
Discharge: HOME | End: 2025-01-23
Attending: EMERGENCY MEDICINE
Payer: MEDICARE

## 2025-01-23 VITALS
DIASTOLIC BLOOD PRESSURE: 79 MMHG | BODY MASS INDEX: 34.16 KG/M2 | OXYGEN SATURATION: 94 % | HEART RATE: 98 BPM | WEIGHT: 205.03 LBS | TEMPERATURE: 98.4 F | HEIGHT: 65 IN | SYSTOLIC BLOOD PRESSURE: 134 MMHG | RESPIRATION RATE: 16 BRPM

## 2025-01-23 DIAGNOSIS — I48.91 ATRIAL FIBRILLATION, UNSPECIFIED TYPE: Primary | ICD-10-CM

## 2025-01-23 LAB
ALBUMIN SERPL-MCNC: 4.1 G/DL (ref 3.5–5.2)
ALBUMIN/GLOB SERPL: 1.3 G/DL
ALP SERPL-CCNC: 105 U/L (ref 39–117)
ALT SERPL W P-5'-P-CCNC: 17 U/L (ref 1–33)
ANION GAP SERPL CALCULATED.3IONS-SCNC: 12.7 MMOL/L (ref 5–15)
AST SERPL-CCNC: 24 U/L (ref 1–32)
BACTERIA UR QL AUTO: ABNORMAL /HPF
BASOPHILS # BLD AUTO: 0.03 10*3/MM3 (ref 0–0.2)
BASOPHILS NFR BLD AUTO: 0.2 % (ref 0–1.5)
BILIRUB SERPL-MCNC: 0.3 MG/DL (ref 0–1.2)
BILIRUB UR QL STRIP: NEGATIVE
BUN SERPL-MCNC: 18 MG/DL (ref 8–23)
BUN/CREAT SERPL: 18.2 (ref 7–25)
CALCIUM SPEC-SCNC: 9.6 MG/DL (ref 8.6–10.5)
CHLORIDE SERPL-SCNC: 95 MMOL/L (ref 98–107)
CLARITY UR: ABNORMAL
CO2 SERPL-SCNC: 28.3 MMOL/L (ref 22–29)
COLOR UR: YELLOW
CREAT SERPL-MCNC: 0.99 MG/DL (ref 0.57–1)
DEPRECATED RDW RBC AUTO: 45.2 FL (ref 37–54)
EGFRCR SERPLBLD CKD-EPI 2021: 59.6 ML/MIN/1.73
EOSINOPHIL # BLD AUTO: 0.05 10*3/MM3 (ref 0–0.4)
EOSINOPHIL NFR BLD AUTO: 0.4 % (ref 0.3–6.2)
ERYTHROCYTE [DISTWIDTH] IN BLOOD BY AUTOMATED COUNT: 13.8 % (ref 12.3–15.4)
GEN 5 1HR TROPONIN T REFLEX: 15 NG/L
GLOBULIN UR ELPH-MCNC: 3.1 GM/DL
GLUCOSE SERPL-MCNC: 215 MG/DL (ref 65–99)
GLUCOSE UR STRIP-MCNC: NEGATIVE MG/DL
HCT VFR BLD AUTO: 42.7 % (ref 34–46.6)
HGB BLD-MCNC: 14 G/DL (ref 12–15.9)
HGB UR QL STRIP.AUTO: NEGATIVE
HOLD SPECIMEN: NORMAL
HOLD SPECIMEN: NORMAL
HYALINE CASTS UR QL AUTO: ABNORMAL /LPF
IMM GRANULOCYTES # BLD AUTO: 0.09 10*3/MM3 (ref 0–0.05)
IMM GRANULOCYTES NFR BLD AUTO: 0.7 % (ref 0–0.5)
KETONES UR QL STRIP: NEGATIVE
LEUKOCYTE ESTERASE UR QL STRIP.AUTO: ABNORMAL
LIPASE SERPL-CCNC: 48 U/L (ref 13–60)
LYMPHOCYTES # BLD AUTO: 1.51 10*3/MM3 (ref 0.7–3.1)
LYMPHOCYTES NFR BLD AUTO: 12.2 % (ref 19.6–45.3)
MAGNESIUM SERPL-MCNC: 1.5 MG/DL (ref 1.6–2.4)
MCH RBC QN AUTO: 29.3 PG (ref 26.6–33)
MCHC RBC AUTO-ENTMCNC: 32.8 G/DL (ref 31.5–35.7)
MCV RBC AUTO: 89.3 FL (ref 79–97)
MONOCYTES # BLD AUTO: 1.09 10*3/MM3 (ref 0.1–0.9)
MONOCYTES NFR BLD AUTO: 8.8 % (ref 5–12)
NEUTROPHILS NFR BLD AUTO: 77.7 % (ref 42.7–76)
NEUTROPHILS NFR BLD AUTO: 9.62 10*3/MM3 (ref 1.7–7)
NITRITE UR QL STRIP: NEGATIVE
NRBC BLD AUTO-RTO: 0 /100 WBC (ref 0–0.2)
NT-PROBNP SERPL-MCNC: 1944 PG/ML (ref 0–1800)
PH UR STRIP.AUTO: 6.5 [PH] (ref 5–8)
PLATELET # BLD AUTO: 306 10*3/MM3 (ref 140–450)
PMV BLD AUTO: 9.5 FL (ref 6–12)
POTASSIUM SERPL-SCNC: 3.8 MMOL/L (ref 3.5–5.2)
PROT SERPL-MCNC: 7.2 G/DL (ref 6–8.5)
PROT UR QL STRIP: ABNORMAL
RBC # BLD AUTO: 4.78 10*6/MM3 (ref 3.77–5.28)
RBC # UR STRIP: ABNORMAL /HPF
REF LAB TEST METHOD: ABNORMAL
SODIUM SERPL-SCNC: 136 MMOL/L (ref 136–145)
SP GR UR STRIP: 1.02 (ref 1–1.03)
SQUAMOUS #/AREA URNS HPF: ABNORMAL /HPF
STARCH GRANULES URNS QL MICRO: ABNORMAL /HPF
TROPONIN T % DELTA: 0
TROPONIN T NUMERIC DELTA: 0 NG/L
TROPONIN T SERPL HS-MCNC: 15 NG/L
UROBILINOGEN UR QL STRIP: ABNORMAL
WBC # UR STRIP: ABNORMAL /HPF
WBC NRBC COR # BLD AUTO: 12.39 10*3/MM3 (ref 3.4–10.8)
WHOLE BLOOD HOLD COAG: NORMAL
WHOLE BLOOD HOLD SPECIMEN: NORMAL

## 2025-01-23 PROCEDURE — 81001 URINALYSIS AUTO W/SCOPE: CPT | Performed by: EMERGENCY MEDICINE

## 2025-01-23 PROCEDURE — 99285 EMERGENCY DEPT VISIT HI MDM: CPT

## 2025-01-23 PROCEDURE — 71045 X-RAY EXAM CHEST 1 VIEW: CPT

## 2025-01-23 PROCEDURE — 93010 ELECTROCARDIOGRAM REPORT: CPT | Performed by: INTERNAL MEDICINE

## 2025-01-23 PROCEDURE — 96374 THER/PROPH/DIAG INJ IV PUSH: CPT

## 2025-01-23 PROCEDURE — 84484 ASSAY OF TROPONIN QUANT: CPT | Performed by: EMERGENCY MEDICINE

## 2025-01-23 PROCEDURE — 83690 ASSAY OF LIPASE: CPT

## 2025-01-23 PROCEDURE — 93005 ELECTROCARDIOGRAM TRACING: CPT | Performed by: EMERGENCY MEDICINE

## 2025-01-23 PROCEDURE — 83735 ASSAY OF MAGNESIUM: CPT

## 2025-01-23 PROCEDURE — 84484 ASSAY OF TROPONIN QUANT: CPT

## 2025-01-23 PROCEDURE — 80053 COMPREHEN METABOLIC PANEL: CPT

## 2025-01-23 PROCEDURE — 93005 ELECTROCARDIOGRAM TRACING: CPT

## 2025-01-23 PROCEDURE — 36415 COLL VENOUS BLD VENIPUNCTURE: CPT

## 2025-01-23 PROCEDURE — 83880 ASSAY OF NATRIURETIC PEPTIDE: CPT

## 2025-01-23 PROCEDURE — 85025 COMPLETE CBC W/AUTO DIFF WBC: CPT

## 2025-01-23 RX ORDER — SODIUM CHLORIDE 0.9 % (FLUSH) 0.9 %
10 SYRINGE (ML) INJECTION AS NEEDED
Status: DISCONTINUED | OUTPATIENT
Start: 2025-01-23 | End: 2025-01-24 | Stop reason: HOSPADM

## 2025-01-23 RX ORDER — DILTIAZEM HYDROCHLORIDE 5 MG/ML
10 INJECTION INTRAVENOUS ONCE
Status: COMPLETED | OUTPATIENT
Start: 2025-01-23 | End: 2025-01-23

## 2025-01-23 RX ORDER — ASPIRIN 81 MG/1
324 TABLET, CHEWABLE ORAL ONCE
Status: DISCONTINUED | OUTPATIENT
Start: 2025-01-23 | End: 2025-01-24 | Stop reason: HOSPADM

## 2025-01-23 RX ADMIN — DILTIAZEM HYDROCHLORIDE 10 MG: 5 INJECTION INTRAVENOUS at 21:16

## 2025-01-23 NOTE — ED PROVIDER NOTES
Time: 6:09 PM EST  Date of encounter:  1/23/2025  Independent Historian/Clinical History and Information was obtained by:   Patient    History is limited by: N/A    Chief Complaint   Patient presents with    Irregular Heart Beat         History of Present Illness:  Patient is a 74 y.o. year old female who presents to the emergency department for evaluation of near syncope.  Patient states that she was sitting up doing some work when she got really lightheaded and thought she was going to pass out.  Patient states that she had some heart palpitations.  Patient has been off of her Eliquis for A-fib for a couple of days.(Bailey Seaver, APRN, FNP-C)    Patient Care Team  Primary Care Provider: Lester Lizarraga MD    Past Medical History:     Allergies   Allergen Reactions    Empagliflozin Nausea And Vomiting    Metformin Diarrhea     Past Medical History:   Diagnosis Date    Arthritis     Asthma     Atherosclerosis of coronary artery 2018    Non-obstructive coronary artery disease. Cardiac catheterization done in October 2018 showed a 40% mid LAD lesion and a 40% proximal RCA lesion.    Cancer     Breast.     COPD (chronic obstructive pulmonary disease)     Diabetes mellitus     Diverticulitis     Hiatal hernia     Hyperlipemia 01/21/2022    Hypertension, essential 03/05/2022     Past Surgical History:   Procedure Laterality Date    COLONOSCOPY      COLONOSCOPY N/A 11/14/2023    Procedure: COLONOSCOPY WITH POLYPECTOMIES HOT/COLD SNARE, ELEVIEW INJECTION, BIOPSIES;  Surgeon: Lester Gillette MD;  Location: [facility];  Service: Gastroenterology;  Laterality: N/A;  COLON POLYPS, DIVERTICULOSIS    ENDOSCOPY N/A 11/14/2023    Procedure: ESOPHAGOGASTRODUODENOSCOPY WITH BIOPSIES;  Surgeon: Lester Gillette MD;  Location: [facility];  Service: Gastroenterology;  Laterality: N/A;  PREVIOUS SURGERY    HERNIA REPAIR      HYSTERECTOMY      MASTECTOMY Bilateral     UPPER GASTROINTESTINAL ENDOSCOPY        Family History   Problem Relation Age of Onset    Colon cancer Neg Hx     Malig Hyperthermia Neg Hx        Home Medications:  Prior to Admission medications    Medication Sig Start Date End Date Taking? Authorizing Provider   albuterol sulfate  (90 Base) MCG/ACT inhaler Inhale 2 puffs Every 4 (Four) Hours As Needed for Wheezing. 6/24/22   Rohan Ring DO   apixaban (ELIQUIS) 5 MG tablet tablet Take 1 tablet by mouth 2 (Two) Times a Day. 11/19/24   Umesh Guadarrama MD   Arthritis Pain Reliever 650 MG 8 hr tablet Take 1 tablet by mouth Every 8 (Eight) Hours As Needed for Mild Pain. 3/2/22   Emergency, Nurse Epic, RN   azelastine (ASTELIN) 0.1 % nasal spray Administer 2 sprays into the nostril(s) as directed by provider 2 (Two) Times a Day. Use in each nostril as directed 10/30/24   Randall Mathew MD   baclofen (LIORESAL) 10 MG tablet Take 1 tablet by mouth 2 (Two) Times a Day.    Jayesh Mckinley MD   buPROPion XL (WELLBUTRIN XL) 150 MG 24 hr tablet Take 1 tablet by mouth Daily. 12/2/24   Jayesh Mckinley MD   dilTIAZem CD (CARDIZEM CD) 360 MG 24 hr capsule Take 1 capsule by mouth Daily. 12/13/24   Philip Bueno MD   fluticasone (FLONASE) 50 MCG/ACT nasal spray Administer 2 sprays into the nostril(s) as directed by provider Daily. 10/30/24   Randall Mathew MD   Fluticasone-Umeclidin-Vilant (TRELEGY ELLIPTA) 200-62.5-25 MCG/ACT inhaler Inhale 1 puff Daily. 10/30/24   Randall Mathew MD   furosemide (LASIX) 20 MG tablet TAKE 1 TABLET BY MOUTH DAILY 1/3/25   Edith Will APRN   gabapentin (NEURONTIN) 300 MG capsule Take 1 capsule by mouth Every Night.    Jayesh Mckinley MD   glipizide (GLUCOTROL) 5 MG tablet Take 1 tablet by mouth Daily. 5/30/24   Jayesh Mckinley MD   hydroCHLOROthiazide 25 MG tablet TAKE 1 TABLET BY MOUTH EVERY DAY  Patient taking differently: Take 1 tablet by mouth Daily. 10/21/24   Umesh Guadarrama MD   ipratropium-albuterol (DUO-NEB) 0.5-2.5 mg/3 ml  nebulizer USE 3 ML VIA NEBULIZER FOUR TIMES DAILY AS NEEDED FOR WHEEZING OR SHORTNESS OF BREATH  Patient taking differently: Take 3 mL by nebulization 4 (Four) Times a Day As Needed for Wheezing or Shortness of Air. 10/30/24   Randall Mathew MD   Januvia 25 MG tablet Take 1 tablet by mouth Daily. 12/2/24   Jayesh Mckinley MD   lisinopril (PRINIVIL,ZESTRIL) 5 MG tablet Take 1 tablet by mouth Daily.    Jayesh Mckinley MD   metoprolol succinate XL (TOPROL-XL) 50 MG 24 hr tablet Take 1 tablet by mouth Every 12 (Twelve) Hours. 12/13/24   Philip Bueno MD   mirtazapine (REMERON) 15 MG tablet Take 1 tablet by mouth Every Night.    Jayesh Mckinley MD   montelukast (SINGULAIR) 10 MG tablet Take 1 tablet by mouth Every Night. 10/30/24   Randall Mathew MD   nitroglycerin (NITROSTAT) 0.4 MG SL tablet 1 under the tongue as needed for angina, may repeat q5mins for up three doses 4/29/24   Edith Will APRN   omeprazole (priLOSEC) 40 MG capsule TAKE 1 CAPSULE BY MOUTH TWICE DAILY 1/13/25   Randall Mathew MD   ondansetron (ZOFRAN) 4 MG tablet Take 1 tablet by mouth Every 6 (Six) Hours As Needed for Nausea or Vomiting.    Jayesh Mckinely MD   pantoprazole (PROTONIX) 40 MG EC tablet Take 1 tablet by mouth Daily.  Patient not taking: Reported on 1/13/2025    Jayesh Mckinley MD   pravastatin (PRAVACHOL) 80 MG tablet TAKE 1 TABLET BY MOUTH DAILY 1/20/25   Edith Will APRN   sertraline (ZOLOFT) 100 MG tablet Take 2 tablets by mouth Daily.    Jayesh Mckinely MD   vitamin D3 125 MCG (5000 UT) capsule capsule Take 1 capsule by mouth Daily.    Jayesh Mckinley MD   vitamin E 400 UNIT capsule Take 1 capsule by mouth Daily.    Jayesh Mckinley MD        Social History:   Social History     Tobacco Use    Smoking status: Never     Passive exposure: Past    Smokeless tobacco: Never   Vaping Use    Vaping status: Never Used   Substance Use Topics    Alcohol use: Never    Drug use:  "Never         Review of Systems:  Review of Systems   Constitutional:  Negative for chills and fever.   HENT:  Negative for congestion, rhinorrhea and sore throat.    Eyes:  Negative for pain and visual disturbance.   Respiratory:  Negative for apnea, cough, chest tightness and shortness of breath.    Cardiovascular:  Positive for chest pain and palpitations.   Gastrointestinal:  Negative for abdominal pain, diarrhea, nausea and vomiting.   Genitourinary:  Negative for difficulty urinating and dysuria.   Musculoskeletal:  Negative for joint swelling and myalgias.   Skin:  Negative for color change.   Neurological:  Negative for seizures and headaches.   Psychiatric/Behavioral: Negative.     All other systems reviewed and are negative.       Physical Exam:  /79   Pulse 98   Temp 98.4 °F (36.9 °C)   Resp 16   Ht 165.1 cm (65\")   Wt 93 kg (205 lb 0.4 oz)   SpO2 94%   BMI 34.12 kg/m²         Physical Exam  Vitals and nursing note reviewed.   Constitutional:       General: She is not in acute distress.     Appearance: Normal appearance. She is not toxic-appearing.   HENT:      Head: Normocephalic and atraumatic.      Jaw: There is normal jaw occlusion.      Mouth/Throat:      Mouth: Mucous membranes are moist.   Eyes:      General: Lids are normal.      Extraocular Movements: Extraocular movements intact.      Conjunctiva/sclera: Conjunctivae normal.      Pupils: Pupils are equal, round, and reactive to light.   Cardiovascular:      Rate and Rhythm: Tachycardia present. Rhythm irregular.      Pulses: Normal pulses.      Heart sounds: Normal heart sounds.   Pulmonary:      Effort: Pulmonary effort is normal. No respiratory distress.      Breath sounds: Normal breath sounds. No wheezing or rhonchi.   Abdominal:      General: Abdomen is flat. There is no distension.      Palpations: Abdomen is soft.      Tenderness: There is no abdominal tenderness. There is no guarding or rebound.   Musculoskeletal:         " General: Normal range of motion.      Cervical back: Normal range of motion and neck supple.      Right lower leg: No edema.      Left lower leg: No edema.   Skin:     General: Skin is warm and dry.   Neurological:      General: No focal deficit present.      Mental Status: She is alert and oriented to person, place, and time. Mental status is at baseline.   Psychiatric:         Mood and Affect: Mood normal.         Behavior: Behavior normal.                            Medical Decision Making:      Comorbidities that affect care:    Atrial Fibrillation    External Notes reviewed:    Previous Clinic Note: Orthopedic office visit for shoulder pain management      The following orders were placed and all results were independently analyzed by me:  Orders Placed This Encounter   Procedures    XR Chest 1 View    Bison Draw    High Sensitivity Troponin T    Comprehensive Metabolic Panel    Lipase    BNP    Magnesium    CBC Auto Differential    Urinalysis With Microscopic If Indicated (No Culture) - Urine, Clean Catch    High Sensitivity Troponin T 1Hr    Urinalysis, Microscopic Only - Urine, Clean Catch    NPO Diet NPO Type: Strict NPO    Undress & Gown    Continuous Pulse Oximetry    Oxygen Therapy- Nasal Cannula; Titrate 1-6 LPM Per SpO2; 90 - 95%    ECG 12 Lead ED Triage Standing Order; Chest Pain    ECG 12 Lead ED Triage Standing Order; Chest Pain    Insert Peripheral IV    CBC & Differential    Green Top (Gel)    Lavender Top    Gold Top - SST    Light Blue Top       Medications Given in the Emergency Department:  Medications   sodium chloride 0.9 % flush 10 mL (has no administration in time range)   aspirin chewable tablet 324 mg (324 mg Oral Not Given 1/23/25 2209)   dilTIAZem (CARDIZEM) injection 10 mg (10 mg Intravenous Given 1/23/25 2116)        ED Course:    The patient was initially evaluated in the triage area where orders were placed. The patient was later dispositioned by Issa Harrington MD.      The  patient was advised to stay for completion of workup which includes but is not limited to communication of labs and radiological results, reassessment and plan. The patient was advised that leaving prior to disposition by a provider could result in critical findings that are not communicated to the patient.          Labs:    Lab Results (last 24 hours)       Procedure Component Value Units Date/Time    High Sensitivity Troponin T [[id number]]  (Abnormal) Collected: 01/23/25 1816    Specimen: Blood from Arm, Left Updated: 01/23/25 1853     HS Troponin T 15 ng/L     Narrative:      High Sensitive Troponin T Reference Range:  <14.0 ng/L- Negative Female for AMI  <22.0 ng/L- Negative Male for AMI  >=14 - Abnormal Female indicating possible myocardial injury.  >=22 - Abnormal Male indicating possible myocardial injury.   Clinicians would have to utilize clinical acumen, EKG, Troponin, and serial changes to determine if it is an Acute Myocardial Infarction or myocardial injury due to an underlying chronic condition.         CBC & Differential [[id number]]  (Abnormal) Collected: 01/23/25 1816    Specimen: Blood from Arm, Left Updated: 01/23/25 1822    Narrative:      The following orders were created for panel order CBC & Differential.  Procedure                               Abnormality         Status                     ---------                               -----------         ------                     CBC Auto Differential[[id number]]        Abnormal            Final result                 Please view results for these tests on the individual orders.    Comprehensive Metabolic Panel [[id number]]  (Abnormal) Collected: 01/23/25 1816    Specimen: Blood from Arm, Left Updated: 01/23/25 1853     Glucose 215 mg/dL      BUN 18 mg/dL      Creatinine 0.99 mg/dL      Sodium 136 mmol/L      Potassium 3.8 mmol/L      Chloride 95 mmol/L      CO2 28.3 mmol/L      Calcium 9.6 mg/dL      Total Protein 7.2 g/dL      Albumin 4.1 g/dL       ALT (SGPT) 17 U/L      AST (SGOT) 24 U/L      Alkaline Phosphatase 105 U/L      Total Bilirubin 0.3 mg/dL      Globulin 3.1 gm/dL      A/G Ratio 1.3 g/dL      BUN/Creatinine Ratio 18.2     Anion Gap 12.7 mmol/L      eGFR 59.6 mL/min/1.73     Narrative:      GFR Categories in Chronic Kidney Disease (CKD)      GFR Category          GFR (mL/min/1.73)    Interpretation  G1                     90 or greater         Normal or high (1)  G2                      60-89                Mild decrease (1)  G3a                   45-59                Mild to moderate decrease  G3b                   30-44                Moderate to severe decrease  G4                    15-29                Severe decrease  G5                    14 or less           Kidney failure          (1)In the absence of evidence of kidney disease, neither GFR category G1 or G2 fulfill the criteria for CKD.    eGFR calculation 2021 CKD-EPI creatinine equation, which does not include race as a factor    Lipase [[id number]]  (Normal) Collected: 01/23/25 1816    Specimen: Blood from Arm, Left Updated: 01/23/25 1853     Lipase 48 U/L     BNP [[id number]]  (Abnormal) Collected: 01/23/25 1816    Specimen: Blood from Arm, Left Updated: 01/23/25 1849     proBNP 1,944.0 pg/mL     Narrative:      This assay is used as an aid in the diagnosis of individuals suspected of having heart failure. It can be used as an aid in the diagnosis of acute decompensated heart failure (ADHF) in patients presenting with signs and symptoms of ADHF to the emergency department (ED). In addition, NT-proBNP of <300 pg/mL indicates ADHF is not likely.    Age Range Result Interpretation  NT-proBNP Concentration (pg/mL:      <50             Positive            >450                   Gray                 300-450                    Negative             <300    50-75           Positive            >900                  Gray                300-900                  Negative            <300      >75              Positive            >1800                  Gray                300-1800                  Negative            <300    Magnesium [[id number]]  (Abnormal) Collected: 01/23/25 1816    Specimen: Blood from Arm, Left Updated: 01/23/25 1853     Magnesium 1.5 mg/dL     CBC Auto Differential [[id number]]  (Abnormal) Collected: 01/23/25 1816    Specimen: Blood from Arm, Left Updated: 01/23/25 1822     WBC 12.39 10*3/mm3      RBC 4.78 10*6/mm3      Hemoglobin 14.0 g/dL      Hematocrit 42.7 %      MCV 89.3 fL      MCH 29.3 pg      MCHC 32.8 g/dL      RDW 13.8 %      RDW-SD 45.2 fl      MPV 9.5 fL      Platelets 306 10*3/mm3      Neutrophil % 77.7 %      Lymphocyte % 12.2 %      Monocyte % 8.8 %      Eosinophil % 0.4 %      Basophil % 0.2 %      Immature Grans % 0.7 %      Neutrophils, Absolute 9.62 10*3/mm3      Lymphocytes, Absolute 1.51 10*3/mm3      Monocytes, Absolute 1.09 10*3/mm3      Eosinophils, Absolute 0.05 10*3/mm3      Basophils, Absolute 0.03 10*3/mm3      Immature Grans, Absolute 0.09 10*3/mm3      nRBC 0.0 /100 WBC     Urinalysis With Microscopic If Indicated (No Culture) - Urine, Clean Catch [[id number]]  (Abnormal) Collected: 01/23/25 2038    Specimen: Urine, Clean Catch Updated: 01/23/25 2052     Color, UA Yellow     Appearance, UA Cloudy     pH, UA 6.5     Specific Gravity, UA 1.021     Glucose, UA Negative     Ketones, UA Negative     Bilirubin, UA Negative     Blood, UA Negative     Protein, UA Trace     Leuk Esterase, UA Small (1+)     Nitrite, UA Negative     Urobilinogen, UA 1.0 E.U./dL    High Sensitivity Troponin T 1Hr [[id number]]  (Abnormal) Collected: 01/23/25 2038    Specimen: Blood Updated: 01/23/25 2112     HS Troponin T 15 ng/L      Troponin T Numeric Delta 0 ng/L      Troponin T % Delta 0    Narrative:      High Sensitive Troponin T Reference Range:  <14.0 ng/L- Negative Female for AMI  <22.0 ng/L- Negative Male for AMI  >=14 - Abnormal Female indicating possible myocardial injury.  >=22 -  Abnormal Male indicating possible myocardial injury.   Clinicians would have to utilize clinical acumen, EKG, Troponin, and serial changes to determine if it is an Acute Myocardial Infarction or myocardial injury due to an underlying chronic condition.         Urinalysis, Microscopic Only - Urine, Clean Catch [[id number]]  (Abnormal) Collected: 01/23/25 2038    Specimen: Urine, Clean Catch Updated: 01/23/25 2133     RBC, UA None Seen /HPF      WBC, UA 0-2 /HPF      Bacteria, UA None Seen /HPF      Squamous Epithelial Cells, UA 3-6 /HPF      Hyaline Casts, UA 3-6 /LPF      Starch, UA Moderate/2+ /HPF      Methodology Manual Light Microscopy             Imaging:    XR Chest 1 View    Result Date: 1/23/2025  XR CHEST 1 VW Date of Exam: 1/23/2025 6:53 PM EST Indication: Chest Pain Triage Protocol Comparison: 12/10/2024 Findings: Heart size and pulmonary vasculature within normal limits. Lungs clear other than mild scarring in the lower lungs. Costophrenic angles sharp     Impression: No active cardiopulmonary disease Electronically Signed: Kirill Lala  1/23/2025 7:36 PM EST  Workstation ID: [id number]       Differential Diagnosis and Discussion:      Chest Pain:  Based on the patient's signs and symptoms, I considered aortic dissection, myocardial infaction, pulmonary embolism, cardiac tamponade, pericarditis, pneumothorax, musculoskeletal chest pain and other differential diagnosis as an etiology of the patient's chest pain.     PROCEDURES:    Labs were collected in the emergency department and all labs were reviewed and interpreted by me.  X-ray were performed in the emergency department and all X-ray impressions were independently interpreted by me.  An EKG was performed and the EKG was interpreted by me.    ECG 12 Lead ED Triage Standing Order; Chest Pain   Preliminary Result   HEART KOFH=062  bpm   RR Avtdhrjb=209  ms   AR Interval=  ms   P Horizontal Axis=  deg   P Front Axis=  deg   QRSD Interval=95  ms   QT  Bynbwyha=843  ms   QZdD=703  ms   QRS Axis=-2  deg   T Wave Axis=41  deg   - ABNORMAL ECG -   Atrial fibrillation   Date and Time of Study:2025-01-23 18:27:01         My interpretation of EKG shows atrial fibrillation with normal rate, no acute ischemia, normal QT    Procedures    MDM  Number of Diagnoses or Management Options  Atrial fibrillation, unspecified type  Diagnosis management comments: In summary this is a 74-year-old female with a history of A-fib who presents to the emergency department for evaluation of palpitations.  CBC independently reviewed and interpreted by me and shows no critical abnormalities.  CMP independently reviewed and interpreted by me and shows no critical abnormalities.  Patient was in A-fib with a slightly elevated rate but not with RVR.  She was given IV Cardizem x 1 with resolution of this.  Chest x-ray reviewed by me is unremarkable for acute pathology.  Very strict return to ER and follow-up instructions have been provided to the patient.             Patient was placed on the cardiac monitor after being given IV Cardizem.  They were monitored for ventricular ectopy, arrhythmia, tachycardia, hypoxia, and changes in blood pressure.  Patient was rechecked several times throughout their stay for mental status decline and for reassessment of worsening changes in vital signs.     Total Critical Care time of 35 minutes. Total critical care time documented does not include time spent on separately billed procedures for services of nurses or physician assistants. I personally saw and examined the patient. I have reviewed all diagnostic interpretations and treatment plans as written. I was present for the key portions of any procedures performed and the inclusive time noted in any critical care statement. Critical care time includes patient management by me, time spent at the patients bedside,  time to review lab and imaging results, discussing patient care, documentation in the medical  record, and time spent with family or caregiver.          Patient Care Considerations:    CONSULT: I considered consulting cardiology, however A-fib became controlled      Consultants/Shared Management Plan:    None    Social Determinants of Health:    Patient is independent, reliable, and has access to care.       Disposition and Care Coordination:    Discharged: I considered escalation of care by admitting this patient to the hospital, however no signs of A-fib with RVR    I have explained the patient´s condition, diagnoses and treatment plan based on the information available to me at this time. I have answered questions and addressed any concerns. The patient has a good  understanding of the patient´s diagnosis, condition, and treatment plan as can be expected at this point. The vital signs have been stable. The patient´s condition is stable and appropriate for discharge from the emergency department.      The patient will pursue further outpatient evaluation with the primary care physician or other designated or consulting physician as outlined in the discharge instructions. They are agreeable to this plan of care and follow-up instructions have been explained in detail. The patient has received these instructions in written format and has expressed an understanding of the discharge instructions. The patient is aware that any significant change in condition or worsening of symptoms should prompt an immediate return to this or the closest emergency department or call to 911.  I have explained discharge medications and the need for follow up with the patient/caretakers. This was also printed in the discharge instructions. Patient was discharged with the following medications and follow up:      Medication List        Changed      ipratropium-albuterol 0.5-2.5 mg/3 ml nebulizer  Commonly known as: DUO-NEB  USE 3 ML VIA NEBULIZER FOUR TIMES DAILY AS NEEDED FOR WHEEZING OR SHORTNESS OF BREATH  What changed: See the  new instructions.           Lester Lizarraga MD  [address] [place] [address]  [phone]    In 1 week         Final diagnoses:   Atrial fibrillation, unspecified type        ED Disposition       ED Disposition   Discharge    Condition   Stable    Comment   --               This medical record created using voice recognition software.             Issa Harrington MD  01/23/25 0509

## 2025-01-24 LAB
QT INTERVAL: 352 MS
QTC INTERVAL: 456 MS

## 2025-01-31 ENCOUNTER — TELEPHONE (OUTPATIENT)
Dept: CARDIOLOGY | Facility: CLINIC | Age: 75
End: 2025-01-31
Payer: MEDICARE

## 2025-01-31 NOTE — TELEPHONE ENCOUNTER
Caller Name: Man Navasleon Roy      Relationship: Self      Best Contact Number: [phone]      Patient is requesting samples of ELIQUIS      How many days of medication do you have left? 4 DAYS LEFT         Additional Information: PLEASE CALL PT BACK AND RELAY WHEN SHE CAN  SAMPLES.

## 2025-02-04 ENCOUNTER — TELEPHONE (OUTPATIENT)
Dept: CARDIOLOGY | Facility: CLINIC | Age: 75
End: 2025-02-04
Payer: MEDICARE

## 2025-02-04 NOTE — TELEPHONE ENCOUNTER
The [facility] received a fax that requires your attention. The document has been indexed to the patient’s chart for your review.      Reason for sending: EXTERNAL MEDICAL RECORD NOTIFICATION     Documents Description: INFO NEEDED-[facility]-2.3.25    Name of Sender: BRISTOL LOZOYA SQUIBB PAF     Date Indexed: 2.3.25

## 2025-02-07 ENCOUNTER — OFFICE VISIT (OUTPATIENT)
Dept: CARDIOLOGY | Facility: CLINIC | Age: 75
End: 2025-02-07
Payer: MEDICARE

## 2025-02-07 ENCOUNTER — TELEPHONE (OUTPATIENT)
Dept: CARDIOLOGY | Facility: CLINIC | Age: 75
End: 2025-02-07

## 2025-02-07 VITALS
DIASTOLIC BLOOD PRESSURE: 89 MMHG | BODY MASS INDEX: 34.32 KG/M2 | WEIGHT: 206 LBS | HEART RATE: 102 BPM | HEIGHT: 65 IN | SYSTOLIC BLOOD PRESSURE: 157 MMHG

## 2025-02-07 DIAGNOSIS — R07.89 CHEST DISCOMFORT: ICD-10-CM

## 2025-02-07 DIAGNOSIS — I48.19 PERSISTENT ATRIAL FIBRILLATION: Primary | ICD-10-CM

## 2025-02-07 DIAGNOSIS — I10 ESSENTIAL HYPERTENSION: ICD-10-CM

## 2025-02-07 DIAGNOSIS — E78.2 MIXED HYPERLIPIDEMIA: Chronic | ICD-10-CM

## 2025-02-07 DIAGNOSIS — I25.10 NONOBSTRUCTIVE ATHEROSCLEROSIS OF CORONARY ARTERY: ICD-10-CM

## 2025-02-07 PROCEDURE — 1159F MED LIST DOCD IN RCRD: CPT | Performed by: INTERNAL MEDICINE

## 2025-02-07 PROCEDURE — 1160F RVW MEDS BY RX/DR IN RCRD: CPT | Performed by: INTERNAL MEDICINE

## 2025-02-07 PROCEDURE — 3077F SYST BP >= 140 MM HG: CPT | Performed by: INTERNAL MEDICINE

## 2025-02-07 PROCEDURE — 99214 OFFICE O/P EST MOD 30 MIN: CPT | Performed by: INTERNAL MEDICINE

## 2025-02-07 PROCEDURE — 3079F DIAST BP 80-89 MM HG: CPT | Performed by: INTERNAL MEDICINE

## 2025-02-07 RX ORDER — PRAVASTATIN SODIUM 80 MG/1
80 TABLET ORAL DAILY
Qty: 90 TABLET | Refills: 3 | Status: SHIPPED | OUTPATIENT
Start: 2025-02-07

## 2025-02-07 RX ORDER — FUROSEMIDE 20 MG/1
20 TABLET ORAL DAILY
Qty: 90 TABLET | Refills: 3 | Status: SHIPPED | OUTPATIENT
Start: 2025-02-07

## 2025-02-07 RX ORDER — HYDROCHLOROTHIAZIDE 25 MG/1
25 TABLET ORAL DAILY
Qty: 90 TABLET | Refills: 3 | Status: SHIPPED | OUTPATIENT
Start: 2025-02-07

## 2025-02-07 RX ORDER — NITROGLYCERIN 0.4 MG/1
TABLET SUBLINGUAL
Qty: 25 TABLET | Refills: 1 | Status: SHIPPED | OUTPATIENT
Start: 2025-02-07

## 2025-02-07 NOTE — ASSESSMENT & PLAN NOTE
Blood pressure on the higher side in the office today, well-controlled during multiple previous visits.  Continue metoprolol, hydrochlorothiazide and lisinopril at the current dose.  Encouraged follow low-sodium diet.

## 2025-02-07 NOTE — TELEPHONE ENCOUNTER
PT IS AWARE OF HER MISSING PORTION.  SHE IS SUPPOSED TO BE TURNING HER SIGNED APPLICATION FOR ELIKOLTONIS INTO OUR OFFICE.

## 2025-02-07 NOTE — TELEPHONE ENCOUNTER
Spoke with patient to remind her of medication samples that are ready to  in office. Patient states she has appointment today and will  then.

## 2025-02-07 NOTE — PROGRESS NOTES
CARDIOLOGY FOLLOW-UP PROGRESS NOTE        Chief Complaint  Follow-up, Atrial Fibrillation, and Chest Pain    Subjective            Camilla Kirill Navas presents to [facility] CARDIOLOGY  History of Present Illness    Ms Navas is here for a routine follow-up visit.  She is reporting severe exertional shortness of breath, fatigue and some chest discomfort.  Symptoms are progressively getting worse.  She does not feel any palpitations.  She has no pedal edema.  She is taking all medication including Eliquis as prescribed.      Past History:    (1) Non-obstructive coronary artery disease. Cardiac catheterization done in October 2018 showed a 40% mid LAD lesion and a 40% proximal RCA lesion. (2) Hypertension, difficult to control. (3) Diabetes mellitus, on oral medications. (4) Hyperlipidemia. (5) Breast cancer, s/p surgery. 6) Persistent atrial fibrillation, detected in 2024    Medical History:  Past Medical History:   Diagnosis Date    Arthritis     Asthma     Atherosclerosis of coronary artery 2018    Non-obstructive coronary artery disease. Cardiac catheterization done in October 2018 showed a 40% mid LAD lesion and a 40% proximal RCA lesion.    Cancer     Breast.     COPD (chronic obstructive pulmonary disease)     Diabetes mellitus     Diverticulitis     Hiatal hernia     Hyperlipemia 01/21/2022    Hypertension, essential 03/05/2022       Social History: reports that she has never smoked. She has been exposed to tobacco smoke. She has never used smokeless tobacco. She reports that she does not drink alcohol and does not use drugs.    Allergies: Empagliflozin and Metformin    Current Outpatient Medications on File Prior to Visit   Medication Sig    albuterol sulfate  (90 Base) MCG/ACT inhaler Inhale 2 puffs Every 4 (Four) Hours As Needed for Wheezing.    apixaban (ELIQUIS) 5 MG tablet tablet Take 1 tablet by mouth 2 (Two) Times a Day.    Arthritis Pain Reliever 650 MG 8 hr tablet Take 1  tablet by mouth Every 8 (Eight) Hours As Needed for Mild Pain.    azelastine (ASTELIN) 0.1 % nasal spray Administer 2 sprays into the nostril(s) as directed by provider 2 (Two) Times a Day. Use in each nostril as directed    baclofen (LIORESAL) 10 MG tablet Take 1 tablet by mouth 2 (Two) Times a Day.    buPROPion XL (WELLBUTRIN XL) 150 MG 24 hr tablet Take 1 tablet by mouth Daily.    dilTIAZem CD (CARDIZEM CD) 360 MG 24 hr capsule Take 1 capsule by mouth Daily.    fluticasone (FLONASE) 50 MCG/ACT nasal spray Administer 2 sprays into the nostril(s) as directed by provider Daily.    Fluticasone-Umeclidin-Vilant (TRELEGY ELLIPTA) 200-62.5-25 MCG/ACT inhaler Inhale 1 puff Daily.    gabapentin (NEURONTIN) 300 MG capsule Take 1 capsule by mouth Every Night.    glipizide (GLUCOTROL) 5 MG tablet Take 1 tablet by mouth Daily.    ipratropium-albuterol (DUO-NEB) 0.5-2.5 mg/3 ml nebulizer USE 3 ML VIA NEBULIZER FOUR TIMES DAILY AS NEEDED FOR WHEEZING OR SHORTNESS OF BREATH (Patient taking differently: Take 3 mL by nebulization 4 (Four) Times a Day As Needed for Wheezing or Shortness of Air.)    lisinopril (PRINIVIL,ZESTRIL) 5 MG tablet Take 1 tablet by mouth Daily.    metoprolol succinate XL (TOPROL-XL) 50 MG 24 hr tablet Take 1 tablet by mouth Every 12 (Twelve) Hours.    mirtazapine (REMERON) 15 MG tablet Take 1 tablet by mouth Every Night.    montelukast (SINGULAIR) 10 MG tablet Take 1 tablet by mouth Every Night.    omeprazole (priLOSEC) 40 MG capsule TAKE 1 CAPSULE BY MOUTH TWICE DAILY    ondansetron (ZOFRAN) 4 MG tablet Take 1 tablet by mouth Every 6 (Six) Hours As Needed for Nausea or Vomiting.    pantoprazole (PROTONIX) 40 MG EC tablet Take 1 tablet by mouth Daily.    sertraline (ZOLOFT) 100 MG tablet Take 2 tablets by mouth Daily.    vitamin D3 125 MCG (5000 UT) capsule capsule Take 1 capsule by mouth Daily.    vitamin E 400 UNIT capsule Take 1 capsule by mouth Daily.    [DISCONTINUED] apixaban (ELIQUIS) 5 MG tablet  "tablet Take 2 tablets by mouth 2 (Two) Times a Day for 7 days, THEN 1 tablet 2 (Two) Times a Day for 23 days.    [DISCONTINUED] furosemide (LASIX) 20 MG tablet TAKE 1 TABLET BY MOUTH DAILY    [DISCONTINUED] hydroCHLOROthiazide 25 MG tablet TAKE 1 TABLET BY MOUTH EVERY DAY (Patient taking differently: Take 1 tablet by mouth Daily.)     nitroglycerin (NITROSTAT) 0.4 MG SL tablet 1 under the tongue as needed for angina, may repeat q5mins for up three doses    pravastatin (PRAVACHOL) 80 MG tablet TAKE 1 TABLET BY MOUTH DAILY    Januvia 25 MG tablet Take 1 tablet by mouth Daily.         Review of Systems   Constitutional:  Positive for fatigue.   Respiratory:  Positive for shortness of breath. Negative for cough and wheezing.    Cardiovascular:  Negative for palpitations and leg swelling.   Gastrointestinal:  Negative for nausea and vomiting.   Neurological:  Negative for dizziness and syncope.        Objective     /89   Pulse 102   Ht 165.1 cm (65\")   Wt 93.4 kg (206 lb)   BMI 34.28 kg/m²       Physical Exam    General : Alert, awake, no acute distress  Neck : Supple, no carotid bruit, no jugular venous distention  CVS : Irregular, no murmur, rubs or gallops  Lungs: Clear to auscultation bilaterally, no crackles or rhonchi  Abdomen: Soft, nontender, bowel sounds heard in all 4 quadrants  Extremities: Warm, well-perfused, no pedal edema    Result Review :     The following data was reviewed by: Umesh Guadarrama MD on 02/07/2025:    CMP          12/9/2024    08:24 12/10/2024    04:37 1/23/2025    18:16   CMP   Glucose 244  176  215    BUN 19  13  18    Creatinine 1.01  0.76  0.99    EGFR 58.2  81.8  59.6    Sodium 138  136  136    Potassium 3.3  4.3  3.8    Chloride 100  101  95    Calcium 9.3  8.7  9.6    Total Protein 7.0  6.3  7.2    Albumin 4.1  3.6  4.1    Globulin 2.9  2.7  3.1    Total Bilirubin 0.2  0.3  0.3    Alkaline Phosphatase 109  69  105    AST (SGOT) 18  19  24    ALT (SGPT) 13  10  17  "   Albumin/Globulin Ratio 1.4  1.3  1.3    BUN/Creatinine Ratio 18.8  17.1  18.2    Anion Gap 11.8  11.9  12.7      CBC          12/9/2024    08:24 12/10/2024    04:37 1/23/2025    18:16   CBC   WBC 6.50  7.98  12.39    RBC 3.93  3.61  4.78    Hemoglobin 11.3  10.5  14.0    Hematocrit 35.6  32.9  42.7    MCV 90.6  91.1  89.3    MCH 28.8  29.1  29.3    MCHC 31.7  31.9  32.8    RDW 14.0  14.4  13.8    Platelets 210  212  306      TSH          6/7/2024    02:06   TSH   TSH 2.280             Data reviewed: Cardiology studies        Results for orders placed during the hospital encounter of 07/18/24    Adult Transthoracic Echo Complete w/ Color, Spectral and Contrast if necessary per protocol    Interpretation Summary    Left ventricular systolic function is low normal. Calculated left ventricular EF = 50%    Left atrial cavity is borderline dilated.    There is mild to moderate mitral regurgitation.    Estimated right ventricular systolic pressure from tricuspid regurgitation is normal (<35 mmHg).      Results for orders placed during the hospital encounter of 03/30/22    Stress Test With Myocardial Perfusion One Day    Interpretation Summary  · Myocardial perfusion imaging indicates a normal myocardial perfusion study with no evidence of ischemia.  · Left ventricular ejection fraction is normal. (Calculated EF = 59%).  · Fair exercise tolerance noted.  · There was no chest pain with exercise.  · Exercise EKG is negative for ischemic changes.  · Impressions are consistent with a low risk study.               Assessment and Plan        Diagnoses and all orders for this visit:    1. Persistent atrial fibrillation (Primary)  Assessment & Plan:  She remains in atrial fibrillation and ventricular rates are reasonably well controlled.  She is reporting significant fatigue and exertional shortness of breath.  We are proceeding with a stress test to rule out ischemia.  If stress test is negative, will make a referral to cardiac  electrophysiologist to consider rhythm control strategies.  Continue metoprolol and Cardizem CD for rate control.      2. Nonobstructive atherosclerosis of coronary artery  Assessment & Plan:  She reports severe exertional shortness of breath and fatigue along with some chest discomfort.  Symptoms could be anginal equivalent.  No recent ischemic evaluation available, last stress test was in early 2022.  Will proceed with SPECT stress test to rule out ischemia.  Continue beta-blockers, statin    Orders:  -     nitroglycerin (NITROSTAT) 0.4 MG SL tablet; 1 under the tongue as needed for angina, may repeat q5mins for up three doses  Dispense: 25 tablet; Refill: 1  -     Stress Test With Myocardial Perfusion One Day; Future    3. Chest discomfort  -     Stress Test With Myocardial Perfusion One Day; Future    4. Essential hypertension  Assessment & Plan:  Blood pressure on the higher side in the office today, well-controlled during multiple previous visits.  Continue metoprolol, hydrochlorothiazide and lisinopril at the current dose.  Encouraged follow low-sodium diet.    Orders:  -     hydroCHLOROthiazide 25 MG tablet; Take 1 tablet by mouth Daily.  Dispense: 90 tablet; Refill: 3    5. Mixed hyperlipidemia  Assessment & Plan:  No recent lipid panel available.  Will continue pravastatin and repeat lipid panel with next lab draw.    Orders:  -     pravastatin (PRAVACHOL) 80 MG tablet; Take 1 tablet by mouth Daily.  Dispense: 90 tablet; Refill: 3    Other orders  -     furosemide (LASIX) 20 MG tablet; Take 1 tablet by mouth Daily.  Dispense: 90 tablet; Refill: 3              Follow Up     Return for Will schedule f/u after reviewing test results.    Patient was given instructions and counseling regarding her condition or for health maintenance advice. Please see specific information pulled into the AVS if appropriate.

## 2025-02-07 NOTE — TELEPHONE ENCOUNTER
The [facility] received a fax that requires your attention. The document has been indexed to the patient’s chart for your review.      Reason for sending: EXTERNAL MEDICAL RECORD NOTIFICATION     Documents Description: MISSING INFO-[facility] [place] PAF-2.7.25    Name of Sender: RONALD ANTONIO     Date Indexed: 2.7.25

## 2025-02-07 NOTE — ASSESSMENT & PLAN NOTE
She reports severe exertional shortness of breath and fatigue along with some chest discomfort.  Symptoms could be anginal equivalent.  No recent ischemic evaluation available, last stress test was in early 2022.  Will proceed with SPECT stress test to rule out ischemia.  Continue beta-blockers, statin

## 2025-02-07 NOTE — ASSESSMENT & PLAN NOTE
She remains in atrial fibrillation and ventricular rates are reasonably well controlled.  She is reporting significant fatigue and exertional shortness of breath.  We are proceeding with a stress test to rule out ischemia.  If stress test is negative, will make a referral to cardiac electrophysiologist to consider rhythm control strategies.  Continue metoprolol and Cardizem CD for rate control.

## 2025-02-07 NOTE — ASSESSMENT & PLAN NOTE
No recent lipid panel available.  Will continue pravastatin and repeat lipid panel with next lab draw.

## 2025-02-11 DIAGNOSIS — E78.2 MIXED HYPERLIPIDEMIA: Primary | ICD-10-CM

## 2025-02-12 ENCOUNTER — HOSPITAL ENCOUNTER (OUTPATIENT)
Facility: HOSPITAL | Age: 75
Setting detail: OBSERVATION
LOS: 1 days | Discharge: HOME | End: 2025-02-14
Attending: EMERGENCY MEDICINE | Admitting: STUDENT IN AN ORGANIZED HEALTH CARE EDUCATION/TRAINING PROGRAM
Payer: MEDICARE

## 2025-02-12 ENCOUNTER — APPOINTMENT (OUTPATIENT)
Dept: CT IMAGING | Facility: HOSPITAL | Age: 75
End: 2025-02-12
Payer: MEDICARE

## 2025-02-12 ENCOUNTER — APPOINTMENT (OUTPATIENT)
Dept: GENERAL RADIOLOGY | Facility: HOSPITAL | Age: 75
End: 2025-02-12
Payer: MEDICARE

## 2025-02-12 DIAGNOSIS — R11.2 NAUSEA AND VOMITING, UNSPECIFIED VOMITING TYPE: ICD-10-CM

## 2025-02-12 DIAGNOSIS — I48.91 ATRIAL FIBRILLATION WITH RAPID VENTRICULAR RESPONSE: Primary | ICD-10-CM

## 2025-02-12 LAB
ALBUMIN SERPL-MCNC: 3.7 G/DL (ref 3.5–5.2)
ALBUMIN/GLOB SERPL: 1.2 G/DL
ALP SERPL-CCNC: 58 U/L (ref 39–117)
ALT SERPL W P-5'-P-CCNC: 10 U/L (ref 1–33)
ANION GAP SERPL CALCULATED.3IONS-SCNC: 12.2 MMOL/L (ref 5–15)
AST SERPL-CCNC: 18 U/L (ref 1–32)
BASOPHILS # BLD AUTO: 0.03 10*3/MM3 (ref 0–0.2)
BASOPHILS NFR BLD AUTO: 0.4 % (ref 0–1.5)
BILIRUB SERPL-MCNC: 0.4 MG/DL (ref 0–1.2)
BUN SERPL-MCNC: 10 MG/DL (ref 8–23)
BUN/CREAT SERPL: 12 (ref 7–25)
CALCIUM SPEC-SCNC: 9.1 MG/DL (ref 8.6–10.5)
CHLORIDE SERPL-SCNC: 98 MMOL/L (ref 98–107)
CO2 SERPL-SCNC: 26.8 MMOL/L (ref 22–29)
CREAT SERPL-MCNC: 0.83 MG/DL (ref 0.57–1)
D-LACTATE SERPL-SCNC: 1 MMOL/L (ref 0.5–2)
DEPRECATED RDW RBC AUTO: 46.3 FL (ref 37–54)
EGFRCR SERPLBLD CKD-EPI 2021: 73.6 ML/MIN/1.73
EOSINOPHIL # BLD AUTO: 0.06 10*3/MM3 (ref 0–0.4)
EOSINOPHIL NFR BLD AUTO: 0.8 % (ref 0.3–6.2)
ERYTHROCYTE [DISTWIDTH] IN BLOOD BY AUTOMATED COUNT: 14 % (ref 12.3–15.4)
FLUAV SUBTYP SPEC NAA+PROBE: NOT DETECTED
FLUBV RNA ISLT QL NAA+PROBE: NOT DETECTED
GEN 5 1HR TROPONIN T REFLEX: 27 NG/L
GLOBULIN UR ELPH-MCNC: 3 GM/DL
GLUCOSE SERPL-MCNC: 110 MG/DL (ref 65–99)
HCT VFR BLD AUTO: 34.5 % (ref 34–46.6)
HGB BLD-MCNC: 11 G/DL (ref 12–15.9)
HOLD SPECIMEN: NORMAL
HOLD SPECIMEN: NORMAL
IMM GRANULOCYTES # BLD AUTO: 0.04 10*3/MM3 (ref 0–0.05)
IMM GRANULOCYTES NFR BLD AUTO: 0.5 % (ref 0–0.5)
LIPASE SERPL-CCNC: 23 U/L (ref 13–60)
LYMPHOCYTES # BLD AUTO: 1.24 10*3/MM3 (ref 0.7–3.1)
LYMPHOCYTES NFR BLD AUTO: 15.6 % (ref 19.6–45.3)
MAGNESIUM SERPL-MCNC: 1.5 MG/DL (ref 1.6–2.4)
MCH RBC QN AUTO: 28.9 PG (ref 26.6–33)
MCHC RBC AUTO-ENTMCNC: 31.9 G/DL (ref 31.5–35.7)
MCV RBC AUTO: 90.6 FL (ref 79–97)
MONOCYTES # BLD AUTO: 0.7 10*3/MM3 (ref 0.1–0.9)
MONOCYTES NFR BLD AUTO: 8.8 % (ref 5–12)
NEUTROPHILS NFR BLD AUTO: 5.88 10*3/MM3 (ref 1.7–7)
NEUTROPHILS NFR BLD AUTO: 73.9 % (ref 42.7–76)
NRBC BLD AUTO-RTO: 0 /100 WBC (ref 0–0.2)
NT-PROBNP SERPL-MCNC: 4396 PG/ML (ref 0–1800)
PLATELET # BLD AUTO: 265 10*3/MM3 (ref 140–450)
PMV BLD AUTO: 9.1 FL (ref 6–12)
POTASSIUM SERPL-SCNC: 3.5 MMOL/L (ref 3.5–5.2)
PROT SERPL-MCNC: 6.7 G/DL (ref 6–8.5)
QT INTERVAL: 350 MS
QTC INTERVAL: 466 MS
RBC # BLD AUTO: 3.81 10*6/MM3 (ref 3.77–5.28)
RSV RNA NPH QL NAA+NON-PROBE: NOT DETECTED
SARS-COV-2 RNA RESP QL NAA+PROBE: NOT DETECTED
SODIUM SERPL-SCNC: 137 MMOL/L (ref 136–145)
TROPONIN T % DELTA: -16
TROPONIN T NUMERIC DELTA: -5 NG/L
TROPONIN T SERPL HS-MCNC: 32 NG/L
WBC NRBC COR # BLD AUTO: 7.95 10*3/MM3 (ref 3.4–10.8)
WHOLE BLOOD HOLD COAG: NORMAL
WHOLE BLOOD HOLD SPECIMEN: NORMAL

## 2025-02-12 PROCEDURE — 83690 ASSAY OF LIPASE: CPT

## 2025-02-12 PROCEDURE — 74177 CT ABD & PELVIS W/CONTRAST: CPT

## 2025-02-12 PROCEDURE — 99291 CRITICAL CARE FIRST HOUR: CPT

## 2025-02-12 PROCEDURE — 80053 COMPREHEN METABOLIC PANEL: CPT

## 2025-02-12 PROCEDURE — 25510000001 IOPAMIDOL PER 1 ML: Performed by: EMERGENCY MEDICINE

## 2025-02-12 PROCEDURE — 25010000002 ONDANSETRON PER 1 MG: Performed by: EMERGENCY MEDICINE

## 2025-02-12 PROCEDURE — 83880 ASSAY OF NATRIURETIC PEPTIDE: CPT

## 2025-02-12 PROCEDURE — G0378 HOSPITAL OBSERVATION PER HR: HCPCS

## 2025-02-12 PROCEDURE — 93005 ELECTROCARDIOGRAM TRACING: CPT | Performed by: EMERGENCY MEDICINE

## 2025-02-12 PROCEDURE — 96366 THER/PROPH/DIAG IV INF ADDON: CPT

## 2025-02-12 PROCEDURE — 25010000002 PROCHLORPERAZINE 10 MG/2ML SOLUTION: Performed by: EMERGENCY MEDICINE

## 2025-02-12 PROCEDURE — 83605 ASSAY OF LACTIC ACID: CPT

## 2025-02-12 PROCEDURE — 83735 ASSAY OF MAGNESIUM: CPT

## 2025-02-12 PROCEDURE — 93005 ELECTROCARDIOGRAM TRACING: CPT

## 2025-02-12 PROCEDURE — 96365 THER/PROPH/DIAG IV INF INIT: CPT

## 2025-02-12 PROCEDURE — 96375 TX/PRO/DX INJ NEW DRUG ADDON: CPT

## 2025-02-12 PROCEDURE — 87637 SARSCOV2&INF A&B&RSV AMP PRB: CPT

## 2025-02-12 PROCEDURE — 71045 X-RAY EXAM CHEST 1 VIEW: CPT

## 2025-02-12 PROCEDURE — 85025 COMPLETE CBC W/AUTO DIFF WBC: CPT

## 2025-02-12 PROCEDURE — 36415 COLL VENOUS BLD VENIPUNCTURE: CPT

## 2025-02-12 PROCEDURE — 25810000003 SODIUM CHLORIDE 0.9 % SOLUTION: Performed by: EMERGENCY MEDICINE

## 2025-02-12 PROCEDURE — 84484 ASSAY OF TROPONIN QUANT: CPT

## 2025-02-12 RX ORDER — DILTIAZEM HCL IN NACL,ISO-OSM 125 MG/125
5-15 PLASTIC BAG, INJECTION (ML) INTRAVENOUS
Status: DISCONTINUED | OUTPATIENT
Start: 2025-02-12 | End: 2025-02-14 | Stop reason: HOSPADM

## 2025-02-12 RX ORDER — ONDANSETRON 2 MG/ML
4 INJECTION INTRAMUSCULAR; INTRAVENOUS ONCE
Status: COMPLETED | OUTPATIENT
Start: 2025-02-12 | End: 2025-02-12

## 2025-02-12 RX ORDER — IOPAMIDOL 755 MG/ML
100 INJECTION, SOLUTION INTRAVASCULAR
Status: COMPLETED | OUTPATIENT
Start: 2025-02-12 | End: 2025-02-12

## 2025-02-12 RX ORDER — SODIUM CHLORIDE 0.9 % (FLUSH) 0.9 %
10 SYRINGE (ML) INJECTION AS NEEDED
Status: DISCONTINUED | OUTPATIENT
Start: 2025-02-12 | End: 2025-02-14 | Stop reason: HOSPADM

## 2025-02-12 RX ORDER — PROCHLORPERAZINE EDISYLATE 5 MG/ML
5 INJECTION INTRAMUSCULAR; INTRAVENOUS ONCE
Status: COMPLETED | OUTPATIENT
Start: 2025-02-12 | End: 2025-02-12

## 2025-02-12 RX ORDER — ASPIRIN 81 MG/1
324 TABLET, CHEWABLE ORAL ONCE
Status: DISCONTINUED | OUTPATIENT
Start: 2025-02-12 | End: 2025-02-14 | Stop reason: HOSPADM

## 2025-02-12 RX ADMIN — ONDANSETRON 4 MG: 2 INJECTION INTRAMUSCULAR; INTRAVENOUS at 21:42

## 2025-02-12 RX ADMIN — IOPAMIDOL 100 ML: 755 INJECTION, SOLUTION INTRAVENOUS at 22:33

## 2025-02-12 RX ADMIN — PROCHLORPERAZINE EDISYLATE 5 MG: 5 INJECTION INTRAMUSCULAR; INTRAVENOUS at 23:51

## 2025-02-12 RX ADMIN — DILTIAZEM HYDROCHLORIDE 5 MG/HR: 5 INJECTION INTRAVENOUS at 21:44

## 2025-02-12 RX ADMIN — SODIUM CHLORIDE 1000 ML: 9 INJECTION, SOLUTION INTRAVENOUS at 21:45

## 2025-02-12 NOTE — ED PROVIDER NOTES
Time: 4:47 PM EST  Date of encounter:  2/12/2025  Independent Historian/Clinical History and Information was obtained by:   Patient    History is limited by: N/A    Chief Complaint: Dyspnea      History of Present Illness:  Patient is a 74 y.o. year old female who presents to the emergency department for evaluation of dyspnea.  Patient reports she has had nausea and vomiting over the last few days.  Reports she has not had an appetite secondary to ongoing nausea and vomiting has not ate anything last 3 days.  Patient has a prior medical history consistent for diabetes mellitus, CVA or MI.  History of persistent A-fib on Eliquis.  Patient has a history of asthma and COPD.  Denies supplemental O2 requirement.  Denies hospitalizations last 6 months or antibiotics in last 30 days.  Reports she has not been able to take daily meds secondary to nausea and vomiting.  Denies known fevers.      Patient Care Team  Primary Care Provider: Lester Lizarraga MD    Past Medical History:     Allergies   Allergen Reactions    Empagliflozin Nausea And Vomiting    Metformin Diarrhea     Past Medical History:   Diagnosis Date    Arthritis     Asthma     Atherosclerosis of coronary artery 2018    Non-obstructive coronary artery disease. Cardiac catheterization done in October 2018 showed a 40% mid LAD lesion and a 40% proximal RCA lesion.    Cancer     Breast.     COPD (chronic obstructive pulmonary disease)     Diabetes mellitus     Diverticulitis     Hiatal hernia     Hyperlipemia 01/21/2022    Hypertension, essential 03/05/2022     Past Surgical History:   Procedure Laterality Date    COLONOSCOPY      COLONOSCOPY N/A 11/14/2023    Procedure: COLONOSCOPY WITH POLYPECTOMIES HOT/COLD SNARE, ELEVIEW INJECTION, BIOPSIES;  Surgeon: Lester Gillette MD;  Location: [facility] ENDOSCOPY;  Service: Gastroenterology;  Laterality: N/A;  COLON POLYPS, DIVERTICULOSIS    ENDOSCOPY N/A 11/14/2023    Procedure: ESOPHAGOGASTRODUODENOSCOPY WITH  BIOPSIES;  Surgeon: Lester Gillette MD;  Location: [facility] ENDOSCOPY;  Service: Gastroenterology;  Laterality: N/A;  PREVIOUS SURGERY    HERNIA REPAIR      HYSTERECTOMY      MASTECTOMY Bilateral     UPPER GASTROINTESTINAL ENDOSCOPY       Family History   Problem Relation Age of Onset    Colon cancer Neg Hx     Malig Hyperthermia Neg Hx        Home Medications:  Prior to Admission medications    Medication Sig Start Date End Date Taking? Authorizing Provider   albuterol sulfate  (90 Base) MCG/ACT inhaler Inhale 2 puffs Every 4 (Four) Hours As Needed for Wheezing. 6/24/22   Rohan Ring,    apixaban (ELIQUIS) 5 MG tablet tablet Take 1 tablet by mouth 2 (Two) Times a Day. 11/19/24   Umesh Guadarrama MD   Arthritis Pain Reliever 650 MG 8 hr tablet Take 1 tablet by mouth Every 8 (Eight) Hours As Needed for Mild Pain. 3/2/22   Emergency, Nurse Epic, RN   azelastine (ASTELIN) 0.1 % nasal spray Administer 2 sprays into the nostril(s) as directed by provider 2 (Two) Times a Day. Use in each nostril as directed 10/30/24   Randall Mathew MD   baclofen (LIORESAL) 10 MG tablet Take 1 tablet by mouth 2 (Two) Times a Day.    ProviderJayesh MD   buPROPion XL (WELLBUTRIN XL) 150 MG 24 hr tablet Take 1 tablet by mouth Daily. 12/2/24   Jayesh Mckinley MD   dilTIAZem CD (CARDIZEM CD) 360 MG 24 hr capsule Take 1 capsule by mouth Daily. 12/13/24   Philip Bueno MD   fluticasone (FLONASE) 50 MCG/ACT nasal spray Administer 2 sprays into the nostril(s) as directed by provider Daily. 10/30/24   Randall Mathew MD   Fluticasone-Umeclidin-Vilant (TRELEGY ELLIPTA) 200-62.5-25 MCG/ACT inhaler Inhale 1 puff Daily. 10/30/24   Randall Mathew MD   furosemide (LASIX) 20 MG tablet Take 1 tablet by mouth Daily. 2/7/25   Umesh Guadarrama MD   gabapentin (NEURONTIN) 300 MG capsule Take 1 capsule by mouth Every Night.    ProviderJayesh MD   glipizide (GLUCOTROL) 5 MG tablet Take 1 tablet by mouth Daily.  5/30/24   Jayesh Mckinley MD   hydroCHLOROthiazide 25 MG tablet Take 1 tablet by mouth Daily. 2/7/25   Umesh Guadarrama MD   ipratropium-albuterol (DUO-NEB) 0.5-2.5 mg/3 ml nebulizer USE 3 ML VIA NEBULIZER FOUR TIMES DAILY AS NEEDED FOR WHEEZING OR SHORTNESS OF BREATH  Patient taking differently: Take 3 mL by nebulization 4 (Four) Times a Day As Needed for Wheezing or Shortness of Air. 10/30/24   Randall Mathew MD   lisinopril (PRINIVIL,ZESTRIL) 5 MG tablet Take 1 tablet by mouth Daily.    Jayesh Mckinley MD   metoprolol succinate XL (TOPROL-XL) 50 MG 24 hr tablet Take 1 tablet by mouth Every 12 (Twelve) Hours. 12/13/24   Philip Bueno MD   mirtazapine (REMERON) 15 MG tablet Take 1 tablet by mouth Every Night.    Jayesh Mckinley MD   montelukast (SINGULAIR) 10 MG tablet Take 1 tablet by mouth Every Night. 10/30/24   Randall Mathew MD   nitroglycerin (NITROSTAT) 0.4 MG SL tablet 1 under the tongue as needed for angina, may repeat q5mins for up three doses 2/7/25   Umesh Guadarrama MD   omeprazole (priLOSEC) 40 MG capsule TAKE 1 CAPSULE BY MOUTH TWICE DAILY 1/13/25   Randall Mathew MD   ondansetron (ZOFRAN) 4 MG tablet Take 1 tablet by mouth Every 6 (Six) Hours As Needed for Nausea or Vomiting.    Jayesh Mckinley MD   pantoprazole (PROTONIX) 40 MG EC tablet Take 1 tablet by mouth Daily.    Jaeysh Mckinley MD   pravastatin (PRAVACHOL) 80 MG tablet Take 1 tablet by mouth Daily. 2/7/25   Umesh Guadarrama MD   sertraline (ZOLOFT) 100 MG tablet Take 2 tablets by mouth Daily.    Jayesh Mckinley MD   vitamin D3 125 MCG (5000 UT) capsule capsule Take 1 capsule by mouth Daily.    Jayesh Mckinley MD   vitamin E 400 UNIT capsule Take 1 capsule by mouth Daily.    Jayesh Mckinley MD        Social History:   Social History     Tobacco Use    Smoking status: Never     Passive exposure: Past    Smokeless tobacco: Never   Vaping Use    Vaping status: Never Used  "  Substance Use Topics    Alcohol use: Never    Drug use: Never         Review of Systems:  Review of Systems   Constitutional:  Positive for fatigue. Negative for chills and fever.   HENT:  Negative for congestion, ear pain and sore throat.    Eyes:  Negative for pain.   Respiratory:  Positive for cough and shortness of breath. Negative for chest tightness.    Cardiovascular:  Positive for leg swelling. Negative for chest pain.   Gastrointestinal:  Positive for diarrhea, nausea and vomiting. Negative for abdominal pain.   Genitourinary:  Negative for flank pain and hematuria.   Musculoskeletal:  Negative for joint swelling.   Skin:  Negative for pallor.   Neurological:  Negative for seizures and headaches.   All other systems reviewed and are negative.       Physical Exam:  /70   Pulse 88   Temp 98.2 °F (36.8 °C) (Oral)   Resp 18   Ht 167.6 cm (66\")   Wt 94.3 kg (207 lb 14.3 oz)   SpO2 95%   BMI 33.55 kg/m²     Physical Exam  Vitals and nursing note reviewed.   Constitutional:       General: She is not in acute distress.     Appearance: Normal appearance. She is not toxic-appearing.   HENT:      Head: Normocephalic and atraumatic.      Jaw: There is normal jaw occlusion.      Mouth/Throat:      Mouth: Mucous membranes are moist.   Eyes:      General: Lids are normal.      Extraocular Movements: Extraocular movements intact.      Conjunctiva/sclera: Conjunctivae normal.      Pupils: Pupils are equal, round, and reactive to light.   Cardiovascular:      Rate and Rhythm: Tachycardia present. Rhythm irregular.      Pulses: Normal pulses.      Heart sounds: Normal heart sounds.   Pulmonary:      Effort: Pulmonary effort is normal. Tachypnea present. No respiratory distress.      Breath sounds: Normal breath sounds. No wheezing or rhonchi.   Abdominal:      General: Abdomen is flat. There is no distension.      Palpations: Abdomen is soft.      Tenderness: There is abdominal tenderness. There is no guarding " or rebound.      Comments: Right-sided abdominal tenderness   Musculoskeletal:         General: Normal range of motion.      Cervical back: Normal range of motion and neck supple.      Right lower le+ Edema present.      Left lower le+ Edema present.   Skin:     General: Skin is warm and dry.   Neurological:      General: No focal deficit present.      Mental Status: She is alert and oriented to person, place, and time. Mental status is at baseline.   Psychiatric:         Mood and Affect: Mood normal.         Behavior: Behavior normal.                    Medical Decision Making:      Comorbidities that affect care:  Asthma, diabetes, coronary artery disease, hypertension, COPD, breast cancer, chronic anticoagulation on Eliquis      External Notes reviewed:  Outpatient cardiology visit persistent atrial fibrillation 2025      The following orders were placed and all results were independently analyzed by me:  Orders Placed This Encounter   Procedures    COVID-19, FLU A/B, RSV PCR 1 HR TAT - Swab, Nasopharynx    XR Chest 1 View    CT Abdomen Pelvis With Contrast    CT Abdomen Pelvis Without Contrast    [place] Draw    High Sensitivity Troponin T    Comprehensive Metabolic Panel    Lipase    BNP    Magnesium    CBC Auto Differential    Lactic Acid, Plasma    High Sensitivity Troponin T 1Hr    Diet: Regular/House; Fluid Consistency: Thin (IDDSI 0)    Undress & Gown    Continuous Pulse Oximetry    Vital Signs    Up in Chair    Activity (Specify Details)    Turn Patient    Up With Assistance    Weigh Patient    Daily Weights    Strict Intake & Output    Oral Care    Maintain IV Access    Telemetry - Place Orders & Notify Provider of Results When Patient Experiences Acute Chest Pain, Dysrhythmia or Respiratory Distress    Code Status and Medical Interventions: CPR (Attempt to Resuscitate); Full Support    Hospitalist (on-call MD unless specified)    Nephrology  (on-call MD unless specified)    Oxygen Therapy-  Nasal Cannula; Titrate 1-6 LPM Per SpO2; 90 - 95%    Pulse Oximetry,  Spot    POC Glucose Once    POC Glucose Once    POC Glucose 4x Daily Before Meals & at Bedtime    POC Glucose Once    POC Glucose Once    POC Glucose Once    ECG 12 Lead ED Triage Standing Order; Chest Pain    ECG 12 Lead ED Triage Standing Order; Chest Pain    Insert Peripheral IV    Inpatient Admission    Initiate Observation Status    CBC & Differential    Green Top (Gel)    Lavender Top    Gold Top - SST    Light Blue Top       Medications Given in the Emergency Department:  Medications   sodium chloride 0.9 % flush 10 mL (has no administration in time range)   aspirin chewable tablet 324 mg (324 mg Oral Not Given 2/13/25 0052)   dilTIAZem (CARDIZEM) 125 mg in 125 mL sodium chloride  infusion (1 mg/hr Intravenous New Bag 2/13/25 1638)   albuterol sulfate HFA (PROVENTIL HFA;VENTOLIN HFA;PROAIR HFA) inhaler 2 puff (has no administration in time range)   apixaban (ELIQUIS) tablet 5 mg (5 mg Oral Given 2/13/25 2048)   baclofen (LIORESAL) tablet 10 mg (10 mg Oral Given 2/13/25 2047)   buPROPion XL (WELLBUTRIN XL) 24 hr tablet 150 mg (150 mg Oral Given 2/13/25 1006)   dilTIAZem CD (CARDIZEM CD) 24 hr capsule 360 mg (360 mg Oral Given 2/13/25 0903)   fluticasone (FLONASE) 50 MCG/ACT nasal spray 2 spray (2 sprays Nasal Not Given 2/13/25 1010)   budesonide-formoterol (SYMBICORT) 160-4.5 MCG/ACT inhaler 2 puff ( Inhalation Canceled Entry 2/13/25 2130)     And   revefenacin (YUPELRI) nebulizer solution 175 mcg (175 mcg Nebulization Given 2/13/25 0950)   gabapentin (NEURONTIN) capsule 300 mg (300 mg Oral Given 2/13/25 2047)   acetaminophen (TYLENOL) tablet 650 mg (has no administration in time range)     Or   acetaminophen (TYLENOL) 160 MG/5ML oral solution 650 mg (has no administration in time range)     Or   acetaminophen (TYLENOL) suppository 650 mg (has no administration in time range)   aluminum-magnesium hydroxide-simethicone (MAALOX MAX) 400-400-40  MG/5ML suspension 15 mL (has no administration in time range)   famotidine (PEPCID) tablet 40 mg (40 mg Oral Given 2/13/25 0903)   ALPRAZolam (XANAX) tablet 0.5 mg (has no administration in time range)   sennosides-docusate (PERICOLACE) 8.6-50 MG per tablet 2 tablet (2 tablets Oral Not Given 2/13/25 1006)     And   polyethylene glycol (MIRALAX) packet 17 g (has no administration in time range)     And   bisacodyl (DULCOLAX) EC tablet 5 mg (has no administration in time range)     And   bisacodyl (DULCOLAX) suppository 10 mg (has no administration in time range)   ondansetron (ZOFRAN) injection 4 mg (4 mg Intravenous Given 2/14/25 0713)   nitroglycerin (NITROSTAT) SL tablet 0.4 mg (has no administration in time range)   HYDROcodone-acetaminophen (NORCO) 5-325 MG per tablet 1 tablet (has no administration in time range)   morphine injection 2 mg (has no administration in time range)     And   naloxone (NARCAN) injection 0.4 mg (has no administration in time range)   dextrose (GLUTOSE) oral gel 15 g (has no administration in time range)   dextrose (D50W) (25 g/50 mL) IV injection 25 g (has no administration in time range)   glucagon (GLUCAGEN) injection 1 mg (has no administration in time range)   Insulin Lispro (humaLOG) injection 2-7 Units (2 Units Subcutaneous Given 2/13/25 2047)   metoprolol succinate XL (TOPROL-XL) 24 hr tablet 50 mg (50 mg Oral Given 2/13/25 2048)   pravastatin (PRAVACHOL) tablet 80 mg (80 mg Oral Given 2/13/25 2047)   sertraline (ZOLOFT) tablet 200 mg (200 mg Oral Given 2/13/25 1006)   dilTIAZem (CARDIZEM) bolus from bag 1 mg/mL solution 20 mg (20 mg Intravenous Bolus from Bag 2/12/25 2143)   sodium chloride 0.9 % bolus 1,000 mL (0 mL Intravenous Stopped 2/12/25 2358)   ondansetron (ZOFRAN) injection 4 mg (4 mg Intravenous Given 2/12/25 2142)   iopamidol (ISOVUE-370) 76 % injection 100 mL (100 mL Intravenous Given 2/12/25 2235)   prochlorperazine (COMPAZINE) injection 5 mg (5 mg Intravenous  Given 2/12/25 2351)        ED Course:    ED Course as of 02/14/25 0718   Wed Feb 12, 2025 1648 --- PROVIDER IN TRIAGE NOTE ---    The patient was evaluated by me, Tristian Royal in triage. Orders were placed and the patient is currently awaiting disposition.  [CB]   1657 Lactic ordered secondary to side tachypnea and tachycardia [CB]   2115 My interpretation of EKG: Atrial fibrillation 106, no acute ischemia [JS]   2309 Discussed patient's workup and presentation with Dr. Bueno for admission.  I have placed a bed request for him.  The patient's airway is intact, vital signs, and respiratory status are safe for admission at this time. [JS]      ED Course User Index  [CB] Tristian Royal PA-C  [JS] Umesh Coy MD       Labs:    Lab Results (last 24 hours)       Procedure Component Value Units Date/Time    POC Glucose Once [[id number]]  (Abnormal) Collected: 02/13/25 0720    Specimen: Blood Updated: 02/13/25 0722     Glucose 59 mg/dL      Comment: Serial Number: [id number]:  [id number]       POC Glucose Once [[id number]]  (Abnormal) Collected: 02/13/25 0740    Specimen: Blood Updated: 02/13/25 0742     Glucose 66 mg/dL      Comment: Serial Number: [id number]:  [id number]       POC Glucose Once [[id number]]  (Abnormal) Collected: 02/13/25 0822    Specimen: Blood Updated: 02/13/25 0824     Glucose 130 mg/dL      Comment: Serial Number: [id number]:  [id number]       POC Glucose 4x Daily Before Meals & at Bedtime [[id number]]  (Abnormal) Collected: 02/13/25 1101    Specimen: Blood Updated: 02/13/25 1103     Glucose 157 mg/dL      Comment: Serial Number: [id number]:  [id number]       POC Glucose Once [[id number]]  (Abnormal) Collected: 02/13/25 1647    Specimen: Blood Updated: 02/13/25 1650     Glucose 166 mg/dL      Comment: Serial Number: [id number]:  [id number]       POC Glucose Once [[id number]]  (Abnormal) Collected: 02/13/25 2027    Specimen: Blood Updated: 02/13/25 2028      Glucose 183 mg/dL      Comment: Serial Number: [id number]:  [id number]                Imaging:    CT Abdomen Pelvis Without Contrast    Result Date: 2/13/2025  CT ABDOMEN PELVIS WO CONTRAST Date of Exam: 2/13/2025 4:11 PM EST Indication: Vomiting. Comparison: 2/12/2025 Technique: Axial CT images were obtained of the abdomen and pelvis without the administration of contrast. Reconstructed coronal and sagittal images were also obtained. Automated exposure control and iterative construction methods were used. Findings: Lower Chest: Atelectasis in the lung bases. Small hiatal hernia Organs: No hydronephrosis. Some residual contrast in the renal collecting systems from yesterday's CT. Left renal cysts. Liver, spleen, pancreas, adrenal glands have an unremarkable noncontrast appearance. Gallbladder surgically absent Gastrointestinal: No intestinal distention or evident wall thickening. Diverticula of the sigmoid colon without inflammation Pelvis: No abnormal fluid collection. Uterus surgically absent. Urinary bladder unremarkable Peritoneum/Retroperitoneum: No ascites or pneumoperitoneum. Normal caliber aorta Bones/Soft Tissues: No acute bony abnormality     No acute process demonstrated Electronically Signed: Kirill Lala  2/13/2025 4:59 PM EST  Workstation ID: [id number]       Differential Diagnosis and Discussion:    Weakness: Based on the patient's history, signs, and symptoms, the diffential diagnosis includes but is not limited to meningitis, stroke, sepsis, subarachnoid hemorrhage, intracranial bleeding, encephalitis, acute uti, dehydration, MS, myasthenia gravis, Guillan [place], migraine variant, neuromuscular disorders vertigo, electrolyte imbalance, and metabolic disorders.    PROCEDURES:    Labs were collected in the emergency department and all labs were reviewed and interpreted by me.  X-ray were performed in the emergency department and all X-ray impressions were independently interpreted by me.    ECG  12 Lead ED Triage Standing Order; Chest Pain   Preliminary Result   HEART DYDI=781  bpm   RR Dkbvroqf=031  ms   AZ Interval=  ms   P Horizontal Axis=  deg   P Front Axis=  deg   QRSD Interval=94  ms   QT Nhihxzkq=463  ms   BChY=429  ms   QRS Axis=45  deg   T Wave Axis=47  deg   - ABNORMAL ECG -   Atrial fibrillation   Date and Time of Study:2025-02-12 16:05:03          Procedures    MDM     Amount and/or Complexity of Data Reviewed  Decide to obtain previous medical records or to obtain history from someone other than the patient: yes             Patient was placed on the cardiac monitor after being given IV diltiazem.  They were monitored for ventricular ectopy, arrhythmia, tachycardia, hypoxia, and changes in blood pressure.  Patient was rechecked several times throughout their stay for mental status decline and for reassessment of worsening changes in vital signs.     Total Critical Care time of 35 minutes. Total critical care time documented does not include time spent on separately billed procedures for services of nurses or physician assistants. I personally saw and examined the patient. I have reviewed all diagnostic interpretations and treatment plans as written. I was present for the key portions of any procedures performed and the inclusive time noted in any critical care statement. Critical care time includes patient management by me, time spent at the patients bedside,  time to review lab and imaging results, discussing patient care, documentation in the medical record, and time spent with family or caregiver.          Patient Care Considerations:          Consultants/Shared Management Plan:    I discussed the case with Dr. Bueno who agrees to admission.    Social Determinants of Health:    Patient is unable to carry out activities of daily life. Escalation of care is necessary.       Disposition and Care Coordination:    Admit:   Through independent evaluation of the patient's history, physical, and  imperical data, the patient meets criteria for inpatient admission to the hospital.        Final diagnoses:   Atrial fibrillation with rapid ventricular response   Nausea and vomiting, unspecified vomiting type        ED Disposition       ED Disposition   Decision to Admit    Condition   --    Comment   Level of Care: Telemetry [5]   Diagnosis: Atrial fibrillation with rapid ventricular response [[id number]]   Admitting Physician: BATOOL CARDONA [[id number]]   Attending Physician: BATOOL CARDONA [[id number]]   Certification: I Certify That Inpatient Hospital Services Are Medically Necessary For Greater Than 2 Midnights                 This medical record created using voice recognition software.             Umesh Coy MD  02/14/25 0718

## 2025-02-13 ENCOUNTER — APPOINTMENT (OUTPATIENT)
Dept: CT IMAGING | Facility: HOSPITAL | Age: 75
End: 2025-02-13
Payer: MEDICARE

## 2025-02-13 PROBLEM — I48.92 ATRIAL FLUTTER WITH RAPID VENTRICULAR RESPONSE: Status: ACTIVE | Noted: 2025-02-13

## 2025-02-13 LAB
GLUCOSE BLDC GLUCOMTR-MCNC: 130 MG/DL (ref 70–99)
GLUCOSE BLDC GLUCOMTR-MCNC: 157 MG/DL (ref 70–99)
GLUCOSE BLDC GLUCOMTR-MCNC: 166 MG/DL (ref 70–99)
GLUCOSE BLDC GLUCOMTR-MCNC: 183 MG/DL (ref 70–99)
GLUCOSE BLDC GLUCOMTR-MCNC: 59 MG/DL (ref 70–99)
GLUCOSE BLDC GLUCOMTR-MCNC: 66 MG/DL (ref 70–99)

## 2025-02-13 PROCEDURE — 74176 CT ABD & PELVIS W/O CONTRAST: CPT

## 2025-02-13 PROCEDURE — G0378 HOSPITAL OBSERVATION PER HR: HCPCS

## 2025-02-13 PROCEDURE — 94799 UNLISTED PULMONARY SVC/PX: CPT

## 2025-02-13 PROCEDURE — 82948 REAGENT STRIP/BLOOD GLUCOSE: CPT

## 2025-02-13 PROCEDURE — 63710000001 REVEFENACIN 175 MCG/3ML SOLUTION: Performed by: STUDENT IN AN ORGANIZED HEALTH CARE EDUCATION/TRAINING PROGRAM

## 2025-02-13 PROCEDURE — 96366 THER/PROPH/DIAG IV INF ADDON: CPT

## 2025-02-13 PROCEDURE — 63710000001 INSULIN LISPRO (HUMAN) PER 5 UNITS: Performed by: STUDENT IN AN ORGANIZED HEALTH CARE EDUCATION/TRAINING PROGRAM

## 2025-02-13 PROCEDURE — 94640 AIRWAY INHALATION TREATMENT: CPT

## 2025-02-13 PROCEDURE — 82948 REAGENT STRIP/BLOOD GLUCOSE: CPT | Performed by: STUDENT IN AN ORGANIZED HEALTH CARE EDUCATION/TRAINING PROGRAM

## 2025-02-13 PROCEDURE — 94664 DEMO&/EVAL PT USE INHALER: CPT

## 2025-02-13 RX ORDER — ACETAMINOPHEN 325 MG/1
650 TABLET ORAL EVERY 4 HOURS PRN
Status: DISCONTINUED | OUTPATIENT
Start: 2025-02-13 | End: 2025-02-14 | Stop reason: HOSPADM

## 2025-02-13 RX ORDER — PRAVASTATIN SODIUM 20 MG
80 TABLET ORAL NIGHTLY
Status: DISCONTINUED | OUTPATIENT
Start: 2025-02-13 | End: 2025-02-14 | Stop reason: HOSPADM

## 2025-02-13 RX ORDER — NALOXONE HCL 0.4 MG/ML
0.4 VIAL (ML) INJECTION
Status: DISCONTINUED | OUTPATIENT
Start: 2025-02-13 | End: 2025-02-14 | Stop reason: HOSPADM

## 2025-02-13 RX ORDER — DILTIAZEM HYDROCHLORIDE 180 MG/1
360 CAPSULE, COATED, EXTENDED RELEASE ORAL
Status: DISCONTINUED | OUTPATIENT
Start: 2025-02-13 | End: 2025-02-14 | Stop reason: HOSPADM

## 2025-02-13 RX ORDER — BUDESONIDE AND FORMOTEROL FUMARATE DIHYDRATE 160; 4.5 UG/1; UG/1
2 AEROSOL RESPIRATORY (INHALATION)
Status: DISCONTINUED | OUTPATIENT
Start: 2025-02-13 | End: 2025-02-14 | Stop reason: HOSPADM

## 2025-02-13 RX ORDER — HYDROCODONE BITARTRATE AND ACETAMINOPHEN 5; 325 MG/1; MG/1
1 TABLET ORAL EVERY 6 HOURS PRN
Status: DISCONTINUED | OUTPATIENT
Start: 2025-02-13 | End: 2025-02-14 | Stop reason: HOSPADM

## 2025-02-13 RX ORDER — SERTRALINE HYDROCHLORIDE 100 MG/1
200 TABLET, FILM COATED ORAL DAILY
Status: DISCONTINUED | OUTPATIENT
Start: 2025-02-13 | End: 2025-02-14 | Stop reason: HOSPADM

## 2025-02-13 RX ORDER — BISACODYL 5 MG/1
5 TABLET, DELAYED RELEASE ORAL DAILY PRN
Status: DISCONTINUED | OUTPATIENT
Start: 2025-02-13 | End: 2025-02-14 | Stop reason: HOSPADM

## 2025-02-13 RX ORDER — ALPRAZOLAM 0.25 MG/1
0.5 TABLET ORAL EVERY 8 HOURS PRN
Status: DISCONTINUED | OUTPATIENT
Start: 2025-02-13 | End: 2025-02-14 | Stop reason: HOSPADM

## 2025-02-13 RX ORDER — GABAPENTIN 300 MG/1
300 CAPSULE ORAL NIGHTLY
Status: DISCONTINUED | OUTPATIENT
Start: 2025-02-13 | End: 2025-02-14 | Stop reason: HOSPADM

## 2025-02-13 RX ORDER — ONDANSETRON 2 MG/ML
4 INJECTION INTRAMUSCULAR; INTRAVENOUS EVERY 6 HOURS PRN
Status: DISCONTINUED | OUTPATIENT
Start: 2025-02-13 | End: 2025-02-14 | Stop reason: HOSPADM

## 2025-02-13 RX ORDER — ALUMINA, MAGNESIA, AND SIMETHICONE 2400; 2400; 240 MG/30ML; MG/30ML; MG/30ML
15 SUSPENSION ORAL EVERY 6 HOURS PRN
Status: DISCONTINUED | OUTPATIENT
Start: 2025-02-13 | End: 2025-02-14 | Stop reason: HOSPADM

## 2025-02-13 RX ORDER — ACETAMINOPHEN 160 MG/5ML
650 SOLUTION ORAL EVERY 4 HOURS PRN
Status: DISCONTINUED | OUTPATIENT
Start: 2025-02-13 | End: 2025-02-14 | Stop reason: HOSPADM

## 2025-02-13 RX ORDER — MORPHINE SULFATE 2 MG/ML
2 INJECTION, SOLUTION INTRAMUSCULAR; INTRAVENOUS EVERY 4 HOURS PRN
Status: DISCONTINUED | OUTPATIENT
Start: 2025-02-13 | End: 2025-02-14 | Stop reason: HOSPADM

## 2025-02-13 RX ORDER — FLUTICASONE PROPIONATE 50 MCG
2 SPRAY, SUSPENSION (ML) NASAL DAILY
Status: DISCONTINUED | OUTPATIENT
Start: 2025-02-13 | End: 2025-02-14 | Stop reason: HOSPADM

## 2025-02-13 RX ORDER — AMOXICILLIN 250 MG
2 CAPSULE ORAL 2 TIMES DAILY PRN
Status: DISCONTINUED | OUTPATIENT
Start: 2025-02-13 | End: 2025-02-14 | Stop reason: HOSPADM

## 2025-02-13 RX ORDER — NITROGLYCERIN 0.4 MG/1
0.4 TABLET SUBLINGUAL
Status: DISCONTINUED | OUTPATIENT
Start: 2025-02-13 | End: 2025-02-14 | Stop reason: HOSPADM

## 2025-02-13 RX ORDER — DEXTROSE MONOHYDRATE 25 G/50ML
25 INJECTION, SOLUTION INTRAVENOUS
Status: DISCONTINUED | OUTPATIENT
Start: 2025-02-13 | End: 2025-02-14 | Stop reason: HOSPADM

## 2025-02-13 RX ORDER — BISACODYL 10 MG
10 SUPPOSITORY, RECTAL RECTAL DAILY PRN
Status: DISCONTINUED | OUTPATIENT
Start: 2025-02-13 | End: 2025-02-14 | Stop reason: HOSPADM

## 2025-02-13 RX ORDER — BUPROPION HYDROCHLORIDE 150 MG/1
150 TABLET ORAL EVERY 24 HOURS
Status: DISCONTINUED | OUTPATIENT
Start: 2025-02-13 | End: 2025-02-14 | Stop reason: HOSPADM

## 2025-02-13 RX ORDER — BACLOFEN 10 MG/1
10 TABLET ORAL 2 TIMES DAILY
Status: DISCONTINUED | OUTPATIENT
Start: 2025-02-13 | End: 2025-02-14 | Stop reason: HOSPADM

## 2025-02-13 RX ORDER — FAMOTIDINE 20 MG/1
40 TABLET, FILM COATED ORAL DAILY
Status: DISCONTINUED | OUTPATIENT
Start: 2025-02-13 | End: 2025-02-14 | Stop reason: HOSPADM

## 2025-02-13 RX ORDER — ACETAMINOPHEN 650 MG/1
650 SUPPOSITORY RECTAL EVERY 4 HOURS PRN
Status: DISCONTINUED | OUTPATIENT
Start: 2025-02-13 | End: 2025-02-14 | Stop reason: HOSPADM

## 2025-02-13 RX ORDER — IBUPROFEN 600 MG/1
1 TABLET ORAL
Status: DISCONTINUED | OUTPATIENT
Start: 2025-02-13 | End: 2025-02-14 | Stop reason: HOSPADM

## 2025-02-13 RX ORDER — ALBUTEROL SULFATE 90 UG/1
2 INHALANT RESPIRATORY (INHALATION) EVERY 4 HOURS PRN
Status: DISCONTINUED | OUTPATIENT
Start: 2025-02-13 | End: 2025-02-14 | Stop reason: HOSPADM

## 2025-02-13 RX ORDER — NICOTINE POLACRILEX 4 MG
15 LOZENGE BUCCAL
Status: DISCONTINUED | OUTPATIENT
Start: 2025-02-13 | End: 2025-02-14 | Stop reason: HOSPADM

## 2025-02-13 RX ORDER — METOPROLOL SUCCINATE 50 MG/1
50 TABLET, EXTENDED RELEASE ORAL EVERY 12 HOURS SCHEDULED
Status: DISCONTINUED | OUTPATIENT
Start: 2025-02-13 | End: 2025-02-14 | Stop reason: HOSPADM

## 2025-02-13 RX ORDER — INSULIN LISPRO 100 [IU]/ML
2-7 INJECTION, SOLUTION INTRAVENOUS; SUBCUTANEOUS
Status: DISCONTINUED | OUTPATIENT
Start: 2025-02-13 | End: 2025-02-14 | Stop reason: HOSPADM

## 2025-02-13 RX ORDER — POLYETHYLENE GLYCOL 3350 17 G/17G
17 POWDER, FOR SOLUTION ORAL DAILY PRN
Status: DISCONTINUED | OUTPATIENT
Start: 2025-02-13 | End: 2025-02-14 | Stop reason: HOSPADM

## 2025-02-13 RX ADMIN — BUDESONIDE AND FORMOTEROL FUMARATE DIHYDRATE 2 PUFF: 160; 4.5 AEROSOL RESPIRATORY (INHALATION) at 18:39

## 2025-02-13 RX ADMIN — BACLOFEN 10 MG: 10 TABLET ORAL at 20:47

## 2025-02-13 RX ADMIN — DILTIAZEM HYDROCHLORIDE 360 MG: 180 CAPSULE, COATED, EXTENDED RELEASE ORAL at 09:03

## 2025-02-13 RX ADMIN — BUDESONIDE AND FORMOTEROL FUMARATE DIHYDRATE 2 PUFF: 160; 4.5 AEROSOL RESPIRATORY (INHALATION) at 09:50

## 2025-02-13 RX ADMIN — INSULIN LISPRO 2 UNITS: 100 INJECTION, SOLUTION INTRAVENOUS; SUBCUTANEOUS at 17:22

## 2025-02-13 RX ADMIN — BUPROPION HYDROCHLORIDE 150 MG: 150 TABLET, EXTENDED RELEASE ORAL at 10:06

## 2025-02-13 RX ADMIN — BACLOFEN 10 MG: 10 TABLET ORAL at 09:03

## 2025-02-13 RX ADMIN — PRAVASTATIN SODIUM 80 MG: 20 TABLET ORAL at 20:47

## 2025-02-13 RX ADMIN — GABAPENTIN 300 MG: 300 CAPSULE ORAL at 20:47

## 2025-02-13 RX ADMIN — METOPROLOL SUCCINATE 50 MG: 50 TABLET, EXTENDED RELEASE ORAL at 20:48

## 2025-02-13 RX ADMIN — BUDESONIDE AND FORMOTEROL FUMARATE DIHYDRATE 2 PUFF: 160; 4.5 AEROSOL RESPIRATORY (INHALATION) at 02:29

## 2025-02-13 RX ADMIN — INSULIN LISPRO 2 UNITS: 100 INJECTION, SOLUTION INTRAVENOUS; SUBCUTANEOUS at 20:47

## 2025-02-13 RX ADMIN — APIXABAN 5 MG: 5 TABLET, FILM COATED ORAL at 20:48

## 2025-02-13 RX ADMIN — APIXABAN 5 MG: 5 TABLET, FILM COATED ORAL at 09:03

## 2025-02-13 RX ADMIN — SERTRALINE HYDROCHLORIDE 200 MG: 100 TABLET ORAL at 10:06

## 2025-02-13 RX ADMIN — REVEFENACIN 175 MCG: 175 SOLUTION RESPIRATORY (INHALATION) at 09:50

## 2025-02-13 RX ADMIN — FAMOTIDINE 40 MG: 20 TABLET, FILM COATED ORAL at 09:03

## 2025-02-13 RX ADMIN — METOPROLOL SUCCINATE 50 MG: 50 TABLET, EXTENDED RELEASE ORAL at 10:06

## 2025-02-13 RX ADMIN — DILTIAZEM HYDROCHLORIDE 1 MG/HR: 5 INJECTION INTRAVENOUS at 16:38

## 2025-02-13 NOTE — PLAN OF CARE
Goal Outcome Evaluation:  Plan of Care Reviewed With: patient        Progress: improving  Outcome Evaluation: No acute changes with patient this shift. Patient able to consume more during dinner time meal. No c/o nausea at this time.

## 2025-02-13 NOTE — PLAN OF CARE
Goal Outcome Evaluation:  Plan of Care Reviewed With: patient        Progress: improving  Outcome Evaluation: Pt AOx4, VSS, 2L NC, Cardizem gtt titrated to affect, no complaints or new signs or symptoms noted overnight. Pt rested comfortably.

## 2025-02-13 NOTE — H&P
[facility]   HISTORY AND PHYSICAL    Patient Name: Camilla Navas  : 1950  MRN: [id number]  Primary Care Physician:  Lester Lizarraga MD  Date of admission: 2025    Subjective   Subjective     Chief Complaint: Atrial fibrillation with RVR    HPI:    Camilla Navas is a 74 y.o. female 74-year-old female with past medical history of chronic atrial fibrillation on anticoagulation, type 2 diabetes, hyperlipidemia, asthma, history of breast cancer who has been having nausea and vomiting since Monday and since that time had not taken any of her medications.  Her appetite has also been poor associated with that.  She denies any fevers or chills otherwise.  At the time of her presentation patient's heart rate was noted to be elevated and was started on diltiazem drip.  Her remaining vitals were otherwise stable.  Her chemistry and CBC is unremarkable.  proBNP noted to be mildly elevated.  Due to her nausea and vomiting and inability to keep medications down, patient has been admitted for further management    Review of Systems  All review of systems negative except as in subjective complaints:  Personal History     Past Medical History:   Diagnosis Date    Arthritis     Asthma     Atherosclerosis of coronary artery 2018    Non-obstructive coronary artery disease. Cardiac catheterization done in 2018 showed a 40% mid LAD lesion and a 40% proximal RCA lesion.    Cancer     Breast.     COPD (chronic obstructive pulmonary disease)     Diabetes mellitus     Diverticulitis     Hiatal hernia     Hyperlipemia 2022    Hypertension, essential 2022       Past Surgical History:   Procedure Laterality Date    COLONOSCOPY      COLONOSCOPY N/A 2023    Procedure: COLONOSCOPY WITH POLYPECTOMIES HOT/COLD SNARE, ELEVIEW INJECTION, BIOPSIES;  Surgeon: Lester Gillette MD;  Location: [facility] ENDOSCOPY;  Service: Gastroenterology;  Laterality: N/A;  COLON POLYPS, DIVERTICULOSIS     ENDOSCOPY N/A 11/14/2023    Procedure: ESOPHAGOGASTRODUODENOSCOPY WITH BIOPSIES;  Surgeon: Lester Gillette MD;  Location: [facility] ENDOSCOPY;  Service: Gastroenterology;  Laterality: N/A;  PREVIOUS SURGERY    HERNIA REPAIR      HYSTERECTOMY      MASTECTOMY Bilateral     UPPER GASTROINTESTINAL ENDOSCOPY         Family History: family history is not on file. Otherwise pertinent FHx was reviewed and not pertinent to current issue.    Social History:  reports that she has never smoked. She has been exposed to tobacco smoke. She has never used smokeless tobacco. She reports that she does not drink alcohol and does not use drugs.    Home Medications:  Fluticasone-Umeclidin-Vilant, acetaminophen, albuterol sulfate HFA, apixaban, azelastine, baclofen, buPROPion XL, dilTIAZem CD, fluticasone, furosemide, gabapentin, glipizide, hydroCHLOROthiazide, ipratropium-albuterol, lisinopril, metoprolol succinate XL, mirtazapine, montelukast, nitroglycerin, omeprazole, ondansetron, pravastatin, sertraline, vitamin D3, and vitamin E      Allergies:  Allergies   Allergen Reactions    Empagliflozin Nausea And Vomiting    Metformin Diarrhea       Objective   Objective     Vitals:   Temp:  [97.3 °F (36.3 °C)-99.5 °F (37.5 °C)] 97.7 °F (36.5 °C)  Heart Rate:  [] 98  Resp:  [16-23] 18  BP: (120-149)/() 120/60  Flow (L/min) (Oxygen Therapy):  [2] 2  Physical Exam               Constitutional:         Awake, alert responsive, conversant, no obvious distress   Eyes:                       PERRLA, sclerae anicteric, no conjunctival injection   HEENT:                   Moist mucous membranes, no nasal or eye discharge, no throat congestion   Neck:                      Supple, no thyromegaly, no lymphadenopathy, trachea midline, no elevated JVD   Respiratory:           Clear to auscultation bilaterally, nonlabored respirations    Cardiovascular:     RRR, no murmurs, rubs, or gallops, palpable pedal pulses bilaterally,No bilateral  ankle edema   Gastrointestinal:   Positive bowel sounds, soft, nontender, nondistended, no organomegaly   Musculoskeletal:   No clubbing or cyanosis to extremities, muscle wasting, joint swelling, muscle weakness   Psychiatric:             Appropriate affect, cooperative   Neurologic:            Awake alert ,oriented x 3, strength symmetric in all extremities, Cranial Nerves grossly intact to confrontation, speech clear   Skin:                      No rashes, bruising, skin ulcers, petechiae or ecchymosis    Result Review    Result Review:  I have personally reviewed the results from the time of this admission to 2/13/2025 08:30 EST and agree with these findings:  []  Laboratory  []  Microbiology  []  Radiology  []  EKG/Telemetry   []  Cardiology/Vascular   []  Pathology  []  Old records  []  Other:    Results from last 7 days   Lab Units 02/12/25  1625   WBC 10*3/mm3 7.95   HEMOGLOBIN g/dL 11.0*   PLATELETS 10*3/mm3 265     Results from last 7 days   Lab Units 02/12/25  1625   SODIUM mmol/L 137   POTASSIUM mmol/L 3.5   CHLORIDE mmol/L 98   CO2 mmol/L 26.8   ANION GAP mmol/L 12.2   BUN mg/dL 10   CREATININE mg/dL 0.83   GLUCOSE mg/dL 110*   EGFR mL/min/1.73 73.6   CALCIUM mg/dL 9.1   MAGNESIUM mg/dL 1.5*   BILIRUBIN mg/dL 0.4   ALK PHOS U/L 58   ALT (SGPT) U/L 10   AST (SGOT) U/L 18         Assessment & Plan   Assessment / Plan     Active Hospital Problems:  Active Hospital Problems    Diagnosis     Atrial fibrillation with rapid ventricular response      74-year-old female with past medical history of chronic atrial fibrillation on anticoagulation, type 2 diabetes, hyperlipidemia, asthma, history of breast cancer who has been having nausea and vomiting since Monday and since that time had not taken any of her medications found to be in A-fib with RVR and started on diltiazem drip    Plan:   Patient on supportive care for nausea and vomiting  Continue with diltiazem drip and wean off  Continue p.o. diltiazem and  p.o. metoprolol at home doses  Continue with Eliquis 5 mg twice daily, baclofen 10 mg twice daily, Wellbutrin 150 every 24, Trelegy Ellipta, sliding scale insulin, Neurontin 300 mg at bedtime, pravastatin 80 and Zoloft 100  Will hold off on glipizide    VTE Prophylaxis:  Pharmacologic & mechanical VTE prophylaxis orders are present.        CODE STATUS:    Code Status (Patient has no pulse and is not breathing): CPR (Attempt to Resuscitate)  Medical Interventions (Patient has pulse or is breathing): Full Support    Admission Status:  I believe this patient meets observation status.    Electronically signed by Philip Bueno MD, 02/13/25, 8:30 AM EST.

## 2025-02-13 NOTE — PAYOR COMM NOTE
"Camilla Navas (74 y.o. Female)       Date of Birth   1950    Social Security Number       Address   [address] [place] [address]    Home Phone   [phone]    MRN   [id number]       [facility]    Marital Status                               Admission Date   2/12/25    Admission Type   Emergency    Admitting Provider   Philip Bueno MD    Attending Provider   Philip Bueno MD    Department, Room/Bed   [facility] UNIT, 209/1       Discharge Date       Discharge Disposition       Discharge Destination                                 Attending Provider: Philip Bueno MD    Allergies: Empagliflozin, Metformin    Isolation: None   Infection: None   Code Status: CPR    Ht: 167.6 cm (66\")   Wt: 95 kg (209 lb 7 oz)    Admission Cmt: None   Principal Problem: Atrial flutter with rapid ventricular response [I48.92]                   Active Insurance as of 2/12/2025       Primary Coverage       Payor Plan Insurance Group Employer/Plan Group    [employer] [place] MEDICARE REPLACEMENT WELLCARE MED ADV PPO        Payor Plan Address Payor Plan Phone Number Payor Plan Fax Number Effective Dates    [address] [address]   1/1/2025 - None Entered    [facility] [id number]         Subscriber Name Subscriber Birth Date Member ID       CAMILLA NAVAS 1950 [id number]                     Emergency Contacts        (Rel.) Home Phone Work Phone Mobile Phone    BUDDY PADILLA (Daughter) [phone] -- [phone]    Shahnaz Sanchez (Grandchild) [phone] -- [phone]    Wanda Charles (Grandchild) -- -- [phone]             Atrial Fibrillation RRG Observation Care       Indications Met   Last updated by Dione Cano RN on 2/13/2025 0859     Review Status Created By   Primary Completed Dione Cano RN      Criteria Review   Atrial Fibrillation RRG Observation Care     Overall Determination: Indications Met   "   Criteria:  [×] Observation care is indicated for  1 or more  of the following :      [×] Pharmacologic rate control needed (eg, symptomatic Tachycardia)          2/13/2025  8:57 AM              -- 2/13/2025  8:57 AM by Dione Cano RN --                  Per MD note-nausea and vomiting since Monday and since that time had not taken any of her medications found to be in A-fib with RVR and started on diltiazem drip     Notes:  -- 2/13/2025  8:59 AM by Dione Cano RN --      Subject: Admission      74 y.o. female 74-year-old female with past medical history of chronic atrial fibrillation on anticoagulation, type 2 diabetes, hyperlipidemia, asthma, history of breast cancer who has been having nausea and vomiting since Monday and since that time had not taken any of her medications. Her appetite has also been poor associated with that. She denies any fevers or chills otherwise. At the time of her presentation patient's heart rate was noted to be elevated and was started on diltiazem drip. Her remaining vitals were otherwise stable. Her chemistry and CBC is unremarkable. proBNP noted to be mildly elevated. Due to her nausea and vomiting and inability to keep medications down, patient has been admitted for further management                  Vitals:       Temp: [97.3 °F (36.3 °C)-99.5 °F (37.5 °C)] 97.7 °F (36.5 °C)      Heart Rate: [] 98      Resp: [16-23] 18      BP: (120-149)/() 120/60      Flow (L/min) (Oxygen Therapy): [2] 2                  Plan:       Patient on supportive care for nausea and vomiting      Continue with diltiazem drip and wean off      Continue p.o. diltiazem and p.o. metoprolol at home doses      Continue with Eliquis 5 mg twice daily, baclofen 10 mg twice daily, Wellbutrin 150 every 24, Trelegy Ellipta, sliding scale insulin, Neurontin 300 mg at bedtime, pravastatin 80 and Zoloft 100      Will hold off on glipizide                      History & Physical        Philip Bueno MD  at 25 0830           [facility]   HISTORY AND PHYSICAL    Patient Name: Camilla Navas  : 1950  MRN: [id number]  Primary Care Physician:  Lester Lizarraga MD  Date of admission: 2025    Subjective  Subjective     Chief Complaint: Atrial fibrillation with RVR    HPI:    Camilla Navas is a 74 y.o. female 74-year-old female with past medical history of chronic atrial fibrillation on anticoagulation, type 2 diabetes, hyperlipidemia, asthma, history of breast cancer who has been having nausea and vomiting since Monday and since that time had not taken any of her medications.  Her appetite has also been poor associated with that.  She denies any fevers or chills otherwise.  At the time of her presentation patient's heart rate was noted to be elevated and was started on diltiazem drip.  Her remaining vitals were otherwise stable.  Her chemistry and CBC is unremarkable.  proBNP noted to be mildly elevated.  Due to her nausea and vomiting and inability to keep medications down, patient has been admitted for further management    Review of Systems  All review of systems negative except as in subjective complaints:  Personal History     Past Medical History:   Diagnosis Date    Arthritis     Asthma     Atherosclerosis of coronary artery 2018    Non-obstructive coronary artery disease. Cardiac catheterization done in 2018 showed a 40% mid LAD lesion and a 40% proximal RCA lesion.    Cancer     Breast.     COPD (chronic obstructive pulmonary disease)     Diabetes mellitus     Diverticulitis     Hiatal hernia     Hyperlipemia 2022    Hypertension, essential 2022       Past Surgical History:   Procedure Laterality Date    COLONOSCOPY      COLONOSCOPY N/A 2023    Procedure: COLONOSCOPY WITH POLYPECTOMIES HOT/COLD SNARE, ELEVIEW INJECTION, BIOPSIES;  Surgeon: Lester Gillette MD;  Location: [facility] ENDOSCOPY;  Service: Gastroenterology;  Laterality: N/A;  COLON  POLYPS, DIVERTICULOSIS    ENDOSCOPY N/A 11/14/2023    Procedure: ESOPHAGOGASTRODUODENOSCOPY WITH BIOPSIES;  Surgeon: Lester Gillette MD;  Location: [facility] ENDOSCOPY;  Service: Gastroenterology;  Laterality: N/A;  PREVIOUS SURGERY    HERNIA REPAIR      HYSTERECTOMY      MASTECTOMY Bilateral     UPPER GASTROINTESTINAL ENDOSCOPY         Family History: family history is not on file. Otherwise pertinent FHx was reviewed and not pertinent to current issue.    Social History:  reports that she has never smoked. She has been exposed to tobacco smoke. She has never used smokeless tobacco. She reports that she does not drink alcohol and does not use drugs.    Home Medications:  Fluticasone-Umeclidin-Vilant, acetaminophen, albuterol sulfate HFA, apixaban, azelastine, baclofen, buPROPion XL, dilTIAZem CD, fluticasone, furosemide, gabapentin, glipizide, hydroCHLOROthiazide, ipratropium-albuterol, lisinopril, metoprolol succinate XL, mirtazapine, montelukast, nitroglycerin, omeprazole, ondansetron, pravastatin, sertraline, vitamin D3, and vitamin E      Allergies:  Allergies   Allergen Reactions    Empagliflozin Nausea And Vomiting    Metformin Diarrhea       Objective  Objective     Vitals:   Temp:  [97.3 °F (36.3 °C)-99.5 °F (37.5 °C)] 97.7 °F (36.5 °C)  Heart Rate:  [] 98  Resp:  [16-23] 18  BP: (120-149)/() 120/60  Flow (L/min) (Oxygen Therapy):  [2] 2  Physical Exam               Constitutional:         Awake, alert responsive, conversant, no obvious distress   Eyes:                       PERRLA, sclerae anicteric, no conjunctival injection   HEENT:                   Moist mucous membranes, no nasal or eye discharge, no throat congestion   Neck:                      Supple, no thyromegaly, no lymphadenopathy, trachea midline, no elevated JVD   Respiratory:           Clear to auscultation bilaterally, nonlabored respirations    Cardiovascular:     RRR, no murmurs, rubs, or gallops, palpable pedal pulses  bilaterally,No bilateral ankle edema   Gastrointestinal:   Positive bowel sounds, soft, nontender, nondistended, no organomegaly   Musculoskeletal:   No clubbing or cyanosis to extremities, muscle wasting, joint swelling, muscle weakness   Psychiatric:             Appropriate affect, cooperative   Neurologic:            Awake alert ,oriented x 3, strength symmetric in all extremities, Cranial Nerves grossly intact to confrontation, speech clear   Skin:                      No rashes, bruising, skin ulcers, petechiae or ecchymosis    Result Review   Result Review:  I have personally reviewed the results from the time of this admission to 2/13/2025 08:30 EST and agree with these findings:  []  Laboratory  []  Microbiology  []  Radiology  []  EKG/Telemetry   []  Cardiology/Vascular   []  Pathology  []  Old records  []  Other:    Results from last 7 days   Lab Units 02/12/25  1625   WBC 10*3/mm3 7.95   HEMOGLOBIN g/dL 11.0*   PLATELETS 10*3/mm3 265     Results from last 7 days   Lab Units 02/12/25  1625   SODIUM mmol/L 137   POTASSIUM mmol/L 3.5   CHLORIDE mmol/L 98   CO2 mmol/L 26.8   ANION GAP mmol/L 12.2   BUN mg/dL 10   CREATININE mg/dL 0.83   GLUCOSE mg/dL 110*   EGFR mL/min/1.73 73.6   CALCIUM mg/dL 9.1   MAGNESIUM mg/dL 1.5*   BILIRUBIN mg/dL 0.4   ALK PHOS U/L 58   ALT (SGPT) U/L 10   AST (SGOT) U/L 18         Assessment & Plan  Assessment / Plan     Active Hospital Problems:  Active Hospital Problems    Diagnosis     Atrial fibrillation with rapid ventricular response      74-year-old female with past medical history of chronic atrial fibrillation on anticoagulation, type 2 diabetes, hyperlipidemia, asthma, history of breast cancer who has been having nausea and vomiting since Monday and since that time had not taken any of her medications found to be in A-fib with RVR and started on diltiazem drip    Plan:   Patient on supportive care for nausea and vomiting  Continue with diltiazem drip and wean off  Continue  p.o. diltiazem and p.o. metoprolol at home doses  Continue with Eliquis 5 mg twice daily, baclofen 10 mg twice daily, Wellbutrin 150 every 24, Trelegy Ellipta, sliding scale insulin, Neurontin 300 mg at bedtime, pravastatin 80 and Zoloft 100  Will hold off on glipizide    VTE Prophylaxis:  Pharmacologic & mechanical VTE prophylaxis orders are present.        CODE STATUS:    Code Status (Patient has no pulse and is not breathing): CPR (Attempt to Resuscitate)  Medical Interventions (Patient has pulse or is breathing): Full Support    Admission Status:  I believe this patient meets observation status.    Electronically signed by Philip Bueno MD, 02/13/25, 8:30 AM EST.             Electronically signed by Philip Bueno MD at 02/13/25 0834          Emergency Department Notes        Francesca Batres RN at 02/12/25 2030          Pt states has not been able to take medicine or eat without vomiting since Monday 2/10/25.     Electronically signed by Francesca Batres RN at 02/12/25 2032       Physician Progress Notes (last 24 hours)  Notes from 02/12/25 1112 through 02/13/25 1112   No notes of this type exist for this encounter.       Consult Notes (last 24 hours)  Notes from 02/12/25 1112 through 02/13/25 1112   No notes of this type exist for this encounter.     [facility] ,[place]  Ph [phone]-  F [phone]

## 2025-02-14 ENCOUNTER — READMISSION MANAGEMENT (OUTPATIENT)
Dept: CALL CENTER | Facility: HOSPITAL | Age: 75
End: 2025-02-14
Payer: MEDICARE

## 2025-02-14 VITALS
BODY MASS INDEX: 33.41 KG/M2 | TEMPERATURE: 99.5 F | OXYGEN SATURATION: 97 % | RESPIRATION RATE: 18 BRPM | DIASTOLIC BLOOD PRESSURE: 77 MMHG | HEIGHT: 66 IN | HEART RATE: 83 BPM | SYSTOLIC BLOOD PRESSURE: 140 MMHG | WEIGHT: 207.89 LBS

## 2025-02-14 PROBLEM — I48.92 ATRIAL FLUTTER WITH RAPID VENTRICULAR RESPONSE: Status: RESOLVED | Noted: 2025-02-13 | Resolved: 2025-02-14

## 2025-02-14 PROBLEM — I48.91 ATRIAL FIBRILLATION WITH RAPID VENTRICULAR RESPONSE: Status: RESOLVED | Noted: 2025-02-12 | Resolved: 2025-02-14

## 2025-02-14 LAB
GLUCOSE BLDC GLUCOMTR-MCNC: 124 MG/DL (ref 70–99)
GLUCOSE BLDC GLUCOMTR-MCNC: 67 MG/DL (ref 70–99)

## 2025-02-14 PROCEDURE — 94799 UNLISTED PULMONARY SVC/PX: CPT

## 2025-02-14 PROCEDURE — G0378 HOSPITAL OBSERVATION PER HR: HCPCS

## 2025-02-14 PROCEDURE — 94664 DEMO&/EVAL PT USE INHALER: CPT

## 2025-02-14 PROCEDURE — 82948 REAGENT STRIP/BLOOD GLUCOSE: CPT | Performed by: STUDENT IN AN ORGANIZED HEALTH CARE EDUCATION/TRAINING PROGRAM

## 2025-02-14 PROCEDURE — 25010000002 ONDANSETRON PER 1 MG: Performed by: STUDENT IN AN ORGANIZED HEALTH CARE EDUCATION/TRAINING PROGRAM

## 2025-02-14 PROCEDURE — 63710000001 REVEFENACIN 175 MCG/3ML SOLUTION: Performed by: STUDENT IN AN ORGANIZED HEALTH CARE EDUCATION/TRAINING PROGRAM

## 2025-02-14 PROCEDURE — 96376 TX/PRO/DX INJ SAME DRUG ADON: CPT

## 2025-02-14 RX ADMIN — DILTIAZEM HYDROCHLORIDE 360 MG: 180 CAPSULE, COATED, EXTENDED RELEASE ORAL at 09:00

## 2025-02-14 RX ADMIN — ONDANSETRON 4 MG: 2 INJECTION INTRAMUSCULAR; INTRAVENOUS at 07:13

## 2025-02-14 RX ADMIN — METOPROLOL SUCCINATE 50 MG: 50 TABLET, EXTENDED RELEASE ORAL at 09:00

## 2025-02-14 RX ADMIN — APIXABAN 5 MG: 5 TABLET, FILM COATED ORAL at 09:00

## 2025-02-14 RX ADMIN — REVEFENACIN 175 MCG: 175 SOLUTION RESPIRATORY (INHALATION) at 08:17

## 2025-02-14 RX ADMIN — FAMOTIDINE 40 MG: 20 TABLET, FILM COATED ORAL at 09:00

## 2025-02-14 RX ADMIN — BUDESONIDE AND FORMOTEROL FUMARATE DIHYDRATE 2 PUFF: 160; 4.5 AEROSOL RESPIRATORY (INHALATION) at 08:17

## 2025-02-14 RX ADMIN — SERTRALINE HYDROCHLORIDE 200 MG: 100 TABLET ORAL at 09:00

## 2025-02-14 RX ADMIN — BUPROPION HYDROCHLORIDE 150 MG: 150 TABLET, EXTENDED RELEASE ORAL at 09:00

## 2025-02-14 RX ADMIN — BACLOFEN 10 MG: 10 TABLET ORAL at 09:00

## 2025-02-14 NOTE — PLAN OF CARE
Goal Outcome Evaluation:  Plan of Care Reviewed With: patient        Progress: no change  Outcome Evaluation: VSS, HR , increases with activity. No c/o pain or discomfort. No c/o nausea. Bed alarm set, call light within reach.

## 2025-02-14 NOTE — DISCHARGE SUMMARY
[facility]   DISCHARGE SUMMARY    Patient Name: Camilla Navas  : 1950  MRN: [id number]    Date of Admission: 2025  Date of Discharge: 2025  Primary Care Physician: Lester Lizarraga MD    Consults       Date and Time Order Name Status Description    2025 11:05 PM Nephrology  (on-call MD unless specified)      2025 10:55 PM Hospitalist (on-call MD unless specified)              Hospital Course     Presenting Problem:   Atrial fibrillation with rapid ventricular response [I48.91]  Nausea and vomiting, unspecified vomiting type [R11.2]  Atrial flutter with rapid ventricular response [I48.92]    Active and resolved problems  Principal Problem (Resolved):    Atrial flutter with rapid ventricular response  Overview:  Active Problems:    * No active hospital problems. *  Resolved Problems:    Atrial fibrillation with rapid ventricular response  Overview:     Hospital Course:  Camilla Navas is a 74 y.o. female 74-year-old female with past medical history of chronic atrial fibrillation on anticoagulation, type 2 diabetes, hyperlipidemia, asthma, history of breast cancer who has been having nausea and vomiting since Monday and since that time had not taken any of her medications found to be in A-fib with RVR and started on diltiazem drip and quickly weaned off once able to take Po medications. Ct abdomen was negative. Patients nausea and abdominal pain and diarrhea improved. Possibly viral ill ness and patient stable and being discharged home and home meds continued      Vital Signs:  Temp:  [98.1 °F (36.7 °C)-99.5 °F (37.5 °C)] 98.1 °F (36.7 °C)  Heart Rate:  [] 92  Resp:  [16-18] 18  BP: (127-156)/(57-99) 138/74  Flow (L/min) (Oxygen Therapy):  [2] 2      Discharge Details        Discharge Medications        Changes to Medications        Instructions Start Date   ipratropium-albuterol 0.5-2.5 mg/3 ml nebulizer  Commonly known as: DUO-NEB  What changed: See the new  instructions.   USE 3 ML VIA NEBULIZER FOUR TIMES DAILY AS NEEDED FOR WHEEZING OR SHORTNESS OF BREATH             Continue These Medications        Instructions Start Date   albuterol sulfate  (90 Base) MCG/ACT inhaler  Commonly known as: PROVENTIL HFA;VENTOLIN HFA;PROAIR HFA   2 puffs, Inhalation, Every 4 Hours PRN      apixaban 5 MG tablet tablet  Commonly known as: ELIQUIS   5 mg, Oral, 2 Times Daily      Arthritis Pain Reliever 650 MG 8 hr tablet  Generic drug: acetaminophen   650 mg, Every 8 Hours PRN      azelastine 0.1 % nasal spray  Commonly known as: ASTELIN   2 sprays, Nasal, 2 Times Daily, Use in each nostril as directed      baclofen 10 MG tablet  Commonly known as: LIORESAL   10 mg, 2 Times Daily      buPROPion  MG 24 hr tablet  Commonly known as: WELLBUTRIN XL   150 mg, Every 24 Hours      dilTIAZem  MG 24 hr capsule  Commonly known as: CARDIZEM CD   360 mg, Oral, Every 24 Hours Scheduled      fluticasone 50 MCG/ACT nasal spray  Commonly known as: FLONASE   2 sprays, Nasal, Daily      Fluticasone-Umeclidin-Vilant 200-62.5-25 MCG/ACT inhaler  Commonly known as: TRELEGY ELLIPTA   1 puff, Inhalation, Daily - RT      furosemide 20 MG tablet  Commonly known as: LASIX   20 mg, Oral, Daily      gabapentin 300 MG capsule  Commonly known as: NEURONTIN   300 mg, Nightly      glipizide 5 MG tablet  Commonly known as: GLUCOTROL   5 mg, Daily      hydroCHLOROthiazide 25 MG tablet   25 mg, Oral, Daily      lisinopril 5 MG tablet  Commonly known as: PRINIVIL,ZESTRIL   1 tablet, Daily      metoprolol succinate XL 50 MG 24 hr tablet  Commonly known as: TOPROL-XL   50 mg, Oral, Every 12 Hours Scheduled      mirtazapine 15 MG tablet  Commonly known as: REMERON   15 mg, Nightly      montelukast 10 MG tablet  Commonly known as: SINGULAIR   10 mg, Oral, Nightly      nitroglycerin 0.4 MG SL tablet  Commonly known as: NITROSTAT   1 under the tongue as needed for angina, may repeat q5mins for up three  doses      omeprazole 40 MG capsule  Commonly known as: priLOSEC   40 mg, Oral, 2 Times Daily      ondansetron 4 MG tablet  Commonly known as: ZOFRAN   4 mg, Every 6 Hours PRN      pravastatin 80 MG tablet  Commonly known as: PRAVACHOL   80 mg, Oral, Daily      sertraline 100 MG tablet  Commonly known as: ZOLOFT   200 mg, Daily      vitamin D3 125 MCG (5000 UT) capsule capsule   5,000 Units, Daily      vitamin E 400 UNIT capsule   400 Units, Daily               Allergies   Allergen Reactions    Empagliflozin Nausea And Vomiting    Metformin Diarrhea         Discharge Disposition:  Home or Self Care    Diet:  as otlerated      Discharge Activity:   as tolerated      CODE STATUS:    Code Status and Medical Interventions: CPR (Attempt to Resuscitate); Full Support   Ordered at: 02/12/25 4574     Code Status (Patient has no pulse and is not breathing):    CPR (Attempt to Resuscitate)     Medical Interventions (Patient has pulse or is breathing):    Full Support         Future Appointments   Date Time Provider Department Center   3/6/2025 10:00 AM [facility] PULM LAB ROOM 2 [facility] PFT [facility]   3/6/2025 11:00 AM [facility] WALK TEST ROOM [facility] PFT [facility]   3/17/2025 10:30 AM Randall Mathew MD [facility] PCC ETW [facility]           Time spent on Discharge including face to face service:  15 minutes    Electronically signed by Philip Bueno MD, 02/14/25, 8:57 AM EST.

## 2025-02-15 NOTE — OUTREACH NOTE
Prep Survey      Flowsheet Row Responses   [facility] facility patient discharged from? [facility]   Is LACE score < 7 ? No   Eligibility Readm Mgmt   Discharge diagnosis Atrial flutter with rapid ventricular response   Does the patient have one of the following disease processes/diagnoses(primary or secondary)? Other   Prep survey completed? Yes            Eliza ROBERTSON - Registered Nurse

## 2025-02-17 ENCOUNTER — TELEPHONE (OUTPATIENT)
Dept: CARDIOLOGY | Facility: CLINIC | Age: 75
End: 2025-02-17
Payer: MEDICARE

## 2025-02-17 NOTE — TELEPHONE ENCOUNTER
Caller: INEZ    Relationship: SELF    Callback number: [phone]    Is it ok to leave a message: [] Yes [x] No    Requested medication for samples: ELIQUIS    How much medication does the patient currently have left: UNTIL SATURDAY    Who will be picking up the samples: SELF    Do you need information about patient financial assistance for this medication: [] Yes [] No    Additional details provided:  PATIENT IS NEEDING SAMPLES OF ELIQUIS. PLEASE CALL PATIENT IS ANY IS AVAILABLE. THANK YOU!

## 2025-02-18 NOTE — TELEPHONE ENCOUNTER
SPOKE TO PT.  ADVISED SAMPLES (DOCUMENTED) ARE READY TO BE PICKED UP.  SHE PLANS TO COME SOMETIME TODAY.     PT HAS APPLIED FOR ASST WITH ELIQUIS, AWAITING ON DETERMINATION LETTER.

## 2025-02-19 ENCOUNTER — READMISSION MANAGEMENT (OUTPATIENT)
Dept: CALL CENTER | Facility: HOSPITAL | Age: 75
End: 2025-02-19
Payer: MEDICARE

## 2025-02-19 NOTE — OUTREACH NOTE
Medical Week 1 Survey      Flowsheet Row Responses   [facility] facility patient discharged from? [facility]   Does the patient have one of the following disease processes/diagnoses(primary or secondary)? Other   Week 1 attempt successful? No   Unsuccessful attempts Attempt 1            ABRAHAM SPANN - Registered Nurse

## 2025-02-20 LAB
QT INTERVAL: 350 MS
QTC INTERVAL: 466 MS

## 2025-02-25 ENCOUNTER — READMISSION MANAGEMENT (OUTPATIENT)
Dept: CALL CENTER | Facility: HOSPITAL | Age: 75
End: 2025-02-25
Payer: MEDICARE

## 2025-02-25 NOTE — OUTREACH NOTE
Medical Week 1 Survey      Flowsheet Row Responses   [facility] patient discharged from? [facility]   Does the patient have one of the following disease processes/diagnoses(primary or secondary)? Other   Week 1 attempt successful? Yes   Call start time 1519   Call end time 1521   Discharge diagnosis Atrial flutter with rapid ventricular response   Person spoke with today (if not patient) and relationship Patient   Prescription comments No concerns or questions noted.   Medication comments Feels like heart races at time.   Does the patient have a primary care provider?  Yes   Comments regarding PCP Has had fu with pcp since [place]. Has stress test 03/05   Has home health visited the patient within 72 hours of discharge? N/A   Psychosocial issues? No   Did the patient receive a copy of their discharge instructions? Yes   Nursing interventions Reviewed instructions with patient   What is the patient's perception of their health status since discharge? Improving   Is the patient/caregiver able to teach back signs and symptoms related to disease process for when to call PCP? Yes   Is the patient/caregiver able to teach back signs and symptoms related to disease process for when to call 911? Yes   Is the patient/caregiver able to teach back the hierarchy of who to call/visit for symptoms/problems? PCP, Specialist, Home health nurse, Urgent Care, ED, 911 Yes   Week 1 call completed? Yes   Graduated Yes   Wrap up additional comments Patient reports doing well. Has upcoming stress test to evaluate if needing cardioversion versus heart cath.   Call end time 1521            lEiza ROBERTSON - Registered Nurse

## 2025-03-04 ENCOUNTER — APPOINTMENT (OUTPATIENT)
Dept: GENERAL RADIOLOGY | Facility: HOSPITAL | Age: 75
End: 2025-03-04
Payer: MEDICARE

## 2025-03-04 ENCOUNTER — HOSPITAL ENCOUNTER (EMERGENCY)
Facility: HOSPITAL | Age: 75
Discharge: HOME | End: 2025-03-04
Attending: EMERGENCY MEDICINE | Admitting: EMERGENCY MEDICINE
Payer: MEDICARE

## 2025-03-04 VITALS
DIASTOLIC BLOOD PRESSURE: 80 MMHG | BODY MASS INDEX: 31.96 KG/M2 | SYSTOLIC BLOOD PRESSURE: 118 MMHG | TEMPERATURE: 98 F | OXYGEN SATURATION: 97 % | HEART RATE: 92 BPM | WEIGHT: 198.85 LBS | RESPIRATION RATE: 16 BRPM | HEIGHT: 66 IN

## 2025-03-04 DIAGNOSIS — R07.9 CHEST PAIN, UNSPECIFIED TYPE: Primary | ICD-10-CM

## 2025-03-04 LAB
ALBUMIN SERPL-MCNC: 4.1 G/DL (ref 3.5–5.2)
ALBUMIN/GLOB SERPL: 1.3 G/DL
ALP SERPL-CCNC: 75 U/L (ref 39–117)
ALT SERPL W P-5'-P-CCNC: 17 U/L (ref 1–33)
ANION GAP SERPL CALCULATED.3IONS-SCNC: 12.4 MMOL/L (ref 5–15)
AST SERPL-CCNC: 28 U/L (ref 1–32)
BASOPHILS # BLD AUTO: 0.03 10*3/MM3 (ref 0–0.2)
BASOPHILS NFR BLD AUTO: 0.3 % (ref 0–1.5)
BILIRUB SERPL-MCNC: 0.2 MG/DL (ref 0–1.2)
BUN SERPL-MCNC: 19 MG/DL (ref 8–23)
BUN/CREAT SERPL: 15.1 (ref 7–25)
CALCIUM SPEC-SCNC: 9.8 MG/DL (ref 8.6–10.5)
CHLORIDE SERPL-SCNC: 97 MMOL/L (ref 98–107)
CO2 SERPL-SCNC: 26.6 MMOL/L (ref 22–29)
CREAT SERPL-MCNC: 1.26 MG/DL (ref 0.57–1)
DEPRECATED RDW RBC AUTO: 44.8 FL (ref 37–54)
EGFRCR SERPLBLD CKD-EPI 2021: 44.6 ML/MIN/1.73
EOSINOPHIL # BLD AUTO: 0.07 10*3/MM3 (ref 0–0.4)
EOSINOPHIL NFR BLD AUTO: 0.8 % (ref 0.3–6.2)
ERYTHROCYTE [DISTWIDTH] IN BLOOD BY AUTOMATED COUNT: 13.7 % (ref 12.3–15.4)
GEN 5 1HR TROPONIN T REFLEX: 13 NG/L
GLOBULIN UR ELPH-MCNC: 3.2 GM/DL
GLUCOSE SERPL-MCNC: 135 MG/DL (ref 65–99)
HCT VFR BLD AUTO: 36.9 % (ref 34–46.6)
HGB BLD-MCNC: 12.1 G/DL (ref 12–15.9)
HOLD SPECIMEN: NORMAL
HOLD SPECIMEN: NORMAL
IMM GRANULOCYTES # BLD AUTO: 0.04 10*3/MM3 (ref 0–0.05)
IMM GRANULOCYTES NFR BLD AUTO: 0.5 % (ref 0–0.5)
LIPASE SERPL-CCNC: 35 U/L (ref 13–60)
LYMPHOCYTES # BLD AUTO: 2 10*3/MM3 (ref 0.7–3.1)
LYMPHOCYTES NFR BLD AUTO: 22.6 % (ref 19.6–45.3)
MAGNESIUM SERPL-MCNC: 1.8 MG/DL (ref 1.6–2.4)
MCH RBC QN AUTO: 29.7 PG (ref 26.6–33)
MCHC RBC AUTO-ENTMCNC: 32.8 G/DL (ref 31.5–35.7)
MCV RBC AUTO: 90.4 FL (ref 79–97)
MONOCYTES # BLD AUTO: 0.91 10*3/MM3 (ref 0.1–0.9)
MONOCYTES NFR BLD AUTO: 10.3 % (ref 5–12)
NEUTROPHILS NFR BLD AUTO: 5.79 10*3/MM3 (ref 1.7–7)
NEUTROPHILS NFR BLD AUTO: 65.5 % (ref 42.7–76)
NRBC BLD AUTO-RTO: 0 /100 WBC (ref 0–0.2)
NT-PROBNP SERPL-MCNC: 1567 PG/ML (ref 0–1800)
PLATELET # BLD AUTO: 299 10*3/MM3 (ref 140–450)
PMV BLD AUTO: 9.6 FL (ref 6–12)
POTASSIUM SERPL-SCNC: 3.9 MMOL/L (ref 3.5–5.2)
PROT SERPL-MCNC: 7.3 G/DL (ref 6–8.5)
QT INTERVAL: 392 MS
QTC INTERVAL: 463 MS
RBC # BLD AUTO: 4.08 10*6/MM3 (ref 3.77–5.28)
SODIUM SERPL-SCNC: 136 MMOL/L (ref 136–145)
TROPONIN T % DELTA: -7
TROPONIN T NUMERIC DELTA: -1 NG/L
TROPONIN T SERPL HS-MCNC: 14 NG/L
WBC NRBC COR # BLD AUTO: 8.84 10*3/MM3 (ref 3.4–10.8)
WHOLE BLOOD HOLD COAG: NORMAL
WHOLE BLOOD HOLD SPECIMEN: NORMAL

## 2025-03-04 PROCEDURE — 93005 ELECTROCARDIOGRAM TRACING: CPT

## 2025-03-04 PROCEDURE — 96374 THER/PROPH/DIAG INJ IV PUSH: CPT

## 2025-03-04 PROCEDURE — 93005 ELECTROCARDIOGRAM TRACING: CPT | Performed by: EMERGENCY MEDICINE

## 2025-03-04 PROCEDURE — 71045 X-RAY EXAM CHEST 1 VIEW: CPT

## 2025-03-04 PROCEDURE — 99284 EMERGENCY DEPT VISIT MOD MDM: CPT

## 2025-03-04 PROCEDURE — 84484 ASSAY OF TROPONIN QUANT: CPT | Performed by: EMERGENCY MEDICINE

## 2025-03-04 PROCEDURE — 80053 COMPREHEN METABOLIC PANEL: CPT

## 2025-03-04 PROCEDURE — 83735 ASSAY OF MAGNESIUM: CPT

## 2025-03-04 PROCEDURE — 25010000002 ONDANSETRON PER 1 MG: Performed by: EMERGENCY MEDICINE

## 2025-03-04 PROCEDURE — 83880 ASSAY OF NATRIURETIC PEPTIDE: CPT

## 2025-03-04 PROCEDURE — 36415 COLL VENOUS BLD VENIPUNCTURE: CPT

## 2025-03-04 PROCEDURE — 84484 ASSAY OF TROPONIN QUANT: CPT

## 2025-03-04 PROCEDURE — 85025 COMPLETE CBC W/AUTO DIFF WBC: CPT

## 2025-03-04 PROCEDURE — 25010000002 ACETAMINOPHEN 10 MG/ML SOLUTION: Performed by: EMERGENCY MEDICINE

## 2025-03-04 PROCEDURE — 83690 ASSAY OF LIPASE: CPT

## 2025-03-04 PROCEDURE — 96375 TX/PRO/DX INJ NEW DRUG ADDON: CPT

## 2025-03-04 RX ORDER — ONDANSETRON 2 MG/ML
4 INJECTION INTRAMUSCULAR; INTRAVENOUS ONCE
Status: COMPLETED | OUTPATIENT
Start: 2025-03-04 | End: 2025-03-04

## 2025-03-04 RX ORDER — ASPIRIN 81 MG/1
324 TABLET, CHEWABLE ORAL ONCE
Status: DISCONTINUED | OUTPATIENT
Start: 2025-03-04 | End: 2025-03-04 | Stop reason: HOSPADM

## 2025-03-04 RX ORDER — ALUMINA, MAGNESIA, AND SIMETHICONE 2400; 2400; 240 MG/30ML; MG/30ML; MG/30ML
15 SUSPENSION ORAL ONCE
Status: COMPLETED | OUTPATIENT
Start: 2025-03-04 | End: 2025-03-04

## 2025-03-04 RX ORDER — SODIUM CHLORIDE 0.9 % (FLUSH) 0.9 %
10 SYRINGE (ML) INJECTION AS NEEDED
Status: DISCONTINUED | OUTPATIENT
Start: 2025-03-04 | End: 2025-03-04 | Stop reason: HOSPADM

## 2025-03-04 RX ORDER — ACETAMINOPHEN 10 MG/ML
1000 INJECTION, SOLUTION INTRAVENOUS ONCE
Status: COMPLETED | OUTPATIENT
Start: 2025-03-04 | End: 2025-03-04

## 2025-03-04 RX ADMIN — ACETAMINOPHEN 1000 MG: 10 INJECTION, SOLUTION INTRAVENOUS at 06:03

## 2025-03-04 RX ADMIN — ONDANSETRON 4 MG: 2 INJECTION INTRAMUSCULAR; INTRAVENOUS at 06:03

## 2025-03-04 RX ADMIN — ALUMINUM HYDROXIDE, MAGNESIUM HYDROXIDE, AND DIMETHICONE 15 ML: 400; 400; 40 SUSPENSION ORAL at 06:03

## 2025-03-04 NOTE — ED PROVIDER NOTES
Time: 6:29 AM EST  Date of encounter:  3/4/2025  Independent Historian/Clinical History and Information was obtained by:   Patient    History is limited by: N/A    Chief Complaint: chest pain      History of Present Illness:  Patient is a 74 y.o. year old female who presents to the emergency department for evaluation of Chest pain.  Chest pain started proxy 2 hours ago.  Pain radiates to below her left rib.  She does have a history of atrial fibrillation and COPD.  Describes pain as constant.  No alleviating aggravating factors.  She does report some nausea.  Patient states she is scheduled for a stress test tomorrow.      Patient Care Team  Primary Care Provider: Lester Lizarraga MD    Past Medical History:     Allergies   Allergen Reactions    Empagliflozin Nausea And Vomiting    Metformin Diarrhea     Past Medical History:   Diagnosis Date    Arthritis     Asthma     Atherosclerosis of coronary artery 2018    Non-obstructive coronary artery disease. Cardiac catheterization done in October 2018 showed a 40% mid LAD lesion and a 40% proximal RCA lesion.    Cancer     Breast.     COPD (chronic obstructive pulmonary disease)     Diabetes mellitus     Diverticulitis     Hiatal hernia     Hyperlipemia 01/21/2022    Hypertension, essential 03/05/2022     Past Surgical History:   Procedure Laterality Date    COLONOSCOPY      COLONOSCOPY N/A 11/14/2023    Procedure: COLONOSCOPY WITH POLYPECTOMIES HOT/COLD SNARE, ELEVIEW INJECTION, BIOPSIES;  Surgeon: Lester Gillette MD;  Location: [facility] ENDOSCOPY;  Service: Gastroenterology;  Laterality: N/A;  COLON POLYPS, DIVERTICULOSIS    ENDOSCOPY N/A 11/14/2023    Procedure: ESOPHAGOGASTRODUODENOSCOPY WITH BIOPSIES;  Surgeon: Lester Gillette MD;  Location: [facility] ENDOSCOPY;  Service: Gastroenterology;  Laterality: N/A;  PREVIOUS SURGERY    HERNIA REPAIR      HYSTERECTOMY      MASTECTOMY Bilateral     UPPER GASTROINTESTINAL ENDOSCOPY       Family History   Problem  Relation Age of Onset    Colon cancer Neg Hx     Malig Hyperthermia Neg Hx        Home Medications:  Prior to Admission medications    Medication Sig Start Date End Date Taking? Authorizing Provider   albuterol sulfate  (90 Base) MCG/ACT inhaler Inhale 2 puffs Every 4 (Four) Hours As Needed for Wheezing. 6/24/22   Rohan Ring DO   apixaban (ELIQUIS) 5 MG tablet tablet Take 1 tablet by mouth 2 (Two) Times a Day. 2/18/25   Umesh Guadarrama MD   Arthritis Pain Reliever 650 MG 8 hr tablet Take 1 tablet by mouth Every 8 (Eight) Hours As Needed for Mild Pain. 3/2/22   Emergency, Nurse Epic, RN   azelastine (ASTELIN) 0.1 % nasal spray Administer 2 sprays into the nostril(s) as directed by provider 2 (Two) Times a Day. Use in each nostril as directed 10/30/24   Randall Mathew MD   baclofen (LIORESAL) 10 MG tablet Take 1 tablet by mouth 2 (Two) Times a Day.    Jayesh Mckinley MD   buPROPion XL (WELLBUTRIN XL) 150 MG 24 hr tablet Take 1 tablet by mouth Daily. 12/2/24   Jayesh Mckinley MD   dilTIAZem CD (CARDIZEM CD) 360 MG 24 hr capsule Take 1 capsule by mouth Daily. 12/13/24   Philip Bueno MD   fluticasone (FLONASE) 50 MCG/ACT nasal spray Administer 2 sprays into the nostril(s) as directed by provider Daily. 10/30/24   Randall Mathew MD   Fluticasone-Umeclidin-Vilant (TRELEGY ELLIPTA) 200-62.5-25 MCG/ACT inhaler Inhale 1 puff Daily. 10/30/24   Randall Mathew MD   furosemide (LASIX) 20 MG tablet Take 1 tablet by mouth Daily. 2/7/25   Umesh Guadarrama MD   gabapentin (NEURONTIN) 300 MG capsule Take 1 capsule by mouth Every Night.    Jayesh Mckinley MD   glipizide (GLUCOTROL) 5 MG tablet Take 1 tablet by mouth Daily. 5/30/24   Jayesh Mckinley MD   hydroCHLOROthiazide 25 MG tablet Take 1 tablet by mouth Daily. 2/7/25   Umesh Guadarrama MD   ipratropium-albuterol (DUO-NEB) 0.5-2.5 mg/3 ml nebulizer USE 3 ML VIA NEBULIZER FOUR TIMES DAILY AS NEEDED FOR WHEEZING OR SHORTNESS OF  BREATH  Patient taking differently: Take 3 mL by nebulization 4 (Four) Times a Day As Needed for Wheezing or Shortness of Air. 10/30/24   Randall Mathew MD   lisinopril (PRINIVIL,ZESTRIL) 5 MG tablet Take 1 tablet by mouth Daily.    Jayesh Mckinley MD   metoprolol succinate XL (TOPROL-XL) 50 MG 24 hr tablet Take 1 tablet by mouth Every 12 (Twelve) Hours. 12/13/24   Philip Bueno MD   mirtazapine (REMERON) 15 MG tablet Take 1 tablet by mouth Every Night.    Jayesh Mckinley MD   montelukast (SINGULAIR) 10 MG tablet Take 1 tablet by mouth Every Night. 10/30/24   Randall Mathew MD   nitroglycerin (NITROSTAT) 0.4 MG SL tablet 1 under the tongue as needed for angina, may repeat q5mins for up three doses 2/7/25   Umesh Guadarrama MD   omeprazole (priLOSEC) 40 MG capsule TAKE 1 CAPSULE BY MOUTH TWICE DAILY 1/13/25   Randall Mathew MD   ondansetron (ZOFRAN) 4 MG tablet Take 1 tablet by mouth Every 6 (Six) Hours As Needed for Nausea or Vomiting.    Jayesh Mckinley MD   pravastatin (PRAVACHOL) 80 MG tablet Take 1 tablet by mouth Daily. 2/7/25   Umesh Guadarrama MD   sertraline (ZOLOFT) 100 MG tablet Take 2 tablets by mouth Daily.    Jayesh Mckinley MD   vitamin D3 125 MCG (5000 UT) capsule capsule Take 1 capsule by mouth Daily.    Jayesh Mckinley MD   vitamin E 400 UNIT capsule Take 1 capsule by mouth Daily.    Jayesh Mckinley MD        Social History:   Social History     Tobacco Use    Smoking status: Never     Passive exposure: Past    Smokeless tobacco: Never   Vaping Use    Vaping status: Never Used   Substance Use Topics    Alcohol use: Never    Drug use: Never         Review of Systems:  Review of Systems   Constitutional:  Negative for chills and fever.   HENT:  Negative for congestion, ear pain and sore throat.    Eyes:  Negative for pain.   Respiratory:  Negative for cough, chest tightness and shortness of breath.    Cardiovascular:  Positive for chest pain.  "  Gastrointestinal:  Negative for abdominal pain, diarrhea, nausea and vomiting.   Genitourinary:  Negative for flank pain and hematuria.   Musculoskeletal:  Negative for joint swelling.   Skin:  Negative for pallor.   Neurological:  Negative for seizures and headaches.   All other systems reviewed and are negative.       Physical Exam:  /70   Pulse 82   Temp 98 °F (36.7 °C) (Oral)   Resp 16   Ht 167.6 cm (66\")   Wt 90.2 kg (198 lb 13.7 oz)   SpO2 96%   BMI 32.10 kg/m²     Physical Exam  Constitutional:       Appearance: Normal appearance.   HENT:      Head: Normocephalic and atraumatic.      Nose: Nose normal.      Mouth/Throat:      Mouth: Mucous membranes are moist.   Eyes:      Extraocular Movements: Extraocular movements intact.      Conjunctiva/sclera: Conjunctivae normal.      Pupils: Pupils are equal, round, and reactive to light.   Cardiovascular:      Rate and Rhythm: Normal rate and regular rhythm.      Pulses: Normal pulses.      Heart sounds: Normal heart sounds.   Pulmonary:      Effort: Pulmonary effort is normal.      Breath sounds: Normal breath sounds.   Abdominal:      General: There is no distension.      Palpations: Abdomen is soft.      Tenderness: There is no abdominal tenderness.   Musculoskeletal:         General: Normal range of motion.      Cervical back: Normal range of motion.   Skin:     General: Skin is warm and dry.      Capillary Refill: Capillary refill takes less than 2 seconds.   Neurological:      General: No focal deficit present.      Mental Status: She is alert and oriented to person, place, and time. Mental status is at baseline.   Psychiatric:         Mood and Affect: Mood normal.         Behavior: Behavior normal.                    Medical Decision Making:      Comorbidities that affect care:    Asthma, Cancer, COPD, Diabetes, Hypertension    External Notes reviewed:    Previous Clinic Note: Patient was admitted on 2/12/2025 and discharged on 2/14/2025 for " atrial fibrillation with RVR, nausea and vomiting      The following orders were placed and all results were independently analyzed by me:  Orders Placed This Encounter   Procedures    XR Chest 1 View    [place] Draw    High Sensitivity Troponin T    Comprehensive Metabolic Panel    Lipase    BNP    Magnesium    CBC Auto Differential    High Sensitivity Troponin T 1Hr    NPO Diet NPO Type: Strict NPO    Undress & Gown    Continuous Pulse Oximetry    Oxygen Therapy- Nasal Cannula; Titrate 1-6 LPM Per SpO2; 90 - 95%    ECG 12 Lead ED Triage Standing Order; Chest Pain    ECG 12 Lead ED Triage Standing Order; Chest Pain    Insert Peripheral IV    CBC & Differential    Green Top (Gel)    Lavender Top    Gold Top - SST    Light Blue Top       Medications Given in the Emergency Department:  Medications   sodium chloride 0.9 % flush 10 mL (has no administration in time range)   aspirin chewable tablet 324 mg (324 mg Oral Not Given 3/4/25 0247)   ondansetron (ZOFRAN) injection 4 mg (4 mg Intravenous Given 3/4/25 0603)   aluminum-magnesium hydroxide-simethicone (MAALOX MAX) 400-400-40 MG/5ML suspension 15 mL (15 mL Oral Given 3/4/25 0603)   acetaminophen (OFIRMEV) injection 1,000 mg (1,000 mg Intravenous Given 3/4/25 0603)        ED Course:    ED Course as of 03/04/25 0634   Tue Mar 04, 2025   0631 ECG 12 Lead ED Triage Standing Order; Chest Pain  Atrial fibrillation with rate of 84.  No acute ST elevation.  Normal QTc.  EKG interpreted by me [LD]      ED Course User Index  [LD] Barber Gonzalez MD       Labs:    Lab Results (last 24 hours)       Procedure Component Value Units Date/Time    High Sensitivity Troponin T [[id number]]  (Abnormal) Collected: 03/04/25 0223    Specimen: Blood from Arm, Right Updated: 03/04/25 0251     HS Troponin T 14 ng/L     Narrative:      High Sensitive Troponin T Reference Range:  <14.0 ng/L- Negative Female for AMI  <22.0 ng/L- Negative Male for AMI  >=14 - Abnormal Female indicating  possible myocardial injury.  >=22 - Abnormal Male indicating possible myocardial injury.   Clinicians would have to utilize clinical acumen, EKG, Troponin, and serial changes to determine if it is an Acute Myocardial Infarction or myocardial injury due to an underlying chronic condition.         CBC & Differential [[id number]]  (Abnormal) Collected: 03/04/25 0223    Specimen: Blood from Arm, Right Updated: 03/04/25 0230    Narrative:      The following orders were created for panel order CBC & Differential.  Procedure                               Abnormality         Status                     ---------                               -----------         ------                     CBC Auto Differential[[id number]]        Abnormal            Final result                 Please view results for these tests on the individual orders.    Comprehensive Metabolic Panel [[id number]]  (Abnormal) Collected: 03/04/25 0223    Specimen: Blood from Arm, Right Updated: 03/04/25 0256     Glucose 135 mg/dL      BUN 19 mg/dL      Creatinine 1.26 mg/dL      Sodium 136 mmol/L      Potassium 3.9 mmol/L      Comment: Slight hemolysis detected by analyzer. Result may be falsely elevated.        Chloride 97 mmol/L      CO2 26.6 mmol/L      Calcium 9.8 mg/dL      Total Protein 7.3 g/dL      Albumin 4.1 g/dL      ALT (SGPT) 17 U/L      AST (SGOT) 28 U/L      Comment: Slight hemolysis detected by analyzer. Result may be falsely elevated.        Alkaline Phosphatase 75 U/L      Total Bilirubin 0.2 mg/dL      Globulin 3.2 gm/dL      A/G Ratio 1.3 g/dL      BUN/Creatinine Ratio 15.1     Anion Gap 12.4 mmol/L      eGFR 44.6 mL/min/1.73     Narrative:      GFR Categories in Chronic Kidney Disease (CKD)      GFR Category          GFR (mL/min/1.73)    Interpretation  G1                     90 or greater         Normal or high (1)  G2                      60-89                Mild decrease (1)  G3a                   45-59                Mild to moderate  decrease  G3b                   30-44                Moderate to severe decrease  G4                    15-29                Severe decrease  G5                    14 or less           Kidney failure          (1)In the absence of evidence of kidney disease, neither GFR category G1 or G2 fulfill the criteria for CKD.    eGFR calculation 2021 CKD-EPI creatinine equation, which does not include race as a factor    Lipase [[id number]]  (Normal) Collected: 03/04/25 0223    Specimen: Blood from Arm, Right Updated: 03/04/25 0251     Lipase 35 U/L     BNP [[id number]]  (Normal) Collected: 03/04/25 0223    Specimen: Blood from Arm, Right Updated: 03/04/25 0249     proBNP 1,567.0 pg/mL     Narrative:      This assay is used as an aid in the diagnosis of individuals suspected of having heart failure. It can be used as an aid in the diagnosis of acute decompensated heart failure (ADHF) in patients presenting with signs and symptoms of ADHF to the emergency department (ED). In addition, NT-proBNP of <300 pg/mL indicates ADHF is not likely.    Age Range Result Interpretation  NT-proBNP Concentration (pg/mL:      <50             Positive            >450                   Gray                 300-450                    Negative             <300    50-75           Positive            >900                  Gray                300-900                  Negative            <300      >75             Positive            >1800                  Gray                300-1800                  Negative            <300    Magnesium [[id number]]  (Normal) Collected: 03/04/25 0223    Specimen: Blood from Arm, Right Updated: 03/04/25 0256     Magnesium 1.8 mg/dL     CBC Auto Differential [[id number]]  (Abnormal) Collected: 03/04/25 0223    Specimen: Blood from Arm, Right Updated: 03/04/25 0230     WBC 8.84 10*3/mm3      RBC 4.08 10*6/mm3      Hemoglobin 12.1 g/dL      Hematocrit 36.9 %      MCV 90.4 fL      MCH 29.7 pg      MCHC 32.8 g/dL      RDW 13.7  %      RDW-SD 44.8 fl      MPV 9.6 fL      Platelets 299 10*3/mm3      Neutrophil % 65.5 %      Lymphocyte % 22.6 %      Monocyte % 10.3 %      Eosinophil % 0.8 %      Basophil % 0.3 %      Immature Grans % 0.5 %      Neutrophils, Absolute 5.79 10*3/mm3      Lymphocytes, Absolute 2.00 10*3/mm3      Monocytes, Absolute 0.91 10*3/mm3      Eosinophils, Absolute 0.07 10*3/mm3      Basophils, Absolute 0.03 10*3/mm3      Immature Grans, Absolute 0.04 10*3/mm3      nRBC 0.0 /100 WBC     High Sensitivity Troponin T 1Hr [[id number]] Collected: 03/04/25 0510    Specimen: Blood Updated: 03/04/25 0541     HS Troponin T 13 ng/L      Troponin T Numeric Delta -1 ng/L      Troponin T % Delta -7    Narrative:      High Sensitive Troponin T Reference Range:  <14.0 ng/L- Negative Female for AMI  <22.0 ng/L- Negative Male for AMI  >=14 - Abnormal Female indicating possible myocardial injury.  >=22 - Abnormal Male indicating possible myocardial injury.   Clinicians would have to utilize clinical acumen, EKG, Troponin, and serial changes to determine if it is an Acute Myocardial Infarction or myocardial injury due to an underlying chronic condition.                  Imaging:    XR Chest 1 View    Result Date: 3/4/2025  XR CHEST 1 VW Date of Exam: 3/4/2025 2:32 AM EST Indication: Chest Pain Triage Protocol Comparison: 2/12/2025 Findings: Heart is enlarged. Pulmonary vascularity is normal. The lungs are clear. No pneumothorax.     Cardiomegaly without evidence of heart failure or pneumonia. Electronically Signed: Carlton Gaona MD  3/4/2025 2:34 AM EST  Workstation ID: [id number]       Differential Diagnosis and Discussion:    Chest Pain:  Based on the patient's signs and symptoms, I considered aortic dissection, myocardial infaction, pulmonary embolism, cardiac tamponade, pericarditis, pneumothorax, musculoskeletal chest pain and other differential diagnosis as an etiology of the patient's chest pain.     PROCEDURES:    Labs were collected  in the emergency department and all labs were reviewed and interpreted by me.  X-ray were performed in the emergency department and all X-ray impressions were independently interpreted by me.  An EKG was performed and the EKG was interpreted by me.    ECG 12 Lead ED Triage Standing Order; Chest Pain   Preliminary Result   HEART RATE=84  bpm   RR Guvpuunx=130  ms   [place] Interval=  ms   P Horizontal Axis=  deg   P Front Axis=  deg   QRSD Jtciifur=924  ms   QT Lswaqoof=821  ms   WUvU=814  ms   QRS Axis=-11  deg   T Wave Axis=59  deg   - ABNORMAL ECG -   Atrial fibrillation   Inferior infarct, old   Date and Time of Study:2025-03-04 02:09:22          Procedures    MDM  Number of Diagnoses or Management Options  Diagnosis management comments: Patient is afebrile nontoxic-appearing.  Vital signs are stable.  At time of evaluation she is lying in bed in no acute distress.  EKG showed atrial fibrillation.  No acute ST elevation.  2 sets troponins were both negative.  Labs show no significant abnormalities.  Patient is scheduled for stress test tomorrow.  Recommend that she follows up with her cardiologist as scheduled.  Discussed return precautions, discharge instructions and answered all questions.         Amount and/or Complexity of Data Reviewed  Clinical lab tests: reviewed  Tests in the radiology section of CPT®: reviewed  Review and summarize past medical records: yes  Independent visualization of images, tracings, or specimens: yes    Risk of Complications, Morbidity, and/or Mortality  Presenting problems: moderate  Management options: moderate                       Patient Care Considerations:    SEPSIS was considered but is NOT present in the emergency department as SIRS criteria is not present.      Consultants/Shared Management Plan:    None    Social Determinants of Health:    Patient is independent, reliable, and has access to care.       Disposition and Care Coordination:    Discharged: The patient is suitable  and stable for discharge with no need for consideration of admission.    I have explained the patient´s condition, diagnoses and treatment plan based on the information available to me at this time. I have answered questions and addressed any concerns. The patient has a good  understanding of the patient´s diagnosis, condition, and treatment plan as can be expected at this point. The vital signs have been stable. The patient´s condition is stable and appropriate for discharge from the emergency department.      The patient will pursue further outpatient evaluation with the primary care physician or other designated or consulting physician as outlined in the discharge instructions. They are agreeable to this plan of care and follow-up instructions have been explained in detail. The patient has received these instructions in written format and has expressed an understanding of the discharge instructions. The patient is aware that any significant change in condition or worsening of symptoms should prompt an immediate return to this or the closest emergency department or call to 911.  I have explained discharge medications and the need for follow up with the patient/caretakers. This was also printed in the discharge instructions. Patient was discharged with the following medications and follow up:      Medication List        Changed      ipratropium-albuterol 0.5-2.5 mg/3 ml nebulizer  Commonly known as: DUO-NEB  USE 3 ML VIA NEBULIZER FOUR TIMES DAILY AS NEEDED FOR WHEEZING OR SHORTNESS OF BREATH  What changed: See the new instructions.           Lester Lizarraga MD  [address]  [place] [address]  [phone]    In 2 days         Final diagnoses:   Chest pain, unspecified type        ED Disposition       ED Disposition   Discharge    Condition   Stable    Comment   --               This medical record created using voice recognition software.             Barber Gonzalez MD  03/04/25 0623

## 2025-03-04 NOTE — ED NOTES
Patient arrived via EMS from home. Patient c/o chest pain that started two hours ago that radiates below her left rib. Patient has a history of afib and COPD.. Patient received 324 mg of Asprin via EMS. Patient states that the pain is constant.

## 2025-03-05 ENCOUNTER — HOSPITAL ENCOUNTER (OUTPATIENT)
Facility: HOSPITAL | Age: 75
Discharge: HOME | End: 2025-03-05
Admitting: INTERNAL MEDICINE
Payer: MEDICARE

## 2025-03-05 DIAGNOSIS — I25.10 NONOBSTRUCTIVE ATHEROSCLEROSIS OF CORONARY ARTERY: ICD-10-CM

## 2025-03-05 DIAGNOSIS — R07.89 CHEST DISCOMFORT: ICD-10-CM

## 2025-03-05 PROCEDURE — 93017 CV STRESS TEST TRACING ONLY: CPT

## 2025-03-05 PROCEDURE — A9502 TC99M TETROFOSMIN: HCPCS | Performed by: INTERNAL MEDICINE

## 2025-03-05 PROCEDURE — 34310000005 TECHNETIUM TETROFOSMIN KIT: Performed by: INTERNAL MEDICINE

## 2025-03-05 PROCEDURE — 25010000002 REGADENOSON 0.4 MG/5ML SOLUTION: Performed by: INTERNAL MEDICINE

## 2025-03-05 PROCEDURE — 78452 HT MUSCLE IMAGE SPECT MULT: CPT

## 2025-03-05 RX ORDER — REGADENOSON 0.08 MG/ML
0.4 INJECTION, SOLUTION INTRAVENOUS
Status: COMPLETED | OUTPATIENT
Start: 2025-03-05 | End: 2025-03-05

## 2025-03-05 RX ADMIN — TETROFOSMIN 1 DOSE: 1.38 INJECTION, POWDER, LYOPHILIZED, FOR SOLUTION INTRAVENOUS at 10:53

## 2025-03-05 RX ADMIN — REGADENOSON 0.4 MG: 0.08 INJECTION, SOLUTION INTRAVENOUS at 10:53

## 2025-03-05 RX ADMIN — TETROFOSMIN 1 DOSE: 1.38 INJECTION, POWDER, LYOPHILIZED, FOR SOLUTION INTRAVENOUS at 08:20

## 2025-03-06 ENCOUNTER — APPOINTMENT (OUTPATIENT)
Dept: CT IMAGING | Facility: HOSPITAL | Age: 75
DRG: 074 | End: 2025-03-06
Payer: MEDICARE

## 2025-03-06 ENCOUNTER — HOSPITAL ENCOUNTER (INPATIENT)
Facility: HOSPITAL | Age: 75
LOS: 3 days | Discharge: HOME | DRG: 074 | End: 2025-03-09
Attending: EMERGENCY MEDICINE | Admitting: INTERNAL MEDICINE
Payer: MEDICARE

## 2025-03-06 DIAGNOSIS — R19.7 DIARRHEA, UNSPECIFIED TYPE: ICD-10-CM

## 2025-03-06 DIAGNOSIS — E86.0 DEHYDRATION: ICD-10-CM

## 2025-03-06 DIAGNOSIS — I48.91 ATRIAL FIBRILLATION WITH RAPID VENTRICULAR RESPONSE: Primary | ICD-10-CM

## 2025-03-06 LAB
027 TOXIN: NORMAL
ALBUMIN SERPL-MCNC: 4 G/DL (ref 3.5–5.2)
ALBUMIN/GLOB SERPL: 1.3 G/DL
ALP SERPL-CCNC: 66 U/L (ref 39–117)
ALT SERPL W P-5'-P-CCNC: 18 U/L (ref 1–33)
ANION GAP SERPL CALCULATED.3IONS-SCNC: 11.9 MMOL/L (ref 5–15)
AST SERPL-CCNC: 26 U/L (ref 1–32)
BASOPHILS # BLD AUTO: 0.04 10*3/MM3 (ref 0–0.2)
BASOPHILS NFR BLD AUTO: 0.6 % (ref 0–1.5)
BH CV IMMEDIATE POST TECH DATA BLOOD PRESSURE: NORMAL MMHG
BH CV IMMEDIATE POST TECH DATA HEART RATE: 145 BPM
BH CV IMMEDIATE POST TECH DATA OXYGEN SATS: 98 %
BH CV REST NUCLEAR ISOTOPE DOSE: 9.2 MCI
BH CV SIX MINUTE RECOVERY TECH DATA BLOOD PRESSURE: NORMAL
BH CV SIX MINUTE RECOVERY TECH DATA HEART RATE: 138 BPM
BH CV SIX MINUTE RECOVERY TECH DATA OXYGEN SATURATION: 96 %
BH CV STRESS BP STAGE 1: NORMAL
BH CV STRESS COMMENTS STAGE 1: NORMAL
BH CV STRESS DOSE REGADENOSON STAGE 1: 0.4
BH CV STRESS DURATION MIN STAGE 1: 0
BH CV STRESS DURATION SEC STAGE 1: 10
BH CV STRESS HR STAGE 1: 130
BH CV STRESS NUCLEAR ISOTOPE DOSE: 35 MCI
BH CV STRESS O2 STAGE 1: 97
BH CV STRESS PROTOCOL 1: NORMAL
BH CV STRESS RECOVERY BP: NORMAL MMHG
BH CV STRESS RECOVERY HR: 158 BPM
BH CV STRESS RECOVERY O2: 98 %
BH CV STRESS STAGE 1: 1
BH CV THREE MINUTE POST TECH DATA BLOOD PRESSURE: NORMAL MMHG
BH CV THREE MINUTE POST TECH DATA HEART RATE: 138 BPM
BH CV THREE MINUTE POST TECH DATA OXYGEN SATURATION: 94 %
BILIRUB SERPL-MCNC: 0.3 MG/DL (ref 0–1.2)
BUN SERPL-MCNC: 16 MG/DL (ref 8–23)
BUN/CREAT SERPL: 14.5 (ref 7–25)
C DIFF TOX GENS STL QL NAA+PROBE: NEGATIVE
CALCIUM SPEC-SCNC: 9.9 MG/DL (ref 8.6–10.5)
CHLORIDE SERPL-SCNC: 100 MMOL/L (ref 98–107)
CO2 SERPL-SCNC: 26.1 MMOL/L (ref 22–29)
CREAT SERPL-MCNC: 1.1 MG/DL (ref 0.57–1)
D-LACTATE SERPL-SCNC: 1.4 MMOL/L (ref 0.5–2)
D-LACTATE SERPL-SCNC: 2.4 MMOL/L (ref 0.5–2)
DEPRECATED RDW RBC AUTO: 46 FL (ref 37–54)
EGFRCR SERPLBLD CKD-EPI 2021: 52.5 ML/MIN/1.73
EOSINOPHIL # BLD AUTO: 0.03 10*3/MM3 (ref 0–0.4)
EOSINOPHIL NFR BLD AUTO: 0.4 % (ref 0.3–6.2)
ERYTHROCYTE [DISTWIDTH] IN BLOOD BY AUTOMATED COUNT: 14.1 % (ref 12.3–15.4)
GLOBULIN UR ELPH-MCNC: 3.2 GM/DL
GLUCOSE SERPL-MCNC: 140 MG/DL (ref 65–99)
HCT VFR BLD AUTO: 39.9 % (ref 34–46.6)
HGB BLD-MCNC: 13.1 G/DL (ref 12–15.9)
HOLD SPECIMEN: NORMAL
HOLD SPECIMEN: NORMAL
IMM GRANULOCYTES # BLD AUTO: 0.05 10*3/MM3 (ref 0–0.05)
IMM GRANULOCYTES NFR BLD AUTO: 0.7 % (ref 0–0.5)
LYMPHOCYTES # BLD AUTO: 1.76 10*3/MM3 (ref 0.7–3.1)
LYMPHOCYTES NFR BLD AUTO: 24.8 % (ref 19.6–45.3)
MAXIMAL PREDICTED HEART RATE: 145 BPM
MCH RBC QN AUTO: 29.5 PG (ref 26.6–33)
MCHC RBC AUTO-ENTMCNC: 32.8 G/DL (ref 31.5–35.7)
MCV RBC AUTO: 89.9 FL (ref 79–97)
MONOCYTES # BLD AUTO: 0.75 10*3/MM3 (ref 0.1–0.9)
MONOCYTES NFR BLD AUTO: 10.6 % (ref 5–12)
NEUTROPHILS NFR BLD AUTO: 4.47 10*3/MM3 (ref 1.7–7)
NEUTROPHILS NFR BLD AUTO: 62.9 % (ref 42.7–76)
NRBC BLD AUTO-RTO: 0 /100 WBC (ref 0–0.2)
PERCENT MAX PREDICTED HR: 110.34 %
PLATELET # BLD AUTO: 253 10*3/MM3 (ref 140–450)
PMV BLD AUTO: 9.9 FL (ref 6–12)
POTASSIUM SERPL-SCNC: 4 MMOL/L (ref 3.5–5.2)
PROT SERPL-MCNC: 7.2 G/DL (ref 6–8.5)
RBC # BLD AUTO: 4.44 10*6/MM3 (ref 3.77–5.28)
SODIUM SERPL-SCNC: 138 MMOL/L (ref 136–145)
SPECT HRT GATED+EF W RNC IV: 34 %
STRESS BASELINE BP: NORMAL MMHG
STRESS BASELINE HR: 124 BPM
STRESS O2 SAT REST: 97 %
STRESS PERCENT HR: 130 %
STRESS POST O2 SAT PEAK: 98 %
STRESS POST PEAK BP: NORMAL MMHG
STRESS POST PEAK HR: 160 BPM
STRESS TARGET HR: 123 BPM
WBC NRBC COR # BLD AUTO: 7.1 10*3/MM3 (ref 3.4–10.8)
WHOLE BLOOD HOLD COAG: NORMAL
WHOLE BLOOD HOLD SPECIMEN: NORMAL

## 2025-03-06 PROCEDURE — 83605 ASSAY OF LACTIC ACID: CPT | Performed by: EMERGENCY MEDICINE

## 2025-03-06 PROCEDURE — 36415 COLL VENOUS BLD VENIPUNCTURE: CPT | Performed by: EMERGENCY MEDICINE

## 2025-03-06 PROCEDURE — 25010000002 DIPHENHYDRAMINE PER 50 MG: Performed by: EMERGENCY MEDICINE

## 2025-03-06 PROCEDURE — 85025 COMPLETE CBC W/AUTO DIFF WBC: CPT | Performed by: EMERGENCY MEDICINE

## 2025-03-06 PROCEDURE — 25010000002 ONDANSETRON PER 1 MG: Performed by: EMERGENCY MEDICINE

## 2025-03-06 PROCEDURE — 74177 CT ABD & PELVIS W/CONTRAST: CPT

## 2025-03-06 PROCEDURE — 25010000002 LORAZEPAM PER 2 MG: Performed by: EMERGENCY MEDICINE

## 2025-03-06 PROCEDURE — 36415 COLL VENOUS BLD VENIPUNCTURE: CPT

## 2025-03-06 PROCEDURE — 87493 C DIFF AMPLIFIED PROBE: CPT | Performed by: EMERGENCY MEDICINE

## 2025-03-06 PROCEDURE — 93010 ELECTROCARDIOGRAM REPORT: CPT | Performed by: INTERNAL MEDICINE

## 2025-03-06 PROCEDURE — 93005 ELECTROCARDIOGRAM TRACING: CPT | Performed by: EMERGENCY MEDICINE

## 2025-03-06 PROCEDURE — 25010000002 PROCHLORPERAZINE 10 MG/2ML SOLUTION: Performed by: EMERGENCY MEDICINE

## 2025-03-06 PROCEDURE — 87040 BLOOD CULTURE FOR BACTERIA: CPT | Performed by: EMERGENCY MEDICINE

## 2025-03-06 PROCEDURE — 84443 ASSAY THYROID STIM HORMONE: CPT | Performed by: INTERNAL MEDICINE

## 2025-03-06 PROCEDURE — 25510000001 IOPAMIDOL PER 1 ML: Performed by: EMERGENCY MEDICINE

## 2025-03-06 PROCEDURE — 93005 ELECTROCARDIOGRAM TRACING: CPT

## 2025-03-06 PROCEDURE — 80053 COMPREHEN METABOLIC PANEL: CPT | Performed by: EMERGENCY MEDICINE

## 2025-03-06 PROCEDURE — 99291 CRITICAL CARE FIRST HOUR: CPT

## 2025-03-06 RX ORDER — ONDANSETRON 2 MG/ML
4 INJECTION INTRAMUSCULAR; INTRAVENOUS ONCE
Status: COMPLETED | OUTPATIENT
Start: 2025-03-06 | End: 2025-03-06

## 2025-03-06 RX ORDER — AMOXICILLIN 250 MG
2 CAPSULE ORAL 2 TIMES DAILY PRN
Status: DISCONTINUED | OUTPATIENT
Start: 2025-03-06 | End: 2025-03-09 | Stop reason: HOSPADM

## 2025-03-06 RX ORDER — LORAZEPAM 2 MG/ML
1 INJECTION INTRAMUSCULAR ONCE
Status: COMPLETED | OUTPATIENT
Start: 2025-03-06 | End: 2025-03-06

## 2025-03-06 RX ORDER — BISACODYL 10 MG
10 SUPPOSITORY, RECTAL RECTAL DAILY PRN
Status: DISCONTINUED | OUTPATIENT
Start: 2025-03-06 | End: 2025-03-09 | Stop reason: HOSPADM

## 2025-03-06 RX ORDER — DILTIAZEM HYDROCHLORIDE 5 MG/ML
10 INJECTION INTRAVENOUS ONCE
Status: COMPLETED | OUTPATIENT
Start: 2025-03-06 | End: 2025-03-06

## 2025-03-06 RX ORDER — MIRTAZAPINE 15 MG/1
15 TABLET, FILM COATED ORAL NIGHTLY
Status: DISCONTINUED | OUTPATIENT
Start: 2025-03-06 | End: 2025-03-09 | Stop reason: HOSPADM

## 2025-03-06 RX ORDER — FAMOTIDINE 20 MG/1
40 TABLET, FILM COATED ORAL DAILY
Status: DISCONTINUED | OUTPATIENT
Start: 2025-03-07 | End: 2025-03-09 | Stop reason: HOSPADM

## 2025-03-06 RX ORDER — DIPHENHYDRAMINE HYDROCHLORIDE 50 MG/ML
12.5 INJECTION INTRAMUSCULAR; INTRAVENOUS ONCE
Status: COMPLETED | OUTPATIENT
Start: 2025-03-06 | End: 2025-03-06

## 2025-03-06 RX ORDER — ALUMINA, MAGNESIA, AND SIMETHICONE 2400; 2400; 240 MG/30ML; MG/30ML; MG/30ML
15 SUSPENSION ORAL EVERY 6 HOURS PRN
Status: DISCONTINUED | OUTPATIENT
Start: 2025-03-06 | End: 2025-03-09 | Stop reason: HOSPADM

## 2025-03-06 RX ORDER — PROCHLORPERAZINE EDISYLATE 5 MG/ML
10 INJECTION INTRAMUSCULAR; INTRAVENOUS ONCE
Status: COMPLETED | OUTPATIENT
Start: 2025-03-06 | End: 2025-03-06

## 2025-03-06 RX ORDER — POLYETHYLENE GLYCOL 3350 17 G/17G
17 POWDER, FOR SOLUTION ORAL DAILY PRN
Status: DISCONTINUED | OUTPATIENT
Start: 2025-03-06 | End: 2025-03-09 | Stop reason: HOSPADM

## 2025-03-06 RX ORDER — ONDANSETRON 2 MG/ML
4 INJECTION INTRAMUSCULAR; INTRAVENOUS EVERY 6 HOURS PRN
Status: DISCONTINUED | OUTPATIENT
Start: 2025-03-06 | End: 2025-03-09 | Stop reason: HOSPADM

## 2025-03-06 RX ORDER — METOPROLOL SUCCINATE 50 MG/1
50 TABLET, EXTENDED RELEASE ORAL EVERY 12 HOURS SCHEDULED
Status: DISCONTINUED | OUTPATIENT
Start: 2025-03-06 | End: 2025-03-09 | Stop reason: HOSPADM

## 2025-03-06 RX ORDER — IOPAMIDOL 755 MG/ML
100 INJECTION, SOLUTION INTRAVASCULAR
Status: COMPLETED | OUTPATIENT
Start: 2025-03-06 | End: 2025-03-06

## 2025-03-06 RX ORDER — DILTIAZEM HYDROCHLORIDE EXTENDED-RELEASE TABLETS 360 MG/1
1 TABLET, EXTENDED RELEASE ORAL DAILY
COMMUNITY
Start: 2025-01-30

## 2025-03-06 RX ORDER — SODIUM CHLORIDE 0.9 % (FLUSH) 0.9 %
10 SYRINGE (ML) INJECTION AS NEEDED
Status: DISCONTINUED | OUTPATIENT
Start: 2025-03-06 | End: 2025-03-09 | Stop reason: HOSPADM

## 2025-03-06 RX ORDER — DILTIAZEM HCL IN NACL,ISO-OSM 125 MG/125
5-15 PLASTIC BAG, INJECTION (ML) INTRAVENOUS
Status: DISCONTINUED | OUTPATIENT
Start: 2025-03-06 | End: 2025-03-08

## 2025-03-06 RX ORDER — NITROGLYCERIN 0.4 MG/1
0.4 TABLET SUBLINGUAL
Status: DISCONTINUED | OUTPATIENT
Start: 2025-03-06 | End: 2025-03-09 | Stop reason: HOSPADM

## 2025-03-06 RX ORDER — BISACODYL 5 MG/1
5 TABLET, DELAYED RELEASE ORAL DAILY PRN
Status: DISCONTINUED | OUTPATIENT
Start: 2025-03-06 | End: 2025-03-09 | Stop reason: HOSPADM

## 2025-03-06 RX ORDER — BUPROPION HYDROCHLORIDE 150 MG/1
150 TABLET ORAL DAILY
Status: DISCONTINUED | OUTPATIENT
Start: 2025-03-06 | End: 2025-03-09 | Stop reason: HOSPADM

## 2025-03-06 RX ADMIN — DILTIAZEM HYDROCHLORIDE 10 MG: 5 INJECTION, SOLUTION INTRAVENOUS at 19:58

## 2025-03-06 RX ADMIN — ONDANSETRON 4 MG: 2 INJECTION INTRAMUSCULAR; INTRAVENOUS at 19:51

## 2025-03-06 RX ADMIN — DILTIAZEM HYDROCHLORIDE 5 MG/HR: 5 INJECTION, SOLUTION INTRAVENOUS at 20:02

## 2025-03-06 RX ADMIN — IOPAMIDOL 100 ML: 755 INJECTION, SOLUTION INTRAVENOUS at 18:49

## 2025-03-06 RX ADMIN — PROCHLORPERAZINE EDISYLATE 10 MG: 5 INJECTION INTRAMUSCULAR; INTRAVENOUS at 17:05

## 2025-03-06 RX ADMIN — LORAZEPAM 1 MG: 2 INJECTION INTRAMUSCULAR; INTRAVENOUS at 19:51

## 2025-03-06 RX ADMIN — DIPHENHYDRAMINE HYDROCHLORIDE 12.5 MG: 50 INJECTION, SOLUTION INTRAMUSCULAR; INTRAVENOUS at 17:05

## 2025-03-06 NOTE — PROGRESS NOTES
Stress test : Blood supply appears to be normal to all the walls of the heart, indicating no major blockages in the heart arteries.  She was noted from atrial fibrillation with fast heart rates during the stress test.    Also noted recent hospital admissions and ER visits.  Follow-up in cardiology clinic in 1 week, may be scheduled with Francesca CLIFFORD.      Electronically signed by Umesh Guadarrama MD, 03/06/25, 4:46 PM EST.

## 2025-03-07 ENCOUNTER — APPOINTMENT (OUTPATIENT)
Dept: CARDIOLOGY | Facility: HOSPITAL | Age: 75
DRG: 074 | End: 2025-03-07
Payer: MEDICARE

## 2025-03-07 ENCOUNTER — TELEPHONE (OUTPATIENT)
Dept: CARDIOLOGY | Facility: CLINIC | Age: 75
End: 2025-03-07
Payer: MEDICARE

## 2025-03-07 PROBLEM — K31.84 DIABETIC GASTROPARESIS: Status: ACTIVE | Noted: 2025-03-07

## 2025-03-07 PROBLEM — E86.0 DEHYDRATION: Status: ACTIVE | Noted: 2025-03-07

## 2025-03-07 PROBLEM — E11.43 DIABETIC GASTROPARESIS: Status: ACTIVE | Noted: 2025-03-07

## 2025-03-07 LAB
QT INTERVAL: 320 MS
QTC INTERVAL: 466 MS
TSH SERPL DL<=0.05 MIU/L-ACNC: 2.15 UIU/ML (ref 0.27–4.2)

## 2025-03-07 PROCEDURE — 93306 TTE W/DOPPLER COMPLETE: CPT

## 2025-03-07 PROCEDURE — 25810000003 LACTATED RINGERS PER 1000 ML: Performed by: INTERNAL MEDICINE

## 2025-03-07 PROCEDURE — 25010000002 METOCLOPRAMIDE PER 10 MG: Performed by: INTERNAL MEDICINE

## 2025-03-07 PROCEDURE — 25010000002 DIGOXIN PER 500 MCG: Performed by: INTERNAL MEDICINE

## 2025-03-07 PROCEDURE — 25010000002 SULFUR HEXAFLUORIDE MICROSPH 60.7-25 MG RECONSTITUTED SUSPENSION: Performed by: INTERNAL MEDICINE

## 2025-03-07 PROCEDURE — 93306 TTE W/DOPPLER COMPLETE: CPT | Performed by: STUDENT IN AN ORGANIZED HEALTH CARE EDUCATION/TRAINING PROGRAM

## 2025-03-07 RX ORDER — LORAZEPAM 0.5 MG/1
1 TABLET ORAL NIGHTLY PRN
Status: DISCONTINUED | OUTPATIENT
Start: 2025-03-07 | End: 2025-03-09 | Stop reason: HOSPADM

## 2025-03-07 RX ORDER — METOCLOPRAMIDE HYDROCHLORIDE 5 MG/ML
10 INJECTION INTRAMUSCULAR; INTRAVENOUS 3 TIMES DAILY
Status: COMPLETED | OUTPATIENT
Start: 2025-03-07 | End: 2025-03-09

## 2025-03-07 RX ORDER — DILTIAZEM HYDROCHLORIDE 180 MG/1
360 CAPSULE, COATED, EXTENDED RELEASE ORAL
Status: DISCONTINUED | OUTPATIENT
Start: 2025-03-07 | End: 2025-03-07

## 2025-03-07 RX ORDER — SODIUM CHLORIDE, SODIUM LACTATE, POTASSIUM CHLORIDE, CALCIUM CHLORIDE 600; 310; 30; 20 MG/100ML; MG/100ML; MG/100ML; MG/100ML
100 INJECTION, SOLUTION INTRAVENOUS CONTINUOUS
Status: ACTIVE | OUTPATIENT
Start: 2025-03-07 | End: 2025-03-07

## 2025-03-07 RX ORDER — METOPROLOL TARTRATE 25 MG/1
25 TABLET, FILM COATED ORAL EVERY 12 HOURS SCHEDULED
Status: DISCONTINUED | OUTPATIENT
Start: 2025-03-07 | End: 2025-03-07

## 2025-03-07 RX ORDER — DIGOXIN 0.25 MG/ML
250 INJECTION INTRAMUSCULAR; INTRAVENOUS ONCE
Status: COMPLETED | OUTPATIENT
Start: 2025-03-07 | End: 2025-03-07

## 2025-03-07 RX ORDER — DILTIAZEM HYDROCHLORIDE 300 MG/1
300 CAPSULE, COATED, EXTENDED RELEASE ORAL
Status: DISCONTINUED | OUTPATIENT
Start: 2025-03-07 | End: 2025-03-09 | Stop reason: HOSPADM

## 2025-03-07 RX ADMIN — LORAZEPAM 1 MG: 0.5 TABLET ORAL at 21:16

## 2025-03-07 RX ADMIN — Medication 10 ML: at 08:14

## 2025-03-07 RX ADMIN — METOPROLOL SUCCINATE 50 MG: 50 TABLET, FILM COATED, EXTENDED RELEASE ORAL at 08:14

## 2025-03-07 RX ADMIN — BUPROPION HYDROCHLORIDE 150 MG: 150 TABLET, EXTENDED RELEASE ORAL at 00:07

## 2025-03-07 RX ADMIN — APIXABAN 5 MG: 5 TABLET, FILM COATED ORAL at 00:06

## 2025-03-07 RX ADMIN — MIRTAZAPINE 15 MG: 15 TABLET, FILM COATED ORAL at 00:06

## 2025-03-07 RX ADMIN — METOCLOPRAMIDE HYDROCHLORIDE 10 MG: 5 INJECTION INTRAMUSCULAR; INTRAVENOUS at 21:16

## 2025-03-07 RX ADMIN — DIGOXIN 250 MCG: 0.25 INJECTION INTRAMUSCULAR; INTRAVENOUS at 14:09

## 2025-03-07 RX ADMIN — METOPROLOL SUCCINATE 50 MG: 50 TABLET, FILM COATED, EXTENDED RELEASE ORAL at 00:06

## 2025-03-07 RX ADMIN — APIXABAN 5 MG: 5 TABLET, FILM COATED ORAL at 21:16

## 2025-03-07 RX ADMIN — METOCLOPRAMIDE HYDROCHLORIDE 10 MG: 5 INJECTION INTRAMUSCULAR; INTRAVENOUS at 14:09

## 2025-03-07 RX ADMIN — FAMOTIDINE 40 MG: 20 TABLET, FILM COATED ORAL at 08:14

## 2025-03-07 RX ADMIN — MIRTAZAPINE 15 MG: 15 TABLET, FILM COATED ORAL at 21:16

## 2025-03-07 RX ADMIN — Medication 10 ML: at 21:18

## 2025-03-07 RX ADMIN — METOPROLOL SUCCINATE 50 MG: 50 TABLET, FILM COATED, EXTENDED RELEASE ORAL at 21:16

## 2025-03-07 RX ADMIN — SODIUM CHLORIDE, SODIUM LACTATE, POTASSIUM CHLORIDE, AND CALCIUM CHLORIDE 100 ML/HR: .6; .31; .03; .02 INJECTION, SOLUTION INTRAVENOUS at 14:08

## 2025-03-07 RX ADMIN — APIXABAN 5 MG: 5 TABLET, FILM COATED ORAL at 08:14

## 2025-03-07 RX ADMIN — DILTIAZEM HYDROCHLORIDE 300 MG: 300 CAPSULE, EXTENDED RELEASE ORAL at 14:08

## 2025-03-07 NOTE — NURSING NOTE
Patient Camilla Navas admitted to 4MTU from the ED. Patient is alert and oriented to person, place, time, and situation. Patient oriented to unit, call light system and bed alarm use, teaching effective. Patient agreed to bed alarm use. Candida Auris screening revealed patient will NOT need tested, contact isolation and bleach cleaning due to no risk factors. Patient currently is not a concern for an active CDIFF infection.    4 eyes skin assessment completed with Rocael Ruiz RN. Per policy, the following skin interventions were put in place as a result of the skin assessment below: None - Jayesh is greater than 18 and no skin issues noted    Skin Assessments (most recent)      Flowsheet Row Most Recent Value   Skin WDL WDL   Sensory Perception 4-->no impairment   Moisture 4-->rarely moist   Activity 4-->walks frequently   Mobility 3-->slightly limited   Nutrition 2-->probably inadequate   Friction and Shear 3-->no apparent problem   Jayesh Score 20   Pressure Reduction Techniques frequent weight shift encouraged            Fall assessment completed. Per policy, the patient was determined be a Low fall risk due to Roy Ramón score 14 or less (only used within the first 24 hours of admission). Patient assessed to need the following mobility assistance: Standby Assist with the following assistive devices: Walker, and will be using a bedside commode for toileting. Per policy, the following fall interventions were put in place: Standard Fall Precautions - oriented to room and call light, bed low and locked, clutter free environment, adequate lighting, directed to call for assistance, non-skid footwear, hourly rounding and personal belongings within reach..     Patient's belongings that were sent with family: None  Patient's belongings that were sent to security: Other:  Patient's belongings locked in safe box in room: None  Patient's belongings that were sent to pharmacy: None  Patient's belongings kept at  bedside per patient: Purse/Wallet, Dentures, Glasses, Clothing, Watch, and Other:one credit card.

## 2025-03-07 NOTE — H&P
[facility]   HISTORY AND PHYSICAL    Patient Name: Camilla Navas  : 1950  MRN: [id number]  Primary Care Physician:  Lester Lizarraga MD  Date of admission: 3/6/2025    Subjective   Subjective     Chief Complaint:  dehydration    HPI:    Camilla Navas is a 74 y.o. female  past medical history significant for essential hypertension asthma coronary artery disease type 2 diabetes mellitus has been having persistent nausea for almost 1 week to 10 days but denies any diarrhea or vomiting however she does complain of belching.  She denies any fever chills headache chest pain shortness of breath.  Patient has been to the emergency room twice and this time was admitted for dehydration and also had A-fib with RVR which is new onset    Review of Systems  All review of systems negative except as in subjective complaints:  Personal History     Past Medical History:   Diagnosis Date    Arthritis     Asthma     Atherosclerosis of coronary artery 2018    Non-obstructive coronary artery disease. Cardiac catheterization done in 2018 showed a 40% mid LAD lesion and a 40% proximal RCA lesion.    Cancer     Breast.     COPD (chronic obstructive pulmonary disease)     Diabetes mellitus     Diverticulitis     Hiatal hernia     Hyperlipemia 2022    Hypertension, essential 2022       Past Surgical History:   Procedure Laterality Date    COLONOSCOPY      COLONOSCOPY N/A 2023    Procedure: COLONOSCOPY WITH POLYPECTOMIES HOT/COLD SNARE, ELEVIEW INJECTION, BIOPSIES;  Surgeon: Lester Gillette MD;  Location: [facility] ENDOSCOPY;  Service: Gastroenterology;  Laterality: N/A;  COLON POLYPS, DIVERTICULOSIS    ENDOSCOPY N/A 2023    Procedure: ESOPHAGOGASTRODUODENOSCOPY WITH BIOPSIES;  Surgeon: Lester Gillette MD;  Location: [facility] ENDOSCOPY;  Service: Gastroenterology;  Laterality: N/A;  PREVIOUS SURGERY    HERNIA REPAIR      HYSTERECTOMY      MASTECTOMY Bilateral     UPPER  GASTROINTESTINAL ENDOSCOPY         Family History: family history is not on file. Otherwise pertinent FHx was reviewed and not pertinent to current issue.    Social History:  reports that she has never smoked. She has been exposed to tobacco smoke. She has never used smokeless tobacco. She reports that she does not drink alcohol and does not use drugs.    Home Medications:  Fluticasone-Umeclidin-Vilant, acetaminophen, albuterol sulfate HFA, apixaban, azelastine, baclofen, buPROPion XL, dilTIAZem HCl ER, fluticasone, furosemide, gabapentin, glipizide, hydroCHLOROthiazide, ipratropium-albuterol, lisinopril, metoprolol succinate XL, mirtazapine, montelukast, nitroglycerin, omeprazole, ondansetron, pravastatin, sertraline, vitamin D3, and vitamin E      Allergies:  Allergies   Allergen Reactions    Empagliflozin Nausea And Vomiting    Metformin Diarrhea       Objective   Objective     Vitals:   Temp:  [97.6 °F (36.4 °C)-98.1 °F (36.7 °C)] 98.1 °F (36.7 °C)  Heart Rate:  [] 132  Resp:  [16-21] 16  BP: (109-153)/() 142/66  Physical Exam               Constitutional:         Awake, alert responsive, conversant, no obvious distress   Eyes:                       PERRLA, sclerae anicteric, no conjunctival injection   HEENT:                   Moist mucous membranes, no nasal or eye discharge, no throat congestion   Neck:                      Supple, no thyromegaly, no lymphadenopathy, trachea midline, no elevated JVD   Respiratory:           Clear to auscultation bilaterally, nonlabored respirations    Cardiovascular:     RRR, no murmurs, rubs, or gallops, palpable pedal pulses bilaterally,No bilateral ankle edema   Gastrointestinal:   Positive bowel sounds, soft, nontender, nondistended, no organomegaly   Musculoskeletal:   No clubbing or cyanosis to extremities, muscle wasting, joint swelling, muscle weakness   Psychiatric:             Appropriate affect, cooperative   Neurologic:            Awake alert ,oriented  x 3, strength symmetric in all extremities, Cranial Nerves grossly intact to confrontation, speech clear   Skin:                      No rashes, bruising, skin ulcers, petechiae or ecchymosis    Result Review    Result Review:  I have personally reviewed the results from the time of this admission to 3/7/2025 13:28 EST and agree with these findings:  []  Laboratory  []  Microbiology  []  Radiology  []  EKG/Telemetry   []  Cardiology/Vascular   []  Pathology  []  Old records  []  Other:    Results from last 7 days   Lab Units 03/06/25  1700 03/04/25  0223   WBC 10*3/mm3 7.10 8.84   HEMOGLOBIN g/dL 13.1 12.1   PLATELETS 10*3/mm3 253 299     Results from last 7 days   Lab Units 03/06/25  1700 03/04/25  0223   SODIUM mmol/L 138 136   POTASSIUM mmol/L 4.0 3.9   CHLORIDE mmol/L 100 97*   CO2 mmol/L 26.1 26.6   ANION GAP mmol/L 11.9 12.4   BUN mg/dL 16 19   CREATININE mg/dL 1.10* 1.26*   GLUCOSE mg/dL 140* 135*   EGFR mL/min/1.73 52.5* 44.6*   CALCIUM mg/dL 9.9 9.8   MAGNESIUM mg/dL  --  1.8   ALBUMIN g/dL 4.0 4.1   BILIRUBIN mg/dL 0.3 0.2   ALK PHOS U/L 66 75   ALT (SGPT) U/L 18 17   AST (SGOT) U/L 26 28         Assessment & Plan   Assessment / Plan     Active Hospital Problems:  Active Hospital Problems    Diagnosis     Dehydration     Diabetic gastroparesis     Atrial fibrillation with rapid ventricular response     Type 2 diabetes mellitus        Plan:   Continue Cardizem gtt   Continue Eliquis    Start beta-blockers   Reglan   IV fluids  VTE Prophylaxis:  Pharmacologic VTE prophylaxis orders are present.        CODE STATUS:    Level Of Support Discussed With: Patient  Code Status (Patient has no pulse and is not breathing): CPR (Attempt to Resuscitate)  Medical Interventions (Patient has pulse or is breathing): Full Support    Admission Status:  I believe this patient meets  and patient status.    Electronically signed by VENESSA Bernardo, 03/06/25, 9:01 PM EST.

## 2025-03-07 NOTE — PLAN OF CARE
Goal Outcome Evaluation:  Plan of Care Reviewed With: patient, grandchild(gardenia)        Progress: improving  Outcome Evaluation: Patient AOx4. Room Air. Cardiziem gtt currently on hold. Patient still in AFib heart rate 90s-105. Digoxin push IV and PO cardiziem given.IV fluids infusing per order. Patient states she has tolerated meals well today. Continuing plan of care.

## 2025-03-07 NOTE — ED PROVIDER NOTES
Time: 8:27 PM EST  Date of encounter:  3/6/2025  Independent Historian/Clinical History and Information was obtained by:   Patient    History is limited by: N/A    Chief Complaint: Nausea vomiting diarrhea      History of Present Illness:  Patient is a 74 y.o. year old female who presents to the emergency department for evaluation of nausea vomiting and diarrhea.  Patient reports symptoms for several days and this is her second emergency department visit for the symptoms.      Patient Care Team  Primary Care Provider: Lester Lizarraga MD    Past Medical History:     Allergies   Allergen Reactions    Empagliflozin Nausea And Vomiting    Metformin Diarrhea     Past Medical History:   Diagnosis Date    Arthritis     Asthma     Atherosclerosis of coronary artery 2018    Non-obstructive coronary artery disease. Cardiac catheterization done in October 2018 showed a 40% mid LAD lesion and a 40% proximal RCA lesion.    Cancer     Breast.     COPD (chronic obstructive pulmonary disease)     Diabetes mellitus     Diverticulitis     Hiatal hernia     Hyperlipemia 01/21/2022    Hypertension, essential 03/05/2022     Past Surgical History:   Procedure Laterality Date    COLONOSCOPY      COLONOSCOPY N/A 11/14/2023    Procedure: COLONOSCOPY WITH POLYPECTOMIES HOT/COLD SNARE, ELEVIEW INJECTION, BIOPSIES;  Surgeon: Lester Gillette MD;  Location: [facility] ENDOSCOPY;  Service: Gastroenterology;  Laterality: N/A;  COLON POLYPS, DIVERTICULOSIS    ENDOSCOPY N/A 11/14/2023    Procedure: ESOPHAGOGASTRODUODENOSCOPY WITH BIOPSIES;  Surgeon: Lester iGllette MD;  Location: [facility] ENDOSCOPY;  Service: Gastroenterology;  Laterality: N/A;  PREVIOUS SURGERY    HERNIA REPAIR      HYSTERECTOMY      MASTECTOMY Bilateral     UPPER GASTROINTESTINAL ENDOSCOPY       Family History   Problem Relation Age of Onset    Colon cancer Neg Hx     Malig Hyperthermia Neg Hx        Home Medications:  Prior to Admission medications    Medication Sig  Start Date End Date Taking? Authorizing Provider   albuterol sulfate  (90 Base) MCG/ACT inhaler Inhale 2 puffs Every 4 (Four) Hours As Needed for Wheezing. 6/24/22  Yes Rohan Ring DO   apixaban (ELIQUIS) 5 MG tablet tablet Take 1 tablet by mouth 2 (Two) Times a Day. 2/18/25  Yes Umesh Guadarrama MD   Arthritis Pain Reliever 650 MG 8 hr tablet Take 1 tablet by mouth Every 8 (Eight) Hours As Needed for Mild Pain. 3/2/22  Yes Emergency, Nurse Epic, RN   baclofen (LIORESAL) 10 MG tablet Take 1 tablet by mouth 2 (Two) Times a Day.   Yes Jayesh Mckinley MD   buPROPion XL (WELLBUTRIN XL) 150 MG 24 hr tablet Take 1 tablet by mouth Daily. 12/2/24  Yes Jayesh Mckinley MD   dilTIAZem CD (CARDIZEM CD) 360 MG 24 hr capsule Take 1 capsule by mouth Daily. 12/13/24  Yes Philip Bueno MD   fluticasone (FLONASE) 50 MCG/ACT nasal spray Administer 2 sprays into the nostril(s) as directed by provider Daily. 10/30/24  Yes Randall Mathew MD   Fluticasone-Umeclidin-Vilant (TRELEGY ELLIPTA) 200-62.5-25 MCG/ACT inhaler Inhale 1 puff Daily. 10/30/24  Yes Randall Mathew MD   furosemide (LASIX) 20 MG tablet Take 1 tablet by mouth Daily. 2/7/25  Yes Umesh Guadarrama MD   gabapentin (NEURONTIN) 300 MG capsule Take 1 capsule by mouth Every Night.   Yes ProviderJayesh MD   glipizide (GLUCOTROL) 5 MG tablet Take 1 tablet by mouth Daily. 5/30/24  Yes Jayesh Mckinley MD   hydroCHLOROthiazide 25 MG tablet Take 1 tablet by mouth Daily. 2/7/25  Yes Umesh Guadarrama MD   ipratropium-albuterol (DUO-NEB) 0.5-2.5 mg/3 ml nebulizer USE 3 ML VIA NEBULIZER FOUR TIMES DAILY AS NEEDED FOR WHEEZING OR SHORTNESS OF BREATH  Patient taking differently: Take 3 mL by nebulization 4 (Four) Times a Day As Needed for Wheezing or Shortness of Air. 10/30/24  Yes Randall Mathew MD   lisinopril (PRINIVIL,ZESTRIL) 5 MG tablet Take 1 tablet by mouth Daily.   Yes Provider, MD Jayesh   metoprolol succinate XL (TOPROL-XL) 50  MG 24 hr tablet Take 1 tablet by mouth Every 12 (Twelve) Hours. 12/13/24  Yes Philip Bueno MD   mirtazapine (REMERON) 15 MG tablet Take 1 tablet by mouth Every Night.   Yes ProviderJayesh MD   montelukast (SINGULAIR) 10 MG tablet Take 1 tablet by mouth Every Night. 10/30/24  Yes Randall Mathew MD   omeprazole (priLOSEC) 40 MG capsule TAKE 1 CAPSULE BY MOUTH TWICE DAILY 1/13/25  Yes Randall Mathew MD   ondansetron (ZOFRAN) 4 MG tablet Take 1 tablet by mouth Every 6 (Six) Hours As Needed for Nausea or Vomiting.   Yes ProviderJayesh MD   pravastatin (PRAVACHOL) 80 MG tablet Take 1 tablet by mouth Daily. 2/7/25  Yes Umesh Guadarrama MD   sertraline (ZOLOFT) 100 MG tablet Take 2 tablets by mouth Daily.   Yes Jayesh Mckinley MD   vitamin D3 125 MCG (5000 UT) capsule capsule Take 1 capsule by mouth Daily.   Yes ProviderJayesh MD   vitamin E 400 UNIT capsule Take 1 capsule by mouth Daily.   Yes ProviderJayesh MD   azelastine (ASTELIN) 0.1 % nasal spray Administer 2 sprays into the nostril(s) as directed by provider 2 (Two) Times a Day. Use in each nostril as directed 10/30/24   Randall Mathew MD   nitroglycerin (NITROSTAT) 0.4 MG SL tablet 1 under the tongue as needed for angina, may repeat q5mins for up three doses 2/7/25   Umesh Guadarrama MD        Social History:   Social History     Tobacco Use    Smoking status: Never     Passive exposure: Past    Smokeless tobacco: Never   Vaping Use    Vaping status: Never Used   Substance Use Topics    Alcohol use: Never    Drug use: Never         Review of Systems:  Review of Systems   Constitutional:  Negative for chills and fever.   HENT:  Negative for congestion, rhinorrhea and sore throat.    Eyes:  Negative for pain and visual disturbance.   Respiratory:  Negative for apnea, cough, chest tightness and shortness of breath.    Cardiovascular:  Negative for chest pain and palpitations.   Gastrointestinal:  Positive for diarrhea,  "nausea and vomiting. Negative for abdominal pain.   Genitourinary:  Negative for difficulty urinating and dysuria.   Musculoskeletal:  Negative for joint swelling and myalgias.   Skin:  Negative for color change.   Neurological:  Negative for seizures and headaches.   Psychiatric/Behavioral: Negative.     All other systems reviewed and are negative.       Physical Exam:  /68 (BP Location: Right arm, Patient Position: Lying)   Pulse (!) 126   Temp 98 °F (36.7 °C) (Oral)   Resp 16   Ht 167.6 cm (66\")   Wt 92.8 kg (204 lb 9.4 oz)   SpO2 92%   BMI 33.02 kg/m²     Physical Exam  Vitals and nursing note reviewed.   Constitutional:       General: She is not in acute distress.     Appearance: Normal appearance. She is not toxic-appearing.   HENT:      Head: Normocephalic and atraumatic.      Jaw: There is normal jaw occlusion.   Eyes:      General: Lids are normal.      Extraocular Movements: Extraocular movements intact.      Conjunctiva/sclera: Conjunctivae normal.      Pupils: Pupils are equal, round, and reactive to light.   Cardiovascular:      Rate and Rhythm: Regular rhythm. Tachycardia present.      Pulses: Normal pulses.      Heart sounds: Normal heart sounds.   Pulmonary:      Effort: Pulmonary effort is normal. No respiratory distress.      Breath sounds: Normal breath sounds. No wheezing or rhonchi.   Abdominal:      General: Abdomen is flat.      Palpations: Abdomen is soft.      Tenderness: There is no abdominal tenderness. There is no guarding or rebound.   Musculoskeletal:         General: Normal range of motion.      Cervical back: Normal range of motion and neck supple.      Right lower leg: No edema.      Left lower leg: No edema.   Skin:     General: Skin is warm and dry.   Neurological:      Mental Status: She is alert and oriented to person, place, and time. Mental status is at baseline.   Psychiatric:         Mood and Affect: Mood normal.                    Medical Decision " Making:      Comorbidities that affect care:    A-fib    External Notes reviewed:    None      The following orders were placed and all results were independently analyzed by me:  Orders Placed This Encounter   Procedures    Blood Culture - Blood,    Blood Culture - Blood,    Clostridioides difficile Toxin - Stool, Per Rectum    Clostridioides difficile Toxin, PCR - Stool, Per Rectum    CT Abdomen Pelvis With Contrast    Comprehensive Metabolic Panel    Lactic Acid, Plasma    [place] Draw    CBC Auto Differential    STAT Lactic Acid, Reflex    Diet: Regular/House; Fluid Consistency: Thin (IDDSI 0)    Undress & Gown    Continuous Pulse Oximetry    Vital Signs    Vital Signs    Up in Chair    Activity (Specify Details)    Turn Patient    Up With Assistance    Weigh Patient    Daily Weights    Strict Intake & Output    Oral Care    Maintain IV Access    Telemetry - Place Orders & Notify Provider of Results When Patient Experiences Acute Chest Pain, Dysrhythmia or Respiratory Distress    Code Status and Medical Interventions: CPR (Attempt to Resuscitate); Full Support    IP General Consult (Use specialty-specific consult if known)    Oxygen Therapy- Nasal Cannula; Titrate 1-6 LPM Per SpO2; 90 - 95%    Pulse Oximetry,  Spot    ECG 12 Lead Tachycardia    Insert Peripheral IV    Inpatient Admission    CBC & Differential    Green Top (Gel)    Lavender Top    Gold Top - SST    Light Blue Top       Medications Given in the Emergency Department:  Medications   sodium chloride 0.9 % flush 10 mL (has no administration in time range)   sodium chloride 0.9 % bolus 1,000 mL (0 mL Intravenous Hold 3/6/25 1727)   dilTIAZem (CARDIZEM) 125 mg in 125 mL sodium chloride  infusion (15 mg/hr Intravenous Rate/Dose Change 3/6/25 2017)   aluminum-magnesium hydroxide-simethicone (MAALOX MAX) 400-400-40 MG/5ML suspension 15 mL (has no administration in time range)   famotidine (PEPCID) tablet 40 mg (has no administration in time range)    sennosides-docusate (PERICOLACE) 8.6-50 MG per tablet 2 tablet (has no administration in time range)     And   polyethylene glycol (MIRALAX) packet 17 g (has no administration in time range)     And   bisacodyl (DULCOLAX) EC tablet 5 mg (has no administration in time range)     And   bisacodyl (DULCOLAX) suppository 10 mg (has no administration in time range)   ondansetron (ZOFRAN) injection 4 mg (has no administration in time range)   nitroglycerin (NITROSTAT) SL tablet 0.4 mg (has no administration in time range)   apixaban (ELIQUIS) tablet 5 mg (has no administration in time range)   buPROPion XL (WELLBUTRIN XL) 24 hr tablet 150 mg (has no administration in time range)   metoprolol succinate XL (TOPROL-XL) 24 hr tablet 50 mg (has no administration in time range)   mirtazapine (REMERON) tablet 15 mg (has no administration in time range)   prochlorperazine (COMPAZINE) injection 10 mg (10 mg Intravenous Given 3/6/25 1705)   diphenhydrAMINE (BENADRYL) injection 12.5 mg (12.5 mg Intravenous Given 3/6/25 1705)   iopamidol (ISOVUE-370) 76 % injection 100 mL (100 mL Intravenous Given 3/6/25 1849)   ondansetron (ZOFRAN) injection 4 mg (4 mg Intravenous Given 3/6/25 1951)   LORazepam (ATIVAN) injection 1 mg (1 mg Intravenous Given 3/6/25 1951)   dilTIAZem (CARDIZEM) injection 10 mg (10 mg Intravenous Given 3/6/25 1958)        ED Course:         Labs:    Lab Results (last 24 hours)       Procedure Component Value Units Date/Time    Lactic Acid, Plasma [[id number]]  (Abnormal) Collected: 03/06/25 1557    Specimen: Blood from Arm, Left Updated: 03/06/25 1629     Lactate 2.4 mmol/L     Blood Culture - Blood, Arm, Left [[id number]] Collected: 03/06/25 1557    Specimen: Blood from Arm, Left Updated: 03/06/25 1600    CBC & Differential [[id number]]  (Abnormal) Collected: 03/06/25 1700    Specimen: Blood Updated: 03/06/25 3393    Narrative:      The following orders were created for panel order CBC & Differential.  Procedure                                Abnormality         Status                     ---------                               -----------         ------                     CBC Auto Differential[[id number]]        Abnormal            Final result                 Please view results for these tests on the individual orders.    Comprehensive Metabolic Panel [[id number]]  (Abnormal) Collected: 03/06/25 1700    Specimen: Blood Updated: 03/06/25 1733     Glucose 140 mg/dL      BUN 16 mg/dL      Creatinine 1.10 mg/dL      Sodium 138 mmol/L      Potassium 4.0 mmol/L      Chloride 100 mmol/L      CO2 26.1 mmol/L      Calcium 9.9 mg/dL      Total Protein 7.2 g/dL      Albumin 4.0 g/dL      ALT (SGPT) 18 U/L      AST (SGOT) 26 U/L      Alkaline Phosphatase 66 U/L      Total Bilirubin 0.3 mg/dL      Globulin 3.2 gm/dL      A/G Ratio 1.3 g/dL      BUN/Creatinine Ratio 14.5     Anion Gap 11.9 mmol/L      eGFR 52.5 mL/min/1.73     Narrative:      GFR Categories in Chronic Kidney Disease (CKD)      GFR Category          GFR (mL/min/1.73)    Interpretation  G1                     90 or greater         Normal or high (1)  G2                      60-89                Mild decrease (1)  G3a                   45-59                Mild to moderate decrease  G3b                   30-44                Moderate to severe decrease  G4                    15-29                Severe decrease  G5                    14 or less           Kidney failure          (1)In the absence of evidence of kidney disease, neither GFR category G1 or G2 fulfill the criteria for CKD.    eGFR calculation 2021 CKD-EPI creatinine equation, which does not include race as a factor    CBC Auto Differential [[id number]]  (Abnormal) Collected: 03/06/25 1700    Specimen: Blood Updated: 03/06/25 1713     WBC 7.10 10*3/mm3      RBC 4.44 10*6/mm3      Hemoglobin 13.1 g/dL      Hematocrit 39.9 %      MCV 89.9 fL      MCH 29.5 pg      MCHC 32.8 g/dL      RDW 14.1 %      RDW-SD 46.0 fl      MPV  9.9 fL      Platelets 253 10*3/mm3      Neutrophil % 62.9 %      Lymphocyte % 24.8 %      Monocyte % 10.6 %      Eosinophil % 0.4 %      Basophil % 0.6 %      Immature Grans % 0.7 %      Neutrophils, Absolute 4.47 10*3/mm3      Lymphocytes, Absolute 1.76 10*3/mm3      Monocytes, Absolute 0.75 10*3/mm3      Eosinophils, Absolute 0.03 10*3/mm3      Basophils, Absolute 0.04 10*3/mm3      Immature Grans, Absolute 0.05 10*3/mm3      nRBC 0.0 /100 WBC     STAT Lactic Acid, Reflex [[id number]]  (Normal) Collected: 03/06/25 1700    Specimen: Blood Updated: 03/06/25 1731     Lactate 1.4 mmol/L     Blood Culture - Blood, Arm, Left [[id number]] Collected: 03/06/25 1737    Specimen: Blood from Arm, Left Updated: 03/06/25 1743    Clostridioides difficile Toxin - Stool, Per Rectum [[id number]] Collected: 03/06/25 1827    Specimen: Stool from Per Rectum Updated: 03/06/25 1943    Narrative:      The following orders were created for panel order Clostridioides difficile Toxin - Stool, Per Rectum.  Procedure                               Abnormality         Status                     ---------                               -----------         ------                     Clostridioides difficile...[[id number]]                      Final result                 Please view results for these tests on the individual orders.    Clostridioides difficile Toxin, PCR - Stool, Per Rectum [[id number]] Collected: 03/06/25 1827    Specimen: Stool from Per Rectum Updated: 03/06/25 1943     Toxigenic C. difficile by PCR Negative     027 Toxin Presumptive Negative    Narrative:      The result indicates the absence of toxigenic C. difficile from stool specimen.              Imaging:    CT Abdomen Pelvis With Contrast    Result Date: 3/6/2025  CT ABDOMEN PELVIS W CONTRAST Date of Exam: 3/6/2025 6:43 PM EST Indication: abd pain Abdominal pain. Comparison: 2/13/2025 Technique: Axial CT images were obtained of the abdomen and pelvis after the uneventful  intravenous administration of iodinated contrast. Reconstructed coronal and sagittal images were also obtained. Automated exposure control and iterative construction methods were used. FINDINGS: Lung bases: Minimal atelectatic changes. The heart and pericardium are normal. Liver:No masses. No intrahepatic biliary ductal dilatation. Spleen: Splenic granulomata Pancreas:No pancreatic masses. No evidence of pancreatitis. Gallbladder and common bile duct: Previous cholecystectomy Adrenal glands:No adrenal masses Kidneys and ureters: Multiple bilateral hypodensities involving both kidneys measuring up to 4.5 cm on the left. These are stable compared with 2/13/2025 no calculi present within the ureters. Normal caliber ureters. Urinary bladder:No urinary bladder wall thickening. No bladder masses. Small bowel:Normal caliber small bowel. Large bowel: Colonic diverticulosis without evidence of diverticulitis. Appendix: Not seen however there is no evidence of appendicitis GENITOURINARY: Prior hysterectomy Ascites or pneumoperitoneum:None. Adenopathy:None present Osseous structures: The proximal femurs are intact. The pubic bones are intact. Degenerative changes of the hips. Significant degenerative changes of the lumbar spine. Other findings: Atheromatous disease of the abdominal aorta and visualized branches.     Impression: No acute abdominal or pelvic pathology. Electronically Signed: Junior Rutledge MD  3/6/2025 7:16 PM EST  Workstation ID: [id number]       Differential Diagnosis and Discussion:    Diarrhea: Differential diagnosis includes but is not limited to malabsorption syndrome, bacterial infection, carcinoid syndrome, pancreatic hypersecretion, viral infection, celiac sprue, Crohn's disease, ulcerative colitis, ischemic colitis, colitis, hypermotility, and irritable bowel syndrome.  Vomiting: Differential diagnosis includes but is not limited to migraine, labyrinthine disorders, psychogenic, metabolic and endocrine  causes, peptic ulcer, gastric outlet obstruction, gastritis, gastroenteritis, appendicitis, intestinal obstruction, paralytic ileus, food poisoning, cholecystitis, acute hepatitis, acute pancreatitis, acute febrile illness, and myocardial infarction.    PROCEDURES:    Labs were collected in the emergency department and all labs were reviewed and interpreted by me.  An EKG was performed and the EKG was interpreted by me.  CT scan was performed in the emergency department and the CT scan radiology impression was interpreted by me.    ECG 12 Lead Tachycardia   Preliminary Result   HEART RSAE=592  bpm   RR Knnocpwu=288  ms   MD Interval=  ms   P Horizontal Axis=  deg   P Front Axis=  deg   QRSD Interval=85  ms   QT Interval=320  ms   ZUfV=990  ms   QRS Axis=19  deg   T Wave Axis=35  deg   - ABNORMAL ECG -   Atrial fibrillation   Date and Time of Study:2025-03-06 15:42:00          Procedures    MDM  Number of Diagnoses or Management Options  Atrial fibrillation with rapid ventricular response  Dehydration  Diarrhea, unspecified type  Diagnosis management comments: In summary this is a 74-year-old female who presents to the emergency department for evaluation and treatment of nausea, vomiting, diarrhea.  She was also found to be in atrial fibrillation with rapid ventricular response while in the Emergency Department necessitating IV Cardizem bolus and drip.  CBC independently reviewed and interpreted by me and shows no critical abnormalities.  CMP independently reviewed and interpreted by me and shows no critical abnormalities.  Initial lactic acid level elevated, IV fluids administered.  Patient case has been discussed with the PCP team who will admit to the hospital for further evaluation and continuation of treatment.             Patient was placed on the cardiac monitor after being given IV Cardizem drip.  They were monitored for ventricular ectopy, arrhythmia, tachycardia, hypoxia, and changes in blood pressure.   Patient was rechecked several times throughout their stay for mental status decline and for reassessment of worsening changes in vital signs.     Total Critical Care time of 40 minutes. Total critical care time documented does not include time spent on separately billed procedures for services of nurses or physician assistants. I personally saw and examined the patient. I have reviewed all diagnostic interpretations and treatment plans as written. I was present for the key portions of any procedures performed and the inclusive time noted in any critical care statement. Critical care time includes patient management by me, time spent at the patients bedside,  time to review lab and imaging results, discussing patient care, documentation in the medical record, and time spent with family or caregiver.          Patient Care Considerations:    SEPSIS IS NOT PRESENT IN THE EMERGENCY DEPARTMENT: Patient meets SIRS criteria in the emergency department however the patient does not have a known source of bacterial infection to confirm the diagnosis of sepsis.        Consultants/Shared Management Plan:    PCP: I have discussed the case with Dr. Ratliff who agrees to accept the patient for admission.    Social Determinants of Health:    Patient is independent, reliable, and has access to care.       Disposition and Care Coordination:    Admit:   Through independent evaluation of the patient's history, physical, and imperical data, the patient meets criteria for inpatient admission to the hospital.        Final diagnoses:   Atrial fibrillation with rapid ventricular response   Dehydration   Diarrhea, unspecified type        ED Disposition       ED Disposition   Decision to Admit    Condition   --    Comment   Level of Care: Telemetry [5]   Diagnosis: Atrial fibrillation with rapid ventricular response [[id number]]   Admitting Physician: SUPRIYA RATLIFF [[id number]]   Attending Physician: SUPRIYA RATLIFF [[id number]]   Certification: I Certify  That Inpatient Hospital Services Are Medically Necessary For Greater Than 2 Midnights                 This medical record created using voice recognition software.             Issa Harrington MD  03/06/25 9122

## 2025-03-07 NOTE — TELEPHONE ENCOUNTER
----- Message from Umesh Guadarrama sent at 3/6/2025  4:46 PM EST -----  Stress test : Blood supply appears to be normal to all the walls of the heart, indicating no major blockages in the heart arteries.  She was noted from atrial fibrillation with fast heart rates during the stress test.    Also noted recent hospital admissions and ER visits.  Follow-up in cardiology clinic in 1 week, may be scheduled with Francesca CLIFFORD.      Electronically signed by Umesh Guadarrama MD, 03/06/25, 4:46 PM EST.

## 2025-03-07 NOTE — PLAN OF CARE
Goal Outcome Evaluation:  Plan of Care Reviewed With: patient        Progress: improving  Outcome Evaluation: Pt a/o, on cardizem, room air, pt has lost some weight, will continue plan of care.

## 2025-03-07 NOTE — PROGRESS NOTES
[facility]     Progress Note    Patient Name: Camilla Navas  : 1950  MRN: [id number]  Primary Care Physician:  Lester Lizarraga MD  Date of admission: 3/6/2025    Subjective  patient is still complaining of nauseous feeling she denies any chest pain fluttering in her chest    Review of Systems  All review of systems are negative except as mentioned in subjective complaints.    Objective   Objective     Vitals:   Temp:  [97.6 °F (36.4 °C)-98.1 °F (36.7 °C)] 98.1 °F (36.7 °C)  Heart Rate:  [] 132  Resp:  [16-21] 16  BP: (109-153)/() 142/66  Physical Exam    Constitutional: Awake, alert responsive, conversant, no obvious distress              Psychiatric:  Appropriate affect, cooperative   Neurologic:  Awake alert ,oriented x 3, strength symmetric in all extremities, Cranial Nerves grossly intact to confrontation, speech clear   Eyes:   PERRLA, sclerae anicteric, no conjunctival injection   HEENT:  Moist mucous membranes, no nasal or eye discharge, no throat congestion   Neck:   Supple, no thyromegaly, no lymphadenopathy, trachea midline, no elevated JVD   Respiratory:  Clear to auscultation bilaterally, nonlabored respirations    Cardiovascular: RRR, no murmurs, rubs, or gallops, palpable pedal pulses bilaterally, No bilateral ankle edema   Gastrointestinal: Positive bowel sounds, soft, nontender, nondistended, no organomegaly   Musculoskeletal:  No clubbing or cyanosis to extremities,muscle wasting, joint swelling, muscle weakness             Skin:                      No rashes, bruising, skin ulcers, petechiae or ecchymosis    Result Review    Result Review:  I have personally reviewed the results from the time of this admission to 3/7/2025 13:35 EST and agree with these findings:  []  Laboratory  []  Microbiology  []  Radiology  []  EKG/Telemetry   []  Cardiology/Vascular   []  Pathology  []  Old records  []  Other:    Results from last 7 days   Lab Units 25  1700  03/04/25  0223   WBC 10*3/mm3 7.10 8.84   HEMOGLOBIN g/dL 13.1 12.1   PLATELETS 10*3/mm3 253 299     Results from last 7 days   Lab Units 03/06/25  1700 03/04/25  0223   SODIUM mmol/L 138 136   POTASSIUM mmol/L 4.0 3.9   CHLORIDE mmol/L 100 97*   CO2 mmol/L 26.1 26.6   ANION GAP mmol/L 11.9 12.4   BUN mg/dL 16 19   CREATININE mg/dL 1.10* 1.26*   GLUCOSE mg/dL 140* 135*   EGFR mL/min/1.73 52.5* 44.6*   CALCIUM mg/dL 9.9 9.8   MAGNESIUM mg/dL  --  1.8   ALBUMIN g/dL 4.0 4.1   BILIRUBIN mg/dL 0.3 0.2   ALK PHOS U/L 66 75   ALT (SGPT) U/L 18 17   AST (SGOT) U/L 26 28       Scheduled Meds:apixaban, 5 mg, Oral, BID  buPROPion XL, 150 mg, Oral, Daily  digoxin, 250 mcg, Intravenous, Once  dilTIAZem CD, 300 mg, Oral, Q24H  famotidine, 40 mg, Oral, Daily  metoclopramide, 10 mg, Intravenous, TID  metoprolol succinate XL, 50 mg, Oral, Q12H  mirtazapine, 15 mg, Oral, Nightly  sodium chloride, 1,000 mL, Intravenous, Once      Continuous Infusions:dilTIAZem, 5-15 mg/hr, Last Rate: 5 mg/hr (03/07/25 0821)  lactated ringers, 100 mL/hr      PRN Meds:.  aluminum-magnesium hydroxide-simethicone    senna-docusate sodium **AND** polyethylene glycol **AND** bisacodyl **AND** bisacodyl    nitroglycerin    ondansetron    sodium chloride    Assessment & Plan   Assessment / Plan       Active Hospital Problems:    Active Hospital Problems    Diagnosis  POA    Dehydration [E86.0]  Unknown    Diabetic gastroparesis [E11.43, K31.84]  Unknown    Atrial fibrillation with rapid ventricular response [I48.91]  Yes    Type 2 diabetes mellitus [E11.9]  Yes       Plan:    Started p.o. Cardizem and metoprolol   IV digoxin   Started Reglan   IV fluids       Electronically signed by Emory Ratliff MD, 03/07/25, 1:35 PM EST.

## 2025-03-08 LAB
ALBUMIN SERPL-MCNC: 3.7 G/DL (ref 3.5–5.2)
ALBUMIN/GLOB SERPL: 1.4 G/DL
ALP SERPL-CCNC: 61 U/L (ref 39–117)
ALT SERPL W P-5'-P-CCNC: 13 U/L (ref 1–33)
ANION GAP SERPL CALCULATED.3IONS-SCNC: 9.8 MMOL/L (ref 5–15)
AST SERPL-CCNC: 21 U/L (ref 1–32)
BILIRUB SERPL-MCNC: 0.3 MG/DL (ref 0–1.2)
BUN SERPL-MCNC: 10 MG/DL (ref 8–23)
BUN/CREAT SERPL: 12.2 (ref 7–25)
CALCIUM SPEC-SCNC: 9.2 MG/DL (ref 8.6–10.5)
CHLORIDE SERPL-SCNC: 106 MMOL/L (ref 98–107)
CO2 SERPL-SCNC: 24.2 MMOL/L (ref 22–29)
CREAT SERPL-MCNC: 0.82 MG/DL (ref 0.57–1)
EGFRCR SERPLBLD CKD-EPI 2021: 74.7 ML/MIN/1.73
GLOBULIN UR ELPH-MCNC: 2.7 GM/DL
GLUCOSE SERPL-MCNC: 93 MG/DL (ref 65–99)
POTASSIUM SERPL-SCNC: 3.9 MMOL/L (ref 3.5–5.2)
PROT SERPL-MCNC: 6.4 G/DL (ref 6–8.5)
SODIUM SERPL-SCNC: 140 MMOL/L (ref 136–145)
WHOLE BLOOD HOLD SPECIMEN: NORMAL

## 2025-03-08 PROCEDURE — 80053 COMPREHEN METABOLIC PANEL: CPT | Performed by: INTERNAL MEDICINE

## 2025-03-08 PROCEDURE — 25010000002 METOCLOPRAMIDE PER 10 MG: Performed by: INTERNAL MEDICINE

## 2025-03-08 RX ADMIN — METOCLOPRAMIDE HYDROCHLORIDE 10 MG: 5 INJECTION INTRAMUSCULAR; INTRAVENOUS at 15:53

## 2025-03-08 RX ADMIN — LORAZEPAM 1 MG: 0.5 TABLET ORAL at 21:05

## 2025-03-08 RX ADMIN — FAMOTIDINE 40 MG: 20 TABLET, FILM COATED ORAL at 09:49

## 2025-03-08 RX ADMIN — APIXABAN 5 MG: 5 TABLET, FILM COATED ORAL at 21:04

## 2025-03-08 RX ADMIN — METOPROLOL SUCCINATE 50 MG: 50 TABLET, FILM COATED, EXTENDED RELEASE ORAL at 21:05

## 2025-03-08 RX ADMIN — METOCLOPRAMIDE HYDROCHLORIDE 10 MG: 5 INJECTION INTRAMUSCULAR; INTRAVENOUS at 09:49

## 2025-03-08 RX ADMIN — Medication 10 ML: at 09:49

## 2025-03-08 RX ADMIN — METOPROLOL SUCCINATE 50 MG: 50 TABLET, FILM COATED, EXTENDED RELEASE ORAL at 09:49

## 2025-03-08 RX ADMIN — APIXABAN 5 MG: 5 TABLET, FILM COATED ORAL at 09:49

## 2025-03-08 RX ADMIN — METOCLOPRAMIDE HYDROCHLORIDE 10 MG: 5 INJECTION INTRAMUSCULAR; INTRAVENOUS at 21:05

## 2025-03-08 RX ADMIN — Medication 10 ML: at 21:05

## 2025-03-08 RX ADMIN — BUPROPION HYDROCHLORIDE 150 MG: 150 TABLET, EXTENDED RELEASE ORAL at 09:49

## 2025-03-08 RX ADMIN — ALUMINUM HYDROXIDE, MAGNESIUM HYDROXIDE, AND DIMETHICONE 30 ML: 400; 400; 40 SUSPENSION ORAL at 10:27

## 2025-03-08 RX ADMIN — DILTIAZEM HYDROCHLORIDE 300 MG: 300 CAPSULE, EXTENDED RELEASE ORAL at 09:49

## 2025-03-08 RX ADMIN — MIRTAZAPINE 15 MG: 15 TABLET, FILM COATED ORAL at 21:04

## 2025-03-08 NOTE — PLAN OF CARE
Goal Outcome Evaluation:  Plan of Care Reviewed With: patient cont with controlled heart rate no longer requiring cardizem gtt.  Denies soa, discomfort.  Up with assist to bsc.  No s/s of distress.  VSS.  Cont. POC.

## 2025-03-08 NOTE — PROGRESS NOTES
[facility]     Progress Note    Patient Name: Camilla Navas  : 1950  MRN: [id number]  Primary Care Physician:  Lester Lizarraga MD  Date of admission: 3/6/2025    Subjective patient is feeling much better  Her nausea vomiting has really responded to Reglan    Review of Systems  All review of systems are negative except as mentioned in subjective complaints.    Objective   Objective     Vitals:   Temp:  [97.3 °F (36.3 °C)-98.8 °F (37.1 °C)] 98.1 °F (36.7 °C)  Heart Rate:  [] 90  Resp:  [16-18] 18  BP: (109-153)/(58-87) 153/87  Physical Exam    Constitutional: Awake, alert responsive, conversant, no obvious distress              Psychiatric:  Appropriate affect, cooperative   Neurologic:  Awake alert ,oriented x 3, strength symmetric in all extremities, Cranial Nerves grossly intact to confrontation, speech clear   Eyes:   PERRLA, sclerae anicteric, no conjunctival injection   HEENT:  Moist mucous membranes, no nasal or eye discharge, no throat congestion   Neck:   Supple, no thyromegaly, no lymphadenopathy, trachea midline, no elevated JVD   Respiratory:  Clear to auscultation bilaterally, nonlabored respirations    Cardiovascular: RRR, no murmurs, rubs, or gallops, palpable pedal pulses bilaterally, No bilateral ankle edema   Gastrointestinal: Positive bowel sounds, soft, nontender, nondistended, no organomegaly   Musculoskeletal:  No clubbing or cyanosis to extremities,muscle wasting, joint swelling, muscle weakness             Skin:                      No rashes, bruising, skin ulcers, petechiae or ecchymosis    Result Review    Result Review:  I have personally reviewed the results from the time of this admission to 3/8/2025 09:45 EST and agree with these findings:  []  Laboratory  []  Microbiology  []  Radiology  []  EKG/Telemetry   []  Cardiology/Vascular   []  Pathology  []  Old records  []  Other:    Results from last 7 days   Lab Units 25  1700 25  0223   WBC  10*3/mm3 7.10 8.84   HEMOGLOBIN g/dL 13.1 12.1   PLATELETS 10*3/mm3 253 299     Results from last 7 days   Lab Units 03/08/25  0525 03/06/25  1700 03/04/25  0223   SODIUM mmol/L 140 138 136   POTASSIUM mmol/L 3.9 4.0 3.9   CHLORIDE mmol/L 106 100 97*   CO2 mmol/L 24.2 26.1 26.6   ANION GAP mmol/L 9.8 11.9 12.4   BUN mg/dL 10 16 19   CREATININE mg/dL 0.82 1.10* 1.26*   GLUCOSE mg/dL 93 140* 135*   EGFR mL/min/1.73 74.7 52.5* 44.6*   CALCIUM mg/dL 9.2 9.9 9.8   MAGNESIUM mg/dL  --   --  1.8   ALBUMIN g/dL 3.7 4.0 4.1   BILIRUBIN mg/dL 0.3 0.3 0.2   ALK PHOS U/L 61 66 75   ALT (SGPT) U/L 13 18 17   AST (SGOT) U/L 21 26 28       Scheduled Meds:apixaban, 5 mg, Oral, BID  buPROPion XL, 150 mg, Oral, Daily  dilTIAZem CD, 300 mg, Oral, Q24H  famotidine, 40 mg, Oral, Daily  metoclopramide, 10 mg, Intravenous, TID  metoprolol succinate XL, 50 mg, Oral, Q12H  mirtazapine, 15 mg, Oral, Nightly  sodium chloride, 1,000 mL, Intravenous, Once  Sulfur Hexafluoride Microsph, 5 mL, Injection, Once      Continuous Infusions:dilTIAZem, 5-15 mg/hr, Last Rate: Stopped (03/07/25 1346)      PRN Meds:.  aluminum-magnesium hydroxide-simethicone    senna-docusate sodium **AND** polyethylene glycol **AND** bisacodyl **AND** bisacodyl    LORazepam    nitroglycerin    ondansetron    sodium chloride    Assessment & Plan   Assessment / Plan       Active Hospital Problems:    Active Hospital Problems    Diagnosis  POA    Dehydration [E86.0]  Unknown    Diabetic gastroparesis [E11.43, K31.84]  Unknown    Atrial fibrillation with rapid ventricular response [I48.91]  Yes    Type 2 diabetes mellitus [E11.9]  Yes       Plan:   DC IV fluids  Continue Reglan  Off Cardizem drip  Heart rate under control       Electronically signed by Emory Ratliff MD, 03/08/25, 9:45 AM EST.

## 2025-03-09 ENCOUNTER — READMISSION MANAGEMENT (OUTPATIENT)
Dept: CALL CENTER | Facility: HOSPITAL | Age: 75
End: 2025-03-09
Payer: MEDICARE

## 2025-03-09 VITALS
RESPIRATION RATE: 18 BRPM | BODY MASS INDEX: 31.89 KG/M2 | DIASTOLIC BLOOD PRESSURE: 84 MMHG | HEIGHT: 66 IN | SYSTOLIC BLOOD PRESSURE: 139 MMHG | WEIGHT: 198.41 LBS | HEART RATE: 112 BPM | TEMPERATURE: 98.6 F | OXYGEN SATURATION: 95 %

## 2025-03-09 PROCEDURE — 25010000002 METOCLOPRAMIDE PER 10 MG: Performed by: INTERNAL MEDICINE

## 2025-03-09 RX ORDER — METOCLOPRAMIDE 10 MG/1
10 TABLET ORAL
Qty: 6 TABLET | Refills: 0 | Status: SHIPPED | OUTPATIENT
Start: 2025-03-09 | End: 2025-03-12

## 2025-03-09 RX ORDER — METOCLOPRAMIDE 5 MG/1
10 TABLET ORAL
Status: DISCONTINUED | OUTPATIENT
Start: 2025-03-09 | End: 2025-03-09 | Stop reason: HOSPADM

## 2025-03-09 RX ADMIN — BUPROPION HYDROCHLORIDE 150 MG: 150 TABLET, EXTENDED RELEASE ORAL at 09:48

## 2025-03-09 RX ADMIN — METOCLOPRAMIDE HYDROCHLORIDE 10 MG: 5 INJECTION INTRAMUSCULAR; INTRAVENOUS at 09:48

## 2025-03-09 RX ADMIN — FAMOTIDINE 40 MG: 20 TABLET, FILM COATED ORAL at 09:48

## 2025-03-09 RX ADMIN — METOPROLOL SUCCINATE 50 MG: 50 TABLET, FILM COATED, EXTENDED RELEASE ORAL at 09:48

## 2025-03-09 RX ADMIN — Medication 10 ML: at 09:48

## 2025-03-09 RX ADMIN — METOCLOPRAMIDE 10 MG: 5 TABLET ORAL at 12:58

## 2025-03-09 RX ADMIN — DILTIAZEM HYDROCHLORIDE 300 MG: 300 CAPSULE, EXTENDED RELEASE ORAL at 09:48

## 2025-03-09 RX ADMIN — APIXABAN 5 MG: 5 TABLET, FILM COATED ORAL at 09:48

## 2025-03-09 NOTE — PLAN OF CARE
Goal Outcome Evaluation:  Plan of Care Reviewed With: patient hr cont to be afib rate controlled.  No c/o nausea.  Had 3 episodes of loose semi formed stool on day shift.  No stooling on night's.  Vss.  Cont with POC.

## 2025-03-09 NOTE — DISCHARGE SUMMARY
[facility]   DISCHARGE SUMMARY    Patient Name: Camilla Navas  : 1950  MRN: [id number]    Date of Admission: 3/6/2025  Date of Discharge: 3/9/2025  Primary Care Physician: Lester Lizarraga MD    Consults       Date and Time Order Name Status Description    3/6/2025  8:24 PM IP General Consult (Use specialty-specific consult if known)              Hospital Course     Presenting Problem:   Dehydration [E86.0]  Atrial fibrillation with rapid ventricular response [I48.91]  Diarrhea, unspecified type [R19.7]    Active and resolved problems  Active Problems:    Type 2 diabetes mellitus  Overview:    Atrial fibrillation with rapid ventricular response  Overview:    Dehydration  Overview:    Diabetic gastroparesis  Overview:  Resolved Problems:    * No resolved hospital problems. *      Hospital Course:  Camilla Navas is a 74 y.o. female with past medical history significant for essential hypertension coronary artery disease asthma type 2 diabetes mellitus was having persistent nausea for 1 week to 10 days and was feeling very weak tired rundown and inability to eat or drink.  Patient had acute diabetic gastroparesis and was started on Reglan after hydration and her nausea vomiting has completely resolved she is feeling back to normal as she is doing well is decided to discharge her home  Patient responded very well to Reglan    Vital Signs:  Temp:  [97.9 °F (36.6 °C)-98.8 °F (37.1 °C)] 98.8 °F (37.1 °C)  Heart Rate:  [78-98] 98  Resp:  [18] 18  BP: (136-157)/(69-89) 157/89      Discharge Details        Discharge Medications        New Medications        Instructions Start Date   metoclopramide 10 MG tablet  Commonly known as: REGLAN   10 mg, Oral, 3 Times Daily Before Meals             Changes to Medications        Instructions Start Date   ipratropium-albuterol 0.5-2.5 mg/3 ml nebulizer  Commonly known as: DUO-NEB  What changed: See the new instructions.   USE 3 ML VIA NEBULIZER FOUR TIMES  Dear Amee,    Your symptoms show that you may have COVID-19.     Because you also reported sore throat, I would like to also test you for strep throat to determine if we need to treat you for that as well.    What should I do?  We would like to test you for COVID-19 virus and Strep Throat. I have placed orders for these tests.   To schedule: go to your Digg home page and scroll down to the section that says  You have an appointment that needs to be scheduled  and click the large green button that says  Schedule Now  and follow the steps to find the next available openings. It is important that when you are asked what the reason for your appointment is that you mention you need BOTH COVID and Strep tests.    If you are unable to complete these Digg scheduling steps, please call [phone] to schedule your testing.     How do I self-isolate?  You isolate when you have symptoms of COVID or a test shows you have COVID, even if you don t have symptoms.   If you DO have symptoms:  Stay home and away from others  For at least 5 days after your symptoms started, AND   You are fever free for 24 hours (with no medicine that reduces fever), AND  Your other symptoms are better.  Wear a mask for 10 full days any time you are around others.  If you DON T have symptoms:  Stay at home and away from others for at least 5 days after your positive test.  Wear a mask for 10 full days any time you are around others.    How can I take care of myself?  Over the counter medications may help with your symptoms such as runny or stuffy nose, cough, chills, or fever. Talk to your care team about your options.   Some people are at high risk of severe illness (for example, you have a weak immune system, you re 50 years or older, or you have certain medical problems). If your risk is high and your symptoms started in the last 5 days, we strongly recommend for you to get COVID treatment as soon as possible. There are safe and effective  "medicines that can make you feel better faster, and prevent hospitalization and death.       To discuss COVID treatment you can:  Call your clinic OR [phone] ([phone]) and ask to speak to a nurse about a positive COVID test.  Send a [email or web address] message to your care team. In [email or web address] select \"Ask a Medical Question\"  Then \"Do I need an appointment\" and put \"COVID\" in the subject line. Please include a phone number to call you back in the message.       Get lots of rest. Drink extra fluids (unless a doctor has told you not to)  Take Tylenol (acetaminophen) or ibuprofen for fever or pain. If you have liver or kidney problems, ask your family doctor if it's okay to take Tylenol or ibuprofen  Take over the counter medications for your symptoms, as directed by your doctor. You may also talk to your pharmacist.    If you have other health problems (like cancer, heart failure, an organ transplant or severe kidney disease): Call your specialty clinic if you don't feel better in the next 2 days.  Know when to call 911. Emergency warning signs include:  Trouble breathing or shortness of breath  Pain or pressure in the chest that doesn't go away  Feeling confused like you haven't felt before, or not being able to wake up  Bluish-colored lips or face    Where can I get more information?  [facility] - About COVID-19: [email or web address]   CDC - What to Do If You're Sick: https://www.cdc.gov/coronavirus/2019-ncov/if-you-are-sick/index.html  CDC -  Isolation https://www.cdc.gov/coronavirus/2019-ncov/your-health/isolation.html    November 16, 2023  RE:  Amee Nettles                                                                                                                  [address]  [facility] [address]      To whom it may concern:    I evaluated Amee Nettles on November 16, 2023. Amee Nettles should be excused from work/school.     They should let their workplace manager and staffing " DAILY AS NEEDED FOR WHEEZING OR SHORTNESS OF BREATH             Continue These Medications        Instructions Start Date   albuterol sulfate  (90 Base) MCG/ACT inhaler  Commonly known as: PROVENTIL HFA;VENTOLIN HFA;PROAIR HFA   2 puffs, Inhalation, Every 4 Hours PRN      apixaban 5 MG tablet tablet  Commonly known as: ELIQUIS   5 mg, Oral, 2 Times Daily      Arthritis Pain Reliever 650 MG 8 hr tablet  Generic drug: acetaminophen   650 mg, Every 8 Hours PRN      azelastine 0.1 % nasal spray  Commonly known as: ASTELIN   2 sprays, Nasal, 2 Times Daily, Use in each nostril as directed      baclofen 10 MG tablet  Commonly known as: LIORESAL   10 mg, 2 Times Daily      buPROPion  MG 24 hr tablet  Commonly known as: WELLBUTRIN XL   150 mg, Every 24 Hours      dilTIAZem HCl  MG 24 hr tablet  Commonly known as: CARDIZEM LA   1 tablet, Daily      fluticasone 50 MCG/ACT nasal spray  Commonly known as: FLONASE   2 sprays, Nasal, Daily      Fluticasone-Umeclidin-Vilant 200-62.5-25 MCG/ACT inhaler  Commonly known as: TRELEGY ELLIPTA   1 puff, Inhalation, Daily - RT      furosemide 20 MG tablet  Commonly known as: LASIX   20 mg, Oral, Daily      gabapentin 300 MG capsule  Commonly known as: NEURONTIN   300 mg, Nightly      glipizide 5 MG tablet  Commonly known as: GLUCOTROL   5 mg, Daily      hydroCHLOROthiazide 25 MG tablet   25 mg, Oral, Daily      lisinopril 5 MG tablet  Commonly known as: PRINIVIL,ZESTRIL   1 tablet, Daily      metoprolol succinate XL 50 MG 24 hr tablet  Commonly known as: TOPROL-XL   50 mg, Oral, Every 12 Hours Scheduled      mirtazapine 15 MG tablet  Commonly known as: REMERON   15 mg, Nightly      montelukast 10 MG tablet  Commonly known as: SINGULAIR   10 mg, Oral, Nightly      nitroglycerin 0.4 MG SL tablet  Commonly known as: NITROSTAT   1 under the tongue as needed for angina, may repeat q5mins for up three doses      omeprazole 40 MG capsule  Commonly known as: priLOSEC   40 mg,  Oral, 2 Times Daily      ondansetron 4 MG tablet  Commonly known as: ZOFRAN   4 mg, Every 6 Hours PRN      pravastatin 80 MG tablet  Commonly known as: PRAVACHOL   80 mg, Oral, Daily      sertraline 100 MG tablet  Commonly known as: ZOLOFT   200 mg, Daily      vitamin D3 125 MCG (5000 UT) capsule capsule   5,000 Units, Daily      vitamin E 400 UNIT capsule   400 Units, Daily               Allergies   Allergen Reactions    Empagliflozin Nausea And Vomiting    Metformin Diarrhea         Discharge Disposition:  Home or Self Care    Diet:        Discharge Activity:         CODE STATUS:    Code Status and Medical Interventions: CPR (Attempt to Resuscitate); Full Support   Ordered at: 03/06/25 2056     Code Status (Patient has no pulse and is not breathing):    CPR (Attempt to Resuscitate)     Medical Interventions (Patient has pulse or is breathing):    Full Support     Level Of Support Discussed With:    Patient         Future Appointments   Date Time Provider Department Center   3/14/2025  2:30 PM [facility] PULM LAB ROOM 1 [facility]   3/14/2025  3:30 PM [facility] WALK TEST ROOM [facility]   3/17/2025 10:30 AM Randall Mathew MD [facility] ETW [facility]           Time spent on Discharge including face to face service:  35 minutes    Electronically signed by Emory Ratliff MD, 03/09/25, 11:23 AM EDT.           office know when their quarantine ends.    We can not give an exact date as it depends on the information below. They can calculate this on their own or work with their manager/staffing office to calculate this. (For example if they were exposed on 10/04, they would have to quarantine for 14 full days. That would be through 10/18. They could return on 10/19.)    Quarantine Guidelines:    If patient receives a positive COVID-19 test result, they should follow the guidance of those who are giving the results. Usually the return to work is 10 (or in some cases 20 days from symptom onset.) If they work at [employer], they must be cleared by Employee Occupational Health and Safety to return to work.      If patient receives a negative COVID-19 test result and did not have a high risk exposure to someone with a known positive COVID-19 test, they can return to work once they're free of fever for 24 hours without fever-reducing medication and their symptoms are improving or resolved.    If patient receives a negative COVID-19 test and if they had a high risk exposure to someone who has tested positive for COVID-19 then they can return to work 14 days after their last contact with the positive individual    Note: If there is ongoing exposure to the covid positive person, this quarantine period may be longer than 14 days. (For example, if they are continually exposed to their child, who tested positive and cannot isolate from them, then the quarantine of 7-14 days can't start until their child is no longer contagious. This is typically 10 days from onset to the child's symptoms. So the total duration may be 17-24 days in this case.)     Sincerely,  Tomeka Noramn PA-C

## 2025-03-10 NOTE — OUTREACH NOTE
Prep Survey      Flowsheet Row Responses   [facility] facility patient discharged from? [facility]   Is LACE score < 7 ? No   Eligibility Readm Mgmt   Discharge diagnosis Atrial fibrillation with rapid ventricular response   Does the patient have one of the following disease processes/diagnoses(primary or secondary)? Other   Does the patient have Home health ordered? No   Is there a DME ordered? No   Prep survey completed? Yes            Nasima MAKI - Registered Nurse

## 2025-03-11 LAB
AV MEAN PRESS GRAD SYS DOP V1V2: 5 MMHG
BACTERIA SPEC AEROBE CULT: NORMAL
BACTERIA SPEC AEROBE CULT: NORMAL
BH CV ECHO MEAS - AO ROOT DIAM: 2.2 CM
BH CV ECHO MEAS - AO V2 VTI: 26.3 CM
BH CV ECHO MEAS - AVA(I,D): 1.65 CM2
BH CV ECHO MEAS - EDV(CUBED): 72 ML
BH CV ECHO MEAS - EDV(MOD-SP2): 58.9 ML
BH CV ECHO MEAS - EDV(MOD-SP4): 75.6 ML
BH CV ECHO MEAS - EF(MOD-SP2): 54.3 %
BH CV ECHO MEAS - EF(MOD-SP4): 51.1 %
BH CV ECHO MEAS - ESV(CUBED): 23.6 ML
BH CV ECHO MEAS - ESV(MOD-SP2): 26.9 ML
BH CV ECHO MEAS - ESV(MOD-SP4): 37 ML
BH CV ECHO MEAS - FS: 31 %
BH CV ECHO MEAS - IVS/LVPW: 0.99 CM
BH CV ECHO MEAS - IVSD: 1.04 CM
BH CV ECHO MEAS - LA DIMENSION: 3.7 CM
BH CV ECHO MEAS - LAT PEAK E' VEL: 12 CM/SEC
BH CV ECHO MEAS - LV MASS(C)D: 143.8 GRAMS
BH CV ECHO MEAS - LV MAX PG: 2.7 MMHG
BH CV ECHO MEAS - LV MEAN PG: 1 MMHG
BH CV ECHO MEAS - LV V1 MAX: 81.4 CM/SEC
BH CV ECHO MEAS - LV V1 VTI: 13.8 CM
BH CV ECHO MEAS - LVIDD: 4.2 CM
BH CV ECHO MEAS - LVIDS: 2.9 CM
BH CV ECHO MEAS - LVOT AREA: 3.1 CM2
BH CV ECHO MEAS - LVOT DIAM: 2 CM
BH CV ECHO MEAS - LVPWD: 1.05 CM
BH CV ECHO MEAS - MED PEAK E' VEL: 8.3 CM/SEC
BH CV ECHO MEAS - MV A MAX VEL: 41.9 CM/SEC
BH CV ECHO MEAS - MV DEC SLOPE: 814 CM/SEC2
BH CV ECHO MEAS - MV DEC TIME: 0.14 SEC
BH CV ECHO MEAS - MV E MAX VEL: 116 CM/SEC
BH CV ECHO MEAS - MV E/A: 2.8
BH CV ECHO MEAS - MV MEAN PG: 2 MMHG
BH CV ECHO MEAS - MV V2 VTI: 16.7 CM
BH CV ECHO MEAS - MVA(VTI): 2.6 CM2
BH CV ECHO MEAS - RVDD: 3 CM
BH CV ECHO MEAS - SV(LVOT): 43.4 ML
BH CV ECHO MEAS - SV(MOD-SP2): 32 ML
BH CV ECHO MEAS - SV(MOD-SP4): 38.6 ML
BH CV ECHO MEAS - TAPSE (>1.6): 1.14 CM
BH CV ECHO MEAS - TR MAX PG: 22.3 MMHG
BH CV ECHO MEAS - TR MAX VEL: 236 CM/SEC
BH CV ECHO MEASUREMENTS AVERAGE E/E' RATIO: 11.43
LEFT ATRIUM VOLUME INDEX: 28.7 ML/M2
LV EF BIPLANE MOD: 53.4 %
QT INTERVAL: 392 MS
QTC INTERVAL: 463 MS

## 2025-03-11 NOTE — TELEPHONE ENCOUNTER
SW patient. Went over results and recommendations. Patient scheduled with VENESSA Cook.     Patient states she is so weak, recently discharged from hospital on 03/09/25.

## 2025-03-12 ENCOUNTER — OFFICE VISIT (OUTPATIENT)
Dept: CARDIOLOGY | Facility: CLINIC | Age: 75
End: 2025-03-12
Payer: MEDICARE

## 2025-03-12 VITALS
HEIGHT: 66 IN | SYSTOLIC BLOOD PRESSURE: 117 MMHG | WEIGHT: 191 LBS | HEART RATE: 83 BPM | DIASTOLIC BLOOD PRESSURE: 66 MMHG | BODY MASS INDEX: 30.7 KG/M2

## 2025-03-12 DIAGNOSIS — I10 ESSENTIAL HYPERTENSION: ICD-10-CM

## 2025-03-12 DIAGNOSIS — I48.19 PERSISTENT ATRIAL FIBRILLATION: Primary | ICD-10-CM

## 2025-03-12 DIAGNOSIS — I25.10 NONOBSTRUCTIVE ATHEROSCLEROSIS OF CORONARY ARTERY: ICD-10-CM

## 2025-03-12 DIAGNOSIS — E78.2 MIXED HYPERLIPIDEMIA: ICD-10-CM

## 2025-03-12 NOTE — PROGRESS NOTES
Chief Complaint  Atrial Fibrillation (Hospital Follow Up)    Subjective        History of Present Illness  Camilla Navas presents to [facility] CARDIOLOGY       History of Present Illness  The patient is a 74-year-old female coming in today for cardiac follow-up for atrial fibrillation.  She reports a recent improvement in her condition following a 3-day hospital stay.  She is complaining of significant weakness, and at times tremor since discharge from the hospital.  Denies any lightheadedness dizziness or syncopal episodes.  She does not report any symptoms of tachycardia or palpitations. She was not provided with home health services upon discharge from the hospital. She has an upcoming appointment with her primary care physician, Dr. Lizarraga, on 03/17/2025. She has not undergone a neurological evaluation and reports no changes in memory or balance. Her daughter has observed facial tremors, but these are less severe than those experienced during her hospital stay. She also reports difficulty with ambulation.    She is currently on Eliquis and has requested additional samples. She has applied for the patient assistance program but has not yet received a response.    She is a diabetic and has been experiencing gastrointestinal issues, including occasional nausea and acid reflux. She is currently taking Reglan for these symptoms.          Past History:     (1) Non-obstructive coronary artery disease. Cardiac catheterization done in October 2018 showed a 40% mid LAD lesion and a 40% proximal RCA lesion. (2) Hypertension, difficult to control. (3) Diabetes mellitus, on oral medications. (4) Hyperlipidemia. (5) Breast cancer, s/p surgery. 6) Persistent atrial fibrillation, detected in 2024      Past Medical History:   Diagnosis Date    Arthritis     Asthma     Atherosclerosis of coronary artery 2018    Cancer     COPD (chronic obstructive pulmonary disease)     Diabetes mellitus     Diverticulitis      Hiatal hernia     Hyperlipemia 01/21/2022    Hypertension, essential 03/05/2022       Allergies   Allergen Reactions    Empagliflozin Nausea And Vomiting    Metformin Diarrhea        Past Surgical History:   Procedure Laterality Date    COLONOSCOPY      COLONOSCOPY N/A 11/14/2023    Procedure: COLONOSCOPY WITH POLYPECTOMIES HOT/COLD SNARE, ELEVIEW INJECTION, BIOPSIES;  Surgeon: Lester Gillette MD;  Location: [facility] ENDOSCOPY;  Service: Gastroenterology;  Laterality: N/A;  COLON POLYPS, DIVERTICULOSIS    ENDOSCOPY N/A 11/14/2023    Procedure: ESOPHAGOGASTRODUODENOSCOPY WITH BIOPSIES;  Surgeon: Lester Gillette MD;  Location: [facility] ENDOSCOPY;  Service: Gastroenterology;  Laterality: N/A;  PREVIOUS SURGERY    HERNIA REPAIR      HYSTERECTOMY      MASTECTOMY Bilateral     UPPER GASTROINTESTINAL ENDOSCOPY          Social History  She  reports that she has never smoked. She has been exposed to tobacco smoke. She has never used smokeless tobacco. She reports that she does not drink alcohol and does not use drugs.    Family History  Her family history is not on file.       No current facility-administered medications on file prior to visit.     Current Outpatient Medications on File Prior to Visit   Medication Sig    albuterol sulfate  (90 Base) MCG/ACT inhaler Inhale 2 puffs Every 4 (Four) Hours As Needed for Wheezing.    apixaban (ELIQUIS) 5 MG tablet tablet Take 1 tablet by mouth 2 (Two) Times a Day.    Arthritis Pain Reliever 650 MG 8 hr tablet Take 1 tablet by mouth Every 8 (Eight) Hours As Needed for Mild Pain.    baclofen (LIORESAL) 10 MG tablet Take 1 tablet by mouth Daily.    buPROPion XL (WELLBUTRIN XL) 150 MG 24 hr tablet Take 1 tablet by mouth Daily.    dilTIAZem HCl ER (CARDIZEM LA) 360 MG 24 hr tablet Take 1 tablet by mouth Daily.    Fluticasone-Umeclidin-Vilant (TRELEGY ELLIPTA) 200-62.5-25 MCG/ACT inhaler Inhale 1 puff Daily.    furosemide (LASIX) 20 MG tablet Take 1 tablet by mouth Daily.  "   gabapentin (NEURONTIN) 300 MG capsule Take 1 capsule by mouth Every Night.    glipizide (GLUCOTROL) 5 MG tablet Take 1 tablet by mouth Daily.    hydroCHLOROthiazide 25 MG tablet Take 1 tablet by mouth Daily.    ipratropium-albuterol (DUO-NEB) 0.5-2.5 mg/3 ml nebulizer USE 3 ML VIA NEBULIZER FOUR TIMES DAILY AS NEEDED FOR WHEEZING OR SHORTNESS OF BREATH (Patient taking differently: Take 3 mL by nebulization 4 (Four) Times a Day As Needed for Wheezing or Shortness of Air.)    lisinopril (PRINIVIL,ZESTRIL) 5 MG tablet Take 1 tablet by mouth Daily.    metoprolol succinate XL (TOPROL-XL) 50 MG 24 hr tablet Take 1 tablet by mouth Every 12 (Twelve) Hours.    mirtazapine (REMERON) 15 MG tablet Take 1 tablet by mouth Every Night.    montelukast (SINGULAIR) 10 MG tablet Take 1 tablet by mouth Every Night.    nitroglycerin (NITROSTAT) 0.4 MG SL tablet 1 under the tongue as needed for angina, may repeat q5mins for up three doses    omeprazole (priLOSEC) 40 MG capsule TAKE 1 CAPSULE BY MOUTH TWICE DAILY    ondansetron (ZOFRAN) 4 MG tablet Take 1 tablet by mouth Every 6 (Six) Hours As Needed for Nausea or Vomiting.    pravastatin (PRAVACHOL) 80 MG tablet Take 1 tablet by mouth Daily.    sertraline (ZOLOFT) 100 MG tablet Take 2 tablets by mouth Daily.    vitamin D3 125 MCG (5000 UT) capsule capsule Take 1 capsule by mouth Daily.    vitamin E 400 UNIT capsule Take 1 capsule by mouth Daily.         Review of Systems   Constitutional:  Negative for fatigue.   Respiratory:  Negative for cough, chest tightness and shortness of breath.    Cardiovascular:  Negative for chest pain, palpitations and leg swelling.   Gastrointestinal:  Negative for nausea and vomiting.   Neurological:  Positive for tremors and weakness. Negative for dizziness and syncope.        Objective   Vitals:    03/12/25 1505   BP: 117/66   Pulse: 83   Weight: 86.6 kg (191 lb)   Height: 167.6 cm (66\")         Physical Exam  General : Alert, awake, no acute " distress  Neck : Supple, no carotid bruit, no jugular venous distention  CVS : Irregular rhythm, no murmur, no rubs or gallops  Lungs: Clear to auscultation bilaterally, no crackles or rhonchi  Abdomen: Soft, nontender, bowel sounds active  Extremities: Warm, well-perfused, no pedal edema      Result Review     The following data was reviewed by VENESSA Howell  proBNP   Date Value Ref Range Status   03/04/2025 1,567.0 0.0 - 1,800.0 pg/mL Final     CMP          3/4/2025    02:23 3/6/2025    17:00 3/8/2025    05:25   CMP   Glucose 135  140  93    BUN 19  16  10    Creatinine 1.26  1.10  0.82    EGFR 44.6  52.5  74.7    Sodium 136  138  140    Potassium 3.9  4.0  3.9    Chloride 97  100  106    Calcium 9.8  9.9  9.2    Total Protein 7.3  7.2  6.4    Albumin 4.1  4.0  3.7    Globulin 3.2  3.2  2.7    Total Bilirubin 0.2  0.3  0.3    Alkaline Phosphatase 75  66  61    AST (SGOT) 28  26  21    ALT (SGPT) 17  18  13    Albumin/Globulin Ratio 1.3  1.3  1.4    BUN/Creatinine Ratio 15.1  14.5  12.2    Anion Gap 12.4  11.9  9.8      CBC w/diff          2/12/2025    16:25 3/4/2025    02:23 3/6/2025    17:00   CBC w/Diff   WBC 7.95  8.84  7.10    RBC 3.81  4.08  4.44    Hemoglobin 11.0  12.1  13.1    Hematocrit 34.5  36.9  39.9    MCV 90.6  90.4  89.9    MCH 28.9  29.7  29.5    MCHC 31.9  32.8  32.8    RDW 14.0  13.7  14.1    Platelets 265  299  253    Neutrophil Rel % 73.9  65.5  62.9    Immature Granulocyte Rel % 0.5  0.5  0.7    Lymphocyte Rel % 15.6  22.6  24.8    Monocyte Rel % 8.8  10.3  10.6    Eosinophil Rel % 0.8  0.8  0.4    Basophil Rel % 0.4  0.3  0.6       Lab Results   Component Value Date    TSH 2.150 03/06/2025        Results for orders placed during the hospital encounter of 03/06/25    Adult Transthoracic Echo Complete W/ Cont if Necessary Per Protocol    Interpretation Summary  Normal chamber sizes.  LV has normal wall thickness.  No regional wall motion abnormalities are present.  Systolic function is  normal calculated LVEF is greater than 55%.  Diastolic function could not be accurately assessed.  RV has normal systolic function.  Trileaflet sclerotic aortic valve.  There is no aortic valve stenosis/regurgitation.  Mitral valve has thickened leaflets with preserved leaflet excursion.  Mild MR.  Grossly normal tricuspid valve.  Mild TR is noted.  Calculated RVSP is 18 mmHg plus right atrial pressure  No pericardial effusion is noted.  IVC has normal size.  Estimated right atrial pressure is 0 to 5 mmHg    No prior studies available for comparison    Results for orders placed during the hospital encounter of 03/05/25    Stress Test With Myocardial Perfusion One Day    Interpretation Summary    Myocardial perfusion imaging indicates a normal myocardial perfusion study with no evidence of ischemia.    Left ventricular ejection fraction is moderately reduced (Calculated EF = 34%).  This is likely an underestimation due to technical difficulties in gating secondary to persistent atrial fibrillation.    Diaphragmatic attenuation artifact is present.    Baseline EKG showed atrial fibrillation.  Findings consistent with a normal ECG stress test.    Impressions are consistent with a low risk study.           Assessment and Plan   Diagnoses and all orders for this visit:    1. Persistent atrial fibrillation (Primary)    2. Nonobstructive atherosclerosis of coronary artery    3. Essential hypertension    4. Mixed hyperlipidemia          Assessment & Plan  1.  Atrial fibrillation-she remains in atrial fibrillation with controlled ventricular rates.  Continue current regiment with diltiazem and metoprolol.  Recommended consideration for referral to cardiac electrophysiologist for further evaluation.  Briefly discussed option for cardioversion, however since she is not currently symptomatic, and her rate is well-controlled this will be deferred for now.  Continue chronic anticoagulation with Eliquis.  Samples given in the  office today.    2.  CAD- stable without symptoms of angina.  G recent stress test negative for ischemia.      3.  Hypertension-blood pressure well-controlled, continue current meds    4.  Hyperlipidemia-continue current dose pravastatin    Encouraged her to discuss generalized weakness and tremors with PCP at upcoming follow-up, and options for possible home health therapy to assist her.                Follow Up   Return in about 4 months (around 7/12/2025) for with Dr. Guadarrama.    Patient was given instructions and counseling regarding her condition or for health maintenance advice. Please see specific information pulled into the AVS if appropriate.     Signed,  VENESSA Howell  03/12/2025     Patient or patient representative verbalized consent for the use of Ambient Listening during the visit with  VENESSA Howell for chart documentation. 4/12/2025  15:17 EDT    Dictated Utilizing Dragon Dictation: Please note that portions of this note were completed with a voice recognition program.  Part of this note may be an electronic transcription/translation of spoken language to printed text using the Dragon Dictation System.

## 2025-03-13 ENCOUNTER — READMISSION MANAGEMENT (OUTPATIENT)
Dept: CALL CENTER | Facility: HOSPITAL | Age: 75
End: 2025-03-13
Payer: MEDICARE

## 2025-03-13 NOTE — OUTREACH NOTE
Medical Week 1 Survey      Flowsheet Row Responses   [facility] patient discharged from? [facility]   Does the patient have one of the following disease processes/diagnoses(primary or secondary)? Other   Week 1 attempt successful? Yes   Call start time 1525   Discharge diagnosis Atrial fibrillation with rapid ventricular response   Person spoke with today (if not patient) and relationship Daughter- Stephany Jha reviewed with patient/caregiver? Yes   Does the patient have all medications ordered at discharge? Yes   Prescription comments No concerns or questions noted.   Is the patient taking all medications as directed (includes completed medication regime)? Yes   Does the patient have a primary care provider?  Yes   Comments regarding PCP Seen Cards yesterday   Comments Seeing pcp monday. Will have pcp order PT at that time.   Has home health visited the patient within 72 hours of discharge? N/A   Psychosocial issues? No   Did the patient receive a copy of their discharge instructions? Yes   Nursing interventions Reviewed instructions with patient   What is the patient's perception of their health status since discharge? Improving   Is the patient/caregiver able to teach back signs and symptoms related to disease process for when to call PCP? Yes   Is the patient/caregiver able to teach back signs and symptoms related to disease process for when to call 911? Yes   Is the patient/caregiver able to teach back the hierarchy of who to call/visit for symptoms/problems? PCP, Specialist, Home health nurse, Urgent Care, ED, 911 Yes   Week 1 call completed? Yes   Graduated Yes   Would this patient benefit from a Referral to Amb Social Work? Yes   Reason for Social Work Referral Caregiving/Support  [Needing PT]   Wrap up additional comments Daughter reports doing well still very weak. Seen cards yesterday and PT was recommended. Waiting to see pcp and will request orders then discussed Calling pcp today for HH orders and  will consult our  to help facilitate in HH. Daughter understands and is appreciative of the help. Please call Stephany- she is at work if she does answer leave a LEONARDO ROBERTSON - Registered Nurse

## 2025-03-18 ENCOUNTER — TELEPHONE (OUTPATIENT)
Dept: CASE MANAGEMENT | Facility: OTHER | Age: 75
End: 2025-03-18
Payer: MEDICARE

## 2025-03-18 ENCOUNTER — PATIENT OUTREACH (OUTPATIENT)
Dept: CASE MANAGEMENT | Facility: OTHER | Age: 75
End: 2025-03-18
Payer: MEDICARE

## 2025-03-18 NOTE — OUTREACH NOTE
AMBULATORY CASE MANAGEMENT NOTE    Names and Relationships of Patient/Support Persons: Contact: BUDDY PADILLA; Relationship: Emergency Contact -     Patient Outreach    Incoming call from daughter, Buddy.  She is returning a call to [facility] from previous VMs.  Introduced self and explained role and purpose of outreach related to a referral from the Call Center.  She states that her mother has had a PCP follow up and has obtained the needed referral for  services.  She will follow up with her PCP office with any delay in these services.  She has no other needs or questions at this time.  Encouraged her to outreach to [facility] for future needs.     Marjorie ROBERTSON  Ambulatory Case Management    3/18/2025, 09:54 EDT

## 2025-03-18 NOTE — TELEPHONE ENCOUNTER
Referral from the call center, outreach attempts have been made x 3 to daughter as requested.  VM with ACM contact provided.  Closed program due to unable to reach.  Marjorie Ortiz RN ACM

## 2025-03-27 ENCOUNTER — LAB REQUISITION (OUTPATIENT)
Dept: LAB | Facility: HOSPITAL | Age: 75
End: 2025-03-27
Payer: MEDICARE

## 2025-03-27 DIAGNOSIS — R30.0 DYSURIA: ICD-10-CM

## 2025-03-27 LAB
BACTERIA UR QL AUTO: ABNORMAL /HPF
BILIRUB UR QL STRIP: NEGATIVE
CLARITY UR: CLEAR
COLOR UR: YELLOW
GLUCOSE UR STRIP-MCNC: NEGATIVE MG/DL
HGB UR QL STRIP.AUTO: ABNORMAL
HYALINE CASTS UR QL AUTO: ABNORMAL /LPF
KETONES UR QL STRIP: ABNORMAL
LEUKOCYTE ESTERASE UR QL STRIP.AUTO: ABNORMAL
NITRITE UR QL STRIP: NEGATIVE
PH UR STRIP.AUTO: 6.5 [PH] (ref 5–8)
PROT UR QL STRIP: ABNORMAL
RBC # UR STRIP: ABNORMAL /HPF
REF LAB TEST METHOD: ABNORMAL
SP GR UR STRIP: 1.02 (ref 1–1.03)
SQUAMOUS #/AREA URNS HPF: ABNORMAL /HPF
UROBILINOGEN UR QL STRIP: ABNORMAL
WBC # UR STRIP: ABNORMAL /HPF

## 2025-03-27 PROCEDURE — 81001 URINALYSIS AUTO W/SCOPE: CPT | Performed by: INTERNAL MEDICINE

## 2025-03-27 PROCEDURE — 87086 URINE CULTURE/COLONY COUNT: CPT | Performed by: INTERNAL MEDICINE

## 2025-03-27 PROCEDURE — 87077 CULTURE AEROBIC IDENTIFY: CPT | Performed by: INTERNAL MEDICINE

## 2025-03-27 PROCEDURE — 87186 SC STD MICRODIL/AGAR DIL: CPT | Performed by: INTERNAL MEDICINE

## 2025-03-28 ENCOUNTER — HOSPITAL ENCOUNTER (OUTPATIENT)
Dept: RESPIRATORY THERAPY | Facility: HOSPITAL | Age: 75
Discharge: HOME | End: 2025-03-28
Payer: MEDICARE

## 2025-03-28 DIAGNOSIS — Z77.22 SECOND HAND TOBACCO SMOKE EXPOSURE: ICD-10-CM

## 2025-03-28 DIAGNOSIS — J45.909 ASTHMA, UNSPECIFIED ASTHMA SEVERITY, UNSPECIFIED WHETHER COMPLICATED, UNSPECIFIED WHETHER PERSISTENT: ICD-10-CM

## 2025-03-28 DIAGNOSIS — J41.1 BRONCHITIS, MUCOPURULENT RECURRENT: ICD-10-CM

## 2025-03-28 DIAGNOSIS — R05.9 COUGH, UNSPECIFIED TYPE: ICD-10-CM

## 2025-03-28 DIAGNOSIS — Z85.3 HX: BREAST CANCER: ICD-10-CM

## 2025-03-28 DIAGNOSIS — R06.09 DYSPNEA ON EXERTION: ICD-10-CM

## 2025-03-28 PROCEDURE — 94060 EVALUATION OF WHEEZING: CPT

## 2025-03-28 PROCEDURE — 94726 PLETHYSMOGRAPHY LUNG VOLUMES: CPT

## 2025-03-28 PROCEDURE — 94729 DIFFUSING CAPACITY: CPT

## 2025-03-28 RX ORDER — ALBUTEROL SULFATE 0.83 MG/ML
2.5 SOLUTION RESPIRATORY (INHALATION) ONCE
Status: COMPLETED | OUTPATIENT
Start: 2025-03-28 | End: 2025-03-28

## 2025-03-28 RX ADMIN — ALBUTEROL SULFATE 2.5 MG: 2.5 SOLUTION RESPIRATORY (INHALATION) at 09:02

## 2025-03-28 NOTE — PROGRESS NOTES
Exercise Oximetry    Patient Name:Camilla Navas   MRN: [id number]   Date: 03/28/25             ROOM AIR BASELINE   SpO2% 96   Heart Rate 91   Blood Pressure 136/69     EXERCISE ON ROOM AIR SpO2% EXERCISE ON O2 @  LPM SpO2%   1 MINUTE 96 1 MINUTE    2 MINUTES 95 2 MINUTES    3 MINUTES 96 3 MINUTES    4 MINUTES 96 4 MINUTES    5 MINUTES 95 5 MINUTES    6 MINUTES 96 6 MINUTES               Distance Walked  200 feet Distance Walked   Dyspnea (Starla Scale)  2 Dyspnea (Starla Scale)   Fatigue (Starla Scale)  3 Fatigue (Starla Scale)   SpO2% Post Exercise  97 SpO2% Post Exercise   HR Post Exercise  96 HR Post Exercise   Time to Recovery   Time to Recovery     Comments:

## 2025-03-29 LAB — BACTERIA SPEC AEROBE CULT: ABNORMAL

## 2025-04-03 ENCOUNTER — TELEPHONE (OUTPATIENT)
Dept: CARDIOLOGY | Facility: CLINIC | Age: 75
End: 2025-04-03
Payer: MEDICARE

## 2025-04-03 NOTE — TELEPHONE ENCOUNTER
Yes, she will need fasting lipid and hepatic function panel    CCHD Screen [08-20]: Initial  Pre-Ductal SpO2(%): 95  Post-Ductal SpO2(%): 96  SpO2 Difference(Pre MINUS Post): -1  Extremities Used: Right Hand, Right Foot  Result: Passed  Follow up: Normal Screen- (No follow-up needed)

## 2025-04-03 NOTE — TELEPHONE ENCOUNTER
Patient needs lab work, verbalized understanding.    [employer] was at her home when I spoke with the patient, she stated patient is having transportation issues and wants to know if [employer] can draw the blood work. They need an order called in to [phone].

## 2025-04-04 ENCOUNTER — APPOINTMENT (OUTPATIENT)
Dept: GENERAL RADIOLOGY | Facility: HOSPITAL | Age: 75
DRG: 055 | End: 2025-04-04
Payer: MEDICARE

## 2025-04-04 ENCOUNTER — HOSPITAL ENCOUNTER (INPATIENT)
Facility: HOSPITAL | Age: 75
LOS: 6 days | Discharge: NURSING, REHAB OR LONG-TERM CARE | DRG: 055 | End: 2025-04-19
Attending: EMERGENCY MEDICINE | Admitting: INTERNAL MEDICINE
Payer: MEDICARE

## 2025-04-04 DIAGNOSIS — R60.0 BILATERAL LOWER EXTREMITY EDEMA: ICD-10-CM

## 2025-04-04 DIAGNOSIS — R26.2 DIFFICULTY WALKING: ICD-10-CM

## 2025-04-04 DIAGNOSIS — R13.12 OROPHARYNGEAL DYSPHAGIA: ICD-10-CM

## 2025-04-04 DIAGNOSIS — R26.2 UNABLE TO AMBULATE: ICD-10-CM

## 2025-04-04 DIAGNOSIS — E87.6 HYPOKALEMIA: ICD-10-CM

## 2025-04-04 DIAGNOSIS — R33.8 ACUTE URINARY RETENTION: ICD-10-CM

## 2025-04-04 DIAGNOSIS — R53.1 GENERALIZED WEAKNESS: Primary | ICD-10-CM

## 2025-04-04 LAB
ALBUMIN SERPL-MCNC: 4.2 G/DL (ref 3.5–5.2)
ALBUMIN/GLOB SERPL: 1.4 G/DL
ALP SERPL-CCNC: 71 U/L (ref 39–117)
ALT SERPL W P-5'-P-CCNC: 11 U/L (ref 1–33)
ANION GAP SERPL CALCULATED.3IONS-SCNC: 15.7 MMOL/L (ref 5–15)
AST SERPL-CCNC: 21 U/L (ref 1–32)
BACTERIA UR QL AUTO: ABNORMAL /HPF
BASOPHILS # BLD AUTO: 0.03 10*3/MM3 (ref 0–0.2)
BASOPHILS NFR BLD AUTO: 0.4 % (ref 0–1.5)
BILIRUB SERPL-MCNC: 0.3 MG/DL (ref 0–1.2)
BILIRUB UR QL STRIP: NEGATIVE
BUN SERPL-MCNC: 15 MG/DL (ref 8–23)
BUN/CREAT SERPL: 16.5 (ref 7–25)
CALCIUM SPEC-SCNC: 9.5 MG/DL (ref 8.6–10.5)
CHLORIDE SERPL-SCNC: 99 MMOL/L (ref 98–107)
CLARITY UR: CLEAR
CO2 SERPL-SCNC: 26.3 MMOL/L (ref 22–29)
COLOR UR: YELLOW
CREAT SERPL-MCNC: 0.91 MG/DL (ref 0.57–1)
DEPRECATED RDW RBC AUTO: 43.8 FL (ref 37–54)
EGFRCR SERPLBLD CKD-EPI 2021: 65.9 ML/MIN/1.73
EOSINOPHIL # BLD AUTO: 0.08 10*3/MM3 (ref 0–0.4)
EOSINOPHIL NFR BLD AUTO: 1 % (ref 0.3–6.2)
ERYTHROCYTE [DISTWIDTH] IN BLOOD BY AUTOMATED COUNT: 13.3 % (ref 12.3–15.4)
FLUAV RNA RESP QL NAA+PROBE: NOT DETECTED
FLUBV RNA RESP QL NAA+PROBE: NOT DETECTED
GEN 5 1HR TROPONIN T REFLEX: 17 NG/L
GLOBULIN UR ELPH-MCNC: 3 GM/DL
GLUCOSE BLDC GLUCOMTR-MCNC: 150 MG/DL (ref 70–99)
GLUCOSE BLDC GLUCOMTR-MCNC: 87 MG/DL (ref 70–99)
GLUCOSE SERPL-MCNC: 111 MG/DL (ref 65–99)
GLUCOSE UR STRIP-MCNC: ABNORMAL MG/DL
HCT VFR BLD AUTO: 37.9 % (ref 34–46.6)
HGB BLD-MCNC: 12.6 G/DL (ref 12–15.9)
HGB UR QL STRIP.AUTO: ABNORMAL
HOLD SPECIMEN: NORMAL
HOLD SPECIMEN: NORMAL
HYALINE CASTS UR QL AUTO: ABNORMAL /LPF
IMM GRANULOCYTES # BLD AUTO: 0.02 10*3/MM3 (ref 0–0.05)
IMM GRANULOCYTES NFR BLD AUTO: 0.2 % (ref 0–0.5)
KETONES UR QL STRIP: NEGATIVE
LEUKOCYTE ESTERASE UR QL STRIP.AUTO: NEGATIVE
LYMPHOCYTES # BLD AUTO: 1.86 10*3/MM3 (ref 0.7–3.1)
LYMPHOCYTES NFR BLD AUTO: 22.9 % (ref 19.6–45.3)
MAGNESIUM SERPL-MCNC: 1.7 MG/DL (ref 1.6–2.4)
MAGNESIUM SERPL-MCNC: 1.7 MG/DL (ref 1.6–2.4)
MCH RBC QN AUTO: 29.7 PG (ref 26.6–33)
MCHC RBC AUTO-ENTMCNC: 33.2 G/DL (ref 31.5–35.7)
MCV RBC AUTO: 89.4 FL (ref 79–97)
MONOCYTES # BLD AUTO: 0.98 10*3/MM3 (ref 0.1–0.9)
MONOCYTES NFR BLD AUTO: 12.1 % (ref 5–12)
NEUTROPHILS NFR BLD AUTO: 5.16 10*3/MM3 (ref 1.7–7)
NEUTROPHILS NFR BLD AUTO: 63.4 % (ref 42.7–76)
NITRITE UR QL STRIP: NEGATIVE
NRBC BLD AUTO-RTO: 0 /100 WBC (ref 0–0.2)
NT-PROBNP SERPL-MCNC: 1704 PG/ML (ref 0–1800)
PH UR STRIP.AUTO: 7 [PH] (ref 5–8)
PLATELET # BLD AUTO: 299 10*3/MM3 (ref 140–450)
PMV BLD AUTO: 9.4 FL (ref 6–12)
POTASSIUM SERPL-SCNC: 2.9 MMOL/L (ref 3.5–5.2)
PROT SERPL-MCNC: 7.2 G/DL (ref 6–8.5)
PROT UR QL STRIP: NEGATIVE
RBC # BLD AUTO: 4.24 10*6/MM3 (ref 3.77–5.28)
RBC # UR STRIP: ABNORMAL /HPF
REF LAB TEST METHOD: ABNORMAL
RSV RNA RESP QL NAA+PROBE: NOT DETECTED
SARS-COV-2 RNA RESP QL NAA+PROBE: NOT DETECTED
SODIUM SERPL-SCNC: 141 MMOL/L (ref 136–145)
SP GR UR STRIP: 1.01 (ref 1–1.03)
SQUAMOUS #/AREA URNS HPF: ABNORMAL /HPF
TROPONIN T % DELTA: 0
TROPONIN T NUMERIC DELTA: 0 NG/L
TROPONIN T SERPL HS-MCNC: 17 NG/L
UROBILINOGEN UR QL STRIP: ABNORMAL
WBC # UR STRIP: ABNORMAL /HPF
WBC NRBC COR # BLD AUTO: 8.13 10*3/MM3 (ref 3.4–10.8)
WHOLE BLOOD HOLD COAG: NORMAL
WHOLE BLOOD HOLD SPECIMEN: NORMAL

## 2025-04-04 PROCEDURE — 63710000001 FAMOTIDINE 20 MG TABLET: Performed by: INTERNAL MEDICINE

## 2025-04-04 PROCEDURE — 93005 ELECTROCARDIOGRAM TRACING: CPT | Performed by: EMERGENCY MEDICINE

## 2025-04-04 PROCEDURE — A9270 NON-COVERED ITEM OR SERVICE: HCPCS | Performed by: INTERNAL MEDICINE

## 2025-04-04 PROCEDURE — A9270 NON-COVERED ITEM OR SERVICE: HCPCS | Performed by: EMERGENCY MEDICINE

## 2025-04-04 PROCEDURE — P9612 CATHETERIZE FOR URINE SPEC: HCPCS

## 2025-04-04 PROCEDURE — G0378 HOSPITAL OBSERVATION PER HR: HCPCS

## 2025-04-04 PROCEDURE — 93010 ELECTROCARDIOGRAM REPORT: CPT | Performed by: INTERNAL MEDICINE

## 2025-04-04 PROCEDURE — 63710000001 POTASSIUM CHLORIDE 10 MEQ CAPSULE CONTROLLED-RELEASE: Performed by: EMERGENCY MEDICINE

## 2025-04-04 PROCEDURE — 63710000001 POTASSIUM CHLORIDE 10 MEQ CAPSULE CONTROLLED-RELEASE: Performed by: INTERNAL MEDICINE

## 2025-04-04 PROCEDURE — 83735 ASSAY OF MAGNESIUM: CPT | Performed by: INTERNAL MEDICINE

## 2025-04-04 PROCEDURE — 87637 SARSCOV2&INF A&B&RSV AMP PRB: CPT | Performed by: EMERGENCY MEDICINE

## 2025-04-04 PROCEDURE — 80053 COMPREHEN METABOLIC PANEL: CPT | Performed by: EMERGENCY MEDICINE

## 2025-04-04 PROCEDURE — 81001 URINALYSIS AUTO W/SCOPE: CPT | Performed by: EMERGENCY MEDICINE

## 2025-04-04 PROCEDURE — 84484 ASSAY OF TROPONIN QUANT: CPT | Performed by: EMERGENCY MEDICINE

## 2025-04-04 PROCEDURE — 83735 ASSAY OF MAGNESIUM: CPT | Performed by: EMERGENCY MEDICINE

## 2025-04-04 PROCEDURE — 93005 ELECTROCARDIOGRAM TRACING: CPT

## 2025-04-04 PROCEDURE — 63710000001 ALPRAZOLAM 0.25 MG TABLET: Performed by: INTERNAL MEDICINE

## 2025-04-04 PROCEDURE — 99285 EMERGENCY DEPT VISIT HI MDM: CPT

## 2025-04-04 PROCEDURE — 63710000001 INSULIN LISPRO (HUMAN) PER 5 UNITS: Performed by: INTERNAL MEDICINE

## 2025-04-04 PROCEDURE — 82948 REAGENT STRIP/BLOOD GLUCOSE: CPT

## 2025-04-04 PROCEDURE — 36415 COLL VENOUS BLD VENIPUNCTURE: CPT

## 2025-04-04 PROCEDURE — 83880 ASSAY OF NATRIURETIC PEPTIDE: CPT | Performed by: EMERGENCY MEDICINE

## 2025-04-04 PROCEDURE — 71045 X-RAY EXAM CHEST 1 VIEW: CPT

## 2025-04-04 PROCEDURE — 85025 COMPLETE CBC W/AUTO DIFF WBC: CPT | Performed by: EMERGENCY MEDICINE

## 2025-04-04 RX ORDER — ALUMINA, MAGNESIA, AND SIMETHICONE 2400; 2400; 240 MG/30ML; MG/30ML; MG/30ML
15 SUSPENSION ORAL EVERY 6 HOURS PRN
Status: DISCONTINUED | OUTPATIENT
Start: 2025-04-04 | End: 2025-04-19 | Stop reason: HOSPADM

## 2025-04-04 RX ORDER — NICOTINE POLACRILEX 4 MG
15 LOZENGE BUCCAL
Status: DISCONTINUED | OUTPATIENT
Start: 2025-04-04 | End: 2025-04-19 | Stop reason: HOSPADM

## 2025-04-04 RX ORDER — DEXTROSE MONOHYDRATE 25 G/50ML
25 INJECTION, SOLUTION INTRAVENOUS
Status: DISCONTINUED | OUTPATIENT
Start: 2025-04-04 | End: 2025-04-19 | Stop reason: HOSPADM

## 2025-04-04 RX ORDER — IBUPROFEN 600 MG/1
1 TABLET ORAL
Status: DISCONTINUED | OUTPATIENT
Start: 2025-04-04 | End: 2025-04-19 | Stop reason: HOSPADM

## 2025-04-04 RX ORDER — ALPRAZOLAM 0.25 MG
0.5 TABLET ORAL EVERY 8 HOURS PRN
Status: DISPENSED | OUTPATIENT
Start: 2025-04-04 | End: 2025-04-11

## 2025-04-04 RX ORDER — ACETAMINOPHEN 650 MG/1
650 SUPPOSITORY RECTAL EVERY 4 HOURS PRN
Status: DISCONTINUED | OUTPATIENT
Start: 2025-04-04 | End: 2025-04-19 | Stop reason: HOSPADM

## 2025-04-04 RX ORDER — SODIUM CHLORIDE 0.9 % (FLUSH) 0.9 %
10 SYRINGE (ML) INJECTION AS NEEDED
Status: DISCONTINUED | OUTPATIENT
Start: 2025-04-04 | End: 2025-04-19 | Stop reason: HOSPADM

## 2025-04-04 RX ORDER — POTASSIUM CHLORIDE 750 MG/1
40 CAPSULE, EXTENDED RELEASE ORAL ONCE
Status: COMPLETED | OUTPATIENT
Start: 2025-04-04 | End: 2025-04-04

## 2025-04-04 RX ORDER — INSULIN LISPRO 100 [IU]/ML
2-9 INJECTION, SOLUTION INTRAVENOUS; SUBCUTANEOUS
Status: DISCONTINUED | OUTPATIENT
Start: 2025-04-04 | End: 2025-04-11

## 2025-04-04 RX ORDER — AMOXICILLIN 250 MG
2 CAPSULE ORAL 2 TIMES DAILY PRN
Status: DISCONTINUED | OUTPATIENT
Start: 2025-04-04 | End: 2025-04-19 | Stop reason: HOSPADM

## 2025-04-04 RX ORDER — ACETAMINOPHEN 160 MG/5ML
650 SOLUTION ORAL EVERY 4 HOURS PRN
Status: DISCONTINUED | OUTPATIENT
Start: 2025-04-04 | End: 2025-04-19 | Stop reason: HOSPADM

## 2025-04-04 RX ORDER — FAMOTIDINE 20 MG/1
40 TABLET, FILM COATED ORAL DAILY
Status: DISCONTINUED | OUTPATIENT
Start: 2025-04-04 | End: 2025-04-05

## 2025-04-04 RX ORDER — POLYETHYLENE GLYCOL 3350 17 G/17G
17 POWDER, FOR SOLUTION ORAL DAILY PRN
Status: DISCONTINUED | OUTPATIENT
Start: 2025-04-04 | End: 2025-04-19 | Stop reason: HOSPADM

## 2025-04-04 RX ORDER — ONDANSETRON 2 MG/ML
4 INJECTION INTRAMUSCULAR; INTRAVENOUS EVERY 6 HOURS PRN
Status: DISCONTINUED | OUTPATIENT
Start: 2025-04-04 | End: 2025-04-19 | Stop reason: HOSPADM

## 2025-04-04 RX ORDER — POTASSIUM CHLORIDE 750 MG/1
40 CAPSULE, EXTENDED RELEASE ORAL EVERY 4 HOURS
Status: COMPLETED | OUTPATIENT
Start: 2025-04-04 | End: 2025-04-04

## 2025-04-04 RX ORDER — BISACODYL 10 MG
10 SUPPOSITORY, RECTAL RECTAL DAILY PRN
Status: DISCONTINUED | OUTPATIENT
Start: 2025-04-04 | End: 2025-04-19 | Stop reason: HOSPADM

## 2025-04-04 RX ORDER — BISACODYL 5 MG/1
5 TABLET, DELAYED RELEASE ORAL DAILY PRN
Status: DISCONTINUED | OUTPATIENT
Start: 2025-04-04 | End: 2025-04-19 | Stop reason: HOSPADM

## 2025-04-04 RX ORDER — DIPHENHYDRAMINE HCL 25 MG
25 TABLET ORAL EVERY 6 HOURS PRN
COMMUNITY

## 2025-04-04 RX ORDER — ACETAMINOPHEN 325 MG/1
650 TABLET ORAL EVERY 4 HOURS PRN
Status: DISCONTINUED | OUTPATIENT
Start: 2025-04-04 | End: 2025-04-19 | Stop reason: HOSPADM

## 2025-04-04 RX ADMIN — POTASSIUM CHLORIDE 40 MEQ: 750 CAPSULE, EXTENDED RELEASE ORAL at 07:27

## 2025-04-04 RX ADMIN — ALPRAZOLAM 0.5 MG: 0.25 TABLET ORAL at 21:16

## 2025-04-04 RX ADMIN — POTASSIUM CHLORIDE 40 MEQ: 750 CAPSULE, EXTENDED RELEASE ORAL at 20:05

## 2025-04-04 RX ADMIN — POTASSIUM CHLORIDE 40 MEQ: 750 CAPSULE, EXTENDED RELEASE ORAL at 17:03

## 2025-04-04 RX ADMIN — FAMOTIDINE 40 MG: 20 TABLET, FILM COATED ORAL at 13:50

## 2025-04-04 RX ADMIN — INSULIN LISPRO 2 UNITS: 100 INJECTION, SOLUTION INTRAVENOUS; SUBCUTANEOUS at 17:03

## 2025-04-04 NOTE — H&P
[facility]   HISTORY AND PHYSICAL    Patient Name: Camilla Navas  : 1950  MRN: [id number]  Primary Care Physician:  Lester Lizarraga MD  Date of admission: 2025    Subjective   Subjective     Chief Complaint:  generalized weakness    HPI:    Camilla Navas is a 74 y.o. female with past medical history significant for  chronic atrial fibrillation,essential hypertension coronary artery disease asthma type 2 diabetes mellitus  diabetic gastroparesis was admitted to the hospital about a month ago with diabetic gastroparesis and she responded to Reglan very well and then patient was discharged home.   Apparently patient saw her PCP and they started her back on Reglan because of nausea and came to the emergency room complaining of some lower extremity swelling but severe weakness to a point where she is having difficulty standing up.  Her potassium was found to be 2.9    Review of Systems  All review of systems negative except as in subjective complaints:  Personal History     Past Medical History:   Diagnosis Date    Arthritis     Asthma     Atherosclerosis of coronary artery 2018    Non-obstructive coronary artery disease. Cardiac catheterization done in 2018 showed a 40% mid LAD lesion and a 40% proximal RCA lesion.    Cancer     Breast.     COPD (chronic obstructive pulmonary disease)     Diabetes mellitus     Diverticulitis     Hiatal hernia     Hyperlipemia 2022    Hypertension, essential 2022       Past Surgical History:   Procedure Laterality Date    COLONOSCOPY      COLONOSCOPY N/A 2023    Procedure: COLONOSCOPY WITH POLYPECTOMIES HOT/COLD SNARE, ELEVIEW INJECTION, BIOPSIES;  Surgeon: Lester Gillette MD;  Location: [facility] ENDOSCOPY;  Service: Gastroenterology;  Laterality: N/A;  COLON POLYPS, DIVERTICULOSIS    ENDOSCOPY N/A 2023    Procedure: ESOPHAGOGASTRODUODENOSCOPY WITH BIOPSIES;  Surgeon: Lester Gillette MD;  Location: [facility] ENDOSCOPY;   Service: Gastroenterology;  Laterality: N/A;  PREVIOUS SURGERY    HERNIA REPAIR      HYSTERECTOMY      MASTECTOMY Bilateral     UPPER GASTROINTESTINAL ENDOSCOPY         Family History: family history is not on file. Otherwise pertinent FHx was reviewed and not pertinent to current issue.    Social History:  reports that she has never smoked. She has been exposed to tobacco smoke. She has never used smokeless tobacco. She reports that she does not drink alcohol and does not use drugs.    Home Medications:  Fluticasone-Umeclidin-Vilant, acetaminophen, albuterol sulfate HFA, apixaban, azelastine, baclofen, buPROPion XL, dilTIAZem HCl ER, fluticasone, furosemide, gabapentin, glipizide, hydroCHLOROthiazide, ipratropium-albuterol, lisinopril, metoprolol succinate XL, mirtazapine, montelukast, nitroglycerin, omeprazole, ondansetron, pravastatin, sertraline, vitamin D3, and vitamin E      Allergies:  Allergies   Allergen Reactions    Empagliflozin Nausea And Vomiting    Metformin Diarrhea       Objective   Objective     Vitals:   Temp:  [98.5 °F (36.9 °C)] 98.5 °F (36.9 °C)  Heart Rate:  [] 99  Resp:  [12-17] 17  BP: (139-167)/(78-93) 139/92  Physical Exam               Constitutional:         Awake, alert responsive, conversant, no obvious distress   Eyes:                       PERRLA, sclerae anicteric, no conjunctival injection   HEENT:                   Moist mucous membranes, no nasal or eye discharge, no throat congestion   Neck:                      Supple, no thyromegaly, no lymphadenopathy, trachea midline, no elevated JVD   Respiratory:           Clear to auscultation bilaterally, nonlabored respirations    Cardiovascular:     RRR, no murmurs, rubs, or gallops, palpable pedal pulses bilaterally,No bilateral ankle edema   Gastrointestinal:   Positive bowel sounds, soft, nontender, nondistended, no organomegaly   Musculoskeletal:   No clubbing or cyanosis to extremities, muscle wasting, joint swelling, muscle  weakness   Psychiatric:             Appropriate affect, cooperative   Neurologic:            Awake alert ,oriented x 3, strength symmetric in all extremities, Cranial Nerves grossly intact to confrontation, speech clear   Skin:                      No rashes, bruising, skin ulcers, petechiae or ecchymosis    Result Review    Result Review:  I have personally reviewed the results from the time of this admission to 4/4/2025 12:45 EDT and agree with these findings:  []  Laboratory  []  Microbiology  []  Radiology  []  EKG/Telemetry   []  Cardiology/Vascular   []  Pathology  []  Old records  []  Other:    Results from last 7 days   Lab Units 04/04/25  0624   WBC 10*3/mm3 8.13   HEMOGLOBIN g/dL 12.6   PLATELETS 10*3/mm3 299     Results from last 7 days   Lab Units 04/04/25  0624   SODIUM mmol/L 141   POTASSIUM mmol/L 2.9*   CHLORIDE mmol/L 99   CO2 mmol/L 26.3   ANION GAP mmol/L 15.7*   BUN mg/dL 15   CREATININE mg/dL 0.91   GLUCOSE mg/dL 111*   EGFR mL/min/1.73 65.9   CALCIUM mg/dL 9.5   MAGNESIUM mg/dL 1.7   ALBUMIN g/dL 4.2   BILIRUBIN mg/dL 0.3   ALK PHOS U/L 71   ALT (SGPT) U/L 11   AST (SGOT) U/L 21         Assessment & Plan   Assessment / Plan     Active Hospital Problems:  Active Hospital Problems    Diagnosis     **Generalized weakness     Hypokalemia        Plan:    Hold Reglan   Aggressively replace potassium   Patient will be admitted as observation and see how patient does tomorrow and then make further recommendations    VTE Prophylaxis:  No VTE prophylaxis order currently exists.        CODE STATUS:    Code Status (Patient has no pulse and is not breathing): CPR (Attempt to Resuscitate)  Medical Interventions (Patient has pulse or is breathing): Full Support    Admission Status:  I believe this patient meets  observation status.    Electronically signed by Emory Ratliff MD, 04/04/25, 12:43 PM EDT.

## 2025-04-04 NOTE — ED PROVIDER NOTES
Time: 6:23 AM EDT  Date of encounter:  4/4/2025  Independent Historian/Clinical History and Information was obtained by:   Patient and EMS    History is limited by: N/A    Chief Complaint: General Weakness        History of Present Illness:  Patient is a 74 y.o. year old diabetic female with history of A-fib on Eliquis who presents to the emergency department for evaluation of generalized weakness all over.    Too weak to get out of bed and slid to the floor but did not injure herself.    No fevers but reports some chills.  No cough or congestion.    Recently had some vomiting and diarrhea this week.    She is taking all of her medications compliantly.  She did start on Benadryl recently last night for her tremors.  She was recently taking Reglan this past month since leaving the hospital for gastroparesis, but discontinued it yesterday thinking this could be causing the shaking.      Patient Care Team  Primary Care Provider: Lester Lizarraga MD    Past Medical History:     Allergies   Allergen Reactions    Empagliflozin Nausea And Vomiting    Metformin Diarrhea     Past Medical History:   Diagnosis Date    Arthritis     Asthma     Atherosclerosis of coronary artery 2018    Non-obstructive coronary artery disease. Cardiac catheterization done in October 2018 showed a 40% mid LAD lesion and a 40% proximal RCA lesion.    Cancer     Breast.     COPD (chronic obstructive pulmonary disease)     Diabetes mellitus     Diverticulitis     Hiatal hernia     Hyperlipemia 01/21/2022    Hypertension, essential 03/05/2022     Past Surgical History:   Procedure Laterality Date    COLONOSCOPY      COLONOSCOPY N/A 11/14/2023    Procedure: COLONOSCOPY WITH POLYPECTOMIES HOT/COLD SNARE, ELEVIEW INJECTION, BIOPSIES;  Surgeon: Lester Gillette MD;  Location: [facility] ENDOSCOPY;  Service: Gastroenterology;  Laterality: N/A;  COLON POLYPS, DIVERTICULOSIS    ENDOSCOPY N/A 11/14/2023    Procedure: ESOPHAGOGASTRODUODENOSCOPY WITH  BIOPSIES;  Surgeon: Lester Gillette MD;  Location: [facility] ENDOSCOPY;  Service: Gastroenterology;  Laterality: N/A;  PREVIOUS SURGERY    HERNIA REPAIR      HYSTERECTOMY      MASTECTOMY Bilateral     UPPER GASTROINTESTINAL ENDOSCOPY       Family History   Problem Relation Age of Onset    Colon cancer Neg Hx     Malig Hyperthermia Neg Hx        Home Medications:  Prior to Admission medications    Medication Sig Start Date End Date Taking? Authorizing Provider   albuterol sulfate  (90 Base) MCG/ACT inhaler Inhale 2 puffs Every 4 (Four) Hours As Needed for Wheezing. 6/24/22   Rohan Ring,    apixaban (ELIQUIS) 5 MG tablet tablet Take 1 tablet by mouth 2 (Two) Times a Day. 2/18/25   Umesh Guadarrama MD   Arthritis Pain Reliever 650 MG 8 hr tablet Take 1 tablet by mouth Every 8 (Eight) Hours As Needed for Mild Pain. 3/2/22   Emergency, Nurse Epic, RN   azelastine (ASTELIN) 0.1 % nasal spray Administer 2 sprays into the nostril(s) as directed by provider 2 (Two) Times a Day. Use in each nostril as directed 10/30/24   Randall Mathew MD   baclofen (LIORESAL) 10 MG tablet Take 1 tablet by mouth 2 (Two) Times a Day.    ProviderJayesh MD   buPROPion XL (WELLBUTRIN XL) 150 MG 24 hr tablet Take 1 tablet by mouth Daily. 12/2/24   Jayesh Mckinley MD   dilTIAZem HCl ER (CARDIZEM LA) 360 MG 24 hr tablet Take 1 tablet by mouth Daily. 1/30/25   Jayesh Mckinley MD   fluticasone (FLONASE) 50 MCG/ACT nasal spray Administer 2 sprays into the nostril(s) as directed by provider Daily. 10/30/24   Randall Mathew MD   Fluticasone-Umeclidin-Vilant (TRELEGY ELLIPTA) 200-62.5-25 MCG/ACT inhaler Inhale 1 puff Daily. 10/30/24   Randall Mathew MD   furosemide (LASIX) 20 MG tablet Take 1 tablet by mouth Daily. 2/7/25   Umesh Guadarrama MD   gabapentin (NEURONTIN) 300 MG capsule Take 1 capsule by mouth Every Night.    Jayesh Mckinley MD   glipizide (GLUCOTROL) 5 MG tablet Take 1 tablet by mouth Daily.  5/30/24   Jayesh Mckinley MD   hydroCHLOROthiazide 25 MG tablet Take 1 tablet by mouth Daily. 2/7/25   Umesh Guadarrama MD   ipratropium-albuterol (DUO-NEB) 0.5-2.5 mg/3 ml nebulizer USE 3 ML VIA NEBULIZER FOUR TIMES DAILY AS NEEDED FOR WHEEZING OR SHORTNESS OF BREATH  Patient taking differently: Take 3 mL by nebulization 4 (Four) Times a Day As Needed for Wheezing or Shortness of Air. 10/30/24   Randall Mathew MD   lisinopril (PRINIVIL,ZESTRIL) 5 MG tablet Take 1 tablet by mouth Daily.    Jayesh Mckinley MD   metoprolol succinate XL (TOPROL-XL) 50 MG 24 hr tablet Take 1 tablet by mouth Every 12 (Twelve) Hours. 12/13/24   Philip Bueno MD   mirtazapine (REMERON) 15 MG tablet Take 1 tablet by mouth Every Night.    Jayesh Mckinley MD   montelukast (SINGULAIR) 10 MG tablet Take 1 tablet by mouth Every Night. 10/30/24   Randall Mathew MD   nitroglycerin (NITROSTAT) 0.4 MG SL tablet 1 under the tongue as needed for angina, may repeat q5mins for up three doses 2/7/25   Umesh Guadarrama MD   omeprazole (priLOSEC) 40 MG capsule TAKE 1 CAPSULE BY MOUTH TWICE DAILY 1/13/25   Randall Mathew MD   ondansetron (ZOFRAN) 4 MG tablet Take 1 tablet by mouth Every 6 (Six) Hours As Needed for Nausea or Vomiting.    Jayesh Mckinley MD   pravastatin (PRAVACHOL) 80 MG tablet Take 1 tablet by mouth Daily. 2/7/25   Umesh Guadarrama MD   sertraline (ZOLOFT) 100 MG tablet Take 2 tablets by mouth Daily.    Jayesh Mckinley MD   vitamin D3 125 MCG (5000 UT) capsule capsule Take 1 capsule by mouth Daily.    Jayesh Mckinley MD   vitamin E 400 UNIT capsule Take 1 capsule by mouth Daily.    Jayesh Mckinley MD        Social History:   Social History     Tobacco Use    Smoking status: Never     Passive exposure: Past    Smokeless tobacco: Never   Vaping Use    Vaping status: Never Used   Substance Use Topics    Alcohol use: Never    Drug use: Never         Review of Systems:  Review of Systems  "  I performed a 10 point review of systems which was all negative, except for the positives found in the HPI above.    Physical Exam:  /92 (BP Location: Right arm, Patient Position: Lying)   Pulse 99   Temp 98.5 °F (36.9 °C) (Oral)   Resp 17   Ht 167.6 cm (66\")   Wt 87 kg (191 lb 12.8 oz)   SpO2 97%   BMI 30.96 kg/m²     Physical Exam   General: Awake alert and in no obvious distress, has tremor    HEENT: Head normocephalic atraumatic, eyes PERRLA EOMI, nose normal, oropharynx normal.    Neck: Supple full range of motion, no meningismus, no lymphadenopathy    Heart: Regular rate but irregularly irregular rhythm, no murmurs or rubs, 2+ radial pulses bilaterally    Lungs: Clear to auscultation bilaterally without wheezes or crackles, no respiratory distress    Abdomen: Soft, nontender, nondistended, no rebound or guarding    Skin: Warm, dry, no rash    Musculoskeletal: Normal range of motion, 2+ bilateral pitting lower extremity edema    Neurologic: Resting tremor noted, oriented x3, no motor deficits no sensory deficits    Psychiatric: Mood appears stable, no psychosis            Medical Decision Making:      Comorbidities that affect care:    Atrial Fibrillation, Diabetes    External Notes reviewed:    None      The following orders were placed and all results were independently analyzed by me:  Orders Placed This Encounter   Procedures    COVID-19, FLU A/B, RSV PCR 1 HR TAT - Swab, Nasopharynx    XR Chest 1 View    [place] Draw    Comprehensive Metabolic Panel    High Sensitivity Troponin T    Magnesium    Urinalysis With Microscopic If Indicated (No Culture) - Urine, Clean Catch    CBC Auto Differential    High Sensitivity Troponin T 1Hr    Urinalysis, Microscopic Only - Urine, Clean Catch    BNP    Comprehensive Metabolic Panel    Magnesium    Diet: Regular/House; Fluid Consistency: Thin (IDDSI 0)    Undress & Gown    Continuous Pulse Oximetry    Vital Signs    Orthostatic Blood Pressure    Straight " cath    Ambulate patient    Vital Signs    Up in Chair    Activity (Specify Details)    Turn Patient    Up With Assistance    Weigh Patient    Daily Weights    Strict Intake & Output    Oral Care    Code Status and Medical Interventions: CPR (Attempt to Resuscitate); Full Support    Nephrology  (on-call MD unless specified)    Oxygen Therapy- Nasal Cannula; Titrate 1-6 LPM Per SpO2; 90 - 95%    Pulse Oximetry,  Spot    POC Glucose Once    POC Glucose 4x Daily Before Meals & at Bedtime    ECG 12 Lead Syncope    Insert Peripheral IV    Initiate Observation Status    Fall Precautions    CBC & Differential    Green Top (Gel)    Lavender Top    Gold Top - SST    Light Blue Top       Medications Given in the Emergency Department:  Medications   sodium chloride 0.9 % flush 10 mL (has no administration in time range)   acetaminophen (TYLENOL) tablet 650 mg (has no administration in time range)     Or   acetaminophen (TYLENOL) 160 MG/5ML oral solution 650 mg (has no administration in time range)     Or   acetaminophen (TYLENOL) suppository 650 mg (has no administration in time range)   aluminum-magnesium hydroxide-simethicone (MAALOX MAX) 400-400-40 MG/5ML suspension 15 mL (has no administration in time range)   famotidine (PEPCID) tablet 40 mg (has no administration in time range)   ALPRAZolam (XANAX) tablet 0.5 mg (has no administration in time range)   sennosides-docusate (PERICOLACE) 8.6-50 MG per tablet 2 tablet (has no administration in time range)     And   polyethylene glycol (MIRALAX) packet 17 g (has no administration in time range)     And   bisacodyl (DULCOLAX) EC tablet 5 mg (has no administration in time range)     And   bisacodyl (DULCOLAX) suppository 10 mg (has no administration in time range)   ondansetron (ZOFRAN) injection 4 mg (has no administration in time range)   potassium chloride (MICRO-K/KLOR-CON) CR capsule (has no administration in time range)   dextrose (GLUTOSE) oral gel 15 g (has no  administration in time range)   dextrose (D50W) (25 g/50 mL) IV injection 25 g (has no administration in time range)   glucagon (GLUCAGEN) injection 1 mg (has no administration in time range)   Insulin Lispro (humaLOG) injection 2-9 Units (has no administration in time range)   potassium chloride (MICRO-K/KLOR-CON) CR capsule (40 mEq Oral Given 4/4/25 0727)        ED Course:    ED Course as of 04/04/25 1247   Fri Apr 04, 2025   1000 EKG: I interpreted her twelve-lead EKG as atrial fibrillation at 84 bpm, absent P waves, normal QRS, borderline T wave abnormalities in the anterolateral leads, no ST elevation. [VS]      ED Course User Index  [VS] Kwasi Chavez MD       Labs:    Lab Results (last 24 hours)       Procedure Component Value Units Date/Time    CBC & Differential [[id number]]  (Abnormal) Collected: 04/04/25 0624    Specimen: Blood Updated: 04/04/25 0634    Narrative:      The following orders were created for panel order CBC & Differential.  Procedure                               Abnormality         Status                     ---------                               -----------         ------                     CBC Auto Differential[[id number]]        Abnormal            Final result                 Please view results for these tests on the individual orders.    Comprehensive Metabolic Panel [[id number]]  (Abnormal) Collected: 04/04/25 0624    Specimen: Blood Updated: 04/04/25 0649     Glucose 111 mg/dL      BUN 15 mg/dL      Creatinine 0.91 mg/dL      Sodium 141 mmol/L      Potassium 2.9 mmol/L      Chloride 99 mmol/L      CO2 26.3 mmol/L      Calcium 9.5 mg/dL      Total Protein 7.2 g/dL      Albumin 4.2 g/dL      ALT (SGPT) 11 U/L      AST (SGOT) 21 U/L      Alkaline Phosphatase 71 U/L      Total Bilirubin 0.3 mg/dL      Globulin 3.0 gm/dL      A/G Ratio 1.4 g/dL      BUN/Creatinine Ratio 16.5     Anion Gap 15.7 mmol/L      eGFR 65.9 mL/min/1.73     Narrative:      GFR Categories in Chronic Kidney  Disease (CKD)      GFR Category          GFR (mL/min/1.73)    Interpretation  G1                     90 or greater         Normal or high (1)  G2                      60-89                Mild decrease (1)  G3a                   45-59                Mild to moderate decrease  G3b                   30-44                Moderate to severe decrease  G4                    15-29                Severe decrease  G5                    14 or less           Kidney failure          (1)In the absence of evidence of kidney disease, neither GFR category G1 or G2 fulfill the criteria for CKD.    eGFR calculation 2021 CKD-EPI creatinine equation, which does not include race as a factor    High Sensitivity Troponin T [[id number]]  (Abnormal) Collected: 04/04/25 0624    Specimen: Blood Updated: 04/04/25 0649     HS Troponin T 17 ng/L     Narrative:      High Sensitive Troponin T Reference Range:  <14.0 ng/L- Negative Female for AMI  <22.0 ng/L- Negative Male for AMI  >=14 - Abnormal Female indicating possible myocardial injury.  >=22 - Abnormal Male indicating possible myocardial injury.   Clinicians would have to utilize clinical acumen, EKG, Troponin, and serial changes to determine if it is an Acute Myocardial Infarction or myocardial injury due to an underlying chronic condition.         Magnesium [[id number]]  (Normal) Collected: 04/04/25 0624    Specimen: Blood Updated: 04/04/25 0649     Magnesium 1.7 mg/dL     CBC Auto Differential [[id number]]  (Abnormal) Collected: 04/04/25 0624    Specimen: Blood Updated: 04/04/25 0634     WBC 8.13 10*3/mm3      RBC 4.24 10*6/mm3      Hemoglobin 12.6 g/dL      Hematocrit 37.9 %      MCV 89.4 fL      MCH 29.7 pg      MCHC 33.2 g/dL      RDW 13.3 %      RDW-SD 43.8 fl      MPV 9.4 fL      Platelets 299 10*3/mm3      Neutrophil % 63.4 %      Lymphocyte % 22.9 %      Monocyte % 12.1 %      Eosinophil % 1.0 %      Basophil % 0.4 %      Immature Grans % 0.2 %      Neutrophils, Absolute 5.16 10*3/mm3       Lymphocytes, Absolute 1.86 10*3/mm3      Monocytes, Absolute 0.98 10*3/mm3      Eosinophils, Absolute 0.08 10*3/mm3      Basophils, Absolute 0.03 10*3/mm3      Immature Grans, Absolute 0.02 10*3/mm3      nRBC 0.0 /100 WBC     COVID-19, FLU A/B, RSV PCR 1 HR TAT - Swab, Nasopharynx [[id number]]  (Normal) Collected: 04/04/25 0656    Specimen: Swab from Nasopharynx Updated: 04/04/25 0742     COVID19 Not Detected     Influenza A PCR Not Detected     Influenza B PCR Not Detected     RSV, PCR Not Detected    Narrative:      Fact sheet for providers: https://www.fda.gov/media/605624/download    Fact sheet for patients: https://www.fda.gov/media/667592/download    Test performed by PCR.    High Sensitivity Troponin T 1Hr [[id number]]  (Abnormal) Collected: 04/04/25 0732    Specimen: Blood from Arm, Right Updated: 04/04/25 0755     HS Troponin T 17 ng/L      Troponin T Numeric Delta 0 ng/L      Troponin T % Delta 0    Narrative:      High Sensitive Troponin T Reference Range:  <14.0 ng/L- Negative Female for AMI  <22.0 ng/L- Negative Male for AMI  >=14 - Abnormal Female indicating possible myocardial injury.  >=22 - Abnormal Male indicating possible myocardial injury.   Clinicians would have to utilize clinical acumen, EKG, Troponin, and serial changes to determine if it is an Acute Myocardial Infarction or myocardial injury due to an underlying chronic condition.         BNP [[id number]]  (Normal) Collected: 04/04/25 0732    Specimen: Blood from Arm, Right Updated: 04/04/25 1130     proBNP 1,704.0 pg/mL     Narrative:      This assay is used as an aid in the diagnosis of individuals suspected of having heart failure. It can be used as an aid in the diagnosis of acute decompensated heart failure (ADHF) in patients presenting with signs and symptoms of ADHF to the emergency department (ED). In addition, NT-proBNP of <300 pg/mL indicates ADHF is not likely.    Age Range Result Interpretation  NT-proBNP Concentration  (pg/mL:      <50             Positive            >450                   Gray                 300-450                    Negative             <300    50-75           Positive            >900                  Gray                300-900                  Negative            <300      >75             Positive            >1800                  Gray                300-1800                  Negative            <300    Magnesium [[id number]] Collected: 04/04/25 0732    Specimen: Blood from Arm, Right Updated: 04/04/25 1245    Urinalysis With Microscopic If Indicated (No Culture) - Straight Cath [[id number]]  (Abnormal) Collected: 04/04/25 0940    Specimen: Urine from Straight Cath Updated: 04/04/25 0954     Color, UA Yellow     Appearance, UA Clear     pH, UA 7.0     Specific Gravity, UA 1.015     Glucose,  mg/dL (2+)     Ketones, UA Negative     Bilirubin, UA Negative     Blood, UA Trace     Protein, UA Negative     Leuk Esterase, UA Negative     Nitrite, UA Negative     Urobilinogen, UA 0.2 E.U./dL    Urinalysis, Microscopic Only - Straight Cath [[id number]]  (Abnormal) Collected: 04/04/25 0940    Specimen: Urine from Straight Cath Updated: 04/04/25 0954     RBC, UA 6-10 /HPF      WBC, UA 0-2 /HPF      Bacteria, UA None Seen /HPF      Squamous Epithelial Cells, UA 0-2 /HPF      Hyaline Casts, UA None Seen /LPF      Methodology Automated Microscopy             Imaging:    XR Chest 1 View  Result Date: 4/4/2025  XR CHEST 1 VW Date of Exam: 4/4/2025 6:32 AM EDT Indication: Weak/Dizzy/AMS triage protocol Comparison: 3/4/2025 Findings: Cardiomegaly is unchanged. Pulmonary vascularity is normal. The lungs are clear. No pneumothorax identified.     No acute cardiopulmonary findings. Electronically Signed: Carlton Gaona MD  4/4/2025 6:41 AM EDT  Workstation ID: [id number]        Differential Diagnosis and Discussion:    Weakness: Based on the patient's history, signs, and symptoms, the diffential diagnosis includes but is not  limited to meningitis, stroke, sepsis, subarachnoid hemorrhage, intracranial bleeding, encephalitis, acute uti, dehydration, MS, myasthenia gravis, [place], migraine variant, neuromuscular disorders vertigo, electrolyte imbalance, and metabolic disorders.    PROCEDURES:    Labs were collected in the emergency department and all labs were reviewed and interpreted by me.  X-ray were performed in the emergency department and all X-ray impressions were independently interpreted by me.  An EKG was performed and the EKG was interpreted by me.    ECG 12 Lead Syncope   Preliminary Result   HEART RATE=84  bpm   RR Jhytpjaj=592  ms   MS Interval=  ms   P Horizontal Axis=  deg   P Front Axis=  deg   QRSD Interval=96  ms   QT Rjqtgdus=886  ms   WDzA=049  ms   QRS Axis=-10  deg   T Wave Axis=27  deg   - ABNORMAL ECG -   Atrial fibrillation   Borderline repolarization abnormality   Date and Time of Study:2025-04-04 06:13:59          Procedures    MDM     Amount and/or Complexity of Data Reviewed  Clinical lab tests: reviewed  Tests in the radiology section of CPT®: reviewed  Tests in the medicine section of CPT®: reviewed  Decide to obtain previous medical records or to obtain history from someone other than the patient: yes             This patient is a pleasant 74-year-old diabetic female with A-fib presenting with generalized weakness, recent vomiting and diarrhea this week.    Vital signs are stable, I am checking blood work to look for signs of dehydration or electrolyte abnormalities or infection.    She has a benign, nontender abdomen and I do not think she needs CT imaging.      *Of note we were unable to get a urine sample and patient felt the need to urinate but unable to do so and we did an In-N-Out catheter getting out about 850 cc of urine, suggesting some retention, possibly from recent Benadryl usage last night.      We try to ambulate her with a walker to see if she could go home but she was unable to take 1  or 2 steps without looking very unsteady on her feet and required significant nursing assistance to get back on the stretcher, and it looks like she would be a significant fall risk.    I will plan to admit her to the hospital for generalized weakness and unsteady gait, hypokalemia, possible adverse medication effect of Reglan this month causing extrapyramidal effect, new urinary retention possibly due to recent Benadryl usage.                  Patient Care Considerations:          Consultants/Shared Management Plan:    PCP: I have discussed the case with Dr. Ratliff who is on-call for PCP, who agrees to accept the patient for admission.    Social Determinants of Health:    Patient is independent, reliable, and has access to care.       Disposition and Care Coordination:    Admit:   Through independent evaluation of the patient's history, physical, and imperical data, the patient meets criteria for inpatient admission to the hospital.        Final diagnoses:   Generalized weakness   Hypokalemia   Bilateral lower extremity edema   Unable to ambulate   Acute urinary retention        ED Disposition       ED Disposition   Decision to Admit    Condition   --    Comment   Level of Care: Med/Surg [1]   Diagnosis: Generalized weakness [503351]   Admitting Physician: SUPRIYA RATLIFF [[id number]   Attending Physician: SUPRIYA RATLIFF [[id number]]                 This medical record created using voice recognition software.             Kwasi Chavez MD  04/04/25 6301

## 2025-04-04 NOTE — ED NOTES
ED to Inpatient Report    PATIENT DEMOGRAPHICS  Camilla Navas   1950  74 y.o.  female  Allergies   Allergen Reactions    Empagliflozin Nausea And Vomiting    Metformin Diarrhea          04/04/25  0631   Weight: 87 kg (191 lb 12.8 oz)      Code Status and Medical Interventions: CPR (Attempt to Resuscitate); Full Support   Ordered at: 04/04/25 1240     Code Status (Patient has no pulse and is not breathing):    CPR (Attempt to Resuscitate)     Medical Interventions (Patient has pulse or is breathing):    Full Support        HISTORY  Past Medical History:   Diagnosis Date    Arthritis     Asthma     Atherosclerosis of coronary artery 2018    Non-obstructive coronary artery disease. Cardiac catheterization done in October 2018 showed a 40% mid LAD lesion and a 40% proximal RCA lesion.    Cancer     Breast.     COPD (chronic obstructive pulmonary disease)     Diabetes mellitus     Diverticulitis     Hiatal hernia     Hyperlipemia 01/21/2022    Hypertension, essential 03/05/2022      Past Surgical History:   Procedure Laterality Date    COLONOSCOPY      COLONOSCOPY N/A 11/14/2023    Procedure: COLONOSCOPY WITH POLYPECTOMIES HOT/COLD SNARE, ELEVIEW INJECTION, BIOPSIES;  Surgeon: Lester Gillette MD;  Location: [facility];  Service: Gastroenterology;  Laterality: N/A;  COLON POLYPS, DIVERTICULOSIS    ENDOSCOPY N/A 11/14/2023    Procedure: ESOPHAGOGASTRODUODENOSCOPY WITH BIOPSIES;  Surgeon: Lester Gillette MD;  Location: [facility];  Service: Gastroenterology;  Laterality: N/A;  PREVIOUS SURGERY    HERNIA REPAIR      HYSTERECTOMY      MASTECTOMY Bilateral     UPPER GASTROINTESTINAL ENDOSCOPY       Prior to Admission medications    Medication Sig Start Date End Date Taking? Authorizing Provider   albuterol sulfate  (90 Base) MCG/ACT inhaler Inhale 2 puffs Every 4 (Four) Hours As Needed for Wheezing. 6/24/22  Yes Rohan Ring,    apixaban (ELIQUIS) 5 MG tablet tablet Take 1 tablet by  mouth 2 (Two) Times a Day. 2/18/25  Yes Umesh Guadarrama MD   Arthritis Pain Reliever 650 MG 8 hr tablet Take 1 tablet by mouth Every 8 (Eight) Hours As Needed for Mild Pain. 3/2/22  Yes Emergency, Nurse Nasir, RN   baclofen (LIORESAL) 10 MG tablet Take 1 tablet by mouth Daily.   Yes ProviderJayesh MD   buPROPion XL (WELLBUTRIN XL) 150 MG 24 hr tablet Take 1 tablet by mouth Daily. 12/2/24  Yes Jayesh Mckinley MD   dilTIAZem HCl ER (CARDIZEM LA) 360 MG 24 hr tablet Take 1 tablet by mouth Daily. 1/30/25  Yes Jayesh Mckinley MD   Fluticasone-Umeclidin-Vilant (TRELEGY ELLIPTA) 200-62.5-25 MCG/ACT inhaler Inhale 1 puff Daily. 10/30/24  Yes Randall Mathew MD   furosemide (LASIX) 20 MG tablet Take 1 tablet by mouth Daily. 2/7/25  Yes Umesh Guadarrama MD   gabapentin (NEURONTIN) 300 MG capsule Take 1 capsule by mouth Every Night.   Yes Provider, MD Jayesh   glipizide (GLUCOTROL) 5 MG tablet Take 1 tablet by mouth Daily. 5/30/24  Yes Jayesh Mckinley MD   hydroCHLOROthiazide 25 MG tablet Take 1 tablet by mouth Daily. 2/7/25  Yes Umesh Guadarrama MD   ipratropium-albuterol (DUO-NEB) 0.5-2.5 mg/3 ml nebulizer USE 3 ML VIA NEBULIZER FOUR TIMES DAILY AS NEEDED FOR WHEEZING OR SHORTNESS OF BREATH  Patient taking differently: Take 3 mL by nebulization 4 (Four) Times a Day As Needed for Wheezing or Shortness of Air. 10/30/24  Yes Randall Mathew MD   lisinopril (PRINIVIL,ZESTRIL) 5 MG tablet Take 1 tablet by mouth Daily.   Yes ProviderJayesh MD   metoprolol succinate XL (TOPROL-XL) 50 MG 24 hr tablet Take 1 tablet by mouth Every 12 (Twelve) Hours. 12/13/24  Yes Philip Bueno MD   mirtazapine (REMERON) 15 MG tablet Take 1 tablet by mouth Every Night.   Yes ProviderJayesh MD   montelukast (SINGULAIR) 10 MG tablet Take 1 tablet by mouth Every Night. 10/30/24  Yes Randall Mathew MD   nitroglycerin (NITROSTAT) 0.4 MG SL tablet 1 under the tongue as needed for angina, may repeat  q5mins for up three doses 2/7/25  Yes Umesh Guadarrama MD   omeprazole (priLOSEC) 40 MG capsule TAKE 1 CAPSULE BY MOUTH TWICE DAILY 1/13/25  Yes Randall Mathew MD   ondansetron (ZOFRAN) 4 MG tablet Take 1 tablet by mouth Every 6 (Six) Hours As Needed for Nausea or Vomiting.   Yes ProviderJayesh MD   pravastatin (PRAVACHOL) 80 MG tablet Take 1 tablet by mouth Daily. 2/7/25  Yes Umesh Guadarrama MD   vitamin D3 125 MCG (5000 UT) capsule capsule Take 1 capsule by mouth Daily.   Yes ProviderJayesh MD   vitamin E 400 UNIT capsule Take 1 capsule by mouth Daily.   Yes ProviderJayesh MD   sertraline (ZOLOFT) 100 MG tablet Take 2 tablets by mouth Daily.    ProviderJayesh MD   azelastine (ASTELIN) 0.1 % nasal spray Administer 2 sprays into the nostril(s) as directed by provider 2 (Two) Times a Day. Use in each nostril as directed 10/30/24 4/4/25  Randall Mathew MD   fluticasone (FLONASE) 50 MCG/ACT nasal spray Administer 2 sprays into the nostril(s) as directed by provider Daily. 10/30/24 4/4/25  Randall Mathew MD        HPI  Chief Complaint   Patient presents with    Weakness - Generalized      Diagnosis Plan   1. Generalized weakness        2. Hypokalemia        3. Bilateral lower extremity edema        4. Unable to ambulate        5. Acute urinary retention           Emory Ratliff MD  Vitals:    04/04/25 0700 04/04/25 0800 04/04/25 0900 04/04/25 1000   BP: 149/91 167/93 165/89 139/92   BP Location: Right arm Right arm Right arm Right arm   Patient Position: Lying Lying Lying Lying   Pulse: 88 102 94 99   Resp: 14 15 16 17   Temp: 98.5 °F (36.9 °C) 98.5 °F (36.9 °C) 98.5 °F (36.9 °C) 98.5 °F (36.9 °C)   TempSrc: Oral Oral Oral Oral   SpO2: 95% 96% 97% 97%   Weight:       Height:         Results for orders placed or performed during the hospital encounter of 04/04/25   ECG 12 Lead Syncope    Collection Time: 04/04/25  6:13 AM   Result Value Ref Range    QT Interval 383 ms    QTC Interval  452 ms   Comprehensive Metabolic Panel    Collection Time: 04/04/25  6:24 AM    Specimen: Blood   Result Value Ref Range    Glucose 111 (H) 65 - 99 mg/dL    BUN 15 8 - 23 mg/dL    Creatinine 0.91 0.57 - 1.00 mg/dL    Sodium 141 136 - 145 mmol/L    Potassium 2.9 (L) 3.5 - 5.2 mmol/L    Chloride 99 98 - 107 mmol/L    CO2 26.3 22.0 - 29.0 mmol/L    Calcium 9.5 8.6 - 10.5 mg/dL    Total Protein 7.2 6.0 - 8.5 g/dL    Albumin 4.2 3.5 - 5.2 g/dL    ALT (SGPT) 11 1 - 33 U/L    AST (SGOT) 21 1 - 32 U/L    Alkaline Phosphatase 71 39 - 117 U/L    Total Bilirubin 0.3 0.0 - 1.2 mg/dL    Globulin 3.0 gm/dL    A/G Ratio 1.4 g/dL    BUN/Creatinine Ratio 16.5 7.0 - 25.0    Anion Gap 15.7 (H) 5.0 - 15.0 mmol/L    eGFR 65.9 >60.0 mL/min/1.73   High Sensitivity Troponin T    Collection Time: 04/04/25  6:24 AM    Specimen: Blood   Result Value Ref Range    HS Troponin T 17 (H) <14 ng/L   Magnesium    Collection Time: 04/04/25  6:24 AM    Specimen: Blood   Result Value Ref Range    Magnesium 1.7 1.6 - 2.4 mg/dL   CBC Auto Differential    Collection Time: 04/04/25  6:24 AM    Specimen: Blood   Result Value Ref Range    WBC 8.13 3.40 - 10.80 10*3/mm3    RBC 4.24 3.77 - 5.28 10*6/mm3    Hemoglobin 12.6 12.0 - 15.9 g/dL    Hematocrit 37.9 34.0 - 46.6 %    MCV 89.4 79.0 - 97.0 fL    MCH 29.7 26.6 - 33.0 pg    MCHC 33.2 31.5 - 35.7 g/dL    RDW 13.3 12.3 - 15.4 %    RDW-SD 43.8 37.0 - 54.0 fl    MPV 9.4 6.0 - 12.0 fL    Platelets 299 140 - 450 10*3/mm3    Neutrophil % 63.4 42.7 - 76.0 %    Lymphocyte % 22.9 19.6 - 45.3 %    Monocyte % 12.1 (H) 5.0 - 12.0 %    Eosinophil % 1.0 0.3 - 6.2 %    Basophil % 0.4 0.0 - 1.5 %    Immature Grans % 0.2 0.0 - 0.5 %    Neutrophils, Absolute 5.16 1.70 - 7.00 10*3/mm3    Lymphocytes, Absolute 1.86 0.70 - 3.10 10*3/mm3    Monocytes, Absolute 0.98 (H) 0.10 - 0.90 10*3/mm3    Eosinophils, Absolute 0.08 0.00 - 0.40 10*3/mm3    Basophils, Absolute 0.03 0.00 - 0.20 10*3/mm3    Immature Grans, Absolute 0.02 0.00 -  0.05 10*3/mm3    nRBC 0.0 0.0 - 0.2 /100 WBC   Green Top (Gel)    Collection Time: 04/04/25  6:24 AM   Result Value Ref Range    Extra Tube Hold for add-ons.    Lavender Top    Collection Time: 04/04/25  6:24 AM   Result Value Ref Range    Extra Tube hold for add-on    Gold Top - SST    Collection Time: 04/04/25  6:24 AM   Result Value Ref Range    Extra Tube Hold for add-ons.    Light Blue Top    Collection Time: 04/04/25  6:24 AM   Result Value Ref Range    Extra Tube Hold for add-ons.    COVID-19, FLU A/B, RSV PCR 1 HR TAT - Swab, Nasopharynx    Collection Time: 04/04/25  6:56 AM    Specimen: Nasopharynx; Swab   Result Value Ref Range    COVID19 Not Detected Not Detected - Ref. Range    Influenza A PCR Not Detected Not Detected    Influenza B PCR Not Detected Not Detected    RSV, PCR Not Detected Not Detected   High Sensitivity Troponin T 1Hr    Collection Time: 04/04/25  7:32 AM    Specimen: Arm, Right; Blood   Result Value Ref Range    HS Troponin T 17 (H) <14 ng/L    Troponin T Numeric Delta 0 ng/L    Troponin T % Delta 0 Abnormal if >/= 20%   BNP    Collection Time: 04/04/25  7:32 AM    Specimen: Arm, Right; Blood   Result Value Ref Range    proBNP 1,704.0 0.0 - 1,800.0 pg/mL   Magnesium    Collection Time: 04/04/25  7:32 AM    Specimen: Arm, Right; Blood   Result Value Ref Range    Magnesium 1.7 1.6 - 2.4 mg/dL   Urinalysis With Microscopic If Indicated (No Culture) - Straight Cath    Collection Time: 04/04/25  9:40 AM    Specimen: Straight Cath; Urine   Result Value Ref Range    Color, UA Yellow Yellow, Straw    Appearance, UA Clear Clear    pH, UA 7.0 5.0 - 8.0    Specific Gravity, UA 1.015 1.005 - 1.030    Glucose,  mg/dL (2+) (A) Negative    Ketones, UA Negative Negative    Bilirubin, UA Negative Negative    Blood, UA Trace (A) Negative    Protein, UA Negative Negative    Leuk Esterase, UA Negative Negative    Nitrite, UA Negative Negative    Urobilinogen, UA 0.2 E.U./dL 0.2 - 1.0 E.U./dL    Urinalysis, Microscopic Only - Straight Cath    Collection Time: 04/04/25  9:40 AM    Specimen: Straight Cath; Urine   Result Value Ref Range    RBC, UA 6-10 (A) None Seen, 0-2 /HPF    WBC, UA 0-2 None Seen, 0-2 /HPF    Bacteria, UA None Seen None Seen /HPF    Squamous Epithelial Cells, UA 0-2 None Seen, 0-2 /HPF    Hyaline Casts, UA None Seen None Seen /LPF    Methodology Automated Microscopy       XR Chest 1 View   Final Result   No acute cardiopulmonary findings.            Electronically Signed: Carlton Gaona MD     4/4/2025 6:41 AM EDT     Workstation ID: [id number]        Lines, Drains & Airways       Active LDAs       Name Placement date Placement time Site Days    Peripheral IV 03/06/25 2238 Left Antecubital 03/06/25 2238  Antecubital  28    External Urinary Catheter 04/04/25 0633  --  less than 1                  Medications   sodium chloride 0.9 % flush 10 mL (has no administration in time range)   acetaminophen (TYLENOL) tablet 650 mg (has no administration in time range)     Or   acetaminophen (TYLENOL) 160 MG/5ML oral solution 650 mg (has no administration in time range)     Or   acetaminophen (TYLENOL) suppository 650 mg (has no administration in time range)   aluminum-magnesium hydroxide-simethicone (MAALOX MAX) 400-400-40 MG/5ML suspension 15 mL (has no administration in time range)   famotidine (PEPCID) tablet 40 mg (has no administration in time range)   ALPRAZolam (XANAX) tablet 0.5 mg (has no administration in time range)   sennosides-docusate (PERICOLACE) 8.6-50 MG per tablet 2 tablet (has no administration in time range)     And   polyethylene glycol (MIRALAX) packet 17 g (has no administration in time range)     And   bisacodyl (DULCOLAX) EC tablet 5 mg (has no administration in time range)     And   bisacodyl (DULCOLAX) suppository 10 mg (has no administration in time range)   ondansetron (ZOFRAN) injection 4 mg (has no administration in time range)   potassium chloride (MICRO-K/KLOR-CON)  CR capsule (has no administration in time range)   dextrose (GLUTOSE) oral gel 15 g (has no administration in time range)   dextrose (D50W) (25 g/50 mL) IV injection 25 g (has no administration in time range)   glucagon (GLUCAGEN) injection 1 mg (has no administration in time range)   Insulin Lispro (humaLOG) injection 2-9 Units (has no administration in time range)   potassium chloride (MICRO-K/KLOR-CON) CR capsule (40 mEq Oral Given 4/4/25 0727)      ECG 12 Lead Syncope   Preliminary Result   HEART RATE=84  bpm   RR Ncdqxsbh=052  ms   DE Interval=  ms   P Horizontal Axis=  deg   P Front Axis=  deg   QRSD Interval=96  ms   QT Flhidlwv=825  ms   REiG=823  ms   QRS Axis=-10  deg   T Wave Axis=27  deg   - ABNORMAL ECG -   Atrial fibrillation   Borderline repolarization abnormality   Date and Time of Study:2025-04-04 06:13:59           Sonia Benjamin RN  04/04/25 13:24 EDT

## 2025-04-04 NOTE — ED NOTES
Per EMS, Patient comes from home, Slid out of bed, she has been feeling weak/sick Patient in afib with pvcs. On eliquis, hx of DM,

## 2025-04-05 LAB
ALBUMIN SERPL-MCNC: 3.9 G/DL (ref 3.5–5.2)
ALBUMIN/GLOB SERPL: 1.4 G/DL
ALP SERPL-CCNC: 61 U/L (ref 39–117)
ALT SERPL W P-5'-P-CCNC: 9 U/L (ref 1–33)
ANION GAP SERPL CALCULATED.3IONS-SCNC: 12 MMOL/L (ref 5–15)
AST SERPL-CCNC: 20 U/L (ref 1–32)
BILIRUB SERPL-MCNC: 0.3 MG/DL (ref 0–1.2)
BUN SERPL-MCNC: 12 MG/DL (ref 8–23)
BUN/CREAT SERPL: 16.9 (ref 7–25)
CALCIUM SPEC-SCNC: 9.3 MG/DL (ref 8.6–10.5)
CHLORIDE SERPL-SCNC: 103 MMOL/L (ref 98–107)
CO2 SERPL-SCNC: 24 MMOL/L (ref 22–29)
CREAT SERPL-MCNC: 0.71 MG/DL (ref 0.57–1)
EGFRCR SERPLBLD CKD-EPI 2021: 88.8 ML/MIN/1.73
GLOBULIN UR ELPH-MCNC: 2.8 GM/DL
GLUCOSE BLDC GLUCOMTR-MCNC: 109 MG/DL (ref 70–99)
GLUCOSE BLDC GLUCOMTR-MCNC: 131 MG/DL (ref 70–99)
GLUCOSE BLDC GLUCOMTR-MCNC: 171 MG/DL (ref 70–99)
GLUCOSE BLDC GLUCOMTR-MCNC: 190 MG/DL (ref 70–99)
GLUCOSE SERPL-MCNC: 96 MG/DL (ref 65–99)
POTASSIUM SERPL-SCNC: 3.7 MMOL/L (ref 3.5–5.2)
PROT SERPL-MCNC: 6.7 G/DL (ref 6–8.5)
SODIUM SERPL-SCNC: 139 MMOL/L (ref 136–145)

## 2025-04-05 PROCEDURE — A9270 NON-COVERED ITEM OR SERVICE: HCPCS | Performed by: STUDENT IN AN ORGANIZED HEALTH CARE EDUCATION/TRAINING PROGRAM

## 2025-04-05 PROCEDURE — 80053 COMPREHEN METABOLIC PANEL: CPT | Performed by: INTERNAL MEDICINE

## 2025-04-05 PROCEDURE — 63710000001 METOPROLOL SUCCINATE XL 50 MG TABLET SUSTAINED-RELEASE 24 HOUR: Performed by: STUDENT IN AN ORGANIZED HEALTH CARE EDUCATION/TRAINING PROGRAM

## 2025-04-05 PROCEDURE — G0378 HOSPITAL OBSERVATION PER HR: HCPCS

## 2025-04-05 PROCEDURE — 63710000001 DILTIAZEM CD 180 MG CAPSULE SUSTAINED-RELEASE 24 HR: Performed by: STUDENT IN AN ORGANIZED HEALTH CARE EDUCATION/TRAINING PROGRAM

## 2025-04-05 PROCEDURE — 63710000001 INSULIN LISPRO (HUMAN) PER 5 UNITS: Performed by: INTERNAL MEDICINE

## 2025-04-05 PROCEDURE — 94799 UNLISTED PULMONARY SVC/PX: CPT

## 2025-04-05 PROCEDURE — 63710000001 DIPHENHYDRAMINE PER 50 MG: Performed by: STUDENT IN AN ORGANIZED HEALTH CARE EDUCATION/TRAINING PROGRAM

## 2025-04-05 PROCEDURE — 63710000001 PANTOPRAZOLE 40 MG TABLET DELAYED-RELEASE: Performed by: STUDENT IN AN ORGANIZED HEALTH CARE EDUCATION/TRAINING PROGRAM

## 2025-04-05 PROCEDURE — 25010000002 ONDANSETRON PER 1 MG: Performed by: INTERNAL MEDICINE

## 2025-04-05 PROCEDURE — 94640 AIRWAY INHALATION TREATMENT: CPT

## 2025-04-05 PROCEDURE — 82948 REAGENT STRIP/BLOOD GLUCOSE: CPT

## 2025-04-05 PROCEDURE — 63710000001 LISINOPRIL 5 MG TABLET: Performed by: STUDENT IN AN ORGANIZED HEALTH CARE EDUCATION/TRAINING PROGRAM

## 2025-04-05 PROCEDURE — 63710000001 ACETAMINOPHEN 325 MG TABLET: Performed by: INTERNAL MEDICINE

## 2025-04-05 PROCEDURE — 63710000001 APIXABAN 5 MG TABLET: Performed by: STUDENT IN AN ORGANIZED HEALTH CARE EDUCATION/TRAINING PROGRAM

## 2025-04-05 PROCEDURE — 63710000001 BUPROPION XL 150 MG TABLET SUSTAINED-RELEASE 24 HOUR: Performed by: STUDENT IN AN ORGANIZED HEALTH CARE EDUCATION/TRAINING PROGRAM

## 2025-04-05 PROCEDURE — 36415 COLL VENOUS BLD VENIPUNCTURE: CPT | Performed by: INTERNAL MEDICINE

## 2025-04-05 PROCEDURE — 63710000001 MONTELUKAST 10 MG TABLET: Performed by: STUDENT IN AN ORGANIZED HEALTH CARE EDUCATION/TRAINING PROGRAM

## 2025-04-05 PROCEDURE — 63710000001 PRAVASTATIN 20 MG TABLET: Performed by: STUDENT IN AN ORGANIZED HEALTH CARE EDUCATION/TRAINING PROGRAM

## 2025-04-05 PROCEDURE — A9270 NON-COVERED ITEM OR SERVICE: HCPCS | Performed by: INTERNAL MEDICINE

## 2025-04-05 PROCEDURE — 63710000001 BACLOFEN 10 MG TABLET: Performed by: STUDENT IN AN ORGANIZED HEALTH CARE EDUCATION/TRAINING PROGRAM

## 2025-04-05 PROCEDURE — 63710000001 GABAPENTIN 300 MG CAPSULE: Performed by: STUDENT IN AN ORGANIZED HEALTH CARE EDUCATION/TRAINING PROGRAM

## 2025-04-05 PROCEDURE — 63710000001 FUROSEMIDE 20 MG TABLET: Performed by: STUDENT IN AN ORGANIZED HEALTH CARE EDUCATION/TRAINING PROGRAM

## 2025-04-05 PROCEDURE — 63710000001 OXYCODONE 5 MG TABLET: Performed by: STUDENT IN AN ORGANIZED HEALTH CARE EDUCATION/TRAINING PROGRAM

## 2025-04-05 PROCEDURE — 63710000001 REVEFENACIN 175 MCG/3ML SOLUTION: Performed by: STUDENT IN AN ORGANIZED HEALTH CARE EDUCATION/TRAINING PROGRAM

## 2025-04-05 PROCEDURE — 63710000001 MIRTAZAPINE 15 MG TABLET: Performed by: STUDENT IN AN ORGANIZED HEALTH CARE EDUCATION/TRAINING PROGRAM

## 2025-04-05 PROCEDURE — 63710000001 SERTRALINE 100 MG TABLET: Performed by: STUDENT IN AN ORGANIZED HEALTH CARE EDUCATION/TRAINING PROGRAM

## 2025-04-05 RX ORDER — ARFORMOTEROL TARTRATE 15 UG/2ML
15 SOLUTION RESPIRATORY (INHALATION)
Status: DISCONTINUED | OUTPATIENT
Start: 2025-04-05 | End: 2025-04-19 | Stop reason: HOSPADM

## 2025-04-05 RX ORDER — DILTIAZEM HYDROCHLORIDE 180 MG/1
360 CAPSULE, COATED, EXTENDED RELEASE ORAL
Status: DISCONTINUED | OUTPATIENT
Start: 2025-04-05 | End: 2025-04-19 | Stop reason: HOSPADM

## 2025-04-05 RX ORDER — ALBUTEROL SULFATE 0.83 MG/ML
2.5 SOLUTION RESPIRATORY (INHALATION) EVERY 6 HOURS PRN
Status: DISCONTINUED | OUTPATIENT
Start: 2025-04-05 | End: 2025-04-19 | Stop reason: HOSPADM

## 2025-04-05 RX ORDER — BACLOFEN 10 MG/1
10 TABLET ORAL DAILY
Status: DISCONTINUED | OUTPATIENT
Start: 2025-04-05 | End: 2025-04-19 | Stop reason: HOSPADM

## 2025-04-05 RX ORDER — FUROSEMIDE 20 MG/1
20 TABLET ORAL DAILY
Status: DISCONTINUED | OUTPATIENT
Start: 2025-04-05 | End: 2025-04-19 | Stop reason: HOSPADM

## 2025-04-05 RX ORDER — SERTRALINE HYDROCHLORIDE 100 MG/1
200 TABLET, FILM COATED ORAL DAILY
Status: DISCONTINUED | OUTPATIENT
Start: 2025-04-05 | End: 2025-04-19 | Stop reason: HOSPADM

## 2025-04-05 RX ORDER — BUPROPION HYDROCHLORIDE 150 MG/1
150 TABLET ORAL
Status: DISCONTINUED | OUTPATIENT
Start: 2025-04-05 | End: 2025-04-19 | Stop reason: HOSPADM

## 2025-04-05 RX ORDER — MONTELUKAST SODIUM 10 MG/1
10 TABLET ORAL NIGHTLY
Status: DISCONTINUED | OUTPATIENT
Start: 2025-04-05 | End: 2025-04-19 | Stop reason: HOSPADM

## 2025-04-05 RX ORDER — GABAPENTIN 300 MG/1
300 CAPSULE ORAL NIGHTLY
Status: DISCONTINUED | OUTPATIENT
Start: 2025-04-05 | End: 2025-04-19 | Stop reason: HOSPADM

## 2025-04-05 RX ORDER — METOPROLOL SUCCINATE 50 MG/1
50 TABLET, EXTENDED RELEASE ORAL EVERY 12 HOURS SCHEDULED
Status: DISCONTINUED | OUTPATIENT
Start: 2025-04-05 | End: 2025-04-19 | Stop reason: HOSPADM

## 2025-04-05 RX ORDER — DIPHENHYDRAMINE HCL 25 MG
25 CAPSULE ORAL EVERY 8 HOURS PRN
Status: DISCONTINUED | OUTPATIENT
Start: 2025-04-05 | End: 2025-04-19 | Stop reason: HOSPADM

## 2025-04-05 RX ORDER — GLIPIZIDE 5 MG/1
5 TABLET ORAL DAILY
Status: DISCONTINUED | OUTPATIENT
Start: 2025-04-05 | End: 2025-04-14

## 2025-04-05 RX ORDER — PRAVASTATIN SODIUM 20 MG
80 TABLET ORAL NIGHTLY
Status: DISCONTINUED | OUTPATIENT
Start: 2025-04-05 | End: 2025-04-19 | Stop reason: HOSPADM

## 2025-04-05 RX ORDER — LISINOPRIL 5 MG/1
5 TABLET ORAL DAILY
Status: DISCONTINUED | OUTPATIENT
Start: 2025-04-05 | End: 2025-04-19 | Stop reason: HOSPADM

## 2025-04-05 RX ORDER — OXYCODONE HYDROCHLORIDE 5 MG/1
5 TABLET ORAL EVERY 6 HOURS PRN
Refills: 0 | Status: DISPENSED | OUTPATIENT
Start: 2025-04-05 | End: 2025-04-14

## 2025-04-05 RX ORDER — PANTOPRAZOLE SODIUM 40 MG/1
40 TABLET, DELAYED RELEASE ORAL
Status: DISCONTINUED | OUTPATIENT
Start: 2025-04-05 | End: 2025-04-19 | Stop reason: HOSPADM

## 2025-04-05 RX ORDER — BUDESONIDE 0.5 MG/2ML
0.5 INHALANT ORAL
Status: DISCONTINUED | OUTPATIENT
Start: 2025-04-05 | End: 2025-04-19 | Stop reason: HOSPADM

## 2025-04-05 RX ORDER — MIRTAZAPINE 15 MG/1
15 TABLET, FILM COATED ORAL NIGHTLY
Status: DISCONTINUED | OUTPATIENT
Start: 2025-04-05 | End: 2025-04-19 | Stop reason: HOSPADM

## 2025-04-05 RX ADMIN — ARFORMOTEROL TARTRATE 15 MCG: 15 SOLUTION RESPIRATORY (INHALATION) at 12:00

## 2025-04-05 RX ADMIN — Medication 10 ML: at 21:26

## 2025-04-05 RX ADMIN — ACETAMINOPHEN 650 MG: 325 TABLET ORAL at 21:27

## 2025-04-05 RX ADMIN — FAMOTIDINE 40 MG: 20 TABLET, FILM COATED ORAL at 08:02

## 2025-04-05 RX ADMIN — BUPROPION HYDROCHLORIDE 150 MG: 150 TABLET, EXTENDED RELEASE ORAL at 10:07

## 2025-04-05 RX ADMIN — DIPHENHYDRAMINE HYDROCHLORIDE 25 MG: 25 CAPSULE ORAL at 16:11

## 2025-04-05 RX ADMIN — MIRTAZAPINE 15 MG: 15 TABLET, FILM COATED ORAL at 21:27

## 2025-04-05 RX ADMIN — REVEFENACIN 175 MCG: 175 SOLUTION RESPIRATORY (INHALATION) at 12:00

## 2025-04-05 RX ADMIN — ONDANSETRON 4 MG: 2 INJECTION INTRAMUSCULAR; INTRAVENOUS at 16:55

## 2025-04-05 RX ADMIN — OXYCODONE 5 MG: 5 TABLET ORAL at 18:25

## 2025-04-05 RX ADMIN — APIXABAN 5 MG: 5 TABLET, FILM COATED ORAL at 10:10

## 2025-04-05 RX ADMIN — DILTIAZEM HYDROCHLORIDE 360 MG: 180 CAPSULE, COATED, EXTENDED RELEASE ORAL at 10:07

## 2025-04-05 RX ADMIN — METOPROLOL SUCCINATE 50 MG: 50 TABLET, FILM COATED, EXTENDED RELEASE ORAL at 10:09

## 2025-04-05 RX ADMIN — ACETAMINOPHEN 650 MG: 325 TABLET ORAL at 17:21

## 2025-04-05 RX ADMIN — ARFORMOTEROL TARTRATE 15 MCG: 15 SOLUTION RESPIRATORY (INHALATION) at 21:10

## 2025-04-05 RX ADMIN — FUROSEMIDE 20 MG: 20 TABLET ORAL at 10:09

## 2025-04-05 RX ADMIN — ALPRAZOLAM 0.5 MG: 0.25 TABLET ORAL at 08:01

## 2025-04-05 RX ADMIN — INSULIN LISPRO 2 UNITS: 100 INJECTION, SOLUTION INTRAVENOUS; SUBCUTANEOUS at 12:04

## 2025-04-05 RX ADMIN — PRAVASTATIN SODIUM 80 MG: 20 TABLET ORAL at 21:27

## 2025-04-05 RX ADMIN — INSULIN LISPRO 2 UNITS: 100 INJECTION, SOLUTION INTRAVENOUS; SUBCUTANEOUS at 17:21

## 2025-04-05 RX ADMIN — APIXABAN 5 MG: 5 TABLET, FILM COATED ORAL at 21:27

## 2025-04-05 RX ADMIN — PANTOPRAZOLE SODIUM 40 MG: 40 TABLET, DELAYED RELEASE ORAL at 10:10

## 2025-04-05 RX ADMIN — GABAPENTIN 300 MG: 300 CAPSULE ORAL at 21:27

## 2025-04-05 RX ADMIN — METOPROLOL SUCCINATE 50 MG: 50 TABLET, FILM COATED, EXTENDED RELEASE ORAL at 21:27

## 2025-04-05 RX ADMIN — MONTELUKAST 10 MG: 10 TABLET, FILM COATED ORAL at 21:27

## 2025-04-05 RX ADMIN — BACLOFEN 10 MG: 10 TABLET ORAL at 10:10

## 2025-04-05 RX ADMIN — SERTRALINE HYDROCHLORIDE 200 MG: 100 TABLET ORAL at 10:10

## 2025-04-05 RX ADMIN — ONDANSETRON 4 MG: 2 INJECTION INTRAMUSCULAR; INTRAVENOUS at 08:02

## 2025-04-05 RX ADMIN — BUDESONIDE 0.5 MG: 0.5 SUSPENSION RESPIRATORY (INHALATION) at 12:00

## 2025-04-05 RX ADMIN — BUDESONIDE 0.5 MG: 0.5 SUSPENSION RESPIRATORY (INHALATION) at 21:10

## 2025-04-05 RX ADMIN — LISINOPRIL 5 MG: 5 TABLET ORAL at 10:10

## 2025-04-05 NOTE — PLAN OF CARE
Goal Outcome Evaluation:  Plan of Care Reviewed With: patient   Pt A&Ox4. Pt has been nauseated this shift, PRN Zofran given and has been effective per pt. Pt c/o 10/10 pain in her legs and knees, MD notified, pain medication was given, see MAR. Pt refused to get up this shift in the chair d/t pain and weakness in legs. Continue plan of care.

## 2025-04-05 NOTE — PROGRESS NOTES
[facility]     Progress Note    Patient Name: Camilla Navas  : 1950  MRN: [id number]  Primary Care Physician:  Lester Lizarraga MD  Date of admission: 2025    Subjective   Patient's potassium is stable  No major acute events otherwise overnight    Scheduled Meds:famotidine, 40 mg, Oral, Daily  insulin lispro, 2-9 Units, Subcutaneous, 4x Daily AC & at Bedtime      Continuous Infusions:   PRN Meds:.  acetaminophen **OR** acetaminophen **OR** acetaminophen    ALPRAZolam    aluminum-magnesium hydroxide-simethicone    senna-docusate sodium **AND** polyethylene glycol **AND** bisacodyl **AND** bisacodyl    dextrose    dextrose    glucagon (human recombinant)    ondansetron    sodium chloride       Review of Systems  Constitutional:        Weakness tiredness fatigue  Eyes:                       No blurry vision, eye discharge, eye irritation, eye pain  HEENT:                   No acute hair loss, earache and discharge, nasal congestion or discharge, sore throat, postnasal drip  Respiratory:           No shortness of breath coughing sputum production wheezing hemoptysis pleuritic chest pain  Cardiovascular:     No chest pain, orthopnea, PND, dizziness, palpitation, lower extremity edema  Gastrointestinal:   No nausea vomiting diarrhea abdominal pain constipation  Genitourinary:       No urinary incontinence, hesitancy, frequency, urgency, dysuria  Hematologic:         No bruising, bleeding, pallor, lymphadenopathy  Endocrine:            No coldness, hot flashes, polyuria, abnormal hair growth  Musculoskeletal:    No body pains, aches, arthritic pains, muscle pain ,muscle wasting  Psychiatric:          No low or high mood, anxiety, hallucinations, delusions  Skin.                      No rash, ulcers, bruising, itching  Neurological:        No confusion, headache, focal weakness, numbness, dysphasia    Objective   Objective     Vitals:   Temp:  [98.3 °F (36.8 °C)-99 °F (37.2 °C)] 98.3 °F (36.8  °C)  Heart Rate:  [] 112  Resp:  [16-19] 16  BP: (139-159)/(78-98) 155/78  Physical Exam    Constitutional: Awake, alert responsive, conversant, no obvious distress              Psychiatric:  Appropriate affect, cooperative   Neurologic:  Awake alert ,oriented x 3, strength symmetric in all extremities, Cranial Nerves grossly intact to confrontation, speech clear   Eyes:   PERRLA, sclerae anicteric, no conjunctival injection   HEENT:  Moist mucous membranes, no nasal or eye discharge, no throat congestion   Neck:   Supple, no thyromegaly, no lymphadenopathy, trachea midline, no elevated JVD   Respiratory:  Clear to auscultation bilaterally, nonlabored respirations    Cardiovascular: RRR, no murmurs, rubs, or gallops, palpable pedal pulses bilaterally, No bilateral ankle edema   Gastrointestinal: Positive bowel sounds, soft, nontender, nondistended, no organomegaly   Musculoskeletal:  No clubbing or cyanosis to extremities,muscle wasting, joint swelling, muscle weakness             Skin:                      No rashes, bruising, skin ulcers, petechiae or ecchymosis    Result Review    Result Review:  I have personally reviewed the results from the time of this admission to 4/5/2025 09:35 EDT and agree with these findings:  []  Laboratory  []  Microbiology  []  Radiology  []  EKG/Telemetry   []  Cardiology/Vascular   []  Pathology  []  Old records  []  Other:    Assessment & Plan   Assessment / Plan       Active Hospital Problems:  Active Hospital Problems    Diagnosis     **Generalized weakness     Hypokalemia      74-year-old female with past medical history of chronic A-fib on anticoagulation, type 2 diabetes, hyperlipidemia, asthma, history of breast cancer who came in with nausea and generalized weakness found to have potassium of 2.9 which has been repleted with labs otherwise largely unremarkable and urinalysis showing few RBCs.  Creatinine is otherwise normal    Plan:   Will continue to monitor potassium  levels.  Will hold off on hydrochlorothiazide  Physical therapy  Will continue Brovana Pulmicort and Yupelri and Singulair  Continue Eliquis 5 mg twice daily, Cardizem 360 and metoprolol 50 every 12  Continue with lisinopril 5 mg daily  Continue with Wellbutrin 150 every 24, baclofen 10 mg daily, Remeron 15 mg nightly, Zoloft 200 mg daily  Continue pravastatin 80 mg daily       Electronically signed by Philip Bueno MD, 04/05/25, 9:35 AM EDT.

## 2025-04-05 NOTE — PLAN OF CARE
Goal Outcome Evaluation:  Plan of Care Reviewed With: patient        Progress: improving  Outcome Evaluation: Pt is A&O x4 this shift. Pt has slept most the night. Called out twice to use BSC x2 assist. Pt states she is wore out by the time she gets back to bed but seems to bare weight better each time. x2 BM. xanax given x1.

## 2025-04-06 ENCOUNTER — APPOINTMENT (OUTPATIENT)
Dept: GENERAL RADIOLOGY | Facility: HOSPITAL | Age: 75
DRG: 055 | End: 2025-04-06
Payer: MEDICARE

## 2025-04-06 LAB
GLUCOSE BLDC GLUCOMTR-MCNC: 206 MG/DL (ref 70–99)
GLUCOSE BLDC GLUCOMTR-MCNC: 232 MG/DL (ref 70–99)
GLUCOSE BLDC GLUCOMTR-MCNC: 235 MG/DL (ref 70–99)
GLUCOSE BLDC GLUCOMTR-MCNC: 83 MG/DL (ref 70–99)

## 2025-04-06 PROCEDURE — 63710000001 METOPROLOL SUCCINATE XL 50 MG TABLET SUSTAINED-RELEASE 24 HOUR: Performed by: STUDENT IN AN ORGANIZED HEALTH CARE EDUCATION/TRAINING PROGRAM

## 2025-04-06 PROCEDURE — 94799 UNLISTED PULMONARY SVC/PX: CPT

## 2025-04-06 PROCEDURE — A9270 NON-COVERED ITEM OR SERVICE: HCPCS | Performed by: STUDENT IN AN ORGANIZED HEALTH CARE EDUCATION/TRAINING PROGRAM

## 2025-04-06 PROCEDURE — 73560 X-RAY EXAM OF KNEE 1 OR 2: CPT

## 2025-04-06 PROCEDURE — 94664 DEMO&/EVAL PT USE INHALER: CPT

## 2025-04-06 PROCEDURE — G0378 HOSPITAL OBSERVATION PER HR: HCPCS

## 2025-04-06 PROCEDURE — 63710000001 INSULIN LISPRO (HUMAN) PER 5 UNITS: Performed by: INTERNAL MEDICINE

## 2025-04-06 PROCEDURE — 25010000002 HYDROMORPHONE 1 MG/ML SOLUTION: Performed by: STUDENT IN AN ORGANIZED HEALTH CARE EDUCATION/TRAINING PROGRAM

## 2025-04-06 PROCEDURE — 63710000001 APIXABAN 5 MG TABLET: Performed by: STUDENT IN AN ORGANIZED HEALTH CARE EDUCATION/TRAINING PROGRAM

## 2025-04-06 PROCEDURE — 63710000001 DIPHENHYDRAMINE PER 50 MG: Performed by: STUDENT IN AN ORGANIZED HEALTH CARE EDUCATION/TRAINING PROGRAM

## 2025-04-06 PROCEDURE — 63710000001 REVEFENACIN 175 MCG/3ML SOLUTION: Performed by: STUDENT IN AN ORGANIZED HEALTH CARE EDUCATION/TRAINING PROGRAM

## 2025-04-06 PROCEDURE — A9270 NON-COVERED ITEM OR SERVICE: HCPCS | Performed by: INTERNAL MEDICINE

## 2025-04-06 PROCEDURE — 94760 N-INVAS EAR/PLS OXIMETRY 1: CPT

## 2025-04-06 PROCEDURE — 82948 REAGENT STRIP/BLOOD GLUCOSE: CPT

## 2025-04-06 PROCEDURE — 63710000001 OXYCODONE 5 MG TABLET: Performed by: STUDENT IN AN ORGANIZED HEALTH CARE EDUCATION/TRAINING PROGRAM

## 2025-04-06 PROCEDURE — 63710000001 PRAVASTATIN 20 MG TABLET: Performed by: STUDENT IN AN ORGANIZED HEALTH CARE EDUCATION/TRAINING PROGRAM

## 2025-04-06 RX ADMIN — FUROSEMIDE 20 MG: 20 TABLET ORAL at 08:31

## 2025-04-06 RX ADMIN — LISINOPRIL 5 MG: 5 TABLET ORAL at 08:30

## 2025-04-06 RX ADMIN — ARFORMOTEROL TARTRATE 15 MCG: 15 SOLUTION RESPIRATORY (INHALATION) at 18:51

## 2025-04-06 RX ADMIN — ARFORMOTEROL TARTRATE 15 MCG: 15 SOLUTION RESPIRATORY (INHALATION) at 09:52

## 2025-04-06 RX ADMIN — Medication 10 ML: at 20:15

## 2025-04-06 RX ADMIN — METOPROLOL SUCCINATE 50 MG: 50 TABLET, FILM COATED, EXTENDED RELEASE ORAL at 20:14

## 2025-04-06 RX ADMIN — DILTIAZEM HYDROCHLORIDE 360 MG: 180 CAPSULE, COATED, EXTENDED RELEASE ORAL at 08:30

## 2025-04-06 RX ADMIN — OXYCODONE 5 MG: 5 TABLET ORAL at 20:15

## 2025-04-06 RX ADMIN — PANTOPRAZOLE SODIUM 40 MG: 40 TABLET, DELAYED RELEASE ORAL at 05:57

## 2025-04-06 RX ADMIN — INSULIN LISPRO 4 UNITS: 100 INJECTION, SOLUTION INTRAVENOUS; SUBCUTANEOUS at 17:23

## 2025-04-06 RX ADMIN — APIXABAN 5 MG: 5 TABLET, FILM COATED ORAL at 08:30

## 2025-04-06 RX ADMIN — BACLOFEN 10 MG: 10 TABLET ORAL at 08:30

## 2025-04-06 RX ADMIN — INSULIN LISPRO 4 UNITS: 100 INJECTION, SOLUTION INTRAVENOUS; SUBCUTANEOUS at 12:07

## 2025-04-06 RX ADMIN — OXYCODONE 5 MG: 5 TABLET ORAL at 05:56

## 2025-04-06 RX ADMIN — HYDROMORPHONE HYDROCHLORIDE 0.5 MG: 1 INJECTION, SOLUTION INTRAMUSCULAR; INTRAVENOUS; SUBCUTANEOUS at 10:26

## 2025-04-06 RX ADMIN — METOPROLOL SUCCINATE 50 MG: 50 TABLET, FILM COATED, EXTENDED RELEASE ORAL at 08:31

## 2025-04-06 RX ADMIN — DIPHENHYDRAMINE HYDROCHLORIDE 25 MG: 25 CAPSULE ORAL at 20:15

## 2025-04-06 RX ADMIN — MONTELUKAST 10 MG: 10 TABLET, FILM COATED ORAL at 20:15

## 2025-04-06 RX ADMIN — PRAVASTATIN SODIUM 80 MG: 20 TABLET ORAL at 20:14

## 2025-04-06 RX ADMIN — REVEFENACIN 175 MCG: 175 SOLUTION RESPIRATORY (INHALATION) at 09:52

## 2025-04-06 RX ADMIN — BUDESONIDE 0.5 MG: 0.5 SUSPENSION RESPIRATORY (INHALATION) at 18:51

## 2025-04-06 RX ADMIN — MIRTAZAPINE 15 MG: 15 TABLET, FILM COATED ORAL at 20:15

## 2025-04-06 RX ADMIN — BUDESONIDE 0.5 MG: 0.5 SUSPENSION RESPIRATORY (INHALATION) at 09:52

## 2025-04-06 RX ADMIN — SERTRALINE HYDROCHLORIDE 200 MG: 100 TABLET ORAL at 08:30

## 2025-04-06 RX ADMIN — APIXABAN 5 MG: 5 TABLET, FILM COATED ORAL at 20:15

## 2025-04-06 RX ADMIN — HYDROMORPHONE HYDROCHLORIDE 0.5 MG: 1 INJECTION, SOLUTION INTRAMUSCULAR; INTRAVENOUS; SUBCUTANEOUS at 17:23

## 2025-04-06 RX ADMIN — INSULIN LISPRO 4 UNITS: 100 INJECTION, SOLUTION INTRAVENOUS; SUBCUTANEOUS at 20:15

## 2025-04-06 RX ADMIN — GABAPENTIN 300 MG: 300 CAPSULE ORAL at 20:15

## 2025-04-06 RX ADMIN — BUPROPION HYDROCHLORIDE 150 MG: 150 TABLET, EXTENDED RELEASE ORAL at 08:30

## 2025-04-06 NOTE — PLAN OF CARE
Goal Outcome Evaluation:  Plan of Care Reviewed With: patient, family  Pt A&Ox4. Pt able to sit on edge of bed for a few minutes today. PRN dilaudid administered this shift for pain and has dara effective per pt. Family has remained at bedside and is attentive to the pt. Continue plan of care.

## 2025-04-06 NOTE — PLAN OF CARE
Goal Outcome Evaluation:  Plan of Care Reviewed With: patient        Progress: no change  Outcome Evaluation: VSS. A&Ox4. PRN pain medication administered x1, see MAR. Pt rested well throughout shift. No c/o pain or discomfort at this time.

## 2025-04-06 NOTE — PROGRESS NOTES
[facility]     Progress Note    Patient Name: Camilla Navas  : 1950  MRN: [id number]  Primary Care Physician:  Lester Lizarraga MD  Date of admission: 2025    Subjective   No major acute events overnight  Patient overall still very weak  Patient is complaining of severe pain in the knee    Scheduled Meds:apixaban, 5 mg, Oral, BID  arformoterol, 15 mcg, Nebulization, BID - RT   And  budesonide, 0.5 mg, Nebulization, BID - RT   And  revefenacin, 175 mcg, Nebulization, Daily - RT  baclofen, 10 mg, Oral, Daily  buPROPion XL, 150 mg, Oral, Q24H  dilTIAZem CD, 360 mg, Oral, Q24H  furosemide, 20 mg, Oral, Daily  gabapentin, 300 mg, Oral, Nightly  [Held by provider] glipizide, 5 mg, Oral, Daily  insulin lispro, 2-9 Units, Subcutaneous, 4x Daily AC & at Bedtime  lisinopril, 5 mg, Oral, Daily  metoprolol succinate XL, 50 mg, Oral, Q12H  mirtazapine, 15 mg, Oral, Nightly  montelukast, 10 mg, Oral, Nightly  pantoprazole, 40 mg, Oral, Q AM  pravastatin, 80 mg, Oral, Nightly  sertraline, 200 mg, Oral, Daily      Continuous Infusions:   PRN Meds:.  acetaminophen **OR** acetaminophen **OR** acetaminophen    albuterol    ALPRAZolam    aluminum-magnesium hydroxide-simethicone    senna-docusate sodium **AND** polyethylene glycol **AND** bisacodyl **AND** bisacodyl    dextrose    dextrose    diphenhydrAMINE    glucagon (human recombinant)    ondansetron    oxyCODONE    sodium chloride       Review of Systems  Constitutional:        Weakness tiredness fatigue  Eyes:                       No blurry vision, eye discharge, eye irritation, eye pain  HEENT:                   No acute hair loss, earache and discharge, nasal congestion or discharge, sore throat, postnasal drip  Respiratory:           No shortness of breath coughing sputum production wheezing hemoptysis pleuritic chest pain  Cardiovascular:     No chest pain, orthopnea, PND, dizziness, palpitation, lower extremity edema  Gastrointestinal:   No nausea  vomiting diarrhea abdominal pain constipation  Genitourinary:       No urinary incontinence, hesitancy, frequency, urgency, dysuria  Hematologic:         No bruising, bleeding, pallor, lymphadenopathy  Endocrine:            No coldness, hot flashes, polyuria, abnormal hair growth  Musculoskeletal:    No body pains, aches, arthritic pains, muscle pain ,muscle wasting  Psychiatric:          No low or high mood, anxiety, hallucinations, delusions  Skin.                      No rash, ulcers, bruising, itching  Neurological:        No confusion, headache, focal weakness, numbness, dysphasia    Objective   Objective     Vitals:   Temp:  [97.5 °F (36.4 °C)-98.6 °F (37 °C)] 98.6 °F (37 °C)  Heart Rate:  [] 113  Resp:  [16-20] 16  BP: (110-159)/(70-86) 131/72  Physical Exam    Constitutional: Awake, alert responsive, conversant, no obvious distress              Psychiatric:  Appropriate affect, cooperative   Neurologic:  Awake alert ,oriented x 3, strength symmetric in all extremities, Cranial Nerves grossly intact to confrontation, speech clear   Eyes:   PERRLA, sclerae anicteric, no conjunctival injection   HEENT:  Moist mucous membranes, no nasal or eye discharge, no throat congestion   Neck:   Supple, no thyromegaly, no lymphadenopathy, trachea midline, no elevated JVD   Respiratory:  Clear to auscultation bilaterally, nonlabored respirations    Cardiovascular: RRR, no murmurs, rubs, or gallops, palpable pedal pulses bilaterally, No bilateral ankle edema   Gastrointestinal: Positive bowel sounds, soft, nontender, nondistended, no organomegaly   Musculoskeletal:  No clubbing or cyanosis to extremities,muscle wasting, joint swelling, muscle weakness             Skin:                      No rashes, bruising, skin ulcers, petechiae or ecchymosis    Result Review    Result Review:  I have personally reviewed the results from the time of this admission to 4/6/2025 08:58 EDT and agree with these findings:  []   Laboratory  []  Microbiology  []  Radiology  []  EKG/Telemetry   []  Cardiology/Vascular   []  Pathology  []  Old records  []  Other:    Assessment & Plan   Assessment / Plan       Active Hospital Problems:  Active Hospital Problems    Diagnosis     **Generalized weakness     Hypokalemia      74-year-old female with past medical history of chronic A-fib on anticoagulation, type 2 diabetes, hyperlipidemia, asthma, history of breast cancer who came in with nausea and generalized weakness found to have potassium of 2.9 which has been repleted with labs otherwise largely unremarkable and urinalysis showing few RBCs.  Creatinine is otherwise normal    Plan:   Will continue to monitor potassium levels.  Will hold off on hydrochlorothiazide  Physical therapy.   to help with placement  Will continue Bropavela Pultheresaort and Yupelri and Singulair  Continue Eliquis 5 mg twice daily, Cardizem 360 and metoprolol 50 every 12  Continue with lisinopril 5 mg daily  Continue with Wellbutrin 150 every 24, baclofen 10 mg daily, Remeron 15 mg nightly, Zoloft 200 mg daily  Continue pravastatin 80 mg daily  Will get x-ray of the knees bilaterally  Dilaudid 0.5 every 4 as needed

## 2025-04-07 LAB
ALBUMIN SERPL-MCNC: 3.6 G/DL (ref 3.5–5.2)
ANION GAP SERPL CALCULATED.3IONS-SCNC: 13.4 MMOL/L (ref 5–15)
BASOPHILS # BLD AUTO: 0.02 10*3/MM3 (ref 0–0.2)
BASOPHILS NFR BLD AUTO: 0.2 % (ref 0–1.5)
BUN SERPL-MCNC: 19 MG/DL (ref 8–23)
BUN/CREAT SERPL: 22.1 (ref 7–25)
CALCIUM SPEC-SCNC: 9 MG/DL (ref 8.6–10.5)
CHLORIDE SERPL-SCNC: 95 MMOL/L (ref 98–107)
CO2 SERPL-SCNC: 23.6 MMOL/L (ref 22–29)
CREAT SERPL-MCNC: 0.86 MG/DL (ref 0.57–1)
DEPRECATED RDW RBC AUTO: 46.3 FL (ref 37–54)
EGFRCR SERPLBLD CKD-EPI 2021: 70.6 ML/MIN/1.73
EOSINOPHIL # BLD AUTO: 0.03 10*3/MM3 (ref 0–0.4)
EOSINOPHIL NFR BLD AUTO: 0.2 % (ref 0.3–6.2)
ERYTHROCYTE [DISTWIDTH] IN BLOOD BY AUTOMATED COUNT: 13.9 % (ref 12.3–15.4)
ERYTHROCYTE [SEDIMENTATION RATE] IN BLOOD: 93 MM/HR (ref 0–30)
GLUCOSE BLDC GLUCOMTR-MCNC: 147 MG/DL (ref 70–99)
GLUCOSE BLDC GLUCOMTR-MCNC: 195 MG/DL (ref 70–99)
GLUCOSE BLDC GLUCOMTR-MCNC: 233 MG/DL (ref 70–99)
GLUCOSE BLDC GLUCOMTR-MCNC: 90 MG/DL (ref 70–99)
GLUCOSE SERPL-MCNC: 114 MG/DL (ref 65–99)
HCT VFR BLD AUTO: 38.6 % (ref 34–46.6)
HGB BLD-MCNC: 12.7 G/DL (ref 12–15.9)
IMM GRANULOCYTES # BLD AUTO: 0.04 10*3/MM3 (ref 0–0.05)
IMM GRANULOCYTES NFR BLD AUTO: 0.3 % (ref 0–0.5)
LYMPHOCYTES # BLD AUTO: 1.42 10*3/MM3 (ref 0.7–3.1)
LYMPHOCYTES NFR BLD AUTO: 11.4 % (ref 19.6–45.3)
MCH RBC QN AUTO: 29.9 PG (ref 26.6–33)
MCHC RBC AUTO-ENTMCNC: 32.9 G/DL (ref 31.5–35.7)
MCV RBC AUTO: 90.8 FL (ref 79–97)
MONOCYTES # BLD AUTO: 2.58 10*3/MM3 (ref 0.1–0.9)
MONOCYTES NFR BLD AUTO: 20.7 % (ref 5–12)
NEUTROPHILS NFR BLD AUTO: 67.2 % (ref 42.7–76)
NEUTROPHILS NFR BLD AUTO: 8.36 10*3/MM3 (ref 1.7–7)
NRBC BLD AUTO-RTO: 0 /100 WBC (ref 0–0.2)
PHOSPHATE SERPL-MCNC: 3.8 MG/DL (ref 2.5–4.5)
PLATELET # BLD AUTO: 240 10*3/MM3 (ref 140–450)
PMV BLD AUTO: 9.5 FL (ref 6–12)
POTASSIUM SERPL-SCNC: 3.8 MMOL/L (ref 3.5–5.2)
RBC # BLD AUTO: 4.25 10*6/MM3 (ref 3.77–5.28)
RBC MORPH BLD: NORMAL
SMALL PLATELETS BLD QL SMEAR: ADEQUATE
SODIUM SERPL-SCNC: 132 MMOL/L (ref 136–145)
WBC MORPH BLD: NORMAL
WBC NRBC COR # BLD AUTO: 12.45 10*3/MM3 (ref 3.4–10.8)

## 2025-04-07 PROCEDURE — 63710000001 METOPROLOL SUCCINATE XL 50 MG TABLET SUSTAINED-RELEASE 24 HOUR: Performed by: STUDENT IN AN ORGANIZED HEALTH CARE EDUCATION/TRAINING PROGRAM

## 2025-04-07 PROCEDURE — 63710000001 DIPHENHYDRAMINE PER 50 MG: Performed by: STUDENT IN AN ORGANIZED HEALTH CARE EDUCATION/TRAINING PROGRAM

## 2025-04-07 PROCEDURE — 85652 RBC SED RATE AUTOMATED: CPT | Performed by: STUDENT IN AN ORGANIZED HEALTH CARE EDUCATION/TRAINING PROGRAM

## 2025-04-07 PROCEDURE — A9270 NON-COVERED ITEM OR SERVICE: HCPCS | Performed by: STUDENT IN AN ORGANIZED HEALTH CARE EDUCATION/TRAINING PROGRAM

## 2025-04-07 PROCEDURE — 63710000001 PANTOPRAZOLE 40 MG TABLET DELAYED-RELEASE: Performed by: STUDENT IN AN ORGANIZED HEALTH CARE EDUCATION/TRAINING PROGRAM

## 2025-04-07 PROCEDURE — 82948 REAGENT STRIP/BLOOD GLUCOSE: CPT | Performed by: INTERNAL MEDICINE

## 2025-04-07 PROCEDURE — G0378 HOSPITAL OBSERVATION PER HR: HCPCS

## 2025-04-07 PROCEDURE — 63710000001 ALPRAZOLAM 0.25 MG TABLET: Performed by: INTERNAL MEDICINE

## 2025-04-07 PROCEDURE — 63710000001 DILTIAZEM CD 180 MG CAPSULE SUSTAINED-RELEASE 24 HR: Performed by: STUDENT IN AN ORGANIZED HEALTH CARE EDUCATION/TRAINING PROGRAM

## 2025-04-07 PROCEDURE — 63710000001 INSULIN LISPRO (HUMAN) PER 5 UNITS: Performed by: INTERNAL MEDICINE

## 2025-04-07 PROCEDURE — 63710000001 BACLOFEN 10 MG TABLET: Performed by: STUDENT IN AN ORGANIZED HEALTH CARE EDUCATION/TRAINING PROGRAM

## 2025-04-07 PROCEDURE — 85025 COMPLETE CBC W/AUTO DIFF WBC: CPT | Performed by: STUDENT IN AN ORGANIZED HEALTH CARE EDUCATION/TRAINING PROGRAM

## 2025-04-07 PROCEDURE — 63710000001 BUPROPION XL 150 MG TABLET SUSTAINED-RELEASE 24 HOUR: Performed by: STUDENT IN AN ORGANIZED HEALTH CARE EDUCATION/TRAINING PROGRAM

## 2025-04-07 PROCEDURE — 63710000001 APIXABAN 5 MG TABLET: Performed by: STUDENT IN AN ORGANIZED HEALTH CARE EDUCATION/TRAINING PROGRAM

## 2025-04-07 PROCEDURE — 97161 PT EVAL LOW COMPLEX 20 MIN: CPT

## 2025-04-07 PROCEDURE — 82948 REAGENT STRIP/BLOOD GLUCOSE: CPT

## 2025-04-07 PROCEDURE — 63710000001 FUROSEMIDE 20 MG TABLET: Performed by: STUDENT IN AN ORGANIZED HEALTH CARE EDUCATION/TRAINING PROGRAM

## 2025-04-07 PROCEDURE — 63710000001 GABAPENTIN 300 MG CAPSULE: Performed by: STUDENT IN AN ORGANIZED HEALTH CARE EDUCATION/TRAINING PROGRAM

## 2025-04-07 PROCEDURE — 94664 DEMO&/EVAL PT USE INHALER: CPT

## 2025-04-07 PROCEDURE — A9270 NON-COVERED ITEM OR SERVICE: HCPCS | Performed by: INTERNAL MEDICINE

## 2025-04-07 PROCEDURE — 94799 UNLISTED PULMONARY SVC/PX: CPT

## 2025-04-07 PROCEDURE — 63710000001 SERTRALINE 100 MG TABLET: Performed by: STUDENT IN AN ORGANIZED HEALTH CARE EDUCATION/TRAINING PROGRAM

## 2025-04-07 PROCEDURE — 63710000001 MIRTAZAPINE 15 MG TABLET: Performed by: STUDENT IN AN ORGANIZED HEALTH CARE EDUCATION/TRAINING PROGRAM

## 2025-04-07 PROCEDURE — 80069 RENAL FUNCTION PANEL: CPT | Performed by: STUDENT IN AN ORGANIZED HEALTH CARE EDUCATION/TRAINING PROGRAM

## 2025-04-07 PROCEDURE — 85007 BL SMEAR W/DIFF WBC COUNT: CPT | Performed by: STUDENT IN AN ORGANIZED HEALTH CARE EDUCATION/TRAINING PROGRAM

## 2025-04-07 PROCEDURE — 63710000001 REVEFENACIN 175 MCG/3ML SOLUTION: Performed by: STUDENT IN AN ORGANIZED HEALTH CARE EDUCATION/TRAINING PROGRAM

## 2025-04-07 PROCEDURE — 63710000001 LISINOPRIL 5 MG TABLET: Performed by: STUDENT IN AN ORGANIZED HEALTH CARE EDUCATION/TRAINING PROGRAM

## 2025-04-07 PROCEDURE — 63710000001 MONTELUKAST 10 MG TABLET: Performed by: STUDENT IN AN ORGANIZED HEALTH CARE EDUCATION/TRAINING PROGRAM

## 2025-04-07 PROCEDURE — 25010000002 HYDROMORPHONE 1 MG/ML SOLUTION: Performed by: STUDENT IN AN ORGANIZED HEALTH CARE EDUCATION/TRAINING PROGRAM

## 2025-04-07 PROCEDURE — 94761 N-INVAS EAR/PLS OXIMETRY MLT: CPT

## 2025-04-07 RX ADMIN — HYDROMORPHONE HYDROCHLORIDE 0.5 MG: 1 INJECTION, SOLUTION INTRAMUSCULAR; INTRAVENOUS; SUBCUTANEOUS at 18:01

## 2025-04-07 RX ADMIN — HYDROMORPHONE HYDROCHLORIDE 0.5 MG: 1 INJECTION, SOLUTION INTRAMUSCULAR; INTRAVENOUS; SUBCUTANEOUS at 03:16

## 2025-04-07 RX ADMIN — DIPHENHYDRAMINE HYDROCHLORIDE 25 MG: 25 CAPSULE ORAL at 05:21

## 2025-04-07 RX ADMIN — ALPRAZOLAM 0.5 MG: 0.25 TABLET ORAL at 08:56

## 2025-04-07 RX ADMIN — HYDROMORPHONE HYDROCHLORIDE 0.5 MG: 1 INJECTION, SOLUTION INTRAMUSCULAR; INTRAVENOUS; SUBCUTANEOUS at 13:49

## 2025-04-07 RX ADMIN — SERTRALINE HYDROCHLORIDE 200 MG: 100 TABLET ORAL at 08:57

## 2025-04-07 RX ADMIN — INSULIN LISPRO 4 UNITS: 100 INJECTION, SOLUTION INTRAVENOUS; SUBCUTANEOUS at 22:00

## 2025-04-07 RX ADMIN — BUDESONIDE 0.5 MG: 0.5 SUSPENSION RESPIRATORY (INHALATION) at 19:17

## 2025-04-07 RX ADMIN — OXYCODONE 5 MG: 5 TABLET ORAL at 22:00

## 2025-04-07 RX ADMIN — REVEFENACIN 175 MCG: 175 SOLUTION RESPIRATORY (INHALATION) at 07:26

## 2025-04-07 RX ADMIN — BUDESONIDE 0.5 MG: 0.5 SUSPENSION RESPIRATORY (INHALATION) at 07:24

## 2025-04-07 RX ADMIN — BACLOFEN 10 MG: 10 TABLET ORAL at 08:59

## 2025-04-07 RX ADMIN — ALPRAZOLAM 0.5 MG: 0.25 TABLET ORAL at 22:00

## 2025-04-07 RX ADMIN — Medication 10 ML: at 22:00

## 2025-04-07 RX ADMIN — OXYCODONE 5 MG: 5 TABLET ORAL at 11:54

## 2025-04-07 RX ADMIN — METOPROLOL SUCCINATE 50 MG: 50 TABLET, FILM COATED, EXTENDED RELEASE ORAL at 08:57

## 2025-04-07 RX ADMIN — BUPROPION HYDROCHLORIDE 150 MG: 150 TABLET, EXTENDED RELEASE ORAL at 08:59

## 2025-04-07 RX ADMIN — PRAVASTATIN SODIUM 80 MG: 20 TABLET ORAL at 22:00

## 2025-04-07 RX ADMIN — GABAPENTIN 300 MG: 300 CAPSULE ORAL at 22:00

## 2025-04-07 RX ADMIN — DIPHENHYDRAMINE HYDROCHLORIDE 25 MG: 25 CAPSULE ORAL at 22:00

## 2025-04-07 RX ADMIN — FUROSEMIDE 20 MG: 20 TABLET ORAL at 08:59

## 2025-04-07 RX ADMIN — APIXABAN 5 MG: 5 TABLET, FILM COATED ORAL at 22:00

## 2025-04-07 RX ADMIN — MONTELUKAST 10 MG: 10 TABLET, FILM COATED ORAL at 22:00

## 2025-04-07 RX ADMIN — INSULIN LISPRO 2 UNITS: 100 INJECTION, SOLUTION INTRAVENOUS; SUBCUTANEOUS at 18:01

## 2025-04-07 RX ADMIN — DILTIAZEM HYDROCHLORIDE 360 MG: 180 CAPSULE, COATED, EXTENDED RELEASE ORAL at 08:59

## 2025-04-07 RX ADMIN — ARFORMOTEROL TARTRATE 15 MCG: 15 SOLUTION RESPIRATORY (INHALATION) at 07:23

## 2025-04-07 RX ADMIN — ARFORMOTEROL TARTRATE 15 MCG: 15 SOLUTION RESPIRATORY (INHALATION) at 19:17

## 2025-04-07 RX ADMIN — APIXABAN 5 MG: 5 TABLET, FILM COATED ORAL at 08:59

## 2025-04-07 RX ADMIN — METOPROLOL SUCCINATE 50 MG: 50 TABLET, FILM COATED, EXTENDED RELEASE ORAL at 22:00

## 2025-04-07 RX ADMIN — MIRTAZAPINE 15 MG: 15 TABLET, FILM COATED ORAL at 22:00

## 2025-04-07 RX ADMIN — LISINOPRIL 5 MG: 5 TABLET ORAL at 08:59

## 2025-04-07 RX ADMIN — PANTOPRAZOLE SODIUM 40 MG: 40 TABLET, DELAYED RELEASE ORAL at 05:21

## 2025-04-07 RX ADMIN — HYDROMORPHONE HYDROCHLORIDE 0.5 MG: 1 INJECTION, SOLUTION INTRAMUSCULAR; INTRAVENOUS; SUBCUTANEOUS at 08:56

## 2025-04-07 NOTE — PLAN OF CARE
Goal Outcome Evaluation:  Plan of Care Reviewed With: patient        Progress: no change  Outcome Evaluation: VSS. A&Ox4. PRN pain medication administered x2, see MAR. Pt rested well throughout shift. No c/o pain or discomfort noted at this time.

## 2025-04-07 NOTE — PROGRESS NOTES
04/07/25 1425   Music Therapy   Minutes of Assessment 2   Assessment Detail Evaluation not performed - Pt asleep

## 2025-04-07 NOTE — THERAPY EVALUATION
Acute Care - Physical Therapy Initial Evaluation   Em     Patient Name: Camilla Navas  : 1950  MRN: [id number]  Today's Date: 2025      Visit Dx:     ICD-10-CM ICD-9-CM   1. Generalized weakness  R53.1 780.79   2. Hypokalemia  E87.6 276.8   3. Bilateral lower extremity edema  R60.0 782.3   4. Unable to ambulate  R26.2 719.7   5. Acute urinary retention  R33.8 788.29   6. Difficulty walking  R26.2 719.7     Patient Active Problem List   Diagnosis    Reflux esophagitis    Type 2 diabetes mellitus    Depression    DDD (degenerative disc disease), cervical    Cough    Colon polyps    Chest pain    Cancer    Atrophic vaginitis    Bladder disorder    Nonobstructive atherosclerosis of coronary artery    Asthma without status asthmaticus    Adjustment disorder with anxiety    Functional dyspepsia    Esophageal reflux    Ear pain    Dry mouth    Diverticula of intestine    Diabetic peripheral neuropathy associated with type 2 diabetes mellitus    Difficult or painful urination    Limb swelling    Insomnia    Hyperlipemia    Hematuria    Generalized anxiety disorder    Menopausal and postmenopausal disorder    Mandibular mass    Malignant neoplasm of female breast    Major depression, single episode    Sensorineural hearing loss    Seasonal allergic rhinitis    Renal mass    Overweight with body mass index (BMI) 25.0-29.9    Osteoarthritis    Obesity    Vaginal itching    Simple renal cyst    Essential hypertension    Diarrhea    Generalized abdominal pain    Nausea    Heartburn    Pedal edema    Hypokalemia    Pharyngitis    Persistent atrial fibrillation    Atrial fib/flutter, transient    Chest discomfort    Atrial fibrillation with rapid ventricular response    Dehydration    Diabetic gastroparesis    Generalized weakness     Past Medical History:   Diagnosis Date    Arthritis     Asthma     Atherosclerosis of coronary artery 2018    Non-obstructive coronary artery disease. Cardiac catheterization  done in October 2018 showed a 40% mid LAD lesion and a 40% proximal RCA lesion.    Cancer     Breast.     COPD (chronic obstructive pulmonary disease)     Diabetes mellitus     Diverticulitis     Hiatal hernia     Hyperlipemia 01/21/2022    Hypertension, essential 03/05/2022     Past Surgical History:   Procedure Laterality Date    COLONOSCOPY      COLONOSCOPY N/A 11/14/2023    Procedure: COLONOSCOPY WITH POLYPECTOMIES HOT/COLD SNARE, ELEVIEW INJECTION, BIOPSIES;  Surgeon: Lester Gillette MD;  Location: [facility];  Service: Gastroenterology;  Laterality: N/A;  COLON POLYPS, DIVERTICULOSIS    ENDOSCOPY N/A 11/14/2023    Procedure: ESOPHAGOGASTRODUODENOSCOPY WITH BIOPSIES;  Surgeon: Lester Gillette MD;  Location: [facility] ENDOSCOPY;  Service: Gastroenterology;  Laterality: N/A;  PREVIOUS SURGERY    HERNIA REPAIR      HYSTERECTOMY      MASTECTOMY Bilateral     UPPER GASTROINTESTINAL ENDOSCOPY       PT Assessment (Last 12 Hours)       PT Evaluation and Treatment       Row Name 04/07/25 1100          Physical Therapy Time and Intention    Subjective Information complains of;fatigue;pain  -CS     Document Type evaluation  -CS     Mode of Treatment individual therapy;physical therapy  -CS     Patient Effort fair  -CS     Symptoms Noted During/After Treatment fatigue;increased pain  -CS       Row Name 04/07/25 1100          General Information    Patient Profile Reviewed yes  -CS     Patient Observations alert;cooperative;agree to therapy  -CS     Prior Level of Function independent:;all household mobility;gait;transfer;bed mobility;ADL's  Pt with inconsistent reports on PLOF but states she is normally independent and has required increased help over the last few weeks. Has assist with laundry and meals  -CS     Equipment Currently Used at Home walker, rolling;shower chair  Pt reports she has a step-over tub with grab bar and seat. Rolling walker used for ambulation. No home O2  -CS     Existing  Precautions/Restrictions fall  -CS     Limitations/Impairments safety/cognitive  -CS     Barriers to Rehab none identified  -CS       Row Name 04/07/25 1100          Living Environment    Current Living Arrangements home  -CS     Home Accessibility stairs to enter home;stairs within home  -CS     People in Home alone  -CS     Primary Care Provided by self  -CS       Row Name 04/07/25 1100          Home Main Entrance    Number of Stairs, Main Entrance two  -CS     Stair Railings, Main Entrance railings safe and in good condition  -CS       Row Name 04/07/25 1100          Stairs Within Home, Primary    Number of Stairs, Within Home, Primary none  -CS       Row Name 04/07/25 1100          Pain    Pretreatment Pain Rating 8/10  -CS     Posttreatment Pain Rating 8/10  -CS     Pain Location knee  -CS     Pain Side/Orientation left  -CS       Row Name 04/07/25 1100          Cognition    Affect/Mental Status (Cognition) confused  intermittent confusion  -CS     Orientation Status (Cognition) oriented to;person;place  -CS       Row Name 04/07/25 1100          Range of Motion Comprehensive    General Range of Motion lower extremity range of motion deficits identified  AAROM needed for increased range  -CS     Comment, General Range of Motion limited B knee flexion due to increased pain and swelling (L>R)  -CS       Row Name 04/07/25 1100          Strength Comprehensive (MMT)    General Manual Muscle Testing (MMT) Assessment lower extremity strength deficits identified  -CS     Comment, General Manual Muscle Testing (MMT) Assessment BLEs assessed at 2+/5  -CS       Row Name 04/07/25 1100          Bed Mobility    Bed Mobility bed mobility (all) activities  -CS     All Activities, [place] (Bed Mobility) verbal cues;maximum assist (25% patient effort);dependent (less than 25% patient effort)  -CS     Bed Mobility, Safety Issues decreased use of legs for bridging/pushing;decreased use of arms for pushing/pulling  -CS      Assistive Device (Bed Mobility) bed rails;head of bed elevated  -CS     Comment, (Bed Mobility) No further transfers due to patient reporting too much pain.Also, pt's resting HR in bed was 115-125bpm and having increased HR with very minimal movement.  -       Row Name 04/07/25 1100          Gait/Stairs (Locomotion)    [place] Level (Gait) not tested  -CS       Row Name 04/07/25 1100          Safety Issues/Impairments Affecting Functional Mobility    Safety Issues Affecting Function (Mobility) awareness of need for assistance;insight into deficits/self-awareness  -CS     Impairments Affecting Function (Mobility) balance;cognition;endurance/activity tolerance;strength;pain;range of motion (ROM)  -CS       Row Name 04/07/25 1100          Plan of Care Review    Plan of Care Reviewed With patient  -CS     Progress no change  -CS     Outcome Evaluation Pt presents with limitations that impede their ability to safely and independently transfer and ambulate. The skills of a therapist will be required to safely and effectively implement the following treatment plan to restore maximal level of function.  -       Row Name 04/07/25 1100          Positioning and Restraints    Pre-Treatment Position in bed  -CS     Post Treatment Position bed  -CS     In Bed supine;call light within reach;encouraged to call for assist;exit alarm on  -CS       Row Name 04/07/25 1100          Therapy Assessment/Plan (PT)    Rehab Potential (PT) fair  -CS     Criteria for Skilled Interventions Met (PT) yes;skilled treatment is necessary  -CS     Therapy Frequency (PT) daily  -CS     Predicted Duration of Therapy Intervention (PT) 10 days  -CS     Problem List (PT) problems related to;balance;mobility;strength;pain;cognition;range of motion (ROM)  -CS     Activity Limitations Related to Problem List (PT) unable to ambulate safely;unable to transfer safely  -       Row Name 04/07/25 1100          PT Evaluation Complexity    History, PT  Evaluation Complexity 1-2 personal factors and/or comorbidities  -CS     Examination of Body Systems (PT Eval Complexity) total of 4 or more elements  -CS     Clinical Presentation (PT Evaluation Complexity) stable  -CS     Clinical Decision Making (PT Evaluation Complexity) low complexity  -CS     Overall Complexity (PT Evaluation Complexity) low complexity  -CS       Row Name 04/07/25 1100          Therapy Plan Review/Discharge Plan (PT)    Therapy Plan Review (PT) evaluation/treatment results reviewed;patient  -CS       Row Name 04/07/25 1100          Physical Therapy Goals    Bed Mobility Goal Selection (PT) bed mobility, PT goal 1  -CS     Transfer Goal Selection (PT) transfer, PT goal 1  -CS     Gait Training Goal Selection (PT) gait training, PT goal 1  -CS       Row Name 04/07/25 1100          Bed Mobility Goal 1 (PT)    Activity/Assistive Device (Bed Mobility Goal 1, PT) bed mobility activities, all  -CS     [place] Level/Cues Needed (Bed Mobility Goal 1, PT) contact guard required  -CS     Time Frame (Bed Mobility Goal 1, PT) long term goal (LTG);10 days  -CS       Row Name 04/07/25 1100          Transfer Goal 1 (PT)    Activity/Assistive Device (Transfer Goal 1, PT) sit-to-stand/stand-to-sit;bed-to-chair/chair-to-bed;walker, rolling  -CS     [place] Level/Cues Needed (Transfer Goal 1, PT) contact guard required  -CS     Time Frame (Transfer Goal 1, PT) long term goal (LTG);10 days  -CS       Row Name 04/07/25 1100          Gait Training Goal 1 (PT)    Activity/Assistive Device (Gait Training Goal 1, PT) gait (walking locomotion);assistive device use;walker, rolling  -CS     [place] Level (Gait Training Goal 1, PT) contact guard required  -CS     Distance (Gait Training Goal 1, PT) 50  -CS     Time Frame (Gait Training Goal 1, PT) long term goal (LTG);10 days  -CS               User Key  (r) = Recorded By, (t) = Taken By, (c) = Cosigned By      Initials Name Provider Type    DEIDRE Puente  CARROLL Encarnacion Physical Therapist                      PT Recommendation and Plan  Anticipated Discharge Disposition (PT): sub acute care setting  Planned Therapy Interventions (PT): bed mobility training, balance training, gait training  Therapy Frequency (PT): daily  Plan of Care Reviewed With: patient  Progress: no change  Outcome Evaluation: Pt presents with limitations that impede their ability to safely and independently transfer and ambulate. The skills of a therapist will be required to safely and effectively implement the following treatment plan to restore maximal level of function.   Outcome Measures       Row Name 04/07/25 1100             How much help from another person do you currently need...    Turning from your back to your side while in flat bed without using bedrails? 2  -CS      Moving from lying on back to sitting on the side of a flat bed without bedrails? 2  -CS      Moving to and from a bed to a chair (including a wheelchair)? 1  -CS      Standing up from a chair using your arms (e.g., wheelchair, bedside chair)? 2  -CS      Climbing 3-5 steps with a railing? 1  -CS      To walk in hospital room? 1  -CS      AM-PAC 6 Clicks Score (PT) 9  -CS         Functional Assessment    Outcome Measure Options AM-PAC 6 Clicks Basic Mobility (PT)  -CS                User Key  (r) = Recorded By, (t) = Taken By, (c) = Cosigned By      Initials Name Provider Type    Alysha Betts PT Physical Therapist                     Time Calculation:    PT Charges       Row Name 04/07/25 1144             Time Calculation    PT Received On 04/07/25  -CS      PT Goal Re-Cert Due Date 04/16/25  -CS         Untimed Charges    PT Eval/Re-eval Minutes 32  -CS         Total Minutes    Untimed Charges Total Minutes 32  -CS       Total Minutes 32  -CS                User Key  (r) = Recorded By, (t) = Taken By, (c) = Cosigned By      Initials Name Provider Type    Alysha Betts PT Physical Therapist                   Therapy Charges for Today       Code Description Service Date Service Provider Modifiers Qty    47074159722 HC PT EVAL LOW COMPLEXITY 3 4/7/2025 Alysha Puente, PT GP 1            PT G-Codes  Outcome Measure Options: AM-PAC 6 Clicks Basic Mobility (PT)  AM-PAC 6 Clicks Score (PT): 9    Alysha Puente, PT  4/7/2025

## 2025-04-07 NOTE — CONSULTS
"Purpose of the visit was to evaluate for: support for patient/family. Spoke with patient and discussed goals of care, resuscitation status, and clarify code status.      Assessment:  RN, FENG, Palliative Care met with patient to introduce self and assess palliative care needs.  Patient has a Living Will and POA paperwork on file at [facility].  Discussed code status with chest compressions and intubation.  Patient reports wanting to be on \"life support\" for 5 days if needed.  Patient will remain a full code at this time.  Reports lives by herself but has good support if needed.  Provided emotional support and will continue to follow.  Consult for Music Therapy placed.    Tasks Completed: Code Status clarification and Emotional Support.    Maria Antonia CASTRO RN, CCM  Palliative Care  "

## 2025-04-07 NOTE — PROGRESS NOTES
[facility]     Progress Note    Patient Name: Camilla Navas  : 1950  MRN: [id number]  Primary Care Physician:  Lester Lizarraga MD  Date of admission: 2025    Subjective   Patient's knee examination showed effusion  Continues to be in severe pain  No major acute events otherwise overnight    Scheduled Meds:apixaban, 5 mg, Oral, BID  arformoterol, 15 mcg, Nebulization, BID - RT   And  budesonide, 0.5 mg, Nebulization, BID - RT   And  revefenacin, 175 mcg, Nebulization, Daily - RT  baclofen, 10 mg, Oral, Daily  buPROPion XL, 150 mg, Oral, Q24H  dilTIAZem CD, 360 mg, Oral, Q24H  furosemide, 20 mg, Oral, Daily  gabapentin, 300 mg, Oral, Nightly  [Held by provider] glipizide, 5 mg, Oral, Daily  insulin lispro, 2-9 Units, Subcutaneous, 4x Daily AC & at Bedtime  lisinopril, 5 mg, Oral, Daily  metoprolol succinate XL, 50 mg, Oral, Q12H  mirtazapine, 15 mg, Oral, Nightly  montelukast, 10 mg, Oral, Nightly  pantoprazole, 40 mg, Oral, Q AM  pravastatin, 80 mg, Oral, Nightly  sertraline, 200 mg, Oral, Daily      Continuous Infusions:   PRN Meds:.  acetaminophen **OR** acetaminophen **OR** acetaminophen    albuterol    ALPRAZolam    aluminum-magnesium hydroxide-simethicone    senna-docusate sodium **AND** polyethylene glycol **AND** bisacodyl **AND** bisacodyl    dextrose    dextrose    diphenhydrAMINE    glucagon (human recombinant)    HYDROmorphone    ondansetron    oxyCODONE    sodium chloride       Review of Systems  Constitutional:        Weakness tiredness fatigue  Eyes:                       No blurry vision, eye discharge, eye irritation, eye pain  HEENT:                   No acute hair loss, earache and discharge, nasal congestion or discharge, sore throat, postnasal drip  Respiratory:           No shortness of breath coughing sputum production wheezing hemoptysis pleuritic chest pain  Cardiovascular:     No chest pain, orthopnea, PND, dizziness, palpitation, lower extremity  edema  Gastrointestinal:   No nausea vomiting diarrhea abdominal pain constipation  Genitourinary:       No urinary incontinence, hesitancy, frequency, urgency, dysuria  Hematologic:         No bruising, bleeding, pallor, lymphadenopathy  Endocrine:            No coldness, hot flashes, polyuria, abnormal hair growth  Musculoskeletal:    No body pains, aches, arthritic pains, muscle pain ,muscle wasting  Psychiatric:          No low or high mood, anxiety, hallucinations, delusions  Skin.                      No rash, ulcers, bruising, itching  Neurological:        No confusion, headache, focal weakness, numbness, dysphasia    Objective   Objective     Vitals:   Temp:  [98.2 °F (36.8 °C)-99.7 °F (37.6 °C)] 99.1 °F (37.3 °C)  Heart Rate:  [] 108  Resp:  [16-18] 18  BP: (111-138)/(55-79) 120/58  Flow (L/min) (Oxygen Therapy):  [1-2] 2  Physical Exam    Constitutional: Awake, alert responsive, conversant, no obvious distress              Psychiatric:  Appropriate affect, cooperative   Neurologic:  Awake alert ,oriented x 3, strength symmetric in all extremities, Cranial Nerves grossly intact to confrontation, speech clear   Eyes:   PERRLA, sclerae anicteric, no conjunctival injection   HEENT:  Moist mucous membranes, no nasal or eye discharge, no throat congestion   Neck:   Supple, no thyromegaly, no lymphadenopathy, trachea midline, no elevated JVD   Respiratory:  Clear to auscultation bilaterally, nonlabored respirations    Cardiovascular: RRR, no murmurs, rubs, or gallops, palpable pedal pulses bilaterally, No bilateral ankle edema   Gastrointestinal: Positive bowel sounds, soft, nontender, nondistended, no organomegaly   Musculoskeletal:  No clubbing or cyanosis to extremities,muscle wasting, joint swelling, muscle weakness             Skin:                      No rashes, bruising, skin ulcers, petechiae or ecchymosis    Result Review    Result Review:  I have personally reviewed the results from the time of  this admission to 4/7/2025 08:53 EDT and agree with these findings:  []  Laboratory  []  Microbiology  []  Radiology  []  EKG/Telemetry   []  Cardiology/Vascular   []  Pathology  []  Old records  []  Other:    Assessment & Plan   Assessment / Plan       Active Hospital Problems:  Active Hospital Problems    Diagnosis     **Generalized weakness     Hypokalemia      74-year-old female with past medical history of chronic A-fib on anticoagulation, type 2 diabetes, hyperlipidemia, asthma, history of breast cancer who came in with nausea and generalized weakness found to have potassium of 2.9 which has been repleted with labs otherwise largely unremarkable and urinalysis showing few RBCs.  Creatinine is otherwise normal    Plan:   Will continue to monitor potassium levels.  Will hold off on hydrochlorothiazide  Physical therapy.   to help with placement  Will continue Marce Patton and Yupelri and Singulair  Continue Eliquis 5 mg twice daily, Cardizem 360 and metoprolol 50 every 12  Continue with lisinopril 5 mg daily  Continue with Wellbutrin 150 every 24, baclofen 10 mg daily, Remeron 15 mg nightly, Zoloft 200 mg daily  Continue pravastatin 80 mg daily  X-ray and exam consistent with effusion and severe osteoarthritis.  Orthopedic has been consulted.  Appreciate their help in managing the patient  Dilaudid 0.5 every 4 as needed

## 2025-04-07 NOTE — PLAN OF CARE
Goal Outcome Evaluation:  Plan of Care Reviewed With: patient      Pt a/ox4. PRN pain medication alternated throughout shift for left lower extremity pain. Titrated from 2LNC to room air, sats remain above 93% at this time. Decreased output noted this shift, bladder scan volume 436mL, MD notified and straight cath order parameters placed. Straight cath volume 500mL out. Continuing with plan of care.

## 2025-04-08 LAB
GLUCOSE BLDC GLUCOMTR-MCNC: 145 MG/DL (ref 70–99)
GLUCOSE BLDC GLUCOMTR-MCNC: 149 MG/DL (ref 70–99)
GLUCOSE BLDC GLUCOMTR-MCNC: 169 MG/DL (ref 70–99)
GLUCOSE BLDC GLUCOMTR-MCNC: 247 MG/DL (ref 70–99)

## 2025-04-08 PROCEDURE — 94799 UNLISTED PULMONARY SVC/PX: CPT

## 2025-04-08 PROCEDURE — 63710000001 DIPHENHYDRAMINE PER 50 MG: Performed by: STUDENT IN AN ORGANIZED HEALTH CARE EDUCATION/TRAINING PROGRAM

## 2025-04-08 PROCEDURE — 94664 DEMO&/EVAL PT USE INHALER: CPT

## 2025-04-08 PROCEDURE — 82948 REAGENT STRIP/BLOOD GLUCOSE: CPT | Performed by: INTERNAL MEDICINE

## 2025-04-08 PROCEDURE — 63710000001 REVEFENACIN 175 MCG/3ML SOLUTION: Performed by: STUDENT IN AN ORGANIZED HEALTH CARE EDUCATION/TRAINING PROGRAM

## 2025-04-08 PROCEDURE — 94761 N-INVAS EAR/PLS OXIMETRY MLT: CPT

## 2025-04-08 PROCEDURE — 25010000002 HYDROMORPHONE 1 MG/ML SOLUTION: Performed by: STUDENT IN AN ORGANIZED HEALTH CARE EDUCATION/TRAINING PROGRAM

## 2025-04-08 PROCEDURE — A9270 NON-COVERED ITEM OR SERVICE: HCPCS | Performed by: STUDENT IN AN ORGANIZED HEALTH CARE EDUCATION/TRAINING PROGRAM

## 2025-04-08 PROCEDURE — 63710000001 INSULIN LISPRO (HUMAN) PER 5 UNITS: Performed by: INTERNAL MEDICINE

## 2025-04-08 PROCEDURE — 82948 REAGENT STRIP/BLOOD GLUCOSE: CPT

## 2025-04-08 PROCEDURE — 63710000001 PANTOPRAZOLE 40 MG TABLET DELAYED-RELEASE: Performed by: STUDENT IN AN ORGANIZED HEALTH CARE EDUCATION/TRAINING PROGRAM

## 2025-04-08 PROCEDURE — 25010000002 ONDANSETRON PER 1 MG: Performed by: INTERNAL MEDICINE

## 2025-04-08 PROCEDURE — G0378 HOSPITAL OBSERVATION PER HR: HCPCS

## 2025-04-08 PROCEDURE — 99221 1ST HOSP IP/OBS SF/LOW 40: CPT | Performed by: ORTHOPAEDIC SURGERY

## 2025-04-08 PROCEDURE — 63710000001 PRAVASTATIN 20 MG TABLET: Performed by: STUDENT IN AN ORGANIZED HEALTH CARE EDUCATION/TRAINING PROGRAM

## 2025-04-08 PROCEDURE — 20610 DRAIN/INJ JOINT/BURSA W/O US: CPT | Performed by: ORTHOPAEDIC SURGERY

## 2025-04-08 RX ADMIN — PRAVASTATIN SODIUM 80 MG: 20 TABLET ORAL at 21:42

## 2025-04-08 RX ADMIN — APIXABAN 5 MG: 5 TABLET, FILM COATED ORAL at 22:07

## 2025-04-08 RX ADMIN — ARFORMOTEROL TARTRATE 15 MCG: 15 SOLUTION RESPIRATORY (INHALATION) at 19:24

## 2025-04-08 RX ADMIN — BUDESONIDE 0.5 MG: 0.5 SUSPENSION RESPIRATORY (INHALATION) at 09:37

## 2025-04-08 RX ADMIN — BUDESONIDE 0.5 MG: 0.5 SUSPENSION RESPIRATORY (INHALATION) at 19:24

## 2025-04-08 RX ADMIN — LISINOPRIL 5 MG: 5 TABLET ORAL at 10:06

## 2025-04-08 RX ADMIN — MONTELUKAST 10 MG: 10 TABLET, FILM COATED ORAL at 21:43

## 2025-04-08 RX ADMIN — METOPROLOL SUCCINATE 50 MG: 50 TABLET, FILM COATED, EXTENDED RELEASE ORAL at 21:42

## 2025-04-08 RX ADMIN — INSULIN LISPRO 2 UNITS: 100 INJECTION, SOLUTION INTRAVENOUS; SUBCUTANEOUS at 17:32

## 2025-04-08 RX ADMIN — Medication 10 ML: at 10:07

## 2025-04-08 RX ADMIN — ONDANSETRON 4 MG: 2 INJECTION INTRAMUSCULAR; INTRAVENOUS at 00:02

## 2025-04-08 RX ADMIN — ALPRAZOLAM 0.5 MG: 0.25 TABLET ORAL at 06:02

## 2025-04-08 RX ADMIN — SERTRALINE HYDROCHLORIDE 200 MG: 100 TABLET ORAL at 10:06

## 2025-04-08 RX ADMIN — BUPROPION HYDROCHLORIDE 150 MG: 150 TABLET, EXTENDED RELEASE ORAL at 10:06

## 2025-04-08 RX ADMIN — HYDROMORPHONE HYDROCHLORIDE 0.5 MG: 1 INJECTION, SOLUTION INTRAMUSCULAR; INTRAVENOUS; SUBCUTANEOUS at 00:02

## 2025-04-08 RX ADMIN — FUROSEMIDE 20 MG: 20 TABLET ORAL at 10:06

## 2025-04-08 RX ADMIN — INSULIN LISPRO 4 UNITS: 100 INJECTION, SOLUTION INTRAVENOUS; SUBCUTANEOUS at 22:07

## 2025-04-08 RX ADMIN — ARFORMOTEROL TARTRATE 15 MCG: 15 SOLUTION RESPIRATORY (INHALATION) at 09:38

## 2025-04-08 RX ADMIN — HYDROMORPHONE HYDROCHLORIDE 0.5 MG: 1 INJECTION, SOLUTION INTRAMUSCULAR; INTRAVENOUS; SUBCUTANEOUS at 06:02

## 2025-04-08 RX ADMIN — REVEFENACIN 175 MCG: 175 SOLUTION RESPIRATORY (INHALATION) at 09:38

## 2025-04-08 RX ADMIN — BACLOFEN 10 MG: 10 TABLET ORAL at 10:06

## 2025-04-08 RX ADMIN — MIRTAZAPINE 15 MG: 15 TABLET, FILM COATED ORAL at 21:42

## 2025-04-08 RX ADMIN — METOPROLOL SUCCINATE 50 MG: 50 TABLET, FILM COATED, EXTENDED RELEASE ORAL at 10:06

## 2025-04-08 RX ADMIN — GABAPENTIN 300 MG: 300 CAPSULE ORAL at 21:43

## 2025-04-08 RX ADMIN — APIXABAN 5 MG: 5 TABLET, FILM COATED ORAL at 10:06

## 2025-04-08 RX ADMIN — PANTOPRAZOLE SODIUM 40 MG: 40 TABLET, DELAYED RELEASE ORAL at 06:02

## 2025-04-08 RX ADMIN — DIPHENHYDRAMINE HYDROCHLORIDE 25 MG: 25 CAPSULE ORAL at 06:02

## 2025-04-08 RX ADMIN — DILTIAZEM HYDROCHLORIDE 360 MG: 180 CAPSULE, COATED, EXTENDED RELEASE ORAL at 10:06

## 2025-04-08 NOTE — PLAN OF CARE
Goal Outcome Evaluation:  Plan of Care Reviewed With: patient        Progress: no change  Outcome Evaluation: A&Ox4. HR elevated this shift. Pt on 1L NC d/t low SpO2 while sleeping. Other VSS. PRN pain medication administered x3 this shift, see MAR. PRN antiemetic administered x1, see MAR. PRN xanax administered x2, see MAR. Bladder scan 251 this shift. Family at bedside and involved in pt's care. No c/o pain or discomfort noted at this time.

## 2025-04-08 NOTE — PROGRESS NOTES
[facility]     Progress Note    Patient Name: Camilla Navas  : 1950  MRN: [id number]  Primary Care Physician:  Lester Lizarraga MD  Date of admission: 2025    Subjective   Patient's knee examination showed effusion  Continues to be in severe pain  White count is slightly elevated and sed rate is also elevated  Orthopedic consult pending    Scheduled Meds:apixaban, 5 mg, Oral, BID  arformoterol, 15 mcg, Nebulization, BID - RT   And  budesonide, 0.5 mg, Nebulization, BID - RT   And  revefenacin, 175 mcg, Nebulization, Daily - RT  baclofen, 10 mg, Oral, Daily  buPROPion XL, 150 mg, Oral, Q24H  dilTIAZem CD, 360 mg, Oral, Q24H  furosemide, 20 mg, Oral, Daily  gabapentin, 300 mg, Oral, Nightly  [Held by provider] glipizide, 5 mg, Oral, Daily  insulin lispro, 2-9 Units, Subcutaneous, 4x Daily AC & at Bedtime  lisinopril, 5 mg, Oral, Daily  metoprolol succinate XL, 50 mg, Oral, Q12H  mirtazapine, 15 mg, Oral, Nightly  montelukast, 10 mg, Oral, Nightly  pantoprazole, 40 mg, Oral, Q AM  pravastatin, 80 mg, Oral, Nightly  sertraline, 200 mg, Oral, Daily      Continuous Infusions:   PRN Meds:.  acetaminophen **OR** acetaminophen **OR** acetaminophen    albuterol    ALPRAZolam    aluminum-magnesium hydroxide-simethicone    senna-docusate sodium **AND** polyethylene glycol **AND** bisacodyl **AND** bisacodyl    dextrose    dextrose    diphenhydrAMINE    glucagon (human recombinant)    HYDROmorphone    ondansetron    oxyCODONE    sodium chloride       Review of Systems  Constitutional:        Weakness tiredness fatigue  Eyes:                       No blurry vision, eye discharge, eye irritation, eye pain  HEENT:                   No acute hair loss, earache and discharge, nasal congestion or discharge, sore throat, postnasal drip  Respiratory:           No shortness of breath coughing sputum production wheezing hemoptysis pleuritic chest pain  Cardiovascular:     No chest pain, orthopnea, PND,  dizziness, palpitation, lower extremity edema  Gastrointestinal:   No nausea vomiting diarrhea abdominal pain constipation  Genitourinary:       No urinary incontinence, hesitancy, frequency, urgency, dysuria  Hematologic:         No bruising, bleeding, pallor, lymphadenopathy  Endocrine:            No coldness, hot flashes, polyuria, abnormal hair growth  Musculoskeletal:    No body pains, aches, arthritic pains, muscle pain ,muscle wasting  Psychiatric:          No low or high mood, anxiety, hallucinations, delusions  Skin.                      No rash, ulcers, bruising, itching  Neurological:        No confusion, headache, focal weakness, numbness, dysphasia    Objective   Objective     Vitals:   Temp:  [98 °F (36.7 °C)-98.3 °F (36.8 °C)] 98 °F (36.7 °C)  Heart Rate:  [] 83  Resp:  [16-24] 18  BP: (106-137)/(44-79) 127/79  Flow (L/min) (Oxygen Therapy):  [1-2] 1  Physical Exam    Constitutional: Awake, alert responsive, conversant, no obvious distress              Psychiatric:  Appropriate affect, cooperative   Neurologic:  Awake alert ,oriented x 3, strength symmetric in all extremities, Cranial Nerves grossly intact to confrontation, speech clear   Eyes:   PERRLA, sclerae anicteric, no conjunctival injection   HEENT:  Moist mucous membranes, no nasal or eye discharge, no throat congestion   Neck:   Supple, no thyromegaly, no lymphadenopathy, trachea midline, no elevated JVD   Respiratory:  Clear to auscultation bilaterally, nonlabored respirations    Cardiovascular: RRR, no murmurs, rubs, or gallops, palpable pedal pulses bilaterally, No bilateral ankle edema   Gastrointestinal: Positive bowel sounds, soft, nontender, nondistended, no organomegaly   Musculoskeletal:  No clubbing or cyanosis to extremities,muscle wasting, joint swelling, muscle weakness             Skin:                      No rashes, bruising, skin ulcers, petechiae or ecchymosis    Result Review    Result Review:  I have personally  reviewed the results from the time of this admission to 4/8/2025 08:43 EDT and agree with these findings:  []  Laboratory  []  Microbiology  []  Radiology  []  EKG/Telemetry   []  Cardiology/Vascular   []  Pathology  []  Old records  []  Other:    Assessment & Plan   Assessment / Plan       Active Hospital Problems:  Active Hospital Problems    Diagnosis     **Generalized weakness     Hypokalemia      74-year-old female with past medical history of chronic A-fib on anticoagulation, type 2 diabetes, hyperlipidemia, asthma, history of breast cancer who came in with nausea and generalized weakness found to have potassium of 2.9 which has been repleted with labs otherwise largely unremarkable and urinalysis showing few RBCs.  Creatinine is otherwise normal    Plan:   Will continue to monitor potassium levels.  Will hold off on hydrochlorothiazide  Physical therapy.   to help with placement  Will continue Marce Patton and Yupelri and Singulair  Continue Eliquis 5 mg twice daily, Cardizem 360 and metoprolol 50 every 12  Continue with lisinopril 5 mg daily  Continue with Wellbutrin 150 every 24, baclofen 10 mg daily, Remeron 15 mg nightly, Zoloft 200 mg daily  Continue pravastatin 80 mg daily  X-ray and exam consistent with effusion and severe osteoarthritis.  Orthopedic has been consulted.  Appreciate their help in managing the patient  Dilaudid 0.5 every 4 as needed

## 2025-04-08 NOTE — NURSING NOTE
Palliative care follow up.  Patient resting with eyes closed.  Palliative care will continue to follow.    Maria Antonia CASTRO RN, [facility]  Palliative Care

## 2025-04-08 NOTE — PLAN OF CARE
"Goal Outcome Evaluation:               VSS. Pt was very sleepy this morning and hard to arouse. Family mentioned they were concerned as this is not her baseline. Pt family requested to ask Dr. Bueno if this was \"The end\" for the pt.  Myles advised to back off of the pain meds until pt can be reoriented and more alert. Daughter stayed with pt most of day. Pt was not alert enough to take meds this morning, so meds administered after lunch. Pt would like meds crushed in applesauce from now on due to difficulty with swallowing. Pt to have Knee aspiration today. Consent obtained and in chart. Pt has no complaints at this time, continue plan of care.                             "

## 2025-04-08 NOTE — PROGRESS NOTES
04/08/25 1055   Music Therapy   Minutes of Assessment 1   Assessment Detail Evaluation not performed - Pt asleep     Communication sent to signed on RN to request they notify [facility] when Pt wakes up.

## 2025-04-09 LAB
ALBUMIN SERPL-MCNC: 3.3 G/DL (ref 3.5–5.2)
ANION GAP SERPL CALCULATED.3IONS-SCNC: 13.2 MMOL/L (ref 5–15)
BACTERIA UR QL AUTO: ABNORMAL /HPF
BASOPHILS # BLD AUTO: 0.02 10*3/MM3 (ref 0–0.2)
BASOPHILS NFR BLD AUTO: 0.3 % (ref 0–1.5)
BILIRUB UR QL STRIP: NEGATIVE
BUN SERPL-MCNC: 31 MG/DL (ref 8–23)
BUN/CREAT SERPL: 37.8 (ref 7–25)
CALCIUM SPEC-SCNC: 10.1 MG/DL (ref 8.6–10.5)
CHLORIDE SERPL-SCNC: 95 MMOL/L (ref 98–107)
CLARITY UR: CLEAR
CO2 SERPL-SCNC: 24.8 MMOL/L (ref 22–29)
COLOR UR: YELLOW
CREAT SERPL-MCNC: 0.82 MG/DL (ref 0.57–1)
DEPRECATED RDW RBC AUTO: 44.6 FL (ref 37–54)
EGFRCR SERPLBLD CKD-EPI 2021: 74.7 ML/MIN/1.73
EOSINOPHIL # BLD AUTO: 0 10*3/MM3 (ref 0–0.4)
EOSINOPHIL NFR BLD AUTO: 0 % (ref 0.3–6.2)
ERYTHROCYTE [DISTWIDTH] IN BLOOD BY AUTOMATED COUNT: 13.4 % (ref 12.3–15.4)
GLUCOSE BLDC GLUCOMTR-MCNC: 271 MG/DL (ref 70–99)
GLUCOSE BLDC GLUCOMTR-MCNC: 304 MG/DL (ref 70–99)
GLUCOSE BLDC GLUCOMTR-MCNC: 362 MG/DL (ref 70–99)
GLUCOSE BLDC GLUCOMTR-MCNC: 450 MG/DL (ref 70–99)
GLUCOSE SERPL-MCNC: 308 MG/DL (ref 65–99)
GLUCOSE UR STRIP-MCNC: ABNORMAL MG/DL
GRAN CASTS URNS QL MICRO: ABNORMAL /LPF
HCT VFR BLD AUTO: 39.5 % (ref 34–46.6)
HGB BLD-MCNC: 13.1 G/DL (ref 12–15.9)
HGB UR QL STRIP.AUTO: ABNORMAL
HYALINE CASTS UR QL AUTO: ABNORMAL /LPF
IMM GRANULOCYTES # BLD AUTO: 0.02 10*3/MM3 (ref 0–0.05)
IMM GRANULOCYTES NFR BLD AUTO: 0.3 % (ref 0–0.5)
KETONES UR QL STRIP: NEGATIVE
LEUKOCYTE ESTERASE UR QL STRIP.AUTO: ABNORMAL
LYMPHOCYTES # BLD AUTO: 0.44 10*3/MM3 (ref 0.7–3.1)
LYMPHOCYTES NFR BLD AUTO: 6 % (ref 19.6–45.3)
MCH RBC QN AUTO: 29.8 PG (ref 26.6–33)
MCHC RBC AUTO-ENTMCNC: 33.2 G/DL (ref 31.5–35.7)
MCV RBC AUTO: 90 FL (ref 79–97)
MONOCYTES # BLD AUTO: 0.56 10*3/MM3 (ref 0.1–0.9)
MONOCYTES NFR BLD AUTO: 7.6 % (ref 5–12)
NEUTROPHILS NFR BLD AUTO: 6.34 10*3/MM3 (ref 1.7–7)
NEUTROPHILS NFR BLD AUTO: 85.8 % (ref 42.7–76)
NITRITE UR QL STRIP: NEGATIVE
NRBC BLD AUTO-RTO: 0 /100 WBC (ref 0–0.2)
PH UR STRIP.AUTO: 5.5 [PH] (ref 5–8)
PHOSPHATE SERPL-MCNC: 3.5 MG/DL (ref 2.5–4.5)
PLATELET # BLD AUTO: 290 10*3/MM3 (ref 140–450)
PMV BLD AUTO: 9.9 FL (ref 6–12)
POTASSIUM SERPL-SCNC: 4.3 MMOL/L (ref 3.5–5.2)
PROT UR QL STRIP: ABNORMAL
RBC # BLD AUTO: 4.39 10*6/MM3 (ref 3.77–5.28)
RBC # UR STRIP: ABNORMAL /HPF
REF LAB TEST METHOD: ABNORMAL
SODIUM SERPL-SCNC: 133 MMOL/L (ref 136–145)
SP GR UR STRIP: 1.02 (ref 1–1.03)
SQUAMOUS #/AREA URNS HPF: ABNORMAL /HPF
UROBILINOGEN UR QL STRIP: ABNORMAL
WBC # UR STRIP: ABNORMAL /HPF
WBC NRBC COR # BLD AUTO: 7.38 10*3/MM3 (ref 3.4–10.8)
YEAST URNS QL MICRO: ABNORMAL /HPF

## 2025-04-09 PROCEDURE — 81001 URINALYSIS AUTO W/SCOPE: CPT | Performed by: STUDENT IN AN ORGANIZED HEALTH CARE EDUCATION/TRAINING PROGRAM

## 2025-04-09 PROCEDURE — 85025 COMPLETE CBC W/AUTO DIFF WBC: CPT | Performed by: STUDENT IN AN ORGANIZED HEALTH CARE EDUCATION/TRAINING PROGRAM

## 2025-04-09 PROCEDURE — 80069 RENAL FUNCTION PANEL: CPT | Performed by: STUDENT IN AN ORGANIZED HEALTH CARE EDUCATION/TRAINING PROGRAM

## 2025-04-09 PROCEDURE — 63710000001 REVEFENACIN 175 MCG/3ML SOLUTION: Performed by: STUDENT IN AN ORGANIZED HEALTH CARE EDUCATION/TRAINING PROGRAM

## 2025-04-09 PROCEDURE — G0378 HOSPITAL OBSERVATION PER HR: HCPCS

## 2025-04-09 PROCEDURE — 82948 REAGENT STRIP/BLOOD GLUCOSE: CPT | Performed by: INTERNAL MEDICINE

## 2025-04-09 PROCEDURE — 25010000002 CEFTRIAXONE PER 250 MG: Performed by: STUDENT IN AN ORGANIZED HEALTH CARE EDUCATION/TRAINING PROGRAM

## 2025-04-09 PROCEDURE — 94761 N-INVAS EAR/PLS OXIMETRY MLT: CPT

## 2025-04-09 PROCEDURE — 82948 REAGENT STRIP/BLOOD GLUCOSE: CPT

## 2025-04-09 PROCEDURE — 63710000001 INSULIN LISPRO (HUMAN) PER 5 UNITS: Performed by: INTERNAL MEDICINE

## 2025-04-09 PROCEDURE — 94799 UNLISTED PULMONARY SVC/PX: CPT

## 2025-04-09 PROCEDURE — 87086 URINE CULTURE/COLONY COUNT: CPT | Performed by: STUDENT IN AN ORGANIZED HEALTH CARE EDUCATION/TRAINING PROGRAM

## 2025-04-09 PROCEDURE — 94664 DEMO&/EVAL PT USE INHALER: CPT

## 2025-04-09 RX ADMIN — ARFORMOTEROL TARTRATE 15 MCG: 15 SOLUTION RESPIRATORY (INHALATION) at 19:43

## 2025-04-09 RX ADMIN — INSULIN LISPRO 7 UNITS: 100 INJECTION, SOLUTION INTRAVENOUS; SUBCUTANEOUS at 08:17

## 2025-04-09 RX ADMIN — ARFORMOTEROL TARTRATE 15 MCG: 15 SOLUTION RESPIRATORY (INHALATION) at 09:22

## 2025-04-09 RX ADMIN — BUDESONIDE 0.5 MG: 0.5 SUSPENSION RESPIRATORY (INHALATION) at 19:44

## 2025-04-09 RX ADMIN — CEFTRIAXONE SODIUM 1000 MG: 1 INJECTION, POWDER, FOR SOLUTION INTRAMUSCULAR; INTRAVENOUS at 19:45

## 2025-04-09 RX ADMIN — INSULIN LISPRO 6 UNITS: 100 INJECTION, SOLUTION INTRAVENOUS; SUBCUTANEOUS at 12:10

## 2025-04-09 RX ADMIN — PANTOPRAZOLE SODIUM 40 MG: 40 TABLET, DELAYED RELEASE ORAL at 06:29

## 2025-04-09 RX ADMIN — ACETAMINOPHEN 650 MG: 325 TABLET ORAL at 23:57

## 2025-04-09 RX ADMIN — APIXABAN 5 MG: 5 TABLET, FILM COATED ORAL at 08:17

## 2025-04-09 RX ADMIN — REVEFENACIN 175 MCG: 175 SOLUTION RESPIRATORY (INHALATION) at 09:22

## 2025-04-09 RX ADMIN — PRAVASTATIN SODIUM 80 MG: 20 TABLET ORAL at 21:00

## 2025-04-09 RX ADMIN — SERTRALINE HYDROCHLORIDE 200 MG: 100 TABLET ORAL at 08:17

## 2025-04-09 RX ADMIN — METOPROLOL SUCCINATE 50 MG: 50 TABLET, FILM COATED, EXTENDED RELEASE ORAL at 21:00

## 2025-04-09 RX ADMIN — INSULIN LISPRO 9 UNITS: 100 INJECTION, SOLUTION INTRAVENOUS; SUBCUTANEOUS at 20:59

## 2025-04-09 RX ADMIN — MIRTAZAPINE 15 MG: 15 TABLET, FILM COATED ORAL at 21:00

## 2025-04-09 RX ADMIN — MONTELUKAST 10 MG: 10 TABLET, FILM COATED ORAL at 21:00

## 2025-04-09 RX ADMIN — APIXABAN 5 MG: 5 TABLET, FILM COATED ORAL at 21:00

## 2025-04-09 RX ADMIN — DILTIAZEM HYDROCHLORIDE 360 MG: 180 CAPSULE, COATED, EXTENDED RELEASE ORAL at 08:17

## 2025-04-09 RX ADMIN — BACLOFEN 10 MG: 10 TABLET ORAL at 08:17

## 2025-04-09 RX ADMIN — FUROSEMIDE 20 MG: 20 TABLET ORAL at 08:18

## 2025-04-09 RX ADMIN — INSULIN LISPRO 6 UNITS: 100 INJECTION, SOLUTION INTRAVENOUS; SUBCUTANEOUS at 18:11

## 2025-04-09 RX ADMIN — GABAPENTIN 300 MG: 300 CAPSULE ORAL at 21:00

## 2025-04-09 RX ADMIN — LISINOPRIL 5 MG: 5 TABLET ORAL at 08:17

## 2025-04-09 RX ADMIN — BUDESONIDE 0.5 MG: 0.5 SUSPENSION RESPIRATORY (INHALATION) at 09:22

## 2025-04-09 RX ADMIN — BUPROPION HYDROCHLORIDE 150 MG: 150 TABLET, EXTENDED RELEASE ORAL at 08:18

## 2025-04-09 RX ADMIN — METOPROLOL SUCCINATE 50 MG: 50 TABLET, FILM COATED, EXTENDED RELEASE ORAL at 08:17

## 2025-04-09 RX ADMIN — ACETAMINOPHEN 650 MG: 160 SOLUTION ORAL at 13:10

## 2025-04-09 NOTE — PLAN OF CARE
Goal Outcome Evaluation:         VSS. Pt underwent straight cath for urine specimen. 450 mL was obtained. Pt complained of 5/10 pain in her neck today. Tylenol offered and pt was receptive. Pt was not alert or responsive this morning. She is currently awake and eating. Bladder scanned at 1425 and 98mL was recorded. No straight cath at this time. Continue plan of care.

## 2025-04-09 NOTE — PROGRESS NOTES
MT-BC attempted to complete MT consult 2x on this day but Pt was asleep both times.     04/09/25 1400   Music Therapy   Minutes of Assessment 1   Assessment Detail Evaluation not performed - Pt was asleep        04/09/25 1454   Music Therapy   Minutes of Assessment 1   Assessment Detail Evaluation not performed - Pt asleep

## 2025-04-09 NOTE — CONSULTS
[facility]   Consult Note    Patient Name: Camilla Navas  : 1950  MRN: [id number]  Primary Care Physician:  Lester Lizarraga MD  Referring Physician: Emory Ratliff MD  Date of admission: 2025    Subjective   Subjective     Reason for Consult/ Chief Complaint: Bilateral knee pain    HPI:  Camilla Navas is a 74 y.o. female with bilateral knee pain.  The patient was admitted to the hospital and orthopedics was consulted for bilateral knee pain and swelling.  X-rays reveal knee effusions and osteoarthritis.  The patient denies recent injury or trauma.  No recent infections reported.    Review of Systems   All systems were reviewed and negative except for those mentioned in HPI    Personal History     Past Medical History:   Diagnosis Date    Arthritis     Asthma     Atherosclerosis of coronary artery 2018    Non-obstructive coronary artery disease. Cardiac catheterization done in 2018 showed a 40% mid LAD lesion and a 40% proximal RCA lesion.    Cancer     Breast.     COPD (chronic obstructive pulmonary disease)     Diabetes mellitus     Diverticulitis     Hiatal hernia     Hyperlipemia 2022    Hypertension, essential 2022       Past Surgical History:   Procedure Laterality Date    COLONOSCOPY      COLONOSCOPY N/A 2023    Procedure: COLONOSCOPY WITH POLYPECTOMIES HOT/COLD SNARE, ELEVIEW INJECTION, BIOPSIES;  Surgeon: Lester Gillette MD;  Location: [facility] ENDOSCOPY;  Service: Gastroenterology;  Laterality: N/A;  COLON POLYPS, DIVERTICULOSIS    ENDOSCOPY N/A 2023    Procedure: ESOPHAGOGASTRODUODENOSCOPY WITH BIOPSIES;  Surgeon: Lester Gillette MD;  Location: [facility] ENDOSCOPY;  Service: Gastroenterology;  Laterality: N/A;  PREVIOUS SURGERY    HERNIA REPAIR      HYSTERECTOMY      MASTECTOMY Bilateral     UPPER GASTROINTESTINAL ENDOSCOPY         Family History: family history is not on file. Otherwise pertinent FHx was reviewed and not pertinent to  current issue.    Social History:  reports that she has never smoked. She has been exposed to tobacco smoke. She has never used smokeless tobacco. She reports that she does not drink alcohol and does not use drugs.    Home Medications:  Fluticasone-Umeclidin-Vilant, acetaminophen, albuterol sulfate HFA, apixaban, baclofen, buPROPion XL, dilTIAZem HCl ER, diphenhydrAMINE, furosemide, gabapentin, glipizide, hydroCHLOROthiazide, ipratropium-albuterol, lisinopril, metoprolol succinate XL, mirtazapine, montelukast, nitroglycerin, omeprazole, ondansetron, pravastatin, sertraline, vitamin D3, and vitamin E    Allergies:  Allergies   Allergen Reactions    Empagliflozin Nausea And Vomiting    Metformin Diarrhea       Objective    Objective     Vitals:   Temp:  [98 °F (36.7 °C)-98.2 °F (36.8 °C)] 98 °F (36.7 °C)  Heart Rate:  [] 103  Resp:  [16-20] 16  BP: (116-145)/(44-97) 125/64  Flow (L/min) (Oxygen Therapy):  [0-2] 0    Physical Exam:   Constitutional: Awake, alert   HENT: NCAT   Neck: Supple   Respiratory: Unlabored breathing   Cardiovascular: Regular heart rate   Gastrointestinal: Nontender nondistended   Musculoskeletal: Bilateral knee effusions.  Positive pulses.  Reduced knee range of motion.  Stability intact.  Positive effusion.  No redness.  Skin intact.   Psychiatric: Appropriate affect, cooperative   Neurologic: Grossly intact   Skin: No rashes     Result Review    Result Review:  I have personally reviewed the results from the time of this admission to 4/8/2025 21:01 EDT and agree with these findings:  []  Laboratory  []  Microbiology  [x]  Radiology  []  EKG/Telemetry   []  Cardiology/Vascular   []  Pathology  []  Old records  []  Other:    Most notable findings include: X-rays: Advanced knee osteoarthritis with effusions    Assessment & Plan   Assessment / Plan     Brief Patient Summary:  Camilla Navas is a 74 y.o. female who has bilateral knee effusions, osteoarthritis    Active Hospital  Problems:  Active Hospital Problems    Diagnosis     **Generalized weakness     Hypokalemia        Plan: I discussed treatment options with the patient.  The patient wishes to proceed with nonoperative treatment and is not a good candidate for knee replacement at this point.  Risks and benefits of aspiration and injection of bilateral knees was discussed.  The patient tolerated the procedure well.  Approximately 35 cc of clear/yellowish fluid was aspirated from each knee and an injection of steroid was tolerated well.  Ace wrap's were placed.  Plan for range of motion and weightbearing with activity as tolerated.  Mobilization per physical therapy as able.  Patient may follow-up with orthopedics as needed.  Thank you for the consultation.        Electronically signed by Kadeem Freire MD, 04/08/25, 9:01 PM EDT.

## 2025-04-09 NOTE — PROGRESS NOTES
[facility]     Progress Note    Patient Name: Camilla Navas  : 1950  MRN: [id number]  Primary Care Physician:  Lester Lizarraga MD  Date of admission: 2025    Subjective   Patient had bilateral knee aspiration  Pain appears to be better  Overall yesterday had been drowsy  Pain has improved  Appears to be weak and frail  No apparent obvious sign of infection though inflammatory markers are elevated  No concerns for urinary retention      Scheduled Meds:apixaban, 5 mg, Oral, BID  arformoterol, 15 mcg, Nebulization, BID - RT   And  budesonide, 0.5 mg, Nebulization, BID - RT   And  revefenacin, 175 mcg, Nebulization, Daily - RT  baclofen, 10 mg, Oral, Daily  buPROPion XL, 150 mg, Oral, Q24H  dilTIAZem CD, 360 mg, Oral, Q24H  furosemide, 20 mg, Oral, Daily  gabapentin, 300 mg, Oral, Nightly  [Held by provider] glipizide, 5 mg, Oral, Daily  insulin lispro, 2-9 Units, Subcutaneous, 4x Daily AC & at Bedtime  lisinopril, 5 mg, Oral, Daily  metoprolol succinate XL, 50 mg, Oral, Q12H  mirtazapine, 15 mg, Oral, Nightly  montelukast, 10 mg, Oral, Nightly  pantoprazole, 40 mg, Oral, Q AM  pravastatin, 80 mg, Oral, Nightly  sertraline, 200 mg, Oral, Daily      Continuous Infusions:   PRN Meds:.  acetaminophen **OR** acetaminophen **OR** acetaminophen    albuterol    ALPRAZolam    aluminum-magnesium hydroxide-simethicone    senna-docusate sodium **AND** polyethylene glycol **AND** bisacodyl **AND** bisacodyl    dextrose    dextrose    diphenhydrAMINE    glucagon (human recombinant)    HYDROmorphone    ondansetron    oxyCODONE    sodium chloride       Review of Systems  Constitutional:        Weakness tiredness fatigue  Eyes:                       No blurry vision, eye discharge, eye irritation, eye pain  HEENT:                   No acute hair loss, earache and discharge, nasal congestion or discharge, sore throat, postnasal drip  Respiratory:           No shortness of breath coughing sputum production  wheezing hemoptysis pleuritic chest pain  Cardiovascular:     No chest pain, orthopnea, PND, dizziness, palpitation, lower extremity edema  Gastrointestinal:   No nausea vomiting diarrhea abdominal pain constipation  Genitourinary:       No urinary incontinence, hesitancy, frequency, urgency, dysuria  Hematologic:         No bruising, bleeding, pallor, lymphadenopathy  Endocrine:            No coldness, hot flashes, polyuria, abnormal hair growth  Musculoskeletal:    No body pains, aches, arthritic pains, muscle pain ,muscle wasting  Psychiatric:          No low or high mood, anxiety, hallucinations, delusions  Skin.                      No rash, ulcers, bruising, itching  Neurological:        No confusion, headache, focal weakness, numbness, dysphasia    Objective   Objective     Vitals:   Temp:  [97.6 °F (36.4 °C)-98.9 °F (37.2 °C)] 98.1 °F (36.7 °C)  Heart Rate:  [] 98  Resp:  [16-20] 18  BP: (118-145)/(64-97) 124/91  Flow (L/min) (Oxygen Therapy):  [0-1] 0  Physical Exam    Constitutional: Awake, alert responsive, conversant, no obvious distress              Psychiatric:  Appropriate affect, cooperative   Neurologic:  Awake alert ,oriented x 3, strength symmetric in all extremities, Cranial Nerves grossly intact to confrontation, speech clear   Eyes:   PERRLA, sclerae anicteric, no conjunctival injection   HEENT:  Moist mucous membranes, no nasal or eye discharge, no throat congestion   Neck:   Supple, no thyromegaly, no lymphadenopathy, trachea midline, no elevated JVD   Respiratory:  Clear to auscultation bilaterally, nonlabored respirations    Cardiovascular: RRR, no murmurs, rubs, or gallops, palpable pedal pulses bilaterally, No bilateral ankle edema   Gastrointestinal: Positive bowel sounds, soft, nontender, nondistended, no organomegaly   Musculoskeletal:  No clubbing or cyanosis to extremities,muscle wasting, joint swelling, muscle weakness             Skin:                      No rashes,  bruising, skin ulcers, petechiae or ecchymosis    Result Review    Result Review:  I have personally reviewed the results from the time of this admission to 4/9/2025 08:18 EDT and agree with these findings:  []  Laboratory  []  Microbiology  []  Radiology  []  EKG/Telemetry   []  Cardiology/Vascular   []  Pathology  []  Old records  []  Other:    Assessment & Plan   Assessment / Plan       Active Hospital Problems:  Active Hospital Problems    Diagnosis     **Generalized weakness     Hypokalemia      74-year-old female with past medical history of chronic A-fib on anticoagulation, type 2 diabetes, hyperlipidemia, asthma, history of breast cancer who came in with nausea and generalized weakness found to have potassium of 2.9 which has been repleted with labs otherwise largely unremarkable and urinalysis showing few RBCs.  Creatinine is otherwise normal.  Has uptrending white count and generalized weakness with frailty however no apparent obvious sign of infection.  Knee with bilateral osteoarthritis and effusion which was drained and treated by orthopedics on 4/8    Plan:   Will continue to monitor potassium levels.  Will hold off on hydrochlorothiazide  Physical therapy.   to help with placement  Will continue Marce Patton and Yupelri and Singulair  Continue Eliquis 5 mg twice daily, Cardizem 360 and metoprolol 50 every 12  Continue with lisinopril 5 mg daily  Continue with Wellbutrin 150 every 24, baclofen 10 mg daily, Remeron 15 mg nightly, Zoloft 200 mg daily  Continue pravastatin 80 mg daily  Patient status post bilateral knee effusion drainage.  No concerns for infection  Will try to hold off on pain medication today

## 2025-04-09 NOTE — PLAN OF CARE
Goal Outcome Evaluation:           Progress: no change  Outcome Evaluation: Pt alert and oriented x 4 however was very drowsy throughout the shift falling asleep in between care. Pt complains of neck pain, no pain medication requested. Pt slept comfortably without any complaints. Bladder scan obtained at 0200 and read 316ml.

## 2025-04-09 NOTE — PROCEDURES
Preprocedure diagnosis: Bilateral knee effusions    Post procedure diagnosis: Bilateral knee effusions    Procedure: Bilateral knee aspiration and steroid injection    Surgeon: Dr. Freire    Findings: Bilateral knee effusions    Specimens: None    Description of procedure: Informed consent was obtained preprocedure.  A formal timeout was held.  The skin was prepped with ChloraPrep to the bilateral knees.  Ethyl chloride cold spray was used.  An 18-gauge needle was used to aspirate the superolateral left knee.  35 cc of yellow clear fluid was aspirated.  1 cc of 40 mg Kenalog and 4 cc of lidocaine was injected.  A Band-Aid and Ace wrap was placed.    The procedure was repeated for the right knee.  The skin was prepped with ChloraPrep.  An 18-gauge needle was used to aspirate the knee.  35 cc of clear yellow fluid was aspirated.  1 cc of 40 mg Kenalog and 4 cc of lidocaine was injected.  A Band-Aid and Ace wrap was placed.  The patient tolerated the procedure well.  There were no complications.

## 2025-04-10 ENCOUNTER — APPOINTMENT (OUTPATIENT)
Dept: MRI IMAGING | Facility: HOSPITAL | Age: 75
DRG: 055 | End: 2025-04-10
Payer: MEDICARE

## 2025-04-10 ENCOUNTER — APPOINTMENT (OUTPATIENT)
Dept: NEUROLOGY | Facility: HOSPITAL | Age: 75
DRG: 055 | End: 2025-04-10
Payer: MEDICARE

## 2025-04-10 LAB
GLUCOSE BLDC GLUCOMTR-MCNC: 383 MG/DL (ref 70–99)
GLUCOSE BLDC GLUCOMTR-MCNC: 452 MG/DL (ref 70–99)
GLUCOSE BLDC GLUCOMTR-MCNC: 458 MG/DL (ref 70–99)
GLUCOSE BLDC GLUCOMTR-MCNC: 466 MG/DL (ref 70–99)

## 2025-04-10 PROCEDURE — 63710000001 REVEFENACIN 175 MCG/3ML SOLUTION: Performed by: STUDENT IN AN ORGANIZED HEALTH CARE EDUCATION/TRAINING PROGRAM

## 2025-04-10 PROCEDURE — G0378 HOSPITAL OBSERVATION PER HR: HCPCS

## 2025-04-10 PROCEDURE — 94664 DEMO&/EVAL PT USE INHALER: CPT

## 2025-04-10 PROCEDURE — 97110 THERAPEUTIC EXERCISES: CPT

## 2025-04-10 PROCEDURE — 70551 MRI BRAIN STEM W/O DYE: CPT

## 2025-04-10 PROCEDURE — 25010000002 CEFTRIAXONE PER 250 MG: Performed by: STUDENT IN AN ORGANIZED HEALTH CARE EDUCATION/TRAINING PROGRAM

## 2025-04-10 PROCEDURE — 94799 UNLISTED PULMONARY SVC/PX: CPT

## 2025-04-10 PROCEDURE — 63710000001 INSULIN GLARGINE PER 5 UNITS: Performed by: STUDENT IN AN ORGANIZED HEALTH CARE EDUCATION/TRAINING PROGRAM

## 2025-04-10 PROCEDURE — 94761 N-INVAS EAR/PLS OXIMETRY MLT: CPT

## 2025-04-10 PROCEDURE — 25010000002 KETOROLAC TROMETHAMINE PER 15 MG: Performed by: STUDENT IN AN ORGANIZED HEALTH CARE EDUCATION/TRAINING PROGRAM

## 2025-04-10 PROCEDURE — 70547 MR ANGIOGRAPHY NECK W/O DYE: CPT

## 2025-04-10 PROCEDURE — 72141 MRI NECK SPINE W/O DYE: CPT

## 2025-04-10 PROCEDURE — 97165 OT EVAL LOW COMPLEX 30 MIN: CPT

## 2025-04-10 PROCEDURE — 95816 EEG AWAKE AND DROWSY: CPT

## 2025-04-10 PROCEDURE — 63710000001 INSULIN LISPRO (HUMAN) PER 5 UNITS: Performed by: INTERNAL MEDICINE

## 2025-04-10 PROCEDURE — 82948 REAGENT STRIP/BLOOD GLUCOSE: CPT | Performed by: INTERNAL MEDICINE

## 2025-04-10 PROCEDURE — 82948 REAGENT STRIP/BLOOD GLUCOSE: CPT

## 2025-04-10 PROCEDURE — 92610 EVALUATE SWALLOWING FUNCTION: CPT

## 2025-04-10 RX ORDER — KETOROLAC TROMETHAMINE 15 MG/ML
10 INJECTION, SOLUTION INTRAMUSCULAR; INTRAVENOUS ONCE
Status: COMPLETED | OUTPATIENT
Start: 2025-04-10 | End: 2025-04-10

## 2025-04-10 RX ADMIN — SERTRALINE HYDROCHLORIDE 200 MG: 100 TABLET ORAL at 08:08

## 2025-04-10 RX ADMIN — INSULIN GLARGINE 10 UNITS: 100 INJECTION, SOLUTION SUBCUTANEOUS at 17:27

## 2025-04-10 RX ADMIN — BACLOFEN 10 MG: 10 TABLET ORAL at 08:09

## 2025-04-10 RX ADMIN — MIRTAZAPINE 15 MG: 15 TABLET, FILM COATED ORAL at 20:32

## 2025-04-10 RX ADMIN — INSULIN LISPRO 9 UNITS: 100 INJECTION, SOLUTION INTRAVENOUS; SUBCUTANEOUS at 17:28

## 2025-04-10 RX ADMIN — MONTELUKAST 10 MG: 10 TABLET, FILM COATED ORAL at 20:32

## 2025-04-10 RX ADMIN — PRAVASTATIN SODIUM 80 MG: 20 TABLET ORAL at 20:32

## 2025-04-10 RX ADMIN — KETOROLAC TROMETHAMINE 10 MG: 15 INJECTION, SOLUTION INTRAMUSCULAR; INTRAVENOUS at 19:31

## 2025-04-10 RX ADMIN — INSULIN LISPRO 9 UNITS: 100 INJECTION, SOLUTION INTRAVENOUS; SUBCUTANEOUS at 12:09

## 2025-04-10 RX ADMIN — APIXABAN 5 MG: 5 TABLET, FILM COATED ORAL at 20:32

## 2025-04-10 RX ADMIN — GLIPIZIDE 5 MG: 5 TABLET ORAL at 08:09

## 2025-04-10 RX ADMIN — INSULIN LISPRO 9 UNITS: 100 INJECTION, SOLUTION INTRAVENOUS; SUBCUTANEOUS at 20:33

## 2025-04-10 RX ADMIN — REVEFENACIN 175 MCG: 175 SOLUTION RESPIRATORY (INHALATION) at 07:53

## 2025-04-10 RX ADMIN — METOPROLOL SUCCINATE 50 MG: 50 TABLET, FILM COATED, EXTENDED RELEASE ORAL at 08:08

## 2025-04-10 RX ADMIN — INSULIN GLARGINE 15 UNITS: 100 INJECTION, SOLUTION SUBCUTANEOUS at 12:10

## 2025-04-10 RX ADMIN — METOPROLOL SUCCINATE 50 MG: 50 TABLET, FILM COATED, EXTENDED RELEASE ORAL at 20:32

## 2025-04-10 RX ADMIN — ACETAMINOPHEN 650 MG: 325 TABLET ORAL at 05:58

## 2025-04-10 RX ADMIN — ACETAMINOPHEN 650 MG: 325 TABLET ORAL at 11:12

## 2025-04-10 RX ADMIN — FUROSEMIDE 20 MG: 20 TABLET ORAL at 08:09

## 2025-04-10 RX ADMIN — OXYCODONE 5 MG: 5 TABLET ORAL at 08:08

## 2025-04-10 RX ADMIN — BUDESONIDE 0.5 MG: 0.5 SUSPENSION RESPIRATORY (INHALATION) at 07:54

## 2025-04-10 RX ADMIN — PANTOPRAZOLE SODIUM 40 MG: 40 TABLET, DELAYED RELEASE ORAL at 05:58

## 2025-04-10 RX ADMIN — BUPROPION HYDROCHLORIDE 150 MG: 150 TABLET, EXTENDED RELEASE ORAL at 08:08

## 2025-04-10 RX ADMIN — ARFORMOTEROL TARTRATE 15 MCG: 15 SOLUTION RESPIRATORY (INHALATION) at 19:57

## 2025-04-10 RX ADMIN — DILTIAZEM HYDROCHLORIDE 360 MG: 180 CAPSULE, COATED, EXTENDED RELEASE ORAL at 08:08

## 2025-04-10 RX ADMIN — INSULIN LISPRO 8 UNITS: 100 INJECTION, SOLUTION INTRAVENOUS; SUBCUTANEOUS at 08:09

## 2025-04-10 RX ADMIN — APIXABAN 5 MG: 5 TABLET, FILM COATED ORAL at 08:09

## 2025-04-10 RX ADMIN — LISINOPRIL 5 MG: 5 TABLET ORAL at 08:08

## 2025-04-10 RX ADMIN — BUDESONIDE 0.5 MG: 0.5 SUSPENSION RESPIRATORY (INHALATION) at 19:57

## 2025-04-10 RX ADMIN — CEFTRIAXONE SODIUM 1000 MG: 1 INJECTION, POWDER, FOR SOLUTION INTRAMUSCULAR; INTRAVENOUS at 18:15

## 2025-04-10 RX ADMIN — ARFORMOTEROL TARTRATE 15 MCG: 15 SOLUTION RESPIRATORY (INHALATION) at 07:54

## 2025-04-10 RX ADMIN — GABAPENTIN 300 MG: 300 CAPSULE ORAL at 20:32

## 2025-04-10 NOTE — PLAN OF CARE
Goal Outcome Evaluation:           Progress: improving  Outcome Evaluation: Pt aox4 but lethargic. Pt had complaints of neck pain, Neurology came to see pt and ordered MRI. Pt given prn pain medication. Pt bladder scan x2 throughout the night. Straight cathed pt at 0400 and had 600ml out. Pt sleeping throughout most of shift. Will continue with POC.

## 2025-04-10 NOTE — PLAN OF CARE
Goal Outcome Evaluation:  Plan of Care Reviewed With: patient        Progress: no change  Outcome Evaluation: VSS on RA. Patient AAOx4. Blood sugars monitored and maintained with SSI. Patient complained of pain x2, medication administered per MAR. MRI complete this shift. Family visited bedside throughout shift. No other concerns or complaints. Continue plan of care.

## 2025-04-10 NOTE — THERAPY EVALUATION
Patient Name: Camilla Navas  : 1950    MRN: [id number]                              Today's Date: 4/10/2025       Admit Date: 2025    Visit Dx:     ICD-10-CM ICD-9-CM   1. Generalized weakness  R53.1 780.79   2. Hypokalemia  E87.6 276.8   3. Bilateral lower extremity edema  R60.0 782.3   4. Unable to ambulate  R26.2 719.7   5. Acute urinary retention  R33.8 788.29   6. Difficulty walking  R26.2 719.7     Patient Active Problem List   Diagnosis    Reflux esophagitis    Type 2 diabetes mellitus    Depression    DDD (degenerative disc disease), cervical    Cough    Colon polyps    Chest pain    Cancer    Atrophic vaginitis    Bladder disorder    Nonobstructive atherosclerosis of coronary artery    Asthma without status asthmaticus    Adjustment disorder with anxiety    Functional dyspepsia    Esophageal reflux    Ear pain    Dry mouth    Diverticula of intestine    Diabetic peripheral neuropathy associated with type 2 diabetes mellitus    Difficult or painful urination    Limb swelling    Insomnia    Hyperlipemia    Hematuria    Generalized anxiety disorder    Menopausal and postmenopausal disorder    Mandibular mass    Malignant neoplasm of female breast    Major depression, single episode    Sensorineural hearing loss    Seasonal allergic rhinitis    Renal mass    Overweight with body mass index (BMI) 25.0-29.9    Osteoarthritis    Obesity    Vaginal itching    Simple renal cyst    Essential hypertension    Diarrhea    Generalized abdominal pain    Nausea    Heartburn    Pedal edema    Hypokalemia    Pharyngitis    Persistent atrial fibrillation    Atrial fib/flutter, transient    Chest discomfort    Atrial fibrillation with rapid ventricular response    Dehydration    Diabetic gastroparesis    Generalized weakness     Past Medical History:   Diagnosis Date    Arthritis     Asthma     Atherosclerosis of coronary artery 2018    Non-obstructive coronary artery disease. Cardiac catheterization done in  October 2018 showed a 40% mid LAD lesion and a 40% proximal RCA lesion.    Cancer     Breast.     COPD (chronic obstructive pulmonary disease)     Diabetes mellitus     Diverticulitis     Hiatal hernia     Hyperlipemia 01/21/2022    Hypertension, essential 03/05/2022     Past Surgical History:   Procedure Laterality Date    COLONOSCOPY      COLONOSCOPY N/A 11/14/2023    Procedure: COLONOSCOPY WITH POLYPECTOMIES HOT/COLD SNARE, ELEVIEW INJECTION, BIOPSIES;  Surgeon: Lester Gillette MD;  Location: [facility];  Service: Gastroenterology;  Laterality: N/A;  COLON POLYPS, DIVERTICULOSIS    ENDOSCOPY N/A 11/14/2023    Procedure: ESOPHAGOGASTRODUODENOSCOPY WITH BIOPSIES;  Surgeon: Lester Gillette MD;  Location: [facility] ENDOSCOPY;  Service: Gastroenterology;  Laterality: N/A;  PREVIOUS SURGERY    HERNIA REPAIR      HYSTERECTOMY      MASTECTOMY Bilateral     UPPER GASTROINTESTINAL ENDOSCOPY        General Information       Row Name 04/10/25 1244          OT Time and Intention    Document Type evaluation  -PG     Mode of Treatment individual therapy;occupational therapy  -PG       Row Name 04/10/25 1247          General Information    Patient Profile Reviewed yes  Patient lives alone and reports independence with ADLs and transfers.  Family assisted as needed  -PG     Prior Level of Function independent:;transfer;ADL's  -PG     Existing Precautions/Restrictions fall  -PG     Barriers to Rehab none identified  -PG       Row Name 04/10/25 1243          Occupational Profile    Reason for Services/Referral (Occupational Profile) Patient is a 74-year-old female admitted for hypokalemia, generalized weakness and immobility.  Patient is being evaluated by Occupational Therapy due to recent decline in ADL function.  No previous OT services identified  -PG       Row Name 04/10/25 124          Living Environment    Current Living Arrangements home  -PG       Row Name 04/10/25 1246          Cognition    Orientation  Status (Cognition) person;oriented to;place  -       Row Name 04/10/25 1244          Safety Issues/Impairments Affecting Functional Mobility    Impairments Affecting Function (Mobility) balance;cognition;endurance/activity tolerance;strength;pain;range of motion (ROM)  -     Cognitive Impairments, Mobility Safety/Performance judgment;sequencing abilities;problem-solving/reasoning;insight into deficits/self-awareness  -               User Key  (r) = Recorded By, (t) = Taken By, (c) = Cosigned By      Initials Name Provider Type     Matthew Sousa OT Occupational Therapist                     Mobility/ADL's       Row Name 04/10/25 1249          Bed Mobility    Bed Mobility supine-sit-supine  -     All Activities, [place] (Bed Mobility) maximum assist (25% patient effort)  -[facility] Name 04/10/25 1249          Transfers    Transfers sit-stand transfer;stand-sit transfer  -[facility] Name 04/10/25 1249          Sit-Stand Transfer    Sit-Stand [place] (Transfers) moderate assist (50% patient effort);verbal cues  -     Assistive Device (Sit-Stand Transfers) walker, front-wheeled  -     Comment, (Sit-Stand Transfer) From elevated bed  -[facility] Name 04/10/25 1249          Stand-Sit Transfer    Stand-Sit [place] (Transfers) moderate assist (50% patient effort);verbal cues  -     Assistive Device (Stand-Sit Transfers) walker, front-wheeled  -[facility] Name 04/10/25 1249          Activities of Daily Living    BADL Assessment/Intervention bathing;upper body dressing;lower body dressing;grooming;toileting  -       Row Name 04/10/25 1249          Bathing Assessment/Intervention    [place] Level (Bathing) bathing skills;maximum assist (25% patient effort)  -       Row Name 04/10/25 1249          Upper Body Dressing Assessment/Training    [place] Level (Upper Body Dressing) upper body dressing skills;maximum assist (25% patient effort)  -[facility] Name 04/10/25 1248           Lower Body Dressing Assessment/Training    [place] Level (Lower Body Dressing) lower body dressing skills;dependent (less than 25% patient effort)  -PG       Row Name 04/10/25 1249          Grooming Assessment/Training    [place] Level (Grooming) grooming skills;minimum assist (75% patient effort)  -PG       Row Name 04/10/25 1249          Toileting Assessment/Training    [place] Level (Toileting) toileting skills;dependent (less than 25% patient effort)  -PG               User Key  (r) = Recorded By, (t) = Taken By, (c) = Cosigned By      Initials Name Provider Type    PG Matthew Sousa OT Occupational Therapist                   Obj/Interventions       Row Name 04/10/25 1251          Sensory Assessment (Somatosensory)    Sensory Assessment (Somatosensory) unable/difficult to assess  -PG       Row Name 04/10/25 1251          Vision Assessment/Intervention    Visual Impairment/Limitations corrective lenses full-time  -PG       Row Name 04/10/25 1251          Range of Motion Comprehensive    General Range of Motion no range of motion deficits identified  -PG       Row Name 04/10/25 1251          Strength Comprehensive (MMT)    Comment, General Manual Muscle Testing (MMT) Assessment 3+/5 BUE  -PG       Row Name 04/10/25 1251          Motor Skills    Motor Skills coordination;functional endurance  -PG     Coordination WFL  -PG     Functional Endurance poor plus  -PG               User Key  (r) = Recorded By, (t) = Taken By, (c) = Cosigned By      Initials Name Provider Type    PG Matthew Sousa OT Occupational Therapist                   Goals/Plan       Row Name 04/10/25 1254          Transfer Goal 1 (OT)    Activity/Assistive Device (Transfer Goal 1, OT) transfers, all  -PG     [place] Level/Cues Needed (Transfer Goal 1, OT) modified independence  -PG     Time Frame (Transfer Goal 1, OT) long term goal (LTG);10 days  -PG       Row Name 04/10/25 1254          Bathing Goal 1 (OT)     Activity/Device (Bathing Goal 1, OT) bathing skills, all  -PG     [place] Level/Cues Needed (Bathing Goal 1, OT) modified independence  -PG     Time Frame (Bathing Goal 1, OT) long term goal (LTG);10 days  -PG       Row Name 04/10/25 1254          Dressing Goal 1 (OT)    Activity/Device (Dressing Goal 1, OT) dressing skills, all  -PG     [place]/Cues Needed (Dressing Goal 1, OT) modified independence  -PG     Time Frame (Dressing Goal 1, OT) long term goal (LTG);10 days  -PG       Row Name 04/10/25 1254          Toileting Goal 1 (OT)    Activity/Device (Toileting Goal 1, OT) toileting skills, all  -PG     [place] Level/Cues Needed (Toileting Goal 1, OT) modified independence  -PG     Time Frame (Toileting Goal 1, OT) long term goal (LTG);10 days  -PG       Row Name 04/10/25 1254          Grooming Goal 1 (OT)    Activity/Device (Grooming Goal 1, OT) grooming skills, all  -PG     [place] (Grooming Goal 1, OT) modified independence  -PG     Time Frame (Grooming Goal 1, OT) long term goal (LTG);10 days  -PG       Row Name 04/10/25 1254          Strength Goal 1 (OT)    Strength Goal 1 (OT) Patient will improve bilateral upper extremity strength to 4/5 to support independence with self-care activity  -PG     Time Frame (Strength Goal 1, OT) long term goal (LTG);10 days  -PG       Row Name 04/10/25 1254          Problem Specific Goal 1 (OT)    Problem Specific Goal 1 (OT) Patient will improve activity tolerance to good minus to support independence with self-care tasks  -PG     Time Frame (Problem Specific Goal 1, OT) long term goal (LTG);10 days  -PG       Row Name 04/10/25 1254          Therapy Assessment/Plan (OT)    Planned Therapy Interventions (OT) activity tolerance training;BADL retraining;strengthening exercise;transfer/mobility retraining;patient/caregiver education/training;occupation/activity based interventions  -PG               User Key  (r) = Recorded By, (t) = Taken By, (c) = Cosigned  By      Initials Name Provider Type    PG Matthew Sousa, OT Occupational Therapist                   Clinical Impression       Row Name 04/10/25 1257          Pain Assessment    Pretreatment Pain Rating 8/10  -PG     Posttreatment Pain Rating 8/10  -PG     Pain Location knee  -PG     Pain Side/Orientation posterior  -PG     Pain Management Interventions nursing notified  -PG       Row Name 04/10/25 1252          Plan of Care Review    Plan of Care Reviewed With patient  -PG     Progress no change  -PG     Outcome Evaluation Patient presents with limitations affecting strength, activity tolerance, and balance impacting patient's ability to return home safely and independently.  The skills of a therapist will be required to safely and effectively implement the following treatment plan to restore maximal level of function  -PG       Row Name 04/10/25 1252          Therapy Assessment/Plan (OT)    Patient/Family Therapy Goal Statement (OT) Get stronger and eventually return home  -PG     Rehab Potential (OT) good  -PG     Criteria for Skilled Therapeutic Interventions Met (OT) yes;meets criteria;skilled treatment is necessary  -PG     Therapy Frequency (OT) 5 times/wk  -PG       Row Name 04/10/25 1252          Therapy Plan Review/Discharge Plan (OT)    Anticipated Discharge Disposition (OT) sub acute care setting  -PG               User Key  (r) = Recorded By, (t) = Taken By, (c) = Cosigned By      Initials Name Provider Type    PG Matthew Sousa OT Occupational Therapist                   Outcome Measures       Row Name 04/10/25 1891          How much help from another is currently needed...    Putting on and taking off regular lower body clothing? 1  -PG     Bathing (including washing, rinsing, and drying) 1  -PG     Toileting (which includes using toilet bed pan or urinal) 1  -PG     Putting on and taking off regular upper body clothing 1  -PG     Taking care of personal grooming (such as brushing teeth) 2  -PG      Eating meals 3  -PG     AM-PAC 6 Clicks Score (OT) 9  -PG       Row Name 04/10/25 0823 04/10/25 0740       How much help from another person do you currently need...    Turning from your back to your side while in flat bed without using bedrails? 2  -DK 1  -BT    Moving from lying on back to sitting on the side of a flat bed without bedrails? 2  -DK 1  -BT    Moving to and from a bed to a chair (including a wheelchair)? 1  -DK 1  -BT    Standing up from a chair using your arms (e.g., wheelchair, bedside chair)? 1  -DK 1  -BT    Climbing 3-5 steps with a railing? 1  -DK 1  -BT    To walk in hospital room? 1  -DK 1  -BT    AM-PAC 6 Clicks Score (PT) 8  -DK 6  -BT    Highest Level of Mobility Goal 3 --> Sit at edge of bed  -DK 2 --> Bed activities/dependent transfer  -BT      Row Name 04/10/25 1255 04/10/25 0823       Functional Assessment    Outcome Measure Options AM-PAC 6 Clicks Daily Activity (OT);Optimal Instrument  -PG AM-PAC 6 Clicks Basic Mobility (PT)  -      Row Name 04/10/25 1255          Optimal Instrument    Optimal Instrument Optimal - 3  -PG     Bending/Stooping 4  -PG     Standing 3  -PG     Reaching 2  -PG     From the list, choose the 3 activities you would most like to be able to do without any difficulty Bending/stooping;Standing;Reaching  -PG     Total Score Optimal - 3 9  -PG               User Key  (r) = Recorded By, (t) = Taken By, (c) = Cosigned By      Initials Name Provider Type    Sharon Odonnell, PTA Physical Therapist Assistant    Matthew Ballard, OT Occupational Therapist    Rachel Diaz RN Registered Nurse                    Occupational Therapy Education       Title: PT OT SLP Therapies (Done)       Topic: Occupational Therapy (Done)       Point: ADL training (Done)       Learning Progress Summary            Patient Acceptance, E,D, DU by PG at 4/10/2025 1257                      Point: Home exercise program (Done)       Learning Progress Summary            Patient  Acceptance, E,D, DU by PG at 4/10/2025 1257                      Point: Precautions (Done)       Learning Progress Summary            Patient Acceptance, E,D, DU by PG at 4/10/2025 1257                      Point: Body mechanics (Done)       Learning Progress Summary            Patient Acceptance, E,D, DU by PG at 4/10/2025 1257                                      User Key       Initials Effective Dates Name Provider Type Discipline    PG 06/16/21 -  Matthew Sousa OT Occupational Therapist OT                  OT Recommendation and Plan  Planned Therapy Interventions (OT): activity tolerance training, BADL retraining, strengthening exercise, transfer/mobility retraining, patient/caregiver education/training, occupation/activity based interventions  Therapy Frequency (OT): 5 times/wk  Plan of Care Review  Plan of Care Reviewed With: patient  Progress: no change  Outcome Evaluation: Patient presents with limitations affecting strength, activity tolerance, and balance impacting patient's ability to return home safely and independently.  The skills of a therapist will be required to safely and effectively implement the following treatment plan to restore maximal level of function     Time Calculation:   Evaluation Complexity (OT)  Review Occupational Profile/Medical/Therapy History Complexity: brief/low complexity  Assessment, Occupational Performance/Identification of Deficit Complexity: 3-5 performance deficits  Clinical Decision Making Complexity (OT): problem focused assessment/low complexity  Overall Complexity of Evaluation (OT): low complexity     Time Calculation- OT       Row Name 04/10/25 1259             Time Calculation- OT    OT Received On 04/10/25  -PG      OT Goal Re-Cert Due Date 04/19/25  -PG         Untimed Charges    OT Eval/Re-eval Minutes 35  -PG         Total Minutes    Untimed Charges Total Minutes 35  -PG       Total Minutes 35  -PG                User Key  (r) = Recorded By, (t) = Taken By,  (c) = Cosigned By      Initials Name Provider Type    PG Matthew Sousa OT Occupational Therapist                  Therapy Charges for Today       Code Description Service Date Service Provider Modifiers Qty    01262679909 HC OT EVAL LOW COMPLEXITY 3 4/10/2025 Matthew Sousa OT GO 1                 Matthew Sousa OT  4/10/2025

## 2025-04-10 NOTE — PLAN OF CARE
Goal Outcome Evaluation:      ASSESSMENT/ PLAN OF CARE:  Pt presents with limitations, noted below, that impede patient's ability to tolerate least restrictive diet safely and independently. The skills of a therapist will be required to safely and effectively implement the following treatment plan to restore maximal level of function.    PROBLEMS:  1.  Risk of aspiration, swallow delay, decreased oral motor strength/range of motion   TREATMENT: Speech therapy for dysphagia, education of strategies and tolerance of least restrictive diet.    FREQUENCY/DURATION: Daily, 5 days a week    REHAB POTENTIAL:  Pt has good rehab potential.  The following limitations may influence improvement/ length of tx medical status.    RECOMMENDATIONS:   1.   DIET: Mechanical soft solids, nectar thick liquids    2.  POSITION: Fully upright for all p.o. intake, 30 minutes following    3.  COMPENSATORY STRATEGIES: Assist for self-feeding, small bites and sips, single controlled sips of liquids via straw.             Anticipated Discharge Disposition (SLP): anticipate therapy at next level of care

## 2025-04-10 NOTE — THERAPY EVALUATION
Acute Care - Speech Language Pathology   Swallow Initial Evaluation  Em     Patient Name: Camilla Navas  : 1950  MRN: [id number]  Today's Date: 4/10/2025               Admit Date: 2025    Visit Dx:     ICD-10-CM ICD-9-CM   1. Generalized weakness  R53.1 780.79   2. Hypokalemia  E87.6 276.8   3. Bilateral lower extremity edema  R60.0 782.3   4. Unable to ambulate  R26.2 719.7   5. Acute urinary retention  R33.8 788.29   6. Difficulty walking  R26.2 719.7   7. Oropharyngeal dysphagia  R13.12 787.22     Patient Active Problem List   Diagnosis    Reflux esophagitis    Type 2 diabetes mellitus    Depression    DDD (degenerative disc disease), cervical    Cough    Colon polyps    Chest pain    Cancer    Atrophic vaginitis    Bladder disorder    Nonobstructive atherosclerosis of coronary artery    Asthma without status asthmaticus    Adjustment disorder with anxiety    Functional dyspepsia    Esophageal reflux    Ear pain    Dry mouth    Diverticula of intestine    Diabetic peripheral neuropathy associated with type 2 diabetes mellitus    Difficult or painful urination    Limb swelling    Insomnia    Hyperlipemia    Hematuria    Generalized anxiety disorder    Menopausal and postmenopausal disorder    Mandibular mass    Malignant neoplasm of female breast    Major depression, single episode    Sensorineural hearing loss    Seasonal allergic rhinitis    Renal mass    Overweight with body mass index (BMI) 25.0-29.9    Osteoarthritis    Obesity    Vaginal itching    Simple renal cyst    Essential hypertension    Diarrhea    Generalized abdominal pain    Nausea    Heartburn    Pedal edema    Hypokalemia    Pharyngitis    Persistent atrial fibrillation    Atrial fib/flutter, transient    Chest discomfort    Atrial fibrillation with rapid ventricular response    Dehydration    Diabetic gastroparesis    Generalized weakness     Past Medical History:   Diagnosis Date    Arthritis     Asthma      "Atherosclerosis of coronary artery 2018    Non-obstructive coronary artery disease. Cardiac catheterization done in October 2018 showed a 40% mid LAD lesion and a 40% proximal RCA lesion.    Cancer     Breast.     COPD (chronic obstructive pulmonary disease)     Diabetes mellitus     Diverticulitis     Hiatal hernia     Hyperlipemia 01/21/2022    Hypertension, essential 03/05/2022     Past Surgical History:   Procedure Laterality Date    COLONOSCOPY      COLONOSCOPY N/A 11/14/2023    Procedure: COLONOSCOPY WITH POLYPECTOMIES HOT/COLD SNARE, ELEVIEW INJECTION, BIOPSIES;  Surgeon: Lester Gillette MD;  Location: [facility] ENDOSCOPY;  Service: Gastroenterology;  Laterality: N/A;  COLON POLYPS, DIVERTICULOSIS    ENDOSCOPY N/A 11/14/2023    Procedure: ESOPHAGOGASTRODUODENOSCOPY WITH BIOPSIES;  Surgeon: Lester Gillette MD;  Location: [facility] ENDOSCOPY;  Service: Gastroenterology;  Laterality: N/A;  PREVIOUS SURGERY    HERNIA REPAIR      HYSTERECTOMY      MASTECTOMY Bilateral     UPPER GASTROINTESTINAL ENDOSCOPY         SLP Recommendation and Plan          Inpatient Speech Pathology Dysphagia Evaluation        PAIN SCALE: None indicated    PRECAUTIONS/CONTRAINDICATIONS: Standard, fall    SUSPECTED ABUSE/NEGLECT/EXPLOITATION: None noted    SOCIAL/PSYCHOLOGICAL NEEDS/BARRIERS: None noted    PAST SOCIAL HISTORY: 74-year-old female, lives at home with family    PRIOR FUNCTION: Patient reports difficulty swallowing for several days    PATIENT GOALS/EXPECTATIONS: Patient wants to \"feel better\"    HISTORY: Patient is a 74-year-old female mated to [facility] on 4//25 secondary to generalized weakness.  Reportedly patient dated onset of her symptoms sometime around the last week of March.  She patient started having intermittent weakness and loss of appetite.  Patient also noting tremors in her mouth since the middle of March 2025.  Patient reports it has been difficult for her to eat and drink, frequent coughing " while drinking thin liquids.  MRI revealed signal aberration within the right centrum semiovale with suggestion of mild surrounding edema.  Apparent involvement of the adjacent body of the corpus callosum and questionable involvement of the right lateral ventricle.    CURRENT DIET LEVEL: Regular    OBJECTIVE:    TEST ADMINISTERED: Clinical dysphagia evaluation    COGNITION/SAFETY AWARENESS: Appears appropriate for environment however not thoroughly evaluated    BEHAVIORAL OBSERVATIONS: Alert, cooperative, lingual and lower jaw tremoring    ORAL MOTOR EXAM: Lingual protrusion at midline, moderate lingual tremors.  Patient with difficulty with labial protrusion and retraction due to moderate movement of lower jaw.    VOICE QUALITY: Clear    REFLEX EXAM: Deferred    POSTURE: Sitting fully upright    FEEDING/SWALLOWING FUNCTION: Assessed with thin liquids, nectar thick liquids, purée solids, regular solids    CLINICAL OBSERVATIONS: Nectar thick liquids by spoon, cup and straw drink.  Patient exhibiting difficulty with labial seal around cup resulting in anterior loss.  Swallows were completed with spoon and straw trials with mild delay.  Vocal quality and laryngeal sounds clear to auscultation.  Thin liquids by spoon and by straw.  Throat clearing with spoon trial, delayed cough, wet vocal quality with straw trial.  Purée by spoon with decreased labial seal to spoon.  Poor oral transit with eventual swallow completion.  Double swallow observed.  Laryngeal sounds clear to auscultation.  Regular crunchy solids with patient primarily chewing on the left.  Extended rotary chew with poor range of motion.  Swallow completed.  Left-sided buccal residue.  Patient with self awareness and attempting to clear.  Trial of nectar thick liquid by straw assisted and clearance.  Laryngeal elevation on palpation.  Patient with swallow delay, delay initiation of swallow.  Signs of aspiration with thin liquids.  Difficulty with rotary chew  and manipulation of the crunchy solids.  Silent aspiration cannot be ruled out.    DYSPHAGIA CRITERIA: Risk of aspiration    FUNCTIONAL ASSESSMENT INSTRUMENT: Patient currently scored a level 5 of 7 on Functional Communication Measures for swallowing indicating a 20-39% limitation in function.    ASSESSMENT/ PLAN OF CARE:  Pt presents with limitations, noted below, that impede patient's ability to tolerate least restrictive diet safely and independently. The skills of a therapist will be required to safely and effectively implement the following treatment plan to restore maximal level of function.    PROBLEMS:  1.  Risk of aspiration, swallow delay, decreased oral motor strength/range of motion   LTG 1: 30 days.  Patient will increase functional communication measures for swallowing to level 6 of 7, indicating a 1-19% limitation in function.   STG 1a: 14 days.  Patient will tolerate diet of mechanical soft solids, nectar thick liquids with minimal to no signs or symptoms of aspiration.   STG 1b: 14 days.  Patient will tolerate diet of mechanical soft solids, nectar thick liquids utilizing strategies with moderate cues.   STG 1c: 14 days.  Patient will tolerate single sips of thin liquids with minimal to no signs or symptoms of aspiration at 50% of trials.   STG 1d: 14 days.  Patient will tolerate diet with improved oral control for increased rotary chew and tolerance of regular solid trials.   TREATMENT: Speech therapy for dysphagia, education of strategies and tolerance of least restrictive diet.    FREQUENCY/DURATION: Daily, 5 days a week    REHAB POTENTIAL:  Pt has good rehab potential.  The following limitations may influence improvement/ length of tx medical status.    RECOMMENDATIONS:   1.   DIET: Mechanical soft solids, nectar thick liquids    2.  POSITION: Fully upright for all p.o. intake, 30 minutes following    3.  COMPENSATORY STRATEGIES: Assist for self-feeding, small bites and sips, single controlled  sips of liquids via straw.    Pt/responsible party agrees with plan of care and has been informed of all alternatives, risks and benefits.                    Anticipated Discharge Disposition (SLP): anticipate therapy at next level of care (04/10/25 3639)                                                               EDUCATION  The patient has been educated in the following areas:   Dysphagia (Swallowing Impairment).                Time Calculation:    Time Calculation- SLP       Row Name 04/10/25 1430             Time Calculation- SLP    SLP Start Time 1300  -SN      SLP Stop Time 1400  -SN      SLP Time Calculation (min) 60 min  -SN      SLP Received On 04/10/25  -SN         Untimed Charges    07401-LC Eval Oral Pharyng Swallow Minutes 60  -SN         Total Minutes    Untimed Charges Total Minutes 60  -SN       Total Minutes 60  -SN                User Key  (r) = Recorded By, (t) = Taken By, (c) = Cosigned By      Initials Name Provider Type    Yamileth Castaneda MS-CCC/SLP, FRANCES Speech and Language Pathologist                    Therapy Charges for Today       Code Description Service Date Service Provider Modifiers Qty    [id number] HC ST EVAL ORAL PHARYNG SWALLOW 4 4/10/2025 Yamileth Morris MS-CCC/SLPFRANCES GN 1                 PRINCE Greenberg/FRANCES NAGY  4/10/2025

## 2025-04-10 NOTE — THERAPY TREATMENT NOTE
Acute Care - Physical Therapy Treatment Note  NILSON Strickland     Patient Name: Camilla Navas  : 1950  MRN: [id number]  Today's Date: 4/10/2025      Visit Dx:     ICD-10-CM ICD-9-CM   1. Generalized weakness  R53.1 780.79   2. Hypokalemia  E87.6 276.8   3. Bilateral lower extremity edema  R60.0 782.3   4. Unable to ambulate  R26.2 719.7   5. Acute urinary retention  R33.8 788.29   6. Difficulty walking  R26.2 719.7     Patient Active Problem List   Diagnosis    Reflux esophagitis    Type 2 diabetes mellitus    Depression    DDD (degenerative disc disease), cervical    Cough    Colon polyps    Chest pain    Cancer    Atrophic vaginitis    Bladder disorder    Nonobstructive atherosclerosis of coronary artery    Asthma without status asthmaticus    Adjustment disorder with anxiety    Functional dyspepsia    Esophageal reflux    Ear pain    Dry mouth    Diverticula of intestine    Diabetic peripheral neuropathy associated with type 2 diabetes mellitus    Difficult or painful urination    Limb swelling    Insomnia    Hyperlipemia    Hematuria    Generalized anxiety disorder    Menopausal and postmenopausal disorder    Mandibular mass    Malignant neoplasm of female breast    Major depression, single episode    Sensorineural hearing loss    Seasonal allergic rhinitis    Renal mass    Overweight with body mass index (BMI) 25.0-29.9    Osteoarthritis    Obesity    Vaginal itching    Simple renal cyst    Essential hypertension    Diarrhea    Generalized abdominal pain    Nausea    Heartburn    Pedal edema    Hypokalemia    Pharyngitis    Persistent atrial fibrillation    Atrial fib/flutter, transient    Chest discomfort    Atrial fibrillation with rapid ventricular response    Dehydration    Diabetic gastroparesis    Generalized weakness     Past Medical History:   Diagnosis Date    Arthritis     Asthma     Atherosclerosis of coronary artery 2018    Non-obstructive coronary artery disease. Cardiac catheterization done  in October 2018 showed a 40% mid LAD lesion and a 40% proximal RCA lesion.    Cancer     Breast.     COPD (chronic obstructive pulmonary disease)     Diabetes mellitus     Diverticulitis     Hiatal hernia     Hyperlipemia 01/21/2022    Hypertension, essential 03/05/2022     Past Surgical History:   Procedure Laterality Date    COLONOSCOPY      COLONOSCOPY N/A 11/14/2023    Procedure: COLONOSCOPY WITH POLYPECTOMIES HOT/COLD SNARE, ELEVIEW INJECTION, BIOPSIES;  Surgeon: Lester Gillette MD;  Location: [facility] ENDOSCOPY;  Service: Gastroenterology;  Laterality: N/A;  COLON POLYPS, DIVERTICULOSIS    ENDOSCOPY N/A 11/14/2023    Procedure: ESOPHAGOGASTRODUODENOSCOPY WITH BIOPSIES;  Surgeon: Lester Gillette MD;  Location: [facility] ENDOSCOPY;  Service: Gastroenterology;  Laterality: N/A;  PREVIOUS SURGERY    HERNIA REPAIR      HYSTERECTOMY      MASTECTOMY Bilateral     UPPER GASTROINTESTINAL ENDOSCOPY       PT Assessment (Last 12 Hours)       PT Evaluation and Treatment       Row Name 04/10/25 0823          Physical Therapy Time and Intention    Subjective Information complains of;weakness;fatigue;pain  -DK     Document Type therapy note (daily note)  -DK     Mode of Treatment individual therapy;physical therapy  -DK     Patient Effort good  -DK     Symptoms Noted During/After Treatment fatigue;increased pain  -DK     Comment Pt was c/o moderate neck and back pain.  -DK       Row Name 04/10/25 0823          Pain    Pretreatment Pain Rating 8/10  -DK     Posttreatment Pain Rating 8/10  -DK     Pain Location back;neck  -DK     Pain Side/Orientation generalized  -DK     Pain Management Interventions exercise or physical activity utilized;nursing notified  -DK       Row Name 04/10/25 0823          Cognition    Affect/Mental Status (Cognition) L  -DK     Orientation Status (Cognition) oriented to;person;place;situation  -DK     Follows Commands (Cognition) L  -DK     Cognitive Function (Cognition) WFL  -DK     Personal  Safety Interventions gait belt;nonskid shoes/slippers when out of bed;supervised activity  -DK       Row Name 04/10/25 0823          Motor Skills    Motor Skills --  therapeutic exercises  -DK     Therapeutic Exercise hip;knee;ankle  -DK     Additional Documentation --  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  -       Row Name 04/10/25 0823          Hip (Therapeutic Exercise)    Hip (Therapeutic Exercise) AAROM (active assistive range of motion)  -     Hip AAROM (Therapeutic Exercise) bilateral;flexion;extension;aBduction;aDduction;supine;10 repetitions;2 sets  -DK       Row Name 04/10/25 0823          Knee (Therapeutic Exercise)    Knee (Therapeutic Exercise) AAROM (active assistive range of motion)  -DK     Knee AAROM (Therapeutic Exercise) bilateral;flexion;extension;supine;10 repetitions;2 sets  -       Row Name 04/10/25 0823          Ankle (Therapeutic Exercise)    Ankle (Therapeutic Exercise) AAROM (active assistive range of motion)  -DK     Ankle AAROM (Therapeutic Exercise) bilateral;dorsiflexion;plantarflexion;supine;10 repetitions;2 sets  -DK       Row Name 04/10/25 0823          Plan of Care Review    Plan of Care Reviewed With patient  -DK     Progress no change  -       Row Name 04/10/25 0823          Positioning and Restraints    Pre-Treatment Position in bed  -DK     Post Treatment Position bed  -DK     In Bed supine;call light within reach;encouraged to call for assist;exit alarm on;with other staff;side rails up x2;legs elevated;heels elevated  -DK               User Key  (r) = Recorded By, (t) = Taken By, (c) = Cosigned By      Initials Name Provider Type    Sharon Odonnell PTA Physical Therapist Assistant                      PT Recommendation and Plan     Plan of Care Reviewed With: patient  Progress: no change   Outcome Measures       Row Name 04/10/25 0823 04/07/25 1100          How much help from another person do you currently need...    Turning  from your back to your side while in flat bed without using bedrails? 2  -DK 2  -CS     Moving from lying on back to sitting on the side of a flat bed without bedrails? 2  -DK 2  -CS     Moving to and from a bed to a chair (including a wheelchair)? 1  -DK 1  -CS     Standing up from a chair using your arms (e.g., wheelchair, bedside chair)? 1  -DK 2  -CS     Climbing 3-5 steps with a railing? 1  -DK 1  -CS     To walk in hospital room? 1  -DK 1  -CS     AM-PAC 6 Clicks Score (PT) 8  -DK 9  -CS        Functional Assessment    Outcome Measure Options AM-PAC 6 Clicks Basic Mobility (PT)  -DK AM-PAC 6 Clicks Basic Mobility (PT)  -CS               User Key  (r) = Recorded By, (t) = Taken By, (c) = Cosigned By      Initials Name Provider Type    Sharon Odonnell PTA Physical Therapist Assistant    CS Alysha Puente, PT Physical Therapist                     Time Calculation:    PT Charges       Row Name 04/10/25 0826             Time Calculation    PT Received On 04/10/25  -DK      PT Goal Re-Cert Due Date 04/16/25  -DK         Timed Charges    97995 - PT Therapeutic Exercise Minutes 14  -DK      58726 - PT Therapeutic Activity Minutes 3  -DK         Total Minutes    Timed Charges Total Minutes 17  -DK       Total Minutes 17  -DK                User Key  (r) = Recorded By, (t) = Taken By, (c) = Cosigned By      Initials Name Provider Type    Sharon Odonnell PTA Physical Therapist Assistant                  Therapy Charges for Today       Code Description Service Date Service Provider Modifiers Qty    27609527042  PT THER PROC EA 15 MIN 4/10/2025 Sharon Michael PTA GP 1            PT G-Codes  Outcome Measure Options: AM-PAC 6 Clicks Basic Mobility (PT)  AM-PAC 6 Clicks Score (PT): 8    Sharon Michael PTA  4/10/2025

## 2025-04-10 NOTE — PROGRESS NOTES
[facility]     Progress Note    Patient Name: Camilla Navas  : 1950  MRN: [id number]  Primary Care Physician:  Lester Lizarraga MD  Date of admission: 2025    Subjective   Patient was evaluated by neurology  Has concerns for UTI  Blood pressures and vitals are stable  Blood sugars were elevated in the morning  Bladder scan with some mild retention    Scheduled Meds:apixaban, 5 mg, Oral, BID  arformoterol, 15 mcg, Nebulization, BID - RT   And  budesonide, 0.5 mg, Nebulization, BID - RT   And  revefenacin, 175 mcg, Nebulization, Daily - RT  baclofen, 10 mg, Oral, Daily  buPROPion XL, 150 mg, Oral, Q24H  cefTRIAXone, 1,000 mg, Intravenous, Q24H  dilTIAZem CD, 360 mg, Oral, Q24H  furosemide, 20 mg, Oral, Daily  gabapentin, 300 mg, Oral, Nightly  [Held by provider] glipizide, 5 mg, Oral, Daily  insulin lispro, 2-9 Units, Subcutaneous, 4x Daily AC & at Bedtime  lisinopril, 5 mg, Oral, Daily  metoprolol succinate XL, 50 mg, Oral, Q12H  mirtazapine, 15 mg, Oral, Nightly  montelukast, 10 mg, Oral, Nightly  pantoprazole, 40 mg, Oral, Q AM  pravastatin, 80 mg, Oral, Nightly  sertraline, 200 mg, Oral, Daily      Continuous Infusions:   PRN Meds:.  acetaminophen **OR** acetaminophen **OR** acetaminophen    albuterol    ALPRAZolam    aluminum-magnesium hydroxide-simethicone    senna-docusate sodium **AND** polyethylene glycol **AND** bisacodyl **AND** bisacodyl    dextrose    dextrose    diphenhydrAMINE    glucagon (human recombinant)    HYDROmorphone    ondansetron    oxyCODONE    sodium chloride       Review of Systems  Constitutional:        Weakness tiredness fatigue  Eyes:                       No blurry vision, eye discharge, eye irritation, eye pain  HEENT:                   No acute hair loss, earache and discharge, nasal congestion or discharge, sore throat, postnasal drip  Respiratory:           No shortness of breath coughing sputum production wheezing hemoptysis pleuritic chest  pain  Cardiovascular:     No chest pain, orthopnea, PND, dizziness, palpitation, lower extremity edema  Gastrointestinal:   No nausea vomiting diarrhea abdominal pain constipation  Genitourinary:       No urinary incontinence, hesitancy, frequency, urgency, dysuria  Hematologic:         No bruising, bleeding, pallor, lymphadenopathy  Endocrine:            No coldness, hot flashes, polyuria, abnormal hair growth  Musculoskeletal:    No body pains, aches, arthritic pains, muscle pain ,muscle wasting  Psychiatric:          No low or high mood, anxiety, hallucinations, delusions  Skin.                      No rash, ulcers, bruising, itching  Neurological:        No confusion, headache, focal weakness, numbness, dysphasia    Objective   Objective     Vitals:   Temp:  [97.5 °F (36.4 °C)-98.6 °F (37 °C)] 98.6 °F (37 °C)  Heart Rate:  [] 89  Resp:  [18-24] 19  BP: (118-152)/(59-91) 118/74  Physical Exam    Constitutional: Awake, alert responsive, conversant, no obvious distress              Psychiatric:  Appropriate affect, cooperative   Neurologic:  Awake alert ,oriented x 3, strength symmetric in all extremities, Cranial Nerves grossly intact to confrontation, speech clear   Eyes:   PERRLA, sclerae anicteric, no conjunctival injection   HEENT:  Moist mucous membranes, no nasal or eye discharge, no throat congestion   Neck:   Supple, no thyromegaly, no lymphadenopathy, trachea midline, no elevated JVD   Respiratory:  Clear to auscultation bilaterally, nonlabored respirations    Cardiovascular: RRR, no murmurs, rubs, or gallops, palpable pedal pulses bilaterally, No bilateral ankle edema   Gastrointestinal: Positive bowel sounds, soft, nontender, nondistended, no organomegaly   Musculoskeletal:  No clubbing or cyanosis to extremities,muscle wasting, joint swelling, muscle weakness             Skin:                      No rashes, bruising, skin ulcers, petechiae or ecchymosis    Result Review    Result Review:  I have  personally reviewed the results from the time of this admission to 4/10/2025 07:23 EDT and agree with these findings:  []  Laboratory  []  Microbiology  []  Radiology  []  EKG/Telemetry   []  Cardiology/Vascular   []  Pathology  []  Old records  []  Other:    Assessment & Plan   Assessment / Plan       Active Hospital Problems:  Active Hospital Problems    Diagnosis     **Generalized weakness     Hypokalemia      74-year-old female with past medical history of chronic A-fib on anticoagulation, type 2 diabetes, hyperlipidemia, asthma, history of breast cancer who came in with nausea and generalized weakness found to have potassium of 2.9 which has been repleted with labs otherwise largely unremarkable and urinalysis showing few RBCs.  Creatinine is otherwise normal.  Has uptrending white count and generalized weakness with frailty however no apparent obvious sign of infection.  Knee with bilateral osteoarthritis and effusion which was drained and treated by orthopedics on 4/8.  UA concerning for UTI    Plan:   Patient has been started on ceftriaxone 1 g  Physical therapy.   to help with placement  Will continue Marce Patton and Yupelri and Singulair  Continue Eliquis 5 mg twice daily, Cardizem 360 and metoprolol 50 every 12  Continue with lisinopril 5 mg daily  Continue with Wellbutrin 150 every 24, baclofen 10 mg daily, Remeron 15 mg nightly, Zoloft 200 mg daily  Continue pravastatin 80 mg daily  Patient status post bilateral knee effusion drainage.  No concerns for infection  Will try to avoid pain medications as much as possible  Neurology has been consulted.  Appreciate the help in managing the patient.  Patient to get MRI and EEG and started on Sinemet  Will restart glipizide and increase sliding scale to medium dose

## 2025-04-10 NOTE — CONSULTS
[facility]   Consult Note      Patient Name: Camilla Navas  : 1950  MRN: [id number]  Primary Care Physician:  Lester Lizarraga MD  Date of admission: 2025    Subjective   Subjective     This 74 years old woman was seen upon the request of Dr. Jose Eduardo Palacios for evaluation.    Chief Complaint:   Weakness  Parkinson disease    HPI:  The information was obtained from the daughter who dated the onset of her illness sometime around the last week of 2025 when she started having intermittent weakness and loss of appetite.  Apparently, she has not been walking for the past 2 weeks.  They noted tremors in her mouth since the middle of 2025.  This is something new.    She has been having pain since she has been admitted to the hospital.    Review of Systems  No report of any headache, dizziness, nausea or vomiting.      Past Medical History:   Diagnosis Date    Arthritis     Asthma     Atherosclerosis of coronary artery 2018    Non-obstructive coronary artery disease. Cardiac catheterization done in 2018 showed a 40% mid LAD lesion and a 40% proximal RCA lesion.    Cancer     Breast.     COPD (chronic obstructive pulmonary disease)     Diabetes mellitus     Diverticulitis     Hiatal hernia     Hyperlipemia 2022    Hypertension, essential 2022       Past Surgical History:   Procedure Laterality Date    COLONOSCOPY      COLONOSCOPY N/A 2023    Procedure: COLONOSCOPY WITH POLYPECTOMIES HOT/COLD SNARE, ELEVIEW INJECTION, BIOPSIES;  Surgeon: Lester Gillette MD;  Location: [facility] ENDOSCOPY;  Service: Gastroenterology;  Laterality: N/A;  COLON POLYPS, DIVERTICULOSIS    ENDOSCOPY N/A 2023    Procedure: ESOPHAGOGASTRODUODENOSCOPY WITH BIOPSIES;  Surgeon: Lester Gillette MD;  Location: [facility] ENDOSCOPY;  Service: Gastroenterology;  Laterality: N/A;  PREVIOUS SURGERY    HERNIA REPAIR      HYSTERECTOMY      MASTECTOMY Bilateral     UPPER GASTROINTESTINAL  ENDOSCOPY         Family History: family history is not on file. Otherwise pertinent FHx was reviewed and not pertinent to current issue.    Social History:  reports that she has never smoked. She has been exposed to tobacco smoke. She has never used smokeless tobacco. She reports that she does not drink alcohol and does not use drugs.    Psychosocial History: No known psychiatric difficulties.    Home Medications:  Fluticasone-Umeclidin-Vilant, acetaminophen, albuterol sulfate HFA, apixaban, baclofen, buPROPion XL, dilTIAZem HCl ER, diphenhydrAMINE, furosemide, gabapentin, glipizide, hydroCHLOROthiazide, ipratropium-albuterol, lisinopril, metoprolol succinate XL, mirtazapine, montelukast, nitroglycerin, omeprazole, ondansetron, pravastatin, sertraline, vitamin D3, and vitamin E      Allergies:  Allergies   Allergen Reactions    Empagliflozin Nausea And Vomiting    Metformin Diarrhea       Vitals:   Temp:  [97.6 °F (36.4 °C)-98.9 °F (37.2 °C)] 98.2 °F (36.8 °C)  Heart Rate:  [] 98  Resp:  [18-24] 18  BP: (118-152)/(59-91) 134/59  Physical Exam   On examination she was awake but lethargic.  She was well-developed and is overweight.  The body mass index is 32.74.    Her heart was regular heart rate was 80/min..  There were no murmurs.  The lungs were clear.    The carotid pulses were 1+ and equal.  There were no bruits on either side.    Neurological Examination:  The responses were coherent and relevant.  She does not seem to be aware was going on around her.  She was able to understand and follow verbal commands.    The pupils were 3 to 4 mm. They were round and equal reactive to light directly and consensually.  There was no extraocular muscle weakness.    No facial asymmetry.  No facial weakness.  Was able to hear normal conversational speech.    The strength of the sternomastoid and trapezius muscles was normal and symmetrical.  The uvula and the tongue were in the midline.    She is weak in both upper and  lower extremities.    Felt pinprick equal in both sides of the forehead, face, lower jaw, both upper and lower extremities, and both sides of her trunk.    The deep tendon reflexes were absent on both sides.    She was not able to do the finger-to-nose test on both sides.    There is questionable cogwheel rigidity on both elbow joints more on the right.  She has tremors of her lips and mouth.      Result Review:  I have personally reviewed the results from the time of this admission to 4/9/2025 22:27 EDT and agree with these findings:  []  Laboratory  []  Microbiology  []  Radiology  []  EKG/Telemetry   []  Cardiology/Vascular   []  Pathology  []  Old records  []  Other:        Impression:    Generalized weakness  Parkinsonian syndrome which may be related to Remeron.  Neck pain  Obesity with a body mass index of 32.74      Plan:   Will obtain an MRI of the brain and the cervical spine as well as an EEG.  Depending on the results of this, will advise accordingly.  Will give her a therapeutic trial of Sinemet and see whether this will help..  Will start physical occupational therapy.    Thank you very much for letting me see this patient.  I will follow the patient with you.      Please note that portions of this note were completed with a voice recognition program.  Part of this note is an electric or electronic transcription/translation of spoken language to printed text using the dragon dictating system.    Electronically signed by Sunitha Suarez Jr., MD, 04/09/25, 10:27 PM EDT.

## 2025-04-11 ENCOUNTER — APPOINTMENT (OUTPATIENT)
Dept: MRI IMAGING | Facility: HOSPITAL | Age: 75
DRG: 055 | End: 2025-04-11
Payer: MEDICARE

## 2025-04-11 LAB
ALBUMIN SERPL-MCNC: 3.2 G/DL (ref 3.5–5.2)
ANION GAP SERPL CALCULATED.3IONS-SCNC: 14.3 MMOL/L (ref 5–15)
BACTERIA SPEC AEROBE CULT: ABNORMAL
BASOPHILS # BLD AUTO: 0.01 10*3/MM3 (ref 0–0.2)
BASOPHILS NFR BLD AUTO: 0.1 % (ref 0–1.5)
BUN SERPL-MCNC: 51 MG/DL (ref 8–23)
BUN/CREAT SERPL: 59.3 (ref 7–25)
CALCIUM SPEC-SCNC: 9.4 MG/DL (ref 8.6–10.5)
CHLORIDE SERPL-SCNC: 94 MMOL/L (ref 98–107)
CO2 SERPL-SCNC: 23.7 MMOL/L (ref 22–29)
CREAT SERPL-MCNC: 0.86 MG/DL (ref 0.57–1)
DEPRECATED RDW RBC AUTO: 43.2 FL (ref 37–54)
EGFRCR SERPLBLD CKD-EPI 2021: 70.6 ML/MIN/1.73
EOSINOPHIL # BLD AUTO: 0.01 10*3/MM3 (ref 0–0.4)
EOSINOPHIL NFR BLD AUTO: 0.1 % (ref 0.3–6.2)
ERYTHROCYTE [DISTWIDTH] IN BLOOD BY AUTOMATED COUNT: 13.4 % (ref 12.3–15.4)
GLUCOSE BLDC GLUCOMTR-MCNC: 287 MG/DL (ref 70–99)
GLUCOSE BLDC GLUCOMTR-MCNC: 471 MG/DL (ref 70–99)
GLUCOSE BLDC GLUCOMTR-MCNC: 548 MG/DL (ref 70–99)
GLUCOSE BLDC GLUCOMTR-MCNC: 576 MG/DL (ref 70–99)
GLUCOSE SERPL-MCNC: 370 MG/DL (ref 65–99)
HCT VFR BLD AUTO: 37.5 % (ref 34–46.6)
HGB BLD-MCNC: 12.6 G/DL (ref 12–15.9)
IMM GRANULOCYTES # BLD AUTO: 0.04 10*3/MM3 (ref 0–0.05)
IMM GRANULOCYTES NFR BLD AUTO: 0.4 % (ref 0–0.5)
LYMPHOCYTES # BLD AUTO: 0.64 10*3/MM3 (ref 0.7–3.1)
LYMPHOCYTES NFR BLD AUTO: 6.6 % (ref 19.6–45.3)
MCH RBC QN AUTO: 29.6 PG (ref 26.6–33)
MCHC RBC AUTO-ENTMCNC: 33.6 G/DL (ref 31.5–35.7)
MCV RBC AUTO: 88 FL (ref 79–97)
MONOCYTES # BLD AUTO: 0.67 10*3/MM3 (ref 0.1–0.9)
MONOCYTES NFR BLD AUTO: 6.9 % (ref 5–12)
NEUTROPHILS NFR BLD AUTO: 8.31 10*3/MM3 (ref 1.7–7)
NEUTROPHILS NFR BLD AUTO: 85.9 % (ref 42.7–76)
NRBC BLD AUTO-RTO: 0 /100 WBC (ref 0–0.2)
PHOSPHATE SERPL-MCNC: 3.4 MG/DL (ref 2.5–4.5)
PLATELET # BLD AUTO: 404 10*3/MM3 (ref 140–450)
PMV BLD AUTO: 9.4 FL (ref 6–12)
POTASSIUM SERPL-SCNC: 4.3 MMOL/L (ref 3.5–5.2)
RBC # BLD AUTO: 4.26 10*6/MM3 (ref 3.77–5.28)
SODIUM SERPL-SCNC: 132 MMOL/L (ref 136–145)
WBC NRBC COR # BLD AUTO: 9.68 10*3/MM3 (ref 3.4–10.8)

## 2025-04-11 PROCEDURE — G0378 HOSPITAL OBSERVATION PER HR: HCPCS

## 2025-04-11 PROCEDURE — 94799 UNLISTED PULMONARY SVC/PX: CPT

## 2025-04-11 PROCEDURE — 25010000002 CEFTRIAXONE PER 250 MG: Performed by: STUDENT IN AN ORGANIZED HEALTH CARE EDUCATION/TRAINING PROGRAM

## 2025-04-11 PROCEDURE — A9577 INJ MULTIHANCE: HCPCS | Performed by: INTERNAL MEDICINE

## 2025-04-11 PROCEDURE — 94664 DEMO&/EVAL PT USE INHALER: CPT

## 2025-04-11 PROCEDURE — 70553 MRI BRAIN STEM W/O & W/DYE: CPT

## 2025-04-11 PROCEDURE — 63710000001 REVEFENACIN 175 MCG/3ML SOLUTION: Performed by: STUDENT IN AN ORGANIZED HEALTH CARE EDUCATION/TRAINING PROGRAM

## 2025-04-11 PROCEDURE — 82948 REAGENT STRIP/BLOOD GLUCOSE: CPT

## 2025-04-11 PROCEDURE — 25510000002 GADOBENATE DIMEGLUMINE 529 MG/ML SOLUTION: Performed by: INTERNAL MEDICINE

## 2025-04-11 PROCEDURE — 63710000001 INSULIN LISPRO (HUMAN) PER 5 UNITS: Performed by: INTERNAL MEDICINE

## 2025-04-11 PROCEDURE — 80069 RENAL FUNCTION PANEL: CPT | Performed by: STUDENT IN AN ORGANIZED HEALTH CARE EDUCATION/TRAINING PROGRAM

## 2025-04-11 PROCEDURE — 94761 N-INVAS EAR/PLS OXIMETRY MLT: CPT

## 2025-04-11 PROCEDURE — 85025 COMPLETE CBC W/AUTO DIFF WBC: CPT | Performed by: STUDENT IN AN ORGANIZED HEALTH CARE EDUCATION/TRAINING PROGRAM

## 2025-04-11 PROCEDURE — 63710000001 INSULIN GLARGINE PER 5 UNITS: Performed by: STUDENT IN AN ORGANIZED HEALTH CARE EDUCATION/TRAINING PROGRAM

## 2025-04-11 PROCEDURE — 82948 REAGENT STRIP/BLOOD GLUCOSE: CPT | Performed by: INTERNAL MEDICINE

## 2025-04-11 PROCEDURE — 63710000001 INSULIN LISPRO (HUMAN) PER 5 UNITS: Performed by: STUDENT IN AN ORGANIZED HEALTH CARE EDUCATION/TRAINING PROGRAM

## 2025-04-11 RX ORDER — TEMAZEPAM 15 MG/1
30 CAPSULE ORAL ONCE
Status: COMPLETED | OUTPATIENT
Start: 2025-04-11 | End: 2025-04-11

## 2025-04-11 RX ORDER — INSULIN LISPRO 100 [IU]/ML
INJECTION, SOLUTION INTRAVENOUS; SUBCUTANEOUS
COMMUNITY

## 2025-04-11 RX ORDER — INSULIN LISPRO 100 [IU]/ML
4-24 INJECTION, SOLUTION INTRAVENOUS; SUBCUTANEOUS
Status: DISCONTINUED | OUTPATIENT
Start: 2025-04-11 | End: 2025-04-19 | Stop reason: HOSPADM

## 2025-04-11 RX ADMIN — BACLOFEN 10 MG: 10 TABLET ORAL at 08:24

## 2025-04-11 RX ADMIN — INSULIN GLARGINE 15 UNITS: 100 INJECTION, SOLUTION SUBCUTANEOUS at 08:24

## 2025-04-11 RX ADMIN — INSULIN LISPRO 6 UNITS: 100 INJECTION, SOLUTION INTRAVENOUS; SUBCUTANEOUS at 08:24

## 2025-04-11 RX ADMIN — LISINOPRIL 5 MG: 5 TABLET ORAL at 08:24

## 2025-04-11 RX ADMIN — ACETAMINOPHEN 650 MG: 325 TABLET ORAL at 23:01

## 2025-04-11 RX ADMIN — GLIPIZIDE 5 MG: 5 TABLET ORAL at 08:25

## 2025-04-11 RX ADMIN — BUDESONIDE 0.5 MG: 0.5 SUSPENSION RESPIRATORY (INHALATION) at 09:46

## 2025-04-11 RX ADMIN — MIRTAZAPINE 15 MG: 15 TABLET, FILM COATED ORAL at 20:28

## 2025-04-11 RX ADMIN — ALPRAZOLAM 0.5 MG: 0.25 TABLET ORAL at 11:32

## 2025-04-11 RX ADMIN — INSULIN LISPRO 24 UNITS: 100 INJECTION, SOLUTION INTRAVENOUS; SUBCUTANEOUS at 17:15

## 2025-04-11 RX ADMIN — TEMAZEPAM 30 MG: 15 CAPSULE ORAL at 22:41

## 2025-04-11 RX ADMIN — INSULIN LISPRO 24 UNITS: 100 INJECTION, SOLUTION INTRAVENOUS; SUBCUTANEOUS at 20:28

## 2025-04-11 RX ADMIN — DILTIAZEM HYDROCHLORIDE 360 MG: 180 CAPSULE, COATED, EXTENDED RELEASE ORAL at 08:24

## 2025-04-11 RX ADMIN — BUPROPION HYDROCHLORIDE 150 MG: 150 TABLET, EXTENDED RELEASE ORAL at 08:24

## 2025-04-11 RX ADMIN — ARFORMOTEROL TARTRATE 15 MCG: 15 SOLUTION RESPIRATORY (INHALATION) at 20:58

## 2025-04-11 RX ADMIN — PRAVASTATIN SODIUM 80 MG: 20 TABLET ORAL at 20:29

## 2025-04-11 RX ADMIN — GABAPENTIN 300 MG: 300 CAPSULE ORAL at 20:28

## 2025-04-11 RX ADMIN — SERTRALINE HYDROCHLORIDE 200 MG: 100 TABLET ORAL at 08:24

## 2025-04-11 RX ADMIN — METOPROLOL SUCCINATE 50 MG: 50 TABLET, FILM COATED, EXTENDED RELEASE ORAL at 08:24

## 2025-04-11 RX ADMIN — FUROSEMIDE 20 MG: 20 TABLET ORAL at 08:24

## 2025-04-11 RX ADMIN — REVEFENACIN 175 MCG: 175 SOLUTION RESPIRATORY (INHALATION) at 09:46

## 2025-04-11 RX ADMIN — APIXABAN 5 MG: 5 TABLET, FILM COATED ORAL at 08:24

## 2025-04-11 RX ADMIN — GADOBENATE DIMEGLUMINE 19 ML: 529 INJECTION, SOLUTION INTRAVENOUS at 23:57

## 2025-04-11 RX ADMIN — INSULIN GLARGINE 10 UNITS: 100 INJECTION, SOLUTION SUBCUTANEOUS at 11:32

## 2025-04-11 RX ADMIN — ARFORMOTEROL TARTRATE 15 MCG: 15 SOLUTION RESPIRATORY (INHALATION) at 09:46

## 2025-04-11 RX ADMIN — APIXABAN 5 MG: 5 TABLET, FILM COATED ORAL at 20:28

## 2025-04-11 RX ADMIN — METOPROLOL SUCCINATE 50 MG: 50 TABLET, FILM COATED, EXTENDED RELEASE ORAL at 20:28

## 2025-04-11 RX ADMIN — MONTELUKAST 10 MG: 10 TABLET, FILM COATED ORAL at 20:29

## 2025-04-11 RX ADMIN — INSULIN LISPRO 9 UNITS: 100 INJECTION, SOLUTION INTRAVENOUS; SUBCUTANEOUS at 11:32

## 2025-04-11 RX ADMIN — PANTOPRAZOLE SODIUM 40 MG: 40 TABLET, DELAYED RELEASE ORAL at 05:20

## 2025-04-11 RX ADMIN — CEFTRIAXONE SODIUM 1000 MG: 1 INJECTION, POWDER, FOR SOLUTION INTRAMUSCULAR; INTRAVENOUS at 18:33

## 2025-04-11 RX ADMIN — BUDESONIDE 0.5 MG: 0.5 SUSPENSION RESPIRATORY (INHALATION) at 20:58

## 2025-04-11 NOTE — PROGRESS NOTES
[facility]     Progress Note             Patient Name: Camilla Navas  : 1950  MRN: [id number]  Primary Care Physician:  Lester Lizarraga MD  Date of admission: 2025        Present illness:  I met the daughter in the hallway.  She called her daughter who is the power of .  I reviewed with him the digital images of the MRI of her brain that was done on April 10, 2025.  This showed the restricted diffusion in the right frontoparietal lobe close to the midline with what appears to be surrounding edema.  This extends to the corpus callosum.  They were informed that it is unusual for stroke to this and to have the surrounding edema.  There is a good chance that this is probably tumor.  It is recommended to have a repeat MRI with contrast.  Apparently this been ordered already.  Will give Restoril 30 mg on-call to MRI because she mentioned that she could not sit still in the MRI.    He is awake and more alert today.  The tremors of her lips and mouth are essentially unchanged.  There is no tremors of her hands and fingers.  There is no cogwheel rigidity on passive manipulation of the elbow region.  They were informed that Parkinson disease is less likely.  Her daughter also had tremors of her right hand and fingers which suggested the tremors are probably familial.    Review of Systems  No headache, dizziness, nausea or vomiting.      Past Medical History:   Diagnosis Date    Arthritis     Asthma     Atherosclerosis of coronary artery 2018    Non-obstructive coronary artery disease. Cardiac catheterization done in 2018 showed a 40% mid LAD lesion and a 40% proximal RCA lesion.    Cancer     Breast.     COPD (chronic obstructive pulmonary disease)     Diabetes mellitus     Diverticulitis     Hiatal hernia     Hyperlipemia 2022    Hypertension, essential 2022       Past Surgical History:   Procedure Laterality Date    COLONOSCOPY      COLONOSCOPY N/A 2023     Procedure: COLONOSCOPY WITH POLYPECTOMIES HOT/COLD SNARE, ELEVIEW INJECTION, BIOPSIES;  Surgeon: Lester Gillette MD;  Location: [facility] ENDOSCOPY;  Service: Gastroenterology;  Laterality: N/A;  COLON POLYPS, DIVERTICULOSIS    ENDOSCOPY N/A 11/14/2023    Procedure: ESOPHAGOGASTRODUODENOSCOPY WITH BIOPSIES;  Surgeon: Lester Gillette MD;  Location: [facility] ENDOSCOPY;  Service: Gastroenterology;  Laterality: N/A;  PREVIOUS SURGERY    HERNIA REPAIR      HYSTERECTOMY      MASTECTOMY Bilateral     UPPER GASTROINTESTINAL ENDOSCOPY         Family History: family history is not on file. Otherwise pertinent FHx was reviewed and not pertinent to current issue.    Social History:  reports that she has never smoked. She has been exposed to tobacco smoke. She has never used smokeless tobacco. She reports that she does not drink alcohol and does not use drugs.      Home Medications:  Fluticasone-Umeclidin-Vilant, acetaminophen, albuterol sulfate HFA, apixaban, baclofen, buPROPion XL, dilTIAZem HCl ER, diphenhydrAMINE, furosemide, gabapentin, glipizide, hydroCHLOROthiazide, ipratropium-albuterol, lisinopril, metoprolol succinate XL, mirtazapine, montelukast, nitroglycerin, omeprazole, ondansetron, pravastatin, sertraline, vitamin D3, and vitamin E      Allergies:  Allergies   Allergen Reactions    Empagliflozin Nausea And Vomiting    Metformin Diarrhea       Vitals:   Temp:  [97.3 °F (36.3 °C)-98.3 °F (36.8 °C)] 98.3 °F (36.8 °C)  Heart Rate:  [65-95] 78  Resp:  [16-20] 18  BP: (110-169)/(65-72) 169/72      RESULT REVIEW:  I have personally reviewed the results from the time of this admission to 4/11/2025 10:18 EDT and agree with these findings:  []  Laboratory  []  Microbiology  []  Radiology  []  EKG/Telemetry   []  Cardiology/Vascular   []  Pathology  []  Old records  []  Other:  Most notable findings include:   April 11, 2025  There is 4 x 2 mm area area of restricted diffusion in the right frontoparietal lobe extending  to the corpus callosum with surrounding edema suggestive of subacute infarct although a neoplastic process could not be ruled out completely.  In addition, there is a slight shift of the midline to the left which could go along with the neoplastic process.    I reviewed the digital images of the MR angiography of the neck vessels done on April 10, 2025 was unremarkable.    I reviewed the digital images of the MRI of the cervical spine that was done on April 10, 2025.  There is mild degenerative changes along the cervical spine and mild disrespects complex bulging at C5-C6 and C6-7 levels without evidence of nerve root or spinal cord compression.    Impression:    Neoplastic process, right frontoparietal lobe  Subacute right frontoparietal lobe infarct  Generalized weakness  Parkinsonian syndrome which may be related to Remeron.  Neck pain  Obesity with a body mass index of 32.74        Plan:   Will give her a therapeutic trial of Sinemet and see whether this will help..  Will see what the repeat MRI with contrast show.     Thank you very much for letting me see this patient.  I will follow the patient with you.      Electronically signed by Sunitha Suarez Jr., MD, 04/11/25, 10:18 AM EDT.        Please note that portions of this note were completed with a voice recognition program.  Part of this note is an electric or electronic transcription/translation of spoken language to printed text using the dragon dictating system.

## 2025-04-11 NOTE — PROGRESS NOTES
[facility]     Progress Note    Patient Name: Camilla Navas  : 1950  MRN: [id number]  Primary Care Physician:  Lester Lizarraga MD  Date of admission: 2025    Subjective   Patient had MRI of the brain which was concerning for possible small stroke  Had urinary retention requiring straight cath  Blood sugars are still elevated  Continues to have concerns for neck pain  Imaging with stenosis noted  EEG unremarkable    Scheduled Meds:apixaban, 5 mg, Oral, BID  arformoterol, 15 mcg, Nebulization, BID - RT   And  budesonide, 0.5 mg, Nebulization, BID - RT   And  revefenacin, 175 mcg, Nebulization, Daily - RT  baclofen, 10 mg, Oral, Daily  buPROPion XL, 150 mg, Oral, Q24H  cefTRIAXone, 1,000 mg, Intravenous, Q24H  dilTIAZem CD, 360 mg, Oral, Q24H  furosemide, 20 mg, Oral, Daily  gabapentin, 300 mg, Oral, Nightly  glipizide, 5 mg, Oral, Daily  insulin glargine, 15 Units, Subcutaneous, Daily  insulin lispro, 2-9 Units, Subcutaneous, 4x Daily AC & at Bedtime  lisinopril, 5 mg, Oral, Daily  metoprolol succinate XL, 50 mg, Oral, Q12H  mirtazapine, 15 mg, Oral, Nightly  montelukast, 10 mg, Oral, Nightly  pantoprazole, 40 mg, Oral, Q AM  pravastatin, 80 mg, Oral, Nightly  sertraline, 200 mg, Oral, Daily      Continuous Infusions:   PRN Meds:.  acetaminophen **OR** acetaminophen **OR** acetaminophen    albuterol    ALPRAZolam    aluminum-magnesium hydroxide-simethicone    senna-docusate sodium **AND** polyethylene glycol **AND** bisacodyl **AND** bisacodyl    dextrose    dextrose    diphenhydrAMINE    glucagon (human recombinant)    HYDROmorphone    ondansetron    oxyCODONE    sodium chloride       Review of Systems  Constitutional:        Weakness tiredness fatigue  Eyes:                       No blurry vision, eye discharge, eye irritation, eye pain  HEENT:                   No acute hair loss, earache and discharge, nasal congestion or discharge, sore throat, postnasal drip  Respiratory:           No  shortness of breath coughing sputum production wheezing hemoptysis pleuritic chest pain  Cardiovascular:     No chest pain, orthopnea, PND, dizziness, palpitation, lower extremity edema  Gastrointestinal:   No nausea vomiting diarrhea abdominal pain constipation  Genitourinary:       No urinary incontinence, hesitancy, frequency, urgency, dysuria  Hematologic:         No bruising, bleeding, pallor, lymphadenopathy  Endocrine:            No coldness, hot flashes, polyuria, abnormal hair growth  Musculoskeletal:    No body pains, aches, arthritic pains, muscle pain ,muscle wasting  Psychiatric:          No low or high mood, anxiety, hallucinations, delusions  Skin.                      No rash, ulcers, bruising, itching  Neurological:        No confusion, headache, focal weakness, numbness, dysphasia    Objective   Objective     Vitals:   Temp:  [97.3 °F (36.3 °C)-97.9 °F (36.6 °C)] 97.7 °F (36.5 °C)  Heart Rate:  [65-90] 90  Resp:  [16-20] 16  BP: (110-140)/(65-71) 127/70  Physical Exam    Constitutional: Awake, alert responsive, conversant, no obvious distress              Psychiatric:  Appropriate affect, cooperative   Neurologic:  Awake alert ,oriented x 3, strength symmetric in all extremities, Cranial Nerves grossly intact to confrontation, speech clear   Eyes:   PERRLA, sclerae anicteric, no conjunctival injection   HEENT:  Moist mucous membranes, no nasal or eye discharge, no throat congestion   Neck:   Supple, no thyromegaly, no lymphadenopathy, trachea midline, no elevated JVD   Respiratory:  Clear to auscultation bilaterally, nonlabored respirations    Cardiovascular: RRR, no murmurs, rubs, or gallops, palpable pedal pulses bilaterally, No bilateral ankle edema   Gastrointestinal: Positive bowel sounds, soft, nontender, nondistended, no organomegaly   Musculoskeletal:  No clubbing or cyanosis to extremities,muscle wasting, joint swelling, muscle weakness             Skin:                      No rashes,  bruising, skin ulcers, petechiae or ecchymosis    Result Review    Result Review:  I have personally reviewed the results from the time of this admission to 4/11/2025 08:06 EDT and agree with these findings:  []  Laboratory  []  Microbiology  []  Radiology  []  EKG/Telemetry   []  Cardiology/Vascular   []  Pathology  []  Old records  []  Other:    Assessment & Plan   Assessment / Plan       Active Hospital Problems:  Active Hospital Problems    Diagnosis     **Generalized weakness     Hypokalemia      74-year-old female with past medical history of chronic A-fib on anticoagulation, type 2 diabetes, hyperlipidemia, asthma, history of breast cancer who came in with nausea and generalized weakness found to have potassium of 2.9 which has been repleted with labs otherwise largely unremarkable and urinalysis showing few RBCs.  Creatinine is otherwise normal.  Has uptrending white count and generalized weakness with frailty however no apparent obvious sign of infection.  Knee with bilateral osteoarthritis and effusion which was drained and treated by orthopedics on 4/8.  UA concerning for UTI and cultures positive sensitive to ceftriaxone with Klebsiella.  Due to patient's persistent tremor, MRI of the brain showed concerns for subacute infarct versus glial neoplasm    Plan:   Patient has been started on ceftriaxone 1 g  Physical therapy.   to help with placement  Will continue Brovana Pulmicort and Yupelri and Singulair  Continue Eliquis 5 mg twice daily, Cardizem 360 and metoprolol 50 every 12  Continue with lisinopril 5 mg daily  Continue with Wellbutrin 150 every 24, baclofen 10 mg daily, Remeron 15 mg nightly, Zoloft 200 mg daily  Continue pravastatin 80 mg daily  Patient status post bilateral knee effusion drainage.  No concerns for infection  Will try to avoid pain medications as much as possible  Neurology has been consulted.  Appreciate the help in managing the patient.    Will get repeat MRI of the  head with and without contrast to assess for any neoplastic process  Glipizide has been restarted.  Will increase to medium dose sliding scale and 15 units of insulin daily

## 2025-04-11 NOTE — PLAN OF CARE
Goal Outcome Evaluation:  Plan of Care Reviewed With: patient        Progress: no change  Outcome Evaluation: VSS on RA. Patient AAOx4. Blood sugars monitored and maintained with SSI. Granger catheter inserted for urinary retention Waiting on MRI with contrast to be complete. Family visited bedside throughout shift. No other concerns or complaints. Continue plan of care.

## 2025-04-11 NOTE — PLAN OF CARE
Goal Outcome Evaluation:           Progress: improving  Outcome Evaluation: Pt aox4, VSS. Pt continued to have complaints about neck pain, given one time does of toradol. Pt drinking nectar thick liquid with no complaints. Pt had not urinated by midnight, bladder scanned pt, BS showed greater than 400. Straight cathed pt and got 800 out. Pt seems to be resting comfortably at this time. Will continue to monitor.

## 2025-04-12 LAB
ALBUMIN SERPL-MCNC: 3.1 G/DL (ref 3.5–5.2)
ANION GAP SERPL CALCULATED.3IONS-SCNC: 11.3 MMOL/L (ref 5–15)
BASOPHILS # BLD AUTO: 0.02 10*3/MM3 (ref 0–0.2)
BASOPHILS NFR BLD AUTO: 0.2 % (ref 0–1.5)
BUN SERPL-MCNC: 35 MG/DL (ref 8–23)
BUN/CREAT SERPL: 48.6 (ref 7–25)
CALCIUM SPEC-SCNC: 9.4 MG/DL (ref 8.6–10.5)
CHLORIDE SERPL-SCNC: 97 MMOL/L (ref 98–107)
CO2 SERPL-SCNC: 24.7 MMOL/L (ref 22–29)
CREAT SERPL-MCNC: 0.72 MG/DL (ref 0.57–1)
DEPRECATED RDW RBC AUTO: 43 FL (ref 37–54)
EGFRCR SERPLBLD CKD-EPI 2021: 87.3 ML/MIN/1.73
EOSINOPHIL # BLD AUTO: 0.01 10*3/MM3 (ref 0–0.4)
EOSINOPHIL NFR BLD AUTO: 0.1 % (ref 0.3–6.2)
ERYTHROCYTE [DISTWIDTH] IN BLOOD BY AUTOMATED COUNT: 13.2 % (ref 12.3–15.4)
GLUCOSE BLDC GLUCOMTR-MCNC: 199 MG/DL (ref 70–99)
GLUCOSE BLDC GLUCOMTR-MCNC: 226 MG/DL (ref 70–99)
GLUCOSE BLDC GLUCOMTR-MCNC: 294 MG/DL (ref 70–99)
GLUCOSE BLDC GLUCOMTR-MCNC: 362 MG/DL (ref 70–99)
GLUCOSE SERPL-MCNC: 254 MG/DL (ref 65–99)
HCT VFR BLD AUTO: 38.7 % (ref 34–46.6)
HGB BLD-MCNC: 12.8 G/DL (ref 12–15.9)
IMM GRANULOCYTES # BLD AUTO: 0.07 10*3/MM3 (ref 0–0.05)
IMM GRANULOCYTES NFR BLD AUTO: 0.7 % (ref 0–0.5)
LYMPHOCYTES # BLD AUTO: 0.97 10*3/MM3 (ref 0.7–3.1)
LYMPHOCYTES NFR BLD AUTO: 9.7 % (ref 19.6–45.3)
MCH RBC QN AUTO: 29.2 PG (ref 26.6–33)
MCHC RBC AUTO-ENTMCNC: 33.1 G/DL (ref 31.5–35.7)
MCV RBC AUTO: 88.4 FL (ref 79–97)
MONOCYTES # BLD AUTO: 1.01 10*3/MM3 (ref 0.1–0.9)
MONOCYTES NFR BLD AUTO: 10.1 % (ref 5–12)
NEUTROPHILS NFR BLD AUTO: 7.92 10*3/MM3 (ref 1.7–7)
NEUTROPHILS NFR BLD AUTO: 79.2 % (ref 42.7–76)
NRBC BLD AUTO-RTO: 0 /100 WBC (ref 0–0.2)
PHOSPHATE SERPL-MCNC: 3.2 MG/DL (ref 2.5–4.5)
PLATELET # BLD AUTO: 414 10*3/MM3 (ref 140–450)
PMV BLD AUTO: 9.3 FL (ref 6–12)
POTASSIUM SERPL-SCNC: 4.5 MMOL/L (ref 3.5–5.2)
RBC # BLD AUTO: 4.38 10*6/MM3 (ref 3.77–5.28)
SODIUM SERPL-SCNC: 133 MMOL/L (ref 136–145)
WBC NRBC COR # BLD AUTO: 10 10*3/MM3 (ref 3.4–10.8)

## 2025-04-12 PROCEDURE — 63710000001 INSULIN LISPRO (HUMAN) PER 5 UNITS: Performed by: STUDENT IN AN ORGANIZED HEALTH CARE EDUCATION/TRAINING PROGRAM

## 2025-04-12 PROCEDURE — 80069 RENAL FUNCTION PANEL: CPT | Performed by: STUDENT IN AN ORGANIZED HEALTH CARE EDUCATION/TRAINING PROGRAM

## 2025-04-12 PROCEDURE — 63710000001 INSULIN GLARGINE PER 5 UNITS: Performed by: STUDENT IN AN ORGANIZED HEALTH CARE EDUCATION/TRAINING PROGRAM

## 2025-04-12 PROCEDURE — 82948 REAGENT STRIP/BLOOD GLUCOSE: CPT | Performed by: STUDENT IN AN ORGANIZED HEALTH CARE EDUCATION/TRAINING PROGRAM

## 2025-04-12 PROCEDURE — 94761 N-INVAS EAR/PLS OXIMETRY MLT: CPT

## 2025-04-12 PROCEDURE — 63710000001 DIPHENHYDRAMINE PER 50 MG: Performed by: STUDENT IN AN ORGANIZED HEALTH CARE EDUCATION/TRAINING PROGRAM

## 2025-04-12 PROCEDURE — 85025 COMPLETE CBC W/AUTO DIFF WBC: CPT | Performed by: STUDENT IN AN ORGANIZED HEALTH CARE EDUCATION/TRAINING PROGRAM

## 2025-04-12 PROCEDURE — 82948 REAGENT STRIP/BLOOD GLUCOSE: CPT

## 2025-04-12 PROCEDURE — 94799 UNLISTED PULMONARY SVC/PX: CPT

## 2025-04-12 PROCEDURE — 63710000001 REVEFENACIN 175 MCG/3ML SOLUTION: Performed by: STUDENT IN AN ORGANIZED HEALTH CARE EDUCATION/TRAINING PROGRAM

## 2025-04-12 PROCEDURE — 25010000002 CEFTRIAXONE PER 250 MG: Performed by: STUDENT IN AN ORGANIZED HEALTH CARE EDUCATION/TRAINING PROGRAM

## 2025-04-12 PROCEDURE — 94664 DEMO&/EVAL PT USE INHALER: CPT

## 2025-04-12 PROCEDURE — G0378 HOSPITAL OBSERVATION PER HR: HCPCS

## 2025-04-12 RX ORDER — CARBIDOPA/LEVODOPA 10MG-100MG
1 TABLET ORAL 3 TIMES DAILY
Status: DISCONTINUED | OUTPATIENT
Start: 2025-04-12 | End: 2025-04-19 | Stop reason: HOSPADM

## 2025-04-12 RX ADMIN — APIXABAN 5 MG: 5 TABLET, FILM COATED ORAL at 20:38

## 2025-04-12 RX ADMIN — INSULIN GLARGINE 25 UNITS: 100 INJECTION, SOLUTION SUBCUTANEOUS at 08:57

## 2025-04-12 RX ADMIN — INSULIN LISPRO 20 UNITS: 100 INJECTION, SOLUTION INTRAVENOUS; SUBCUTANEOUS at 21:12

## 2025-04-12 RX ADMIN — BUPROPION HYDROCHLORIDE 150 MG: 150 TABLET, EXTENDED RELEASE ORAL at 08:56

## 2025-04-12 RX ADMIN — GLIPIZIDE 5 MG: 5 TABLET ORAL at 08:57

## 2025-04-12 RX ADMIN — METOPROLOL SUCCINATE 50 MG: 50 TABLET, FILM COATED, EXTENDED RELEASE ORAL at 08:56

## 2025-04-12 RX ADMIN — BACLOFEN 10 MG: 10 TABLET ORAL at 08:56

## 2025-04-12 RX ADMIN — BUDESONIDE 0.5 MG: 0.5 SUSPENSION RESPIRATORY (INHALATION) at 07:51

## 2025-04-12 RX ADMIN — ARFORMOTEROL TARTRATE 15 MCG: 15 SOLUTION RESPIRATORY (INHALATION) at 19:08

## 2025-04-12 RX ADMIN — Medication 10 ML: at 20:38

## 2025-04-12 RX ADMIN — INSULIN LISPRO 4 UNITS: 100 INJECTION, SOLUTION INTRAVENOUS; SUBCUTANEOUS at 17:07

## 2025-04-12 RX ADMIN — MIRTAZAPINE 15 MG: 15 TABLET, FILM COATED ORAL at 20:38

## 2025-04-12 RX ADMIN — FUROSEMIDE 20 MG: 20 TABLET ORAL at 08:56

## 2025-04-12 RX ADMIN — METOPROLOL SUCCINATE 50 MG: 50 TABLET, FILM COATED, EXTENDED RELEASE ORAL at 20:38

## 2025-04-12 RX ADMIN — APIXABAN 5 MG: 5 TABLET, FILM COATED ORAL at 08:57

## 2025-04-12 RX ADMIN — BUDESONIDE 0.5 MG: 0.5 SUSPENSION RESPIRATORY (INHALATION) at 19:08

## 2025-04-12 RX ADMIN — INSULIN LISPRO 12 UNITS: 100 INJECTION, SOLUTION INTRAVENOUS; SUBCUTANEOUS at 11:27

## 2025-04-12 RX ADMIN — PRAVASTATIN SODIUM 80 MG: 20 TABLET ORAL at 20:38

## 2025-04-12 RX ADMIN — ARFORMOTEROL TARTRATE 15 MCG: 15 SOLUTION RESPIRATORY (INHALATION) at 07:51

## 2025-04-12 RX ADMIN — CARBIDOPA AND LEVODOPA 1 TABLET: 10; 100 TABLET ORAL at 08:56

## 2025-04-12 RX ADMIN — DILTIAZEM HYDROCHLORIDE 360 MG: 180 CAPSULE, COATED, EXTENDED RELEASE ORAL at 08:56

## 2025-04-12 RX ADMIN — CARBIDOPA AND LEVODOPA 1 TABLET: 10; 100 TABLET ORAL at 21:12

## 2025-04-12 RX ADMIN — CARBIDOPA AND LEVODOPA 1 TABLET: 10; 100 TABLET ORAL at 15:27

## 2025-04-12 RX ADMIN — REVEFENACIN 175 MCG: 175 SOLUTION RESPIRATORY (INHALATION) at 07:51

## 2025-04-12 RX ADMIN — OXYCODONE 5 MG: 5 TABLET ORAL at 23:03

## 2025-04-12 RX ADMIN — PANTOPRAZOLE SODIUM 40 MG: 40 TABLET, DELAYED RELEASE ORAL at 05:01

## 2025-04-12 RX ADMIN — LISINOPRIL 5 MG: 5 TABLET ORAL at 08:56

## 2025-04-12 RX ADMIN — SERTRALINE HYDROCHLORIDE 200 MG: 100 TABLET ORAL at 08:56

## 2025-04-12 RX ADMIN — MONTELUKAST 10 MG: 10 TABLET, FILM COATED ORAL at 20:38

## 2025-04-12 RX ADMIN — CEFTRIAXONE SODIUM 1000 MG: 1 INJECTION, POWDER, FOR SOLUTION INTRAMUSCULAR; INTRAVENOUS at 20:37

## 2025-04-12 RX ADMIN — INSULIN LISPRO 8 UNITS: 100 INJECTION, SOLUTION INTRAVENOUS; SUBCUTANEOUS at 08:57

## 2025-04-12 RX ADMIN — DIPHENHYDRAMINE HYDROCHLORIDE 25 MG: 25 CAPSULE ORAL at 21:14

## 2025-04-12 RX ADMIN — GABAPENTIN 300 MG: 300 CAPSULE ORAL at 20:38

## 2025-04-12 NOTE — PLAN OF CARE
Goal Outcome Evaluation:  Plan of Care Reviewed With: patient           Outcome Evaluation: VSS, RA, MRI completed, FC, BS elevated.

## 2025-04-12 NOTE — PROGRESS NOTES
[facility]     Progress Note    Patient Name: Camilla Navas  : 1950  MRN: [id number]  Primary Care Physician:  Lester Lizarraga MD  Date of admission: 2025    Subjective   Subjective     HPI:  Patient Reports her knees are feeling better.  She is having less pain and soreness.  She denies any new orthopedic complaints.    Review of Systems   See HPI    Objective   Objective     Vitals:   Temp:  [97.7 °F (36.5 °C)-99 °F (37.2 °C)] 98.6 °F (37 °C)  Heart Rate:  [] 90  Resp:  [16-20] 20  BP: (124-157)/(50-81) 142/77  Physical Exam    General: Alert, no acute distress   Chest: Unlabored breathing, cardiovascular: Regular heart rate   Musculoskeletal: Neurovascular intact.  Decreased swelling and tenderness.    Result Review      WBC   Date Value Ref Range Status   2025 10.00 3.40 - 10.80 10*3/mm3 Final     RBC   Date Value Ref Range Status   2025 4.38 3.77 - 5.28 10*6/mm3 Final     Hemoglobin   Date Value Ref Range Status   2025 12.8 12.0 - 15.9 g/dL Final     Hematocrit   Date Value Ref Range Status   2025 38.7 34.0 - 46.6 % Final     MCV   Date Value Ref Range Status   2025 88.4 79.0 - 97.0 fL Final     MCH   Date Value Ref Range Status   2025 29.2 26.6 - 33.0 pg Final     MCHC   Date Value Ref Range Status   2025 33.1 31.5 - 35.7 g/dL Final     RDW   Date Value Ref Range Status   2025 13.2 12.3 - 15.4 % Final     RDW-SD   Date Value Ref Range Status   2025 43.0 37.0 - 54.0 fl Final     MPV   Date Value Ref Range Status   2025 9.3 6.0 - 12.0 fL Final     Platelets   Date Value Ref Range Status   2025 414 140 - 450 10*3/mm3 Final     Neutrophil %   Date Value Ref Range Status   2025 79.2 (H) 42.7 - 76.0 % Final     Lymphocyte %   Date Value Ref Range Status   2025 9.7 (L) 19.6 - 45.3 % Final     Monocyte %   Date Value Ref Range Status   2025 10.1 5.0 - 12.0 % Final     Eosinophil %   Date Value Ref  Range Status   04/12/2025 0.1 (L) 0.3 - 6.2 % Final     Basophil %   Date Value Ref Range Status   04/12/2025 0.2 0.0 - 1.5 % Final     Immature Grans %   Date Value Ref Range Status   04/12/2025 0.7 (H) 0.0 - 0.5 % Final     Neutrophils, Absolute   Date Value Ref Range Status   04/12/2025 7.92 (H) 1.70 - 7.00 10*3/mm3 Final     Lymphocytes, Absolute   Date Value Ref Range Status   04/12/2025 0.97 0.70 - 3.10 10*3/mm3 Final     Monocytes, Absolute   Date Value Ref Range Status   04/12/2025 1.01 (H) 0.10 - 0.90 10*3/mm3 Final     Eosinophils, Absolute   Date Value Ref Range Status   04/12/2025 0.01 0.00 - 0.40 10*3/mm3 Final     Basophils, Absolute   Date Value Ref Range Status   04/12/2025 0.02 0.00 - 0.20 10*3/mm3 Final     Immature Grans, Absolute   Date Value Ref Range Status   04/12/2025 0.07 (H) 0.00 - 0.05 10*3/mm3 Final     nRBC   Date Value Ref Range Status   04/12/2025 0.0 0.0 - 0.2 /100 WBC Final        Result Review:  I have personally reviewed the results from the time of this admission to 4/12/2025 08:37 EDT and agree with these findings:  [x]  Laboratory  []  Microbiology  []  Radiology  []  EKG/Telemetry   []  Cardiology/Vascular   []  Pathology  []  Old records  []  Other:      Assessment & Plan   Assessment / Plan     Brief Patient Summary:  Camilla Navas is a 74 y.o. female who has bilateral knee osteoarthritis    Active Hospital Problems:  Active Hospital Problems    Diagnosis    • **Generalized weakness    • Hypokalemia      Plan: Weightbearing as tolerated with walker and fall precautions  Physical and Occupational Therapy as able  Pain control  DVT prophylaxis  Discharge planning: When medically able.  Follow-up with orthopedics as needed.  When medically able       VTE Prophylaxis:  Pharmacologic VTE prophylaxis orders are present.        CODE STATUS:   Code Status (Patient has no pulse and is not breathing): CPR (Attempt to Resuscitate)  Medical Interventions (Patient has pulse or is  breathing): Full Support    Disposition:  I expect patient to be discharged when medically able.    Electronically signed by Kadeem Freire MD, 04/12/25, 8:37 AM EDT.

## 2025-04-12 NOTE — PROGRESS NOTES
[facility]     Progress Note             Patient Name: Camilla Navas  : 1950  MRN: [id number]  Primary Care Physician:  Lester Lizarraga MD  Date of admission: 2025        Present illness:  She is awake and more alert but she is more responsive.  She still is weaker on the right extremities.  The orolingual movement are essentially unchanged.  She mentioned that her granddaughter explained to her everything.  And because of this, she has trouble sleeping at night.  The Restoril we gave her for the MRI did not help.    The pupils were 3 to 4 mm there were an equal reactive to light directly and consensually.  There were no extraocular muscle weakness.  No facial asymmetry.    The repeat MRI of the brain was done on 2025 showed an enhancing mass in the right frontoparietal lobe medially extending to the corpus callosum measuring 51 mm in anterior-posterior diameter 4.6 mm in its transverse diameter and 21.1 mm in its vertical diameter..      Past Medical History:   Diagnosis Date    Arthritis     Asthma     Atherosclerosis of coronary artery 2018    Non-obstructive coronary artery disease. Cardiac catheterization done in 2018 showed a 40% mid LAD lesion and a 40% proximal RCA lesion.    Cancer     Breast.     COPD (chronic obstructive pulmonary disease)     Diabetes mellitus     Diverticulitis     Hiatal hernia     Hyperlipemia 2022    Hypertension, essential 2022       Past Surgical History:   Procedure Laterality Date    COLONOSCOPY      COLONOSCOPY N/A 2023    Procedure: COLONOSCOPY WITH POLYPECTOMIES HOT/COLD SNARE, ELEVIEW INJECTION, BIOPSIES;  Surgeon: Lester Gillette MD;  Location: [facility] ENDOSCOPY;  Service: Gastroenterology;  Laterality: N/A;  COLON POLYPS, DIVERTICULOSIS    ENDOSCOPY N/A 2023    Procedure: ESOPHAGOGASTRODUODENOSCOPY WITH BIOPSIES;  Surgeon: Lester Gillette MD;  Location: [facility] ENDOSCOPY;  Service:  Gastroenterology;  Laterality: N/A;  PREVIOUS SURGERY    HERNIA REPAIR      HYSTERECTOMY      MASTECTOMY Bilateral     UPPER GASTROINTESTINAL ENDOSCOPY         Family History: family history is not on file. Otherwise pertinent FHx was reviewed and not pertinent to current issue.    Social History:  reports that she has never smoked. She has been exposed to tobacco smoke. She has never used smokeless tobacco. She reports that she does not drink alcohol and does not use drugs.      Home Medications:  Fluticasone-Umeclidin-Vilant, Insulin Lispro, acetaminophen, albuterol sulfate HFA, apixaban, baclofen, buPROPion XL, dilTIAZem HCl ER, diphenhydrAMINE, furosemide, gabapentin, glipizide, hydroCHLOROthiazide, ipratropium-albuterol, lisinopril, metoprolol succinate XL, mirtazapine, montelukast, nitroglycerin, omeprazole, ondansetron, pravastatin, sertraline, vitamin D3, and vitamin E      Allergies:  Allergies   Allergen Reactions    Empagliflozin Nausea And Vomiting    Metformin Diarrhea       Vitals:   Temp:  [97.7 °F (36.5 °C)-98.6 °F (37 °C)] 98 °F (36.7 °C)  Heart Rate:  [] 108  Resp:  [16-20] 20  BP: (136-157)/(68-92) 136/92        RESULT REVIEW:  I have personally reviewed the results from the time of this admission to 4/12/2025 15:29 EDT and agree with these findings:  []  Laboratory  []  Microbiology  []  Radiology  []  EKG/Telemetry   []  Cardiology/Vascular   []  Pathology  []  Old records  []  Other:  Most notable findings include:   April 12, 2025  I reviewed the digital images of the MRI of her brain that was done on April 11, 2025 with and without contrast.  There is enhancing mass in the right frontoparietal lobe medially with slight slight surrounding edema suggestive of the tumor.    April 11, 2025  There is 4 x 2 mm area area of restricted diffusion in the right frontoparietal lobe extending to the corpus callosum with surrounding edema suggestive of subacute infarct although a neoplastic process  could not be ruled out completely.  In addition, there is a slight shift of the midline to the left which could go along with the neoplastic process.     I reviewed the digital images of the MR angiography of the neck vessels done on April 10, 2025 was unremarkable.     I reviewed the digital images of the MRI of the cervical spine that was done on April 10, 2025.  There is mild degenerative changes along the cervical spine and mild disrespects complex bulging at C5-C6 and C6-7 levels without evidence of nerve root or spinal cord compression.    Impression:    Neoplastic process, right frontoparietal lobe  Subacute right frontoparietal lobe infarct  Generalized weakness  Parkinsonian syndrome which may be related to Remeron.  Neck pain  Insomnia  Obesity with a body mass index of 32.74        Plan:   Will give her a therapeutic trial of Sinemet and see whether this will help..  Will see what the repeat MRI with contrast show.       Electronically signed by Sunitha Suarez Jr., MD, 04/12/25, 3:29 PM EDT.        Please note that portions of this note were completed with a voice recognition program.  Part of this note is an electric or electronic transcription/translation of spoken language to printed text using the dragon dictating system.

## 2025-04-12 NOTE — PROGRESS NOTES
[facility]     Progress Note    Patient Name: Camilla Navas  : 1950  MRN: [id number]  Primary Care Physician:  Lester Lizarraga MD  Date of admission: 2025    Subjective   Patient seen today.  Blood sugars were elevated overnight and she needed increased dose of insulin.    Scheduled Meds:apixaban, 5 mg, Oral, BID  arformoterol, 15 mcg, Nebulization, BID - RT   And  budesonide, 0.5 mg, Nebulization, BID - RT   And  revefenacin, 175 mcg, Nebulization, Daily - RT  baclofen, 10 mg, Oral, Daily  buPROPion XL, 150 mg, Oral, Q24H  cefTRIAXone, 1,000 mg, Intravenous, Q24H  dilTIAZem CD, 360 mg, Oral, Q24H  furosemide, 20 mg, Oral, Daily  gabapentin, 300 mg, Oral, Nightly  glipizide, 5 mg, Oral, Daily  insulin glargine, 25 Units, Subcutaneous, Daily  insulin lispro, 4-24 Units, Subcutaneous, 4x Daily With Meals & Nightly  lisinopril, 5 mg, Oral, Daily  metoprolol succinate XL, 50 mg, Oral, Q12H  mirtazapine, 15 mg, Oral, Nightly  montelukast, 10 mg, Oral, Nightly  pantoprazole, 40 mg, Oral, Q AM  pravastatin, 80 mg, Oral, Nightly  sertraline, 200 mg, Oral, Daily      Continuous Infusions:   PRN Meds:.  acetaminophen **OR** acetaminophen **OR** acetaminophen    albuterol    aluminum-magnesium hydroxide-simethicone    senna-docusate sodium **AND** polyethylene glycol **AND** bisacodyl **AND** bisacodyl    dextrose    dextrose    diphenhydrAMINE    glucagon (human recombinant)    ondansetron    oxyCODONE    sodium chloride       Review of Systems  Constitutional:        Weakness tiredness fatigue  Eyes:                       No blurry vision, eye discharge, eye irritation, eye pain  HEENT:                   No acute hair loss, earache and discharge, nasal congestion or discharge, sore throat, postnasal drip  Respiratory:           No shortness of breath coughing sputum production wheezing hemoptysis pleuritic chest pain  Cardiovascular:     No chest pain, orthopnea, PND, dizziness, palpitation, lower  extremity edema  Gastrointestinal:   No nausea vomiting diarrhea abdominal pain constipation  Genitourinary:       No urinary incontinence, hesitancy, frequency, urgency, dysuria  Hematologic:         No bruising, bleeding, pallor, lymphadenopathy  Endocrine:            No coldness, hot flashes, polyuria, abnormal hair growth  Musculoskeletal:    No body pains, aches, arthritic pains, muscle pain ,muscle wasting  Psychiatric:          No low or high mood, anxiety, hallucinations, delusions  Skin.                      No rash, ulcers, bruising, itching  Neurological:        No confusion, headache, focal weakness, numbness, dysphasia    Objective   Objective     Vitals:   Temp:  [97.7 °F (36.5 °C)-99 °F (37.2 °C)] 98.6 °F (37 °C)  Heart Rate:  [] 90  Resp:  [16-20] 20  BP: (124-157)/(50-81) 142/77  Physical Exam    Constitutional: Awake, alert responsive, conversant, no obvious distress              Psychiatric:  Appropriate affect, cooperative   Neurologic:  Awake alert ,oriented x 3, strength symmetric in all extremities, Cranial Nerves grossly intact to confrontation, speech clear   Eyes:   PERRLA, sclerae anicteric, no conjunctival injection   HEENT:  Moist mucous membranes, no nasal or eye discharge, no throat congestion   Neck:   Supple, no thyromegaly, no lymphadenopathy, trachea midline, no elevated JVD   Respiratory:  Clear to auscultation bilaterally, nonlabored respirations    Cardiovascular: RRR, no murmurs, rubs, or gallops, palpable pedal pulses bilaterally, No bilateral ankle edema   Gastrointestinal: Positive bowel sounds, soft, nontender, nondistended, no organomegaly   Musculoskeletal:  No clubbing or cyanosis to extremities,muscle wasting, joint swelling, muscle weakness             Skin:                      No rashes, bruising, skin ulcers, petechiae or ecchymosis    Result Review    Result Review:  I have personally reviewed the results from the time of this admission to 4/12/2025 08:00 EDT  and agree with these findings:  [x]  Laboratory  []  Microbiology  []  Radiology  []  EKG/Telemetry   []  Cardiology/Vascular   []  Pathology  []  Old records  []  Other:    Assessment & Plan   Assessment / Plan       Active Hospital Problems:  Active Hospital Problems    Diagnosis     **Generalized weakness     Hypokalemia      74-year-old female with past medical history of chronic A-fib on anticoagulation, type 2 diabetes, hyperlipidemia, asthma, history of breast cancer who came in with nausea and generalized weakness found to have potassium of 2.9 which has been repleted with labs otherwise largely unremarkable and urinalysis showing few RBCs.  Creatinine is otherwise normal.  Has uptrending white count and generalized weakness with frailty however no apparent obvious sign of infection.  Knee with bilateral osteoarthritis and effusion which was drained and treated by orthopedics on 4/8.  UA concerning for UTI and cultures positive sensitive to ceftriaxone with Klebsiella.  Due to patient's persistent tremor, MRI of the brain showed concerns for subacute infarct versus glial neoplasm    Plan:   Continue ceftriaxone 1 g  Physical therapy.   to help with placement  Will continue Marce Patton and Yupelri and Singulair  Continue Eliquis 5 mg twice daily, Cardizem 360 and metoprolol 50 every 12  Continue with lisinopril 5 mg daily  Continue with Wellbutrin 150 every 24, baclofen 10 mg daily, Remeron 15 mg nightly, Zoloft 200 mg daily  Continue pravastatin 80 mg daily  Patient status post bilateral knee effusion drainage.  No concerns for infection  Neurology has been consulted.  Appreciate the help in managing the patient.    MRI brain suggestive of GBM or astrocytoma.  Started on Sinemet for tremors  Glucose is better controlled this morning after increase in insulin

## 2025-04-13 PROBLEM — D49.9: Status: ACTIVE | Noted: 2025-04-13

## 2025-04-13 LAB
ALBUMIN SERPL-MCNC: 3.3 G/DL (ref 3.5–5.2)
ALBUMIN/GLOB SERPL: 0.9 G/DL
ALP SERPL-CCNC: 101 U/L (ref 39–117)
ALT SERPL W P-5'-P-CCNC: 27 U/L (ref 1–33)
ANION GAP SERPL CALCULATED.3IONS-SCNC: 10.8 MMOL/L (ref 5–15)
AST SERPL-CCNC: 50 U/L (ref 1–32)
BASOPHILS # BLD AUTO: 0.06 10*3/MM3 (ref 0–0.2)
BASOPHILS NFR BLD AUTO: 0.5 % (ref 0–1.5)
BILIRUB SERPL-MCNC: <0.2 MG/DL (ref 0–1.2)
BUN SERPL-MCNC: 30 MG/DL (ref 8–23)
BUN/CREAT SERPL: 40 (ref 7–25)
CALCIUM SPEC-SCNC: 9.4 MG/DL (ref 8.6–10.5)
CHLORIDE SERPL-SCNC: 101 MMOL/L (ref 98–107)
CO2 SERPL-SCNC: 25.2 MMOL/L (ref 22–29)
CREAT SERPL-MCNC: 0.75 MG/DL (ref 0.57–1)
DEPRECATED RDW RBC AUTO: 44.7 FL (ref 37–54)
EGFRCR SERPLBLD CKD-EPI 2021: 83.1 ML/MIN/1.73
EOSINOPHIL # BLD AUTO: 0.07 10*3/MM3 (ref 0–0.4)
EOSINOPHIL NFR BLD AUTO: 0.5 % (ref 0.3–6.2)
ERYTHROCYTE [DISTWIDTH] IN BLOOD BY AUTOMATED COUNT: 13.4 % (ref 12.3–15.4)
GLOBULIN UR ELPH-MCNC: 3.7 GM/DL
GLUCOSE BLDC GLUCOMTR-MCNC: 118 MG/DL (ref 70–99)
GLUCOSE BLDC GLUCOMTR-MCNC: 196 MG/DL (ref 70–99)
GLUCOSE BLDC GLUCOMTR-MCNC: 218 MG/DL (ref 70–99)
GLUCOSE BLDC GLUCOMTR-MCNC: 228 MG/DL (ref 70–99)
GLUCOSE SERPL-MCNC: 223 MG/DL (ref 65–99)
HCT VFR BLD AUTO: 41.8 % (ref 34–46.6)
HGB BLD-MCNC: 13.7 G/DL (ref 12–15.9)
IMM GRANULOCYTES # BLD AUTO: 0.18 10*3/MM3 (ref 0–0.05)
IMM GRANULOCYTES NFR BLD AUTO: 1.4 % (ref 0–0.5)
LYMPHOCYTES # BLD AUTO: 1.42 10*3/MM3 (ref 0.7–3.1)
LYMPHOCYTES NFR BLD AUTO: 10.9 % (ref 19.6–45.3)
MCH RBC QN AUTO: 29.7 PG (ref 26.6–33)
MCHC RBC AUTO-ENTMCNC: 32.8 G/DL (ref 31.5–35.7)
MCV RBC AUTO: 90.5 FL (ref 79–97)
MONOCYTES # BLD AUTO: 1.08 10*3/MM3 (ref 0.1–0.9)
MONOCYTES NFR BLD AUTO: 8.3 % (ref 5–12)
NEUTROPHILS NFR BLD AUTO: 10.23 10*3/MM3 (ref 1.7–7)
NEUTROPHILS NFR BLD AUTO: 78.4 % (ref 42.7–76)
NRBC BLD AUTO-RTO: 0 /100 WBC (ref 0–0.2)
PLATELET # BLD AUTO: 456 10*3/MM3 (ref 140–450)
PMV BLD AUTO: 9.2 FL (ref 6–12)
POTASSIUM SERPL-SCNC: 4.8 MMOL/L (ref 3.5–5.2)
PROT SERPL-MCNC: 7 G/DL (ref 6–8.5)
RBC # BLD AUTO: 4.62 10*6/MM3 (ref 3.77–5.28)
SODIUM SERPL-SCNC: 137 MMOL/L (ref 136–145)
WBC NRBC COR # BLD AUTO: 13.04 10*3/MM3 (ref 3.4–10.8)

## 2025-04-13 PROCEDURE — 80053 COMPREHEN METABOLIC PANEL: CPT | Performed by: STUDENT IN AN ORGANIZED HEALTH CARE EDUCATION/TRAINING PROGRAM

## 2025-04-13 PROCEDURE — 63710000001 REVEFENACIN 175 MCG/3ML SOLUTION: Performed by: STUDENT IN AN ORGANIZED HEALTH CARE EDUCATION/TRAINING PROGRAM

## 2025-04-13 PROCEDURE — 25010000002 ONDANSETRON PER 1 MG: Performed by: INTERNAL MEDICINE

## 2025-04-13 PROCEDURE — 94799 UNLISTED PULMONARY SVC/PX: CPT

## 2025-04-13 PROCEDURE — 94664 DEMO&/EVAL PT USE INHALER: CPT

## 2025-04-13 PROCEDURE — 63710000001 INSULIN GLARGINE PER 5 UNITS: Performed by: STUDENT IN AN ORGANIZED HEALTH CARE EDUCATION/TRAINING PROGRAM

## 2025-04-13 PROCEDURE — 82948 REAGENT STRIP/BLOOD GLUCOSE: CPT | Performed by: STUDENT IN AN ORGANIZED HEALTH CARE EDUCATION/TRAINING PROGRAM

## 2025-04-13 PROCEDURE — 82948 REAGENT STRIP/BLOOD GLUCOSE: CPT

## 2025-04-13 PROCEDURE — 63710000001 INSULIN LISPRO (HUMAN) PER 5 UNITS: Performed by: STUDENT IN AN ORGANIZED HEALTH CARE EDUCATION/TRAINING PROGRAM

## 2025-04-13 PROCEDURE — 85025 COMPLETE CBC W/AUTO DIFF WBC: CPT | Performed by: STUDENT IN AN ORGANIZED HEALTH CARE EDUCATION/TRAINING PROGRAM

## 2025-04-13 PROCEDURE — 94761 N-INVAS EAR/PLS OXIMETRY MLT: CPT

## 2025-04-13 RX ADMIN — OXYCODONE 5 MG: 5 TABLET ORAL at 21:00

## 2025-04-13 RX ADMIN — PANTOPRAZOLE SODIUM 40 MG: 40 TABLET, DELAYED RELEASE ORAL at 05:26

## 2025-04-13 RX ADMIN — BACLOFEN 10 MG: 10 TABLET ORAL at 08:38

## 2025-04-13 RX ADMIN — CARBIDOPA AND LEVODOPA 1 TABLET: 10; 100 TABLET ORAL at 11:04

## 2025-04-13 RX ADMIN — PRAVASTATIN SODIUM 80 MG: 20 TABLET ORAL at 20:59

## 2025-04-13 RX ADMIN — APIXABAN 5 MG: 5 TABLET, FILM COATED ORAL at 21:00

## 2025-04-13 RX ADMIN — METOPROLOL SUCCINATE 50 MG: 50 TABLET, FILM COATED, EXTENDED RELEASE ORAL at 08:37

## 2025-04-13 RX ADMIN — INSULIN LISPRO 4 UNITS: 100 INJECTION, SOLUTION INTRAVENOUS; SUBCUTANEOUS at 08:37

## 2025-04-13 RX ADMIN — CARBIDOPA AND LEVODOPA 1 TABLET: 10; 100 TABLET ORAL at 22:20

## 2025-04-13 RX ADMIN — MIRTAZAPINE 15 MG: 15 TABLET, FILM COATED ORAL at 21:00

## 2025-04-13 RX ADMIN — ARFORMOTEROL TARTRATE 15 MCG: 15 SOLUTION RESPIRATORY (INHALATION) at 22:28

## 2025-04-13 RX ADMIN — REVEFENACIN 175 MCG: 175 SOLUTION RESPIRATORY (INHALATION) at 08:54

## 2025-04-13 RX ADMIN — METOPROLOL SUCCINATE 50 MG: 50 TABLET, FILM COATED, EXTENDED RELEASE ORAL at 21:00

## 2025-04-13 RX ADMIN — Medication 10 ML: at 08:39

## 2025-04-13 RX ADMIN — LISINOPRIL 5 MG: 5 TABLET ORAL at 08:38

## 2025-04-13 RX ADMIN — MONTELUKAST 10 MG: 10 TABLET, FILM COATED ORAL at 21:00

## 2025-04-13 RX ADMIN — ONDANSETRON 4 MG: 2 INJECTION INTRAMUSCULAR; INTRAVENOUS at 11:04

## 2025-04-13 RX ADMIN — OXYCODONE 5 MG: 5 TABLET ORAL at 08:38

## 2025-04-13 RX ADMIN — INSULIN LISPRO 4 UNITS: 100 INJECTION, SOLUTION INTRAVENOUS; SUBCUTANEOUS at 20:59

## 2025-04-13 RX ADMIN — INSULIN LISPRO 8 UNITS: 100 INJECTION, SOLUTION INTRAVENOUS; SUBCUTANEOUS at 12:03

## 2025-04-13 RX ADMIN — CARBIDOPA AND LEVODOPA 1 TABLET: 10; 100 TABLET ORAL at 16:00

## 2025-04-13 RX ADMIN — BUDESONIDE 0.5 MG: 0.5 SUSPENSION RESPIRATORY (INHALATION) at 08:54

## 2025-04-13 RX ADMIN — BUDESONIDE 0.5 MG: 0.5 SUSPENSION RESPIRATORY (INHALATION) at 22:28

## 2025-04-13 RX ADMIN — SERTRALINE HYDROCHLORIDE 200 MG: 100 TABLET ORAL at 08:38

## 2025-04-13 RX ADMIN — GABAPENTIN 300 MG: 300 CAPSULE ORAL at 21:00

## 2025-04-13 RX ADMIN — ARFORMOTEROL TARTRATE 15 MCG: 15 SOLUTION RESPIRATORY (INHALATION) at 08:54

## 2025-04-13 RX ADMIN — INSULIN GLARGINE 25 UNITS: 100 INJECTION, SOLUTION SUBCUTANEOUS at 08:37

## 2025-04-13 RX ADMIN — GLIPIZIDE 5 MG: 5 TABLET ORAL at 08:38

## 2025-04-13 RX ADMIN — BUPROPION HYDROCHLORIDE 150 MG: 150 TABLET, EXTENDED RELEASE ORAL at 08:38

## 2025-04-13 RX ADMIN — DILTIAZEM HYDROCHLORIDE 360 MG: 180 CAPSULE, COATED, EXTENDED RELEASE ORAL at 08:37

## 2025-04-13 RX ADMIN — FUROSEMIDE 20 MG: 20 TABLET ORAL at 08:37

## 2025-04-13 RX ADMIN — APIXABAN 5 MG: 5 TABLET, FILM COATED ORAL at 08:38

## 2025-04-13 NOTE — PLAN OF CARE
Goal Outcome Evaluation:  Plan of Care Reviewed With: patient           Outcome Evaluation: Patient resting, alert and oriented, patient c/o neck pain, pain med given, bed alert on, call light within reach.

## 2025-04-13 NOTE — PROGRESS NOTES
[facility]     Progress Note    Patient Name: Camilla Navas  : 1950  MRN: [id number]  Primary Care Physician:  Lester Lizarraga MD  Date of admission: 2025    Subjective   Patient seen today.  Blood sugars are overall better controlled now.  No acute events overnight    Scheduled Meds:apixaban, 5 mg, Oral, BID  arformoterol, 15 mcg, Nebulization, BID - RT   And  budesonide, 0.5 mg, Nebulization, BID - RT   And  revefenacin, 175 mcg, Nebulization, Daily - RT  baclofen, 10 mg, Oral, Daily  buPROPion XL, 150 mg, Oral, Q24H  carbidopa-levodopa, 1 tablet, Oral, TID  cefTRIAXone, 1,000 mg, Intravenous, Q24H  dilTIAZem CD, 360 mg, Oral, Q24H  furosemide, 20 mg, Oral, Daily  gabapentin, 300 mg, Oral, Nightly  glipizide, 5 mg, Oral, Daily  insulin glargine, 25 Units, Subcutaneous, Daily  insulin lispro, 4-24 Units, Subcutaneous, 4x Daily With Meals & Nightly  lisinopril, 5 mg, Oral, Daily  metoprolol succinate XL, 50 mg, Oral, Q12H  mirtazapine, 15 mg, Oral, Nightly  montelukast, 10 mg, Oral, Nightly  pantoprazole, 40 mg, Oral, Q AM  pravastatin, 80 mg, Oral, Nightly  sertraline, 200 mg, Oral, Daily      Continuous Infusions:   PRN Meds:.•  acetaminophen **OR** acetaminophen **OR** acetaminophen  •  albuterol  •  aluminum-magnesium hydroxide-simethicone  •  senna-docusate sodium **AND** polyethylene glycol **AND** bisacodyl **AND** bisacodyl  •  dextrose  •  dextrose  •  diphenhydrAMINE  •  glucagon (human recombinant)  •  ondansetron  •  oxyCODONE  •  sodium chloride       Review of Systems  Constitutional:        Weakness tiredness fatigue  Eyes:                       No blurry vision, eye discharge, eye irritation, eye pain  HEENT:                   No acute hair loss, earache and discharge, nasal congestion or discharge, sore throat, postnasal drip  Respiratory:           No shortness of breath coughing sputum production wheezing hemoptysis pleuritic chest pain  Cardiovascular:     No chest  pain, orthopnea, PND, dizziness, palpitation, lower extremity edema  Gastrointestinal:   No nausea vomiting diarrhea abdominal pain constipation  Genitourinary:       No urinary incontinence, hesitancy, frequency, urgency, dysuria  Hematologic:         No bruising, bleeding, pallor, lymphadenopathy  Endocrine:            No coldness, hot flashes, polyuria, abnormal hair growth  Musculoskeletal:    No body pains, aches, arthritic pains, muscle pain ,muscle wasting  Psychiatric:          No low or high mood, anxiety, hallucinations, delusions  Skin.                      No rash, ulcers, bruising, itching  Neurological:        No confusion, headache, focal weakness, numbness, dysphasia    Objective   Objective     Vitals:   Temp:  [97.3 °F (36.3 °C)-98.6 °F (37 °C)] 98.1 °F (36.7 °C)  Heart Rate:  [] 96  Resp:  [16-20] 18  BP: (130-150)/(67-97) 142/80  Physical Exam    Constitutional: Awake, alert responsive, conversant, no obvious distress              Psychiatric:  Appropriate affect, cooperative   Neurologic:  Awake alert ,oriented x 3, strength symmetric in all extremities, Cranial Nerves grossly intact to confrontation, speech clear   Eyes:   PERRLA, sclerae anicteric, no conjunctival injection   HEENT:  Moist mucous membranes, no nasal or eye discharge, no throat congestion   Neck:   Supple, no thyromegaly, no lymphadenopathy, trachea midline, no elevated JVD   Respiratory:  Clear to auscultation bilaterally, nonlabored respirations    Cardiovascular: RRR, no murmurs, rubs, or gallops, palpable pedal pulses bilaterally, No bilateral ankle edema   Gastrointestinal: Positive bowel sounds, soft, nontender, nondistended, no organomegaly   Musculoskeletal:  No clubbing or cyanosis to extremities,muscle wasting, joint swelling, muscle weakness             Skin:                      No rashes, bruising, skin ulcers, petechiae or ecchymosis    Result Review    Result Review:  I have personally reviewed the results  from the time of this admission to 4/13/2025 08:06 EDT and agree with these findings:  [x]  Laboratory  []  Microbiology  []  Radiology  []  EKG/Telemetry   []  Cardiology/Vascular   []  Pathology  []  Old records  []  Other:    Assessment & Plan   Assessment / Plan       Active Hospital Problems:  Active Hospital Problems    Diagnosis    • **Generalized weakness    • Hypokalemia      74-year-old female with past medical history of chronic A-fib on anticoagulation, type 2 diabetes, hyperlipidemia, asthma, history of breast cancer who came in with nausea and generalized weakness found to have potassium of 2.9 which has been repleted with labs otherwise largely unremarkable and urinalysis showing few RBCs.  Creatinine is otherwise normal.  Has uptrending white count and generalized weakness with frailty however no apparent obvious sign of infection.  Knee with bilateral osteoarthritis and effusion which was drained and treated by orthopedics on 4/8.  UA concerning for UTI and cultures positive sensitive to ceftriaxone with Klebsiella.  Due to patient's persistent tremor, MRI of the brain showed concerns for subacute infarct versus glial neoplasm    Plan:   Discontinue ceftriaxone as urine culture is growing coagulase-negative staph which is most likely a contaminant  Physical therapy.   to help with placement  Will continue Brovana Pulmicort and Yupelri and Singulair  Continue Eliquis 5 mg twice daily, Cardizem 360 and metoprolol 50 every 12  Continue with lisinopril 5 mg daily  Continue with Wellbutrin 150 every 24, baclofen 10 mg daily, Remeron 15 mg nightly, Zoloft 200 mg daily  Continue pravastatin 80 mg daily  Patient status post bilateral knee effusion drainage.  No concerns for infection  Neurology has been consulted.  Appreciate the help in managing the patient.    MRI brain suggestive of GBM or astrocytoma.  Sinemet for tremors  Continue current dose of insulin

## 2025-04-13 NOTE — PAYOR COMM NOTE
"Camilla Navas (74 y.o. Female)       Date of Birth   1950    Social Security Number       Address   [address] [place] [address]    Home Phone   [phone]    MRN   [id number]       [facility]    Marital Status                               Admission Date   4/4/2025    Admission Type   Emergency    Admitting Provider   Emory Ratliff MD    Attending Provider   Myles Brown MD    Department, Room/Bed   [address], 3021/1       Discharge Date       Discharge Disposition       Discharge Destination                                 Attending Provider: Myles Brown MD    Allergies: Empagliflozin, Metformin    Isolation: None   Infection: None   Code Status: CPR    Ht: 167.6 cm (66\")   Wt: 88.6 kg (195 lb 5.2 oz)    Admission Cmt: None   Principal Problem: Generalized weakness [R53.1]                   Active Insurance as of 4/4/2025       Primary Coverage       Payor Plan Insurance Group Employer/Plan Group    [facility]        Payor Plan Address Payor Plan Phone Number Payor Plan Fax Number Effective Dates    [address] [address]   1/1/2025 - None Entered    [facility] [id number]         Subscriber Name Subscriber Birth Date Member ID       CAMILLA NAVAS 1950 [id number]                     Emergency Contacts        (Rel.) Home Phone Work Phone Mobile Phone    Sanchez,Shahnaz Call 1st (Grandchild) [phone] -- [phone]    Wanda Charles (Grandchild) -- -- [phone]        Obs 4/4  Inpt 4/13       Stroke: Ischemic [facility]G Inpatient Care       Indications Met   Last updated by Sheila Chambers RN on 4/11/2025 1031     Review Status Created By   Primary Completed Sheila Chambers RN      Criteria Review   Stroke: Ischemic RRG Inpatient Care     Overall Determination: Indications Met     Criteria:  [×] Admission is indicated for  1 or more  of the " following  [A]:      [×] Acute ischemic stroke [A] with neurologic findings that warrant inpatient care, as indicated by  1 or more  of the following :          [×] Dysphagia that warrants evaluation (eg, aspiration risk suspected)              4/11/2025 10:31 AM                  -- 4/11/2025 10:31 AM by Sheila Chambers RN --                      reports difficulty swallowing for several days                       Per ST:  recommend Mechanical soft solids, nectar thick liquids          [×] Gait impairment              4/11/2025 10:31 AM                  -- 4/11/2025 10:31 AM by Sheila Chambers RN --                      dependent with amb/transfers          [×] Finding on brain imaging that requires inpatient care (eg, mass, edema, large acute infarction) [F]              4/11/2025 10:31 AM                  -- 4/11/2025 10:31 AM by Sheila Chambers RN --                      Brain MRI:  1.4 x 2 cm focus of signal aberration within the right centrum semiovale with suggestion of mild surrounding edema. There is associated diffusion restriction. may represent sub acute infarct, ? glial neoplasm          [×] Clinical stability unclear (eg, recurrent or worsening findings)              4/11/2025 10:31 AM                  -- 4/11/2025 10:31 AM by Sheila Chambers RN --                      weakness/fatigue/ urinary retention requiring straight cath/concerns for neck pain                      Imaging with stenosis noted     Notes:  -- 4/11/2025 10:31 AM by Sheila Chambers RN --      Subject: Admission      Per MD note:  MRI of the brain which was concerning for possible small stroke      Had urinary retention requiring straight cath      Blood sugars are still elevated      Continues to have concerns for neck pain      Imaging with stenosis noted      Neuro following,  remains dependent with amb/transfers      MRI Brain pending 4/11- to assess for neoplastic process.       Continue Rocephin 1 gm iv q 24 hr (UTI)                 History & Physical        Emory Ratliff MD at 25 [phone]           [facility]   HISTORY AND PHYSICAL    Patient Name: Camilla Navas  : 1950  MRN: [id number]  Primary Care Physician:  Lester Lizarraga MD  Date of admission: 2025    Subjective  Subjective     Chief Complaint:  generalized weakness    HPI:    Camilla Navas is a 74 y.o. female with past medical history significant for  chronic atrial fibrillation,essential hypertension coronary artery disease asthma type 2 diabetes mellitus  diabetic gastroparesis was admitted to the hospital about a month ago with diabetic gastroparesis and she responded to Reglan very well and then patient was discharged home.   Apparently patient saw her PCP and they started her back on Reglan because of nausea and came to the emergency room complaining of some lower extremity swelling but severe weakness to a point where she is having difficulty standing up.  Her potassium was found to be 2.9    Review of Systems  All review of systems negative except as in subjective complaints:  Personal History     Past Medical History:   Diagnosis Date    Arthritis     Asthma     Atherosclerosis of coronary artery 2018    Non-obstructive coronary artery disease. Cardiac catheterization done in 2018 showed a 40% mid LAD lesion and a 40% proximal RCA lesion.    Cancer     Breast.     COPD (chronic obstructive pulmonary disease)     Diabetes mellitus     Diverticulitis     Hiatal hernia     Hyperlipemia 2022    Hypertension, essential 2022       Past Surgical History:   Procedure Laterality Date    COLONOSCOPY      COLONOSCOPY N/A 2023    Procedure: COLONOSCOPY WITH POLYPECTOMIES HOT/COLD SNARE, ELEVIEW INJECTION, BIOPSIES;  Surgeon: Lester Gillette MD;  Location: [facility] ENDOSCOPY;  Service: Gastroenterology;  Laterality: N/A;  COLON POLYPS, DIVERTICULOSIS    ENDOSCOPY N/A 2023    Procedure: ESOPHAGOGASTRODUODENOSCOPY WITH  BIOPSIES;  Surgeon: Lester Gillette MD;  Location: [facility] ENDOSCOPY;  Service: Gastroenterology;  Laterality: N/A;  PREVIOUS SURGERY    HERNIA REPAIR      HYSTERECTOMY      MASTECTOMY Bilateral     UPPER GASTROINTESTINAL ENDOSCOPY         Family History: family history is not on file. Otherwise pertinent FHx was reviewed and not pertinent to current issue.    Social History:  reports that she has never smoked. She has been exposed to tobacco smoke. She has never used smokeless tobacco. She reports that she does not drink alcohol and does not use drugs.    Home Medications:  Fluticasone-Umeclidin-Vilant, acetaminophen, albuterol sulfate HFA, apixaban, azelastine, baclofen, buPROPion XL, dilTIAZem HCl ER, fluticasone, furosemide, gabapentin, glipizide, hydroCHLOROthiazide, ipratropium-albuterol, lisinopril, metoprolol succinate XL, mirtazapine, montelukast, nitroglycerin, omeprazole, ondansetron, pravastatin, sertraline, vitamin D3, and vitamin E      Allergies:  Allergies   Allergen Reactions    Empagliflozin Nausea And Vomiting    Metformin Diarrhea       Objective  Objective     Vitals:   Temp:  [98.5 °F (36.9 °C)] 98.5 °F (36.9 °C)  Heart Rate:  [] 99  Resp:  [12-17] 17  BP: (139-167)/(78-93) 139/92  Physical Exam               Constitutional:         Awake, alert responsive, conversant, no obvious distress   Eyes:                       PERRLA, sclerae anicteric, no conjunctival injection   HEENT:                   Moist mucous membranes, no nasal or eye discharge, no throat congestion   Neck:                      Supple, no thyromegaly, no lymphadenopathy, trachea midline, no elevated JVD   Respiratory:           Clear to auscultation bilaterally, nonlabored respirations    Cardiovascular:     RRR, no murmurs, rubs, or gallops, palpable pedal pulses bilaterally,No bilateral ankle edema   Gastrointestinal:   Positive bowel sounds, soft, nontender, nondistended, no organomegaly   Musculoskeletal:   No  clubbing or cyanosis to extremities, muscle wasting, joint swelling, muscle weakness   Psychiatric:             Appropriate affect, cooperative   Neurologic:            Awake alert ,oriented x 3, strength symmetric in all extremities, Cranial Nerves grossly intact to confrontation, speech clear   Skin:                      No rashes, bruising, skin ulcers, petechiae or ecchymosis    Result Review   Result Review:  I have personally reviewed the results from the time of this admission to 4/4/2025 12:45 EDT and agree with these findings:  []  Laboratory  []  Microbiology  []  Radiology  []  EKG/Telemetry   []  Cardiology/Vascular   []  Pathology  []  Old records  []  Other:    Results from last 7 days   Lab Units 04/04/25  0624   WBC 10*3/mm3 8.13   HEMOGLOBIN g/dL 12.6   PLATELETS 10*3/mm3 299     Results from last 7 days   Lab Units 04/04/25  0624   SODIUM mmol/L 141   POTASSIUM mmol/L 2.9*   CHLORIDE mmol/L 99   CO2 mmol/L 26.3   ANION GAP mmol/L 15.7*   BUN mg/dL 15   CREATININE mg/dL 0.91   GLUCOSE mg/dL 111*   EGFR mL/min/1.73 65.9   CALCIUM mg/dL 9.5   MAGNESIUM mg/dL 1.7   ALBUMIN g/dL 4.2   BILIRUBIN mg/dL 0.3   ALK PHOS U/L 71   ALT (SGPT) U/L 11   AST (SGOT) U/L 21         Assessment & Plan  Assessment / Plan     Active Hospital Problems:  Active Hospital Problems    Diagnosis     **Generalized weakness     Hypokalemia        Plan:    Hold Reglan   Aggressively replace potassium   Patient will be admitted as observation and see how patient does tomorrow and then make further recommendations    VTE Prophylaxis:  No VTE prophylaxis order currently exists.        CODE STATUS:    Code Status (Patient has no pulse and is not breathing): CPR (Attempt to Resuscitate)  Medical Interventions (Patient has pulse or is breathing): Full Support    Admission Status:  I believe this patient meets  observation status.    Electronically signed by Emory Ratliff MD, 04/04/25, 12:43 PM EDT.             Electronically  signed by Emory Ratliff MD at 04/04/25 1245       Lab Results (last 72 hours)       Procedure Component Value Units Date/Time    POC Glucose Once [[id number]]  (Abnormal) Collected: 04/13/25 1111    Specimen: Blood Updated: 04/13/25 1113     Glucose 228 mg/dL      Comment: Serial Number: [id number]:  [id number]       POC Glucose 4x Daily Before Meals & at Bedtime [[id number]]  (Abnormal) Collected: 04/13/25 0800    Specimen: Blood Updated: 04/13/25 0803     Glucose 218 mg/dL      Comment: Serial Number: [id number]:  [id number]       Comprehensive Metabolic Panel [[id number]]  (Abnormal) Collected: 04/13/25 0554    Specimen: Blood from Arm, Left Updated: 04/13/25 0635     Glucose 223 mg/dL      BUN 30 mg/dL      Creatinine 0.75 mg/dL      Sodium 137 mmol/L      Potassium 4.8 mmol/L      Chloride 101 mmol/L      CO2 25.2 mmol/L      Calcium 9.4 mg/dL      Total Protein 7.0 g/dL      Albumin 3.3 g/dL      ALT (SGPT) 27 U/L      AST (SGOT) 50 U/L      Alkaline Phosphatase 101 U/L      Total Bilirubin <0.2 mg/dL      Globulin 3.7 gm/dL      A/G Ratio 0.9 g/dL      BUN/Creatinine Ratio 40.0     Anion Gap 10.8 mmol/L      eGFR 83.1 mL/min/1.73     Narrative:      GFR Categories in Chronic Kidney Disease (CKD)      GFR Category          GFR (mL/min/1.73)    Interpretation  G1                     90 or greater         Normal or high (1)  G2                      60-89                Mild decrease (1)  G3a                   45-59                Mild to moderate decrease  G3b                   30-44                Moderate to severe decrease  G4                    15-29                Severe decrease  G5                    14 or less           Kidney failure          (1)In the absence of evidence of kidney disease, neither GFR category G1 or G2 fulfill the criteria for CKD.    eGFR calculation 2021 CKD-EPI creatinine equation, which does not include race as a factor    CBC & Differential [[id number]]  (Abnormal)  Collected: 04/13/25 0554    Specimen: Blood from Arm, Left Updated: 04/13/25 0612    Narrative:      The following orders were created for panel order CBC & Differential.  Procedure                               Abnormality         Status                     ---------                               -----------         ------                     CBC Auto Differential[[id number]]        Abnormal            Final result                 Please view results for these tests on the individual orders.    CBC Auto Differential [[id number]]  (Abnormal) Collected: 04/13/25 0554    Specimen: Blood from Arm, Left Updated: 04/13/25 0612     WBC 13.04 10*3/mm3      RBC 4.62 10*6/mm3      Hemoglobin 13.7 g/dL      Hematocrit 41.8 %      MCV 90.5 fL      MCH 29.7 pg      MCHC 32.8 g/dL      RDW 13.4 %      RDW-SD 44.7 fl      MPV 9.2 fL      Platelets 456 10*3/mm3      Neutrophil % 78.4 %      Lymphocyte % 10.9 %      Monocyte % 8.3 %      Eosinophil % 0.5 %      Basophil % 0.5 %      Immature Grans % 1.4 %      Neutrophils, Absolute 10.23 10*3/mm3      Lymphocytes, Absolute 1.42 10*3/mm3      Monocytes, Absolute 1.08 10*3/mm3      Eosinophils, Absolute 0.07 10*3/mm3      Basophils, Absolute 0.06 10*3/mm3      Immature Grans, Absolute 0.18 10*3/mm3      nRBC 0.0 /100 WBC     POC Glucose Once [[id number]]  (Abnormal) Collected: 04/12/25 2033    Specimen: Blood Updated: 04/12/25 2034     Glucose 362 mg/dL      Comment: Serial Number: [id number]:  [id number]       POC Glucose Once [[id number]]  (Abnormal) Collected: 04/12/25 1652    Specimen: Blood Updated: 04/12/25 1654     Glucose 199 mg/dL      Comment: Serial Number: [id number]:  [id number]       POC Glucose 4x Daily Before Meals & at Bedtime [[id number]]  (Abnormal) Collected: 04/12/25 1105    Specimen: Blood Updated: 04/12/25 1108     Glucose 294 mg/dL      Comment: Serial Number: [id number]:  [id number]       POC Glucose 4x Daily Before Meals & at Bedtime [[id number]]   (Abnormal) Collected: 04/12/25 0722    Specimen: Blood Updated: 04/12/25 0723     Glucose 226 mg/dL      Comment: Serial Number: [id number]:  [id number]       Renal Function Panel [[id number]]  (Abnormal) Collected: 04/12/25 0533    Specimen: Blood from Arm, Left Updated: 04/12/25 0619     Glucose 254 mg/dL      BUN 35 mg/dL      Creatinine 0.72 mg/dL      Sodium 133 mmol/L      Potassium 4.5 mmol/L      Chloride 97 mmol/L      CO2 24.7 mmol/L      Calcium 9.4 mg/dL      Albumin 3.1 g/dL      Phosphorus 3.2 mg/dL      Anion Gap 11.3 mmol/L      BUN/Creatinine Ratio 48.6     eGFR 87.3 mL/min/1.73     Narrative:      GFR Categories in Chronic Kidney Disease (CKD)      GFR Category          GFR (mL/min/1.73)    Interpretation  G1                     90 or greater         Normal or high (1)  G2                      60-89                Mild decrease (1)  G3a                   45-59                Mild to moderate decrease  G3b                   30-44                Moderate to severe decrease  G4                    15-29                Severe decrease  G5                    14 or less           Kidney failure          (1)In the absence of evidence of kidney disease, neither GFR category G1 or G2 fulfill the criteria for CKD.    eGFR calculation 2021 CKD-EPI creatinine equation, which does not include race as a factor    CBC & Differential [[id number]]  (Abnormal) Collected: 04/12/25 0533    Specimen: Blood from Arm, Left Updated: 04/12/25 0559    Narrative:      The following orders were created for panel order CBC & Differential.  Procedure                               Abnormality         Status                     ---------                               -----------         ------                     CBC Auto Differential[[id number]]        Abnormal            Final result                 Please view results for these tests on the individual orders.    CBC Auto Differential [[id number]]  (Abnormal) Collected:  04/12/25 0533    Specimen: Blood from Arm, Left Updated: 04/12/25 0559     WBC 10.00 10*3/mm3      RBC 4.38 10*6/mm3      Hemoglobin 12.8 g/dL      Hematocrit 38.7 %      MCV 88.4 fL      MCH 29.2 pg      MCHC 33.1 g/dL      RDW 13.2 %      RDW-SD 43.0 fl      MPV 9.3 fL      Platelets 414 10*3/mm3      Neutrophil % 79.2 %      Lymphocyte % 9.7 %      Monocyte % 10.1 %      Eosinophil % 0.1 %      Basophil % 0.2 %      Immature Grans % 0.7 %      Neutrophils, Absolute 7.92 10*3/mm3      Lymphocytes, Absolute 0.97 10*3/mm3      Monocytes, Absolute 1.01 10*3/mm3      Eosinophils, Absolute 0.01 10*3/mm3      Basophils, Absolute 0.02 10*3/mm3      Immature Grans, Absolute 0.07 10*3/mm3      nRBC 0.0 /100 WBC     POC Glucose Once [[id number]]  (Abnormal) Collected: 04/11/25 2008    Specimen: Blood Updated: 04/11/25 2010     Glucose 548 mg/dL      Comment: Serial Number: [id number]:  [id number]       POC Glucose Once [[id number]]  (Abnormal) Collected: 04/11/25 1653    Specimen: Blood Updated: 04/11/25 1656     Glucose 576 mg/dL      Comment: Serial Number: [id number]:  [id number]       Urine Culture - Urine, Urine, Clean Catch [[id number]]  (Abnormal) Collected: 04/09/25 1041    Specimen: Urine, Clean Catch Updated: 04/11/25 1210     Urine Culture 25,000 CFU/mL Staphylococcus, coagulase negative    Narrative:      By laboratory criteria, additional testing is not indicated and unlikely to produce clinically relevant information. Coagulase negative staphylococci are common skin contaminants, members of the oral bonnie, and of low virulence; requires clinical correlation.  Colonization of the urinary tract without infection is common. Treatment is discouraged unless the patient is symptomatic, pregnant, or undergoing an invasive urologic procedure.    POC Glucose 4x Daily Before Meals & at Bedtime [[id number]]  (Abnormal) Collected: 04/11/25 1109    Specimen: Blood Updated: 04/11/25 1110     Glucose 471 mg/dL       Comment: Serial Number: [id number]:  [id number]       Renal Function Panel [[id number]]  (Abnormal) Collected: 04/11/25 0812    Specimen: Blood from Arm, Left Updated: 04/11/25 0839     Glucose 370 mg/dL      BUN 51 mg/dL      Creatinine 0.86 mg/dL      Sodium 132 mmol/L      Potassium 4.3 mmol/L      Chloride 94 mmol/L      CO2 23.7 mmol/L      Calcium 9.4 mg/dL      Albumin 3.2 g/dL      Phosphorus 3.4 mg/dL      Anion Gap 14.3 mmol/L      BUN/Creatinine Ratio 59.3     eGFR 70.6 mL/min/1.73     Narrative:      GFR Categories in Chronic Kidney Disease (CKD)      GFR Category          GFR (mL/min/1.73)    Interpretation  G1                     90 or greater         Normal or high (1)  G2                      60-89                Mild decrease (1)  G3a                   45-59                Mild to moderate decrease  G3b                   30-44                Moderate to severe decrease  G4                    15-29                Severe decrease  G5                    14 or less           Kidney failure          (1)In the absence of evidence of kidney disease, neither GFR category G1 or G2 fulfill the criteria for CKD.    eGFR calculation 2021 CKD-EPI creatinine equation, which does not include race as a factor    CBC & Differential [[id number]]  (Abnormal) Collected: 04/11/25 0812    Specimen: Blood from Arm, Left Updated: 04/11/25 0819    Narrative:      The following orders were created for panel order CBC & Differential.  Procedure                               Abnormality         Status                     ---------                               -----------         ------                     CBC Auto Differential[[id number]]        Abnormal            Final result                 Please view results for these tests on the individual orders.    CBC Auto Differential [[id number]]  (Abnormal) Collected: 04/11/25 0812    Specimen: Blood from Arm, Left Updated: 04/11/25 0819     WBC 9.68 10*3/mm3      RBC 4.26  10*6/mm3      Hemoglobin 12.6 g/dL      Hematocrit 37.5 %      MCV 88.0 fL      MCH 29.6 pg      MCHC 33.6 g/dL      RDW 13.4 %      RDW-SD 43.2 fl      MPV 9.4 fL      Platelets 404 10*3/mm3      Neutrophil % 85.9 %      Lymphocyte % 6.6 %      Monocyte % 6.9 %      Eosinophil % 0.1 %      Basophil % 0.1 %      Immature Grans % 0.4 %      Neutrophils, Absolute 8.31 10*3/mm3      Lymphocytes, Absolute 0.64 10*3/mm3      Monocytes, Absolute 0.67 10*3/mm3      Eosinophils, Absolute 0.01 10*3/mm3      Basophils, Absolute 0.01 10*3/mm3      Immature Grans, Absolute 0.04 10*3/mm3      nRBC 0.0 /100 WBC     POC Glucose 4x Daily Before Meals & at Bedtime [[id number]]  (Abnormal) Collected: 04/11/25 0728    Specimen: Blood Updated: 04/11/25 0729     Glucose 287 mg/dL      Comment: Serial Number: [id number]:  [id number]       POC Glucose Once [[id number]]  (Abnormal) Collected: 04/10/25 2022    Specimen: Blood Updated: 04/10/25 2024     Glucose 466 mg/dL      Comment: Serial Number: [id number]:  [id number]       POC Glucose Once [[id number]]  (Abnormal) Collected: 04/10/25 1706    Specimen: Blood Updated: 04/10/25 1709     Glucose 458 mg/dL      Comment: Serial Number: [id number]:  [id number]             Imaging Results (Last 72 Hours)       Procedure Component Value Units Date/Time    MRI Brain With & Without Contrast [[id number]] Collected: 04/12/25 0121     Updated: 04/12/25 0136    Narrative:      MRI BRAIN W WO CONTRAST    Date of Exam: 4/11/2025 11:28 PM EDT    Indication: Assess for any neoplastic process versus stroke. Abnormal brain MRI.     Comparison: Noncontrast brain MRI 4/10/2025, brain MRI with and without contrast 11/11/2019    Technique:  Routine multiplanar/multisequence sequence images of the brain were obtained before and after the uneventful administration of Multihance.    Findings:  The diffusion series remains abnormal with signal noted in the medial right frontal parietal lesion  extending into the corpus callosum. Much of this appears to represent T2 shine through artifact. There is some signal loss on the ADC map. As such,   infarct is not completely excluded but is felt to be less likely. This is all probably secondary to what appears to be tumor as suggested on the prior MRI from 4/10/2025 with heterogeneous associated contrast enhancement. The area of abnormal enhancement   measures approximately 5.1 cm in AP dimension by at least 2.7 cm in ML dimension by about 1.6 cm in CC dimension. This does involve the body of the corpus callosum as well. 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 No other   pathologic contrast enhancement is identified. No acute hemorrhage is identified. Two small foci of hemosiderin in the left temporal lobe may reflect possibly old hemorrhagic infarcts or cavernomas. The major intracranial flow voids are maintained.   Craniocervical junction is within normal limits. Pituitary gland unremarkable. Chronic small vessel ischemic changes are noted in the periventricular white matter.      Impression:        1. Abnormal edema and associated pathologic contrast enhancement in the medial aspect of the right frontoparietal lobes in the centrum semiovale extending into the body of the corpus callosum. There is some restricted diffusion noted as well. 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 No associated hemorrhage. Some component of infarct is not completely excluded but felt to be less likely.  2. No other acute findings in the brain. Mild chronic small vessel ischemic changes are noted in the periventricular white matter.        Electronically Signed: Carlton Gaona MD    4/12/2025 1:34 AM EDT    Workstation ID: [id number]          ECG/EMG Results (last 72 hours)       ** No results found for the last 72 hours. **             Physician Progress Notes (last 48 hours)        Myles Brown MD at  [phone]           [facility]     Progress Note    Patient Name: Camilla Navas  : 1950  MRN: [id number]  Primary Care Physician:  Lester Lizarraga MD  Date of admission: 2025    Subjective   Patient seen today.  Blood sugars are overall better controlled now.  No acute events overnight    Scheduled Meds:apixaban, 5 mg, Oral, BID  arformoterol, 15 mcg, Nebulization, BID - RT   And  budesonide, 0.5 mg, Nebulization, BID - RT   And  revefenacin, 175 mcg, Nebulization, Daily - RT  baclofen, 10 mg, Oral, Daily  buPROPion XL, 150 mg, Oral, Q24H  carbidopa-levodopa, 1 tablet, Oral, TID  cefTRIAXone, 1,000 mg, Intravenous, Q24H  dilTIAZem CD, 360 mg, Oral, Q24H  furosemide, 20 mg, Oral, Daily  gabapentin, 300 mg, Oral, Nightly  glipizide, 5 mg, Oral, Daily  insulin glargine, 25 Units, Subcutaneous, Daily  insulin lispro, 4-24 Units, Subcutaneous, 4x Daily With Meals & Nightly  lisinopril, 5 mg, Oral, Daily  metoprolol succinate XL, 50 mg, Oral, Q12H  mirtazapine, 15 mg, Oral, Nightly  montelukast, 10 mg, Oral, Nightly  pantoprazole, 40 mg, Oral, Q AM  pravastatin, 80 mg, Oral, Nightly  sertraline, 200 mg, Oral, Daily      Continuous Infusions:   PRN Meds:.  acetaminophen **OR** acetaminophen **OR** acetaminophen    albuterol    aluminum-magnesium hydroxide-simethicone    senna-docusate sodium **AND** polyethylene glycol **AND** bisacodyl **AND** bisacodyl    dextrose    dextrose    diphenhydrAMINE    glucagon (human recombinant)    ondansetron    oxyCODONE    sodium chloride       Review of Systems  Constitutional:        Weakness tiredness fatigue  Eyes:                       No blurry vision, eye discharge, eye irritation, eye pain  HEENT:                   No acute hair loss, earache and discharge, nasal congestion or discharge, sore throat, postnasal drip  Respiratory:           No shortness of breath coughing sputum production wheezing hemoptysis pleuritic chest pain  Cardiovascular:      No chest pain, orthopnea, PND, dizziness, palpitation, lower extremity edema  Gastrointestinal:   No nausea vomiting diarrhea abdominal pain constipation  Genitourinary:       No urinary incontinence, hesitancy, frequency, urgency, dysuria  Hematologic:         No bruising, bleeding, pallor, lymphadenopathy  Endocrine:            No coldness, hot flashes, polyuria, abnormal hair growth  Musculoskeletal:    No body pains, aches, arthritic pains, muscle pain ,muscle wasting  Psychiatric:          No low or high mood, anxiety, hallucinations, delusions  Skin.                      No rash, ulcers, bruising, itching  Neurological:        No confusion, headache, focal weakness, numbness, dysphasia    Objective   Objective     Vitals:   Temp:  [97.3 °F (36.3 °C)-98.6 °F (37 °C)] 98.1 °F (36.7 °C)  Heart Rate:  [] 96  Resp:  [16-20] 18  BP: (130-150)/(67-97) 142/80  Physical Exam    Constitutional: Awake, alert responsive, conversant, no obvious distress              Psychiatric:  Appropriate affect, cooperative   Neurologic:  Awake alert ,oriented x 3, strength symmetric in all extremities, Cranial Nerves grossly intact to confrontation, speech clear   Eyes:   PERRLA, sclerae anicteric, no conjunctival injection   HEENT:  Moist mucous membranes, no nasal or eye discharge, no throat congestion   Neck:   Supple, no thyromegaly, no lymphadenopathy, trachea midline, no elevated JVD   Respiratory:  Clear to auscultation bilaterally, nonlabored respirations    Cardiovascular: RRR, no murmurs, rubs, or gallops, palpable pedal pulses bilaterally, No bilateral ankle edema   Gastrointestinal: Positive bowel sounds, soft, nontender, nondistended, no organomegaly   Musculoskeletal:  No clubbing or cyanosis to extremities,muscle wasting, joint swelling, muscle weakness             Skin:                      No rashes, bruising, skin ulcers, petechiae or ecchymosis    Result Review    Result Review:  I have personally reviewed  the results from the time of this admission to 4/13/2025 08:06 EDT and agree with these findings:  [x]  Laboratory  []  Microbiology  []  Radiology  []  EKG/Telemetry   []  Cardiology/Vascular   []  Pathology  []  Old records  []  Other:    Assessment & Plan   Assessment / Plan       Active Hospital Problems:  Active Hospital Problems    Diagnosis     **Generalized weakness     Hypokalemia      74-year-old female with past medical history of chronic A-fib on anticoagulation, type 2 diabetes, hyperlipidemia, asthma, history of breast cancer who came in with nausea and generalized weakness found to have potassium of 2.9 which has been repleted with labs otherwise largely unremarkable and urinalysis showing few RBCs.  Creatinine is otherwise normal.  Has uptrending white count and generalized weakness with frailty however no apparent obvious sign of infection.  Knee with bilateral osteoarthritis and effusion which was drained and treated by orthopedics on 4/8.  UA concerning for UTI and cultures positive sensitive to ceftriaxone with Klebsiella.  Due to patient's persistent tremor, MRI of the brain showed concerns for subacute infarct versus glial neoplasm    Plan:   Discontinue ceftriaxone as urine culture is growing coagulase-negative staph which is most likely a contaminant  Physical therapy.   to help with placement  Will continue Brovana Pulmicort and Yupelri and Singulair  Continue Eliquis 5 mg twice daily, Cardizem 360 and metoprolol 50 every 12  Continue with lisinopril 5 mg daily  Continue with Wellbutrin 150 every 24, baclofen 10 mg daily, Remeron 15 mg nightly, Zoloft 200 mg daily  Continue pravastatin 80 mg daily  Patient status post bilateral knee effusion drainage.  No concerns for infection  Neurology has been consulted.  Appreciate the help in managing the patient.    MRI brain suggestive of GBM or astrocytoma.  Sinemet for tremors  Continue current dose of insulin                Electronically signed by Myles Brown MD at 25 0810       Sunitha Suarez Jr., MD at 25 1529           [facility]     Progress Note             Patient Name: Camilla Navas  : 1950  MRN: [id number]  Primary Care Physician:  Lester Lizarraga MD  Date of admission: 2025        Present illness:  She is awake and more alert but she is more responsive.  She still is weaker on the right extremities.  The orolingual movement are essentially unchanged.  She mentioned that her granddaughter explained to her everything.  And because of this, she has trouble sleeping at night.  The Restoril we gave her for the MRI did not help.    The pupils were 3 to 4 mm there were an equal reactive to light directly and consensually.  There were no extraocular muscle weakness.  No facial asymmetry.    The repeat MRI of the brain was done on 2025 showed an enhancing mass in the right frontoparietal lobe medially extending to the corpus callosum measuring 51 mm in anterior-posterior diameter 4.6 mm in its transverse diameter and 21.1 mm in its vertical diameter..      Past Medical History:   Diagnosis Date    Arthritis     Asthma     Atherosclerosis of coronary artery 2018    Non-obstructive coronary artery disease. Cardiac catheterization done in 2018 showed a 40% mid LAD lesion and a 40% proximal RCA lesion.    Cancer     Breast.     COPD (chronic obstructive pulmonary disease)     Diabetes mellitus     Diverticulitis     Hiatal hernia     Hyperlipemia 2022    Hypertension, essential 2022       Past Surgical History:   Procedure Laterality Date    COLONOSCOPY      COLONOSCOPY N/A 2023    Procedure: COLONOSCOPY WITH POLYPECTOMIES HOT/COLD SNARE, ELEVIEW INJECTION, BIOPSIES;  Surgeon: Lester Gillette MD;  Location: [facility] ENDOSCOPY;  Service: Gastroenterology;  Laterality: N/A;  COLON POLYPS, DIVERTICULOSIS    ENDOSCOPY N/A 2023    Procedure:  ESOPHAGOGASTRODUODENOSCOPY WITH BIOPSIES;  Surgeon: Lester Gillette MD;  Location: [facility] ENDOSCOPY;  Service: Gastroenterology;  Laterality: N/A;  PREVIOUS SURGERY    HERNIA REPAIR      HYSTERECTOMY      MASTECTOMY Bilateral     UPPER GASTROINTESTINAL ENDOSCOPY         Family History: family history is not on file. Otherwise pertinent FHx was reviewed and not pertinent to current issue.    Social History:  reports that she has never smoked. She has been exposed to tobacco smoke. She has never used smokeless tobacco. She reports that she does not drink alcohol and does not use drugs.      Home Medications:  Fluticasone-Umeclidin-Vilant, Insulin Lispro, acetaminophen, albuterol sulfate HFA, apixaban, baclofen, buPROPion XL, dilTIAZem HCl ER, diphenhydrAMINE, furosemide, gabapentin, glipizide, hydroCHLOROthiazide, ipratropium-albuterol, lisinopril, metoprolol succinate XL, mirtazapine, montelukast, nitroglycerin, omeprazole, ondansetron, pravastatin, sertraline, vitamin D3, and vitamin E      Allergies:  Allergies   Allergen Reactions    Empagliflozin Nausea And Vomiting    Metformin Diarrhea       Vitals:   Temp:  [97.7 °F (36.5 °C)-98.6 °F (37 °C)] 98 °F (36.7 °C)  Heart Rate:  [] 108  Resp:  [16-20] 20  BP: (136-157)/(68-92) 136/92        RESULT REVIEW:  I have personally reviewed the results from the time of this admission to 4/12/2025 15:29 EDT and agree with these findings:  []  Laboratory  []  Microbiology  []  Radiology  []  EKG/Telemetry   []  Cardiology/Vascular   []  Pathology  []  Old records  []  Other:  Most notable findings include:   April 12, 2025  I reviewed the digital images of the MRI of her brain that was done on April 11, 2025 with and without contrast.  There is enhancing mass in the right frontoparietal lobe medially with slight slight surrounding edema suggestive of the tumor.    April 11, 2025  There is 4 x 2 mm area area of restricted diffusion in the right frontoparietal lobe  extending to the corpus callosum with surrounding edema suggestive of subacute infarct although a neoplastic process could not be ruled out completely.  In addition, there is a slight shift of the midline to the left which could go along with the neoplastic process.     I reviewed the digital images of the MR angiography of the neck vessels done on April 10, 2025 was unremarkable.     I reviewed the digital images of the MRI of the cervical spine that was done on April 10, 2025.  There is mild degenerative changes along the cervical spine and mild disrespects complex bulging at C5-C6 and C6-7 levels without evidence of nerve root or spinal cord compression.    Impression:    Neoplastic process, right frontoparietal lobe  Subacute right frontoparietal lobe infarct  Generalized weakness  Parkinsonian syndrome which may be related to Remeron.  Neck pain  Insomnia  Obesity with a body mass index of 32.74        Plan:   Will give her a therapeutic trial of Sinemet and see whether this will help..  Will see what the repeat MRI with contrast show.       Electronically signed by Sunitha Suarez Jr., MD, 25, 3:29 PM EDT.        Please note that portions of this note were completed with a voice recognition program.  Part of this note is an electric or electronic transcription/translation of spoken language to printed text using the dragon dictating system.    Electronically signed by Sunitha Suarez Jr., MD at 25 1541       Kadeem Freire MD at 25 0837           [facility]     Progress Note    Patient Name: Camilla Navas  : 1950  MRN: [id number]  Primary Care Physician:  Lester Lizarraga MD  Date of admission: 2025    Subjective   Subjective     HPI:  Patient Reports her knees are feeling better.  She is having less pain and soreness.  She denies any new orthopedic complaints.    Review of Systems   See HPI    Objective   Objective     Vitals:   Temp:  [97.7 °F (36.5  °C)-99 °F (37.2 °C)] 98.6 °F (37 °C)  Heart Rate:  [] 90  Resp:  [16-20] 20  BP: (124-157)/(50-81) 142/77  Physical Exam    General: Alert, no acute distress   Chest: Unlabored breathing, cardiovascular: Regular heart rate   Musculoskeletal: Neurovascular intact.  Decreased swelling and tenderness.    Result Review      WBC   Date Value Ref Range Status   04/12/2025 10.00 3.40 - 10.80 10*3/mm3 Final     RBC   Date Value Ref Range Status   04/12/2025 4.38 3.77 - 5.28 10*6/mm3 Final     Hemoglobin   Date Value Ref Range Status   04/12/2025 12.8 12.0 - 15.9 g/dL Final     Hematocrit   Date Value Ref Range Status   04/12/2025 38.7 34.0 - 46.6 % Final     MCV   Date Value Ref Range Status   04/12/2025 88.4 79.0 - 97.0 fL Final     MCH   Date Value Ref Range Status   04/12/2025 29.2 26.6 - 33.0 pg Final     MCHC   Date Value Ref Range Status   04/12/2025 33.1 31.5 - 35.7 g/dL Final     RDW   Date Value Ref Range Status   04/12/2025 13.2 12.3 - 15.4 % Final     RDW-SD   Date Value Ref Range Status   04/12/2025 43.0 37.0 - 54.0 fl Final     MPV   Date Value Ref Range Status   04/12/2025 9.3 6.0 - 12.0 fL Final     Platelets   Date Value Ref Range Status   04/12/2025 414 140 - 450 10*3/mm3 Final     Neutrophil %   Date Value Ref Range Status   04/12/2025 79.2 (H) 42.7 - 76.0 % Final     Lymphocyte %   Date Value Ref Range Status   04/12/2025 9.7 (L) 19.6 - 45.3 % Final     Monocyte %   Date Value Ref Range Status   04/12/2025 10.1 5.0 - 12.0 % Final     Eosinophil %   Date Value Ref Range Status   04/12/2025 0.1 (L) 0.3 - 6.2 % Final     Basophil %   Date Value Ref Range Status   04/12/2025 0.2 0.0 - 1.5 % Final     Immature Grans %   Date Value Ref Range Status   04/12/2025 0.7 (H) 0.0 - 0.5 % Final     Neutrophils, Absolute   Date Value Ref Range Status   04/12/2025 7.92 (H) 1.70 - 7.00 10*3/mm3 Final     Lymphocytes, Absolute   Date Value Ref Range Status   04/12/2025 0.97 0.70 - 3.10 10*3/mm3 Final     Monocytes,  Absolute   Date Value Ref Range Status   2025 1.01 (H) 0.10 - 0.90 10*3/mm3 Final     Eosinophils, Absolute   Date Value Ref Range Status   2025 0.01 0.00 - 0.40 10*3/mm3 Final     Basophils, Absolute   Date Value Ref Range Status   2025 0.02 0.00 - 0.20 10*3/mm3 Final     Immature Grans, Absolute   Date Value Ref Range Status   2025 0.07 (H) 0.00 - 0.05 10*3/mm3 Final     nRBC   Date Value Ref Range Status   2025 0.0 0.0 - 0.2 /100 WBC Final        Result Review:  I have personally reviewed the results from the time of this admission to 2025 08:37 EDT and agree with these findings:  [x]  Laboratory  []  Microbiology  []  Radiology  []  EKG/Telemetry   []  Cardiology/Vascular   []  Pathology  []  Old records  []  Other:      Assessment & Plan   Assessment / Plan     Brief Patient Summary:  Camilla Navas is a 74 y.o. female who has bilateral knee osteoarthritis    Active Hospital Problems:  Active Hospital Problems    Diagnosis     **Generalized weakness     Hypokalemia      Plan: Weightbearing as tolerated with walker and fall precautions  Physical and Occupational Therapy as able  Pain control  DVT prophylaxis  Discharge planning: When medically able.  Follow-up with orthopedics as needed.  When medically able       VTE Prophylaxis:  Pharmacologic VTE prophylaxis orders are present.        CODE STATUS:   Code Status (Patient has no pulse and is not breathing): CPR (Attempt to Resuscitate)  Medical Interventions (Patient has pulse or is breathing): Full Support    Disposition:  I expect patient to be discharged when medically able.    Electronically signed by Kadeem Freire MD, 25, 8:37 AM EDT.               Electronically signed by Kadeem Freire MD at 25 0838       Myles Brown MD at 25 0800           [facility]     Progress Note    Patient Name: Camilla Navas  : 1950  MRN: [id number]  Primary Care Physician:  Lester Lizarraga  MD Prince  Date of admission: 4/4/2025    Subjective   Patient seen today.  Blood sugars were elevated overnight and she needed increased dose of insulin.    Scheduled Meds:apixaban, 5 mg, Oral, BID  arformoterol, 15 mcg, Nebulization, BID - RT   And  budesonide, 0.5 mg, Nebulization, BID - RT   And  revefenacin, 175 mcg, Nebulization, Daily - RT  baclofen, 10 mg, Oral, Daily  buPROPion XL, 150 mg, Oral, Q24H  cefTRIAXone, 1,000 mg, Intravenous, Q24H  dilTIAZem CD, 360 mg, Oral, Q24H  furosemide, 20 mg, Oral, Daily  gabapentin, 300 mg, Oral, Nightly  glipizide, 5 mg, Oral, Daily  insulin glargine, 25 Units, Subcutaneous, Daily  insulin lispro, 4-24 Units, Subcutaneous, 4x Daily With Meals & Nightly  lisinopril, 5 mg, Oral, Daily  metoprolol succinate XL, 50 mg, Oral, Q12H  mirtazapine, 15 mg, Oral, Nightly  montelukast, 10 mg, Oral, Nightly  pantoprazole, 40 mg, Oral, Q AM  pravastatin, 80 mg, Oral, Nightly  sertraline, 200 mg, Oral, Daily      Continuous Infusions:   PRN Meds:.  acetaminophen **OR** acetaminophen **OR** acetaminophen    albuterol    aluminum-magnesium hydroxide-simethicone    senna-docusate sodium **AND** polyethylene glycol **AND** bisacodyl **AND** bisacodyl    dextrose    dextrose    diphenhydrAMINE    glucagon (human recombinant)    ondansetron    oxyCODONE    sodium chloride       Review of Systems  Constitutional:        Weakness tiredness fatigue  Eyes:                       No blurry vision, eye discharge, eye irritation, eye pain  HEENT:                   No acute hair loss, earache and discharge, nasal congestion or discharge, sore throat, postnasal drip  Respiratory:           No shortness of breath coughing sputum production wheezing hemoptysis pleuritic chest pain  Cardiovascular:     No chest pain, orthopnea, PND, dizziness, palpitation, lower extremity edema  Gastrointestinal:   No nausea vomiting diarrhea abdominal pain constipation  Genitourinary:       No urinary incontinence,  hesitancy, frequency, urgency, dysuria  Hematologic:         No bruising, bleeding, pallor, lymphadenopathy  Endocrine:            No coldness, hot flashes, polyuria, abnormal hair growth  Musculoskeletal:    No body pains, aches, arthritic pains, muscle pain ,muscle wasting  Psychiatric:          No low or high mood, anxiety, hallucinations, delusions  Skin.                      No rash, ulcers, bruising, itching  Neurological:        No confusion, headache, focal weakness, numbness, dysphasia    Objective   Objective     Vitals:   Temp:  [97.7 °F (36.5 °C)-99 °F (37.2 °C)] 98.6 °F (37 °C)  Heart Rate:  [] 90  Resp:  [16-20] 20  BP: (124-157)/(50-81) 142/77  Physical Exam    Constitutional: Awake, alert responsive, conversant, no obvious distress              Psychiatric:  Appropriate affect, cooperative   Neurologic:  Awake alert ,oriented x 3, strength symmetric in all extremities, Cranial Nerves grossly intact to confrontation, speech clear   Eyes:   PERRLA, sclerae anicteric, no conjunctival injection   HEENT:  Moist mucous membranes, no nasal or eye discharge, no throat congestion   Neck:   Supple, no thyromegaly, no lymphadenopathy, trachea midline, no elevated JVD   Respiratory:  Clear to auscultation bilaterally, nonlabored respirations    Cardiovascular: RRR, no murmurs, rubs, or gallops, palpable pedal pulses bilaterally, No bilateral ankle edema   Gastrointestinal: Positive bowel sounds, soft, nontender, nondistended, no organomegaly   Musculoskeletal:  No clubbing or cyanosis to extremities,muscle wasting, joint swelling, muscle weakness             Skin:                      No rashes, bruising, skin ulcers, petechiae or ecchymosis    Result Review    Result Review:  I have personally reviewed the results from the time of this admission to 4/12/2025 08:00 EDT and agree with these findings:  [x]  Laboratory  []  Microbiology  []  Radiology  []  EKG/Telemetry   []  Cardiology/Vascular   []   Pathology  []  Old records  []  Other:    Assessment & Plan   Assessment / Plan       Active Hospital Problems:  Active Hospital Problems    Diagnosis     **Generalized weakness     Hypokalemia      74-year-old female with past medical history of chronic A-fib on anticoagulation, type 2 diabetes, hyperlipidemia, asthma, history of breast cancer who came in with nausea and generalized weakness found to have potassium of 2.9 which has been repleted with labs otherwise largely unremarkable and urinalysis showing few RBCs.  Creatinine is otherwise normal.  Has uptrending white count and generalized weakness with frailty however no apparent obvious sign of infection.  Knee with bilateral osteoarthritis and effusion which was drained and treated by orthopedics on 4/8.  UA concerning for UTI and cultures positive sensitive to ceftriaxone with Klebsiella.  Due to patient's persistent tremor, MRI of the brain showed concerns for subacute infarct versus glial neoplasm    Plan:   Continue ceftriaxone 1 g  Physical therapy.   to help with placement  Will continue Brovana Pulmicort and Yupelri and Singulair  Continue Eliquis 5 mg twice daily, Cardizem 360 and metoprolol 50 every 12  Continue with lisinopril 5 mg daily  Continue with Wellbutrin 150 every 24, baclofen 10 mg daily, Remeron 15 mg nightly, Zoloft 200 mg daily  Continue pravastatin 80 mg daily  Patient status post bilateral knee effusion drainage.  No concerns for infection  Neurology has been consulted.  Appreciate the help in managing the patient.    MRI brain suggestive of GBM or astrocytoma.  Started on Sinemet for tremors  Glucose is better controlled this morning after increase in insulin               Electronically signed by Myles Brown MD at 04/12/25 0804       Consult Notes (last 48 hours)  Notes from 04/11/25 1444 through 04/13/25 1444   No notes of this type exist for this encounter.         [facility]  Ph  [phone]  F [phone]

## 2025-04-14 LAB
ALBUMIN SERPL-MCNC: 2.8 G/DL (ref 3.5–5.2)
ALBUMIN/GLOB SERPL: 0.8 G/DL
ALP SERPL-CCNC: 79 U/L (ref 39–117)
ALT SERPL W P-5'-P-CCNC: 12 U/L (ref 1–33)
ANION GAP SERPL CALCULATED.3IONS-SCNC: 11.6 MMOL/L (ref 5–15)
AST SERPL-CCNC: 37 U/L (ref 1–32)
BASOPHILS # BLD AUTO: 0.04 10*3/MM3 (ref 0–0.2)
BASOPHILS NFR BLD AUTO: 0.3 % (ref 0–1.5)
BILIRUB SERPL-MCNC: 0.2 MG/DL (ref 0–1.2)
BUN SERPL-MCNC: 27 MG/DL (ref 8–23)
BUN/CREAT SERPL: 40.9 (ref 7–25)
CALCIUM SPEC-SCNC: 9.3 MG/DL (ref 8.6–10.5)
CHLORIDE SERPL-SCNC: 102 MMOL/L (ref 98–107)
CO2 SERPL-SCNC: 20.4 MMOL/L (ref 22–29)
CREAT SERPL-MCNC: 0.66 MG/DL (ref 0.57–1)
DEPRECATED RDW RBC AUTO: 44 FL (ref 37–54)
EGFRCR SERPLBLD CKD-EPI 2021: 91.6 ML/MIN/1.73
EOSINOPHIL # BLD AUTO: 0.08 10*3/MM3 (ref 0–0.4)
EOSINOPHIL NFR BLD AUTO: 0.6 % (ref 0.3–6.2)
ERYTHROCYTE [DISTWIDTH] IN BLOOD BY AUTOMATED COUNT: 13.4 % (ref 12.3–15.4)
GLOBULIN UR ELPH-MCNC: 3.7 GM/DL
GLUCOSE BLDC GLUCOMTR-MCNC: 190 MG/DL (ref 70–99)
GLUCOSE BLDC GLUCOMTR-MCNC: 212 MG/DL (ref 70–99)
GLUCOSE BLDC GLUCOMTR-MCNC: 234 MG/DL (ref 70–99)
GLUCOSE BLDC GLUCOMTR-MCNC: 353 MG/DL (ref 70–99)
GLUCOSE SERPL-MCNC: 161 MG/DL (ref 65–99)
HCT VFR BLD AUTO: 41.5 % (ref 34–46.6)
HGB BLD-MCNC: 13.5 G/DL (ref 12–15.9)
IMM GRANULOCYTES # BLD AUTO: 0.29 10*3/MM3 (ref 0–0.05)
IMM GRANULOCYTES NFR BLD AUTO: 2.1 % (ref 0–0.5)
LYMPHOCYTES # BLD AUTO: 1.45 10*3/MM3 (ref 0.7–3.1)
LYMPHOCYTES NFR BLD AUTO: 10.3 % (ref 19.6–45.3)
MCH RBC QN AUTO: 29.3 PG (ref 26.6–33)
MCHC RBC AUTO-ENTMCNC: 32.5 G/DL (ref 31.5–35.7)
MCV RBC AUTO: 90.2 FL (ref 79–97)
MONOCYTES # BLD AUTO: 0.9 10*3/MM3 (ref 0.1–0.9)
MONOCYTES NFR BLD AUTO: 6.4 % (ref 5–12)
NEUTROPHILS NFR BLD AUTO: 11.31 10*3/MM3 (ref 1.7–7)
NEUTROPHILS NFR BLD AUTO: 80.3 % (ref 42.7–76)
NRBC BLD AUTO-RTO: 0 /100 WBC (ref 0–0.2)
PLATELET # BLD AUTO: 453 10*3/MM3 (ref 140–450)
PMV BLD AUTO: 9.2 FL (ref 6–12)
POTASSIUM SERPL-SCNC: 4.7 MMOL/L (ref 3.5–5.2)
PROT SERPL-MCNC: 6.5 G/DL (ref 6–8.5)
RBC # BLD AUTO: 4.6 10*6/MM3 (ref 3.77–5.28)
SODIUM SERPL-SCNC: 134 MMOL/L (ref 136–145)
WBC NRBC COR # BLD AUTO: 14.07 10*3/MM3 (ref 3.4–10.8)

## 2025-04-14 PROCEDURE — 97530 THERAPEUTIC ACTIVITIES: CPT

## 2025-04-14 PROCEDURE — 82948 REAGENT STRIP/BLOOD GLUCOSE: CPT | Performed by: STUDENT IN AN ORGANIZED HEALTH CARE EDUCATION/TRAINING PROGRAM

## 2025-04-14 PROCEDURE — 92526 ORAL FUNCTION THERAPY: CPT

## 2025-04-14 PROCEDURE — 94761 N-INVAS EAR/PLS OXIMETRY MLT: CPT

## 2025-04-14 PROCEDURE — 63710000001 INSULIN LISPRO (HUMAN) PER 5 UNITS: Performed by: STUDENT IN AN ORGANIZED HEALTH CARE EDUCATION/TRAINING PROGRAM

## 2025-04-14 PROCEDURE — 94664 DEMO&/EVAL PT USE INHALER: CPT

## 2025-04-14 PROCEDURE — 82948 REAGENT STRIP/BLOOD GLUCOSE: CPT

## 2025-04-14 PROCEDURE — 94799 UNLISTED PULMONARY SVC/PX: CPT

## 2025-04-14 PROCEDURE — 25010000002 ONDANSETRON PER 1 MG: Performed by: INTERNAL MEDICINE

## 2025-04-14 PROCEDURE — 97110 THERAPEUTIC EXERCISES: CPT

## 2025-04-14 PROCEDURE — 63710000001 REVEFENACIN 175 MCG/3ML SOLUTION: Performed by: STUDENT IN AN ORGANIZED HEALTH CARE EDUCATION/TRAINING PROGRAM

## 2025-04-14 PROCEDURE — 63710000001 INSULIN GLARGINE PER 5 UNITS: Performed by: STUDENT IN AN ORGANIZED HEALTH CARE EDUCATION/TRAINING PROGRAM

## 2025-04-14 PROCEDURE — 80053 COMPREHEN METABOLIC PANEL: CPT | Performed by: STUDENT IN AN ORGANIZED HEALTH CARE EDUCATION/TRAINING PROGRAM

## 2025-04-14 PROCEDURE — 85025 COMPLETE CBC W/AUTO DIFF WBC: CPT | Performed by: STUDENT IN AN ORGANIZED HEALTH CARE EDUCATION/TRAINING PROGRAM

## 2025-04-14 PROCEDURE — 99222 1ST HOSP IP/OBS MODERATE 55: CPT | Performed by: NEUROLOGICAL SURGERY

## 2025-04-14 PROCEDURE — 63710000001 DIPHENHYDRAMINE PER 50 MG: Performed by: STUDENT IN AN ORGANIZED HEALTH CARE EDUCATION/TRAINING PROGRAM

## 2025-04-14 RX ORDER — GLIPIZIDE 5 MG/1
5 TABLET ORAL
Status: DISCONTINUED | OUTPATIENT
Start: 2025-04-14 | End: 2025-04-15

## 2025-04-14 RX ADMIN — BACLOFEN 10 MG: 10 TABLET ORAL at 10:32

## 2025-04-14 RX ADMIN — BUPROPION HYDROCHLORIDE 150 MG: 150 TABLET, EXTENDED RELEASE ORAL at 10:28

## 2025-04-14 RX ADMIN — PANTOPRAZOLE SODIUM 40 MG: 40 TABLET, DELAYED RELEASE ORAL at 05:43

## 2025-04-14 RX ADMIN — INSULIN LISPRO 8 UNITS: 100 INJECTION, SOLUTION INTRAVENOUS; SUBCUTANEOUS at 11:48

## 2025-04-14 RX ADMIN — INSULIN LISPRO 10 UNITS: 100 INJECTION, SOLUTION INTRAVENOUS; SUBCUTANEOUS at 17:41

## 2025-04-14 RX ADMIN — GLIPIZIDE 5 MG: 5 TABLET ORAL at 10:28

## 2025-04-14 RX ADMIN — APIXABAN 5 MG: 5 TABLET, FILM COATED ORAL at 10:29

## 2025-04-14 RX ADMIN — INSULIN GLARGINE 25 UNITS: 100 INJECTION, SOLUTION SUBCUTANEOUS at 10:28

## 2025-04-14 RX ADMIN — ONDANSETRON 4 MG: 2 INJECTION INTRAMUSCULAR; INTRAVENOUS at 11:20

## 2025-04-14 RX ADMIN — GABAPENTIN 300 MG: 300 CAPSULE ORAL at 21:25

## 2025-04-14 RX ADMIN — FUROSEMIDE 20 MG: 20 TABLET ORAL at 10:29

## 2025-04-14 RX ADMIN — ACETAMINOPHEN 650 MG: 325 TABLET ORAL at 22:58

## 2025-04-14 RX ADMIN — LISINOPRIL 5 MG: 5 TABLET ORAL at 10:29

## 2025-04-14 RX ADMIN — SERTRALINE HYDROCHLORIDE 200 MG: 100 TABLET ORAL at 10:29

## 2025-04-14 RX ADMIN — DIPHENHYDRAMINE HYDROCHLORIDE 25 MG: 25 CAPSULE ORAL at 21:25

## 2025-04-14 RX ADMIN — CARBIDOPA AND LEVODOPA 1 TABLET: 10; 100 TABLET ORAL at 10:32

## 2025-04-14 RX ADMIN — MIRTAZAPINE 15 MG: 15 TABLET, FILM COATED ORAL at 21:25

## 2025-04-14 RX ADMIN — PRAVASTATIN SODIUM 80 MG: 20 TABLET ORAL at 21:25

## 2025-04-14 RX ADMIN — DILTIAZEM HYDROCHLORIDE 360 MG: 180 CAPSULE, COATED, EXTENDED RELEASE ORAL at 10:29

## 2025-04-14 RX ADMIN — Medication 10 ML: at 21:26

## 2025-04-14 RX ADMIN — CARBIDOPA AND LEVODOPA 1 TABLET: 10; 100 TABLET ORAL at 21:25

## 2025-04-14 RX ADMIN — REVEFENACIN 175 MCG: 175 SOLUTION RESPIRATORY (INHALATION) at 09:47

## 2025-04-14 RX ADMIN — BUDESONIDE 0.5 MG: 0.5 SUSPENSION RESPIRATORY (INHALATION) at 09:48

## 2025-04-14 RX ADMIN — INSULIN LISPRO 4 UNITS: 100 INJECTION, SOLUTION INTRAVENOUS; SUBCUTANEOUS at 08:31

## 2025-04-14 RX ADMIN — INSULIN LISPRO 20 UNITS: 100 INJECTION, SOLUTION INTRAVENOUS; SUBCUTANEOUS at 21:25

## 2025-04-14 RX ADMIN — METOPROLOL SUCCINATE 50 MG: 50 TABLET, FILM COATED, EXTENDED RELEASE ORAL at 10:29

## 2025-04-14 RX ADMIN — MONTELUKAST 10 MG: 10 TABLET, FILM COATED ORAL at 21:24

## 2025-04-14 RX ADMIN — ARFORMOTEROL TARTRATE 15 MCG: 15 SOLUTION RESPIRATORY (INHALATION) at 09:48

## 2025-04-14 RX ADMIN — ARFORMOTEROL TARTRATE 15 MCG: 15 SOLUTION RESPIRATORY (INHALATION) at 20:03

## 2025-04-14 RX ADMIN — BUDESONIDE 0.5 MG: 0.5 SUSPENSION RESPIRATORY (INHALATION) at 20:03

## 2025-04-14 RX ADMIN — GLIPIZIDE 5 MG: 5 TABLET ORAL at 17:40

## 2025-04-14 RX ADMIN — METOPROLOL SUCCINATE 50 MG: 50 TABLET, FILM COATED, EXTENDED RELEASE ORAL at 21:25

## 2025-04-14 RX ADMIN — CARBIDOPA AND LEVODOPA 1 TABLET: 10; 100 TABLET ORAL at 17:40

## 2025-04-14 RX ADMIN — APIXABAN 5 MG: 5 TABLET, FILM COATED ORAL at 21:24

## 2025-04-14 NOTE — THERAPY TREATMENT NOTE
Acute Care - Speech Language Pathology   Swallow Treatment Note NILSON Strickland     Patient Name: Camilla Navas  : 1950  MRN: [id number]  Today's Date: 2025               Admit Date: 2025    Visit Dx:     ICD-10-CM ICD-9-CM   1. Generalized weakness  R53.1 780.79   2. Hypokalemia  E87.6 276.8   3. Bilateral lower extremity edema  R60.0 782.3   4. Unable to ambulate  R26.2 719.7   5. Acute urinary retention  R33.8 788.29   6. Difficulty walking  R26.2 719.7   7. Oropharyngeal dysphagia  R13.12 787.22     Patient Active Problem List   Diagnosis    Reflux esophagitis    Type 2 diabetes mellitus    Depression    DDD (degenerative disc disease), cervical    Cough    Colon polyps    Chest pain    Cancer    Atrophic vaginitis    Bladder disorder    Nonobstructive atherosclerosis of coronary artery    Asthma without status asthmaticus    Adjustment disorder with anxiety    Functional dyspepsia    Esophageal reflux    Ear pain    Dry mouth    Diverticula of intestine    Diabetic peripheral neuropathy associated with type 2 diabetes mellitus    Difficult or painful urination    Limb swelling    Insomnia    Hyperlipemia    Hematuria    Generalized anxiety disorder    Menopausal and postmenopausal disorder    Mandibular mass    Malignant neoplasm of female breast    Major depression, single episode    Sensorineural hearing loss    Seasonal allergic rhinitis    Renal mass    Overweight with body mass index (BMI) 25.0-29.9    Osteoarthritis    Obesity    Vaginal itching    Simple renal cyst    Essential hypertension    Diarrhea    Generalized abdominal pain    Nausea    Heartburn    Pedal edema    Hypokalemia    Pharyngitis    Persistent atrial fibrillation    Atrial fib/flutter, transient    Chest discomfort    Atrial fibrillation with rapid ventricular response    Dehydration    Diabetic gastroparesis    Generalized weakness    Neoplastic growth     Past Medical History:   Diagnosis Date    Arthritis     Asthma      Atherosclerosis of coronary artery 2018    Non-obstructive coronary artery disease. Cardiac catheterization done in October 2018 showed a 40% mid LAD lesion and a 40% proximal RCA lesion.    Cancer     Breast.     COPD (chronic obstructive pulmonary disease)     Diabetes mellitus     Diverticulitis     Hiatal hernia     Hyperlipemia 01/21/2022    Hypertension, essential 03/05/2022     Past Surgical History:   Procedure Laterality Date    COLONOSCOPY      COLONOSCOPY N/A 11/14/2023    Procedure: COLONOSCOPY WITH POLYPECTOMIES HOT/COLD SNARE, ELEVIEW INJECTION, BIOPSIES;  Surgeon: Lester Gillette MD;  Location: [facility] ENDOSCOPY;  Service: Gastroenterology;  Laterality: N/A;  COLON POLYPS, DIVERTICULOSIS    ENDOSCOPY N/A 11/14/2023    Procedure: ESOPHAGOGASTRODUODENOSCOPY WITH BIOPSIES;  Surgeon: Lester Gillette MD;  Location: [facility] ENDOSCOPY;  Service: Gastroenterology;  Laterality: N/A;  PREVIOUS SURGERY    HERNIA REPAIR      HYSTERECTOMY      MASTECTOMY Bilateral     UPPER GASTROINTESTINAL ENDOSCOPY         SLP Recommendation and Plan         SPEECH PATHOLOGY DYSPHAGIA TREATMENT    Subjective/Behavioral Observations: awake, cooperative, pleasant. Improved alertness compared to evaluation on 4/10/25.         Day/time of Treatment:4/14/25        Current Diet:mechanical soft, nectar thick liquids        Current Strategies:small bites and sips at a slow rate, assist with meals        Treatment received:focused on trials of thin liquids for possible diet upgrade        Results of treatment: Patient tolerated nectar thick liquid by straw with mild delayed swallow. Lower jaw tremors observed however appear decreased compared to evaluation on 4/10/25. Mechanical soft solids cut small with extended chewing however improvement in manipulation. Mild oral residue however clears with liquid wash assist. Trial of thin liquid by cup and single straw sip. Swallows completed with min delay. No overt s/s of aspiration.  Free drinking of thin liquid by straw with consistent throat clearing and delayed cough. Repeat trials of single sips of thin liquid by straw with no overt s/s of aspiration.         Progress toward goals:progressing        Barriers to Achieving goals: medical status        Plan of care:/changes in plan: based on treatment results, will upgrade to thin liquids, single sips. Educated patient on diet and compensatory strategies to decrease her risk of aspiration.                                                                                              EDUCATION  The patient has been educated in the following areas:   Dysphagia (Swallowing Impairment).                Time Calculation:    Time Calculation- SLP       Row Name 04/14/25 1227             Time Calculation- SLP    SLP Stop Time 1000  -SN      SLP Received On 04/14/25  -SN         Untimed Charges    36260-ML Treatment Swallow Minutes 45  -SN         Total Minutes    Untimed Charges Total Minutes 45  -SN       Total Minutes 45  -SN                User Key  (r) = Recorded By, (t) = Taken By, (c) = Cosigned By      Initials Name Provider Type    SN Yamileth oMrris MS-CCC/SLP, FRANCES Speech and Language Pathologist                    Therapy Charges for Today       Code Description Service Date Service Provider Modifiers Qty    [id number]  ST TREATMENT SWALLOW 3 4/14/2025 Yamileth Morris MS-CCC/SLPFRANCES GN 1                 PRINCE Greenberg/FRANCES NAGY  4/14/2025

## 2025-04-14 NOTE — PLAN OF CARE
Goal Outcome Evaluation:           Progress: improving  Outcome Evaluation: Patient alert & oriented 3. q2hr turrn, feeder. able to take pills whole. resting good troughout night. d/c plan unsure at this time

## 2025-04-14 NOTE — CONSULTS
[facility]   Neurosurgery Consult    Patient Name:Camilla Navas  : 1950  MRN: [id number]  Primary Care Physician: Lester Lizarraga MD  Date of admission: 2025    Subjective   Subjective     Chief Complaint: Generalized weakness, abnormal brain MRI    History of Present Illness   Camilla Navas is a 74 y.o. female who has had generalized weakness dating back several weeks.  She has had difficulty ambulating for at least 2 to 3 weeks.  She has had some nausea and vomiting intermittently also.  She feels like it was relatively slow to onset.  She had an abnormal MRI of the brain without contrast and a repeat MRI of the brain with contrast demonstrated some contrast-enhancement in the right periventricular region extending across the corpus callosum.  She reports if anything her right sided weakness is actually worse than her left.  She does not have significant headache right now.  She has a history of breast cancer on 2 separate occasions approximately  in 2018.  No other cancers that she is aware of.    Review of Systems   Neurological:  Positive for weakness.         Personal History     Past Medical History:   Diagnosis Date   • Arthritis    • Asthma    • Atherosclerosis of coronary artery 2018    Non-obstructive coronary artery disease. Cardiac catheterization done in 2018 showed a 40% mid LAD lesion and a 40% proximal RCA lesion.   • Cancer     Breast.    • COPD (chronic obstructive pulmonary disease)    • Diabetes mellitus    • Diverticulitis    • Hiatal hernia    • Hyperlipemia 2022   • Hypertension, essential 2022       Past Surgical History:   Procedure Laterality Date   • COLONOSCOPY     • COLONOSCOPY N/A 2023    Procedure: COLONOSCOPY WITH POLYPECTOMIES HOT/COLD SNARE, ELEVIEW INJECTION, BIOPSIES;  Surgeon: Lester Gillette MD;  Location: [facility] ENDOSCOPY;  Service: Gastroenterology;  Laterality: N/A;  COLON POLYPS, DIVERTICULOSIS   • ENDOSCOPY  N/A 11/14/2023    Procedure: ESOPHAGOGASTRODUODENOSCOPY WITH BIOPSIES;  Surgeon: Lester Gillette MD;  Location: [facility] ENDOSCOPY;  Service: Gastroenterology;  Laterality: N/A;  PREVIOUS SURGERY   • HERNIA REPAIR     • HYSTERECTOMY     • MASTECTOMY Bilateral    • UPPER GASTROINTESTINAL ENDOSCOPY         Family History: Her family history is not on file.     Social History: She  reports that she has never smoked. She has been exposed to tobacco smoke. She has never used smokeless tobacco. She reports that she does not drink alcohol and does not use drugs.    Home Medications:  Fluticasone-Umeclidin-Vilant, Insulin Lispro, acetaminophen, albuterol sulfate HFA, apixaban, baclofen, buPROPion XL, dilTIAZem HCl ER, diphenhydrAMINE, furosemide, gabapentin, glipizide, hydroCHLOROthiazide, ipratropium-albuterol, lisinopril, metoprolol succinate XL, mirtazapine, montelukast, nitroglycerin, omeprazole, ondansetron, pravastatin, sertraline, vitamin D3, and vitamin E    Allergies:  She is allergic to empagliflozin and metformin.    Objective    Objective     Vitals:    Temp:  [97.5 °F (36.4 °C)-98.8 °F (37.1 °C)] 97.5 °F (36.4 °C)  Heart Rate:  [] 101  Resp:  [16-20] 18  BP: (114-143)/() 138/104  Output by Drain (mL) 04/13/25 0701 - 04/13/25 1900 04/13/25 1901 - 04/14/25 0700 04/14/25 0701 - 04/14/25 0925 Range Total   Urethral Catheter 1150 800  1951       Physical Exam  Constitutional:       Comments: BMI 31   Pulmonary:      Effort: Pulmonary effort is normal.   Neurological:      Mental Status: She is alert.      Comments: She has a tremor involving her lower jaw/lip.  She has no clear pronator drift.  Finger-to-nose testing is intact.  She has mild generalized weakness.  Her speech is fluent.  She answers questions appropriately.     Psychiatric:         Mood and Affect: Mood normal.          Result Review    Result Review:  I have personally reviewed the results from the time of this admission to 4/14/2025  09:25 EDT and agree with these findings:  []  Laboratory  []  Microbiology  [x]  Radiology  []  EKG/Telemetry   []  Cardiology/Vascular   []  Pathology  []  Old records  []  Other:  Most notable findings include: Her MRI demonstrates a right periventricular lesion which extends across the corpus callosum.  There is not a significant amount of vasogenic edema.  I would favor lymphoma over GBM.  This could be also a delayed stroke as they do contrast-enhanced after a few weeks.    Assessment & Plan   Assessment / Plan     Brief Patient Summary:  Camilla Navas is a 74 y.o. female has a right brain lesion concerning for possible malignancy such as lymphoma or glioblastoma.  It is also possible, but felt less likely, that this represents the delayed presentation of a stroke.    Active Hospital Problems:  Active Hospital Problems    Diagnosis    • **Generalized weakness    • Neoplastic growth    • Hypokalemia      Plan:   With right sided lesion crossing the corpus callosum.  The prognosis for both glioblastoma and primary CNS lymphoma are very poor in 75-year old patients.  Her options include brain biopsy, lumbar puncture versus noninvasive interventions/comfort measures.  They will discuss this as a family and let me know their decision.    VTE Prophylaxis:  Pharmacologic VTE prophylaxis orders are present.

## 2025-04-14 NOTE — PROGRESS NOTES
[facility]     Progress Note             Patient Name: Camilla Navas  : 1950  MRN: [id number]  Primary Care Physician:  Lester Lizarraga MD  Date of admission: 2025        Present illness:  Her daughter, granddaughter, and grandson in law were at the bedside.    She looks better patient is awake and more alert.  Is more responsive.  Her responses were coherent and relevant.  There were no extraocular muscle weakness.  No facial asymmetry.  However, she is weaker in the right side, which is the wrong side.  Her heart and lungs were unremarkable.  The tremors of her mouth and lips are less.    I reviewed with him the digital images of the MRI of her brain that was done on 2025.  I pointed out the location of the tumor.  They were informed that I could not find any information of the weakness on the right side of her body.  There is nothing on the left brain that could explain it.    I also reviewed with them the digital images of the MRI of the cervical spine that was done on April 10, 2025.  Except for mild degenerative changes along the cervical spine, there are no abnormalities noted.  No evidence of any spinal cord compression.  There were no intramedullary process going on.    A decision has to be made whether they want her to have a brain biopsy, chemotherapy or radiation, or just leave the tumor alone.    Review of Systems  No headache, dizziness, nausea or vomiting.      Past Medical History:   Diagnosis Date    Arthritis     Asthma     Atherosclerosis of coronary artery 2018    Non-obstructive coronary artery disease. Cardiac catheterization done in 2018 showed a 40% mid LAD lesion and a 40% proximal RCA lesion.    Cancer     Breast.     COPD (chronic obstructive pulmonary disease)     Diabetes mellitus     Diverticulitis     Hiatal hernia     Hyperlipemia 2022    Hypertension, essential 2022       Past Surgical History:   Procedure Laterality Date     COLONOSCOPY      COLONOSCOPY N/A 11/14/2023    Procedure: COLONOSCOPY WITH POLYPECTOMIES HOT/COLD SNARE, ELEVIEW INJECTION, BIOPSIES;  Surgeon: Lester Gillette MD;  Location: [facility] ENDOSCOPY;  Service: Gastroenterology;  Laterality: N/A;  COLON POLYPS, DIVERTICULOSIS    ENDOSCOPY N/A 11/14/2023    Procedure: ESOPHAGOGASTRODUODENOSCOPY WITH BIOPSIES;  Surgeon: Lester Gillette MD;  Location: [facility] ENDOSCOPY;  Service: Gastroenterology;  Laterality: N/A;  PREVIOUS SURGERY    HERNIA REPAIR      HYSTERECTOMY      MASTECTOMY Bilateral     UPPER GASTROINTESTINAL ENDOSCOPY         Family History: family history is not on file. Otherwise pertinent FHx was reviewed and not pertinent to current issue.    Social History:  reports that she has never smoked. She has been exposed to tobacco smoke. She has never used smokeless tobacco. She reports that she does not drink alcohol and does not use drugs.      Home Medications:  Fluticasone-Umeclidin-Vilant, Insulin Lispro, acetaminophen, albuterol sulfate HFA, apixaban, baclofen, buPROPion XL, dilTIAZem HCl ER, diphenhydrAMINE, furosemide, gabapentin, glipizide, hydroCHLOROthiazide, ipratropium-albuterol, lisinopril, metoprolol succinate XL, mirtazapine, montelukast, nitroglycerin, omeprazole, ondansetron, pravastatin, sertraline, vitamin D3, and vitamin E      Allergies:  Allergies   Allergen Reactions    Empagliflozin Nausea And Vomiting    Metformin Diarrhea       Vitals:   Temp:  [97.5 °F (36.4 °C)-98.8 °F (37.1 °C)] 97.7 °F (36.5 °C)  Heart Rate:  [] 98  Resp:  [16-20] 20  BP: (123-144)/() 134/74    RESULT REVIEW:  I have personally reviewed the results from the time of this admission to 4/14/2025 19:10 EDT and agree with these findings:  []  Laboratory  []  Microbiology  [x]  Radiology  []  EKG/Telemetry   []  Cardiology/Vascular   []  Pathology  []  Old records  []  Other:  Most notable findings include:   April 12, 2025  I reviewed the digital images  of the MRI of her brain that was done on April 11, 2025 with and without contrast.  There is enhancing mass in the right frontoparietal lobe medially with slight slight surrounding edema suggestive of the tumor.     April 11, 2025  There is 4 x 2 mm area area of restricted diffusion in the right frontoparietal lobe extending to the corpus callosum with surrounding edema suggestive of subacute infarct although a neoplastic process could not be ruled out completely.  In addition, there is a slight shift of the midline to the left which could go along with the neoplastic process.     I reviewed the digital images of the MR angiography of the neck vessels done on April 10, 2025 was unremarkable.     I reviewed the digital images of the MRI of the cervical spine that was done on April 10, 2025.  There is mild degenerative changes along the cervical spine and mild disrespects complex bulging at C5-C6 and C6-7 levels without evidence of nerve root or spinal cord compression.    Impression:    Neoplastic process, right frontoparietal lobe  Generalized weakness  Parkinsonian syndrome which may be related to Remeron.  Neck pain  Insomnia  Obesity with a body mass index of 32.74        Plan:   Condition were discussed with her daughter and granddaughter and grandson-in-law.  They were informed that my opinion that she has a primary brain tumor although lymphoma could not be ruled out without doing a biopsy.  Other options are to give her chemotherapy or radiation therapy or both.  I recommended that perhaps we should obtain an oncological opinion.  The family will decide what they want to do let us know.      Electronically signed by Sunitha Suarez Jr., MD, 04/14/25, 7:10 PM EDT.        Please note that portions of this note were completed with a voice recognition program.  Part of this note is an electric or electronic transcription/translation of spoken language to printed text using the dragon dictating system.

## 2025-04-14 NOTE — CONSULTS
[facility] Music Therapy    General:    Referral Source: Palliative Care    Reason for Referral: Emotional/Social Support    Length of Session: 30 minutes    Session Type: Inpatient individual    Previous Music Therapy Contact: No    Patient Music History: Listener    Meeting Location: Bedside    Music Preferences: Country and Other: Zohaib Johnson and Emre Pantoja     Participant(s): Patient and daughter    Others Present: other healthcare workers intermittently present during session    Assessment:     Patient Positioning Prior to Session: In Bed and Supine    Initial Patient Behavior State: Alert, Calm, and Pleasant    Initial Family/Caregiver Behavior State: Accepting, Alert, Calm, Pleasant, and Positive    Treatment Objectives: Enhance quality of life, Promote caregiver coping skills/emotional support, and Promote coping skills    Co-Treatment: no    Interventions: Cognitive/Emotional processing, Life review, Receptive music, Reminiscing, and Supportive Listening    Post Session Assessment:     Ending Patient Behavior State: Alert, Appreciative, Calm, Engaged, Pleasant, and Positive     Ending Family/Caregiver Behavior State: Appreciative, Engaged, Pleasant, and Positive     Tolerance to Treatment:  Tolerated treatment well     Patient engagement: Singing, Music Listening, Expression of thoughts and feelings, Deep breathing, Increased facial expression, Eye contact, and Visual Tracking    Pt and Pt's daughter discussed Pt possibly having a brain tumor and mentioned they were gathering the family tonight to discuss goals of care and what their next steps would be. [facility] discussed heartbeat recording with Pt which Pt and Pt's daughter stated they wanted. Pt's granddaughter is Pt's POA and per Pt will be in the hospital visiting tomorrow. [facility] will plan on following up with Pt and Pt's POA tomorrow to complete this legacy project.     Session Progress: Exhibited stabilized/improved coping and Family/Caregiver  exhibited improved coping    Intervention Plan: Continue to follow up

## 2025-04-14 NOTE — PLAN OF CARE
Goal Outcome Evaluation:         VSS. Pt is Aox4. She c/o neck pain 3/10. Denied pain meds. Pt family visited today and would like any new updates to be given to daughter Shahnaz. Respirations 18 and pt is resting. Continue plan of care

## 2025-04-14 NOTE — THERAPY TREATMENT NOTE
Acute Care - Physical Therapy Treatment Note  NILSON Strickland     Patient Name: Camilla Navas  : 1950  MRN: [id number]  Today's Date: 2025      Visit Dx:     ICD-10-CM ICD-9-CM   1. Generalized weakness  R53.1 780.79   2. Hypokalemia  E87.6 276.8   3. Bilateral lower extremity edema  R60.0 782.3   4. Unable to ambulate  R26.2 719.7   5. Acute urinary retention  R33.8 788.29   6. Difficulty walking  R26.2 719.7   7. Oropharyngeal dysphagia  R13.12 787.22     Patient Active Problem List   Diagnosis    Reflux esophagitis    Type 2 diabetes mellitus    Depression    DDD (degenerative disc disease), cervical    Cough    Colon polyps    Chest pain    Cancer    Atrophic vaginitis    Bladder disorder    Nonobstructive atherosclerosis of coronary artery    Asthma without status asthmaticus    Adjustment disorder with anxiety    Functional dyspepsia    Esophageal reflux    Ear pain    Dry mouth    Diverticula of intestine    Diabetic peripheral neuropathy associated with type 2 diabetes mellitus    Difficult or painful urination    Limb swelling    Insomnia    Hyperlipemia    Hematuria    Generalized anxiety disorder    Menopausal and postmenopausal disorder    Mandibular mass    Malignant neoplasm of female breast    Major depression, single episode    Sensorineural hearing loss    Seasonal allergic rhinitis    Renal mass    Overweight with body mass index (BMI) 25.0-29.9    Osteoarthritis    Obesity    Vaginal itching    Simple renal cyst    Essential hypertension    Diarrhea    Generalized abdominal pain    Nausea    Heartburn    Pedal edema    Hypokalemia    Pharyngitis    Persistent atrial fibrillation    Atrial fib/flutter, transient    Chest discomfort    Atrial fibrillation with rapid ventricular response    Dehydration    Diabetic gastroparesis    Generalized weakness    Neoplastic growth     Past Medical History:   Diagnosis Date    Arthritis     Asthma     Atherosclerosis of coronary artery 2018     Non-obstructive coronary artery disease. Cardiac catheterization done in October 2018 showed a 40% mid LAD lesion and a 40% proximal RCA lesion.    Cancer     Breast.     COPD (chronic obstructive pulmonary disease)     Diabetes mellitus     Diverticulitis     Hiatal hernia     Hyperlipemia 01/21/2022    Hypertension, essential 03/05/2022     Past Surgical History:   Procedure Laterality Date    COLONOSCOPY      COLONOSCOPY N/A 11/14/2023    Procedure: COLONOSCOPY WITH POLYPECTOMIES HOT/COLD SNARE, ELEVIEW INJECTION, BIOPSIES;  Surgeon: Lester Gillette MD;  Location: [facility] ENDOSCOPY;  Service: Gastroenterology;  Laterality: N/A;  COLON POLYPS, DIVERTICULOSIS    ENDOSCOPY N/A 11/14/2023    Procedure: ESOPHAGOGASTRODUODENOSCOPY WITH BIOPSIES;  Surgeon: Lester Gillette MD;  Location: [facility] ENDOSCOPY;  Service: Gastroenterology;  Laterality: N/A;  PREVIOUS SURGERY    HERNIA REPAIR      HYSTERECTOMY      MASTECTOMY Bilateral     UPPER GASTROINTESTINAL ENDOSCOPY       PT Assessment (Last 12 Hours)       PT Evaluation and Treatment       Row Name 04/14/25 1108          Physical Therapy Time and Intention    Subjective Information complains of;weakness;fatigue;pain  -DK     Document Type therapy note (daily note)  -DK     Mode of Treatment individual therapy;physical therapy  -DK     Patient Effort good  -DK     Symptoms Noted During/After Treatment fatigue;increased pain  -DK     Comment Pt reports feeling better, able to participate more with exercises and transfers to sitting/standing today.  -DK       Row Name 04/14/25 1108          Pain    Pretreatment Pain Rating 6/10  -DK     Posttreatment Pain Rating 6/10  -DK     Pain Location neck  -DK     Pain Side/Orientation generalized  -DK     Pain Management Interventions exercise or physical activity utilized  -DK       Row Name 04/14/25 1108          Cognition    Affect/Mental Status (Cognition) WFL  -DK     Orientation Status (Cognition) oriented  to;person;place;situation  -DK     Follows Commands (Cognition) WFL  -DK     Cognitive Function (Cognition) WFL  -DK     Personal Safety Interventions gait belt;nonskid shoes/slippers when out of bed;supervised activity  -       Row Name 04/14/25 1108          Bed Mobility    Bed Mobility supine-sit-supine  -DK     All Activities, [place] (Bed Mobility) minimum assist (75% patient effort);1 person assist  -DK     Supine-Sit-Supine [place] (Bed Mobility) minimum assist (75% patient effort);1 person assist  -DK     Bed Mobility, Safety Issues decreased use of arms for pushing/pulling;decreased use of legs for bridging/pushing  -DK     Assistive Device (Bed Mobility) bed rails;repositioning sheet  -     Comment, (Bed Mobility) Pt sat EOB x 3-4 minutes with SBA.  -       Row Name 04/14/25 1108          Transfers    Transfers sit-stand transfer;stand-sit transfer  -DK     Comment, (Transfers) Pt stood at EOB x 5 seconds, but was unable to stand fully erect.  She was returned to bed on alert post treatment.  -       Row Name 04/14/25 1108          Sit-Stand Transfer    Sit-Stand [place] (Transfers) maximum assist (25% patient effort);moderate assist (50% patient effort);1 person assist  -     Assistive Device (Sit-Stand Transfers) --  no assistive device  -       Row Name 04/14/25 1108          Stand-Sit Transfer    Stand-Sit [place] (Transfers) moderate assist (50% patient effort);maximum assist (25% patient effort);1 person assist  -     Assistive Device (Stand-Sit Transfers) --  no assistive device  -       Row Name 04/14/25 1108          Balance    Balance Assessment sitting static balance;sitting dynamic balance;standing static balance  -     Static Sitting Balance standby assist  -DK     Dynamic Sitting Balance standby assist  -DK     Position, Sitting Balance unsupported;sitting edge of bed  -DK     Static Standing Balance moderate assist;maximum assist;1-person assist  -DK      Position/Device Used, Standing Balance --  no assistive device  -DK     Balance Interventions standing;supported;static;tandem standing  -DK       Row Name 04/14/25 1108          Motor Skills    Motor Skills --  therapeutic exercises  -DK     Coordination WFL  -DK     Therapeutic Exercise hip;knee;ankle  -DK     Additional Documentation --  Pt reports some discomfort with movement of the left foot.  -       Row Name 04/14/25 1108          Hip (Therapeutic Exercise)    Hip (Therapeutic Exercise) AAROM (active assistive range of motion)  -DK     Hip AAROM (Therapeutic Exercise) bilateral;flexion;extension;aBduction;aDduction;supine;10 repetitions;2 sets  -       Row Name 04/14/25 1108          Knee (Therapeutic Exercise)    Knee (Therapeutic Exercise) AAROM (active assistive range of motion)  -DK     Knee AAROM (Therapeutic Exercise) bilateral;flexion;extension;supine;10 repetitions;2 sets  -       Row Name 04/14/25 1108          Ankle (Therapeutic Exercise)    Ankle (Therapeutic Exercise) AAROM (active assistive range of motion)  -DK     Ankle AAROM (Therapeutic Exercise) bilateral;dorsiflexion;plantarflexion;supine;10 repetitions;2 sets  -       Row Name 04/14/25 1108          Plan of Care Review    Plan of Care Reviewed With patient  -DK     Progress improving  -DK       Row Name 04/14/25 1108          Positioning and Restraints    Pre-Treatment Position in bed  -DK     Post Treatment Position bed  -DK     In Bed supine;call light within reach;encouraged to call for assist;exit alarm on;side rails up x2;legs elevated;heels elevated  -DK               User Key  (r) = Recorded By, (t) = Taken By, (c) = Cosigned By      Initials Name Provider Type    Sharon Odonnell PTA Physical Therapist Assistant                      PT Recommendation and Plan     Plan of Care Reviewed With: patient  Progress: improving   Outcome Measures       Row Name 04/14/25 1107             How much help from another person do you  currently need...    Turning from your back to your side while in flat bed without using bedrails? 2  -DK      Moving from lying on back to sitting on the side of a flat bed without bedrails? 2  -DK      Moving to and from a bed to a chair (including a wheelchair)? 2  -DK      Standing up from a chair using your arms (e.g., wheelchair, bedside chair)? 2  -DK      Climbing 3-5 steps with a railing? 1  -DK      To walk in hospital room? 1  -DK      AM-PAC 6 Clicks Score (PT) 10  -DK         Functional Assessment    Outcome Measure Options AM-PAC 6 Clicks Basic Mobility (PT)  -DK                User Key  (r) = Recorded By, (t) = Taken By, (c) = Cosigned By      Initials Name Provider Type    Sharon Odonnell PTA Physical Therapist Assistant                     Time Calculation:    PT Charges       Row Name 04/14/25 1114             Time Calculation    PT Received On 04/14/25  -DK      PT Goal Re-Cert Due Date 04/16/25  -DK         Timed Charges    39940 - PT Therapeutic Exercise Minutes 14  -DK      44835 - PT Therapeutic Activity Minutes 12  -DK         Total Minutes    Timed Charges Total Minutes 26  -DK       Total Minutes 26  -DK                User Key  (r) = Recorded By, (t) = Taken By, (c) = Cosigned By      Initials Name Provider Type    Sharon Odonnell PTA Physical Therapist Assistant                  Therapy Charges for Today       Code Description Service Date Service Provider Modifiers Qty    92438682690 HC PT THER PROC EA 15 MIN 4/14/2025 Sharon Michael PTA GP 1    96326725070 HC PT THERAPEUTIC ACT EA 15 MIN 4/14/2025 Sharon Michael PTA GP 1            PT G-Codes  Outcome Measure Options: AM-PAC 6 Clicks Basic Mobility (PT)  AM-PAC 6 Clicks Score (PT): 10  AM-PAC 6 Clicks Score (OT): 9    Sharon Michael PTA  4/14/2025

## 2025-04-14 NOTE — PLAN OF CARE
Goal Outcome Evaluation:              Outcome Evaluation: Pt is alert and oriented.  Pt has complained of neck pain.  PRN pain meds given.  Nausea reported and PRN given.  Pt has been a  feeder this shift. Meds to be given in applesauce. Continue POC.

## 2025-04-14 NOTE — PROGRESS NOTES
[facility]     Progress Note    Patient Name: Camilla Navas  : 1950  MRN: [id number]  Primary Care Physician:  Lester Lizarraga MD  Date of admission: 2025    Subjective   Patient is feeling fine she has no new issues  Review of Systems  All review of systems are negative except as mentioned in subjective complaints.    Objective   Objective     Vitals:   Temp:  [97.5 °F (36.4 °C)-98.8 °F (37.1 °C)] 97.5 °F (36.4 °C)  Heart Rate:  [] 101  Resp:  [16-20] 18  BP: (114-143)/() 138/104  Physical Exam    Constitutional: Awake, alert responsive, conversant, no obvious distress              Psychiatric:  Appropriate affect, cooperative   Neurologic:  Awake alert ,oriented x 3, strength symmetric in all extremities, Cranial Nerves grossly intact to confrontation, speech clear   Eyes:   PERRLA, sclerae anicteric, no conjunctival injection   HEENT:  Moist mucous membranes, no nasal or eye discharge, no throat congestion   Neck:   Supple, no thyromegaly, no lymphadenopathy, trachea midline, no elevated JVD   Respiratory:  Clear to auscultation bilaterally, nonlabored respirations    Cardiovascular: RRR, no murmurs, rubs, or gallops, palpable pedal pulses bilaterally, No bilateral ankle edema   Gastrointestinal: Positive bowel sounds, soft, nontender, nondistended, no organomegaly   Musculoskeletal:  No clubbing or cyanosis to extremities,muscle wasting, joint swelling, muscle weakness             Skin:                      No rashes, bruising, skin ulcers, petechiae or ecchymosis    Result Review    Result Review:  I have personally reviewed the results from the time of this admission to 2025 08:11 EDT and agree with these findings:  []  Laboratory  []  Microbiology  []  Radiology  []  EKG/Telemetry   []  Cardiology/Vascular   []  Pathology  []  Old records  []  Other:    Results from last 7 days   Lab Units 25  0530 25  0554 25  0533 25  0812 25  0825  04/07/25  0918   WBC 10*3/mm3 14.07* 13.04* 10.00 9.68 7.38 12.45*   HEMOGLOBIN g/dL 13.5 13.7 12.8 12.6 13.1 12.7   PLATELETS 10*3/mm3 453* 456* 414 404 290 240     Results from last 7 days   Lab Units 04/14/25  0530 04/13/25  0554 04/12/25  0533 04/11/25  0812 04/09/25  0825 04/07/25  0918   SODIUM mmol/L 134* 137 133* 132* 133* 132*   POTASSIUM mmol/L 4.7 4.8 4.5 4.3 4.3 3.8   CHLORIDE mmol/L 102 101 97* 94* 95* 95*   CO2 mmol/L 20.4* 25.2 24.7 23.7 24.8 23.6   ANION GAP mmol/L 11.6 10.8 11.3 14.3 13.2 13.4   BUN mg/dL 27* 30* 35* 51* 31* 19   CREATININE mg/dL 0.66 0.75 0.72 0.86 0.82 0.86   GLUCOSE mg/dL 161* 223* 254* 370* 308* 114*   EGFR mL/min/1.73 91.6 83.1 87.3 70.6 74.7 70.6   CALCIUM mg/dL 9.3 9.4 9.4 9.4 10.1 9.0   ALBUMIN g/dL 2.8* 3.3* 3.1* 3.2* 3.3* 3.6   BILIRUBIN mg/dL 0.2 <0.2  --   --   --   --    ALK PHOS U/L 79 101  --   --   --   --    ALT (SGPT) U/L 12 27  --   --   --   --    AST (SGOT) U/L 37* 50*  --   --   --   --        Scheduled Meds:apixaban, 5 mg, Oral, BID  arformoterol, 15 mcg, Nebulization, BID - RT   And  budesonide, 0.5 mg, Nebulization, BID - RT   And  revefenacin, 175 mcg, Nebulization, Daily - RT  baclofen, 10 mg, Oral, Daily  buPROPion XL, 150 mg, Oral, Q24H  carbidopa-levodopa, 1 tablet, Oral, TID  dilTIAZem CD, 360 mg, Oral, Q24H  furosemide, 20 mg, Oral, Daily  gabapentin, 300 mg, Oral, Nightly  glipizide, 5 mg, Oral, Daily  insulin glargine, 25 Units, Subcutaneous, Daily  insulin lispro, 4-24 Units, Subcutaneous, 4x Daily With Meals & Nightly  lisinopril, 5 mg, Oral, Daily  metoprolol succinate XL, 50 mg, Oral, Q12H  mirtazapine, 15 mg, Oral, Nightly  montelukast, 10 mg, Oral, Nightly  pantoprazole, 40 mg, Oral, Q AM  pravastatin, 80 mg, Oral, Nightly  sertraline, 200 mg, Oral, Daily      Continuous Infusions:   PRN Meds:.•  acetaminophen **OR** acetaminophen **OR** acetaminophen  •  albuterol  •  aluminum-magnesium hydroxide-simethicone  •  senna-docusate sodium **AND**  polyethylene glycol **AND** bisacodyl **AND** bisacodyl  •  dextrose  •  dextrose  •  diphenhydrAMINE  •  glucagon (human recombinant)  •  ondansetron  •  oxyCODONE  •  sodium chloride    Assessment & Plan   Assessment / Plan       Active Hospital Problems:    Active Hospital Problems    Diagnosis  POA   • **Generalized weakness [R53.1]  Yes   • Neoplastic growth [D49.9]  Yes     MRI showed 5.1 into 2.7 cm mass going into corpus close most consistent with neoplasm such as GBM or astrocytoma      • Hypokalemia [E87.6]  Yes       Plan:   Consult neurosurgery neurosurgery  Physical therapy  Will make adjustment to control blood sugar       Electronically signed by Emory Ratliff MD, 04/14/25, 8:07 AM EDT.

## 2025-04-15 LAB
GLUCOSE BLDC GLUCOMTR-MCNC: 163 MG/DL (ref 70–99)
GLUCOSE BLDC GLUCOMTR-MCNC: 197 MG/DL (ref 70–99)
GLUCOSE BLDC GLUCOMTR-MCNC: 236 MG/DL (ref 70–99)
GLUCOSE BLDC GLUCOMTR-MCNC: 248 MG/DL (ref 70–99)

## 2025-04-15 PROCEDURE — 82948 REAGENT STRIP/BLOOD GLUCOSE: CPT | Performed by: STUDENT IN AN ORGANIZED HEALTH CARE EDUCATION/TRAINING PROGRAM

## 2025-04-15 PROCEDURE — 99231 SBSQ HOSP IP/OBS SF/LOW 25: CPT | Performed by: NEUROLOGICAL SURGERY

## 2025-04-15 PROCEDURE — 25010000002 ONDANSETRON PER 1 MG: Performed by: INTERNAL MEDICINE

## 2025-04-15 PROCEDURE — 94799 UNLISTED PULMONARY SVC/PX: CPT

## 2025-04-15 PROCEDURE — 94664 DEMO&/EVAL PT USE INHALER: CPT

## 2025-04-15 PROCEDURE — 63710000001 REVEFENACIN 175 MCG/3ML SOLUTION: Performed by: STUDENT IN AN ORGANIZED HEALTH CARE EDUCATION/TRAINING PROGRAM

## 2025-04-15 PROCEDURE — 82948 REAGENT STRIP/BLOOD GLUCOSE: CPT

## 2025-04-15 PROCEDURE — 25010000002 PROCHLORPERAZINE 10 MG/2ML SOLUTION: Performed by: NURSE PRACTITIONER

## 2025-04-15 PROCEDURE — 92526 ORAL FUNCTION THERAPY: CPT

## 2025-04-15 PROCEDURE — 63710000001 INSULIN LISPRO (HUMAN) PER 5 UNITS: Performed by: STUDENT IN AN ORGANIZED HEALTH CARE EDUCATION/TRAINING PROGRAM

## 2025-04-15 PROCEDURE — 94761 N-INVAS EAR/PLS OXIMETRY MLT: CPT

## 2025-04-15 PROCEDURE — 63710000001 INSULIN GLARGINE PER 5 UNITS: Performed by: STUDENT IN AN ORGANIZED HEALTH CARE EDUCATION/TRAINING PROGRAM

## 2025-04-15 RX ORDER — GLIPIZIDE 5 MG/1
10 TABLET ORAL
Status: DISCONTINUED | OUTPATIENT
Start: 2025-04-15 | End: 2025-04-19 | Stop reason: HOSPADM

## 2025-04-15 RX ORDER — PROCHLORPERAZINE 25 MG
25 SUPPOSITORY, RECTAL RECTAL EVERY 12 HOURS PRN
Status: DISCONTINUED | OUTPATIENT
Start: 2025-04-15 | End: 2025-04-19 | Stop reason: HOSPADM

## 2025-04-15 RX ORDER — PROCHLORPERAZINE MALEATE 5 MG/1
5 TABLET ORAL EVERY 6 HOURS PRN
Status: DISCONTINUED | OUTPATIENT
Start: 2025-04-15 | End: 2025-04-19 | Stop reason: HOSPADM

## 2025-04-15 RX ORDER — PROCHLORPERAZINE EDISYLATE 5 MG/ML
5 INJECTION INTRAMUSCULAR; INTRAVENOUS EVERY 6 HOURS PRN
Status: DISCONTINUED | OUTPATIENT
Start: 2025-04-15 | End: 2025-04-19 | Stop reason: HOSPADM

## 2025-04-15 RX ADMIN — APIXABAN 5 MG: 5 TABLET, FILM COATED ORAL at 21:28

## 2025-04-15 RX ADMIN — PROCHLORPERAZINE EDISYLATE 5 MG: 5 INJECTION INTRAMUSCULAR; INTRAVENOUS at 11:21

## 2025-04-15 RX ADMIN — APIXABAN 5 MG: 5 TABLET, FILM COATED ORAL at 09:59

## 2025-04-15 RX ADMIN — METOPROLOL SUCCINATE 50 MG: 50 TABLET, FILM COATED, EXTENDED RELEASE ORAL at 21:29

## 2025-04-15 RX ADMIN — ONDANSETRON 4 MG: 2 INJECTION INTRAMUSCULAR; INTRAVENOUS at 08:56

## 2025-04-15 RX ADMIN — DILTIAZEM HYDROCHLORIDE 360 MG: 180 CAPSULE, COATED, EXTENDED RELEASE ORAL at 09:59

## 2025-04-15 RX ADMIN — ARFORMOTEROL TARTRATE 15 MCG: 15 SOLUTION RESPIRATORY (INHALATION) at 20:30

## 2025-04-15 RX ADMIN — FUROSEMIDE 20 MG: 20 TABLET ORAL at 09:59

## 2025-04-15 RX ADMIN — GLIPIZIDE 10 MG: 5 TABLET ORAL at 16:53

## 2025-04-15 RX ADMIN — BACLOFEN 10 MG: 10 TABLET ORAL at 09:59

## 2025-04-15 RX ADMIN — GABAPENTIN 300 MG: 300 CAPSULE ORAL at 21:28

## 2025-04-15 RX ADMIN — INSULIN LISPRO 4 UNITS: 100 INJECTION, SOLUTION INTRAVENOUS; SUBCUTANEOUS at 09:58

## 2025-04-15 RX ADMIN — PANTOPRAZOLE SODIUM 40 MG: 40 TABLET, DELAYED RELEASE ORAL at 05:55

## 2025-04-15 RX ADMIN — BUDESONIDE 0.5 MG: 0.5 SUSPENSION RESPIRATORY (INHALATION) at 07:38

## 2025-04-15 RX ADMIN — METOPROLOL SUCCINATE 50 MG: 50 TABLET, FILM COATED, EXTENDED RELEASE ORAL at 09:59

## 2025-04-15 RX ADMIN — INSULIN LISPRO 4 UNITS: 100 INJECTION, SOLUTION INTRAVENOUS; SUBCUTANEOUS at 17:46

## 2025-04-15 RX ADMIN — SERTRALINE HYDROCHLORIDE 200 MG: 100 TABLET ORAL at 09:59

## 2025-04-15 RX ADMIN — CARBIDOPA AND LEVODOPA 1 TABLET: 10; 100 TABLET ORAL at 16:53

## 2025-04-15 RX ADMIN — BUPROPION HYDROCHLORIDE 150 MG: 150 TABLET, EXTENDED RELEASE ORAL at 09:59

## 2025-04-15 RX ADMIN — INSULIN LISPRO 8 UNITS: 100 INJECTION, SOLUTION INTRAVENOUS; SUBCUTANEOUS at 22:46

## 2025-04-15 RX ADMIN — PRAVASTATIN SODIUM 80 MG: 20 TABLET ORAL at 21:28

## 2025-04-15 RX ADMIN — LISINOPRIL 5 MG: 5 TABLET ORAL at 09:59

## 2025-04-15 RX ADMIN — MIRTAZAPINE 15 MG: 15 TABLET, FILM COATED ORAL at 21:29

## 2025-04-15 RX ADMIN — ACETAMINOPHEN 650 MG: 325 TABLET ORAL at 21:28

## 2025-04-15 RX ADMIN — Medication 10 MG: at 21:34

## 2025-04-15 RX ADMIN — INSULIN GLARGINE 25 UNITS: 100 INJECTION, SOLUTION SUBCUTANEOUS at 09:58

## 2025-04-15 RX ADMIN — BUDESONIDE 0.5 MG: 0.5 SUSPENSION RESPIRATORY (INHALATION) at 20:29

## 2025-04-15 RX ADMIN — REVEFENACIN 175 MCG: 175 SOLUTION RESPIRATORY (INHALATION) at 07:39

## 2025-04-15 RX ADMIN — Medication 10 ML: at 21:29

## 2025-04-15 RX ADMIN — Medication 10 MG: at 00:30

## 2025-04-15 RX ADMIN — INSULIN LISPRO 8 UNITS: 100 INJECTION, SOLUTION INTRAVENOUS; SUBCUTANEOUS at 12:23

## 2025-04-15 RX ADMIN — ARFORMOTEROL TARTRATE 15 MCG: 15 SOLUTION RESPIRATORY (INHALATION) at 07:38

## 2025-04-15 RX ADMIN — MONTELUKAST 10 MG: 10 TABLET, FILM COATED ORAL at 21:29

## 2025-04-15 RX ADMIN — CARBIDOPA AND LEVODOPA 1 TABLET: 10; 100 TABLET ORAL at 22:38

## 2025-04-15 RX ADMIN — CARBIDOPA AND LEVODOPA 1 TABLET: 10; 100 TABLET ORAL at 09:59

## 2025-04-15 NOTE — THERAPY TREATMENT NOTE
Acute Care - Speech Language Pathology   Swallow Treatment Note NILSON Strickland     Patient Name: Camilla Navas  : 1950  MRN: [id number]  Today's Date: 4/15/2025               Admit Date: 2025    Visit Dx:     ICD-10-CM ICD-9-CM   1. Generalized weakness  R53.1 780.79   2. Hypokalemia  E87.6 276.8   3. Bilateral lower extremity edema  R60.0 782.3   4. Unable to ambulate  R26.2 719.7   5. Acute urinary retention  R33.8 788.29   6. Difficulty walking  R26.2 719.7   7. Oropharyngeal dysphagia  R13.12 787.22     Patient Active Problem List   Diagnosis    Reflux esophagitis    Type 2 diabetes mellitus    Depression    DDD (degenerative disc disease), cervical    Cough    Colon polyps    Chest pain    Cancer    Atrophic vaginitis    Bladder disorder    Nonobstructive atherosclerosis of coronary artery    Asthma without status asthmaticus    Adjustment disorder with anxiety    Functional dyspepsia    Esophageal reflux    Ear pain    Dry mouth    Diverticula of intestine    Diabetic peripheral neuropathy associated with type 2 diabetes mellitus    Difficult or painful urination    Limb swelling    Insomnia    Hyperlipemia    Hematuria    Generalized anxiety disorder    Menopausal and postmenopausal disorder    Mandibular mass    Malignant neoplasm of female breast    Major depression, single episode    Sensorineural hearing loss    Seasonal allergic rhinitis    Renal mass    Overweight with body mass index (BMI) 25.0-29.9    Osteoarthritis    Obesity    Vaginal itching    Simple renal cyst    Essential hypertension    Diarrhea    Generalized abdominal pain    Nausea    Heartburn    Pedal edema    Hypokalemia    Pharyngitis    Persistent atrial fibrillation    Atrial fib/flutter, transient    Chest discomfort    Atrial fibrillation with rapid ventricular response    Dehydration    Diabetic gastroparesis    Generalized weakness    Neoplastic growth     Past Medical History:   Diagnosis Date    Arthritis     Asthma      Atherosclerosis of coronary artery 2018    Non-obstructive coronary artery disease. Cardiac catheterization done in October 2018 showed a 40% mid LAD lesion and a 40% proximal RCA lesion.    Cancer     Breast.     COPD (chronic obstructive pulmonary disease)     Diabetes mellitus     Diverticulitis     Hiatal hernia     Hyperlipemia 01/21/2022    Hypertension, essential 03/05/2022     Past Surgical History:   Procedure Laterality Date    COLONOSCOPY      COLONOSCOPY N/A 11/14/2023    Procedure: COLONOSCOPY WITH POLYPECTOMIES HOT/COLD SNARE, ELEVIEW INJECTION, BIOPSIES;  Surgeon: Lester Gillette MD;  Location: [facility] ENDOSCOPY;  Service: Gastroenterology;  Laterality: N/A;  COLON POLYPS, DIVERTICULOSIS    ENDOSCOPY N/A 11/14/2023    Procedure: ESOPHAGOGASTRODUODENOSCOPY WITH BIOPSIES;  Surgeon: Lester Gillette MD;  Location: [facility] ENDOSCOPY;  Service: Gastroenterology;  Laterality: N/A;  PREVIOUS SURGERY    HERNIA REPAIR      HYSTERECTOMY      MASTECTOMY Bilateral     UPPER GASTROINTESTINAL ENDOSCOPY         SLP Recommendation and Plan      SPEECH PATHOLOGY DYSPHAGIA TREATMENT    Subjective/Behavioral Observations: awake, cooperative, pleasant        Day/time of Treatment:4/15/25        Current Diet:mechanical soft, thin liquids        Current Strategies: assist for feeding, small bites and sips. Controlled, single sips from straw.         Treatment received:focused on dysphagia goals and tolerance of current diet        Results of treatment:Assisted patient for single sips of thin liquids by straw. Patient taking single sips. Swallows completed with mild delay. Consumed 120ml without overt s/s of aspiration. Mechanical soft solids with small controlled bites. Extended chewing and decreased manipulation due to lower jaw tremors. Consumed 50% of meal with assist.         Progress toward goals:progressing        Barriers to Achieving goals: medical status        Plan of care:/changes in plan: continue  updated diet recommendations, strategies and positioning to decrease aspiration risk.                                                                                                 EDUCATION  The patient has been educated in the following areas:   Dysphagia (Swallowing Impairment).                Time Calculation:    Time Calculation- SLP       Row Name 04/15/25 1322             Time Calculation- SLP    SLP Stop Time 1030  -SN      SLP Received On 04/15/25  -SN         Untimed Charges    80185-SA Treatment Swallow Minutes 45  -SN         Total Minutes    Untimed Charges Total Minutes 45  -SN       Total Minutes 45  -SN                User Key  (r) = Recorded By, (t) = Taken By, (c) = Cosigned By      Initials Name Provider Type    Yamileth Castaneda MS-CCC/SLP, FRANCES Speech and Language Pathologist                    Therapy Charges for Today       Code Description Service Date Service Provider Modifiers Qty    95346376809 HC ST TREATMENT SWALLOW 3 4/14/2025 Yamileth Morris MS-CCC/SLP, FRANCES GN 1    37647198644 HC ST TREATMENT SWALLOW 3 4/15/2025 Yamileth Morris MS-CCC/SLP, FRANCES GN 1                 PATO Greenberg CNT  4/15/2025

## 2025-04-15 NOTE — PROGRESS NOTES
[facility]   Neurosurgery Progress Note    Patient Name: Camilla Navas  : 1950  MRN: [id number]  Date of admission: 2025  Surgical Procedures Since Admission:    Subjective   Subjective     Chief Complaint: Sleep difficulty    History of Present Illness   She reports some difficulty sleeping last night otherwise no new issues.  Her family is not at the bedside presently.      Objective   Objective     Vitals:   Temp:  [97.4 °F (36.3 °C)-98.4 °F (36.9 °C)] 97.4 °F (36.3 °C)  Heart Rate:  [] 85  Resp:  [16-20] 20  BP: (110-144)/() 122/69  Output by Drain (mL) 25 0701 - 25 1900 25 1901 - 04/15/25 0700 04/15/25 0701 - 04/15/25 0728 Range Total   Urethral Catheter 600 700  1300       She is awake and alert.  She answers questions appropriately.     Assessment & Plan   Assessment / Plan     Brief Patient Summary:  Camilla Navas is a 74 y.o. 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  Differential would include glioblastoma, lymphoma or subacute stroke.  She has a poor Karnofsky performance score.    Active Hospital Problems:  Active Hospital Problems    Diagnosis    • **Generalized weakness    • Neoplastic growth    • Hypokalemia      Plan:   Await decision from the family.  I would not expect a good outcome with tumor treatment in her situation based on her age and poor KPS.

## 2025-04-15 NOTE — CONSULTS
Palliative Care follow up with patient, daughter, Stephany, and son-in-law at bedside.  Daughter called POA/granddaughter, Shahnaz, and placed on speaker phone to contribute to conversation.  Discussed code status with patient deciding to be a DNR/DNI.  Patient completed a [place] MOST that was placed in physical chart awaiting attending MD signature.  Contacted attending with code status information that has been updated in EPIC.  Patient would like to have inpatient rehab before returning home.  Patient and family are agreeable to [facility] referral at this time.  [facility]  Emotional support provided.  Palliative Care will continue to follow.     Maria Antonia CASTRO RN, [facility]  Palliative Care

## 2025-04-15 NOTE — PROGRESS NOTES
ALTA spoke with Pt's POA this afternoon about legacy project and discussed the need for consent form to be signed. Pt's POA stated she would be here later this evening and form will be left with Pt's primary RN.

## 2025-04-15 NOTE — PROGRESS NOTES
[facility]     Progress Note    Patient Name: Camilla Navas  : 1950  MRN: [id number]  Primary Care Physician:  Lester Lizarraga MD  Date of admission: 2025    Subjective   Patient is resting very comfortably.  I have spoken with patient's granddaughter per patient request and have told her the diagnosis and the poor nature of her prognosis based on brain tumor.  She does not want to proceed with the surgery but want her to go to rehab  Review of Systems  All review of systems are negative except as mentioned in subjective complaints.    Objective   Objective     Vitals:   Temp:  [97.4 °F (36.3 °C)-98.4 °F (36.9 °C)] 97.4 °F (36.3 °C)  Heart Rate:  [] 102  Resp:  [16-20] 16  BP: (110-144)/() 122/69  Physical Exam    Constitutional: Awake, alert responsive, conversant, no obvious distress              Psychiatric:  Appropriate affect, cooperative   Neurologic:  Awake alert ,oriented x 3, strength symmetric in all extremities, Cranial Nerves grossly intact to confrontation, speech clear   Eyes:   PERRLA, sclerae anicteric, no conjunctival injection   HEENT:  Moist mucous membranes, no nasal or eye discharge, no throat congestion   Neck:   Supple, no thyromegaly, no lymphadenopathy, trachea midline, no elevated JVD   Respiratory:  Clear to auscultation bilaterally, nonlabored respirations    Cardiovascular: RRR, no murmurs, rubs, or gallops, palpable pedal pulses bilaterally, No bilateral ankle edema   Gastrointestinal: Positive bowel sounds, soft, nontender, nondistended, no organomegaly   Musculoskeletal:  No clubbing or cyanosis to extremities,muscle wasting, joint swelling, muscle weakness             Skin:                      No rashes, bruising, skin ulcers, petechiae or ecchymosis    Result Review    Result Review:  I have personally reviewed the results from the time of this admission to 4/15/2025 09:34 EDT and agree with these findings:  []  Laboratory  []   Microbiology  []  Radiology  []  EKG/Telemetry   []  Cardiology/Vascular   []  Pathology  []  Old records  []  Other:    Results from last 7 days   Lab Units 04/14/25  0530 04/13/25  0554 04/12/25  0533 04/11/25  0812 04/09/25  0825   WBC 10*3/mm3 14.07* 13.04* 10.00 9.68 7.38   HEMOGLOBIN g/dL 13.5 13.7 12.8 12.6 13.1   PLATELETS 10*3/mm3 453* 456* 414 404 290     Results from last 7 days   Lab Units 04/14/25 0530 04/13/25  0554 04/12/25  0533 04/11/25  0812 04/09/25  0825   SODIUM mmol/L 134* 137 133* 132* 133*   POTASSIUM mmol/L 4.7 4.8 4.5 4.3 4.3   CHLORIDE mmol/L 102 101 97* 94* 95*   CO2 mmol/L 20.4* 25.2 24.7 23.7 24.8   ANION GAP mmol/L 11.6 10.8 11.3 14.3 13.2   BUN mg/dL 27* 30* 35* 51* 31*   CREATININE mg/dL 0.66 0.75 0.72 0.86 0.82   GLUCOSE mg/dL 161* 223* 254* 370* 308*   EGFR mL/min/1.73 91.6 83.1 87.3 70.6 74.7   CALCIUM mg/dL 9.3 9.4 9.4 9.4 10.1   ALBUMIN g/dL 2.8* 3.3* 3.1* 3.2* 3.3*   BILIRUBIN mg/dL 0.2 <0.2  --   --   --    ALK PHOS U/L 79 101  --   --   --    ALT (SGPT) U/L 12 27  --   --   --    AST (SGOT) U/L 37* 50*  --   --   --        Scheduled Meds:apixaban, 5 mg, Oral, BID  arformoterol, 15 mcg, Nebulization, BID - RT   And  budesonide, 0.5 mg, Nebulization, BID - RT   And  revefenacin, 175 mcg, Nebulization, Daily - RT  baclofen, 10 mg, Oral, Daily  buPROPion XL, 150 mg, Oral, Q24H  carbidopa-levodopa, 1 tablet, Oral, TID  dilTIAZem CD, 360 mg, Oral, Q24H  furosemide, 20 mg, Oral, Daily  gabapentin, 300 mg, Oral, Nightly  glipizide, 10 mg, Oral, BID AC  insulin glargine, 25 Units, Subcutaneous, Daily  insulin lispro, 4-24 Units, Subcutaneous, 4x Daily With Meals & Nightly  lisinopril, 5 mg, Oral, Daily  metoprolol succinate XL, 50 mg, Oral, Q12H  mirtazapine, 15 mg, Oral, Nightly  montelukast, 10 mg, Oral, Nightly  pantoprazole, 40 mg, Oral, Q AM  pravastatin, 80 mg, Oral, Nightly  sertraline, 200 mg, Oral, Daily      Continuous Infusions:   PRN Meds:.•  acetaminophen **OR**  acetaminophen **OR** acetaminophen  •  albuterol  •  aluminum-magnesium hydroxide-simethicone  •  senna-docusate sodium **AND** polyethylene glycol **AND** bisacodyl **AND** bisacodyl  •  dextrose  •  dextrose  •  diphenhydrAMINE  •  glucagon (human recombinant)  •  melatonin  •  ondansetron  •  sodium chloride    Assessment & Plan   Assessment / Plan       Active Hospital Problems:    Active Hospital Problems    Diagnosis  POA   • **Generalized weakness [R53.1]  Yes   • Neoplastic growth [D49.9]  Yes     MRI showed 5.1 into 2.7 cm mass going into corpus close most consistent with neoplasm such as GBM or astrocytoma      • Hypokalemia [E87.6]  Yes       Plan:   Per patient's granddaughter no surgery  Rehab placement  Patient's prognosis is very poor       Electronically signed by Emory Ratliff MD, 04/15/25, 9:34 AM EDT.

## 2025-04-15 NOTE — PLAN OF CARE
Goal Outcome Evaluation:  Plan of Care Reviewed With: patient        Progress: no change  Outcome Evaluation: patient AOx4, q2 turn, feeder, takes pills whole in applesauce. Blood glucose monitored, insulin given as ordered. C/O nausea and pain epigastric area, MD notified and does not want to do additional imaging at this time. Compazine ordered per RAMON Redding. Continue plan of care.

## 2025-04-15 NOTE — PROGRESS NOTES
04/15/25 1400   Music Therapy   Minutes of Assessment 1   Assessment Detail Evaluation not performed - Pt asleep and no other family members present in the room     [facility] attempted to follow up with Pt/F on legacy project per Pt request but Pt was asleep and no other family members were present in the room. [facility] contacted primary RN earlier on this day and requested that primary RN notify [facility] when Pt's granddaughter, Shahnaz, arrives on this day.

## 2025-04-16 LAB
GLUCOSE BLDC GLUCOMTR-MCNC: 182 MG/DL (ref 70–99)
GLUCOSE BLDC GLUCOMTR-MCNC: 245 MG/DL (ref 70–99)
GLUCOSE BLDC GLUCOMTR-MCNC: 320 MG/DL (ref 70–99)
GLUCOSE BLDC GLUCOMTR-MCNC: 329 MG/DL (ref 70–99)
GLUCOSE BLDC GLUCOMTR-MCNC: 86 MG/DL (ref 70–99)
QT INTERVAL: 383 MS
QTC INTERVAL: 452 MS

## 2025-04-16 PROCEDURE — 97110 THERAPEUTIC EXERCISES: CPT

## 2025-04-16 PROCEDURE — 94799 UNLISTED PULMONARY SVC/PX: CPT

## 2025-04-16 PROCEDURE — 63710000001 REVEFENACIN 175 MCG/3ML SOLUTION: Performed by: STUDENT IN AN ORGANIZED HEALTH CARE EDUCATION/TRAINING PROGRAM

## 2025-04-16 PROCEDURE — 94664 DEMO&/EVAL PT USE INHALER: CPT

## 2025-04-16 PROCEDURE — 63710000001 INSULIN LISPRO (HUMAN) PER 5 UNITS: Performed by: STUDENT IN AN ORGANIZED HEALTH CARE EDUCATION/TRAINING PROGRAM

## 2025-04-16 PROCEDURE — 94761 N-INVAS EAR/PLS OXIMETRY MLT: CPT

## 2025-04-16 PROCEDURE — 82948 REAGENT STRIP/BLOOD GLUCOSE: CPT | Performed by: STUDENT IN AN ORGANIZED HEALTH CARE EDUCATION/TRAINING PROGRAM

## 2025-04-16 PROCEDURE — 82948 REAGENT STRIP/BLOOD GLUCOSE: CPT

## 2025-04-16 PROCEDURE — 63710000001 INSULIN GLARGINE PER 5 UNITS: Performed by: STUDENT IN AN ORGANIZED HEALTH CARE EDUCATION/TRAINING PROGRAM

## 2025-04-16 PROCEDURE — 92526 ORAL FUNCTION THERAPY: CPT

## 2025-04-16 RX ADMIN — INSULIN GLARGINE 25 UNITS: 100 INJECTION, SOLUTION SUBCUTANEOUS at 10:16

## 2025-04-16 RX ADMIN — PRAVASTATIN SODIUM 80 MG: 20 TABLET ORAL at 21:51

## 2025-04-16 RX ADMIN — INSULIN LISPRO 16 UNITS: 100 INJECTION, SOLUTION INTRAVENOUS; SUBCUTANEOUS at 22:10

## 2025-04-16 RX ADMIN — REVEFENACIN 175 MCG: 175 SOLUTION RESPIRATORY (INHALATION) at 07:32

## 2025-04-16 RX ADMIN — CARBIDOPA AND LEVODOPA 1 TABLET: 10; 100 TABLET ORAL at 21:52

## 2025-04-16 RX ADMIN — SERTRALINE HYDROCHLORIDE 200 MG: 100 TABLET ORAL at 09:13

## 2025-04-16 RX ADMIN — APIXABAN 5 MG: 5 TABLET, FILM COATED ORAL at 21:52

## 2025-04-16 RX ADMIN — ARFORMOTEROL TARTRATE 15 MCG: 15 SOLUTION RESPIRATORY (INHALATION) at 07:32

## 2025-04-16 RX ADMIN — GABAPENTIN 300 MG: 300 CAPSULE ORAL at 21:53

## 2025-04-16 RX ADMIN — METOPROLOL SUCCINATE 50 MG: 50 TABLET, FILM COATED, EXTENDED RELEASE ORAL at 21:52

## 2025-04-16 RX ADMIN — BUPROPION HYDROCHLORIDE 150 MG: 150 TABLET, EXTENDED RELEASE ORAL at 09:13

## 2025-04-16 RX ADMIN — BACLOFEN 10 MG: 10 TABLET ORAL at 09:13

## 2025-04-16 RX ADMIN — Medication 10 ML: at 21:54

## 2025-04-16 RX ADMIN — Medication 10 ML: at 09:13

## 2025-04-16 RX ADMIN — GLIPIZIDE 10 MG: 5 TABLET ORAL at 09:12

## 2025-04-16 RX ADMIN — MONTELUKAST 10 MG: 10 TABLET, FILM COATED ORAL at 21:52

## 2025-04-16 RX ADMIN — INSULIN LISPRO 16 UNITS: 100 INJECTION, SOLUTION INTRAVENOUS; SUBCUTANEOUS at 12:21

## 2025-04-16 RX ADMIN — LISINOPRIL 5 MG: 5 TABLET ORAL at 09:13

## 2025-04-16 RX ADMIN — CARBIDOPA AND LEVODOPA 1 TABLET: 10; 100 TABLET ORAL at 09:13

## 2025-04-16 RX ADMIN — ACETAMINOPHEN 650 MG: 325 TABLET ORAL at 05:57

## 2025-04-16 RX ADMIN — ACETAMINOPHEN 650 MG: 325 TABLET ORAL at 21:52

## 2025-04-16 RX ADMIN — METOPROLOL SUCCINATE 50 MG: 50 TABLET, FILM COATED, EXTENDED RELEASE ORAL at 09:13

## 2025-04-16 RX ADMIN — Medication 10 MG: at 21:52

## 2025-04-16 RX ADMIN — BUDESONIDE 0.5 MG: 0.5 SUSPENSION RESPIRATORY (INHALATION) at 07:32

## 2025-04-16 RX ADMIN — DILTIAZEM HYDROCHLORIDE 360 MG: 180 CAPSULE, COATED, EXTENDED RELEASE ORAL at 09:12

## 2025-04-16 RX ADMIN — ARFORMOTEROL TARTRATE 15 MCG: 15 SOLUTION RESPIRATORY (INHALATION) at 19:50

## 2025-04-16 RX ADMIN — INSULIN LISPRO 8 UNITS: 100 INJECTION, SOLUTION INTRAVENOUS; SUBCUTANEOUS at 17:40

## 2025-04-16 RX ADMIN — PANTOPRAZOLE SODIUM 40 MG: 40 TABLET, DELAYED RELEASE ORAL at 05:58

## 2025-04-16 RX ADMIN — ACETAMINOPHEN 650 MG: 325 TABLET ORAL at 17:40

## 2025-04-16 RX ADMIN — CARBIDOPA AND LEVODOPA 1 TABLET: 10; 100 TABLET ORAL at 17:41

## 2025-04-16 RX ADMIN — FUROSEMIDE 20 MG: 20 TABLET ORAL at 09:13

## 2025-04-16 RX ADMIN — APIXABAN 5 MG: 5 TABLET, FILM COATED ORAL at 09:13

## 2025-04-16 RX ADMIN — BUDESONIDE 0.5 MG: 0.5 SUSPENSION RESPIRATORY (INHALATION) at 19:50

## 2025-04-16 RX ADMIN — MIRTAZAPINE 15 MG: 15 TABLET, FILM COATED ORAL at 21:52

## 2025-04-16 RX ADMIN — GLIPIZIDE 10 MG: 5 TABLET ORAL at 17:45

## 2025-04-16 NOTE — THERAPY TREATMENT NOTE
Patient Name: Camilla Navas  : 1950    MRN: [id number]                              Today's Date: 2025       Admit Date: 2025    Visit Dx:     ICD-10-CM ICD-9-CM   1. Generalized weakness  R53.1 780.79   2. Hypokalemia  E87.6 276.8   3. Bilateral lower extremity edema  R60.0 782.3   4. Unable to ambulate  R26.2 719.7   5. Acute urinary retention  R33.8 788.29   6. Difficulty walking  R26.2 719.7   7. Oropharyngeal dysphagia  R13.12 787.22     Patient Active Problem List   Diagnosis    Reflux esophagitis    Type 2 diabetes mellitus    Depression    DDD (degenerative disc disease), cervical    Cough    Colon polyps    Chest pain    Cancer    Atrophic vaginitis    Bladder disorder    Nonobstructive atherosclerosis of coronary artery    Asthma without status asthmaticus    Adjustment disorder with anxiety    Functional dyspepsia    Esophageal reflux    Ear pain    Dry mouth    Diverticula of intestine    Diabetic peripheral neuropathy associated with type 2 diabetes mellitus    Difficult or painful urination    Limb swelling    Insomnia    Hyperlipemia    Hematuria    Generalized anxiety disorder    Menopausal and postmenopausal disorder    Mandibular mass    Malignant neoplasm of female breast    Major depression, single episode    Sensorineural hearing loss    Seasonal allergic rhinitis    Renal mass    Overweight with body mass index (BMI) 25.0-29.9    Osteoarthritis    Obesity    Vaginal itching    Simple renal cyst    Essential hypertension    Diarrhea    Generalized abdominal pain    Nausea    Heartburn    Pedal edema    Hypokalemia    Pharyngitis    Persistent atrial fibrillation    Atrial fib/flutter, transient    Chest discomfort    Atrial fibrillation with rapid ventricular response    Dehydration    Diabetic gastroparesis    Generalized weakness    Neoplastic growth     Past Medical History:   Diagnosis Date    Arthritis     Asthma     Atherosclerosis of coronary artery 2018     Non-obstructive coronary artery disease. Cardiac catheterization done in October 2018 showed a 40% mid LAD lesion and a 40% proximal RCA lesion.    Cancer     Breast.     COPD (chronic obstructive pulmonary disease)     Diabetes mellitus     Diverticulitis     Hiatal hernia     Hyperlipemia 01/21/2022    Hypertension, essential 03/05/2022     Past Surgical History:   Procedure Laterality Date    COLONOSCOPY      COLONOSCOPY N/A 11/14/2023    Procedure: COLONOSCOPY WITH POLYPECTOMIES HOT/COLD SNARE, ELEVIEW INJECTION, BIOPSIES;  Surgeon: Lester Gillette MD;  Location: [facility] ENDOSCOPY;  Service: Gastroenterology;  Laterality: N/A;  COLON POLYPS, DIVERTICULOSIS    ENDOSCOPY N/A 11/14/2023    Procedure: ESOPHAGOGASTRODUODENOSCOPY WITH BIOPSIES;  Surgeon: Lester Gillette MD;  Location: [facility] ENDOSCOPY;  Service: Gastroenterology;  Laterality: N/A;  PREVIOUS SURGERY    HERNIA REPAIR      HYSTERECTOMY      MASTECTOMY Bilateral     UPPER GASTROINTESTINAL ENDOSCOPY        General Information       Row Name 04/16/25 1239          OT Time and Intention    Document Type therapy note (daily note)  -PG     Mode of Treatment individual therapy;occupational therapy  -PG               User Key  (r) = Recorded By, (t) = Taken By, (c) = Cosigned By      Initials Name Provider Type    PG Matthew Sousa OT Occupational Therapist                     Mobility/ADL's    No documentation.                  Obj/Interventions       Row Name 04/16/25 1239          Shoulder (Therapeutic Exercise)    Shoulder (Therapeutic Exercise) strengthening exercise  -PG     Shoulder Strengthening (Therapeutic Exercise) 1 lb free weight;15 repititions  -PG       Row Name 04/16/25 1239          Elbow/Forearm (Therapeutic Exercise)    Elbow/Forearm (Therapeutic Exercise) strengthening exercise  -PG     Elbow/Forearm Strengthening (Therapeutic Exercise) 1 lb free weight;15 repititions  -PG       Row Name 04/16/25 1239          Motor Skills     Therapeutic Exercise shoulder;elbow/forearm  -PG               User Key  (r) = Recorded By, (t) = Taken By, (c) = Cosigned By      Initials Name Provider Type    PG Matthew Sousa OT Occupational Therapist                   Goals/Plan    No documentation.                  Clinical Impression       Row Name 04/16/25 1240          Plan of Care Review    Progress no change  -PG               User Key  (r) = Recorded By, (t) = Taken By, (c) = Cosigned By      Initials Name Provider Type    PG Matthew Sousa OT Occupational Therapist                   Outcome Measures       Row Name 04/16/25 1240          How much help from another is currently needed...    Putting on and taking off regular lower body clothing? 1  -PG     Bathing (including washing, rinsing, and drying) 1  -PG     Toileting (which includes using toilet bed pan or urinal) 1  -PG     Putting on and taking off regular upper body clothing 1  -PG     Taking care of personal grooming (such as brushing teeth) 2  -PG     Eating meals 3  -PG     AM-PAC 6 Clicks Score (OT) 9  -PG       Row Name 04/16/25 0800          How much help from another person do you currently need...    Turning from your back to your side while in flat bed without using bedrails? 2  -SS     Moving from lying on back to sitting on the side of a flat bed without bedrails? 2  -SS     Moving to and from a bed to a chair (including a wheelchair)? 2  -SS     Standing up from a chair using your arms (e.g., wheelchair, bedside chair)? 2  -SS     Climbing 3-5 steps with a railing? 1  -SS     To walk in hospital room? 2  -SS     AM-PAC 6 Clicks Score (PT) 11  -SS     Highest Level of Mobility Goal 4 --> Transfer to chair/commode  -SS       Row Name 04/16/25 1240          Functional Assessment    Outcome Measure Options AM-PAC 6 Clicks Daily Activity (OT);Optimal Instrument  -PG       Row Name 04/16/25 1240          Optimal Instrument    Optimal Instrument Optimal - 3  -PG     Bending/Stooping 4  -PG      Standing 3  -PG     Reaching 2  -PG               User Key  (r) = Recorded By, (t) = Taken By, (c) = Cosigned By      Initials Name Provider Type    PG Matthew Sousa OT Occupational Therapist    Delilah Jaeger RN Registered Nurse                      OT Recommendation and Plan  Planned Therapy Interventions (OT): activity tolerance training, BADL retraining, strengthening exercise, transfer/mobility retraining, patient/caregiver education/training, occupation/activity based interventions  Therapy Frequency (OT): 5 times/wk  Plan of Care Review  Plan of Care Reviewed With: patient  Progress: no change  Outcome Evaluation: Patient presents with limitations affecting strength, activity tolerance, and balance impacting patient's ability to return home safely and independently.  The skills of a therapist will be required to safely and effectively implement the following treatment plan to restore maximal level of function     Time Calculation:   Evaluation Complexity (OT)  Review Occupational Profile/Medical/Therapy History Complexity: brief/low complexity  Assessment, Occupational Performance/Identification of Deficit Complexity: 3-5 performance deficits  Clinical Decision Making Complexity (OT): problem focused assessment/low complexity  Overall Complexity of Evaluation (OT): low complexity     Time Calculation- OT       Row Name 04/16/25 1241             Time Calculation- OT    OT Received On 04/16/25  -PG      OT Goal Re-Cert Due Date 04/19/25  -PG         Timed Charges    15981 - OT Therapeutic Exercise Minutes 10  -PG         Total Minutes    Timed Charges Total Minutes 10  -PG       Total Minutes 10  -PG                User Key  (r) = Recorded By, (t) = Taken By, (c) = Cosigned By      Initials Name Provider Type    Matthew Ballard OT Occupational Therapist                  Therapy Charges for Today       Code Description Service Date Service Provider Modifiers Qty    [id number]  OT THER PROC EA 15  MIN 4/16/2025 Matthew Sousa, OT GO 1                 Matthew Sousa, OT  4/16/2025

## 2025-04-16 NOTE — PROGRESS NOTES
[facility]     Progress Note             Patient Name: Camilla Navas  : 1950  MRN: [id number]  Primary Care Physician:  Lester Lizarraga MD  Date of admission: 2025        Present illness:  Her son and daughter-in-law are at the bedside.  She feels better.  The shaking and twitching of her lips and mouth are much improved.  However, they have not gone away completely.    The pupils were 3 mm that were round and equal reactive to light directly and consensually.  There were no extraocular muscle weakness.  There is questionable right lower facial weakness.  She still has weakness of the right upper extremity.  The left ankle reflexes were hypoactive to absent on both sides.    Review of Systems  No report of any headache, dizziness, nausea or vomiting.      Past Medical History:   Diagnosis Date    Arthritis     Asthma     Atherosclerosis of coronary artery 2018    Non-obstructive coronary artery disease. Cardiac catheterization done in 2018 showed a 40% mid LAD lesion and a 40% proximal RCA lesion.    Cancer     Breast.     COPD (chronic obstructive pulmonary disease)     Diabetes mellitus     Diverticulitis     Hiatal hernia     Hyperlipemia 2022    Hypertension, essential 2022       Past Surgical History:   Procedure Laterality Date    COLONOSCOPY      COLONOSCOPY N/A 2023    Procedure: COLONOSCOPY WITH POLYPECTOMIES HOT/COLD SNARE, ELEVIEW INJECTION, BIOPSIES;  Surgeon: Lester Gillette MD;  Location: [facility] ENDOSCOPY;  Service: Gastroenterology;  Laterality: N/A;  COLON POLYPS, DIVERTICULOSIS    ENDOSCOPY N/A 2023    Procedure: ESOPHAGOGASTRODUODENOSCOPY WITH BIOPSIES;  Surgeon: Lester Gillette MD;  Location: [facility] ENDOSCOPY;  Service: Gastroenterology;  Laterality: N/A;  PREVIOUS SURGERY    HERNIA REPAIR      HYSTERECTOMY      MASTECTOMY Bilateral     UPPER GASTROINTESTINAL ENDOSCOPY         Family History: family history is not on file.  Otherwise pertinent FHx was reviewed and not pertinent to current issue.    Social History:  reports that she has never smoked. She has been exposed to tobacco smoke. She has never used smokeless tobacco. She reports that she does not drink alcohol and does not use drugs.      Home Medications:  Fluticasone-Umeclidin-Vilant, Insulin Lispro, acetaminophen, albuterol sulfate HFA, apixaban, baclofen, buPROPion XL, dilTIAZem HCl ER, diphenhydrAMINE, furosemide, gabapentin, glipizide, hydroCHLOROthiazide, ipratropium-albuterol, lisinopril, metoprolol succinate XL, mirtazapine, montelukast, nitroglycerin, omeprazole, ondansetron, pravastatin, sertraline, vitamin D3, and vitamin E      Allergies:  Allergies   Allergen Reactions    Empagliflozin Nausea And Vomiting    Metformin Diarrhea       Vitals:   Temp:  [97.4 °F (36.3 °C)-98.4 °F (36.9 °C)] 97.6 °F (36.4 °C)  Heart Rate:  [] 100  Resp:  [16-20] 16  BP: (110-136)/(62-76) 134/62  RESULT REVIEW:  I have personally reviewed the results from the time of this admission to 4/15/2025 20:58 EDT and agree with these findings:  []  Laboratory  []  Microbiology  []  Radiology  []  EKG/Telemetry   []  Cardiology/Vascular   []  Pathology  []  Old records  []  Other:  Most notable findings include:   April 12, 2025  I reviewed the digital images of the MRI of her brain that was done on April 11, 2025 with and without contrast.  There is enhancing mass in the right frontoparietal lobe medially with slight slight surrounding edema suggestive of the tumor.     April 11, 2025  There is 4 x 2 mm area area of restricted diffusion in the right frontoparietal lobe extending to the corpus callosum with surrounding edema suggestive of subacute infarct although a neoplastic process could not be ruled out completely.  In addition, there is a slight shift of the midline to the left which could go along with the neoplastic process.     I reviewed the digital images of the MR angiography of  the neck vessels done on April 10, 2025 was unremarkable.     I reviewed the digital images of the MRI of the cervical spine that was done on April 10, 2025.  There is mild degenerative changes along the cervical spine and mild disrespects complex bulging at C5-C6 and C6-7 levels without evidence of nerve root or spinal cord compression.    Impression:    Neoplastic process, right frontoparietal lobe  Generalized weakness  Parkinsonian syndrome which may be related to Remeron.  Neck pain  Insomnia  Obesity with a body mass index of 32.74        Plan:   The family is undecided about treatment.  They are still thinking about chemotherapy, radiation therapy, or brain surgery for decompression of the tumor.    Electronically signed by Sunitha Suarez Jr., MD, 04/15/25, 8:58 PM EDT.        Please note that portions of this note were completed with a voice recognition program.  Part of this note is an electric or electronic transcription/translation of spoken language to printed text using the dragon dictating system.

## 2025-04-16 NOTE — PLAN OF CARE
Goal Outcome Evaluation:  Plan of Care Reviewed With: patient           Outcome Evaluation: Pt a/ox3. Reoriented to time easily. Room air. Pt did not ambulate. Complained of pain early in shift and admin med as per mar. Rested well during shift. Continue poc.

## 2025-04-16 NOTE — PLAN OF CARE
Goal Outcome Evaluation:  Plan of Care Reviewed With: patient        Progress: no change  Outcome Evaluation: Pt Aox4, c/o ear pain and irrigated ear per Dr Ratliff, small amount of cerumen came out of ear. Still c/o ear discomfort. Has tylenol that can be given prn per mar. Granger remains in place and maintained during shift. No new concerns today, continue plan of care.

## 2025-04-16 NOTE — CONSULTS
Visit made to patient room for [facility] explanation of services. Patient sitting up in bed, awake and alert with multiple family members at bedside including MAYANK Christie. Patient disease progression and goals of care discussed. [facility] services explained for both the home setting and nursing home setting and questions answered about insurance coverage and covered services. Patient eligible for hospice care in the home setting or LTC setting under the Medicare benefit.     Patient and family shared that the current plan is for the patient to discharge to skilled rehabilitation for aggressive therapy services including PT, OT and ST. They hope to exhaust her therapy days and then transition to hospice care at home while pursuing LTC placement in a local nursing home. Hospital team continuing to work on placement. Information left with family about [facility] services and [facility] to follow up with patient and family upon discharge from the hospital to determine when therapy services are completed and hospice care can begin.     [facility] palliative team updated and will notify [facility] if current plans change.

## 2025-04-16 NOTE — PROGRESS NOTES
[facility]     Progress Note    Patient Name: Camilla Navas  : 1950  MRN: [id number]  Primary Care Physician:  Lester Lizarraga MD  Date of admission: 2025    Subjective patient was in a very good spirit fully awake alert responsive interactive    Review of Systems  All review of systems are negative except as mentioned in subjective complaints.    Objective   Objective     Vitals:   Temp:  [97.6 °F (36.4 °C)-98.3 °F (36.8 °C)] 97.8 °F (36.6 °C)  Heart Rate:  [] 90  Resp:  [16-18] 18  BP: (115-134)/(62-88) 134/88  Physical Exam    Constitutional: Awake, alert responsive, conversant, no obvious distress              Psychiatric:  Appropriate affect, cooperative   Neurologic:  Awake alert ,oriented x 3, strength symmetric in all extremities, Cranial Nerves grossly intact to confrontation, speech clear   Eyes:   PERRLA, sclerae anicteric, no conjunctival injection   HEENT:  Moist mucous membranes, no nasal or eye discharge, no throat congestion   Neck:   Supple, no thyromegaly, no lymphadenopathy, trachea midline, no elevated JVD   Respiratory:  Clear to auscultation bilaterally, nonlabored respirations    Cardiovascular: RRR, no murmurs, rubs, or gallops, palpable pedal pulses bilaterally, No bilateral ankle edema   Gastrointestinal: Positive bowel sounds, soft, nontender, nondistended, no organomegaly   Musculoskeletal:  No clubbing or cyanosis to extremities,muscle wasting, joint swelling, muscle weakness             Skin:                      No rashes, bruising, skin ulcers, petechiae or ecchymosis    Result Review    Result Review:  I have personally reviewed the results from the time of this admission to 2025 08:51 EDT and agree with these findings:  []  Laboratory  []  Microbiology  []  Radiology  []  EKG/Telemetry   []  Cardiology/Vascular   []  Pathology  []  Old records  []  Other:    Results from last 7 days   Lab Units 25  0530 25  0554 25  0544  04/11/25  0812   WBC 10*3/mm3 14.07* 13.04* 10.00 9.68   HEMOGLOBIN g/dL 13.5 13.7 12.8 12.6   PLATELETS 10*3/mm3 453* 456* 414 404     Results from last 7 days   Lab Units 04/14/25  0530 04/13/25  0554 04/12/25  0533 04/11/25  0812   SODIUM mmol/L 134* 137 133* 132*   POTASSIUM mmol/L 4.7 4.8 4.5 4.3   CHLORIDE mmol/L 102 101 97* 94*   CO2 mmol/L 20.4* 25.2 24.7 23.7   ANION GAP mmol/L 11.6 10.8 11.3 14.3   BUN mg/dL 27* 30* 35* 51*   CREATININE mg/dL 0.66 0.75 0.72 0.86   GLUCOSE mg/dL 161* 223* 254* 370*   EGFR mL/min/1.73 91.6 83.1 87.3 70.6   CALCIUM mg/dL 9.3 9.4 9.4 9.4   ALBUMIN g/dL 2.8* 3.3* 3.1* 3.2*   BILIRUBIN mg/dL 0.2 <0.2  --   --    ALK PHOS U/L 79 101  --   --    ALT (SGPT) U/L 12 27  --   --    AST (SGOT) U/L 37* 50*  --   --        Scheduled Meds:apixaban, 5 mg, Oral, BID  arformoterol, 15 mcg, Nebulization, BID - RT   And  budesonide, 0.5 mg, Nebulization, BID - RT   And  revefenacin, 175 mcg, Nebulization, Daily - RT  baclofen, 10 mg, Oral, Daily  buPROPion XL, 150 mg, Oral, Q24H  carbidopa-levodopa, 1 tablet, Oral, TID  dilTIAZem CD, 360 mg, Oral, Q24H  furosemide, 20 mg, Oral, Daily  gabapentin, 300 mg, Oral, Nightly  glipizide, 10 mg, Oral, BID AC  insulin glargine, 25 Units, Subcutaneous, Daily  insulin lispro, 4-24 Units, Subcutaneous, 4x Daily With Meals & Nightly  lisinopril, 5 mg, Oral, Daily  metoprolol succinate XL, 50 mg, Oral, Q12H  mirtazapine, 15 mg, Oral, Nightly  montelukast, 10 mg, Oral, Nightly  pantoprazole, 40 mg, Oral, Q AM  pravastatin, 80 mg, Oral, Nightly  sertraline, 200 mg, Oral, Daily      Continuous Infusions:   PRN Meds:.•  acetaminophen **OR** acetaminophen **OR** acetaminophen  •  albuterol  •  aluminum-magnesium hydroxide-simethicone  •  senna-docusate sodium **AND** polyethylene glycol **AND** bisacodyl **AND** bisacodyl  •  dextrose  •  dextrose  •  diphenhydrAMINE  •  glucagon (human recombinant)  •  melatonin  •  ondansetron  •  prochlorperazine **OR**  prochlorperazine **OR** prochlorperazine  •  sodium chloride    Assessment & Plan   Assessment / Plan       Active Hospital Problems:    Active Hospital Problems    Diagnosis  POA   • **Generalized weakness [R53.1]  Yes   • Neoplastic growth [D49.9]  Yes     MRI showed 5.1 into 2.7 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  Per neurosurgery resection of the mass will have a very bad prognosis  Family have decided not to proceed with biopsy lumbar puncture or any other active intervention  Per patient request patient is DNR     • Hypokalemia [E87.6]  Yes       Plan:   Waiting for rehab placement  We will keep patient comfortable       Electronically signed by Emory Ratliff MD, 04/16/25, 8:50 AM EDT.

## 2025-04-16 NOTE — PROGRESS NOTES
Heartbeat recording completed. Consent form signed by Pt's Shahnaz TALLEY. Consent form was put into Pt's chart. MT-BC provided supportive listening related to Pt's plan to go to rehab and then home possibly with [facility]. Pt voiced appreciation for MT-BC assistance in recording heartbeat and reported no unmet needs at this time.    Time spent: 25 minutes.

## 2025-04-16 NOTE — THERAPY TREATMENT NOTE
Acute Care - Speech Language Pathology   Swallow Treatment Note NILSON Strickland     Patient Name: Camilla Navas  : 1950  MRN: [id number]  Today's Date: 2025               Admit Date: 2025    Visit Dx:     ICD-10-CM ICD-9-CM   1. Generalized weakness  R53.1 780.79   2. Hypokalemia  E87.6 276.8   3. Bilateral lower extremity edema  R60.0 782.3   4. Unable to ambulate  R26.2 719.7   5. Acute urinary retention  R33.8 788.29   6. Difficulty walking  R26.2 719.7   7. Oropharyngeal dysphagia  R13.12 787.22     Patient Active Problem List   Diagnosis    Reflux esophagitis    Type 2 diabetes mellitus    Depression    DDD (degenerative disc disease), cervical    Cough    Colon polyps    Chest pain    Cancer    Atrophic vaginitis    Bladder disorder    Nonobstructive atherosclerosis of coronary artery    Asthma without status asthmaticus    Adjustment disorder with anxiety    Functional dyspepsia    Esophageal reflux    Ear pain    Dry mouth    Diverticula of intestine    Diabetic peripheral neuropathy associated with type 2 diabetes mellitus    Difficult or painful urination    Limb swelling    Insomnia    Hyperlipemia    Hematuria    Generalized anxiety disorder    Menopausal and postmenopausal disorder    Mandibular mass    Malignant neoplasm of female breast    Major depression, single episode    Sensorineural hearing loss    Seasonal allergic rhinitis    Renal mass    Overweight with body mass index (BMI) 25.0-29.9    Osteoarthritis    Obesity    Vaginal itching    Simple renal cyst    Essential hypertension    Diarrhea    Generalized abdominal pain    Nausea    Heartburn    Pedal edema    Hypokalemia    Pharyngitis    Persistent atrial fibrillation    Atrial fib/flutter, transient    Chest discomfort    Atrial fibrillation with rapid ventricular response    Dehydration    Diabetic gastroparesis    Generalized weakness    Neoplastic growth     Past Medical History:   Diagnosis Date    Arthritis     Asthma      Atherosclerosis of coronary artery 2018    Non-obstructive coronary artery disease. Cardiac catheterization done in October 2018 showed a 40% mid LAD lesion and a 40% proximal RCA lesion.    Cancer     Breast.     COPD (chronic obstructive pulmonary disease)     Diabetes mellitus     Diverticulitis     Hiatal hernia     Hyperlipemia 01/21/2022    Hypertension, essential 03/05/2022     Past Surgical History:   Procedure Laterality Date    COLONOSCOPY      COLONOSCOPY N/A 11/14/2023    Procedure: COLONOSCOPY WITH POLYPECTOMIES HOT/COLD SNARE, ELEVIEW INJECTION, BIOPSIES;  Surgeon: Lester Gillette MD;  Location: [facility] ENDOSCOPY;  Service: Gastroenterology;  Laterality: N/A;  COLON POLYPS, DIVERTICULOSIS    ENDOSCOPY N/A 11/14/2023    Procedure: ESOPHAGOGASTRODUODENOSCOPY WITH BIOPSIES;  Surgeon: Lester Gillette MD;  Location: [facility] ENDOSCOPY;  Service: Gastroenterology;  Laterality: N/A;  PREVIOUS SURGERY    HERNIA REPAIR      HYSTERECTOMY      MASTECTOMY Bilateral     UPPER GASTROINTESTINAL ENDOSCOPY         SLP Recommendation and Plan         SPEECH PATHOLOGY DYSPHAGIA TREATMENT    Subjective/Behavioral Observations: Awake, cooperative.        Day/time of Treatment: 4/16/2025        Current Diet: Mechanical soft, thin liquids        Current Strategies: Assist for self-feeding, set up, single controlled sips of thin liquids, meds whole in applesauce        Treatment received: Focus on dysphagia goals and use of compensatory strategies to decrease risk of aspiration        Results of treatment:Patient verbalized compensatory strategies independently. Patient however needed intermittent cues for utilization of single straw sips. Throat clear and cough x2 when not using single sips. When strategies used, no overt s/s of aspiration.   Patient with assist to feed soft solids cut small. Lower jaw tremors however mild and only mildly interfered with mastication. Patient consumed 100% of mechanical soft diet  with assist.       Progress toward goals:progressing        Barriers to Achieving goals: medical status, independent use of strategies        Plan of care:/changes in plan: continue current diet, compensatory strategies and positioning to decrease aspiration risk.                                                                                              EDUCATION  The patient has been educated in the following areas:   Dysphagia (Swallowing Impairment).                Time Calculation:    Time Calculation- SLP       Row Name 04/16/25 1144             Time Calculation- SLP    SLP Stop Time 0700  -SN      SLP Received On 04/16/25  -SN         Untimed Charges    30260-LI Treatment Swallow Minutes 45  -SN         Total Minutes    Untimed Charges Total Minutes 45  -SN       Total Minutes 45  -SN                User Key  (r) = Recorded By, (t) = Taken By, (c) = Cosigned By      Initials Name Provider Type    Yamileth Castaneda MS-CCC/SLP, FRANCES Speech and Language Pathologist                    Therapy Charges for Today       Code Description Service Date Service Provider Modifiers Qty    29886963040 HC ST TREATMENT SWALLOW 3 4/15/2025 Yamileth Morris MS-CCC/SLP, FRANCES GN 1    [id number] HC ST TREATMENT SWALLOW 3 4/16/2025 Yamileth Morris MS-CCC/SLP, FRANCES GN 1                 PATO Greenberg CNT  4/16/2025

## 2025-04-16 NOTE — NURSING NOTE
[place] MOST form completed, faxed to [facility] with original copy placed in physical chart.  Patient plans for rehab at discharge and will follow up with [facility] care at that time.  Palliative Care will continue to follow.    Maria Antonia CASTRO RN, CCM  Palliative Care

## 2025-04-17 LAB
GLUCOSE BLDC GLUCOMTR-MCNC: 128 MG/DL (ref 70–99)
GLUCOSE BLDC GLUCOMTR-MCNC: 133 MG/DL (ref 70–99)
GLUCOSE BLDC GLUCOMTR-MCNC: 252 MG/DL (ref 70–99)
GLUCOSE BLDC GLUCOMTR-MCNC: 260 MG/DL (ref 70–99)

## 2025-04-17 PROCEDURE — 63710000001 INSULIN GLARGINE PER 5 UNITS: Performed by: STUDENT IN AN ORGANIZED HEALTH CARE EDUCATION/TRAINING PROGRAM

## 2025-04-17 PROCEDURE — 63710000001 INSULIN LISPRO (HUMAN) PER 5 UNITS: Performed by: STUDENT IN AN ORGANIZED HEALTH CARE EDUCATION/TRAINING PROGRAM

## 2025-04-17 PROCEDURE — 94799 UNLISTED PULMONARY SVC/PX: CPT

## 2025-04-17 PROCEDURE — 97164 PT RE-EVAL EST PLAN CARE: CPT

## 2025-04-17 PROCEDURE — 63710000001 REVEFENACIN 175 MCG/3ML SOLUTION: Performed by: STUDENT IN AN ORGANIZED HEALTH CARE EDUCATION/TRAINING PROGRAM

## 2025-04-17 PROCEDURE — 94664 DEMO&/EVAL PT USE INHALER: CPT

## 2025-04-17 PROCEDURE — 25010000002 ONDANSETRON PER 1 MG: Performed by: INTERNAL MEDICINE

## 2025-04-17 PROCEDURE — 92526 ORAL FUNCTION THERAPY: CPT

## 2025-04-17 PROCEDURE — 82948 REAGENT STRIP/BLOOD GLUCOSE: CPT

## 2025-04-17 PROCEDURE — 94761 N-INVAS EAR/PLS OXIMETRY MLT: CPT

## 2025-04-17 PROCEDURE — 97110 THERAPEUTIC EXERCISES: CPT

## 2025-04-17 RX ORDER — CIPROFLOXACIN AND DEXAMETHASONE 3; 1 MG/ML; MG/ML
4 SUSPENSION/ DROPS AURICULAR (OTIC) 2 TIMES DAILY
Status: DISCONTINUED | OUTPATIENT
Start: 2025-04-17 | End: 2025-04-19 | Stop reason: HOSPADM

## 2025-04-17 RX ADMIN — MONTELUKAST 10 MG: 10 TABLET, FILM COATED ORAL at 21:19

## 2025-04-17 RX ADMIN — ARFORMOTEROL TARTRATE 15 MCG: 15 SOLUTION RESPIRATORY (INHALATION) at 07:27

## 2025-04-17 RX ADMIN — Medication 10 MG: at 21:19

## 2025-04-17 RX ADMIN — MIRTAZAPINE 15 MG: 15 TABLET, FILM COATED ORAL at 21:20

## 2025-04-17 RX ADMIN — GLIPIZIDE 10 MG: 5 TABLET ORAL at 17:25

## 2025-04-17 RX ADMIN — GABAPENTIN 300 MG: 300 CAPSULE ORAL at 21:19

## 2025-04-17 RX ADMIN — CARBIDOPA AND LEVODOPA 1 TABLET: 10; 100 TABLET ORAL at 08:04

## 2025-04-17 RX ADMIN — APIXABAN 5 MG: 5 TABLET, FILM COATED ORAL at 08:03

## 2025-04-17 RX ADMIN — ACETAMINOPHEN 650 MG: 325 TABLET ORAL at 21:20

## 2025-04-17 RX ADMIN — CARBIDOPA AND LEVODOPA 1 TABLET: 10; 100 TABLET ORAL at 15:41

## 2025-04-17 RX ADMIN — BUPROPION HYDROCHLORIDE 150 MG: 150 TABLET, EXTENDED RELEASE ORAL at 08:04

## 2025-04-17 RX ADMIN — INSULIN LISPRO 12 UNITS: 100 INJECTION, SOLUTION INTRAVENOUS; SUBCUTANEOUS at 17:25

## 2025-04-17 RX ADMIN — Medication 10 ML: at 08:10

## 2025-04-17 RX ADMIN — GLIPIZIDE 10 MG: 5 TABLET ORAL at 08:03

## 2025-04-17 RX ADMIN — PRAVASTATIN SODIUM 80 MG: 20 TABLET ORAL at 21:19

## 2025-04-17 RX ADMIN — BACLOFEN 10 MG: 10 TABLET ORAL at 08:04

## 2025-04-17 RX ADMIN — BUDESONIDE 0.5 MG: 0.5 SUSPENSION RESPIRATORY (INHALATION) at 07:27

## 2025-04-17 RX ADMIN — CIPROFLOXACIN AND DEXAMETHASONE 4 DROP: 3; 1 SUSPENSION/ DROPS AURICULAR (OTIC) at 11:33

## 2025-04-17 RX ADMIN — CIPROFLOXACIN AND DEXAMETHASONE 4 DROP: 3; 1 SUSPENSION/ DROPS AURICULAR (OTIC) at 21:20

## 2025-04-17 RX ADMIN — INSULIN LISPRO 12 UNITS: 100 INJECTION, SOLUTION INTRAVENOUS; SUBCUTANEOUS at 21:50

## 2025-04-17 RX ADMIN — Medication 10 ML: at 08:09

## 2025-04-17 RX ADMIN — FUROSEMIDE 20 MG: 20 TABLET ORAL at 08:04

## 2025-04-17 RX ADMIN — ACETAMINOPHEN 650 MG: 325 TABLET ORAL at 05:51

## 2025-04-17 RX ADMIN — REVEFENACIN 175 MCG: 175 SOLUTION RESPIRATORY (INHALATION) at 07:27

## 2025-04-17 RX ADMIN — PANTOPRAZOLE SODIUM 40 MG: 40 TABLET, DELAYED RELEASE ORAL at 05:51

## 2025-04-17 RX ADMIN — INSULIN GLARGINE 25 UNITS: 100 INJECTION, SOLUTION SUBCUTANEOUS at 08:05

## 2025-04-17 RX ADMIN — BUDESONIDE 0.5 MG: 0.5 SUSPENSION RESPIRATORY (INHALATION) at 20:24

## 2025-04-17 RX ADMIN — LISINOPRIL 5 MG: 5 TABLET ORAL at 08:12

## 2025-04-17 RX ADMIN — Medication 10 ML: at 21:21

## 2025-04-17 RX ADMIN — DILTIAZEM HYDROCHLORIDE 360 MG: 180 CAPSULE, COATED, EXTENDED RELEASE ORAL at 08:04

## 2025-04-17 RX ADMIN — METOPROLOL SUCCINATE 50 MG: 50 TABLET, FILM COATED, EXTENDED RELEASE ORAL at 08:04

## 2025-04-17 RX ADMIN — ARFORMOTEROL TARTRATE 15 MCG: 15 SOLUTION RESPIRATORY (INHALATION) at 20:24

## 2025-04-17 RX ADMIN — CARBIDOPA AND LEVODOPA 1 TABLET: 10; 100 TABLET ORAL at 21:19

## 2025-04-17 RX ADMIN — METOPROLOL SUCCINATE 50 MG: 50 TABLET, FILM COATED, EXTENDED RELEASE ORAL at 21:19

## 2025-04-17 RX ADMIN — SERTRALINE HYDROCHLORIDE 200 MG: 100 TABLET ORAL at 08:04

## 2025-04-17 RX ADMIN — ONDANSETRON 4 MG: 2 INJECTION INTRAMUSCULAR; INTRAVENOUS at 15:41

## 2025-04-17 RX ADMIN — APIXABAN 5 MG: 5 TABLET, FILM COATED ORAL at 21:20

## 2025-04-17 NOTE — THERAPY TREATMENT NOTE
Patient Name: Camilla Navas  : 1950    MRN: [id number]                              Today's Date: 2025       Admit Date: 2025    Visit Dx:     ICD-10-CM ICD-9-CM   1. Generalized weakness  R53.1 780.79   2. Hypokalemia  E87.6 276.8   3. Bilateral lower extremity edema  R60.0 782.3   4. Unable to ambulate  R26.2 719.7   5. Acute urinary retention  R33.8 788.29   6. Difficulty walking  R26.2 719.7   7. Oropharyngeal dysphagia  R13.12 787.22     Patient Active Problem List   Diagnosis    Reflux esophagitis    Type 2 diabetes mellitus    Depression    DDD (degenerative disc disease), cervical    Cough    Colon polyps    Chest pain    Cancer    Atrophic vaginitis    Bladder disorder    Nonobstructive atherosclerosis of coronary artery    Asthma without status asthmaticus    Adjustment disorder with anxiety    Functional dyspepsia    Esophageal reflux    Ear pain    Dry mouth    Diverticula of intestine    Diabetic peripheral neuropathy associated with type 2 diabetes mellitus    Difficult or painful urination    Limb swelling    Insomnia    Hyperlipemia    Hematuria    Generalized anxiety disorder    Menopausal and postmenopausal disorder    Mandibular mass    Malignant neoplasm of female breast    Major depression, single episode    Sensorineural hearing loss    Seasonal allergic rhinitis    Renal mass    Overweight with body mass index (BMI) 25.0-29.9    Osteoarthritis    Obesity    Vaginal itching    Simple renal cyst    Essential hypertension    Diarrhea    Generalized abdominal pain    Nausea    Heartburn    Pedal edema    Hypokalemia    Pharyngitis    Persistent atrial fibrillation    Atrial fib/flutter, transient    Chest discomfort    Atrial fibrillation with rapid ventricular response    Dehydration    Diabetic gastroparesis    Generalized weakness    Neoplastic growth     Past Medical History:   Diagnosis Date    Arthritis     Asthma     Atherosclerosis of coronary artery 2018     Non-obstructive coronary artery disease. Cardiac catheterization done in October 2018 showed a 40% mid LAD lesion and a 40% proximal RCA lesion.    Cancer     Breast.     COPD (chronic obstructive pulmonary disease)     Diabetes mellitus     Diverticulitis     Hiatal hernia     Hyperlipemia 01/21/2022    Hypertension, essential 03/05/2022     Past Surgical History:   Procedure Laterality Date    COLONOSCOPY      COLONOSCOPY N/A 11/14/2023    Procedure: COLONOSCOPY WITH POLYPECTOMIES HOT/COLD SNARE, ELEVIEW INJECTION, BIOPSIES;  Surgeon: Lester Gillette MD;  Location: [facility] ENDOSCOPY;  Service: Gastroenterology;  Laterality: N/A;  COLON POLYPS, DIVERTICULOSIS    ENDOSCOPY N/A 11/14/2023    Procedure: ESOPHAGOGASTRODUODENOSCOPY WITH BIOPSIES;  Surgeon: Lester Gillette MD;  Location: [facility] ENDOSCOPY;  Service: Gastroenterology;  Laterality: N/A;  PREVIOUS SURGERY    HERNIA REPAIR      HYSTERECTOMY      MASTECTOMY Bilateral     UPPER GASTROINTESTINAL ENDOSCOPY        General Information       Row Name 04/17/25 1108          OT Time and Intention    Document Type therapy note (daily note)  -PG     Mode of Treatment individual therapy;occupational therapy  -PG               User Key  (r) = Recorded By, (t) = Taken By, (c) = Cosigned By      Initials Name Provider Type    PG Matthew Sousa OT Occupational Therapist                     Mobility/ADL's    No documentation.                  Obj/Interventions       Row Name 04/17/25 1108          Shoulder (Therapeutic Exercise)    Shoulder (Therapeutic Exercise) strengthening exercise  -PG     Shoulder Strengthening (Therapeutic Exercise) 15 repititions;1 lb free weight  -PG       Row Name 04/17/25 1108          Elbow/Forearm (Therapeutic Exercise)    Elbow/Forearm (Therapeutic Exercise) strengthening exercise  -PG     Elbow/Forearm Strengthening (Therapeutic Exercise) 1 lb free weight;15 repititions  -PG       Row Name 04/17/25 1108          Motor Skills     Therapeutic Exercise shoulder;elbow/forearm  -PG               User Key  (r) = Recorded By, (t) = Taken By, (c) = Cosigned By      Initials Name Provider Type    PG Matthew Sousa OT Occupational Therapist                   Goals/Plan    No documentation.                  Clinical Impression       Row Name 04/17/25 1109          Plan of Care Review    Progress no change  -PG               User Key  (r) = Recorded By, (t) = Taken By, (c) = Cosigned By      Initials Name Provider Type    PG Matthew Sousa OT Occupational Therapist                   Outcome Measures       Row Name 04/17/25 1109          How much help from another is currently needed...    Putting on and taking off regular lower body clothing? 1  -PG     Bathing (including washing, rinsing, and drying) 2  -PG     Toileting (which includes using toilet bed pan or urinal) 1  -PG     Putting on and taking off regular upper body clothing 3  -PG     Taking care of personal grooming (such as brushing teeth) 4  -PG     Eating meals 4  -PG     AM-PAC 6 Clicks Score (OT) 15  -PG       Row Name 04/17/25 0801          How much help from another person do you currently need...    Turning from your back to your side while in flat bed without using bedrails? 2  -PHI     Moving from lying on back to sitting on the side of a flat bed without bedrails? 2  -PHI     Moving to and from a bed to a chair (including a wheelchair)? 2  -PHI     Standing up from a chair using your arms (e.g., wheelchair, bedside chair)? 2  -PHI     Climbing 3-5 steps with a railing? 1  -PHI     To walk in hospital room? 2  -PHI     AM-PAC 6 Clicks Score (PT) 11  -PHI     Highest Level of Mobility Goal 4 --> Transfer to chair/commode  -PHI       Row Name 04/17/25 1109          Functional Assessment    Outcome Measure Options AM-PAC 6 Clicks Daily Activity (OT);Optimal Instrument  -PG       Row Name 04/17/25 1109          Optimal Instrument    Optimal Instrument Optimal - 3  -PG     Bending/Stooping 4   -PG     Standing 3  -PG     Reaching 2  -PG               User Key  (r) = Recorded By, (t) = Taken By, (c) = Cosigned By      Initials Name Provider Type    Miladis De La Cruz, RN Registered Nurse    Matthew Ballard OT Occupational Therapist                      OT Recommendation and Plan  Planned Therapy Interventions (OT): activity tolerance training, BADL retraining, strengthening exercise, transfer/mobility retraining, patient/caregiver education/training, occupation/activity based interventions  Therapy Frequency (OT): 5 times/wk  Plan of Care Review  Plan of Care Reviewed With: patient  Progress: no change  Outcome Evaluation: Patient presents with limitations affecting strength, activity tolerance, and balance impacting patient's ability to return home safely and independently.  The skills of a therapist will be required to safely and effectively implement the following treatment plan to restore maximal level of function     Time Calculation:   Evaluation Complexity (OT)  Review Occupational Profile/Medical/Therapy History Complexity: brief/low complexity  Assessment, Occupational Performance/Identification of Deficit Complexity: 3-5 performance deficits  Clinical Decision Making Complexity (OT): problem focused assessment/low complexity  Overall Complexity of Evaluation (OT): low complexity     Time Calculation- OT       Row Name 04/17/25 1110             Time Calculation- OT    OT Received On 04/17/25  -PG      OT Goal Re-Cert Due Date 04/19/25  -PG         Timed Charges    42351 - OT Therapeutic Exercise Minutes 10  -PG         Total Minutes    Timed Charges Total Minutes 10  -PG       Total Minutes 10  -PG                User Key  (r) = Recorded By, (t) = Taken By, (c) = Cosigned By      Initials Name Provider Type    Matthew Ballard OT Occupational Therapist                  Therapy Charges for Today       Code Description Service Date Service Provider Modifiers Qty    [id number]  OT THER PROC EA  15 MIN 4/16/2025 Matthew Sousa, OT GO 1    [id number]  OT THER PROC EA 15 MIN 4/17/2025 Matthew Sousa OT GO 1                 Matthew Sousa OT  4/17/2025

## 2025-04-17 NOTE — THERAPY RE-EVALUATION
Acute Care - Physical Therapy Re-Evaluation   Em     Patient Name: Camilla Navas  : 1950  MRN: [id number]  Today's Date: 2025      Visit Dx:     ICD-10-CM ICD-9-CM   1. Generalized weakness  R53.1 780.79   2. Hypokalemia  E87.6 276.8   3. Bilateral lower extremity edema  R60.0 782.3   4. Unable to ambulate  R26.2 719.7   5. Acute urinary retention  R33.8 788.29   6. Difficulty walking  R26.2 719.7   7. Oropharyngeal dysphagia  R13.12 787.22     Patient Active Problem List   Diagnosis    Reflux esophagitis    Type 2 diabetes mellitus    Depression    DDD (degenerative disc disease), cervical    Cough    Colon polyps    Chest pain    Cancer    Atrophic vaginitis    Bladder disorder    Nonobstructive atherosclerosis of coronary artery    Asthma without status asthmaticus    Adjustment disorder with anxiety    Functional dyspepsia    Esophageal reflux    Ear pain    Dry mouth    Diverticula of intestine    Diabetic peripheral neuropathy associated with type 2 diabetes mellitus    Difficult or painful urination    Limb swelling    Insomnia    Hyperlipemia    Hematuria    Generalized anxiety disorder    Menopausal and postmenopausal disorder    Mandibular mass    Malignant neoplasm of female breast    Major depression, single episode    Sensorineural hearing loss    Seasonal allergic rhinitis    Renal mass    Overweight with body mass index (BMI) 25.0-29.9    Osteoarthritis    Obesity    Vaginal itching    Simple renal cyst    Essential hypertension    Diarrhea    Generalized abdominal pain    Nausea    Heartburn    Pedal edema    Hypokalemia    Pharyngitis    Persistent atrial fibrillation    Atrial fib/flutter, transient    Chest discomfort    Atrial fibrillation with rapid ventricular response    Dehydration    Diabetic gastroparesis    Generalized weakness    Neoplastic growth     Past Medical History:   Diagnosis Date    Arthritis     Asthma     Atherosclerosis of coronary artery 2018     Non-obstructive coronary artery disease. Cardiac catheterization done in October 2018 showed a 40% mid LAD lesion and a 40% proximal RCA lesion.    Cancer     Breast.     COPD (chronic obstructive pulmonary disease)     Diabetes mellitus     Diverticulitis     Hiatal hernia     Hyperlipemia 01/21/2022    Hypertension, essential 03/05/2022     Past Surgical History:   Procedure Laterality Date    COLONOSCOPY      COLONOSCOPY N/A 11/14/2023    Procedure: COLONOSCOPY WITH POLYPECTOMIES HOT/COLD SNARE, ELEVIEW INJECTION, BIOPSIES;  Surgeon: Lester Gillette MD;  Location: [facility] ENDOSCOPY;  Service: Gastroenterology;  Laterality: N/A;  COLON POLYPS, DIVERTICULOSIS    ENDOSCOPY N/A 11/14/2023    Procedure: ESOPHAGOGASTRODUODENOSCOPY WITH BIOPSIES;  Surgeon: Lester Gillette MD;  Location: [facility] ENDOSCOPY;  Service: Gastroenterology;  Laterality: N/A;  PREVIOUS SURGERY    HERNIA REPAIR      HYSTERECTOMY      MASTECTOMY Bilateral     UPPER GASTROINTESTINAL ENDOSCOPY       PT Assessment (Last 12 Hours)       PT Evaluation and Treatment       Row Name 04/17/25 1109          Physical Therapy Time and Intention    Subjective Information complains of;weakness;fatigue (P)   -     Document Type re-evaluation (P)   -     Mode of Treatment individual therapy (P)   -     Patient Effort good (P)   -       Row Name 04/17/25 1109          Range of Motion (ROM)    Range of Motion ROM is WNL;bilateral lower extremities (P)   -       Row Name 04/17/25 1109          Strength (Manual Muscle Testing)    Strength (Manual Muscle Testing) strength is WNL;bilateral lower extremities (P)   -       Row Name 04/17/25 1109          Bed Mobility    Bed Mobility supine-sit-supine (P)   -TM     Supine-Sit-Supine [place] (Bed Mobility) moderate assist (50% patient effort) (P)   -       Row Name 04/17/25 1109          Transfers    Transfers sit-stand transfer (P)   -       Row Name 04/17/25 1109          Sit-Stand Transfer     Sit-Stand [place] (Transfers) moderate assist (50% patient effort) (P)   -TM     Assistive Device (Sit-Stand Transfers) walker, front-wheeled (P)   -TM       Row Name 04/17/25 1109          Stand-Sit Transfer    Stand-Sit [place] (Transfers) moderate assist (50% patient effort) (P)   -TM     Assistive Device (Stand-Sit Transfers) walker, front-wheeled (P)   -       Row Name 04/17/25 1109          Gait/Stairs (Locomotion)    Gait/Stairs Locomotion gait/ambulation independence;gait/ambulation assistive device (P)   -TM     [place] Level (Gait) not tested (P)   -TM     Assistive Device (Gait) walker, front-wheeled (P)   -TM     Patient was able to Ambulate no, other medical factors prevent ambulation (P)   -TM     Reason Patient was unable to Ambulate Excessive Weakness (P)   -TM       Row Name 04/17/25 1109          Safety Issues/Impairments Affecting Functional Mobility    Impairments Affecting Function (Mobility) balance;cognition;endurance/activity tolerance;strength;pain;range of motion (ROM) (P)   -       Row Name 04/17/25 1109          Balance    Balance Assessment standing static balance (P)   -TM     Static Standing Balance moderate assist (P)   -TM     Position/Device Used, Standing Balance walker, front-wheeled (P)   -       Row Name 04/17/25 1146          Plan of Care Review    Plan of Care Reviewed With patient (P)   -TM     Outcome Evaluation Pt presents with strength, balance, and endurance deficits and requires assistance to safely stand. Pt requires skilled therapy services to address these impairments (P)   -       Row Name 04/17/25 1146          Therapy Assessment/Plan (PT)    Patient/Family Therapy Goals Statement (PT) Return home (P)   -TM     Rehab Potential (PT) good (P)   -TM     Criteria for Skilled Interventions Met (PT) skilled treatment is necessary (P)   -TM     Therapy Frequency (PT) daily (P)   -TM     Predicted Duration of Therapy Intervention (PT) 10 days (P)    -TM     Problem List (PT) problems related to;balance;mobility;strength;pain;cognition;range of motion (ROM) (P)   -TM     Activity Limitations Related to Problem List (PT) unable to ambulate safely;unable to transfer safely (P)   -       Row Name 04/17/25 1146          Therapy Plan Review/Discharge Plan (PT)    Therapy Plan Review (PT) evaluation/treatment results reviewed;patient (P)   -       Row Name 04/17/25 1146          Physical Therapy Goals    Bed Mobility Goal Selection (PT) bed mobility, PT goal 1 (P)   -TM     Transfer Goal Selection (PT) transfer, PT goal 1 (P)   -TM     Gait Training Goal Selection (PT) gait training, PT goal 1 (P)   -       Row Name 04/17/25 1146          Bed Mobility Goal 1 (PT)    Activity/Assistive Device (Bed Mobility Goal 1, PT) bed mobility activities, all (P)   -TM     [place] Level/Cues Needed (Bed Mobility Goal 1, PT) contact guard required (P)   -TM     Time Frame (Bed Mobility Goal 1, PT) long term goal (LTG);10 days (P)   -TM     Progress/Outcomes (Bed Mobility Goal 1, PT) good progress toward goal (P)   -TM       Row Name 04/17/25 1146          Transfer Goal 1 (PT)    Activity/Assistive Device (Transfer Goal 1, PT) sit-to-stand/stand-to-sit;bed-to-chair/chair-to-bed;walker, rolling (P)   -TM     [place] Level/Cues Needed (Transfer Goal 1, PT) contact guard required (P)   -TM     Time Frame (Transfer Goal 1, PT) long term goal (LTG);10 days (P)   -TM     Progress/Outcome (Transfer Goal 1, PT) good progress toward goal (P)   -TM       Row Name 04/17/25 1146          Gait Training Goal 1 (PT)    Activity/Assistive Device (Gait Training Goal 1, PT) gait (walking locomotion);assistive device use;walker, rolling (P)   -TM     [place] Level (Gait Training Goal 1, PT) contact guard required (P)   -TM     Distance (Gait Training Goal 1, PT) 50 (P)   -TM     Time Frame (Gait Training Goal 1, PT) long term goal (LTG);10 days (P)   -TM     Progress/Outcome (Gait  Training Goal 1, PT) continuing progress toward goal (P)   -TM               User Key  (r) = Recorded By, (t) = Taken By, (c) = Cosigned By      Initials Name Provider Type    TM Issa Burrows, PT Student PT Student                      PT Recommendation and Plan  Anticipated Discharge Disposition (PT): (P) sub acute care setting  Planned Therapy Interventions (PT): (P) balance training, bed mobility training, gait training, home exercise program, neuromuscular re-education, patient/family education, strengthening, transfer training  Therapy Frequency (PT): (P) daily  Plan of Care Reviewed With: (P) patient  Outcome Evaluation: (P) Pt presents with strength, balance, and endurance deficits and requires assistance to safely stand. Pt requires skilled therapy services to address these impairments   Outcome Measures       Row Name 04/17/25 1147             How much help from another person do you currently need...    Turning from your back to your side while in flat bed without using bedrails? 3 (P)   -TM      Moving from lying on back to sitting on the side of a flat bed without bedrails? 2 (P)   -TM      Moving to and from a bed to a chair (including a wheelchair)? 2 (P)   -TM      Standing up from a chair using your arms (e.g., wheelchair, bedside chair)? 2 (P)   -TM      Climbing 3-5 steps with a railing? 1 (P)   -TM      To walk in hospital room? 1 (P)   -TM      AM-PAC 6 Clicks Score (PT) 11 (P)   -TM         Functional Assessment    Outcome Measure Options AM-PAC 6 Clicks Basic Mobility (PT) (P)   -TM                User Key  (r) = Recorded By, (t) = Taken By, (c) = Cosigned By      Initials Name Provider Type    Issa Arteaga, PT Student PT Student                     Time Calculation:    PT Charges       Row Name 04/17/25 2037             Time Calculation    PT Received On 04/17/25 (P)   -TM      PT Goal Re-Cert Due Date 04/26/25 (P)   -TM         Untimed Charges    PT Eval/Re-eval Minutes 25 (P)   -TM          Total Minutes    Untimed Charges Total Minutes 25 (P)   -TM       Total Minutes 25 (P)   -TM                User Key  (r) = Recorded By, (t) = Taken By, (c) = Cosigned By      Initials Name Provider Type    TM Issa Burrows, PT Student PT Student                  Therapy Charges for Today       Code Description Service Date Service Provider Modifiers Qty    68664552412 HC PT RE-EVAL ESTABLISHED PLAN 2 4/17/2025 Issa Burrows PT Student GP 1            PT G-Codes  Outcome Measure Options: (P) AM-PAC 6 Clicks Basic Mobility (PT)  AM-PAC 6 Clicks Score (PT): (P) 11  AM-PAC 6 Clicks Score (OT): 15    Issa Burrows PT Student  4/17/2025

## 2025-04-17 NOTE — THERAPY TREATMENT NOTE
Acute Care - Speech Language Pathology   Swallow Treatment Note NILSON Strickland     Patient Name: Camilla Navas  : 1950  MRN: [id number]  Today's Date: 2025               Admit Date: 2025    Visit Dx:     ICD-10-CM ICD-9-CM   1. Generalized weakness  R53.1 780.79   2. Hypokalemia  E87.6 276.8   3. Bilateral lower extremity edema  R60.0 782.3   4. Unable to ambulate  R26.2 719.7   5. Acute urinary retention  R33.8 788.29   6. Difficulty walking  R26.2 719.7   7. Oropharyngeal dysphagia  R13.12 787.22     Patient Active Problem List   Diagnosis    Reflux esophagitis    Type 2 diabetes mellitus    Depression    DDD (degenerative disc disease), cervical    Cough    Colon polyps    Chest pain    Cancer    Atrophic vaginitis    Bladder disorder    Nonobstructive atherosclerosis of coronary artery    Asthma without status asthmaticus    Adjustment disorder with anxiety    Functional dyspepsia    Esophageal reflux    Ear pain    Dry mouth    Diverticula of intestine    Diabetic peripheral neuropathy associated with type 2 diabetes mellitus    Difficult or painful urination    Limb swelling    Insomnia    Hyperlipemia    Hematuria    Generalized anxiety disorder    Menopausal and postmenopausal disorder    Mandibular mass    Malignant neoplasm of female breast    Major depression, single episode    Sensorineural hearing loss    Seasonal allergic rhinitis    Renal mass    Overweight with body mass index (BMI) 25.0-29.9    Osteoarthritis    Obesity    Vaginal itching    Simple renal cyst    Essential hypertension    Diarrhea    Generalized abdominal pain    Nausea    Heartburn    Pedal edema    Hypokalemia    Pharyngitis    Persistent atrial fibrillation    Atrial fib/flutter, transient    Chest discomfort    Atrial fibrillation with rapid ventricular response    Dehydration    Diabetic gastroparesis    Generalized weakness    Neoplastic growth     Past Medical History:   Diagnosis Date    Arthritis     Asthma      Atherosclerosis of coronary artery 2018    Non-obstructive coronary artery disease. Cardiac catheterization done in October 2018 showed a 40% mid LAD lesion and a 40% proximal RCA lesion.    Cancer     Breast.     COPD (chronic obstructive pulmonary disease)     Diabetes mellitus     Diverticulitis     Hiatal hernia     Hyperlipemia 01/21/2022    Hypertension, essential 03/05/2022     Past Surgical History:   Procedure Laterality Date    COLONOSCOPY      COLONOSCOPY N/A 11/14/2023    Procedure: COLONOSCOPY WITH POLYPECTOMIES HOT/COLD SNARE, ELEVIEW INJECTION, BIOPSIES;  Surgeon: Lester Gillette MD;  Location: [facility] ENDOSCOPY;  Service: Gastroenterology;  Laterality: N/A;  COLON POLYPS, DIVERTICULOSIS    ENDOSCOPY N/A 11/14/2023    Procedure: ESOPHAGOGASTRODUODENOSCOPY WITH BIOPSIES;  Surgeon: Lester Gillette MD;  Location: [facility] ENDOSCOPY;  Service: Gastroenterology;  Laterality: N/A;  PREVIOUS SURGERY    HERNIA REPAIR      HYSTERECTOMY      MASTECTOMY Bilateral     UPPER GASTROINTESTINAL ENDOSCOPY         SLP Recommendation and Plan         SPEECH PATHOLOGY DYSPHAGIA TREATMENT    Subjective/Behavioral Observations: Awake, cooperative, family at bedside        Day/time of Treatment: 4/17/2024        Current Diet: Mechanical soft, thin liquids        Current Strategies: Assist with self-feeding, small bites and sips, single controlled drink of thin liquids via straw        Treatment received: Focused on dysphagia goals        Results of treatment: Patient verbalize compensatory strategies independently.  She did require some assist for meal set up.  Patient taking single controlled sips of thin liquids via straw cup.  Swallows completed with mild delay without overt clinical signs or symptoms of aspiration.  Patient self-fed soft solids, minimal lower jaw tremoring with chewing.  Patient consumed 50% of meal without overt clinical signs or symptoms of aspiration.        Progress toward goals: Goals  met, patient tolerating least restrictive diet mechanical soft solids and thin liquids.        Barriers to Achieving goals: Goals met        Plan of care:/changes in plan: Patient is tolerating least restrictive diet of mechanical soft solids and thin liquids.  She has met her dysphagia goals.  No further skilled speech pathology services are indicated at this time.                                                                                             EDUCATION  The patient has been educated in the following areas:   Dysphagia (Swallowing Impairment).                Time Calculation:    Time Calculation- SLP       Row Name 04/17/25 1320             Time Calculation- SLP    SLP Stop Time 1300  -SN      SLP Received On 04/17/25  -SN         Untimed Charges    28994-MW Treatment Swallow Minutes 45  -SN         Total Minutes    Untimed Charges Total Minutes 45  -SN       Total Minutes 45  -SN                User Key  (r) = Recorded By, (t) = Taken By, (c) = Cosigned By      Initials Name Provider Type    Yamileth Castaneda MS-CCC/SLP, FRANCES Speech and Language Pathologist                    Therapy Charges for Today       Code Description Service Date Service Provider Modifiers Qty    77662766341 HC ST TREATMENT SWALLOW 3 4/16/2025 Yamileth Morris MS-CCC/SLP, FRANCES GN 1    00027522443 HC ST TREATMENT SWALLOW 3 4/17/2025 Yamileth Morris MS-CCC/SLP, FRANCES GN 1                 PATO Greenberg CNT  4/17/2025   [Date of last menstrual period ____] : date of last menstrual period: [unfilled] [Told you have blood in urine on a urine test] : told blood was present in a urine test [Negative] : Heme/Lymph [see HPI] : see HPI

## 2025-04-17 NOTE — PROGRESS NOTES
[facility]     Progress Note    Patient Name: Camilla Navas  : 1950  MRN: [id number]  Primary Care Physician:  Lester Lizarraga MD  Date of admission: 2025    Subjective patient is fully awake alert oriented not in any acute distress she is complaining of left ear pain.  I looked in her left ear and it has compacted wax    Review of Systems  All review of systems are negative except as mentioned in subjective complaints.    Objective   Objective     Vitals:   Temp:  [97.9 °F (36.6 °C)-98.8 °F (37.1 °C)] 98.3 °F (36.8 °C)  Heart Rate:  [84-98] 93  Resp:  [16-18] 18  BP: ()/(48-79) 121/79  Physical Exam    Constitutional: Awake, alert responsive, conversant, no obvious distress              Psychiatric:  Appropriate affect, cooperative   Neurologic:  Awake alert ,oriented x 3, strength symmetric in all extremities, Cranial Nerves grossly intact to confrontation, speech clear   Eyes:   PERRLA, sclerae anicteric, no conjunctival injection   HEENT:  Moist mucous membranes, no nasal or eye discharge, no throat congestion   Neck:   Supple, no thyromegaly, no lymphadenopathy, trachea midline, no elevated JVD   Respiratory:  Clear to auscultation bilaterally, nonlabored respirations    Cardiovascular: RRR, no murmurs, rubs, or gallops, palpable pedal pulses bilaterally, No bilateral ankle edema   Gastrointestinal: Positive bowel sounds, soft, nontender, nondistended, no organomegaly   Musculoskeletal:  No clubbing or cyanosis to extremities,muscle wasting, joint swelling, muscle weakness             Skin:                      No rashes, bruising, skin ulcers, petechiae or ecchymosis    Result Review    Result Review:  I have personally reviewed the results from the time of this admission to 2025 09:30 EDT and agree with these findings:  []  Laboratory  []  Microbiology  []  Radiology  []  EKG/Telemetry   []  Cardiology/Vascular   []  Pathology  []  Old records  []  Other:    Results from  Patient scheduled to see Dr. Sahu tomorrow at 1300 [place] [facility] [place] [address]    MIGNON Hager  [facility]  Cardiovascular & Thoracic Surgery  Pager: [phone]     last 7 days   Lab Units 04/14/25  0530 04/13/25  0554 04/12/25  0533 04/11/25  0812   WBC 10*3/mm3 14.07* 13.04* 10.00 9.68   HEMOGLOBIN g/dL 13.5 13.7 12.8 12.6   PLATELETS 10*3/mm3 453* 456* 414 404     Results from last 7 days   Lab Units 04/14/25  0530 04/13/25  0554 04/12/25  0533 04/11/25  0812   SODIUM mmol/L 134* 137 133* 132*   POTASSIUM mmol/L 4.7 4.8 4.5 4.3   CHLORIDE mmol/L 102 101 97* 94*   CO2 mmol/L 20.4* 25.2 24.7 23.7   ANION GAP mmol/L 11.6 10.8 11.3 14.3   BUN mg/dL 27* 30* 35* 51*   CREATININE mg/dL 0.66 0.75 0.72 0.86   GLUCOSE mg/dL 161* 223* 254* 370*   EGFR mL/min/1.73 91.6 83.1 87.3 70.6   CALCIUM mg/dL 9.3 9.4 9.4 9.4   ALBUMIN g/dL 2.8* 3.3* 3.1* 3.2*   BILIRUBIN mg/dL 0.2 <0.2  --   --    ALK PHOS U/L 79 101  --   --    ALT (SGPT) U/L 12 27  --   --    AST (SGOT) U/L 37* 50*  --   --        Scheduled Meds:apixaban, 5 mg, Oral, BID  arformoterol, 15 mcg, Nebulization, BID - RT   And  budesonide, 0.5 mg, Nebulization, BID - RT   And  revefenacin, 175 mcg, Nebulization, Daily - RT  baclofen, 10 mg, Oral, Daily  buPROPion XL, 150 mg, Oral, Q24H  carbidopa-levodopa, 1 tablet, Oral, TID  dilTIAZem CD, 360 mg, Oral, Q24H  furosemide, 20 mg, Oral, Daily  gabapentin, 300 mg, Oral, Nightly  glipizide, 10 mg, Oral, BID AC  insulin glargine, 25 Units, Subcutaneous, Daily  insulin lispro, 4-24 Units, Subcutaneous, 4x Daily With Meals & Nightly  lisinopril, 5 mg, Oral, Daily  metoprolol succinate XL, 50 mg, Oral, Q12H  mirtazapine, 15 mg, Oral, Nightly  montelukast, 10 mg, Oral, Nightly  pantoprazole, 40 mg, Oral, Q AM  pravastatin, 80 mg, Oral, Nightly  sertraline, 200 mg, Oral, Daily      Continuous Infusions:   PRN Meds:.•  acetaminophen **OR** acetaminophen **OR** acetaminophen  •  albuterol  •  aluminum-magnesium hydroxide-simethicone  •  senna-docusate sodium **AND** polyethylene glycol **AND** bisacodyl **AND** bisacodyl  •  dextrose  •  dextrose  •  diphenhydrAMINE  •  glucagon (human  recombinant)  •  melatonin  •  ondansetron  •  prochlorperazine **OR** prochlorperazine **OR** prochlorperazine  •  sodium chloride    Assessment & Plan   Assessment / Plan       Active Hospital Problems:    Active Hospital Problems    Diagnosis  POA   • **Generalized weakness [R53.1]  Yes   • Neoplastic growth [D49.9]  Yes     MRI showed 5.1 into 2.7 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  Per neurosurgery resection of the mass will have a very bad prognosis  Family have decided not to proceed with biopsy lumbar puncture or any other active intervention  Per patient request patient is DNR     • Hypokalemia [E87.6]  Yes       Plan:   E[place]rops  Awaiting for rehab placement       Electronically signed by Emory Ratliff MD, 04/17/25, 9:30 AM EDT.

## 2025-04-17 NOTE — PLAN OF CARE
Goal Outcome Evaluation:      Pt planning on discharging soon to rehab- pre cert started. Pt was able to stand at bedside with Physical therapy, ACHS blood sugar checks- insulin coverage administered as needed

## 2025-04-17 NOTE — PLAN OF CARE
Goal Outcome Evaluation:  Plan of Care Reviewed With: patient           Outcome Evaluation: Pt a/ox3. Room air. Bedrest during night shift. C/o earache. There is some wax still in both ear canals but not blocking canal; canal does not appear red or irritated. Pain seems to be more inner ear and extends downwards toward jaw. Given tylenol. Pt slept much of night. Gr[place] in place but dayshift MD should be consulted regarding placement since for acute retention only at this time. Cont with poc.

## 2025-04-17 NOTE — NURSING NOTE
Palliative Care follow up with patient at bedside.  Patient reports having an earache but over all feeling better today.  Plans to discharge to inpatient rehab and follow up with [facility] at rehab.  Palliative Care will continue to follow.    Maria Antonia CASTRO RN, [facility]  Palliative Care

## 2025-04-18 LAB
GLUCOSE BLDC GLUCOMTR-MCNC: 145 MG/DL (ref 70–99)
GLUCOSE BLDC GLUCOMTR-MCNC: 167 MG/DL (ref 70–99)
GLUCOSE BLDC GLUCOMTR-MCNC: 191 MG/DL (ref 70–99)
GLUCOSE BLDC GLUCOMTR-MCNC: 303 MG/DL (ref 70–99)

## 2025-04-18 PROCEDURE — 63710000001 INSULIN GLARGINE PER 5 UNITS: Performed by: STUDENT IN AN ORGANIZED HEALTH CARE EDUCATION/TRAINING PROGRAM

## 2025-04-18 PROCEDURE — 63710000001 INSULIN LISPRO (HUMAN) PER 5 UNITS: Performed by: STUDENT IN AN ORGANIZED HEALTH CARE EDUCATION/TRAINING PROGRAM

## 2025-04-18 PROCEDURE — 94799 UNLISTED PULMONARY SVC/PX: CPT

## 2025-04-18 PROCEDURE — 82948 REAGENT STRIP/BLOOD GLUCOSE: CPT

## 2025-04-18 PROCEDURE — 97530 THERAPEUTIC ACTIVITIES: CPT

## 2025-04-18 PROCEDURE — 25010000002 ONDANSETRON PER 1 MG: Performed by: INTERNAL MEDICINE

## 2025-04-18 PROCEDURE — 63710000001 REVEFENACIN 175 MCG/3ML SOLUTION: Performed by: STUDENT IN AN ORGANIZED HEALTH CARE EDUCATION/TRAINING PROGRAM

## 2025-04-18 PROCEDURE — 94761 N-INVAS EAR/PLS OXIMETRY MLT: CPT

## 2025-04-18 PROCEDURE — 82948 REAGENT STRIP/BLOOD GLUCOSE: CPT | Performed by: STUDENT IN AN ORGANIZED HEALTH CARE EDUCATION/TRAINING PROGRAM

## 2025-04-18 RX ADMIN — ARFORMOTEROL TARTRATE 15 MCG: 15 SOLUTION RESPIRATORY (INHALATION) at 18:48

## 2025-04-18 RX ADMIN — CARBIDOPA AND LEVODOPA 1 TABLET: 10; 100 TABLET ORAL at 20:39

## 2025-04-18 RX ADMIN — PRAVASTATIN SODIUM 80 MG: 20 TABLET ORAL at 20:39

## 2025-04-18 RX ADMIN — APIXABAN 5 MG: 5 TABLET, FILM COATED ORAL at 20:39

## 2025-04-18 RX ADMIN — LISINOPRIL 5 MG: 5 TABLET ORAL at 08:59

## 2025-04-18 RX ADMIN — CIPROFLOXACIN AND DEXAMETHASONE 4 DROP: 3; 1 SUSPENSION/ DROPS AURICULAR (OTIC) at 20:41

## 2025-04-18 RX ADMIN — CIPROFLOXACIN AND DEXAMETHASONE 4 DROP: 3; 1 SUSPENSION/ DROPS AURICULAR (OTIC) at 09:01

## 2025-04-18 RX ADMIN — BUDESONIDE 0.5 MG: 0.5 SUSPENSION RESPIRATORY (INHALATION) at 07:56

## 2025-04-18 RX ADMIN — ONDANSETRON 4 MG: 2 INJECTION INTRAMUSCULAR; INTRAVENOUS at 16:52

## 2025-04-18 RX ADMIN — SENNOSIDES AND DOCUSATE SODIUM 2 TABLET: 50; 8.6 TABLET ORAL at 20:39

## 2025-04-18 RX ADMIN — SERTRALINE HYDROCHLORIDE 200 MG: 100 TABLET ORAL at 08:59

## 2025-04-18 RX ADMIN — BACLOFEN 10 MG: 10 TABLET ORAL at 08:59

## 2025-04-18 RX ADMIN — METOPROLOL SUCCINATE 50 MG: 50 TABLET, FILM COATED, EXTENDED RELEASE ORAL at 08:59

## 2025-04-18 RX ADMIN — BUPROPION HYDROCHLORIDE 150 MG: 150 TABLET, EXTENDED RELEASE ORAL at 08:59

## 2025-04-18 RX ADMIN — ACETAMINOPHEN 650 MG: 325 TABLET ORAL at 06:33

## 2025-04-18 RX ADMIN — Medication 10 ML: at 20:40

## 2025-04-18 RX ADMIN — ARFORMOTEROL TARTRATE 15 MCG: 15 SOLUTION RESPIRATORY (INHALATION) at 07:56

## 2025-04-18 RX ADMIN — INSULIN GLARGINE 25 UNITS: 100 INJECTION, SOLUTION SUBCUTANEOUS at 09:00

## 2025-04-18 RX ADMIN — GABAPENTIN 300 MG: 300 CAPSULE ORAL at 20:40

## 2025-04-18 RX ADMIN — INSULIN LISPRO 4 UNITS: 100 INJECTION, SOLUTION INTRAVENOUS; SUBCUTANEOUS at 17:10

## 2025-04-18 RX ADMIN — PANTOPRAZOLE SODIUM 40 MG: 40 TABLET, DELAYED RELEASE ORAL at 06:34

## 2025-04-18 RX ADMIN — GLIPIZIDE 10 MG: 5 TABLET ORAL at 08:59

## 2025-04-18 RX ADMIN — GLIPIZIDE 10 MG: 5 TABLET ORAL at 16:51

## 2025-04-18 RX ADMIN — BUDESONIDE 0.5 MG: 0.5 SUSPENSION RESPIRATORY (INHALATION) at 18:48

## 2025-04-18 RX ADMIN — REVEFENACIN 175 MCG: 175 SOLUTION RESPIRATORY (INHALATION) at 07:56

## 2025-04-18 RX ADMIN — DILTIAZEM HYDROCHLORIDE 360 MG: 180 CAPSULE, COATED, EXTENDED RELEASE ORAL at 08:59

## 2025-04-18 RX ADMIN — CARBIDOPA AND LEVODOPA 1 TABLET: 10; 100 TABLET ORAL at 08:59

## 2025-04-18 RX ADMIN — METOPROLOL SUCCINATE 50 MG: 50 TABLET, FILM COATED, EXTENDED RELEASE ORAL at 20:40

## 2025-04-18 RX ADMIN — INSULIN LISPRO 16 UNITS: 100 INJECTION, SOLUTION INTRAVENOUS; SUBCUTANEOUS at 12:46

## 2025-04-18 RX ADMIN — MONTELUKAST 10 MG: 10 TABLET, FILM COATED ORAL at 20:39

## 2025-04-18 RX ADMIN — MIRTAZAPINE 15 MG: 15 TABLET, FILM COATED ORAL at 20:41

## 2025-04-18 RX ADMIN — FUROSEMIDE 20 MG: 20 TABLET ORAL at 08:59

## 2025-04-18 RX ADMIN — CARBIDOPA AND LEVODOPA 1 TABLET: 10; 100 TABLET ORAL at 16:51

## 2025-04-18 RX ADMIN — APIXABAN 5 MG: 5 TABLET, FILM COATED ORAL at 08:59

## 2025-04-18 RX ADMIN — INSULIN LISPRO 4 UNITS: 100 INJECTION, SOLUTION INTRAVENOUS; SUBCUTANEOUS at 20:40

## 2025-04-18 NOTE — PROGRESS NOTES
[facility]     Progress Note    Patient Name: Camilla Navas  : 1950  MRN: [id number]  Primary Care Physician:  Lester Lizarraga MD  Date of admission: 2025    Subjective patient fully awake alert oriented participating with the physical therapy.  She is still very weak left ear pain has improved    Review of Systems  All review of systems are negative except as mentioned in subjective complaints.    Objective   Objective     Vitals:   Temp:  [97.8 °F (36.6 °C)-98.4 °F (36.9 °C)] 97.9 °F (36.6 °C)  Heart Rate:  [] 92  Resp:  [18-20] 18  BP: (117-129)/(61-90) 129/66  Physical Exam    Constitutional: Awake, alert responsive, conversant, no obvious distress              Psychiatric:  Appropriate affect, cooperative   Neurologic:  Awake alert ,oriented x 3, strength symmetric in all extremities, Cranial Nerves grossly intact to confrontation, speech clear   Eyes:   PERRLA, sclerae anicteric, no conjunctival injection   HEENT:  Moist mucous membranes, no nasal or eye discharge, no throat congestion   Neck:   Supple, no thyromegaly, no lymphadenopathy, trachea midline, no elevated JVD   Respiratory:  Clear to auscultation bilaterally, nonlabored respirations    Cardiovascular: RRR, no murmurs, rubs, or gallops, palpable pedal pulses bilaterally, No bilateral ankle edema   Gastrointestinal: Positive bowel sounds, soft, nontender, nondistended, no organomegaly   Musculoskeletal:  No clubbing or cyanosis to extremities,muscle wasting, joint swelling, muscle weakness             Skin:                      No rashes, bruising, skin ulcers, petechiae or ecchymosis    Result Review    Result Review:  I have personally reviewed the results from the time of this admission to 2025 09:00 EDT and agree with these findings:  []  Laboratory  []  Microbiology  []  Radiology  []  EKG/Telemetry   []  Cardiology/Vascular   []  Pathology  []  Old records  []  Other:    Results from last 7 days   Lab  Units 04/14/25  0530 04/13/25  0554 04/12/25  0533   WBC 10*3/mm3 14.07* 13.04* 10.00   HEMOGLOBIN g/dL 13.5 13.7 12.8   PLATELETS 10*3/mm3 453* 456* 414     Results from last 7 days   Lab Units 04/14/25  0530 04/13/25  0554 04/12/25  0533   SODIUM mmol/L 134* 137 133*   POTASSIUM mmol/L 4.7 4.8 4.5   CHLORIDE mmol/L 102 101 97*   CO2 mmol/L 20.4* 25.2 24.7   ANION GAP mmol/L 11.6 10.8 11.3   BUN mg/dL 27* 30* 35*   CREATININE mg/dL 0.66 0.75 0.72   GLUCOSE mg/dL 161* 223* 254*   EGFR mL/min/1.73 91.6 83.1 87.3   CALCIUM mg/dL 9.3 9.4 9.4   ALBUMIN g/dL 2.8* 3.3* 3.1*   BILIRUBIN mg/dL 0.2 <0.2  --    ALK PHOS U/L 79 101  --    ALT (SGPT) U/L 12 27  --    AST (SGOT) U/L 37* 50*  --        Scheduled Meds:apixaban, 5 mg, Oral, BID  arformoterol, 15 mcg, Nebulization, BID - RT   And  budesonide, 0.5 mg, Nebulization, BID - RT   And  revefenacin, 175 mcg, Nebulization, Daily - RT  baclofen, 10 mg, Oral, Daily  buPROPion XL, 150 mg, Oral, Q24H  carbidopa-levodopa, 1 tablet, Oral, TID  ciprofloxacin-dexAMETHasone, 4 drop, Left Ear, BID  dilTIAZem CD, 360 mg, Oral, Q24H  furosemide, 20 mg, Oral, Daily  gabapentin, 300 mg, Oral, Nightly  glipizide, 10 mg, Oral, BID AC  insulin glargine, 25 Units, Subcutaneous, Daily  insulin lispro, 4-24 Units, Subcutaneous, 4x Daily With Meals & Nightly  lisinopril, 5 mg, Oral, Daily  metoprolol succinate XL, 50 mg, Oral, Q12H  mirtazapine, 15 mg, Oral, Nightly  montelukast, 10 mg, Oral, Nightly  pantoprazole, 40 mg, Oral, Q AM  pravastatin, 80 mg, Oral, Nightly  sertraline, 200 mg, Oral, Daily      Continuous Infusions:   PRN Meds:.•  acetaminophen **OR** acetaminophen **OR** acetaminophen  •  albuterol  •  aluminum-magnesium hydroxide-simethicone  •  senna-docusate sodium **AND** polyethylene glycol **AND** bisacodyl **AND** bisacodyl  •  dextrose  •  dextrose  •  diphenhydrAMINE  •  glucagon (human recombinant)  •  melatonin  •  ondansetron  •  prochlorperazine **OR** prochlorperazine  **OR** prochlorperazine  •  sodium chloride    Assessment & Plan   Assessment / Plan       Active Hospital Problems:    Active Hospital Problems    Diagnosis  POA   • **Generalized weakness [R53.1]  Yes   • Neoplastic growth [D49.9]  Yes     MRI showed 5.1 into 2.7 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  Per neurosurgery resection of the mass will have a very bad prognosis  Family have decided not to proceed with biopsy lumbar puncture or any other active intervention  Per patient request patient is DNR     • Hypokalemia [E87.6]  Yes       Plan:   No surgical intervention  Overall prognosis is poor  Waiting for rehab placement       Electronically signed by Emory Ratliff MD, 04/18/25, 9:00 AM EDT.

## 2025-04-18 NOTE — PLAN OF CARE
Goal Outcome Evaluation:  Plan of Care Reviewed With: patient        Progress: improving  Outcome Evaluation: Pt a/ox4  today. Pt was able to stand at bedside during dayshift and hopes to transfer to chair today during dayshift. No c/o of pain in left ear so drops are effective. Pt on room air. Slept most of shift comfortably. Cont with poc.

## 2025-04-18 NOTE — PLAN OF CARE
Goal Outcome Evaluation:  Plan of Care Reviewed With: patient        Progress: no change  Outcome Evaluation: Vitals WNL. Pt is A&O x4. Pt has been resting well. No complaints of pain this shift. BS has been checked this shift and covered with sliding scale. Pt has complained of any ear pain this shift since starting ear drops. Pt was a x2-3 when getting to and from the chair. No other complaints this shift. Call light within reach. Will continue plan of care.

## 2025-04-18 NOTE — THERAPY TREATMENT NOTE
Patient Name: Camilla Navas  : 1950    MRN: [id number]                              Today's Date: 2025       Admit Date: 2025    Visit Dx:     ICD-10-CM ICD-9-CM   1. Generalized weakness  R53.1 780.79   2. Hypokalemia  E87.6 276.8   3. Bilateral lower extremity edema  R60.0 782.3   4. Unable to ambulate  R26.2 719.7   5. Acute urinary retention  R33.8 788.29   6. Difficulty walking  R26.2 719.7   7. Oropharyngeal dysphagia  R13.12 787.22     Patient Active Problem List   Diagnosis    Reflux esophagitis    Type 2 diabetes mellitus    Depression    DDD (degenerative disc disease), cervical    Cough    Colon polyps    Chest pain    Cancer    Atrophic vaginitis    Bladder disorder    Nonobstructive atherosclerosis of coronary artery    Asthma without status asthmaticus    Adjustment disorder with anxiety    Functional dyspepsia    Esophageal reflux    Ear pain    Dry mouth    Diverticula of intestine    Diabetic peripheral neuropathy associated with type 2 diabetes mellitus    Difficult or painful urination    Limb swelling    Insomnia    Hyperlipemia    Hematuria    Generalized anxiety disorder    Menopausal and postmenopausal disorder    Mandibular mass    Malignant neoplasm of female breast    Major depression, single episode    Sensorineural hearing loss    Seasonal allergic rhinitis    Renal mass    Overweight with body mass index (BMI) 25.0-29.9    Osteoarthritis    Obesity    Vaginal itching    Simple renal cyst    Essential hypertension    Diarrhea    Generalized abdominal pain    Nausea    Heartburn    Pedal edema    Hypokalemia    Pharyngitis    Persistent atrial fibrillation    Atrial fib/flutter, transient    Chest discomfort    Atrial fibrillation with rapid ventricular response    Dehydration    Diabetic gastroparesis    Generalized weakness    Neoplastic growth     Past Medical History:   Diagnosis Date    Arthritis     Asthma     Atherosclerosis of coronary artery 2018     Non-obstructive coronary artery disease. Cardiac catheterization done in October 2018 showed a 40% mid LAD lesion and a 40% proximal RCA lesion.    Cancer     Breast.     COPD (chronic obstructive pulmonary disease)     Diabetes mellitus     Diverticulitis     Hiatal hernia     Hyperlipemia 01/21/2022    Hypertension, essential 03/05/2022     Past Surgical History:   Procedure Laterality Date    COLONOSCOPY      COLONOSCOPY N/A 11/14/2023    Procedure: COLONOSCOPY WITH POLYPECTOMIES HOT/COLD SNARE, ELEVIEW INJECTION, BIOPSIES;  Surgeon: Lester Gillette MD;  Location: [facility];  Service: Gastroenterology;  Laterality: N/A;  COLON POLYPS, DIVERTICULOSIS    ENDOSCOPY N/A 11/14/2023    Procedure: ESOPHAGOGASTRODUODENOSCOPY WITH BIOPSIES;  Surgeon: Lester Gillette MD;  Location: [facility] ENDOSCOPY;  Service: Gastroenterology;  Laterality: N/A;  PREVIOUS SURGERY    HERNIA REPAIR      HYSTERECTOMY      MASTECTOMY Bilateral     UPPER GASTROINTESTINAL ENDOSCOPY        General Information       Row Name 04/18/25 0842          OT Time and Intention    Document Type therapy note (daily note)  -PG     Mode of Treatment individual therapy;occupational therapy  -PG               User Key  (r) = Recorded By, (t) = Taken By, (c) = Cosigned By      Initials Name Provider Type    PG Matthew Sousa, OT Occupational Therapist                     Mobility/ADL's       Row Name 04/18/25 0842          Bed Mobility    Bed Mobility supine-sit  -PG     All Activities, [place] (Bed Mobility) moderate assist (50% patient effort)  -PG       Row Name 04/18/25 0842          Transfers    Transfers bed-chair transfer  -PG       Row Name 04/18/25 0842          Bed-Chair Transfer    Bed-Chair [place] (Transfers) moderate assist (50% patient effort);2 person assist;verbal cues  -PG     Assistive Device (Bed-Chair Transfers) walker, front-wheeled  -PG               User Key  (r) = Recorded By, (t) = Taken By, (c) = Cosigned By       Initials Name Provider Type    PG Matthew Sousa OT Occupational Therapist                   Obj/Interventions    No documentation.                  Goals/Plan    No documentation.                  Clinical Impression       Row Name 04/18/25 0846          Plan of Care Review    Progress no change  -PG               User Key  (r) = Recorded By, (t) = Taken By, (c) = Cosigned By      Initials Name Provider Type    PG Matthew Sousa OT Occupational Therapist                   Outcome Measures       Row Name 04/18/25 0847          How much help from another is currently needed...    Putting on and taking off regular lower body clothing? 1  -PG     Bathing (including washing, rinsing, and drying) 2  -PG     Toileting (which includes using toilet bed pan or urinal) 1  -PG     Putting on and taking off regular upper body clothing 3  -PG     Taking care of personal grooming (such as brushing teeth) 3  -PG     Eating meals 4  -PG     AM-PAC 6 Clicks Score (OT) 14  -PG       Row Name 04/18/25 0847          Functional Assessment    Outcome Measure Options AM-PAC 6 Clicks Daily Activity (OT);Optimal Instrument  -PG       Row Name 04/18/25 0847          Optimal Instrument    Optimal Instrument Optimal - 3  -PG     Bending/Stooping 4  -PG     Standing 4  -PG     Reaching 2  -PG               User Key  (r) = Recorded By, (t) = Taken By, (c) = Cosigned By      Initials Name Provider Type    Matthew Ballard OT Occupational Therapist                      OT Recommendation and Plan  Planned Therapy Interventions (OT): activity tolerance training, BADL retraining, strengthening exercise, transfer/mobility retraining, patient/caregiver education/training, occupation/activity based interventions  Therapy Frequency (OT): 5 times/wk  Plan of Care Review  Plan of Care Reviewed With: patient  Progress: no change  Outcome Evaluation: Patient presents with limitations affecting strength, activity tolerance, and balance impacting  patient's ability to return home safely and independently.  The skills of a therapist will be required to safely and effectively implement the following treatment plan to restore maximal level of function     Time Calculation:   Evaluation Complexity (OT)  Review Occupational Profile/Medical/Therapy History Complexity: brief/low complexity  Assessment, Occupational Performance/Identification of Deficit Complexity: 3-5 performance deficits  Clinical Decision Making Complexity (OT): problem focused assessment/low complexity  Overall Complexity of Evaluation (OT): low complexity     Time Calculation- OT       Row Name 04/18/25 0849             Time Calculation- OT    OT Received On 04/18/25  -PG      OT Goal Re-Cert Due Date 04/19/25  -PG         Timed Charges    19299 - OT Therapeutic Activity Minutes 15  -PG         Total Minutes    Timed Charges Total Minutes 15  -PG       Total Minutes 15  -PG                User Key  (r) = Recorded By, (t) = Taken By, (c) = Cosigned By      Initials Name Provider Type    PG Matthew Sousa OT Occupational Therapist                  Therapy Charges for Today       Code Description Service Date Service Provider Modifiers Qty    [id number]  OT THER PROC EA 15 MIN 4/17/2025 Matthew Sousa OT GO 1    [id number]  OT THERAPEUTIC ACT EA 15 MIN 4/18/2025 Matthew Sousa OT GO 1                 Matthew Sousa OT  4/18/2025

## 2025-04-19 VITALS
RESPIRATION RATE: 18 BRPM | WEIGHT: 195.11 LBS | DIASTOLIC BLOOD PRESSURE: 53 MMHG | SYSTOLIC BLOOD PRESSURE: 102 MMHG | HEART RATE: 89 BPM | HEIGHT: 66 IN | BODY MASS INDEX: 31.36 KG/M2 | OXYGEN SATURATION: 95 % | TEMPERATURE: 98.6 F

## 2025-04-19 PROBLEM — E87.6 HYPOKALEMIA: Status: RESOLVED | Noted: 2024-06-07 | Resolved: 2025-04-19

## 2025-04-19 PROBLEM — R53.1 GENERALIZED WEAKNESS: Status: RESOLVED | Noted: 2025-04-04 | Resolved: 2025-04-19

## 2025-04-19 LAB
GLUCOSE BLDC GLUCOMTR-MCNC: 106 MG/DL (ref 70–99)
GLUCOSE BLDC GLUCOMTR-MCNC: 180 MG/DL (ref 70–99)
GLUCOSE BLDC GLUCOMTR-MCNC: 63 MG/DL (ref 70–99)

## 2025-04-19 PROCEDURE — 94664 DEMO&/EVAL PT USE INHALER: CPT

## 2025-04-19 PROCEDURE — 63710000001 INSULIN LISPRO (HUMAN) PER 5 UNITS: Performed by: STUDENT IN AN ORGANIZED HEALTH CARE EDUCATION/TRAINING PROGRAM

## 2025-04-19 PROCEDURE — 94799 UNLISTED PULMONARY SVC/PX: CPT

## 2025-04-19 PROCEDURE — 63710000001 INSULIN GLARGINE PER 5 UNITS: Performed by: STUDENT IN AN ORGANIZED HEALTH CARE EDUCATION/TRAINING PROGRAM

## 2025-04-19 PROCEDURE — 82948 REAGENT STRIP/BLOOD GLUCOSE: CPT | Performed by: STUDENT IN AN ORGANIZED HEALTH CARE EDUCATION/TRAINING PROGRAM

## 2025-04-19 PROCEDURE — 63710000001 REVEFENACIN 175 MCG/3ML SOLUTION: Performed by: STUDENT IN AN ORGANIZED HEALTH CARE EDUCATION/TRAINING PROGRAM

## 2025-04-19 PROCEDURE — 94761 N-INVAS EAR/PLS OXIMETRY MLT: CPT

## 2025-04-19 PROCEDURE — 82948 REAGENT STRIP/BLOOD GLUCOSE: CPT | Performed by: INTERNAL MEDICINE

## 2025-04-19 PROCEDURE — 25010000002 ONDANSETRON PER 1 MG: Performed by: INTERNAL MEDICINE

## 2025-04-19 RX ORDER — CARBIDOPA/LEVODOPA 10MG-100MG
1 TABLET ORAL 3 TIMES DAILY
Qty: 90 TABLET | Refills: 0 | Status: SHIPPED | OUTPATIENT
Start: 2025-04-19 | End: 2025-05-19

## 2025-04-19 RX ORDER — GLIPIZIDE 10 MG/1
10 TABLET ORAL
Qty: 60 TABLET | Refills: 0 | Status: SHIPPED | OUTPATIENT
Start: 2025-04-19

## 2025-04-19 RX ADMIN — INSULIN LISPRO 4 UNITS: 100 INJECTION, SOLUTION INTRAVENOUS; SUBCUTANEOUS at 12:15

## 2025-04-19 RX ADMIN — ONDANSETRON 4 MG: 2 INJECTION INTRAMUSCULAR; INTRAVENOUS at 13:54

## 2025-04-19 RX ADMIN — BUPROPION HYDROCHLORIDE 150 MG: 150 TABLET, EXTENDED RELEASE ORAL at 08:33

## 2025-04-19 RX ADMIN — DILTIAZEM HYDROCHLORIDE 360 MG: 180 CAPSULE, COATED, EXTENDED RELEASE ORAL at 08:33

## 2025-04-19 RX ADMIN — CIPROFLOXACIN AND DEXAMETHASONE 4 DROP: 3; 1 SUSPENSION/ DROPS AURICULAR (OTIC) at 08:35

## 2025-04-19 RX ADMIN — GLIPIZIDE 10 MG: 5 TABLET ORAL at 08:30

## 2025-04-19 RX ADMIN — Medication 10 ML: at 08:34

## 2025-04-19 RX ADMIN — FUROSEMIDE 20 MG: 20 TABLET ORAL at 08:32

## 2025-04-19 RX ADMIN — APIXABAN 5 MG: 5 TABLET, FILM COATED ORAL at 08:33

## 2025-04-19 RX ADMIN — ARFORMOTEROL TARTRATE 15 MCG: 15 SOLUTION RESPIRATORY (INHALATION) at 07:34

## 2025-04-19 RX ADMIN — ONDANSETRON 4 MG: 2 INJECTION INTRAMUSCULAR; INTRAVENOUS at 09:46

## 2025-04-19 RX ADMIN — CARBIDOPA AND LEVODOPA 1 TABLET: 10; 100 TABLET ORAL at 08:33

## 2025-04-19 RX ADMIN — SERTRALINE HYDROCHLORIDE 200 MG: 100 TABLET ORAL at 08:33

## 2025-04-19 RX ADMIN — ACETAMINOPHEN 650 MG: 325 TABLET ORAL at 05:35

## 2025-04-19 RX ADMIN — PANTOPRAZOLE SODIUM 40 MG: 40 TABLET, DELAYED RELEASE ORAL at 05:35

## 2025-04-19 RX ADMIN — INSULIN GLARGINE 20 UNITS: 100 INJECTION, SOLUTION SUBCUTANEOUS at 08:32

## 2025-04-19 RX ADMIN — REVEFENACIN 175 MCG: 175 SOLUTION RESPIRATORY (INHALATION) at 07:34

## 2025-04-19 RX ADMIN — BUDESONIDE 0.5 MG: 0.5 SUSPENSION RESPIRATORY (INHALATION) at 07:34

## 2025-04-19 RX ADMIN — BACLOFEN 10 MG: 10 TABLET ORAL at 08:35

## 2025-04-19 RX ADMIN — METOPROLOL SUCCINATE 50 MG: 50 TABLET, FILM COATED, EXTENDED RELEASE ORAL at 08:33

## 2025-04-19 RX ADMIN — LISINOPRIL 5 MG: 5 TABLET ORAL at 08:33

## 2025-04-19 NOTE — DISCHARGE SUMMARY
[facility]   DISCHARGE SUMMARY    Patient Name: Camilla Navas  : 1950  MRN: [id number]    Date of Admission: 2025  Date of Discharge: 2025  Primary Care Physician: Lester Lizarraga MD    Consults       Date and Time Order Name Status Description    2025  8:14 AM Inpatient Neurosurgery Consult Completed     2025 10:39 AM Inpatient Neurology Consult General      2025  8:47 AM Inpatient Orthopedic Surgery Consult Completed     2025 12:33 PM Nephrology  (on-call MD unless specified)              Hospital Course     Presenting Problem:   Hypokalemia [E87.6]  Unable to ambulate [R26.2]  Generalized weakness [R53.1]  Acute urinary retention [R33.8]  Bilateral lower extremity edema [R60.0]  Neoplastic growth [D49.9]    Active and resolved problems  Principal Problem (Resolved):    Generalized weakness  Overview:  Active Problems:    Neoplastic growth  Overview:  Resolved Problems:    Hypokalemia  Overview:     Hospital Course:  Camilla Navas is a 74 y.o. female 74-year-old female with past medical history of chronic A-fib on anticoagulation, type 2 diabetes, hyperlipidemia, asthma, history of breast cancer who came in with nausea and generalized weakness found to have potassium of 2.9 which has been repleted with labs otherwise largely unremarkable and urinalysis showing few RBCs.  Creatinine is otherwise normal.  Has uptrending white count and generalized weakness with frailty however no apparent obvious sign of infection.  Knee with bilateral osteoarthritis and effusion which was drained and treated by orthopedics on .  UA concerning for UTI and cultures positive sensitive to ceftriaxone with Klebsiella completed treatment.  Due to patient's persistent tremor, MRI of the brain showed concerns neoplasm either due to lymphoma or GBM with poor prognosis. Patients BS were elevated and started on insulin currently on 25 units on discharge and glipizide dose increased to  10 bid. Patient to dc to rehab and subsequently to hospice      Vital Signs:  Temp:  [97.5 °F (36.4 °C)-98.6 °F (37 °C)] 98.6 °F (37 °C)  Heart Rate:  [] 89  Resp:  [16-18] 18  BP: (102-144)/(52-75) 102/53      Discharge Details        Discharge Medications        New Medications        Instructions Start Date   carbidopa-levodopa  MG per tablet  Commonly known as: SINEMET   1 tablet, Oral, 3 Times Daily      insulin glargine 100 UNIT/ML injection  Commonly known as: LANTUS, SEMGLEE   20 Units, Subcutaneous, Daily   Start Date: April 20, 2025            Changes to Medications        Instructions Start Date   glipizide 10 MG tablet  Commonly known as: GLUCOTROL  What changed:   medication strength  how much to take  when to take this   10 mg, Oral, 2 Times Daily Before Meals      ipratropium-albuterol 0.5-2.5 mg/3 ml nebulizer  Commonly known as: DUO-NEB  What changed: See the new instructions.   USE 3 ML VIA NEBULIZER FOUR TIMES DAILY AS NEEDED FOR WHEEZING OR SHORTNESS OF BREATH             Continue These Medications        Instructions Start Date   albuterol sulfate  (90 Base) MCG/ACT inhaler  Commonly known as: PROVENTIL HFA;VENTOLIN HFA;PROAIR HFA   2 puffs, Inhalation, Every 4 Hours PRN      apixaban 5 MG tablet tablet  Commonly known as: ELIQUIS   5 mg, Oral, 2 Times Daily      Arthritis Pain Reliever 650 MG 8 hr tablet  Generic drug: acetaminophen   650 mg, Every 8 Hours PRN      baclofen 10 MG tablet  Commonly known as: LIORESAL   10 mg, Daily      Benadryl Allergy 25 MG tablet  Generic drug: diphenhydrAMINE   25 mg, Every 6 Hours PRN      buPROPion  MG 24 hr tablet  Commonly known as: WELLBUTRIN XL   150 mg, Every 24 Hours      dilTIAZem HCl  MG 24 hr tablet  Commonly known as: CARDIZEM LA   1 tablet, Daily      Fluticasone-Umeclidin-Vilant 200-62.5-25 MCG/ACT inhaler  Commonly known as: TRELEGY ELLIPTA   1 puff, Inhalation, Daily - RT      furosemide 20 MG tablet  Commonly  known as: LASIX   20 mg, Oral, Daily      gabapentin 300 MG capsule  Commonly known as: NEURONTIN   300 mg, Nightly      Insulin Lispro 100 UNIT/ML injection  Commonly known as: humaLOG   3 Times Daily Before Meals      lisinopril 5 MG tablet  Commonly known as: PRINIVIL,ZESTRIL   1 tablet, Daily      metoprolol succinate XL 50 MG 24 hr tablet  Commonly known as: TOPROL-XL   50 mg, Oral, Every 12 Hours Scheduled      mirtazapine 15 MG tablet  Commonly known as: REMERON   15 mg, Nightly      montelukast 10 MG tablet  Commonly known as: SINGULAIR   10 mg, Oral, Nightly      nitroglycerin 0.4 MG SL tablet  Commonly known as: NITROSTAT   1 under the tongue as needed for angina, may repeat q5mins for up three doses      omeprazole 40 MG capsule  Commonly known as: priLOSEC   40 mg, Oral, 2 Times Daily      ondansetron 4 MG tablet  Commonly known as: ZOFRAN   4 mg, Every 6 Hours PRN      pravastatin 80 MG tablet  Commonly known as: PRAVACHOL   80 mg, Oral, Daily      sertraline 100 MG tablet  Commonly known as: ZOLOFT   200 mg, Daily             Stop These Medications      hydroCHLOROthiazide 25 MG tablet     vitamin D3 125 MCG (5000 UT) capsule capsule     vitamin E 400 UNIT capsule              Allergies   Allergen Reactions   • Empagliflozin Nausea And Vomiting   • Metformin Diarrhea         Discharge Disposition:  Rehab Facility or Unit (DC - External)    Diet:  As toelrared soft      Discharge Activity:   As tolerated      CODE STATUS:    Code Status and Medical Interventions: No CPR (Do Not Attempt to Resuscitate); Limited Support; No intubation (DNI), No cardioversion   Ordered at: 04/16/25 0849     Code Status (Patient has no pulse and is not breathing):    No CPR (Do Not Attempt to Resuscitate)     Medical Interventions (Patient has pulse or is breathing):    Limited Support     Medical Intervention Limits:    No intubation (DNI)       No cardioversion         Future Appointments   Date Time Provider Department  [place]   4/30/2025  9:15 AM Randall Mathew MD [facility]   7/17/2025  9:30 AM Umesh Guadarrama MD [facility]           Time spent on Discharge including face to face service:  35 minutes    Electronically signed by Philip Bueno MD, 04/19/25, 2:20 PM EDT.

## 2025-04-19 NOTE — PLAN OF CARE
Goal Outcome Evaluation:              Outcome Evaluation: Pt discharged to [facility] by EMS.  Education completed.  POC met.

## 2025-04-19 NOTE — PLAN OF CARE
Goal Outcome Evaluation:  Plan of Care Reviewed With: patient        Progress: no change  Outcome Evaluation: VSS. A&Ox4. Family at bedside and actively participates in pt care. Pt rested well throughout shift. No c/o pain or discomfort at this time. Expecting discharge today.

## 2025-04-19 NOTE — PROGRESS NOTES
[facility]     Progress Note    Patient Name: Camilla Navas  : 1950  MRN: [id number]  Primary Care Physician:  Lester Lizarraga MD  Date of admission: 2025    Subjective   No major acute events overnight  Patient awaiting placement    Scheduled Meds:apixaban, 5 mg, Oral, BID  arformoterol, 15 mcg, Nebulization, BID - RT   And  budesonide, 0.5 mg, Nebulization, BID - RT   And  revefenacin, 175 mcg, Nebulization, Daily - RT  baclofen, 10 mg, Oral, Daily  buPROPion XL, 150 mg, Oral, Q24H  carbidopa-levodopa, 1 tablet, Oral, TID  ciprofloxacin-dexAMETHasone, 4 drop, Left Ear, BID  dilTIAZem CD, 360 mg, Oral, Q24H  furosemide, 20 mg, Oral, Daily  gabapentin, 300 mg, Oral, Nightly  glipizide, 10 mg, Oral, BID AC  insulin glargine, 25 Units, Subcutaneous, Daily  insulin lispro, 4-24 Units, Subcutaneous, 4x Daily With Meals & Nightly  lisinopril, 5 mg, Oral, Daily  metoprolol succinate XL, 50 mg, Oral, Q12H  mirtazapine, 15 mg, Oral, Nightly  montelukast, 10 mg, Oral, Nightly  pantoprazole, 40 mg, Oral, Q AM  pravastatin, 80 mg, Oral, Nightly  sertraline, 200 mg, Oral, Daily      Continuous Infusions:   PRN Meds:.  acetaminophen **OR** acetaminophen **OR** acetaminophen    albuterol    aluminum-magnesium hydroxide-simethicone    senna-docusate sodium **AND** polyethylene glycol **AND** bisacodyl **AND** bisacodyl    dextrose    dextrose    diphenhydrAMINE    glucagon (human recombinant)    melatonin    ondansetron    prochlorperazine **OR** prochlorperazine **OR** prochlorperazine    sodium chloride       Review of Systems  Constitutional:        Weakness tiredness fatigue  Eyes:                       No blurry vision, eye discharge, eye irritation, eye pain  HEENT:                   No acute hair loss, earache and discharge, nasal congestion or discharge, sore throat, postnasal drip  Respiratory:           No shortness of breath coughing sputum production wheezing hemoptysis pleuritic chest  pain  Cardiovascular:     No chest pain, orthopnea, PND, dizziness, palpitation, lower extremity edema  Gastrointestinal:   No nausea vomiting diarrhea abdominal pain constipation  Genitourinary:       No urinary incontinence, hesitancy, frequency, urgency, dysuria  Hematologic:         No bruising, bleeding, pallor, lymphadenopathy  Endocrine:            No coldness, hot flashes, polyuria, abnormal hair growth  Musculoskeletal:    No body pains, aches, arthritic pains, muscle pain ,muscle wasting  Psychiatric:          No low or high mood, anxiety, hallucinations, delusions  Skin.                      No rash, ulcers, bruising, itching  Neurological:        No confusion, headache, focal weakness, numbness, dysphasia    Objective   Objective     Vitals:   Temp:  [97.3 °F (36.3 °C)-98.2 °F (36.8 °C)] 97.5 °F (36.4 °C)  Heart Rate:  [] 96  Resp:  [16-18] 18  BP: (103-137)/(52-73) 136/69  Physical Exam    Constitutional: Awake, alert responsive, conversant, no obvious distress              Psychiatric:  Appropriate affect, cooperative   Neurologic:  Awake alert ,oriented x 3, strength symmetric in all extremities, Cranial Nerves grossly intact to confrontation, speech clear   Eyes:   PERRLA, sclerae anicteric, no conjunctival injection   HEENT:  Moist mucous membranes, no nasal or eye discharge, no throat congestion   Neck:   Supple, no thyromegaly, no lymphadenopathy, trachea midline, no elevated JVD   Respiratory:  Clear to auscultation bilaterally, nonlabored respirations    Cardiovascular: RRR, no murmurs, rubs, or gallops, palpable pedal pulses bilaterally, No bilateral ankle edema   Gastrointestinal: Positive bowel sounds, soft, nontender, nondistended, no organomegaly   Musculoskeletal:  No clubbing or cyanosis to extremities,muscle wasting, joint swelling, muscle weakness             Skin:                      No rashes, bruising, skin ulcers, petechiae or ecchymosis    Result Review    Result Review:  I  have personally reviewed the results from the time of this admission to 4/19/2025 07:39 EDT and agree with these findings:  []  Laboratory  []  Microbiology  []  Radiology  []  EKG/Telemetry   []  Cardiology/Vascular   []  Pathology  []  Old records  []  Other:    Assessment & Plan   Assessment / Plan       Active Hospital Problems:  Active Hospital Problems    Diagnosis     **Generalized weakness     Neoplastic growth     Hypokalemia      74-year-old female with past medical history of chronic A-fib on anticoagulation, type 2 diabetes, hyperlipidemia, asthma, history of breast cancer who came in with nausea and generalized weakness found to have potassium of 2.9 which has been repleted with labs otherwise largely unremarkable and urinalysis showing few RBCs.  Creatinine is otherwise normal.  Has uptrending white count and generalized weakness with frailty however no apparent obvious sign of infection.  Knee with bilateral osteoarthritis and effusion which was drained and treated by orthopedics on 4/8.  UA concerning for UTI and cultures positive sensitive to ceftriaxone with Klebsiella completed treatment.  Due to patient's persistent tremor, MRI of the brain showed concerns neoplasm either due to lymphoma or GBM with poor prognosis     Plan:   Physical therapy.   to help with placement  Will continue Brovana Pulmicort and Yupelri and Singulair  Continue Eliquis 5 mg twice daily, Cardizem 360 and metoprolol 50 every 12  Continue with lisinopril 5 mg daily  Continue with Wellbutrin 150 every 24, baclofen 10 mg daily, Remeron 15 mg nightly, Zoloft 200 mg daily  Continue pravastatin 80 mg daily  Patient status post bilateral knee effusion drainage.  No concerns for infection  Will try to avoid pain medications as much as possible  Neurology has been consulted.  Appreciate the help in managing the patient.  Patient started him on Sinemet  Patient on Lantus 25 units with sliding scale  Neurosurgery  recommendations noted.  Patient is a poor candidate for intervention/treatment and currently plan to transition to hospice after rehab

## 2025-05-13 ENCOUNTER — TRANSCRIBE ORDERS (OUTPATIENT)
Dept: ADMINISTRATIVE | Facility: HOSPITAL | Age: 75
End: 2025-05-13
Payer: MEDICARE

## 2025-05-13 DIAGNOSIS — C71.8 MALIGNANT NEOPLASM OF OVERLAPPING SITES OF BRAIN: Primary | ICD-10-CM

## (undated) DEVICE — Device

## (undated) DEVICE — SOL IRRG H2O PL/BG 1000ML STRL

## (undated) DEVICE — Device: Brand: DEFENDO AIR/WATER/SUCTION AND BIOPSY VALVE

## (undated) DEVICE — SINGLE-USE BIOPSY FORCEPS: Brand: RADIAL JAW 4

## (undated) DEVICE — LINER SURG CANSTR SXN S/RIGD 1500CC

## (undated) DEVICE — SNAR E/S POLYP SNAREMASTER OVL/10MM 2.8X2300MM YEL

## (undated) DEVICE — THE SINGLE USE ETRAP – POLYP TRAP IS USED FOR SUCTION RETRIEVAL OF ENDOSCOPICALLY REMOVED POLYPS.: Brand: ETRAP

## (undated) DEVICE — PAD GRND REM POLYHESIVE A/ DISP

## (undated) DEVICE — SOLIDIFIER LIQLOC PLS 1500CC BT

## (undated) DEVICE — BLCK/BITE BLOX WO/DENTL/RIM W/STRAP 54F

## (undated) DEVICE — CONN JET HYDRA H20 AUXILIARY DISP